# Patient Record
Sex: FEMALE | Race: WHITE
[De-identification: names, ages, dates, MRNs, and addresses within clinical notes are randomized per-mention and may not be internally consistent; named-entity substitution may affect disease eponyms.]

---

## 2018-10-14 ENCOUNTER — HOSPITAL ENCOUNTER (INPATIENT)
Dept: HOSPITAL 54 - ER | Age: 58
LOS: 4 days | Discharge: NURSING, REHAB OR LONG-TERM CARE | DRG: 64 | End: 2018-10-18
Attending: INTERNAL MEDICINE | Admitting: INTERNAL MEDICINE

## 2018-10-14 VITALS — SYSTOLIC BLOOD PRESSURE: 132 MMHG | DIASTOLIC BLOOD PRESSURE: 62 MMHG

## 2018-10-14 VITALS — DIASTOLIC BLOOD PRESSURE: 55 MMHG | SYSTOLIC BLOOD PRESSURE: 125 MMHG

## 2018-10-14 VITALS — BODY MASS INDEX: 37.65 KG/M2 | HEIGHT: 61.5 IN | WEIGHT: 202 LBS

## 2018-10-14 VITALS — DIASTOLIC BLOOD PRESSURE: 56 MMHG | SYSTOLIC BLOOD PRESSURE: 135 MMHG

## 2018-10-14 VITALS — DIASTOLIC BLOOD PRESSURE: 72 MMHG | SYSTOLIC BLOOD PRESSURE: 139 MMHG

## 2018-10-14 VITALS — DIASTOLIC BLOOD PRESSURE: 71 MMHG | SYSTOLIC BLOOD PRESSURE: 145 MMHG

## 2018-10-14 VITALS — DIASTOLIC BLOOD PRESSURE: 79 MMHG | SYSTOLIC BLOOD PRESSURE: 180 MMHG

## 2018-10-14 VITALS — SYSTOLIC BLOOD PRESSURE: 141 MMHG | DIASTOLIC BLOOD PRESSURE: 63 MMHG

## 2018-10-14 DIAGNOSIS — I21.4: ICD-10-CM

## 2018-10-14 DIAGNOSIS — Z87.01: ICD-10-CM

## 2018-10-14 DIAGNOSIS — R29.706: ICD-10-CM

## 2018-10-14 DIAGNOSIS — G81.94: ICD-10-CM

## 2018-10-14 DIAGNOSIS — E78.5: ICD-10-CM

## 2018-10-14 DIAGNOSIS — J96.01: ICD-10-CM

## 2018-10-14 DIAGNOSIS — Z79.84: ICD-10-CM

## 2018-10-14 DIAGNOSIS — I25.10: ICD-10-CM

## 2018-10-14 DIAGNOSIS — I10: ICD-10-CM

## 2018-10-14 DIAGNOSIS — R29.810: ICD-10-CM

## 2018-10-14 DIAGNOSIS — E87.1: ICD-10-CM

## 2018-10-14 DIAGNOSIS — E11.9: ICD-10-CM

## 2018-10-14 DIAGNOSIS — I65.22: ICD-10-CM

## 2018-10-14 DIAGNOSIS — I63.9: Primary | ICD-10-CM

## 2018-10-14 LAB
ALBUMIN SERPL BCP-MCNC: 3.5 G/DL (ref 3.4–5)
ALP SERPL-CCNC: 97 U/L (ref 46–116)
ALT SERPL W P-5'-P-CCNC: 37 U/L (ref 12–78)
APTT PPP: 24 SEC (ref 23–34)
AST SERPL W P-5'-P-CCNC: 13 U/L (ref 15–37)
BASOPHILS # BLD AUTO: 0 /CMM (ref 0–0.2)
BASOPHILS NFR BLD AUTO: 0.2 % (ref 0–2)
BILIRUB DIRECT SERPL-MCNC: 0.1 MG/DL (ref 0–0.2)
BILIRUB SERPL-MCNC: 0.6 MG/DL (ref 0.2–1)
BUN SERPL-MCNC: 17 MG/DL (ref 7–18)
CALCIUM SERPL-MCNC: 9.6 MG/DL (ref 8.5–10.1)
CHLORIDE SERPL-SCNC: 94 MMOL/L (ref 98–107)
CHOLEST SERPL-MCNC: 213 MG/DL (ref ?–200)
CO2 SERPL-SCNC: 28 MMOL/L (ref 21–32)
CREAT SERPL-MCNC: 0.7 MG/DL (ref 0.6–1.3)
EOSINOPHIL NFR BLD AUTO: 1.6 % (ref 0–6)
GLUCOSE SERPL-MCNC: 209 MG/DL (ref 74–106)
HCT VFR BLD AUTO: 41 % (ref 33–45)
HDLC SERPL-MCNC: 45 MG/DL (ref 40–60)
HGB BLD-MCNC: 13.1 G/DL (ref 11.5–14.8)
INR PPP: 0.91 (ref 0.87–1.13)
LDLC SERPL DIRECT ASSAY-MCNC: 129 MG/DL (ref 0–99)
LYMPHOCYTES NFR BLD AUTO: 22.9 % (ref 20–44)
LYMPHOCYTES NFR BLD AUTO: 3 /CMM (ref 0.8–4.8)
MCHC RBC AUTO-ENTMCNC: 32 G/DL (ref 31–36)
MCV RBC AUTO: 82 FL (ref 82–100)
MONOCYTES NFR BLD AUTO: 0.5 /CMM (ref 0.1–1.3)
MONOCYTES NFR BLD AUTO: 4.2 % (ref 2–12)
NEUTROPHILS # BLD AUTO: 9.3 /CMM (ref 1.8–8.9)
NEUTROPHILS NFR BLD AUTO: 71.1 % (ref 43–81)
NT-PROBNP SERPL-MCNC: 42 PG/ML (ref 0–125)
PLATELET # BLD AUTO: 523 /CMM (ref 150–450)
POTASSIUM SERPL-SCNC: 4.4 MMOL/L (ref 3.5–5.1)
PROT SERPL-MCNC: 7.5 G/DL (ref 6.4–8.2)
RBC # BLD AUTO: 5.03 MIL/UL (ref 4–5.2)
RDW COEFFICIENT OF VARIATION: 14.1 (ref 11.5–15)
SODIUM SERPL-SCNC: 128 MMOL/L (ref 136–145)
TRIGL SERPL-MCNC: 411 MG/DL (ref 30–150)
TROPONIN I SERPL-MCNC: < 0.017 NG/ML (ref 0–0.06)
TSH SERPL DL<=0.005 MIU/L-ACNC: 2.78 UIU/ML (ref 0.36–3.74)
WBC NRBC COR # BLD AUTO: 13.1 K/UL (ref 4.3–11)

## 2018-10-14 PROCEDURE — Z7610: HCPCS

## 2018-10-14 PROCEDURE — A6402 STERILE GAUZE <= 16 SQ IN: HCPCS

## 2018-10-14 PROCEDURE — A4606 OXYGEN PROBE USED W OXIMETER: HCPCS

## 2018-10-14 PROCEDURE — G0378 HOSPITAL OBSERVATION PER HR: HCPCS

## 2018-10-14 RX ADMIN — Medication PRN MG: at 18:24

## 2018-10-14 RX ADMIN — ISOSORBIDE DINITRATE SCH MG: 20 TABLET ORAL at 17:00

## 2018-10-14 RX ADMIN — ALBUTEROL SULFATE PRN MG: 2.5 SOLUTION RESPIRATORY (INHALATION) at 13:01

## 2018-10-14 RX ADMIN — INSULIN HUMAN PRN UNIT: 100 INJECTION, SOLUTION PARENTERAL at 13:20

## 2018-10-14 RX ADMIN — LEVOFLOXACIN SCH MG: 500 TABLET, FILM COATED ORAL at 13:15

## 2018-10-14 RX ADMIN — ISOSORBIDE DINITRATE SCH MG: 20 TABLET ORAL at 13:16

## 2018-10-14 RX ADMIN — Medication SCH MG: at 15:30

## 2018-10-14 RX ADMIN — SODIUM CHLORIDE PRN MLS/HR: 9 INJECTION, SOLUTION INTRAVENOUS at 17:00

## 2018-10-14 RX ADMIN — Medication SCH MG: at 17:39

## 2018-10-14 RX ADMIN — ALBUTEROL SULFATE PRN MG: 2.5 SOLUTION RESPIRATORY (INHALATION) at 17:40

## 2018-10-14 RX ADMIN — Medication SCH EACH: at 18:31

## 2018-10-14 RX ADMIN — Medication PRN MG: at 14:22

## 2018-10-14 RX ADMIN — Medication SCH EACH: at 13:20

## 2018-10-14 RX ADMIN — Medication SCH MG: at 12:27

## 2018-10-14 RX ADMIN — Medication SCH MG: at 23:00

## 2018-10-14 RX ADMIN — INSULIN HUMAN PRN UNIT: 100 INJECTION, SOLUTION PARENTERAL at 18:33

## 2018-10-14 RX ADMIN — ALBUTEROL SULFATE PRN MG: 2.5 SOLUTION RESPIRATORY (INHALATION) at 21:12

## 2018-10-14 NOTE — NUR
ERROL RN,SPOKE WITH   PATIENT MRI shows acute infarct right basal ganglia internal 
capsul/ischemic  .  patient will be transferred to icu per MD  nursing supervisor was 
notified

## 2018-10-14 NOTE — NUR
TELE RN NOTE



PATIENT BLOOD PRESSURE 125/55. ADMINISTERED ISOSORBIDE PO BUT HELD LOPRESSOR FOR NOW. PER 
DR. SHETTY, THE GOAL IS TO KEEP BLOOD PRESSURE SLIGHTLY ELEVATED TO ENSURE ADEQUATE ORGAN 
PERFUSION.

## 2018-10-14 NOTE — NUR
TELE RN NOTE



PATIENT CARE ENDORSED TO AUBREY HORN. PATIENT CURRENTLY RESTING IN BED IN STABLE CONDITION.

## 2018-10-14 NOTE — NUR
RN NOTE



RECEIVED PT IN NO ACUTE DISTRESS IN BED. PT IS A/O X 3 AND ABLE TO MAKE NEEDS KNOWN. PT IS 
ON O2 VIA NC @ 4 LPM AND TOLERATING WELL WITH O2 SAT @ 97%. PT HAS VISIBLE FACIAL DROOP ON 
THE LEFT SIDE OF MOUTH AND LEFT SIDE OF BODY. PT IS ABLE TO COMMUNICATE WELL WITH GOOD 
ANNUNCIATION OF WORDS. PT IS ABLE TO LIFT LEFT ARM AND LEG WITH SLIGHT DRIFT. PT NOT C/O ANY 
SOB, DIFFICULTY BREATHING OR PAIN AT THIS TIME. PT HAS RIGHT AC 18G THAT IS CLEAN DRY INTACT 
AND PATENT WITH NS @ 80ML/HR. PT HAS LEFT AC 20 G THAT IS CLEAN DRY INTACT AND PATENT WITH 
SALINE FLUSH. BED IN LOW LOCK POSITION WITH RIALS UP X 2. CALL LIGHT WITHIN REACH AND ALL 
SAFETY MEASURES ENSURED AND CARRIED OUT. WILL CONTINUE TO MONITOR PT.

## 2018-10-14 NOTE — NUR
TELE RN NOTE



RECEIVED PATIENT FROM THE ER AT 11:30 ACCOMPANIED BY AUBREY EPSTEIN AND ER TECH. PATIENT 
COMPLAINING OF SHORTNESS OF BREATH AND DIFFICULTY BREATHING. PLACED ON OXYGEN VIA NASAL 
CANNULA AT 4LPM. BREATHING TREATMENT ORDERS INITIATED AND PROVIDED. PLACED ON CARDIAC 
MONITOR, SINUS TACHYCARDIA WITH A HEART RATE . SPO2 97% ON 4LPM. ORIENTED TO UNIT, BED 
IN LOW AND LOCKED POSITION, CALL LIGHT WITHIN REACH, WILL CONTINUE TO MONITOR CLOSELY.

## 2018-10-14 NOTE — NUR
RN ERROL NOTE 



PATIENT IS AOX2-3, SPEECH CLEAR, ON 5L O2 VIA NC, ON TELE SR, TO BE TRANSFERRED TO ICU DUE 
TO MRI RESULTS, LEFT SIDED WEAKNESS, RAC #18G WITH NS AT 80 ML/HR AND LAC #20G SL, R SIDED 
HEADACHE/CHEST/NECK PER REPORT GIVEN MORPHINE, PT IS READY FOR TRANSFER WILL CALL ICU TO GO 
GO TO , SAFETY MAINTAINED AT ALL TIMES, BED IN LOW LOCKED POSITION, CALL LIGHT WITHIN 
REACH, WILL CONTINUE TO MONITOR FOR ANY CHANGES.

## 2018-10-14 NOTE — NUR
TELE RN NOTE



PATIENT COMPLAINING OF CHEST PALPITATIONS AND NECK, HEAD, SHOULDER, AND CHEST PAIN. PATIENT 
VERY ANXIOUS.  SINUS TACHYCARDIA WITH A HEART RATE RANGING BETWEEN 130 . PAGED DR. SHETTY AND DR. ROSS AND AND RECEIVED ORDERS FOR IVP MORPHINE AND IVP LOPRESSOR.

## 2018-10-14 NOTE — NUR
RN ERROL NOTES 



SPOKE WITH DR FISHER AND SAEED RESULTS OF MRI.ORDERS TO GIVE RESULTS TO DE EDUCO AND  
PATIENT TO BE TRANSFERRED TO ICU ROOM 258

## 2018-10-14 NOTE — NUR
RN ERROL NURSE CHANGE OF CONDITION



READ BACK RESULTS FROM RADIOLOGY RE: MRI PATIENT HAS ACUTE INFARCT (R) BASAL 
GANGLIA/INTERNAL CAPSULE. CALLED DR. AARON REGARDING RESULTS.

## 2018-10-14 NOTE — NUR
PT BIB RA WHO CAME IN DUE TO LEFT SIDED WEAKNESS AND LEFT SIDE FACIAL DROOPING. 
 DR. RIVAS AT BEDSIDE FOR EVAL FOR STROKE ASSESSMENT.  PATIENT ABLE TO 
VERBALIZE.  ALERT AND ORIENTED X 4, VERBALLY RESPONSIVE.  ON 02 @ 2LPM VIA NC.  
KEPT COMFORTABLE. WILL CONTINUE TO MONITOR ACCORDINGLY.

## 2018-10-15 VITALS — DIASTOLIC BLOOD PRESSURE: 64 MMHG | SYSTOLIC BLOOD PRESSURE: 155 MMHG

## 2018-10-15 VITALS — SYSTOLIC BLOOD PRESSURE: 181 MMHG | DIASTOLIC BLOOD PRESSURE: 130 MMHG

## 2018-10-15 VITALS — DIASTOLIC BLOOD PRESSURE: 60 MMHG | SYSTOLIC BLOOD PRESSURE: 143 MMHG

## 2018-10-15 VITALS — SYSTOLIC BLOOD PRESSURE: 136 MMHG | DIASTOLIC BLOOD PRESSURE: 67 MMHG

## 2018-10-15 VITALS — DIASTOLIC BLOOD PRESSURE: 80 MMHG | SYSTOLIC BLOOD PRESSURE: 144 MMHG

## 2018-10-15 VITALS — SYSTOLIC BLOOD PRESSURE: 155 MMHG | DIASTOLIC BLOOD PRESSURE: 63 MMHG

## 2018-10-15 VITALS — DIASTOLIC BLOOD PRESSURE: 59 MMHG | SYSTOLIC BLOOD PRESSURE: 129 MMHG

## 2018-10-15 VITALS — DIASTOLIC BLOOD PRESSURE: 70 MMHG | SYSTOLIC BLOOD PRESSURE: 145 MMHG

## 2018-10-15 VITALS — SYSTOLIC BLOOD PRESSURE: 155 MMHG | DIASTOLIC BLOOD PRESSURE: 74 MMHG

## 2018-10-15 VITALS — DIASTOLIC BLOOD PRESSURE: 45 MMHG | SYSTOLIC BLOOD PRESSURE: 135 MMHG

## 2018-10-15 VITALS — DIASTOLIC BLOOD PRESSURE: 88 MMHG | SYSTOLIC BLOOD PRESSURE: 146 MMHG

## 2018-10-15 VITALS — SYSTOLIC BLOOD PRESSURE: 145 MMHG | DIASTOLIC BLOOD PRESSURE: 74 MMHG

## 2018-10-15 VITALS — DIASTOLIC BLOOD PRESSURE: 64 MMHG | SYSTOLIC BLOOD PRESSURE: 160 MMHG

## 2018-10-15 VITALS — SYSTOLIC BLOOD PRESSURE: 125 MMHG | DIASTOLIC BLOOD PRESSURE: 52 MMHG

## 2018-10-15 VITALS — DIASTOLIC BLOOD PRESSURE: 74 MMHG | SYSTOLIC BLOOD PRESSURE: 167 MMHG

## 2018-10-15 VITALS — SYSTOLIC BLOOD PRESSURE: 143 MMHG | DIASTOLIC BLOOD PRESSURE: 67 MMHG

## 2018-10-15 VITALS — DIASTOLIC BLOOD PRESSURE: 64 MMHG | SYSTOLIC BLOOD PRESSURE: 135 MMHG

## 2018-10-15 VITALS — DIASTOLIC BLOOD PRESSURE: 62 MMHG | SYSTOLIC BLOOD PRESSURE: 140 MMHG

## 2018-10-15 VITALS — SYSTOLIC BLOOD PRESSURE: 164 MMHG | DIASTOLIC BLOOD PRESSURE: 75 MMHG

## 2018-10-15 VITALS — DIASTOLIC BLOOD PRESSURE: 54 MMHG | SYSTOLIC BLOOD PRESSURE: 129 MMHG

## 2018-10-15 VITALS — DIASTOLIC BLOOD PRESSURE: 67 MMHG | SYSTOLIC BLOOD PRESSURE: 151 MMHG

## 2018-10-15 VITALS — SYSTOLIC BLOOD PRESSURE: 137 MMHG | DIASTOLIC BLOOD PRESSURE: 72 MMHG

## 2018-10-15 LAB
APPEARANCE UR: (no result)
APPEARANCE UR: (no result)
APTT PPP: 25 SEC (ref 23–34)
BASOPHILS # BLD AUTO: 0 /CMM (ref 0–0.2)
BASOPHILS NFR BLD AUTO: 0.2 % (ref 0–2)
BILIRUB UR QL STRIP: NEGATIVE
BILIRUB UR QL STRIP: NEGATIVE
BUN SERPL-MCNC: 16 MG/DL (ref 7–18)
CALCIUM SERPL-MCNC: 8.4 MG/DL (ref 8.5–10.1)
CHLORIDE SERPL-SCNC: 99 MMOL/L (ref 98–107)
CHOLEST SERPL-MCNC: 209 MG/DL (ref ?–200)
CO2 SERPL-SCNC: 28 MMOL/L (ref 21–32)
COLOR UR: YELLOW
COLOR UR: YELLOW
CREAT SERPL-MCNC: 0.6 MG/DL (ref 0.6–1.3)
EOSINOPHIL NFR BLD AUTO: 0.6 % (ref 0–6)
GLUCOSE SERPL-MCNC: 183 MG/DL (ref 74–106)
GLUCOSE UR STRIP-MCNC: (no result) MG/DL
GLUCOSE UR STRIP-MCNC: (no result) MG/DL
HCT VFR BLD AUTO: 37 % (ref 33–45)
HDLC SERPL-MCNC: 42 MG/DL (ref 40–60)
HGB BLD-MCNC: 12.1 G/DL (ref 11.5–14.8)
HGB UR QL STRIP: (no result) ERY/UL
HGB UR QL STRIP: NEGATIVE ERY/UL
INR PPP: 0.96 (ref 0.87–1.13)
KETONES UR STRIP-MCNC: NEGATIVE MG/DL
KETONES UR STRIP-MCNC: NEGATIVE MG/DL
LDLC SERPL DIRECT ASSAY-MCNC: 142 MG/DL (ref 0–99)
LEUKOCYTE ESTERASE UR QL STRIP: NEGATIVE
LEUKOCYTE ESTERASE UR QL STRIP: NEGATIVE
LYMPHOCYTES NFR BLD AUTO: 17.3 % (ref 20–44)
LYMPHOCYTES NFR BLD AUTO: 2.4 /CMM (ref 0.8–4.8)
MCHC RBC AUTO-ENTMCNC: 33 G/DL (ref 31–36)
MCV RBC AUTO: 82 FL (ref 82–100)
MONOCYTES NFR BLD AUTO: 0.8 /CMM (ref 0.1–1.3)
MONOCYTES NFR BLD AUTO: 5.6 % (ref 2–12)
NEUTROPHILS # BLD AUTO: 10.4 /CMM (ref 1.8–8.9)
NEUTROPHILS NFR BLD AUTO: 76.3 % (ref 43–81)
NITRITE UR QL STRIP: NEGATIVE
NITRITE UR QL STRIP: NEGATIVE
PH UR STRIP: 6 [PH] (ref 5–8)
PH UR STRIP: 6.5 [PH] (ref 5–8)
PLATELET # BLD AUTO: 452 /CMM (ref 150–450)
POTASSIUM SERPL-SCNC: 4.1 MMOL/L (ref 3.5–5.1)
PROT UR QL STRIP: (no result) MG/DL
PROT UR QL STRIP: NEGATIVE MG/DL
RBC # BLD AUTO: 4.54 MIL/UL (ref 4–5.2)
RBC #/AREA URNS HPF: (no result) /HPF (ref 0–2)
RBC #/AREA URNS HPF: (no result) /HPF (ref 0–2)
RDW COEFFICIENT OF VARIATION: 14.5 (ref 11.5–15)
SODIUM SERPL-SCNC: 135 MMOL/L (ref 136–145)
TRIGL SERPL-MCNC: 206 MG/DL (ref 30–150)
UROBILINOGEN UR STRIP-MCNC: 0.2 EU/DL
UROBILINOGEN UR STRIP-MCNC: 0.2 EU/DL
WBC #/AREA URNS HPF: (no result) /HPF (ref 0–3)
WBC #/AREA URNS HPF: (no result) /HPF (ref 0–3)
WBC NRBC COR # BLD AUTO: 13.7 K/UL (ref 4.3–11)

## 2018-10-15 RX ADMIN — ALBUTEROL SULFATE PRN MG: 2.5 SOLUTION RESPIRATORY (INHALATION) at 23:40

## 2018-10-15 RX ADMIN — FLUTICASONE FUROATE AND VILANTEROL TRIFENATATE SCH EACH: 100; 25 POWDER RESPIRATORY (INHALATION) at 10:06

## 2018-10-15 RX ADMIN — Medication SCH EACH: at 18:02

## 2018-10-15 RX ADMIN — Medication PRN MG: at 18:04

## 2018-10-15 RX ADMIN — METOPROLOL TARTRATE SCH MG: 25 TABLET, FILM COATED ORAL at 08:16

## 2018-10-15 RX ADMIN — SODIUM CHLORIDE PRN MLS/HR: 9 INJECTION, SOLUTION INTRAVENOUS at 18:02

## 2018-10-15 RX ADMIN — LEVOFLOXACIN SCH MG: 500 TABLET, FILM COATED ORAL at 13:36

## 2018-10-15 RX ADMIN — INSULIN HUMAN PRN UNIT: 100 INJECTION, SOLUTION PARENTERAL at 18:05

## 2018-10-15 RX ADMIN — Medication SCH MG: at 23:40

## 2018-10-15 RX ADMIN — Medication PRN MG: at 21:10

## 2018-10-15 RX ADMIN — ALBUTEROL SULFATE PRN MG: 2.5 SOLUTION RESPIRATORY (INHALATION) at 08:02

## 2018-10-15 RX ADMIN — METOPROLOL TARTRATE SCH MG: 25 TABLET, FILM COATED ORAL at 21:09

## 2018-10-15 RX ADMIN — EZETIMIBE SCH MG: 10 TABLET ORAL at 08:15

## 2018-10-15 RX ADMIN — Medication SCH MG: at 08:02

## 2018-10-15 RX ADMIN — Medication SCH MG: at 11:25

## 2018-10-15 RX ADMIN — Medication SCH EACH: at 12:18

## 2018-10-15 RX ADMIN — PANTOPRAZOLE SODIUM SCH MG: 40 TABLET, DELAYED RELEASE ORAL at 08:15

## 2018-10-15 RX ADMIN — Medication SCH MG: at 11:20

## 2018-10-15 RX ADMIN — Medication SCH EACH: at 06:34

## 2018-10-15 RX ADMIN — Medication SCH EACH: at 00:41

## 2018-10-15 RX ADMIN — Medication SCH MG: at 19:08

## 2018-10-15 RX ADMIN — ALBUTEROL SULFATE PRN MG: 2.5 SOLUTION RESPIRATORY (INHALATION) at 19:08

## 2018-10-15 RX ADMIN — Medication PRN MG: at 08:10

## 2018-10-15 RX ADMIN — ALBUTEROL SULFATE PRN MG: 2.5 SOLUTION RESPIRATORY (INHALATION) at 11:25

## 2018-10-15 RX ADMIN — Medication SCH MG: at 15:33

## 2018-10-15 RX ADMIN — SODIUM CHLORIDE PRN MLS/HR: 9 INJECTION, SOLUTION INTRAVENOUS at 06:12

## 2018-10-15 RX ADMIN — Medication PRN MG: at 02:22

## 2018-10-15 RX ADMIN — INSULIN HUMAN PRN UNIT: 100 INJECTION, SOLUTION PARENTERAL at 13:35

## 2018-10-15 NOTE — NUR
WOUND CARE CONSULT: PT HAVING PROCEDURE AT THIS TIME. DISCUSSED SKIN PROTECTION WITH NURSING 
STAFF. WILL SEE PT AS PT CONDITION PERMITS.

## 2018-10-15 NOTE — NUR
ICU/RN:



Dr Lake at bedside, POC discussed at length, labs and imaging reviewed. New orders noted 
and carried out.

## 2018-10-15 NOTE — NUR
ICU/RN:



Dr Fraga at bedside, updated on pt status. Lengthy discussion with pt regarding POC. Per 
MD, hold CT Chest with contrast for now, redajust breathing tx and continue advair. Per MD 
continue current pain management.

## 2018-10-15 NOTE — NUR
ERROL RN NOTES



RECEIVED PATIENT FROM ICU, AAO X4, ON 4L O2, RESPIRATION UNLABORED, ST  ON 
TELEMONITOR, DENIES PAIN, RAC G18 WITH NS AT 80 ML/HR, LAC G20 FLUSHES WELL. BOTH SITES 
CLEAR. DUBOIS CATH IN PLACE, DRAINING YELLOW COLORED URINE TO GRAVITY. SOFT DIET. CALL LIGHT 
WITHIN REACH, BED LOW LOCKED, WILL CONT TO MONITOR.

## 2018-10-15 NOTE — NUR
RN NOTE



RECHECKED TEMP AND IT IS STILL .0. STARTED COOLING MEASURES WITH ICE IN AXILLARY AND 
FAN. WILL RECHECK.

## 2018-10-15 NOTE — NUR
ICU/RN:



Pt received, A&Ox3, no distress noted. Pt follows commands, L sided upper ext weakness noted 
with slight L sided facial droop. No visual gaze deficit noted. Educated on stroke and s/s. 
IVF infusing well. FC draining to gravity. Will cont to monitor pt.

## 2018-10-15 NOTE — NUR
ICU/RN:



Pt s/p breathing tx; morphine effective in managing pain. IVF infusing well through RAC. 
Transferred to [facility] 104 in stable condition. Paxil [pt home med] sent to Rx per protocol, 
inpatient meds placed in casette. All belongings with pt. Report given to AUBREY Galvez for NAHUN.

## 2018-10-16 VITALS — SYSTOLIC BLOOD PRESSURE: 174 MMHG | DIASTOLIC BLOOD PRESSURE: 75 MMHG

## 2018-10-16 VITALS — DIASTOLIC BLOOD PRESSURE: 72 MMHG | SYSTOLIC BLOOD PRESSURE: 142 MMHG

## 2018-10-16 VITALS — DIASTOLIC BLOOD PRESSURE: 60 MMHG | SYSTOLIC BLOOD PRESSURE: 125 MMHG

## 2018-10-16 VITALS — DIASTOLIC BLOOD PRESSURE: 54 MMHG | SYSTOLIC BLOOD PRESSURE: 130 MMHG

## 2018-10-16 VITALS — SYSTOLIC BLOOD PRESSURE: 142 MMHG | DIASTOLIC BLOOD PRESSURE: 72 MMHG

## 2018-10-16 VITALS — SYSTOLIC BLOOD PRESSURE: 131 MMHG | DIASTOLIC BLOOD PRESSURE: 80 MMHG

## 2018-10-16 VITALS — SYSTOLIC BLOOD PRESSURE: 122 MMHG | DIASTOLIC BLOOD PRESSURE: 54 MMHG

## 2018-10-16 LAB
BASOPHILS # BLD AUTO: 0.1 /CMM (ref 0–0.2)
BASOPHILS NFR BLD AUTO: 0.8 % (ref 0–2)
BUN SERPL-MCNC: 10 MG/DL (ref 7–18)
CALCIUM SERPL-MCNC: 8.4 MG/DL (ref 8.5–10.1)
CHLORIDE SERPL-SCNC: 97 MMOL/L (ref 98–107)
CO2 SERPL-SCNC: 27 MMOL/L (ref 21–32)
CREAT SERPL-MCNC: 0.6 MG/DL (ref 0.6–1.3)
EOSINOPHIL NFR BLD AUTO: 1.3 % (ref 0–6)
GLUCOSE SERPL-MCNC: 177 MG/DL (ref 74–106)
HCT VFR BLD AUTO: 35 % (ref 33–45)
HGB BLD-MCNC: 11.5 G/DL (ref 11.5–14.8)
LYMPHOCYTES NFR BLD AUTO: 25 % (ref 20–44)
LYMPHOCYTES NFR BLD AUTO: 3.2 /CMM (ref 0.8–4.8)
MAGNESIUM SERPL-MCNC: 1.9 MG/DL (ref 1.8–2.4)
MCHC RBC AUTO-ENTMCNC: 33 G/DL (ref 31–36)
MCV RBC AUTO: 82 FL (ref 82–100)
MONOCYTES NFR BLD AUTO: 0.9 /CMM (ref 0.1–1.3)
MONOCYTES NFR BLD AUTO: 7.4 % (ref 2–12)
NEUTROPHILS # BLD AUTO: 8.4 /CMM (ref 1.8–8.9)
NEUTROPHILS NFR BLD AUTO: 65.5 % (ref 43–81)
PHOSPHATE SERPL-MCNC: 3 MG/DL (ref 2.5–4.9)
PLATELET # BLD AUTO: 374 /CMM (ref 150–450)
POTASSIUM SERPL-SCNC: 4.3 MMOL/L (ref 3.5–5.1)
RBC # BLD AUTO: 4.28 MIL/UL (ref 4–5.2)
RDW COEFFICIENT OF VARIATION: 14.3 (ref 11.5–15)
SODIUM SERPL-SCNC: 132 MMOL/L (ref 136–145)
WBC NRBC COR # BLD AUTO: 12.8 K/UL (ref 4.3–11)

## 2018-10-16 RX ADMIN — Medication SCH MG: at 08:38

## 2018-10-16 RX ADMIN — SIMVASTATIN SCH MG: 40 TABLET, FILM COATED ORAL at 21:25

## 2018-10-16 RX ADMIN — Medication PRN MG: at 22:27

## 2018-10-16 RX ADMIN — Medication SCH MG: at 16:08

## 2018-10-16 RX ADMIN — ACETAMINOPHEN PRN MG: 325 TABLET ORAL at 23:29

## 2018-10-16 RX ADMIN — ISOSORBIDE DINITRATE SCH MG: 20 TABLET ORAL at 16:57

## 2018-10-16 RX ADMIN — Medication SCH MG: at 10:46

## 2018-10-16 RX ADMIN — Medication PRN MG: at 00:10

## 2018-10-16 RX ADMIN — INSULIN HUMAN PRN UNIT: 100 INJECTION, SOLUTION PARENTERAL at 17:42

## 2018-10-16 RX ADMIN — ALBUTEROL SULFATE PRN MG: 2.5 SOLUTION RESPIRATORY (INHALATION) at 12:47

## 2018-10-16 RX ADMIN — METOPROLOL TARTRATE SCH MG: 25 TABLET, FILM COATED ORAL at 23:29

## 2018-10-16 RX ADMIN — MUPIROCIN SCH GM: 20 OINTMENT TOPICAL at 21:25

## 2018-10-16 RX ADMIN — METOPROLOL TARTRATE SCH MG: 25 TABLET, FILM COATED ORAL at 12:31

## 2018-10-16 RX ADMIN — MUPIROCIN SCH GM: 20 OINTMENT TOPICAL at 13:40

## 2018-10-16 RX ADMIN — Medication SCH EACH: at 16:57

## 2018-10-16 RX ADMIN — Medication PRN MG: at 19:00

## 2018-10-16 RX ADMIN — Medication SCH EA: at 13:40

## 2018-10-16 RX ADMIN — Medication PRN MG: at 05:08

## 2018-10-16 RX ADMIN — FLUTICASONE FUROATE AND VILANTEROL TRIFENATATE SCH EACH: 100; 25 POWDER RESPIRATORY (INHALATION) at 08:38

## 2018-10-16 RX ADMIN — INSULIN HUMAN PRN UNIT: 100 INJECTION, SOLUTION PARENTERAL at 05:28

## 2018-10-16 RX ADMIN — INSULIN HUMAN PRN UNIT: 100 INJECTION, SOLUTION PARENTERAL at 12:40

## 2018-10-16 RX ADMIN — Medication SCH EACH: at 05:26

## 2018-10-16 RX ADMIN — Medication SCH MG: at 19:08

## 2018-10-16 RX ADMIN — INSULIN HUMAN PRN UNIT: 100 INJECTION, SOLUTION PARENTERAL at 00:05

## 2018-10-16 RX ADMIN — LISINOPRIL SCH MG: 10 TABLET ORAL at 12:43

## 2018-10-16 RX ADMIN — ALBUTEROL SULFATE PRN MG: 2.5 SOLUTION RESPIRATORY (INHALATION) at 23:15

## 2018-10-16 RX ADMIN — Medication SCH EACH: at 21:18

## 2018-10-16 RX ADMIN — Medication SCH MG: at 23:15

## 2018-10-16 RX ADMIN — ACETAMINOPHEN PRN MG: 325 TABLET ORAL at 06:56

## 2018-10-16 RX ADMIN — SODIUM CHLORIDE PRN MLS/HR: 9 INJECTION, SOLUTION INTRAVENOUS at 08:46

## 2018-10-16 RX ADMIN — EZETIMIBE SCH MG: 10 TABLET ORAL at 08:38

## 2018-10-16 RX ADMIN — SODIUM CHLORIDE PRN MLS/HR: 9 INJECTION, SOLUTION INTRAVENOUS at 06:37

## 2018-10-16 RX ADMIN — Medication SCH MG: at 07:14

## 2018-10-16 RX ADMIN — Medication PRN MG: at 12:31

## 2018-10-16 RX ADMIN — LEVOFLOXACIN SCH MG: 500 TABLET, FILM COATED ORAL at 12:30

## 2018-10-16 RX ADMIN — Medication PRN MG: at 16:02

## 2018-10-16 RX ADMIN — Medication PRN MG: at 08:39

## 2018-10-16 RX ADMIN — ALBUTEROL SULFATE PRN MG: 2.5 SOLUTION RESPIRATORY (INHALATION) at 19:08

## 2018-10-16 RX ADMIN — Medication SCH EACH: at 00:03

## 2018-10-16 RX ADMIN — PANTOPRAZOLE SODIUM SCH MG: 40 TABLET, DELAYED RELEASE ORAL at 08:37

## 2018-10-16 RX ADMIN — ALBUTEROL SULFATE PRN MG: 2.5 SOLUTION RESPIRATORY (INHALATION) at 16:16

## 2018-10-16 RX ADMIN — Medication SCH MG: at 03:10

## 2018-10-16 RX ADMIN — POLYETHYLENE GLYCOL 3350 SCH GM: 17 POWDER, FOR SOLUTION ORAL at 21:22

## 2018-10-16 RX ADMIN — Medication SCH EACH: at 12:42

## 2018-10-16 RX ADMIN — ALBUTEROL SULFATE PRN MG: 2.5 SOLUTION RESPIRATORY (INHALATION) at 03:10

## 2018-10-16 RX ADMIN — INSULIN HUMAN PRN UNIT: 100 INJECTION, SOLUTION PARENTERAL at 21:28

## 2018-10-16 RX ADMIN — METOPROLOL TARTRATE SCH MG: 25 TABLET, FILM COATED ORAL at 08:38

## 2018-10-16 RX ADMIN — METOPROLOL TARTRATE SCH MG: 25 TABLET, FILM COATED ORAL at 17:25

## 2018-10-16 NOTE — NUR
ERROL RN NOTE 

ABLE TO URINATE 300 ML OF YELLOW COLOR URINE AFTER DUBOIS CATH HAS BEEN REMOVED ,KEEP CLEAN 
AND DRY ,ALL NEEDS ATTENDED, WILL CONT TO MONITOR CLOSELY

## 2018-10-16 NOTE — NUR
PT REFUSED LIPITOR, STATED IT MAKES HER STOMACH HURTS AND REQUESTED CRESTOR. SPOKE TO MD NASH, WILL FOLLOW UP IN AM WITH MD BURCH.

## 2018-10-16 NOTE — NUR
TELE RN NOTE 

RESTING COMFORTABLY , ON TELE MONITOR SR 91 WITH DUBOIS CATH  TO GRAVITY WITH YELLOW COLOR 
URINE,  LT AC HL INTACT ON IVF AS ORDERED BED IN LOWEST AND LOCKED POSITION ,  WILL COMT TO 
MONITOR CLOSELY IN BED , SLEEPING AT THIS TRES E, PER RT BREATHING TX DOING NOW ,

## 2018-10-16 NOTE — NUR
RN/ERROL NOTES:



RECEIVED PT. IN BED W/ HOB ELEVATED. A/O X 4. DENIES ANY C/O CHEST PAIN. ON TELE MONITOR W/ 
SR @ 69. W/ LEFT AC G 20 PATENT AND INTACT W/ NO S/S OF INFECTION/INFILTRATION NOTED. W/ 
LEFT SIDE WEAKNESS NOTED. ABLE TO MAKE NEEDS KNOWN. ABLE TO SWALLOW PILLS. CALL LIGHT 
W/REACH. WILL CONTINUE TO MONITOR.

## 2018-10-16 NOTE — NUR
WOUND CARE CONSULT: PT PRESENTS WITH INTACT SKIN. DUBOIS CATH NOTED. ALL SKIN PROTECTION 
RECOMMENDATIONS DISCUSSED WITH NURSING STAFF. WILL SEE PRN. CURRENT CONRAD SCORE IS 16. MD 
IN AGREEMENT WITH PLAN OF CARE.

## 2018-10-16 NOTE — NUR
ERROL RN NOTE 

ST AT BEDSIDE ,ALL NEEDS ATTENDED

-------------------------------------------------------------------------------

Addendum: 10/16/18 at 1048 by DOMI KRAMER RN

-------------------------------------------------------------------------------

per dr melisa ovalle  to change Accu check ac and hs

## 2018-10-17 VITALS — SYSTOLIC BLOOD PRESSURE: 134 MMHG | DIASTOLIC BLOOD PRESSURE: 49 MMHG

## 2018-10-17 VITALS — DIASTOLIC BLOOD PRESSURE: 59 MMHG | SYSTOLIC BLOOD PRESSURE: 136 MMHG

## 2018-10-17 VITALS — SYSTOLIC BLOOD PRESSURE: 136 MMHG | DIASTOLIC BLOOD PRESSURE: 59 MMHG

## 2018-10-17 VITALS — DIASTOLIC BLOOD PRESSURE: 66 MMHG | SYSTOLIC BLOOD PRESSURE: 150 MMHG

## 2018-10-17 VITALS — SYSTOLIC BLOOD PRESSURE: 150 MMHG | DIASTOLIC BLOOD PRESSURE: 48 MMHG

## 2018-10-17 VITALS — SYSTOLIC BLOOD PRESSURE: 121 MMHG | DIASTOLIC BLOOD PRESSURE: 46 MMHG

## 2018-10-17 VITALS — DIASTOLIC BLOOD PRESSURE: 62 MMHG | SYSTOLIC BLOOD PRESSURE: 116 MMHG

## 2018-10-17 RX ADMIN — Medication SCH MG: at 15:46

## 2018-10-17 RX ADMIN — ISOSORBIDE DINITRATE SCH MG: 20 TABLET ORAL at 08:27

## 2018-10-17 RX ADMIN — CYCLOBENZAPRINE HYDROCHLORIDE PRN MG: 10 TABLET, FILM COATED ORAL at 21:53

## 2018-10-17 RX ADMIN — METOPROLOL TARTRATE SCH MG: 25 TABLET, FILM COATED ORAL at 17:58

## 2018-10-17 RX ADMIN — LOSARTAN POTASSIUM SCH MG: 50 TABLET, FILM COATED ORAL at 08:26

## 2018-10-17 RX ADMIN — ACETYLCYSTEINE SCH MG: 200 INHALANT RESPIRATORY (INHALATION) at 15:46

## 2018-10-17 RX ADMIN — MUPIROCIN SCH GM: 20 OINTMENT TOPICAL at 21:47

## 2018-10-17 RX ADMIN — ALBUTEROL SULFATE PRN MG: 2.5 SOLUTION RESPIRATORY (INHALATION) at 23:28

## 2018-10-17 RX ADMIN — Medication SCH MG: at 07:49

## 2018-10-17 RX ADMIN — FLUTICASONE FUROATE AND VILANTEROL TRIFENATATE SCH EACH: 100; 25 POWDER RESPIRATORY (INHALATION) at 08:20

## 2018-10-17 RX ADMIN — INSULIN HUMAN PRN UNIT: 100 INJECTION, SOLUTION PARENTERAL at 18:18

## 2018-10-17 RX ADMIN — ALBUTEROL SULFATE PRN MG: 2.5 SOLUTION RESPIRATORY (INHALATION) at 03:16

## 2018-10-17 RX ADMIN — Medication SCH EA: at 08:27

## 2018-10-17 RX ADMIN — SIMVASTATIN SCH MG: 40 TABLET, FILM COATED ORAL at 21:49

## 2018-10-17 RX ADMIN — MUPIROCIN SCH GM: 20 OINTMENT TOPICAL at 08:28

## 2018-10-17 RX ADMIN — ISOSORBIDE DINITRATE SCH MG: 20 TABLET ORAL at 16:31

## 2018-10-17 RX ADMIN — ACETAMINOPHEN PRN MG: 325 TABLET ORAL at 17:57

## 2018-10-17 RX ADMIN — Medication SCH MG: at 19:11

## 2018-10-17 RX ADMIN — ACETYLCYSTEINE SCH MG: 200 INHALANT RESPIRATORY (INHALATION) at 23:33

## 2018-10-17 RX ADMIN — Medication SCH EACH: at 12:52

## 2018-10-17 RX ADMIN — Medication PRN MG: at 08:41

## 2018-10-17 RX ADMIN — LEVOFLOXACIN SCH MG: 500 TABLET, FILM COATED ORAL at 12:52

## 2018-10-17 RX ADMIN — ACETYLCYSTEINE SCH MG: 200 INHALANT RESPIRATORY (INHALATION) at 12:00

## 2018-10-17 RX ADMIN — Medication SCH EACH: at 21:48

## 2018-10-17 RX ADMIN — METOPROLOL TARTRATE SCH MG: 25 TABLET, FILM COATED ORAL at 12:53

## 2018-10-17 RX ADMIN — Medication PRN MG: at 16:29

## 2018-10-17 RX ADMIN — INSULIN HUMAN PRN UNIT: 100 INJECTION, SOLUTION PARENTERAL at 08:24

## 2018-10-17 RX ADMIN — ALBUTEROL SULFATE PRN MG: 2.5 SOLUTION RESPIRATORY (INHALATION) at 07:49

## 2018-10-17 RX ADMIN — ALBUTEROL SULFATE PRN MG: 2.5 SOLUTION RESPIRATORY (INHALATION) at 19:16

## 2018-10-17 RX ADMIN — Medication SCH MG: at 18:44

## 2018-10-17 RX ADMIN — Medication PRN MG: at 20:02

## 2018-10-17 RX ADMIN — INSULIN HUMAN PRN UNIT: 100 INJECTION, SOLUTION PARENTERAL at 12:55

## 2018-10-17 RX ADMIN — Medication SCH MG: at 03:17

## 2018-10-17 RX ADMIN — Medication PRN MG: at 23:50

## 2018-10-17 RX ADMIN — Medication PRN MG: at 13:15

## 2018-10-17 RX ADMIN — Medication SCH EACH: at 08:19

## 2018-10-17 RX ADMIN — Medication SCH MG: at 12:00

## 2018-10-17 RX ADMIN — Medication SCH EACH: at 17:57

## 2018-10-17 RX ADMIN — LISINOPRIL SCH MG: 10 TABLET ORAL at 08:25

## 2018-10-17 RX ADMIN — Medication SCH MG: at 08:25

## 2018-10-17 RX ADMIN — METOPROLOL TARTRATE SCH MG: 25 TABLET, FILM COATED ORAL at 05:56

## 2018-10-17 RX ADMIN — EZETIMIBE SCH MG: 10 TABLET ORAL at 08:40

## 2018-10-17 RX ADMIN — PANTOPRAZOLE SODIUM SCH MG: 40 TABLET, DELAYED RELEASE ORAL at 08:20

## 2018-10-17 RX ADMIN — Medication SCH MG: at 23:33

## 2018-10-17 RX ADMIN — POLYETHYLENE GLYCOL 3350 SCH GM: 17 POWDER, FOR SOLUTION ORAL at 21:48

## 2018-10-17 NOTE — NUR
dr. vincent notified regarding change of condition c/o more weakness left side,neurologist 
seen and examined patient and ordered stat ct head.

## 2018-10-18 ENCOUNTER — HOSPITAL ENCOUNTER (INPATIENT)
Dept: HOSPITAL 91 - VRC | Age: 58
LOS: 13 days | Discharge: NURSING, REHAB OR LONG-TERM CARE | DRG: 56 | End: 2018-10-31
Payer: COMMERCIAL

## 2018-10-18 ENCOUNTER — HOSPITAL ENCOUNTER (INPATIENT)
Age: 58
LOS: 13 days | Discharge: NURSING, REHAB OR LONG-TERM CARE | DRG: 56 | End: 2018-10-31

## 2018-10-18 VITALS — DIASTOLIC BLOOD PRESSURE: 45 MMHG | SYSTOLIC BLOOD PRESSURE: 130 MMHG

## 2018-10-18 VITALS — DIASTOLIC BLOOD PRESSURE: 55 MMHG | SYSTOLIC BLOOD PRESSURE: 125 MMHG

## 2018-10-18 VITALS — DIASTOLIC BLOOD PRESSURE: 60 MMHG | SYSTOLIC BLOOD PRESSURE: 125 MMHG

## 2018-10-18 DIAGNOSIS — I25.10: ICD-10-CM

## 2018-10-18 DIAGNOSIS — R47.02: ICD-10-CM

## 2018-10-18 DIAGNOSIS — R51: ICD-10-CM

## 2018-10-18 DIAGNOSIS — D64.9: ICD-10-CM

## 2018-10-18 DIAGNOSIS — M54.2: ICD-10-CM

## 2018-10-18 DIAGNOSIS — E78.5: ICD-10-CM

## 2018-10-18 DIAGNOSIS — Z87.09: ICD-10-CM

## 2018-10-18 DIAGNOSIS — R33.9: ICD-10-CM

## 2018-10-18 DIAGNOSIS — K59.00: ICD-10-CM

## 2018-10-18 DIAGNOSIS — I73.9: ICD-10-CM

## 2018-10-18 DIAGNOSIS — I11.9: ICD-10-CM

## 2018-10-18 DIAGNOSIS — M54.9: ICD-10-CM

## 2018-10-18 DIAGNOSIS — F32.9: ICD-10-CM

## 2018-10-18 DIAGNOSIS — R53.81: ICD-10-CM

## 2018-10-18 DIAGNOSIS — R13.19: ICD-10-CM

## 2018-10-18 DIAGNOSIS — E11.9: ICD-10-CM

## 2018-10-18 DIAGNOSIS — Z87.01: ICD-10-CM

## 2018-10-18 DIAGNOSIS — I25.2: ICD-10-CM

## 2018-10-18 DIAGNOSIS — E87.1: ICD-10-CM

## 2018-10-18 DIAGNOSIS — I69.954: Primary | ICD-10-CM

## 2018-10-18 DIAGNOSIS — J69.0: ICD-10-CM

## 2018-10-18 DIAGNOSIS — Z79.4: ICD-10-CM

## 2018-10-18 DIAGNOSIS — J45.901: ICD-10-CM

## 2018-10-18 LAB
ADD UMIC: YES
UR ASCORBIC ACID: NEGATIVE MG/DL
UR BILIRUBIN (DIP): NEGATIVE MG/DL
UR BLOOD (DIP): (no result) MG/DL
UR CLARITY: CLEAR
UR COLOR: YELLOW
UR GLUCOSE (DIP): NEGATIVE MG/DL
UR KETONES (DIP): NEGATIVE MG/DL
UR LEUKOCYTE ESTERASE (DIP): NEGATIVE LEU/UL
UR NITRITE (DIP): NEGATIVE MG/DL
UR PH (DIP): 6 (ref 5–9)
UR RBC: 2 /HPF (ref 0–5)
UR SPECIFIC GRAVITY (DIP): 1.01 (ref 1–1.03)
UR TOTAL PROTEIN (DIP): NEGATIVE MG/DL
UR UROBILINOGEN (DIP): NEGATIVE MG/DL
UR WBC: 1 /HPF (ref 0–5)

## 2018-10-18 PROCEDURE — 80061 LIPID PANEL: CPT

## 2018-10-18 PROCEDURE — 97116 GAIT TRAINING THERAPY: CPT

## 2018-10-18 PROCEDURE — 70490 CT SOFT TISSUE NECK W/O DYE: CPT

## 2018-10-18 PROCEDURE — 97112 NEUROMUSCULAR REEDUCATION: CPT

## 2018-10-18 PROCEDURE — 84100 ASSAY OF PHOSPHORUS: CPT

## 2018-10-18 PROCEDURE — 84155 ASSAY OF PROTEIN SERUM: CPT

## 2018-10-18 PROCEDURE — 84300 ASSAY OF URINE SODIUM: CPT

## 2018-10-18 PROCEDURE — 94667 MNPJ CHEST WALL 1ST: CPT

## 2018-10-18 PROCEDURE — 83735 ASSAY OF MAGNESIUM: CPT

## 2018-10-18 PROCEDURE — 97167 OT EVAL HIGH COMPLEX 60 MIN: CPT

## 2018-10-18 PROCEDURE — 87081 CULTURE SCREEN ONLY: CPT

## 2018-10-18 PROCEDURE — 92526 ORAL FUNCTION THERAPY: CPT

## 2018-10-18 PROCEDURE — 80048 BASIC METABOLIC PNL TOTAL CA: CPT

## 2018-10-18 PROCEDURE — 97163 PT EVAL HIGH COMPLEX 45 MIN: CPT

## 2018-10-18 PROCEDURE — 93017 CV STRESS TEST TRACING ONLY: CPT

## 2018-10-18 PROCEDURE — 92610 EVALUATE SWALLOWING FUNCTION: CPT

## 2018-10-18 PROCEDURE — 80053 COMPREHEN METABOLIC PANEL: CPT

## 2018-10-18 PROCEDURE — 92611 MOTION FLUOROSCOPY/SWALLOW: CPT

## 2018-10-18 PROCEDURE — 81003 URINALYSIS AUTO W/O SCOPE: CPT

## 2018-10-18 PROCEDURE — 84484 ASSAY OF TROPONIN QUANT: CPT

## 2018-10-18 PROCEDURE — 83935 ASSAY OF URINE OSMOLALITY: CPT

## 2018-10-18 PROCEDURE — 87086 URINE CULTURE/COLONY COUNT: CPT

## 2018-10-18 PROCEDURE — 94640 AIRWAY INHALATION TREATMENT: CPT

## 2018-10-18 PROCEDURE — 78452 HT MUSCLE IMAGE SPECT MULT: CPT

## 2018-10-18 PROCEDURE — 82043 UR ALBUMIN QUANTITATIVE: CPT

## 2018-10-18 PROCEDURE — 97535 SELF CARE MNGMENT TRAINING: CPT

## 2018-10-18 PROCEDURE — 72141 MRI NECK SPINE W/O DYE: CPT

## 2018-10-18 PROCEDURE — 92507 TX SP LANG VOICE COMM INDIV: CPT

## 2018-10-18 PROCEDURE — 82550 ASSAY OF CK (CPK): CPT

## 2018-10-18 PROCEDURE — 97110 THERAPEUTIC EXERCISES: CPT

## 2018-10-18 PROCEDURE — 74230 X-RAY XM SWLNG FUNCJ C+: CPT

## 2018-10-18 PROCEDURE — 93306 TTE W/DOPPLER COMPLETE: CPT

## 2018-10-18 PROCEDURE — 81001 URINALYSIS AUTO W/SCOPE: CPT

## 2018-10-18 PROCEDURE — 93005 ELECTROCARDIOGRAM TRACING: CPT

## 2018-10-18 PROCEDURE — 71045 X-RAY EXAM CHEST 1 VIEW: CPT

## 2018-10-18 PROCEDURE — 97530 THERAPEUTIC ACTIVITIES: CPT

## 2018-10-18 PROCEDURE — 94668 MNPJ CHEST WALL SBSQ: CPT

## 2018-10-18 PROCEDURE — 82962 GLUCOSE BLOOD TEST: CPT

## 2018-10-18 PROCEDURE — 70450 CT HEAD/BRAIN W/O DYE: CPT

## 2018-10-18 PROCEDURE — 85025 COMPLETE CBC W/AUTO DIFF WBC: CPT

## 2018-10-18 PROCEDURE — 94664 DEMO&/EVAL PT USE INHALER: CPT

## 2018-10-18 PROCEDURE — 82553 CREATINE MB FRACTION: CPT

## 2018-10-18 PROCEDURE — 97542 WHEELCHAIR MNGMENT TRAINING: CPT

## 2018-10-18 RX ADMIN — ACETYLCYSTEINE SCH MG: 200 INHALANT RESPIRATORY (INHALATION) at 08:07

## 2018-10-18 RX ADMIN — METOPROLOL TARTRATE SCH MG: 25 TABLET, FILM COATED ORAL at 01:26

## 2018-10-18 RX ADMIN — MORPHINE SULFATE 1 MG: 2 INJECTION, SOLUTION INTRAMUSCULAR; INTRAVENOUS at 21:40

## 2018-10-18 RX ADMIN — Medication SCH MG: at 11:28

## 2018-10-18 RX ADMIN — ISOSORBIDE DINITRATE 1 MG: 20 TABLET ORAL at 21:38

## 2018-10-18 RX ADMIN — ATORVASTATIN CALCIUM 1 MG: 10 TABLET, FILM COATED ORAL at 21:38

## 2018-10-18 RX ADMIN — Medication SCH MG: at 14:51

## 2018-10-18 RX ADMIN — PANTOPRAZOLE SODIUM SCH MG: 40 TABLET, DELAYED RELEASE ORAL at 11:15

## 2018-10-18 RX ADMIN — Medication PRN MG: at 11:21

## 2018-10-18 RX ADMIN — ACETYLCYSTEINE 1 ML: 200 SOLUTION ORAL; RESPIRATORY (INHALATION) at 21:33

## 2018-10-18 RX ADMIN — Medication SCH EA: at 11:21

## 2018-10-18 RX ADMIN — FLUTICASONE FUROATE AND VILANTEROL TRIFENATATE SCH EACH: 100; 25 POWDER RESPIRATORY (INHALATION) at 11:20

## 2018-10-18 RX ADMIN — EZETIMIBE SCH MG: 10 TABLET ORAL at 11:14

## 2018-10-18 RX ADMIN — Medication SCH MG: at 04:03

## 2018-10-18 RX ADMIN — LOSARTAN POTASSIUM SCH MG: 50 TABLET, FILM COATED ORAL at 11:16

## 2018-10-18 RX ADMIN — IPRATROPIUM BROMIDE AND ALBUTEROL SULFATE 1 ML: .5; 3 SOLUTION RESPIRATORY (INHALATION) at 21:33

## 2018-10-18 RX ADMIN — ACETAMINOPHEN PRN MG: 325 TABLET ORAL at 01:33

## 2018-10-18 RX ADMIN — CYCLOBENZAPRINE 1 MG: 10 TABLET, FILM COATED ORAL at 23:14

## 2018-10-18 RX ADMIN — METOPROLOL TARTRATE SCH MG: 25 TABLET, FILM COATED ORAL at 06:01

## 2018-10-18 RX ADMIN — Medication SCH EACH: at 10:12

## 2018-10-18 RX ADMIN — ALBUTEROL SULFATE PRN MG: 2.5 SOLUTION RESPIRATORY (INHALATION) at 11:28

## 2018-10-18 RX ADMIN — Medication SCH MG: at 08:07

## 2018-10-18 RX ADMIN — Medication SCH MG: at 11:15

## 2018-10-18 RX ADMIN — METOPROLOL TARTRATE 1 MG: 25 TABLET ORAL at 23:15

## 2018-10-18 RX ADMIN — ACETAMINOPHEN 1 MG: 325 TABLET, FILM COATED ORAL at 23:14

## 2018-10-18 RX ADMIN — Medication SCH EACH: at 06:01

## 2018-10-18 RX ADMIN — CYCLOBENZAPRINE HYDROCHLORIDE PRN MG: 10 TABLET, FILM COATED ORAL at 06:01

## 2018-10-18 RX ADMIN — ISOSORBIDE DINITRATE SCH MG: 20 TABLET ORAL at 11:20

## 2018-10-18 RX ADMIN — ACETYLCYSTEINE SCH MG: 200 INHALANT RESPIRATORY (INHALATION) at 14:52

## 2018-10-18 RX ADMIN — INSULIN HUMAN PRN UNIT: 100 INJECTION, SOLUTION PARENTERAL at 10:19

## 2018-10-18 RX ADMIN — METOPROLOL TARTRATE SCH MG: 25 TABLET, FILM COATED ORAL at 06:00

## 2018-10-18 RX ADMIN — Medication PRN MG: at 15:54

## 2018-10-18 RX ADMIN — ALBUTEROL SULFATE PRN MG: 2.5 SOLUTION RESPIRATORY (INHALATION) at 14:51

## 2018-10-18 NOTE — NUR
MS RN NOTES

PT IS DISCHARGED TO ACUTE REHAB BY AMBULANCE.DISCHARGE MEDICATIONS AND PROCEDURES WELL 
EXPLAINED AND PT VERBALIZED UNDERSTOOD.ALL THE BELONGINGS WERE GIVEN AND PT MADE AWARE.ALL 
THE HOME MEDICATIONS ARE GIVEN.NO COMPLICATIONS NOTED.REPORT GIVEN TO  IN [facility].

## 2018-10-18 NOTE — NUR
MS RN OPENING NOTES

RECEIVED PT ON BED WITH LEFT SIDE WEAKNESS.ALERT/ORIENTED X 4.ON TELE MONITORING AS PER PT 
REQUEST,TOLERATING WELL WITH O2 SAT 97%.NO SOB AND ACUTE DISTRES 

-------------------------------------------------------------------------------

Addendum: 10/18/18 at 0754 by RACHEL DINERO RN

-------------------------------------------------------------------------------

NO SOB AND ACUTE DISTRESS NOTED.HEPLOCK IS ON LEFT AC G 20,SITE IS CLEAN,DRY AND 
INTACT.SAFETY IS MAINTAINED ALL THE TIME.BED IS IN LOW POSITION AND LOCKED.CALL LIGHT IS 
WITHIN REACH.WILL CONTINUE TO MONITOR THE PT CLOSELY.

## 2018-10-18 NOTE — NUR
MS RN NOTES

 SEEN THE PT AND ORDERED TO CHANGE FLEXERIL 10 MG PRN TO FLEXERIL 10MG PO TID A 
PER PT REQUEST.NEW ORDERS NOTED AND CARRIED OUT.

## 2018-10-18 NOTE — NUR
MS RN NOTES



PT REFUSED HER LOPRESSOR AT THIS TIME. EXPLAINED TO PT IMPORTANCE OF LOPRESSOR IN HER POC 
BUT PT STILL REFUSED. WILL CONTINUE TO MONITOR.

## 2018-10-18 NOTE — NUR
MS RN NOTES



AWAKE & RESPONSIVE. NOT IN ANY DISTRESS. NO SOB NOTED. DENIES ANY PAIN OR DISCOMFORT AT THIS 
TIME. WITH IV-HL PATENT & INTACT. MONITORED ACCORDINGLY. CALL LIGHT WITHIN REACH. BED IN 
LOWEST POSITION. SR UP X 2 FOR SAFETY. WILL ENDORSE TO NEXT SHIFT.

## 2018-10-19 LAB
ADD MAN DIFF?: NO
ALANINE AMINOTRANSFERASE: 36 IU/L (ref 13–69)
ALBUMIN/GLOBULIN RATIO: 1.03
ALBUMIN: 2.7 G/DL (ref 3.3–4.9)
ALKALINE PHOSPHATASE: 61 IU/L (ref 42–121)
ANION GAP: 1 (ref 5–13)
ASPARTATE AMINO TRANSFERASE: 21 IU/L (ref 15–46)
BASOPHIL #: 0 10^3/UL (ref 0–0.1)
BASOPHILS %: 0.2 % (ref 0–2)
BILIRUBIN,DIRECT: 0 MG/DL (ref 0–0.2)
BILIRUBIN,TOTAL: 0.6 MG/DL (ref 0.2–1.3)
BLOOD UREA NITROGEN: 15 MG/DL (ref 7–20)
CALCIUM: 8.8 MG/DL (ref 8.4–10.2)
CARBON DIOXIDE: 36 MMOL/L (ref 21–31)
CHLORIDE: 96 MMOL/L (ref 97–110)
CREATININE: 0.53 MG/DL (ref 0.44–1)
EOSINOPHILS #: 0.2 10^3/UL (ref 0–0.5)
EOSINOPHILS %: 1.7 % (ref 0–7)
GLOBULIN: 2.6 G/DL (ref 1.3–3.2)
GLUCOSE: 145 MG/DL (ref 70–220)
HEMATOCRIT: 30.8 % (ref 37–47)
HEMOGLOBIN: 9.7 G/DL (ref 12–16)
IMMATURE GRANS #M: 0.05 10^3/UL (ref 0–0.03)
IMMATURE GRANS % (M): 0.5 % (ref 0–0.43)
LYMPHOCYTES #: 2.7 10^3/UL (ref 0.8–2.9)
LYMPHOCYTES %: 28.7 % (ref 15–51)
MEAN CORPUSCULAR HEMOGLOBIN: 26.4 PG (ref 29–33)
MEAN CORPUSCULAR HGB CONC: 31.5 G/DL (ref 32–37)
MEAN CORPUSCULAR VOLUME: 83.7 FL (ref 82–101)
MEAN PLATELET VOLUME: 9.2 FL (ref 7.4–10.4)
MONOCYTE #: 1.3 10^3/UL (ref 0.3–0.9)
MONOCYTES %: 13.5 % (ref 0–11)
NEUTROPHIL #: 5.2 10^3/UL (ref 1.6–7.5)
NEUTROPHILS %: 55.4 % (ref 39–77)
NUCLEATED RED BLOOD CELLS #: 0 10^3/UL (ref 0–0)
NUCLEATED RED BLOOD CELLS%: 0 /100WBC (ref 0–0)
PLATELET COUNT: 297 10^3/UL (ref 140–415)
POTASSIUM: 4.3 MMOL/L (ref 3.5–5.1)
RED BLOOD COUNT: 3.68 10^6/UL (ref 4.2–5.4)
RED CELL DISTRIBUTION WIDTH: 13 % (ref 11.5–14.5)
SODIUM: 133 MMOL/L (ref 135–144)
TOTAL PROTEIN: 5.3 G/DL (ref 6.1–8.1)
WHITE BLOOD COUNT: 9.4 10^3/UL (ref 4.8–10.8)

## 2018-10-19 PROCEDURE — F07Z5ZZ BED MOBILITY TREATMENT: ICD-10-PCS

## 2018-10-19 PROCEDURE — F07Z9ZZ GAIT TRAINING/FUNCTIONAL AMBULATION TREATMENT: ICD-10-PCS

## 2018-10-19 PROCEDURE — F07Z8ZZ TRANSFER TRAINING TREATMENT: ICD-10-PCS

## 2018-10-19 RX ADMIN — INSULIN ASPART 1 UNIT: 100 INJECTION, SOLUTION INTRAVENOUS; SUBCUTANEOUS at 08:22

## 2018-10-19 RX ADMIN — ISOSORBIDE DINITRATE 1 MG: 20 TABLET ORAL at 21:00

## 2018-10-19 RX ADMIN — SENNOSIDES 1 TAB: 8.6 TABLET, FILM COATED ORAL at 21:00

## 2018-10-19 RX ADMIN — MORPHINE SULFATE 1 MG: 2 INJECTION, SOLUTION INTRAMUSCULAR; INTRAVENOUS at 12:11

## 2018-10-19 RX ADMIN — MORPHINE SULFATE 1 MG: 2 INJECTION, SOLUTION INTRAMUSCULAR; INTRAVENOUS at 03:25

## 2018-10-19 RX ADMIN — ACETAMINOPHEN 1 MG: 325 TABLET, FILM COATED ORAL at 17:47

## 2018-10-19 RX ADMIN — EZETIMIBE 1 MG: 10 TABLET ORAL at 09:37

## 2018-10-19 RX ADMIN — LEVOFLOXACIN 1 MG: 500 TABLET, FILM COATED ORAL at 09:37

## 2018-10-19 RX ADMIN — IPRATROPIUM BROMIDE AND ALBUTEROL SULFATE 1 ML: .5; 3 SOLUTION RESPIRATORY (INHALATION) at 14:34

## 2018-10-19 RX ADMIN — PAROXETINE HYDROCHLORIDE 1 MG: 12.5 TABLET, FILM COATED, EXTENDED RELEASE ORAL at 09:37

## 2018-10-19 RX ADMIN — DOCUSATE SODIUM 1 MG: 100 CAPSULE, LIQUID FILLED ORAL at 09:37

## 2018-10-19 RX ADMIN — DOCUSATE SODIUM 1 MG: 100 CAPSULE, LIQUID FILLED ORAL at 21:00

## 2018-10-19 RX ADMIN — IPRATROPIUM BROMIDE AND ALBUTEROL SULFATE 1 ML: .5; 3 SOLUTION RESPIRATORY (INHALATION) at 02:35

## 2018-10-19 RX ADMIN — FLUTICASONE FUROATE AND VILANTEROL TRIFENATATE 1 INH: 100; 25 POWDER RESPIRATORY (INHALATION) at 11:59

## 2018-10-19 RX ADMIN — ACETYLCYSTEINE 1 ML: 200 SOLUTION ORAL; RESPIRATORY (INHALATION) at 17:02

## 2018-10-19 RX ADMIN — PANTOPRAZOLE SODIUM 1 MG: 40 TABLET, DELAYED RELEASE ORAL at 06:43

## 2018-10-19 RX ADMIN — METOPROLOL TARTRATE 1 MG: 25 TABLET ORAL at 17:58

## 2018-10-19 RX ADMIN — ATORVASTATIN CALCIUM 1 MG: 10 TABLET, FILM COATED ORAL at 21:00

## 2018-10-19 RX ADMIN — MORPHINE SULFATE 1 MG: 2 INJECTION, SOLUTION INTRAMUSCULAR; INTRAVENOUS at 15:29

## 2018-10-19 RX ADMIN — MUPIROCIN 1 APPLIC: 20 OINTMENT TOPICAL at 21:00

## 2018-10-19 RX ADMIN — INSULIN ASPART 1 UNIT: 100 INJECTION, SOLUTION INTRAVENOUS; SUBCUTANEOUS at 12:15

## 2018-10-19 RX ADMIN — INSULIN ASPART 1 UNIT: 100 INJECTION, SOLUTION INTRAVENOUS; SUBCUTANEOUS at 17:44

## 2018-10-19 RX ADMIN — MUPIROCIN 1 APPLIC: 20 OINTMENT TOPICAL at 09:36

## 2018-10-19 RX ADMIN — INSULIN ASPART 1 UNIT: 100 INJECTION, SOLUTION INTRAVENOUS; SUBCUTANEOUS at 21:00

## 2018-10-19 RX ADMIN — METOPROLOL TARTRATE 1 MG: 25 TABLET ORAL at 11:59

## 2018-10-19 RX ADMIN — METOPROLOL TARTRATE 1 MG: 25 TABLET ORAL at 06:44

## 2018-10-19 RX ADMIN — LOSARTAN POTASSIUM 1 MG: 50 TABLET ORAL at 09:37

## 2018-10-19 RX ADMIN — MORPHINE SULFATE 1 MG: 2 INJECTION, SOLUTION INTRAMUSCULAR; INTRAVENOUS at 18:56

## 2018-10-19 RX ADMIN — MORPHINE SULFATE 1 MG: 2 INJECTION, SOLUTION INTRAMUSCULAR; INTRAVENOUS at 06:47

## 2018-10-19 RX ADMIN — ACETYLCYSTEINE 1 ML: 200 SOLUTION ORAL; RESPIRATORY (INHALATION) at 08:00

## 2018-10-19 RX ADMIN — ASPIRIN 1 MG: 81 TABLET, COATED ORAL at 09:37

## 2018-10-19 RX ADMIN — IPRATROPIUM BROMIDE AND ALBUTEROL SULFATE 1 ML: .5; 3 SOLUTION RESPIRATORY (INHALATION) at 08:58

## 2018-10-19 RX ADMIN — IPRATROPIUM BROMIDE AND ALBUTEROL SULFATE 1 ML: .5; 3 SOLUTION RESPIRATORY (INHALATION) at 19:51

## 2018-10-19 RX ADMIN — MORPHINE SULFATE 1 MG: 2 INJECTION, SOLUTION INTRAMUSCULAR; INTRAVENOUS at 22:41

## 2018-10-19 RX ADMIN — ISOSORBIDE DINITRATE 1 MG: 20 TABLET ORAL at 09:37

## 2018-10-19 RX ADMIN — POLYETHYLENE GLYCOL 3350 1 GM: 17 POWDER, FOR SOLUTION ORAL at 21:00

## 2018-10-20 LAB
ADD MAN DIFF?: NO
ANION GAP: 6 (ref 5–13)
BASOPHIL #: 0.1 10^3/UL (ref 0–0.1)
BASOPHILS %: 0.5 % (ref 0–2)
BLOOD UREA NITROGEN: 16 MG/DL (ref 7–20)
CALCIUM: 8.9 MG/DL (ref 8.4–10.2)
CARBON DIOXIDE: 35 MMOL/L (ref 21–31)
CHLORIDE: 95 MMOL/L (ref 97–110)
CREATININE: 0.73 MG/DL (ref 0.44–1)
EOSINOPHILS #: 0.3 10^3/UL (ref 0–0.5)
EOSINOPHILS %: 2.8 % (ref 0–7)
GLUCOSE: 152 MG/DL (ref 70–220)
HEMATOCRIT: 31.6 % (ref 37–47)
HEMOGLOBIN: 9.9 G/DL (ref 12–16)
IMMATURE GRANS #M: 0.04 10^3/UL (ref 0–0.03)
IMMATURE GRANS % (M): 0.4 % (ref 0–0.43)
LYMPHOCYTES #: 2.8 10^3/UL (ref 0.8–2.9)
LYMPHOCYTES %: 29.2 % (ref 15–51)
MAGNESIUM: 2.2 MG/DL (ref 1.7–2.5)
MEAN CORPUSCULAR HEMOGLOBIN: 26.5 PG (ref 29–33)
MEAN CORPUSCULAR HGB CONC: 31.3 G/DL (ref 32–37)
MEAN CORPUSCULAR VOLUME: 84.5 FL (ref 82–101)
MEAN PLATELET VOLUME: 9 FL (ref 7.4–10.4)
MONOCYTE #: 1.1 10^3/UL (ref 0.3–0.9)
MONOCYTES %: 11.8 % (ref 0–11)
NEUTROPHIL #: 5.3 10^3/UL (ref 1.6–7.5)
NEUTROPHILS %: 55.3 % (ref 39–77)
NUCLEATED RED BLOOD CELLS #: 0 10^3/UL (ref 0–0)
NUCLEATED RED BLOOD CELLS%: 0 /100WBC (ref 0–0)
PHOSPHORUS: 4.9 MG/DL (ref 2.5–4.9)
PLATELET COUNT: 304 10^3/UL (ref 140–415)
POTASSIUM: 4.2 MMOL/L (ref 3.5–5.1)
RED BLOOD COUNT: 3.74 10^6/UL (ref 4.2–5.4)
RED CELL DISTRIBUTION WIDTH: 13.1 % (ref 11.5–14.5)
SODIUM: 136 MMOL/L (ref 135–144)
WHITE BLOOD COUNT: 9.7 10^3/UL (ref 4.8–10.8)

## 2018-10-20 PROCEDURE — F08Z0ZZ BATHING/SHOWERING TECHNIQUES TREATMENT: ICD-10-PCS

## 2018-10-20 PROCEDURE — F06ZDZZ SWALLOWING DYSFUNCTION TREATMENT: ICD-10-PCS

## 2018-10-20 PROCEDURE — F08Z2ZZ GROOMING/PERSONAL HYGIENE TREATMENT: ICD-10-PCS

## 2018-10-20 RX ADMIN — ACETYLCYSTEINE 1 ML: 200 SOLUTION ORAL; RESPIRATORY (INHALATION) at 08:00

## 2018-10-20 RX ADMIN — METOPROLOL TARTRATE 1 MG: 25 TABLET ORAL at 23:53

## 2018-10-20 RX ADMIN — ACETYLCYSTEINE 1 ML: 200 SOLUTION ORAL; RESPIRATORY (INHALATION) at 23:18

## 2018-10-20 RX ADMIN — ACETYLCYSTEINE 1 ML: 200 SOLUTION ORAL; RESPIRATORY (INHALATION) at 16:00

## 2018-10-20 RX ADMIN — HYDROMORPHONE HYDROCHLORIDE 1 MG: 2 INJECTION, SOLUTION INTRAMUSCULAR; INTRAVENOUS; SUBCUTANEOUS at 18:03

## 2018-10-20 RX ADMIN — LOSARTAN POTASSIUM 1 MG: 50 TABLET ORAL at 10:34

## 2018-10-20 RX ADMIN — EZETIMIBE 1 MG: 10 TABLET ORAL at 09:00

## 2018-10-20 RX ADMIN — PAROXETINE HYDROCHLORIDE 1 MG: 12.5 TABLET, FILM COATED, EXTENDED RELEASE ORAL at 10:33

## 2018-10-20 RX ADMIN — FLUTICASONE FUROATE AND VILANTEROL TRIFENATATE 1 INH: 100; 25 POWDER RESPIRATORY (INHALATION) at 10:34

## 2018-10-20 RX ADMIN — METOPROLOL TARTRATE 1 MG: 25 TABLET ORAL at 12:00

## 2018-10-20 RX ADMIN — METOPROLOL TARTRATE 1 MG: 25 TABLET ORAL at 00:00

## 2018-10-20 RX ADMIN — MUPIROCIN 1 APPLIC: 20 OINTMENT TOPICAL at 13:01

## 2018-10-20 RX ADMIN — IPRATROPIUM BROMIDE AND ALBUTEROL SULFATE 1 ML: .5; 3 SOLUTION RESPIRATORY (INHALATION) at 00:03

## 2018-10-20 RX ADMIN — DOCUSATE SODIUM 1 MG: 100 CAPSULE, LIQUID FILLED ORAL at 21:01

## 2018-10-20 RX ADMIN — METOPROLOL TARTRATE 1 MG: 25 TABLET ORAL at 06:21

## 2018-10-20 RX ADMIN — PANTOPRAZOLE SODIUM 1 MG: 40 TABLET, DELAYED RELEASE ORAL at 06:18

## 2018-10-20 RX ADMIN — DOCUSATE SODIUM 1 MG: 100 CAPSULE, LIQUID FILLED ORAL at 10:33

## 2018-10-20 RX ADMIN — HYDROMORPHONE HYDROCHLORIDE 1 MG: 2 INJECTION, SOLUTION INTRAMUSCULAR; INTRAVENOUS; SUBCUTANEOUS at 13:03

## 2018-10-20 RX ADMIN — MORPHINE SULFATE 1 MG: 2 INJECTION, SOLUTION INTRAMUSCULAR; INTRAVENOUS at 09:11

## 2018-10-20 RX ADMIN — INSULIN ASPART 1 UNIT: 100 INJECTION, SOLUTION INTRAVENOUS; SUBCUTANEOUS at 21:04

## 2018-10-20 RX ADMIN — IPRATROPIUM BROMIDE AND ALBUTEROL SULFATE 1 ML: .5; 3 SOLUTION RESPIRATORY (INHALATION) at 13:28

## 2018-10-20 RX ADMIN — ATORVASTATIN CALCIUM 1 MG: 10 TABLET, FILM COATED ORAL at 21:01

## 2018-10-20 RX ADMIN — HYDROMORPHONE HYDROCHLORIDE 1 MG: 2 INJECTION, SOLUTION INTRAMUSCULAR; INTRAVENOUS; SUBCUTANEOUS at 14:58

## 2018-10-20 RX ADMIN — MUPIROCIN 1 APPLIC: 20 OINTMENT TOPICAL at 21:03

## 2018-10-20 RX ADMIN — ACETAMINOPHEN 1 MG: 325 TABLET, FILM COATED ORAL at 00:33

## 2018-10-20 RX ADMIN — ACETAMINOPHEN 1 MG: 325 TABLET, FILM COATED ORAL at 22:41

## 2018-10-20 RX ADMIN — SENNOSIDES 1 TAB: 8.6 TABLET, FILM COATED ORAL at 21:01

## 2018-10-20 RX ADMIN — INSULIN ASPART 1 UNIT: 100 INJECTION, SOLUTION INTRAVENOUS; SUBCUTANEOUS at 12:00

## 2018-10-20 RX ADMIN — INSULIN ASPART 1 UNIT: 100 INJECTION, SOLUTION INTRAVENOUS; SUBCUTANEOUS at 18:33

## 2018-10-20 RX ADMIN — ACETYLCYSTEINE 1 ML: 200 SOLUTION ORAL; RESPIRATORY (INHALATION) at 00:00

## 2018-10-20 RX ADMIN — MORPHINE SULFATE 1 MG: 2 INJECTION, SOLUTION INTRAMUSCULAR; INTRAVENOUS at 06:19

## 2018-10-20 RX ADMIN — HYDROMORPHONE HYDROCHLORIDE 1 MG: 2 INJECTION, SOLUTION INTRAMUSCULAR; INTRAVENOUS; SUBCUTANEOUS at 10:27

## 2018-10-20 RX ADMIN — ENOXAPARIN SODIUM 1 MG: 100 INJECTION SUBCUTANEOUS at 09:10

## 2018-10-20 RX ADMIN — IPRATROPIUM BROMIDE AND ALBUTEROL SULFATE 1 ML: .5; 3 SOLUTION RESPIRATORY (INHALATION) at 23:24

## 2018-10-20 RX ADMIN — IPRATROPIUM BROMIDE AND ALBUTEROL SULFATE 1 ML: .5; 3 SOLUTION RESPIRATORY (INHALATION) at 05:07

## 2018-10-20 RX ADMIN — IPRATROPIUM BROMIDE AND ALBUTEROL SULFATE 1 ML: .5; 3 SOLUTION RESPIRATORY (INHALATION) at 19:38

## 2018-10-20 RX ADMIN — ISOSORBIDE DINITRATE 1 MG: 20 TABLET ORAL at 10:35

## 2018-10-20 RX ADMIN — IPRATROPIUM BROMIDE AND ALBUTEROL SULFATE 1 ML: .5; 3 SOLUTION RESPIRATORY (INHALATION) at 17:11

## 2018-10-20 RX ADMIN — ISOSORBIDE DINITRATE 1 MG: 20 TABLET ORAL at 21:02

## 2018-10-20 RX ADMIN — BACLOFEN 1 MG: 10 TABLET ORAL at 23:50

## 2018-10-20 RX ADMIN — HYDROMORPHONE HYDROCHLORIDE 1 MG: 2 INJECTION, SOLUTION INTRAMUSCULAR; INTRAVENOUS; SUBCUTANEOUS at 21:22

## 2018-10-20 RX ADMIN — METOPROLOL TARTRATE 1 MG: 25 TABLET ORAL at 18:00

## 2018-10-20 RX ADMIN — POLYETHYLENE GLYCOL 3350 1 GM: 17 POWDER, FOR SOLUTION ORAL at 21:02

## 2018-10-20 RX ADMIN — ASPIRIN 1 MG: 81 TABLET, COATED ORAL at 10:33

## 2018-10-20 RX ADMIN — CYCLOBENZAPRINE 1 MG: 10 TABLET, FILM COATED ORAL at 01:35

## 2018-10-20 RX ADMIN — INSULIN ASPART 1 UNIT: 100 INJECTION, SOLUTION INTRAVENOUS; SUBCUTANEOUS at 09:06

## 2018-10-20 RX ADMIN — MORPHINE SULFATE 1 MG: 2 INJECTION, SOLUTION INTRAMUSCULAR; INTRAVENOUS at 01:47

## 2018-10-20 RX ADMIN — LEVOFLOXACIN 1 MG: 500 TABLET, FILM COATED ORAL at 10:33

## 2018-10-20 RX ADMIN — IPRATROPIUM BROMIDE AND ALBUTEROL SULFATE 1 ML: .5; 3 SOLUTION RESPIRATORY (INHALATION) at 09:07

## 2018-10-20 RX ADMIN — ACETAMINOPHEN 1 MG: 325 TABLET, FILM COATED ORAL at 18:28

## 2018-10-21 RX ADMIN — IPRATROPIUM BROMIDE AND ALBUTEROL SULFATE 1 ML: .5; 3 SOLUTION RESPIRATORY (INHALATION) at 04:00

## 2018-10-21 RX ADMIN — MUPIROCIN 1 APPLIC: 20 OINTMENT TOPICAL at 20:36

## 2018-10-21 RX ADMIN — POLYETHYLENE GLYCOL 3350 1 GM: 17 POWDER, FOR SOLUTION ORAL at 20:28

## 2018-10-21 RX ADMIN — HYDROMORPHONE HYDROCHLORIDE 1 MG: 2 INJECTION, SOLUTION INTRAMUSCULAR; INTRAVENOUS; SUBCUTANEOUS at 00:54

## 2018-10-21 RX ADMIN — ACETYLCYSTEINE 1 ML: 200 SOLUTION ORAL; RESPIRATORY (INHALATION) at 16:00

## 2018-10-21 RX ADMIN — METOPROLOL TARTRATE 1 MG: 50 TABLET, FILM COATED ORAL at 08:51

## 2018-10-21 RX ADMIN — IPRATROPIUM BROMIDE AND ALBUTEROL SULFATE 1 ML: .5; 3 SOLUTION RESPIRATORY (INHALATION) at 13:52

## 2018-10-21 RX ADMIN — MAGNESIUM HYDROXIDE 1 ML: 400 SUSPENSION ORAL at 17:25

## 2018-10-21 RX ADMIN — DOCUSATE SODIUM 1 MG: 100 CAPSULE, LIQUID FILLED ORAL at 20:27

## 2018-10-21 RX ADMIN — ACETAMINOPHEN 1 MG: 325 TABLET, FILM COATED ORAL at 23:35

## 2018-10-21 RX ADMIN — INSULIN ASPART 1 UNIT: 100 INJECTION, SOLUTION INTRAVENOUS; SUBCUTANEOUS at 12:40

## 2018-10-21 RX ADMIN — ACETYLCYSTEINE 1 ML: 200 SOLUTION ORAL; RESPIRATORY (INHALATION) at 08:00

## 2018-10-21 RX ADMIN — IPRATROPIUM BROMIDE AND ALBUTEROL SULFATE 1 ML: .5; 3 SOLUTION RESPIRATORY (INHALATION) at 08:31

## 2018-10-21 RX ADMIN — LEVOFLOXACIN 1 MG: 500 TABLET, FILM COATED ORAL at 09:00

## 2018-10-21 RX ADMIN — PANTOPRAZOLE SODIUM 1 MG: 40 TABLET, DELAYED RELEASE ORAL at 06:51

## 2018-10-21 RX ADMIN — EZETIMIBE 1 MG: 10 TABLET ORAL at 08:47

## 2018-10-21 RX ADMIN — PAROXETINE HYDROCHLORIDE 1 MG: 12.5 TABLET, FILM COATED, EXTENDED RELEASE ORAL at 12:59

## 2018-10-21 RX ADMIN — MUPIROCIN 1 APPLIC: 20 OINTMENT TOPICAL at 08:52

## 2018-10-21 RX ADMIN — METOPROLOL TARTRATE 1 MG: 25 TABLET ORAL at 06:52

## 2018-10-21 RX ADMIN — ACETAMINOPHEN 1 MG: 325 TABLET, FILM COATED ORAL at 13:00

## 2018-10-21 RX ADMIN — ISOSORBIDE DINITRATE 1 MG: 20 TABLET ORAL at 08:48

## 2018-10-21 RX ADMIN — METOPROLOL TARTRATE 1 MG: 50 TABLET, FILM COATED ORAL at 20:03

## 2018-10-21 RX ADMIN — ASPIRIN 1 MG: 81 TABLET, COATED ORAL at 08:46

## 2018-10-21 RX ADMIN — IPRATROPIUM BROMIDE AND ALBUTEROL SULFATE 1 ML: .5; 3 SOLUTION RESPIRATORY (INHALATION) at 16:33

## 2018-10-21 RX ADMIN — INSULIN ASPART 1 UNIT: 100 INJECTION, SOLUTION INTRAVENOUS; SUBCUTANEOUS at 21:00

## 2018-10-21 RX ADMIN — ATORVASTATIN CALCIUM 1 MG: 10 TABLET, FILM COATED ORAL at 21:00

## 2018-10-21 RX ADMIN — LOSARTAN POTASSIUM 1 MG: 50 TABLET ORAL at 08:49

## 2018-10-21 RX ADMIN — INSULIN ASPART 1 UNIT: 100 INJECTION, SOLUTION INTRAVENOUS; SUBCUTANEOUS at 17:26

## 2018-10-21 RX ADMIN — DOCUSATE SODIUM 1 MG: 100 CAPSULE, LIQUID FILLED ORAL at 08:56

## 2018-10-21 RX ADMIN — SENNOSIDES 1 TAB: 8.6 TABLET, FILM COATED ORAL at 20:26

## 2018-10-21 RX ADMIN — KETOROLAC TROMETHAMINE 1 MG: 30 INJECTION, SOLUTION INTRAMUSCULAR at 01:45

## 2018-10-21 RX ADMIN — IPRATROPIUM BROMIDE AND ALBUTEROL SULFATE 1 ML: .5; 3 SOLUTION RESPIRATORY (INHALATION) at 20:05

## 2018-10-21 RX ADMIN — ISOSORBIDE DINITRATE 1 MG: 20 TABLET ORAL at 20:03

## 2018-10-21 RX ADMIN — FLUTICASONE FUROATE AND VILANTEROL TRIFENATATE 1 INH: 100; 25 POWDER RESPIRATORY (INHALATION) at 08:52

## 2018-10-21 RX ADMIN — INSULIN ASPART 1 UNIT: 100 INJECTION, SOLUTION INTRAVENOUS; SUBCUTANEOUS at 08:46

## 2018-10-21 RX ADMIN — ENOXAPARIN SODIUM 1 MG: 100 INJECTION SUBCUTANEOUS at 08:51

## 2018-10-21 RX ADMIN — HYDROMORPHONE HYDROCHLORIDE 1 MG: 2 INJECTION, SOLUTION INTRAMUSCULAR; INTRAVENOUS; SUBCUTANEOUS at 19:59

## 2018-10-22 RX ADMIN — ACETAMINOPHEN 1 MG: 325 TABLET, FILM COATED ORAL at 09:21

## 2018-10-22 RX ADMIN — DOCUSATE SODIUM 1 MG: 100 CAPSULE, LIQUID FILLED ORAL at 20:55

## 2018-10-22 RX ADMIN — IPRATROPIUM BROMIDE AND ALBUTEROL SULFATE 1 ML: .5; 3 SOLUTION RESPIRATORY (INHALATION) at 08:18

## 2018-10-22 RX ADMIN — DOCUSATE SODIUM 1 MG: 100 CAPSULE, LIQUID FILLED ORAL at 08:43

## 2018-10-22 RX ADMIN — BACLOFEN 1 MG: 10 TABLET ORAL at 05:42

## 2018-10-22 RX ADMIN — HYDROMORPHONE HYDROCHLORIDE 1 MG: 2 INJECTION, SOLUTION INTRAMUSCULAR; INTRAVENOUS; SUBCUTANEOUS at 03:43

## 2018-10-22 RX ADMIN — HYDROMORPHONE HYDROCHLORIDE 1 MG: 2 INJECTION, SOLUTION INTRAMUSCULAR; INTRAVENOUS; SUBCUTANEOUS at 20:50

## 2018-10-22 RX ADMIN — INSULIN ASPART 1 UNIT: 100 INJECTION, SOLUTION INTRAVENOUS; SUBCUTANEOUS at 21:00

## 2018-10-22 RX ADMIN — SENNOSIDES 1 TAB: 8.6 TABLET, FILM COATED ORAL at 20:55

## 2018-10-22 RX ADMIN — METOPROLOL TARTRATE 1 MG: 50 TABLET, FILM COATED ORAL at 20:42

## 2018-10-22 RX ADMIN — ACETYLCYSTEINE 1 ML: 200 SOLUTION ORAL; RESPIRATORY (INHALATION) at 00:00

## 2018-10-22 RX ADMIN — ISOSORBIDE DINITRATE 1 MG: 20 TABLET ORAL at 20:41

## 2018-10-22 RX ADMIN — BUTALBITAL, ACETAMINOPHEN, AND CAFFEINE 1 TAB: 50; 325; 40 TABLET ORAL at 17:52

## 2018-10-22 RX ADMIN — ACETAMINOPHEN 1 MG: 325 TABLET, FILM COATED ORAL at 03:48

## 2018-10-22 RX ADMIN — INSULIN ASPART 1 UNIT: 100 INJECTION, SOLUTION INTRAVENOUS; SUBCUTANEOUS at 12:00

## 2018-10-22 RX ADMIN — HYDROMORPHONE HYDROCHLORIDE 1 MG: 2 INJECTION, SOLUTION INTRAMUSCULAR; INTRAVENOUS; SUBCUTANEOUS at 13:09

## 2018-10-22 RX ADMIN — ENOXAPARIN SODIUM 1 MG: 100 INJECTION SUBCUTANEOUS at 08:44

## 2018-10-22 RX ADMIN — ACETYLCYSTEINE 1 ML: 200 SOLUTION ORAL; RESPIRATORY (INHALATION) at 08:00

## 2018-10-22 RX ADMIN — ISOSORBIDE DINITRATE 1 MG: 20 TABLET ORAL at 08:42

## 2018-10-22 RX ADMIN — LEVOFLOXACIN 1 MG: 500 TABLET, FILM COATED ORAL at 08:43

## 2018-10-22 RX ADMIN — HYDROMORPHONE HYDROCHLORIDE 1 MG: 2 INJECTION, SOLUTION INTRAMUSCULAR; INTRAVENOUS; SUBCUTANEOUS at 06:44

## 2018-10-22 RX ADMIN — PANTOPRAZOLE SODIUM 1 MG: 40 TABLET, DELAYED RELEASE ORAL at 05:42

## 2018-10-22 RX ADMIN — ATORVASTATIN CALCIUM 1 MG: 10 TABLET, FILM COATED ORAL at 21:00

## 2018-10-22 RX ADMIN — METOPROLOL TARTRATE 1 MG: 50 TABLET, FILM COATED ORAL at 08:43

## 2018-10-22 RX ADMIN — ASPIRIN 1 MG: 81 TABLET, COATED ORAL at 08:42

## 2018-10-22 RX ADMIN — MUPIROCIN 1 APPLIC: 20 OINTMENT TOPICAL at 08:44

## 2018-10-22 RX ADMIN — HYDROMORPHONE HYDROCHLORIDE 1 MG: 2 INJECTION, SOLUTION INTRAMUSCULAR; INTRAVENOUS; SUBCUTANEOUS at 00:22

## 2018-10-22 RX ADMIN — MUPIROCIN 1 APPLIC: 20 OINTMENT TOPICAL at 21:07

## 2018-10-22 RX ADMIN — LOSARTAN POTASSIUM 1 MG: 50 TABLET ORAL at 08:43

## 2018-10-22 RX ADMIN — IPRATROPIUM BROMIDE AND ALBUTEROL SULFATE 1 ML: .5; 3 SOLUTION RESPIRATORY (INHALATION) at 12:00

## 2018-10-22 RX ADMIN — IPRATROPIUM BROMIDE AND ALBUTEROL SULFATE 1 ML: .5; 3 SOLUTION RESPIRATORY (INHALATION) at 00:08

## 2018-10-22 RX ADMIN — INSULIN ASPART 1 UNIT: 100 INJECTION, SOLUTION INTRAVENOUS; SUBCUTANEOUS at 17:35

## 2018-10-22 RX ADMIN — PAROXETINE HYDROCHLORIDE 1 MG: 12.5 TABLET, FILM COATED, EXTENDED RELEASE ORAL at 08:42

## 2018-10-22 RX ADMIN — INSULIN ASPART 1 UNIT: 100 INJECTION, SOLUTION INTRAVENOUS; SUBCUTANEOUS at 07:35

## 2018-10-22 RX ADMIN — IPRATROPIUM BROMIDE AND ALBUTEROL SULFATE 1 ML: .5; 3 SOLUTION RESPIRATORY (INHALATION) at 04:15

## 2018-10-22 RX ADMIN — POLYETHYLENE GLYCOL 3350 1 GM: 17 POWDER, FOR SOLUTION ORAL at 20:55

## 2018-10-22 RX ADMIN — SPIRONOLACTONE 1 MG: 25 TABLET, FILM COATED ORAL at 09:21

## 2018-10-22 RX ADMIN — FLUTICASONE FUROATE AND VILANTEROL TRIFENATATE 1 INH: 100; 25 POWDER RESPIRATORY (INHALATION) at 08:41

## 2018-10-22 RX ADMIN — ATORVASTATIN CALCIUM 1 MG: 10 TABLET, FILM COATED ORAL at 20:55

## 2018-10-22 RX ADMIN — IPRATROPIUM BROMIDE AND ALBUTEROL SULFATE 1 ML: .5; 3 SOLUTION RESPIRATORY (INHALATION) at 20:19

## 2018-10-22 RX ADMIN — EZETIMIBE 1 MG: 10 TABLET ORAL at 08:42

## 2018-10-22 RX ADMIN — IPRATROPIUM BROMIDE AND ALBUTEROL SULFATE 1 ML: .5; 3 SOLUTION RESPIRATORY (INHALATION) at 15:43

## 2018-10-23 LAB
ADD MAN DIFF?: NO
ANION GAP: 6 (ref 5–13)
BASOPHIL #: 0 10^3/UL (ref 0–0.1)
BASOPHILS %: 0.4 % (ref 0–2)
BLOOD UREA NITROGEN: 14 MG/DL (ref 7–20)
CALCIUM: 9.1 MG/DL (ref 8.4–10.2)
CARBON DIOXIDE: 34 MMOL/L (ref 21–31)
CHLORIDE: 92 MMOL/L (ref 97–110)
CREATININE: 0.52 MG/DL (ref 0.44–1)
EOSINOPHILS #: 0.1 10^3/UL (ref 0–0.5)
EOSINOPHILS %: 1.7 % (ref 0–7)
GLUCOSE: 156 MG/DL (ref 70–220)
HEMATOCRIT: 31.9 % (ref 37–47)
HEMOGLOBIN: 10.2 G/DL (ref 12–16)
IMMATURE GRANS #M: 0.04 10^3/UL (ref 0–0.03)
IMMATURE GRANS % (M): 0.5 % (ref 0–0.43)
LYMPHOCYTES #: 2.1 10^3/UL (ref 0.8–2.9)
LYMPHOCYTES %: 26.2 % (ref 15–51)
MAGNESIUM: 2.2 MG/DL (ref 1.7–2.5)
MEAN CORPUSCULAR HEMOGLOBIN: 26.4 PG (ref 29–33)
MEAN CORPUSCULAR HGB CONC: 32 G/DL (ref 32–37)
MEAN CORPUSCULAR VOLUME: 82.4 FL (ref 82–101)
MEAN PLATELET VOLUME: 8.9 FL (ref 7.4–10.4)
MONOCYTE #: 0.6 10^3/UL (ref 0.3–0.9)
MONOCYTES %: 7.5 % (ref 0–11)
NEUTROPHIL #: 5.2 10^3/UL (ref 1.6–7.5)
NEUTROPHILS %: 63.7 % (ref 39–77)
NUCLEATED RED BLOOD CELLS #: 0 10^3/UL (ref 0–0)
NUCLEATED RED BLOOD CELLS%: 0 /100WBC (ref 0–0)
PHOSPHORUS: 3.7 MG/DL (ref 2.5–4.9)
PLATELET COUNT: 337 10^3/UL (ref 140–415)
POTASSIUM: 4.6 MMOL/L (ref 3.5–5.1)
RED BLOOD COUNT: 3.87 10^6/UL (ref 4.2–5.4)
RED CELL DISTRIBUTION WIDTH: 12.9 % (ref 11.5–14.5)
SODIUM: 132 MMOL/L (ref 135–144)
WHITE BLOOD COUNT: 8.1 10^3/UL (ref 4.8–10.8)

## 2018-10-23 RX ADMIN — POLYETHYLENE GLYCOL 3350 1 GM: 17 POWDER, FOR SOLUTION ORAL at 00:00

## 2018-10-23 RX ADMIN — NIFEDIPINE 1 MG: 30 TABLET, FILM COATED, EXTENDED RELEASE ORAL at 10:20

## 2018-10-23 RX ADMIN — IPRATROPIUM BROMIDE AND ALBUTEROL SULFATE 1 ML: .5; 3 SOLUTION RESPIRATORY (INHALATION) at 09:16

## 2018-10-23 RX ADMIN — CARBOXYMETHYLCELLULOSE SODIUM 1 DROP: 10 GEL OPHTHALMIC at 20:25

## 2018-10-23 RX ADMIN — ATORVASTATIN CALCIUM 1 MG: 40 TABLET, FILM COATED ORAL at 20:14

## 2018-10-23 RX ADMIN — PREGABALIN 1 MG: 50 CAPSULE ORAL at 22:35

## 2018-10-23 RX ADMIN — ACETAMINOPHEN 1 MG: 325 TABLET, FILM COATED ORAL at 07:01

## 2018-10-23 RX ADMIN — ISOSORBIDE DINITRATE 1 MG: 20 TABLET ORAL at 07:21

## 2018-10-23 RX ADMIN — INSULIN ASPART 1 UNIT: 100 INJECTION, SOLUTION INTRAVENOUS; SUBCUTANEOUS at 12:00

## 2018-10-23 RX ADMIN — NIFEDIPINE 1 MG: 30 TABLET, FILM COATED, EXTENDED RELEASE ORAL at 14:55

## 2018-10-23 RX ADMIN — IPRATROPIUM BROMIDE AND ALBUTEROL SULFATE 1 ML: .5; 3 SOLUTION RESPIRATORY (INHALATION) at 00:15

## 2018-10-23 RX ADMIN — ENOXAPARIN SODIUM 1 MG: 100 INJECTION SUBCUTANEOUS at 10:20

## 2018-10-23 RX ADMIN — DOCUSATE SODIUM 1 MG: 100 CAPSULE, LIQUID FILLED ORAL at 10:12

## 2018-10-23 RX ADMIN — ACETAMINOPHEN 1 MG: 325 TABLET, FILM COATED ORAL at 14:19

## 2018-10-23 RX ADMIN — HYDROMORPHONE HYDROCHLORIDE 1 MG: 2 INJECTION, SOLUTION INTRAMUSCULAR; INTRAVENOUS; SUBCUTANEOUS at 17:30

## 2018-10-23 RX ADMIN — IPRATROPIUM BROMIDE AND ALBUTEROL SULFATE 1 ML: .5; 3 SOLUTION RESPIRATORY (INHALATION) at 14:06

## 2018-10-23 RX ADMIN — INSULIN ASPART 1 UNIT: 100 INJECTION, SOLUTION INTRAVENOUS; SUBCUTANEOUS at 08:57

## 2018-10-23 RX ADMIN — INSULIN ASPART 1 UNIT: 100 INJECTION, SOLUTION INTRAVENOUS; SUBCUTANEOUS at 17:35

## 2018-10-23 RX ADMIN — HYDROMORPHONE HYDROCHLORIDE 1 MG: 2 INJECTION, SOLUTION INTRAMUSCULAR; INTRAVENOUS; SUBCUTANEOUS at 08:46

## 2018-10-23 RX ADMIN — IPRATROPIUM BROMIDE AND ALBUTEROL SULFATE 1 ML: .5; 3 SOLUTION RESPIRATORY (INHALATION) at 23:15

## 2018-10-23 RX ADMIN — HYDROMORPHONE HYDROCHLORIDE 1 MG: 1 INJECTION, SOLUTION INTRAMUSCULAR; INTRAVENOUS; SUBCUTANEOUS at 14:20

## 2018-10-23 RX ADMIN — ACETYLCYSTEINE 1 ML: 200 SOLUTION ORAL; RESPIRATORY (INHALATION) at 00:24

## 2018-10-23 RX ADMIN — PANTOPRAZOLE SODIUM 1 MG: 40 TABLET, DELAYED RELEASE ORAL at 06:42

## 2018-10-23 RX ADMIN — METOPROLOL TARTRATE 1 MG: 50 TABLET, FILM COATED ORAL at 07:21

## 2018-10-23 RX ADMIN — MUPIROCIN 1 APPLIC: 20 OINTMENT TOPICAL at 20:25

## 2018-10-23 RX ADMIN — EZETIMIBE 1 MG: 10 TABLET ORAL at 10:03

## 2018-10-23 RX ADMIN — SENNOSIDES 1 TAB: 8.6 TABLET, FILM COATED ORAL at 21:00

## 2018-10-23 RX ADMIN — HYDROMORPHONE HYDROCHLORIDE 1 MG: 2 INJECTION, SOLUTION INTRAMUSCULAR; INTRAVENOUS; SUBCUTANEOUS at 23:58

## 2018-10-23 RX ADMIN — PREGABALIN 1 MG: 50 CAPSULE ORAL at 21:00

## 2018-10-23 RX ADMIN — IPRATROPIUM BROMIDE AND ALBUTEROL SULFATE 1 ML: .5; 3 SOLUTION RESPIRATORY (INHALATION) at 17:00

## 2018-10-23 RX ADMIN — ISOSORBIDE DINITRATE 1 MG: 20 TABLET ORAL at 20:10

## 2018-10-23 RX ADMIN — FLUTICASONE FUROATE AND VILANTEROL TRIFENATATE 1 INH: 100; 25 POWDER RESPIRATORY (INHALATION) at 10:12

## 2018-10-23 RX ADMIN — HYDROMORPHONE HYDROCHLORIDE 1 MG: 2 INJECTION, SOLUTION INTRAMUSCULAR; INTRAVENOUS; SUBCUTANEOUS at 20:23

## 2018-10-23 RX ADMIN — DOCUSATE SODIUM 1 MG: 100 CAPSULE, LIQUID FILLED ORAL at 21:00

## 2018-10-23 RX ADMIN — CARBOXYMETHYLCELLULOSE SODIUM 1 DROP: 10 GEL OPHTHALMIC at 17:00

## 2018-10-23 RX ADMIN — ACETYLCYSTEINE 1 ML: 200 SOLUTION ORAL; RESPIRATORY (INHALATION) at 16:00

## 2018-10-23 RX ADMIN — PREGABALIN 1 MG: 50 CAPSULE ORAL at 08:51

## 2018-10-23 RX ADMIN — HYDROMORPHONE HYDROCHLORIDE 1 MG: 2 INJECTION, SOLUTION INTRAMUSCULAR; INTRAVENOUS; SUBCUTANEOUS at 13:20

## 2018-10-23 RX ADMIN — INSULIN ASPART 1 UNIT: 100 INJECTION, SOLUTION INTRAVENOUS; SUBCUTANEOUS at 20:04

## 2018-10-23 RX ADMIN — ACETYLCYSTEINE 1 ML: 200 SOLUTION ORAL; RESPIRATORY (INHALATION) at 08:00

## 2018-10-23 RX ADMIN — HYDROMORPHONE HYDROCHLORIDE 1 MG: 2 INJECTION, SOLUTION INTRAMUSCULAR; INTRAVENOUS; SUBCUTANEOUS at 11:13

## 2018-10-23 RX ADMIN — IPRATROPIUM BROMIDE AND ALBUTEROL SULFATE 1 ML: .5; 3 SOLUTION RESPIRATORY (INHALATION) at 20:30

## 2018-10-23 RX ADMIN — POLYETHYLENE GLYCOL 3350 1 GM: 17 POWDER, FOR SOLUTION ORAL at 21:00

## 2018-10-23 RX ADMIN — SPIRONOLACTONE 1 MG: 25 TABLET, FILM COATED ORAL at 10:12

## 2018-10-23 RX ADMIN — LORAZEPAM 1 MG: 2 INJECTION, SOLUTION INTRAMUSCULAR; INTRAVENOUS at 14:47

## 2018-10-23 RX ADMIN — METOPROLOL TARTRATE 1 MG: 50 TABLET, FILM COATED ORAL at 20:10

## 2018-10-23 RX ADMIN — LOSARTAN POTASSIUM 1 MG: 50 TABLET ORAL at 07:20

## 2018-10-23 RX ADMIN — MUPIROCIN 1 APPLIC: 20 OINTMENT TOPICAL at 10:13

## 2018-10-23 RX ADMIN — CARBOXYMETHYLCELLULOSE SODIUM 1 DROP: 10 GEL OPHTHALMIC at 14:00

## 2018-10-23 RX ADMIN — HYDROMORPHONE HYDROCHLORIDE 1 MG: 2 INJECTION, SOLUTION INTRAMUSCULAR; INTRAVENOUS; SUBCUTANEOUS at 04:54

## 2018-10-23 RX ADMIN — IPRATROPIUM BROMIDE AND ALBUTEROL SULFATE 1 ML: .5; 3 SOLUTION RESPIRATORY (INHALATION) at 05:36

## 2018-10-23 RX ADMIN — PAROXETINE HYDROCHLORIDE 1 MG: 12.5 TABLET, FILM COATED, EXTENDED RELEASE ORAL at 10:03

## 2018-10-23 RX ADMIN — ASPIRIN 1 MG: 81 TABLET, COATED ORAL at 10:11

## 2018-10-23 RX ADMIN — ACETYLCYSTEINE 1 ML: 200 SOLUTION ORAL; RESPIRATORY (INHALATION) at 23:15

## 2018-10-24 LAB
ADD MAN DIFF?: NO
ANION GAP: 4 (ref 5–13)
BASOPHIL #: 0.1 10^3/UL (ref 0–0.1)
BASOPHILS %: 0.6 % (ref 0–2)
BLOOD UREA NITROGEN: 10 MG/DL (ref 7–20)
CALCIUM: 9 MG/DL (ref 8.4–10.2)
CARBON DIOXIDE: 35 MMOL/L (ref 21–31)
CHLORIDE: 94 MMOL/L (ref 97–110)
CHOL/HDL RATIO: 4.9 RATIO
CHOLESTEROL: 163 MG/DL (ref 100–200)
CK INDEX: 1.4
CK-MB: 1.86 NG/ML (ref 0–2.4)
CREATINE KINASE: 136 IU/L (ref 23–200)
CREATININE: 0.45 MG/DL (ref 0.44–1)
EOSINOPHILS #: 0.2 10^3/UL (ref 0–0.5)
EOSINOPHILS %: 2.2 % (ref 0–7)
GLUCOSE: 136 MG/DL (ref 70–220)
HDL CHOLESTEROL: 33 MG/DL (ref 35–98)
HEMATOCRIT: 30.2 % (ref 37–47)
HEMOGLOBIN: 9.6 G/DL (ref 12–16)
IMMATURE GRANS #M: 0.02 10^3/UL (ref 0–0.03)
IMMATURE GRANS % (M): 0.2 % (ref 0–0.43)
LDL CHOLESTEROL,CALCULATED: 103 MG/DL
LYMPHOCYTES #: 3.4 10^3/UL (ref 0.8–2.9)
LYMPHOCYTES %: 39.5 % (ref 15–51)
MEAN CORPUSCULAR HEMOGLOBIN: 26.1 PG (ref 29–33)
MEAN CORPUSCULAR HGB CONC: 31.8 G/DL (ref 32–37)
MEAN CORPUSCULAR VOLUME: 82.1 FL (ref 82–101)
MEAN PLATELET VOLUME: 8.9 FL (ref 7.4–10.4)
MONOCYTE #: 0.8 10^3/UL (ref 0.3–0.9)
MONOCYTES %: 9.4 % (ref 0–11)
NEUTROPHIL #: 4.1 10^3/UL (ref 1.6–7.5)
NEUTROPHILS %: 48.1 % (ref 39–77)
NUCLEATED RED BLOOD CELLS #: 0 10^3/UL (ref 0–0)
NUCLEATED RED BLOOD CELLS%: 0 /100WBC (ref 0–0)
PLATELET COUNT: 330 10^3/UL (ref 140–415)
POTASSIUM: 4 MMOL/L (ref 3.5–5.1)
RED BLOOD COUNT: 3.68 10^6/UL (ref 4.2–5.4)
RED CELL DISTRIBUTION WIDTH: 12.8 % (ref 11.5–14.5)
SODIUM: 133 MMOL/L (ref 135–144)
TRIGLYCERIDES: 134 MG/DL (ref 0–149)
TROPONIN-I: < 0.012 NG/ML (ref 0–0.12)
WHITE BLOOD COUNT: 8.5 10^3/UL (ref 4.8–10.8)

## 2018-10-24 RX ADMIN — HYDROMORPHONE HYDROCHLORIDE 1 MG: 2 INJECTION, SOLUTION INTRAMUSCULAR; INTRAVENOUS; SUBCUTANEOUS at 11:00

## 2018-10-24 RX ADMIN — HYDROMORPHONE HYDROCHLORIDE 1 MG: 2 INJECTION, SOLUTION INTRAMUSCULAR; INTRAVENOUS; SUBCUTANEOUS at 11:15

## 2018-10-24 RX ADMIN — NIFEDIPINE 1 MG: 30 TABLET, FILM COATED, EXTENDED RELEASE ORAL at 11:13

## 2018-10-24 RX ADMIN — IPRATROPIUM BROMIDE AND ALBUTEROL SULFATE 1 ML: .5; 3 SOLUTION RESPIRATORY (INHALATION) at 01:00

## 2018-10-24 RX ADMIN — IPRATROPIUM BROMIDE AND ALBUTEROL SULFATE 1 ML: .5; 3 SOLUTION RESPIRATORY (INHALATION) at 13:00

## 2018-10-24 RX ADMIN — CARBOXYMETHYLCELLULOSE SODIUM 1 DROP: 10 GEL OPHTHALMIC at 13:50

## 2018-10-24 RX ADMIN — POLYETHYLENE GLYCOL 3350 1 GM: 17 POWDER, FOR SOLUTION ORAL at 00:28

## 2018-10-24 RX ADMIN — ACETYLCYSTEINE 1 ML: 200 SOLUTION ORAL; RESPIRATORY (INHALATION) at 08:00

## 2018-10-24 RX ADMIN — INSULIN ASPART 1 UNIT: 100 INJECTION, SOLUTION INTRAVENOUS; SUBCUTANEOUS at 10:30

## 2018-10-24 RX ADMIN — PREGABALIN 1 MG: 50 CAPSULE ORAL at 11:19

## 2018-10-24 RX ADMIN — INSULIN ASPART 1 UNIT: 100 INJECTION, SOLUTION INTRAVENOUS; SUBCUTANEOUS at 12:10

## 2018-10-24 RX ADMIN — IPRATROPIUM BROMIDE AND ALBUTEROL SULFATE 1 ML: .5; 3 SOLUTION RESPIRATORY (INHALATION) at 05:11

## 2018-10-24 RX ADMIN — CARBOXYMETHYLCELLULOSE SODIUM 1 DROP: 10 GEL OPHTHALMIC at 10:50

## 2018-10-24 RX ADMIN — ACETAMINOPHEN 1 MG: 325 TABLET, FILM COATED ORAL at 00:27

## 2018-10-24 RX ADMIN — HYDROMORPHONE HYDROCHLORIDE 1 MG: 2 INJECTION, SOLUTION INTRAMUSCULAR; INTRAVENOUS; SUBCUTANEOUS at 19:02

## 2018-10-24 RX ADMIN — PANTOPRAZOLE SODIUM 1 MG: 40 TABLET, DELAYED RELEASE ORAL at 05:10

## 2018-10-24 RX ADMIN — NIFEDIPINE 1 MG: 30 TABLET, FILM COATED, EXTENDED RELEASE ORAL at 21:16

## 2018-10-24 RX ADMIN — SPIRONOLACTONE 1 MG: 25 TABLET, FILM COATED ORAL at 10:52

## 2018-10-24 RX ADMIN — LORAZEPAM 1 MG: 2 INJECTION, SOLUTION INTRAMUSCULAR; INTRAVENOUS at 16:08

## 2018-10-24 RX ADMIN — LOSARTAN POTASSIUM 1 MG: 50 TABLET ORAL at 11:03

## 2018-10-24 RX ADMIN — INSULIN ASPART 1 UNIT: 100 INJECTION, SOLUTION INTRAVENOUS; SUBCUTANEOUS at 18:18

## 2018-10-24 RX ADMIN — ACETAMINOPHEN 1 MG: 325 TABLET, FILM COATED ORAL at 16:03

## 2018-10-24 RX ADMIN — PREGABALIN 1 MG: 50 CAPSULE ORAL at 21:16

## 2018-10-24 RX ADMIN — METOPROLOL TARTRATE 1 MG: 50 TABLET, FILM COATED ORAL at 11:01

## 2018-10-24 RX ADMIN — ACETAMINOPHEN 1 MG: 325 TABLET, FILM COATED ORAL at 21:29

## 2018-10-24 RX ADMIN — ASPIRIN 1 MG: 81 TABLET, COATED ORAL at 10:51

## 2018-10-24 RX ADMIN — HYDROMORPHONE HYDROCHLORIDE 1 MG: 2 INJECTION, SOLUTION INTRAMUSCULAR; INTRAVENOUS; SUBCUTANEOUS at 22:52

## 2018-10-24 RX ADMIN — HYDROMORPHONE HYDROCHLORIDE 1 MG: 2 INJECTION, SOLUTION INTRAMUSCULAR; INTRAVENOUS; SUBCUTANEOUS at 07:45

## 2018-10-24 RX ADMIN — CARBOXYMETHYLCELLULOSE SODIUM 1 DROP: 10 GEL OPHTHALMIC at 18:09

## 2018-10-24 RX ADMIN — SENNOSIDES 1 TAB: 8.6 TABLET, FILM COATED ORAL at 21:13

## 2018-10-24 RX ADMIN — HYDROMORPHONE HYDROCHLORIDE 1 MG: 2 INJECTION, SOLUTION INTRAMUSCULAR; INTRAVENOUS; SUBCUTANEOUS at 15:59

## 2018-10-24 RX ADMIN — ISOSORBIDE DINITRATE 1 MG: 20 TABLET ORAL at 11:01

## 2018-10-24 RX ADMIN — ATORVASTATIN CALCIUM 1 MG: 40 TABLET, FILM COATED ORAL at 21:12

## 2018-10-24 RX ADMIN — DOCUSATE SODIUM 1 MG: 100 CAPSULE, LIQUID FILLED ORAL at 00:15

## 2018-10-24 RX ADMIN — POLYETHYLENE GLYCOL 3350 1 GM: 17 POWDER, FOR SOLUTION ORAL at 21:12

## 2018-10-24 RX ADMIN — IPRATROPIUM BROMIDE AND ALBUTEROL SULFATE 1 ML: .5; 3 SOLUTION RESPIRATORY (INHALATION) at 10:44

## 2018-10-24 RX ADMIN — INSULIN ASPART 1 UNIT: 100 INJECTION, SOLUTION INTRAVENOUS; SUBCUTANEOUS at 21:41

## 2018-10-24 RX ADMIN — ENOXAPARIN SODIUM 1 MG: 100 INJECTION SUBCUTANEOUS at 10:55

## 2018-10-24 RX ADMIN — METOPROLOL TARTRATE 1 MG: 50 TABLET, FILM COATED ORAL at 21:17

## 2018-10-24 RX ADMIN — DOCUSATE SODIUM 1 MG: 100 CAPSULE, LIQUID FILLED ORAL at 10:52

## 2018-10-24 RX ADMIN — FLUTICASONE FUROATE AND VILANTEROL TRIFENATATE 1 INH: 100; 25 POWDER RESPIRATORY (INHALATION) at 10:53

## 2018-10-24 RX ADMIN — DOCUSATE SODIUM 1 MG: 100 CAPSULE, LIQUID FILLED ORAL at 21:12

## 2018-10-24 RX ADMIN — CARBOXYMETHYLCELLULOSE SODIUM 1 DROP: 10 GEL OPHTHALMIC at 21:25

## 2018-10-24 RX ADMIN — EZETIMIBE 1 MG: 10 TABLET ORAL at 10:51

## 2018-10-24 RX ADMIN — MUPIROCIN 1 APPLIC: 20 OINTMENT TOPICAL at 10:53

## 2018-10-24 RX ADMIN — HYDROMORPHONE HYDROCHLORIDE 1 MG: 2 INJECTION, SOLUTION INTRAMUSCULAR; INTRAVENOUS; SUBCUTANEOUS at 05:10

## 2018-10-24 RX ADMIN — ISOSORBIDE DINITRATE 1 MG: 20 TABLET ORAL at 21:17

## 2018-10-24 RX ADMIN — IPRATROPIUM BROMIDE AND ALBUTEROL SULFATE 1 ML: .5; 3 SOLUTION RESPIRATORY (INHALATION) at 19:47

## 2018-10-24 RX ADMIN — REGADENOSON 1 MG: 0.08 INJECTION, SOLUTION INTRAVENOUS at 08:30

## 2018-10-24 RX ADMIN — MUPIROCIN 1 APPLIC: 20 OINTMENT TOPICAL at 21:42

## 2018-10-24 RX ADMIN — PAROXETINE HYDROCHLORIDE 1 MG: 12.5 TABLET, FILM COATED, EXTENDED RELEASE ORAL at 11:13

## 2018-10-24 RX ADMIN — HYDROMORPHONE HYDROCHLORIDE 1 MG: 2 INJECTION, SOLUTION INTRAMUSCULAR; INTRAVENOUS; SUBCUTANEOUS at 17:22

## 2018-10-24 RX ADMIN — HYDROMORPHONE HYDROCHLORIDE 1 MG: 2 INJECTION, SOLUTION INTRAMUSCULAR; INTRAVENOUS; SUBCUTANEOUS at 18:34

## 2018-10-24 RX ADMIN — IPRATROPIUM BROMIDE AND ALBUTEROL SULFATE 1 ML: .5; 3 SOLUTION RESPIRATORY (INHALATION) at 08:21

## 2018-10-24 RX ADMIN — ACETAMINOPHEN 1 MG: 325 TABLET, FILM COATED ORAL at 10:24

## 2018-10-25 LAB
ANION GAP: 8 (ref 5–13)
BLOOD UREA NITROGEN: 13 MG/DL (ref 7–20)
CALCIUM: 9.1 MG/DL (ref 8.4–10.2)
CARBON DIOXIDE: 32 MMOL/L (ref 21–31)
CHLORIDE: 94 MMOL/L (ref 97–110)
CREATININE: 0.46 MG/DL (ref 0.44–1)
GLUCOSE: 137 MG/DL (ref 70–220)
MAGNESIUM: 2.1 MG/DL (ref 1.7–2.5)
PHOSPHORUS: 5.1 MG/DL (ref 2.5–4.9)
POTASSIUM: 4.1 MMOL/L (ref 3.5–5.1)
SODIUM: 134 MMOL/L (ref 135–144)

## 2018-10-25 RX ADMIN — INSULIN ASPART 1 UNIT: 100 INJECTION, SOLUTION INTRAVENOUS; SUBCUTANEOUS at 11:41

## 2018-10-25 RX ADMIN — PREGABALIN 1 MG: 50 CAPSULE ORAL at 22:06

## 2018-10-25 RX ADMIN — INSULIN ASPART 1 UNIT: 100 INJECTION, SOLUTION INTRAVENOUS; SUBCUTANEOUS at 17:38

## 2018-10-25 RX ADMIN — IPRATROPIUM BROMIDE AND ALBUTEROL SULFATE 1 ML: .5; 3 SOLUTION RESPIRATORY (INHALATION) at 17:42

## 2018-10-25 RX ADMIN — IPRATROPIUM BROMIDE AND ALBUTEROL SULFATE 1 ML: .5; 3 SOLUTION RESPIRATORY (INHALATION) at 00:16

## 2018-10-25 RX ADMIN — DOCUSATE SODIUM 1 MG: 100 CAPSULE, LIQUID FILLED ORAL at 22:06

## 2018-10-25 RX ADMIN — CARBOXYMETHYLCELLULOSE SODIUM 1 DROP: 10 GEL OPHTHALMIC at 17:35

## 2018-10-25 RX ADMIN — CARBOXYMETHYLCELLULOSE SODIUM 1 DROP: 10 GEL OPHTHALMIC at 09:18

## 2018-10-25 RX ADMIN — IPRATROPIUM BROMIDE AND ALBUTEROL SULFATE 1 ML: .5; 3 SOLUTION RESPIRATORY (INHALATION) at 12:59

## 2018-10-25 RX ADMIN — SENNOSIDES 1 TAB: 8.6 TABLET, FILM COATED ORAL at 22:06

## 2018-10-25 RX ADMIN — HYDROMORPHONE HYDROCHLORIDE 1 MG: 2 INJECTION, SOLUTION INTRAMUSCULAR; INTRAVENOUS; SUBCUTANEOUS at 09:29

## 2018-10-25 RX ADMIN — NIFEDIPINE 1 MG: 30 TABLET, FILM COATED, EXTENDED RELEASE ORAL at 22:07

## 2018-10-25 RX ADMIN — PREGABALIN 1 MG: 50 CAPSULE ORAL at 09:10

## 2018-10-25 RX ADMIN — IPRATROPIUM BROMIDE AND ALBUTEROL SULFATE 1 ML: .5; 3 SOLUTION RESPIRATORY (INHALATION) at 09:28

## 2018-10-25 RX ADMIN — INSULIN ASPART 1 UNIT: 100 INJECTION, SOLUTION INTRAVENOUS; SUBCUTANEOUS at 08:03

## 2018-10-25 RX ADMIN — FLUTICASONE FUROATE AND VILANTEROL TRIFENATATE 1 INH: 100; 25 POWDER RESPIRATORY (INHALATION) at 09:18

## 2018-10-25 RX ADMIN — IPRATROPIUM BROMIDE AND ALBUTEROL SULFATE 1 ML: .5; 3 SOLUTION RESPIRATORY (INHALATION) at 20:44

## 2018-10-25 RX ADMIN — HYDROMORPHONE HYDROCHLORIDE 1 MG: 2 INJECTION, SOLUTION INTRAMUSCULAR; INTRAVENOUS; SUBCUTANEOUS at 06:23

## 2018-10-25 RX ADMIN — IPRATROPIUM BROMIDE AND ALBUTEROL SULFATE 1 ML: .5; 3 SOLUTION RESPIRATORY (INHALATION) at 04:56

## 2018-10-25 RX ADMIN — ENOXAPARIN SODIUM 1 MG: 100 INJECTION SUBCUTANEOUS at 09:17

## 2018-10-25 RX ADMIN — PANTOPRAZOLE SODIUM 1 MG: 40 TABLET, DELAYED RELEASE ORAL at 06:21

## 2018-10-25 RX ADMIN — ASPIRIN 1 MG: 81 TABLET, COATED ORAL at 09:07

## 2018-10-25 RX ADMIN — ATORVASTATIN CALCIUM 1 MG: 40 TABLET, FILM COATED ORAL at 22:06

## 2018-10-25 RX ADMIN — SPIRONOLACTONE 1 MG: 25 TABLET, FILM COATED ORAL at 09:08

## 2018-10-25 RX ADMIN — LOSARTAN POTASSIUM 1 MG: 50 TABLET ORAL at 09:08

## 2018-10-25 RX ADMIN — ACETAMINOPHEN 1 MG: 325 TABLET, FILM COATED ORAL at 07:20

## 2018-10-25 RX ADMIN — ACETAMINOPHEN 1 MG: 325 TABLET, FILM COATED ORAL at 13:31

## 2018-10-25 RX ADMIN — PREGABALIN 1 MG: 50 CAPSULE ORAL at 13:28

## 2018-10-25 RX ADMIN — METOPROLOL TARTRATE 1 MG: 50 TABLET, FILM COATED ORAL at 22:08

## 2018-10-25 RX ADMIN — HYDROMORPHONE HYDROCHLORIDE 1 MG: 2 INJECTION, SOLUTION INTRAMUSCULAR; INTRAVENOUS; SUBCUTANEOUS at 02:27

## 2018-10-25 RX ADMIN — EZETIMIBE 1 MG: 10 TABLET ORAL at 09:08

## 2018-10-25 RX ADMIN — DOCUSATE SODIUM 1 MG: 100 CAPSULE, LIQUID FILLED ORAL at 09:09

## 2018-10-25 RX ADMIN — MUPIROCIN 1 APPLIC: 20 OINTMENT TOPICAL at 21:00

## 2018-10-25 RX ADMIN — MUPIROCIN 1 APPLIC: 20 OINTMENT TOPICAL at 09:19

## 2018-10-25 RX ADMIN — CARBOXYMETHYLCELLULOSE SODIUM 1 DROP: 10 GEL OPHTHALMIC at 13:37

## 2018-10-25 RX ADMIN — INSULIN ASPART 1 UNIT: 100 INJECTION, SOLUTION INTRAVENOUS; SUBCUTANEOUS at 21:00

## 2018-10-25 RX ADMIN — CARBOXYMETHYLCELLULOSE SODIUM 1 DROP: 10 GEL OPHTHALMIC at 22:14

## 2018-10-25 RX ADMIN — BUTALBITAL, ACETAMINOPHEN, AND CAFFEINE 1 TAB: 50; 325; 40 TABLET ORAL at 04:09

## 2018-10-25 RX ADMIN — METOPROLOL TARTRATE 1 MG: 50 TABLET, FILM COATED ORAL at 09:09

## 2018-10-25 RX ADMIN — PAROXETINE HYDROCHLORIDE 1 MG: 12.5 TABLET, FILM COATED, EXTENDED RELEASE ORAL at 09:28

## 2018-10-25 RX ADMIN — POLYETHYLENE GLYCOL 3350 1 GM: 17 POWDER, FOR SOLUTION ORAL at 22:06

## 2018-10-25 RX ADMIN — BACLOFEN 1 MG: 10 TABLET ORAL at 09:28

## 2018-10-25 RX ADMIN — NIFEDIPINE 1 MG: 30 TABLET, FILM COATED, EXTENDED RELEASE ORAL at 09:08

## 2018-10-26 LAB
ADD MAN DIFF?: NO
ANION GAP: 7 (ref 5–13)
BASOPHIL #: 0 10^3/UL (ref 0–0.1)
BASOPHILS %: 0.5 % (ref 0–2)
BLOOD UREA NITROGEN: 10 MG/DL (ref 7–20)
CALCIUM: 9.5 MG/DL (ref 8.4–10.2)
CARBON DIOXIDE: 31 MMOL/L (ref 21–31)
CHLORIDE: 98 MMOL/L (ref 97–110)
CREATININE: 0.41 MG/DL (ref 0.44–1)
EOSINOPHILS #: 0.2 10^3/UL (ref 0–0.5)
EOSINOPHILS %: 2.5 % (ref 0–7)
GLUCOSE: 139 MG/DL (ref 70–220)
HEMATOCRIT: 34.8 % (ref 37–47)
HEMOGLOBIN: 11.1 G/DL (ref 12–16)
IMMATURE GRANS #M: 0.04 10^3/UL (ref 0–0.03)
IMMATURE GRANS % (M): 0.5 % (ref 0–0.43)
LYMPHOCYTES #: 2.1 10^3/UL (ref 0.8–2.9)
LYMPHOCYTES %: 25.6 % (ref 15–51)
MAGNESIUM: 2.1 MG/DL (ref 1.7–2.5)
MEAN CORPUSCULAR HEMOGLOBIN: 26.2 PG (ref 29–33)
MEAN CORPUSCULAR HGB CONC: 31.9 G/DL (ref 32–37)
MEAN CORPUSCULAR VOLUME: 82.3 FL (ref 82–101)
MEAN PLATELET VOLUME: 9.2 FL (ref 7.4–10.4)
MONOCYTE #: 0.7 10^3/UL (ref 0.3–0.9)
MONOCYTES %: 9.1 % (ref 0–11)
NEUTROPHIL #: 5 10^3/UL (ref 1.6–7.5)
NEUTROPHILS %: 61.8 % (ref 39–77)
NUCLEATED RED BLOOD CELLS #: 0 10^3/UL (ref 0–0)
NUCLEATED RED BLOOD CELLS%: 0 /100WBC (ref 0–0)
PHOSPHORUS: 3.9 MG/DL (ref 2.5–4.9)
PLATELET COUNT: 373 10^3/UL (ref 140–415)
POTASSIUM: 4.2 MMOL/L (ref 3.5–5.1)
RED BLOOD COUNT: 4.23 10^6/UL (ref 4.2–5.4)
RED CELL DISTRIBUTION WIDTH: 13.2 % (ref 11.5–14.5)
SODIUM: 136 MMOL/L (ref 135–144)
WHITE BLOOD COUNT: 8.2 10^3/UL (ref 4.8–10.8)

## 2018-10-26 RX ADMIN — DOCUSATE SODIUM 1 MG: 100 CAPSULE, LIQUID FILLED ORAL at 20:47

## 2018-10-26 RX ADMIN — IPRATROPIUM BROMIDE AND ALBUTEROL SULFATE 1 ML: .5; 3 SOLUTION RESPIRATORY (INHALATION) at 00:00

## 2018-10-26 RX ADMIN — HYDROMORPHONE HYDROCHLORIDE 1 MG: 2 INJECTION, SOLUTION INTRAMUSCULAR; INTRAVENOUS; SUBCUTANEOUS at 15:00

## 2018-10-26 RX ADMIN — SENNOSIDES 1 TAB: 8.6 TABLET, FILM COATED ORAL at 20:47

## 2018-10-26 RX ADMIN — ACETAMINOPHEN 1 MG: 325 TABLET, FILM COATED ORAL at 01:35

## 2018-10-26 RX ADMIN — POLYETHYLENE GLYCOL 3350 1 GM: 17 POWDER, FOR SOLUTION ORAL at 20:47

## 2018-10-26 RX ADMIN — FLUTICASONE FUROATE AND VILANTEROL TRIFENATATE 1 INH: 100; 25 POWDER RESPIRATORY (INHALATION) at 08:27

## 2018-10-26 RX ADMIN — ACETAMINOPHEN 1 MG: 325 TABLET, FILM COATED ORAL at 17:25

## 2018-10-26 RX ADMIN — IPRATROPIUM BROMIDE AND ALBUTEROL SULFATE 1 ML: .5; 3 SOLUTION RESPIRATORY (INHALATION) at 08:53

## 2018-10-26 RX ADMIN — NIFEDIPINE 1 MG: 30 TABLET, FILM COATED, EXTENDED RELEASE ORAL at 08:26

## 2018-10-26 RX ADMIN — SPIRONOLACTONE 1 MG: 25 TABLET, FILM COATED ORAL at 08:25

## 2018-10-26 RX ADMIN — INSULIN ASPART 1 UNIT: 100 INJECTION, SOLUTION INTRAVENOUS; SUBCUTANEOUS at 11:47

## 2018-10-26 RX ADMIN — ACETAMINOPHEN 1 MG: 325 TABLET, FILM COATED ORAL at 10:09

## 2018-10-26 RX ADMIN — BUTALBITAL, ACETAMINOPHEN, AND CAFFEINE 1 TAB: 50; 325; 40 TABLET ORAL at 14:00

## 2018-10-26 RX ADMIN — LABETALOL 1 MG: 200 TABLET, FILM COATED ORAL at 21:04

## 2018-10-26 RX ADMIN — HYDROMORPHONE HYDROCHLORIDE 1 MG: 2 INJECTION, SOLUTION INTRAMUSCULAR; INTRAVENOUS; SUBCUTANEOUS at 20:58

## 2018-10-26 RX ADMIN — PREGABALIN 1 MG: 75 CAPSULE ORAL at 21:51

## 2018-10-26 RX ADMIN — ATORVASTATIN CALCIUM 1 MG: 40 TABLET, FILM COATED ORAL at 20:47

## 2018-10-26 RX ADMIN — INSULIN ASPART 1 UNIT: 100 INJECTION, SOLUTION INTRAVENOUS; SUBCUTANEOUS at 17:18

## 2018-10-26 RX ADMIN — LABETALOL 1 MG: 200 TABLET, FILM COATED ORAL at 14:12

## 2018-10-26 RX ADMIN — DOCUSATE SODIUM 1 MG: 100 CAPSULE, LIQUID FILLED ORAL at 08:24

## 2018-10-26 RX ADMIN — EZETIMIBE 1 MG: 10 TABLET ORAL at 08:22

## 2018-10-26 RX ADMIN — ASPIRIN 1 MG: 81 TABLET, COATED ORAL at 08:24

## 2018-10-26 RX ADMIN — PREGABALIN 1 MG: 75 CAPSULE ORAL at 14:00

## 2018-10-26 RX ADMIN — HYDROMORPHONE HYDROCHLORIDE 1 MG: 2 INJECTION, SOLUTION INTRAMUSCULAR; INTRAVENOUS; SUBCUTANEOUS at 08:25

## 2018-10-26 RX ADMIN — PREGABALIN 1 MG: 75 CAPSULE ORAL at 08:44

## 2018-10-26 RX ADMIN — INSULIN ASPART 1 UNIT: 100 INJECTION, SOLUTION INTRAVENOUS; SUBCUTANEOUS at 07:49

## 2018-10-26 RX ADMIN — PAROXETINE HYDROCHLORIDE 1 MG: 12.5 TABLET, FILM COATED, EXTENDED RELEASE ORAL at 08:24

## 2018-10-26 RX ADMIN — NIFEDIPINE 1 MG: 30 TABLET, FILM COATED, EXTENDED RELEASE ORAL at 21:04

## 2018-10-26 RX ADMIN — HYDROMORPHONE HYDROCHLORIDE 1 MG: 2 INJECTION, SOLUTION INTRAMUSCULAR; INTRAVENOUS; SUBCUTANEOUS at 05:05

## 2018-10-26 RX ADMIN — CARBOXYMETHYLCELLULOSE SODIUM 1 DROP: 10 GEL OPHTHALMIC at 21:51

## 2018-10-26 RX ADMIN — METOPROLOL TARTRATE 1 MG: 50 TABLET, FILM COATED ORAL at 08:26

## 2018-10-26 RX ADMIN — BACLOFEN 1 MG: 10 TABLET ORAL at 01:35

## 2018-10-26 RX ADMIN — PANTOPRAZOLE SODIUM 1 MG: 40 TABLET, DELAYED RELEASE ORAL at 06:20

## 2018-10-26 RX ADMIN — ENOXAPARIN SODIUM 1 MG: 100 INJECTION SUBCUTANEOUS at 08:35

## 2018-10-26 RX ADMIN — IPRATROPIUM BROMIDE AND ALBUTEROL SULFATE 1 ML: .5; 3 SOLUTION RESPIRATORY (INHALATION) at 04:02

## 2018-10-26 RX ADMIN — IPRATROPIUM BROMIDE AND ALBUTEROL SULFATE 1 ML: .5; 3 SOLUTION RESPIRATORY (INHALATION) at 16:40

## 2018-10-26 RX ADMIN — BACLOFEN 1 MG: 10 TABLET ORAL at 08:25

## 2018-10-26 RX ADMIN — LOSARTAN POTASSIUM 1 MG: 50 TABLET ORAL at 08:25

## 2018-10-26 RX ADMIN — INSULIN ASPART 1 UNIT: 100 INJECTION, SOLUTION INTRAVENOUS; SUBCUTANEOUS at 21:00

## 2018-10-26 RX ADMIN — IPRATROPIUM BROMIDE AND ALBUTEROL SULFATE 1 ML: .5; 3 SOLUTION RESPIRATORY (INHALATION) at 21:44

## 2018-10-26 RX ADMIN — CARBOXYMETHYLCELLULOSE SODIUM 1 DROP: 10 GEL OPHTHALMIC at 17:20

## 2018-10-26 RX ADMIN — HYDROCHLOROTHIAZIDE 1 MG: 25 TABLET ORAL at 12:41

## 2018-10-26 RX ADMIN — HYDROMORPHONE HYDROCHLORIDE 1 MG: 2 INJECTION, SOLUTION INTRAMUSCULAR; INTRAVENOUS; SUBCUTANEOUS at 11:35

## 2018-10-26 RX ADMIN — MUPIROCIN 1 APPLIC: 20 OINTMENT TOPICAL at 09:00

## 2018-10-26 RX ADMIN — BUTALBITAL, ACETAMINOPHEN, AND CAFFEINE 1 TAB: 50; 325; 40 TABLET ORAL at 06:21

## 2018-10-26 RX ADMIN — CARBOXYMETHYLCELLULOSE SODIUM 1 DROP: 10 GEL OPHTHALMIC at 12:43

## 2018-10-26 RX ADMIN — CARBOXYMETHYLCELLULOSE SODIUM 1 DROP: 10 GEL OPHTHALMIC at 08:27

## 2018-10-26 RX ADMIN — IPRATROPIUM BROMIDE AND ALBUTEROL SULFATE 1 ML: .5; 3 SOLUTION RESPIRATORY (INHALATION) at 13:14

## 2018-10-27 LAB
ADD MAN DIFF?: NO
ANION GAP: 11 (ref 5–13)
BASOPHIL #: 0.1 10^3/UL (ref 0–0.1)
BASOPHILS %: 0.7 % (ref 0–2)
BLOOD UREA NITROGEN: 12 MG/DL (ref 7–20)
CALCIUM: 9.6 MG/DL (ref 8.4–10.2)
CARBON DIOXIDE: 30 MMOL/L (ref 21–31)
CHLORIDE: 90 MMOL/L (ref 97–110)
CREATININE: 0.51 MG/DL (ref 0.44–1)
EOSINOPHILS #: 0.3 10^3/UL (ref 0–0.5)
EOSINOPHILS %: 2.8 % (ref 0–7)
GLUCOSE: 101 MG/DL (ref 70–220)
HEMATOCRIT: 31.7 % (ref 37–47)
HEMOGLOBIN: 10.2 G/DL (ref 12–16)
IMMATURE GRANS #M: 0.03 10^3/UL (ref 0–0.03)
IMMATURE GRANS % (M): 0.3 % (ref 0–0.43)
LYMPHOCYTES #: 3.1 10^3/UL (ref 0.8–2.9)
LYMPHOCYTES %: 34.3 % (ref 15–51)
MAGNESIUM: 1.9 MG/DL (ref 1.7–2.5)
MEAN CORPUSCULAR HEMOGLOBIN: 26.4 PG (ref 29–33)
MEAN CORPUSCULAR HGB CONC: 32.2 G/DL (ref 32–37)
MEAN CORPUSCULAR VOLUME: 81.9 FL (ref 82–101)
MEAN PLATELET VOLUME: 8.9 FL (ref 7.4–10.4)
MONOCYTE #: 0.8 10^3/UL (ref 0.3–0.9)
MONOCYTES %: 8.9 % (ref 0–11)
NEUTROPHIL #: 4.8 10^3/UL (ref 1.6–7.5)
NEUTROPHILS %: 53 % (ref 39–77)
NUCLEATED RED BLOOD CELLS #: 0 10^3/UL (ref 0–0)
NUCLEATED RED BLOOD CELLS%: 0 /100WBC (ref 0–0)
PHOSPHORUS: 5.1 MG/DL (ref 2.5–4.9)
PLATELET COUNT: 361 10^3/UL (ref 140–415)
POTASSIUM: 4.3 MMOL/L (ref 3.5–5.1)
RED BLOOD COUNT: 3.87 10^6/UL (ref 4.2–5.4)
RED CELL DISTRIBUTION WIDTH: 13.1 % (ref 11.5–14.5)
SODIUM: 131 MMOL/L (ref 135–144)
WHITE BLOOD COUNT: 9 10^3/UL (ref 4.8–10.8)

## 2018-10-27 RX ADMIN — HYDROMORPHONE HYDROCHLORIDE 1 MG: 2 INJECTION, SOLUTION INTRAMUSCULAR; INTRAVENOUS; SUBCUTANEOUS at 12:00

## 2018-10-27 RX ADMIN — DOCUSATE SODIUM 1 MG: 100 CAPSULE, LIQUID FILLED ORAL at 09:05

## 2018-10-27 RX ADMIN — EZETIMIBE 1 MG: 10 TABLET ORAL at 09:04

## 2018-10-27 RX ADMIN — PREGABALIN 1 MG: 75 CAPSULE ORAL at 21:16

## 2018-10-27 RX ADMIN — HYDROMORPHONE HYDROCHLORIDE 1 MG: 2 INJECTION, SOLUTION INTRAMUSCULAR; INTRAVENOUS; SUBCUTANEOUS at 09:30

## 2018-10-27 RX ADMIN — LABETALOL 1 MG: 200 TABLET, FILM COATED ORAL at 14:46

## 2018-10-27 RX ADMIN — ACETAMINOPHEN 1 MG: 325 TABLET, FILM COATED ORAL at 12:57

## 2018-10-27 RX ADMIN — INSULIN ASPART 1 UNIT: 100 INJECTION, SOLUTION INTRAVENOUS; SUBCUTANEOUS at 17:35

## 2018-10-27 RX ADMIN — ATORVASTATIN CALCIUM 1 MG: 40 TABLET, FILM COATED ORAL at 21:13

## 2018-10-27 RX ADMIN — HYDROMORPHONE HYDROCHLORIDE 1 MG: 2 INJECTION, SOLUTION INTRAMUSCULAR; INTRAVENOUS; SUBCUTANEOUS at 22:01

## 2018-10-27 RX ADMIN — HYDROMORPHONE HYDROCHLORIDE 1 MG: 2 INJECTION, SOLUTION INTRAMUSCULAR; INTRAVENOUS; SUBCUTANEOUS at 06:06

## 2018-10-27 RX ADMIN — ACETAMINOPHEN 1 MG: 325 TABLET, FILM COATED ORAL at 08:33

## 2018-10-27 RX ADMIN — LABETALOL 1 MG: 200 TABLET, FILM COATED ORAL at 21:13

## 2018-10-27 RX ADMIN — ACETAMINOPHEN 1 MG: 325 TABLET, FILM COATED ORAL at 14:51

## 2018-10-27 RX ADMIN — PREGABALIN 1 MG: 75 CAPSULE ORAL at 06:06

## 2018-10-27 RX ADMIN — LABETALOL 1 MG: 200 TABLET, FILM COATED ORAL at 09:23

## 2018-10-27 RX ADMIN — CARBOXYMETHYLCELLULOSE SODIUM 1 DROP: 10 GEL OPHTHALMIC at 14:40

## 2018-10-27 RX ADMIN — IPRATROPIUM BROMIDE AND ALBUTEROL SULFATE 1 ML: .5; 3 SOLUTION RESPIRATORY (INHALATION) at 01:12

## 2018-10-27 RX ADMIN — SENNOSIDES 1 TAB: 8.6 TABLET, FILM COATED ORAL at 21:13

## 2018-10-27 RX ADMIN — ASPIRIN 1 MG: 81 TABLET, COATED ORAL at 09:05

## 2018-10-27 RX ADMIN — DOCUSATE SODIUM 1 MG: 100 CAPSULE, LIQUID FILLED ORAL at 21:13

## 2018-10-27 RX ADMIN — BACLOFEN 1 MG: 10 TABLET ORAL at 09:04

## 2018-10-27 RX ADMIN — IPRATROPIUM BROMIDE AND ALBUTEROL SULFATE 1 ML: .5; 3 SOLUTION RESPIRATORY (INHALATION) at 20:58

## 2018-10-27 RX ADMIN — INSULIN ASPART 1 UNIT: 100 INJECTION, SOLUTION INTRAVENOUS; SUBCUTANEOUS at 21:00

## 2018-10-27 RX ADMIN — MAGNESIUM HYDROXIDE 1 ML: 400 SUSPENSION ORAL at 22:11

## 2018-10-27 RX ADMIN — CARBOXYMETHYLCELLULOSE SODIUM 1 DROP: 10 GEL OPHTHALMIC at 17:17

## 2018-10-27 RX ADMIN — PANTOPRAZOLE SODIUM 1 MG: 40 TABLET, DELAYED RELEASE ORAL at 06:06

## 2018-10-27 RX ADMIN — SPIRONOLACTONE 1 MG: 25 TABLET, FILM COATED ORAL at 09:06

## 2018-10-27 RX ADMIN — PAROXETINE HYDROCHLORIDE 1 MG: 12.5 TABLET, FILM COATED, EXTENDED RELEASE ORAL at 09:05

## 2018-10-27 RX ADMIN — NIFEDIPINE 1 MG: 30 TABLET, FILM COATED, EXTENDED RELEASE ORAL at 21:14

## 2018-10-27 RX ADMIN — INSULIN ASPART 1 UNIT: 100 INJECTION, SOLUTION INTRAVENOUS; SUBCUTANEOUS at 07:35

## 2018-10-27 RX ADMIN — FLUTICASONE FUROATE AND VILANTEROL TRIFENATATE 1 INH: 100; 25 POWDER RESPIRATORY (INHALATION) at 09:01

## 2018-10-27 RX ADMIN — IPRATROPIUM BROMIDE AND ALBUTEROL SULFATE 1 ML: .5; 3 SOLUTION RESPIRATORY (INHALATION) at 09:32

## 2018-10-27 RX ADMIN — POLYETHYLENE GLYCOL 3350 1 GM: 17 POWDER, FOR SOLUTION ORAL at 21:16

## 2018-10-27 RX ADMIN — CARBOXYMETHYLCELLULOSE SODIUM 1 DROP: 10 GEL OPHTHALMIC at 21:16

## 2018-10-27 RX ADMIN — NIFEDIPINE 1 MG: 30 TABLET, FILM COATED, EXTENDED RELEASE ORAL at 09:24

## 2018-10-27 RX ADMIN — ENOXAPARIN SODIUM 1 MG: 100 INJECTION SUBCUTANEOUS at 14:37

## 2018-10-27 RX ADMIN — IPRATROPIUM BROMIDE AND ALBUTEROL SULFATE 1 ML: .5; 3 SOLUTION RESPIRATORY (INHALATION) at 05:22

## 2018-10-27 RX ADMIN — HYDROMORPHONE HYDROCHLORIDE 1 MG: 2 INJECTION, SOLUTION INTRAMUSCULAR; INTRAVENOUS; SUBCUTANEOUS at 17:18

## 2018-10-27 RX ADMIN — HYDROCHLOROTHIAZIDE 1 MG: 25 TABLET ORAL at 09:24

## 2018-10-27 RX ADMIN — CARBOXYMETHYLCELLULOSE SODIUM 1 DROP: 10 GEL OPHTHALMIC at 09:01

## 2018-10-27 RX ADMIN — IPRATROPIUM BROMIDE AND ALBUTEROL SULFATE 1 ML: .5; 3 SOLUTION RESPIRATORY (INHALATION) at 13:54

## 2018-10-27 RX ADMIN — MAGNESIUM HYDROXIDE 1 ML: 400 SUSPENSION ORAL at 08:27

## 2018-10-27 RX ADMIN — Medication 1 MG: at 22:11

## 2018-10-27 RX ADMIN — IPRATROPIUM BROMIDE AND ALBUTEROL SULFATE 1 ML: .5; 3 SOLUTION RESPIRATORY (INHALATION) at 18:10

## 2018-10-27 RX ADMIN — LOSARTAN POTASSIUM 1 MG: 50 TABLET ORAL at 09:25

## 2018-10-27 RX ADMIN — HYDROMORPHONE HYDROCHLORIDE 1 MG: 2 INJECTION, SOLUTION INTRAMUSCULAR; INTRAVENOUS; SUBCUTANEOUS at 10:00

## 2018-10-27 RX ADMIN — HYDROMORPHONE HYDROCHLORIDE 1 MG: 2 INJECTION, SOLUTION INTRAMUSCULAR; INTRAVENOUS; SUBCUTANEOUS at 11:48

## 2018-10-27 RX ADMIN — BUTALBITAL, ACETAMINOPHEN, AND CAFFEINE 1 TAB: 50; 325; 40 TABLET ORAL at 08:27

## 2018-10-27 RX ADMIN — PREGABALIN 1 MG: 75 CAPSULE ORAL at 14:39

## 2018-10-27 RX ADMIN — INSULIN ASPART 1 UNIT: 100 INJECTION, SOLUTION INTRAVENOUS; SUBCUTANEOUS at 12:00

## 2018-10-27 RX ADMIN — HYDROMORPHONE HYDROCHLORIDE 1 MG: 2 INJECTION, SOLUTION INTRAMUSCULAR; INTRAVENOUS; SUBCUTANEOUS at 14:31

## 2018-10-28 RX ADMIN — DOCUSATE SODIUM 1 MG: 100 CAPSULE, LIQUID FILLED ORAL at 09:47

## 2018-10-28 RX ADMIN — LABETALOL 1 MG: 200 TABLET, FILM COATED ORAL at 20:20

## 2018-10-28 RX ADMIN — HYDROMORPHONE HYDROCHLORIDE 1 MG: 2 INJECTION, SOLUTION INTRAMUSCULAR; INTRAVENOUS; SUBCUTANEOUS at 14:40

## 2018-10-28 RX ADMIN — CARBOXYMETHYLCELLULOSE SODIUM 1 DROP: 10 GEL OPHTHALMIC at 13:00

## 2018-10-28 RX ADMIN — HYDROMORPHONE HYDROCHLORIDE 1 MG: 2 INJECTION, SOLUTION INTRAMUSCULAR; INTRAVENOUS; SUBCUTANEOUS at 03:34

## 2018-10-28 RX ADMIN — MAGNESIUM HYDROXIDE 1 ML: 400 SUSPENSION ORAL at 20:21

## 2018-10-28 RX ADMIN — HYDROMORPHONE HYDROCHLORIDE 1 MG: 2 INJECTION, SOLUTION INTRAMUSCULAR; INTRAVENOUS; SUBCUTANEOUS at 23:56

## 2018-10-28 RX ADMIN — INSULIN ASPART 1 UNIT: 100 INJECTION, SOLUTION INTRAVENOUS; SUBCUTANEOUS at 21:00

## 2018-10-28 RX ADMIN — HYDROMORPHONE HYDROCHLORIDE 1 MG: 2 INJECTION, SOLUTION INTRAMUSCULAR; INTRAVENOUS; SUBCUTANEOUS at 12:30

## 2018-10-28 RX ADMIN — LABETALOL 1 MG: 200 TABLET, FILM COATED ORAL at 11:20

## 2018-10-28 RX ADMIN — ACETAMINOPHEN 1 MG: 325 TABLET, FILM COATED ORAL at 15:45

## 2018-10-28 RX ADMIN — IPRATROPIUM BROMIDE AND ALBUTEROL SULFATE 1 ML: .5; 3 SOLUTION RESPIRATORY (INHALATION) at 12:58

## 2018-10-28 RX ADMIN — INSULIN ASPART 1 UNIT: 100 INJECTION, SOLUTION INTRAVENOUS; SUBCUTANEOUS at 12:00

## 2018-10-28 RX ADMIN — PREGABALIN 1 MG: 75 CAPSULE ORAL at 14:40

## 2018-10-28 RX ADMIN — PREGABALIN 1 MG: 75 CAPSULE ORAL at 15:44

## 2018-10-28 RX ADMIN — IPRATROPIUM BROMIDE AND ALBUTEROL SULFATE 1 ML: .5; 3 SOLUTION RESPIRATORY (INHALATION) at 20:30

## 2018-10-28 RX ADMIN — DOCUSATE SODIUM 1 MG: 100 CAPSULE, LIQUID FILLED ORAL at 20:18

## 2018-10-28 RX ADMIN — PREGABALIN 1 MG: 75 CAPSULE ORAL at 23:55

## 2018-10-28 RX ADMIN — INSULIN ASPART 1 UNIT: 100 INJECTION, SOLUTION INTRAVENOUS; SUBCUTANEOUS at 17:35

## 2018-10-28 RX ADMIN — PANTOPRAZOLE SODIUM 1 MG: 40 TABLET, DELAYED RELEASE ORAL at 06:15

## 2018-10-28 RX ADMIN — SENNOSIDES 1 TAB: 8.6 TABLET, FILM COATED ORAL at 20:25

## 2018-10-28 RX ADMIN — EZETIMIBE 1 MG: 10 TABLET ORAL at 09:48

## 2018-10-28 RX ADMIN — HYDROMORPHONE HYDROCHLORIDE 1 MG: 2 INJECTION, SOLUTION INTRAMUSCULAR; INTRAVENOUS; SUBCUTANEOUS at 19:24

## 2018-10-28 RX ADMIN — POLYETHYLENE GLYCOL 3350 1 GM: 17 POWDER, FOR SOLUTION ORAL at 20:25

## 2018-10-28 RX ADMIN — ACETAMINOPHEN 1 MG: 325 TABLET, FILM COATED ORAL at 00:49

## 2018-10-28 RX ADMIN — CARBOXYMETHYLCELLULOSE SODIUM 1 DROP: 10 GEL OPHTHALMIC at 10:46

## 2018-10-28 RX ADMIN — ASPIRIN 1 MG: 81 TABLET, COATED ORAL at 09:47

## 2018-10-28 RX ADMIN — NIFEDIPINE 1 MG: 30 TABLET, FILM COATED, EXTENDED RELEASE ORAL at 20:20

## 2018-10-28 RX ADMIN — HYDROMORPHONE HYDROCHLORIDE 1 MG: 2 INJECTION, SOLUTION INTRAMUSCULAR; INTRAVENOUS; SUBCUTANEOUS at 21:44

## 2018-10-28 RX ADMIN — ACETAMINOPHEN 1 MG: 325 TABLET, FILM COATED ORAL at 06:15

## 2018-10-28 RX ADMIN — HYDROCHLOROTHIAZIDE 1 MG: 25 TABLET ORAL at 11:23

## 2018-10-28 RX ADMIN — LOSARTAN POTASSIUM 1 MG: 50 TABLET ORAL at 11:22

## 2018-10-28 RX ADMIN — BUTALBITAL, ACETAMINOPHEN, AND CAFFEINE 1 TAB: 50; 325; 40 TABLET ORAL at 08:28

## 2018-10-28 RX ADMIN — ACETAMINOPHEN 1 MG: 325 TABLET, FILM COATED ORAL at 21:48

## 2018-10-28 RX ADMIN — CARBOXYMETHYLCELLULOSE SODIUM 1 DROP: 10 GEL OPHTHALMIC at 17:02

## 2018-10-28 RX ADMIN — NIFEDIPINE 1 MG: 30 TABLET, FILM COATED, EXTENDED RELEASE ORAL at 11:21

## 2018-10-28 RX ADMIN — ENOXAPARIN SODIUM 1 MG: 100 INJECTION SUBCUTANEOUS at 09:49

## 2018-10-28 RX ADMIN — IPRATROPIUM BROMIDE AND ALBUTEROL SULFATE 1 ML: .5; 3 SOLUTION RESPIRATORY (INHALATION) at 01:18

## 2018-10-28 RX ADMIN — IPRATROPIUM BROMIDE AND ALBUTEROL SULFATE 1 ML: .5; 3 SOLUTION RESPIRATORY (INHALATION) at 09:32

## 2018-10-28 RX ADMIN — HYDROMORPHONE HYDROCHLORIDE 1 MG: 2 INJECTION, SOLUTION INTRAMUSCULAR; INTRAVENOUS; SUBCUTANEOUS at 16:59

## 2018-10-28 RX ADMIN — IPRATROPIUM BROMIDE AND ALBUTEROL SULFATE 1 ML: .5; 3 SOLUTION RESPIRATORY (INHALATION) at 05:30

## 2018-10-28 RX ADMIN — FLUTICASONE FUROATE AND VILANTEROL TRIFENATATE 1 INH: 100; 25 POWDER RESPIRATORY (INHALATION) at 10:46

## 2018-10-28 RX ADMIN — HYDROMORPHONE HYDROCHLORIDE 1 MG: 2 INJECTION, SOLUTION INTRAMUSCULAR; INTRAVENOUS; SUBCUTANEOUS at 09:44

## 2018-10-28 RX ADMIN — INSULIN ASPART 1 UNIT: 100 INJECTION, SOLUTION INTRAVENOUS; SUBCUTANEOUS at 07:35

## 2018-10-28 RX ADMIN — LABETALOL 1 MG: 200 TABLET, FILM COATED ORAL at 15:46

## 2018-10-28 RX ADMIN — SPIRONOLACTONE 1 MG: 25 TABLET, FILM COATED ORAL at 09:47

## 2018-10-28 RX ADMIN — BUTALBITAL, ACETAMINOPHEN, AND CAFFEINE 1 TAB: 50; 325; 40 TABLET ORAL at 17:46

## 2018-10-28 RX ADMIN — PREGABALIN 1 MG: 75 CAPSULE ORAL at 06:15

## 2018-10-28 RX ADMIN — IPRATROPIUM BROMIDE AND ALBUTEROL SULFATE 1 ML: .5; 3 SOLUTION RESPIRATORY (INHALATION) at 16:13

## 2018-10-28 RX ADMIN — HYDROMORPHONE HYDROCHLORIDE 1 MG: 2 INJECTION, SOLUTION INTRAMUSCULAR; INTRAVENOUS; SUBCUTANEOUS at 00:49

## 2018-10-28 RX ADMIN — ATORVASTATIN CALCIUM 1 MG: 40 TABLET, FILM COATED ORAL at 20:18

## 2018-10-28 RX ADMIN — CARBOXYMETHYLCELLULOSE SODIUM 1 DROP: 10 GEL OPHTHALMIC at 20:27

## 2018-10-28 RX ADMIN — PAROXETINE HYDROCHLORIDE 1 MG: 12.5 TABLET, FILM COATED, EXTENDED RELEASE ORAL at 09:47

## 2018-10-28 RX ADMIN — SPIRONOLACTONE 1 MG: 25 TABLET, FILM COATED ORAL at 11:22

## 2018-10-28 RX ADMIN — HYDROMORPHONE HYDROCHLORIDE 1 MG: 2 INJECTION, SOLUTION INTRAMUSCULAR; INTRAVENOUS; SUBCUTANEOUS at 06:24

## 2018-10-29 LAB
ADD UMIC: NO
UR ASCORBIC ACID: NEGATIVE MG/DL
UR BILIRUBIN (DIP): NEGATIVE MG/DL
UR BLOOD (DIP): NEGATIVE MG/DL
UR CLARITY: CLEAR
UR COLOR: (no result)
UR GLUCOSE (DIP): NEGATIVE MG/DL
UR KETONES (DIP): NEGATIVE MG/DL
UR LEUKOCYTE ESTERASE (DIP): NEGATIVE LEU/UL
UR NITRITE (DIP): NEGATIVE MG/DL
UR PH (DIP): 6 (ref 5–9)
UR SPECIFIC GRAVITY (DIP): 1.01 (ref 1–1.03)
UR TOTAL PROTEIN (DIP): NEGATIVE MG/DL
UR UROBILINOGEN (DIP): NEGATIVE MG/DL

## 2018-10-29 RX ADMIN — ATORVASTATIN CALCIUM 1 MG: 40 TABLET, FILM COATED ORAL at 21:31

## 2018-10-29 RX ADMIN — LABETALOL 1 MG: 200 TABLET, FILM COATED ORAL at 21:32

## 2018-10-29 RX ADMIN — HYDROMORPHONE HYDROCHLORIDE 1 MG: 2 INJECTION, SOLUTION INTRAMUSCULAR; INTRAVENOUS; SUBCUTANEOUS at 21:35

## 2018-10-29 RX ADMIN — IPRATROPIUM BROMIDE AND ALBUTEROL SULFATE 1 ML: .5; 3 SOLUTION RESPIRATORY (INHALATION) at 05:20

## 2018-10-29 RX ADMIN — HYDROMORPHONE HYDROCHLORIDE 1 MG: 2 INJECTION, SOLUTION INTRAMUSCULAR; INTRAVENOUS; SUBCUTANEOUS at 06:14

## 2018-10-29 RX ADMIN — PREGABALIN 1 MG: 50 CAPSULE ORAL at 14:00

## 2018-10-29 RX ADMIN — CARBOXYMETHYLCELLULOSE SODIUM 1 DROP: 10 GEL OPHTHALMIC at 17:27

## 2018-10-29 RX ADMIN — PAROXETINE HYDROCHLORIDE 1 MG: 12.5 TABLET, FILM COATED, EXTENDED RELEASE ORAL at 08:24

## 2018-10-29 RX ADMIN — HYDROMORPHONE HYDROCHLORIDE 1 MG: 2 INJECTION, SOLUTION INTRAMUSCULAR; INTRAVENOUS; SUBCUTANEOUS at 02:06

## 2018-10-29 RX ADMIN — DOCUSATE SODIUM 1 MG: 100 CAPSULE, LIQUID FILLED ORAL at 21:32

## 2018-10-29 RX ADMIN — IPRATROPIUM BROMIDE AND ALBUTEROL SULFATE 1 ML: .5; 3 SOLUTION RESPIRATORY (INHALATION) at 20:39

## 2018-10-29 RX ADMIN — IPRATROPIUM BROMIDE AND ALBUTEROL SULFATE 1 ML: .5; 3 SOLUTION RESPIRATORY (INHALATION) at 17:19

## 2018-10-29 RX ADMIN — ACETAMINOPHEN 1 MG: 325 TABLET, FILM COATED ORAL at 21:33

## 2018-10-29 RX ADMIN — INSULIN ASPART 1 UNIT: 100 INJECTION, SOLUTION INTRAVENOUS; SUBCUTANEOUS at 17:31

## 2018-10-29 RX ADMIN — DOCUSATE SODIUM 1 MG: 100 CAPSULE, LIQUID FILLED ORAL at 08:26

## 2018-10-29 RX ADMIN — NIFEDIPINE 1 MG: 30 TABLET, FILM COATED, EXTENDED RELEASE ORAL at 21:33

## 2018-10-29 RX ADMIN — CARBOXYMETHYLCELLULOSE SODIUM 1 DROP: 10 GEL OPHTHALMIC at 08:27

## 2018-10-29 RX ADMIN — ASPIRIN 1 MG: 81 TABLET, COATED ORAL at 08:25

## 2018-10-29 RX ADMIN — CARBOXYMETHYLCELLULOSE SODIUM 1 DROP: 10 GEL OPHTHALMIC at 21:31

## 2018-10-29 RX ADMIN — EZETIMIBE 1 MG: 10 TABLET ORAL at 08:24

## 2018-10-29 RX ADMIN — LABETALOL 1 MG: 200 TABLET, FILM COATED ORAL at 12:49

## 2018-10-29 RX ADMIN — PREGABALIN 1 MG: 50 CAPSULE ORAL at 21:40

## 2018-10-29 RX ADMIN — FLUTICASONE FUROATE AND VILANTEROL TRIFENATATE 1 INH: 100; 25 POWDER RESPIRATORY (INHALATION) at 08:27

## 2018-10-29 RX ADMIN — LABETALOL 1 MG: 200 TABLET, FILM COATED ORAL at 08:25

## 2018-10-29 RX ADMIN — HYDROMORPHONE HYDROCHLORIDE 1 MG: 2 INJECTION, SOLUTION INTRAMUSCULAR; INTRAVENOUS; SUBCUTANEOUS at 12:50

## 2018-10-29 RX ADMIN — IPRATROPIUM BROMIDE AND ALBUTEROL SULFATE 1 ML: .5; 3 SOLUTION RESPIRATORY (INHALATION) at 08:41

## 2018-10-29 RX ADMIN — HYDROMORPHONE HYDROCHLORIDE 1 MG: 2 INJECTION, SOLUTION INTRAMUSCULAR; INTRAVENOUS; SUBCUTANEOUS at 08:27

## 2018-10-29 RX ADMIN — INSULIN ASPART 1 UNIT: 100 INJECTION, SOLUTION INTRAVENOUS; SUBCUTANEOUS at 07:35

## 2018-10-29 RX ADMIN — ACETAMINOPHEN 1 MG: 325 TABLET, FILM COATED ORAL at 02:07

## 2018-10-29 RX ADMIN — PANTOPRAZOLE SODIUM 1 MG: 40 TABLET, DELAYED RELEASE ORAL at 06:10

## 2018-10-29 RX ADMIN — CARBOXYMETHYLCELLULOSE SODIUM 1 DROP: 10 GEL OPHTHALMIC at 12:50

## 2018-10-29 RX ADMIN — NIFEDIPINE 1 MG: 30 TABLET, FILM COATED, EXTENDED RELEASE ORAL at 08:25

## 2018-10-29 RX ADMIN — IPRATROPIUM BROMIDE AND ALBUTEROL SULFATE 1 ML: .5; 3 SOLUTION RESPIRATORY (INHALATION) at 01:05

## 2018-10-29 RX ADMIN — INSULIN ASPART 1 UNIT: 100 INJECTION, SOLUTION INTRAVENOUS; SUBCUTANEOUS at 12:00

## 2018-10-29 RX ADMIN — MAGNESIUM HYDROXIDE 1 ML: 400 SUSPENSION ORAL at 11:45

## 2018-10-29 RX ADMIN — SENNOSIDES 1 TAB: 8.6 TABLET, FILM COATED ORAL at 21:32

## 2018-10-29 RX ADMIN — HYDROMORPHONE HYDROCHLORIDE 1 MG: 2 INJECTION, SOLUTION INTRAMUSCULAR; INTRAVENOUS; SUBCUTANEOUS at 15:37

## 2018-10-29 RX ADMIN — HYDROMORPHONE HYDROCHLORIDE 1 MG: 2 INJECTION, SOLUTION INTRAMUSCULAR; INTRAVENOUS; SUBCUTANEOUS at 10:38

## 2018-10-29 RX ADMIN — HYDROMORPHONE HYDROCHLORIDE 1 MG: 2 INJECTION, SOLUTION INTRAMUSCULAR; INTRAVENOUS; SUBCUTANEOUS at 18:42

## 2018-10-29 RX ADMIN — LOSARTAN POTASSIUM 1 MG: 50 TABLET ORAL at 08:26

## 2018-10-29 RX ADMIN — BUTALBITAL, ACETAMINOPHEN, AND CAFFEINE 1 TAB: 50; 325; 40 TABLET ORAL at 09:29

## 2018-10-29 RX ADMIN — ENOXAPARIN SODIUM 1 MG: 100 INJECTION SUBCUTANEOUS at 08:24

## 2018-10-29 RX ADMIN — IPRATROPIUM BROMIDE AND ALBUTEROL SULFATE 1 ML: .5; 3 SOLUTION RESPIRATORY (INHALATION) at 12:06

## 2018-10-29 RX ADMIN — INSULIN ASPART 1 UNIT: 100 INJECTION, SOLUTION INTRAVENOUS; SUBCUTANEOUS at 21:00

## 2018-10-29 RX ADMIN — ACETAMINOPHEN 1 MG: 325 TABLET, FILM COATED ORAL at 06:10

## 2018-10-29 RX ADMIN — HYDROCHLOROTHIAZIDE 1 MG: 25 TABLET ORAL at 08:25

## 2018-10-29 RX ADMIN — POLYETHYLENE GLYCOL 3350 1 GM: 17 POWDER, FOR SOLUTION ORAL at 21:33

## 2018-10-29 RX ADMIN — PREGABALIN 1 MG: 100 CAPSULE ORAL at 11:45

## 2018-10-30 LAB
ADD MAN DIFF?: NO
ADD UMIC: YES
ANION GAP: 10 (ref 5–13)
BASOPHIL #: 0 10^3/UL (ref 0–0.1)
BASOPHILS %: 0.5 % (ref 0–2)
BLOOD UREA NITROGEN: 16 MG/DL (ref 7–20)
CALCIUM: 9.1 MG/DL (ref 8.4–10.2)
CARBON DIOXIDE: 31 MMOL/L (ref 21–31)
CHLORIDE: 85 MMOL/L (ref 97–110)
CREATININE,URINE RANDOM: 18.74 MG/DL (ref 20–320)
CREATININE: 0.48 MG/DL (ref 0.44–1)
EOSINOPHILS #: 0.3 10^3/UL (ref 0–0.5)
EOSINOPHILS %: 3.4 % (ref 0–7)
GLUCOSE: 136 MG/DL (ref 70–220)
HEMATOCRIT: 29.6 % (ref 37–47)
HEMOGLOBIN: 9.7 G/DL (ref 12–16)
IMMATURE GRANS #M: 0.04 10^3/UL (ref 0–0.03)
IMMATURE GRANS % (M): 0.5 % (ref 0–0.43)
LYMPHOCYTES #: 1.7 10^3/UL (ref 0.8–2.9)
LYMPHOCYTES %: 22.1 % (ref 15–51)
MAGNESIUM: 2 MG/DL (ref 1.7–2.5)
MEAN CORPUSCULAR HEMOGLOBIN: 26.6 PG (ref 29–33)
MEAN CORPUSCULAR HGB CONC: 32.8 G/DL (ref 32–37)
MEAN CORPUSCULAR VOLUME: 81.1 FL (ref 82–101)
MEAN PLATELET VOLUME: 8.9 FL (ref 7.4–10.4)
MONOCYTE #: 0.9 10^3/UL (ref 0.3–0.9)
MONOCYTES %: 11.7 % (ref 0–11)
NEUTROPHIL #: 4.9 10^3/UL (ref 1.6–7.5)
NEUTROPHILS %: 61.8 % (ref 39–77)
NUCLEATED RED BLOOD CELLS #: 0 10^3/UL (ref 0–0)
NUCLEATED RED BLOOD CELLS%: 0 /100WBC (ref 0–0)
OSMOLALITY,URINE: 170 MOSM/KG (ref 250–1200)
PHOSPHORUS: 4.3 MG/DL (ref 2.5–4.9)
PLATELET COUNT: 373 10^3/UL (ref 140–415)
POTASSIUM: 4.3 MMOL/L (ref 3.5–5.1)
PROTEIN URINE: 15 MG/DL (ref 0–11.9)
RED BLOOD COUNT: 3.65 10^6/UL (ref 4.2–5.4)
RED CELL DISTRIBUTION WIDTH: 13.1 % (ref 11.5–14.5)
SODIUM,URINE RANDOM: < 13 MMOL/L (ref 30–90)
SODIUM: 126 MMOL/L (ref 135–144)
UR ASCORBIC ACID: NEGATIVE MG/DL
UR BACTERIA: (no result) /HPF
UR BILIRUBIN (DIP): NEGATIVE MG/DL
UR BLOOD (DIP): NEGATIVE MG/DL
UR CLARITY: CLEAR
UR COLOR: YELLOW
UR GLUCOSE (DIP): NEGATIVE MG/DL
UR KETONES (DIP): NEGATIVE MG/DL
UR LEUKOCYTE ESTERASE (DIP): (no result) LEU/UL
UR NITRITE (DIP): NEGATIVE MG/DL
UR PH (DIP): 6 (ref 5–9)
UR RBC: 0 /HPF (ref 0–5)
UR SPECIFIC GRAVITY (DIP): 1 (ref 1–1.03)
UR TOTAL PROTEIN (DIP): NEGATIVE MG/DL
UR UROBILINOGEN (DIP): NEGATIVE MG/DL
UR WBC: 22 /HPF (ref 0–5)
WHITE BLOOD COUNT: 7.9 10^3/UL (ref 4.8–10.8)

## 2018-10-30 RX ADMIN — ACETAMINOPHEN 1 MG: 325 TABLET, FILM COATED ORAL at 17:34

## 2018-10-30 RX ADMIN — INSULIN ASPART 1 UNIT: 100 INJECTION, SOLUTION INTRAVENOUS; SUBCUTANEOUS at 12:34

## 2018-10-30 RX ADMIN — NIFEDIPINE 1 MG: 30 TABLET, FILM COATED, EXTENDED RELEASE ORAL at 09:58

## 2018-10-30 RX ADMIN — IPRATROPIUM BROMIDE AND ALBUTEROL SULFATE 1 ML: .5; 3 SOLUTION RESPIRATORY (INHALATION) at 05:35

## 2018-10-30 RX ADMIN — HYDROMORPHONE HYDROCHLORIDE 1 MG: 2 INJECTION, SOLUTION INTRAMUSCULAR; INTRAVENOUS; SUBCUTANEOUS at 06:38

## 2018-10-30 RX ADMIN — LABETALOL 1 MG: 200 TABLET, FILM COATED ORAL at 13:06

## 2018-10-30 RX ADMIN — LUBIPROSTONE 1 MCG: 8 CAPSULE, GELATIN COATED ORAL at 09:58

## 2018-10-30 RX ADMIN — FLUTICASONE FUROATE AND VILANTEROL TRIFENATATE 1 INH: 100; 25 POWDER RESPIRATORY (INHALATION) at 09:56

## 2018-10-30 RX ADMIN — BACLOFEN 1 MG: 10 TABLET ORAL at 20:55

## 2018-10-30 RX ADMIN — IPRATROPIUM BROMIDE AND ALBUTEROL SULFATE 1 ML: .5; 3 SOLUTION RESPIRATORY (INHALATION) at 21:27

## 2018-10-30 RX ADMIN — POLYETHYLENE GLYCOL 3350 1 GM: 17 POWDER, FOR SOLUTION ORAL at 21:08

## 2018-10-30 RX ADMIN — LOSARTAN POTASSIUM 1 MG: 50 TABLET ORAL at 09:57

## 2018-10-30 RX ADMIN — IPRATROPIUM BROMIDE AND ALBUTEROL SULFATE 1 ML: .5; 3 SOLUTION RESPIRATORY (INHALATION) at 09:04

## 2018-10-30 RX ADMIN — HYDROMORPHONE HYDROCHLORIDE 1 MG: 2 INJECTION, SOLUTION INTRAMUSCULAR; INTRAVENOUS; SUBCUTANEOUS at 22:17

## 2018-10-30 RX ADMIN — DOCUSATE SODIUM 1 MG: 100 CAPSULE, LIQUID FILLED ORAL at 09:58

## 2018-10-30 RX ADMIN — ACETAMINOPHEN 1 MG: 325 TABLET, FILM COATED ORAL at 23:39

## 2018-10-30 RX ADMIN — PREGABALIN 1 MG: 50 CAPSULE ORAL at 14:39

## 2018-10-30 RX ADMIN — SENNOSIDES 1 TAB: 8.6 TABLET, FILM COATED ORAL at 21:08

## 2018-10-30 RX ADMIN — DOCUSATE SODIUM 1 MG: 100 CAPSULE, LIQUID FILLED ORAL at 21:08

## 2018-10-30 RX ADMIN — HYDROMORPHONE HYDROCHLORIDE 1 MG: 2 INJECTION, SOLUTION INTRAMUSCULAR; INTRAVENOUS; SUBCUTANEOUS at 14:37

## 2018-10-30 RX ADMIN — NIFEDIPINE 1 MG: 30 TABLET, FILM COATED, EXTENDED RELEASE ORAL at 21:08

## 2018-10-30 RX ADMIN — LABETALOL 1 MG: 200 TABLET, FILM COATED ORAL at 21:07

## 2018-10-30 RX ADMIN — IPRATROPIUM BROMIDE AND ALBUTEROL SULFATE 1 ML: .5; 3 SOLUTION RESPIRATORY (INHALATION) at 16:42

## 2018-10-30 RX ADMIN — ASPIRIN 1 MG: 81 TABLET, COATED ORAL at 09:58

## 2018-10-30 RX ADMIN — PREGABALIN 1 MG: 50 CAPSULE ORAL at 22:16

## 2018-10-30 RX ADMIN — LABETALOL 1 MG: 200 TABLET, FILM COATED ORAL at 09:59

## 2018-10-30 RX ADMIN — IPRATROPIUM BROMIDE AND ALBUTEROL SULFATE 1 ML: .5; 3 SOLUTION RESPIRATORY (INHALATION) at 00:55

## 2018-10-30 RX ADMIN — CARBOXYMETHYLCELLULOSE SODIUM 1 DROP: 10 GEL OPHTHALMIC at 12:36

## 2018-10-30 RX ADMIN — BACLOFEN 1 MG: 10 TABLET ORAL at 14:40

## 2018-10-30 RX ADMIN — INSULIN ASPART 1 UNIT: 100 INJECTION, SOLUTION INTRAVENOUS; SUBCUTANEOUS at 17:36

## 2018-10-30 RX ADMIN — ACETAMINOPHEN 1 MG: 325 TABLET, FILM COATED ORAL at 07:31

## 2018-10-30 RX ADMIN — INSULIN ASPART 1 UNIT: 100 INJECTION, SOLUTION INTRAVENOUS; SUBCUTANEOUS at 08:04

## 2018-10-30 RX ADMIN — EZETIMIBE 1 MG: 10 TABLET ORAL at 09:58

## 2018-10-30 RX ADMIN — ATORVASTATIN CALCIUM 1 MG: 40 TABLET, FILM COATED ORAL at 21:08

## 2018-10-30 RX ADMIN — BACLOFEN 1 MG: 10 TABLET ORAL at 10:11

## 2018-10-30 RX ADMIN — BUTALBITAL, ACETAMINOPHEN, AND CAFFEINE 1 TAB: 50; 325; 40 TABLET ORAL at 11:16

## 2018-10-30 RX ADMIN — LUBIPROSTONE 1 MCG: 8 CAPSULE, GELATIN COATED ORAL at 21:06

## 2018-10-30 RX ADMIN — PAROXETINE HYDROCHLORIDE 1 MG: 12.5 TABLET, FILM COATED, EXTENDED RELEASE ORAL at 09:56

## 2018-10-30 RX ADMIN — PREGABALIN 1 MG: 50 CAPSULE ORAL at 06:37

## 2018-10-30 RX ADMIN — CARBOXYMETHYLCELLULOSE SODIUM 1 DROP: 10 GEL OPHTHALMIC at 09:55

## 2018-10-30 RX ADMIN — INSULIN ASPART 1 UNIT: 100 INJECTION, SOLUTION INTRAVENOUS; SUBCUTANEOUS at 21:00

## 2018-10-30 RX ADMIN — ENOXAPARIN SODIUM 1 MG: 100 INJECTION SUBCUTANEOUS at 10:00

## 2018-10-30 RX ADMIN — SPIRONOLACTONE 1 MG: 25 TABLET, FILM COATED ORAL at 09:56

## 2018-10-30 RX ADMIN — CARBOXYMETHYLCELLULOSE SODIUM 1 DROP: 10 GEL OPHTHALMIC at 17:40

## 2018-10-30 RX ADMIN — PANTOPRAZOLE SODIUM 1 MG: 40 TABLET, DELAYED RELEASE ORAL at 06:37

## 2018-10-30 RX ADMIN — HYDROMORPHONE HYDROCHLORIDE 1 MG: 2 INJECTION, SOLUTION INTRAMUSCULAR; INTRAVENOUS; SUBCUTANEOUS at 18:35

## 2018-10-30 RX ADMIN — CARBOXYMETHYLCELLULOSE SODIUM 1 DROP: 10 GEL OPHTHALMIC at 20:54

## 2018-10-31 LAB
ADD MAN DIFF?: NO
ANION GAP: 9 (ref 5–13)
BASOPHIL #: 0 10^3/UL (ref 0–0.1)
BASOPHILS %: 0.5 % (ref 0–2)
BLOOD UREA NITROGEN: 14 MG/DL (ref 7–20)
CALCIUM: 9.1 MG/DL (ref 8.4–10.2)
CARBON DIOXIDE: 32 MMOL/L (ref 21–31)
CHLORIDE: 88 MMOL/L (ref 97–110)
CREATININE: 0.5 MG/DL (ref 0.44–1)
EOSINOPHILS #: 0.2 10^3/UL (ref 0–0.5)
EOSINOPHILS %: 2.9 % (ref 0–7)
GLUCOSE: 110 MG/DL (ref 70–220)
HEMATOCRIT: 29.8 % (ref 37–47)
HEMOGLOBIN: 9.4 G/DL (ref 12–16)
IMMATURE GRANS #M: 0.03 10^3/UL (ref 0–0.03)
IMMATURE GRANS % (M): 0.4 % (ref 0–0.43)
LYMPHOCYTES #: 2.4 10^3/UL (ref 0.8–2.9)
LYMPHOCYTES %: 31.4 % (ref 15–51)
MAGNESIUM: 2.1 MG/DL (ref 1.7–2.5)
MEAN CORPUSCULAR HEMOGLOBIN: 26 PG (ref 29–33)
MEAN CORPUSCULAR HGB CONC: 31.5 G/DL (ref 32–37)
MEAN CORPUSCULAR VOLUME: 82.3 FL (ref 82–101)
MEAN PLATELET VOLUME: 9.5 FL (ref 7.4–10.4)
MICROALBUMIN/CREATININE RATIO: 15 (ref ?–30)
MICROALBUMIN: 0.3 MG/DL
MONOCYTE #: 0.9 10^3/UL (ref 0.3–0.9)
MONOCYTES %: 11.5 % (ref 0–11)
NEUTROPHIL #: 4 10^3/UL (ref 1.6–7.5)
NEUTROPHILS %: 53.3 % (ref 39–77)
NUCLEATED RED BLOOD CELLS #: 0 10^3/UL (ref 0–0)
NUCLEATED RED BLOOD CELLS%: 0 /100WBC (ref 0–0)
PHOSPHORUS: 4.3 MG/DL (ref 2.5–4.9)
PLATELET COUNT: 399 10^3/UL (ref 140–415)
POTASSIUM: 4.3 MMOL/L (ref 3.5–5.1)
RED BLOOD COUNT: 3.62 10^6/UL (ref 4.2–5.4)
RED CELL DISTRIBUTION WIDTH: 13.4 % (ref 11.5–14.5)
SODIUM: 129 MMOL/L (ref 135–144)
WHITE BLOOD COUNT: 7.6 10^3/UL (ref 4.8–10.8)

## 2018-10-31 RX ADMIN — HYDROMORPHONE HYDROCHLORIDE 1 MG: 2 INJECTION, SOLUTION INTRAMUSCULAR; INTRAVENOUS; SUBCUTANEOUS at 02:26

## 2018-10-31 RX ADMIN — HYDROMORPHONE HYDROCHLORIDE 1 MG: 2 INJECTION, SOLUTION INTRAMUSCULAR; INTRAVENOUS; SUBCUTANEOUS at 00:23

## 2018-10-31 RX ADMIN — BACLOFEN 1 MG: 10 TABLET ORAL at 13:00

## 2018-10-31 RX ADMIN — PREGABALIN 1 MG: 50 CAPSULE ORAL at 05:27

## 2018-10-31 RX ADMIN — DOCUSATE SODIUM 1 MG: 100 CAPSULE, LIQUID FILLED ORAL at 09:53

## 2018-10-31 RX ADMIN — PANTOPRAZOLE SODIUM 1 MG: 40 TABLET, DELAYED RELEASE ORAL at 05:25

## 2018-10-31 RX ADMIN — LUBIPROSTONE 1 MCG: 24 CAPSULE, GELATIN COATED ORAL at 09:53

## 2018-10-31 RX ADMIN — EZETIMIBE 1 MG: 10 TABLET ORAL at 09:52

## 2018-10-31 RX ADMIN — BARIUM SULFATE 1 ML: 980 POWDER, FOR SUSPENSION ORAL at 11:12

## 2018-10-31 RX ADMIN — HYDROMORPHONE HYDROCHLORIDE 1 MG: 2 INJECTION, SOLUTION INTRAMUSCULAR; INTRAVENOUS; SUBCUTANEOUS at 05:25

## 2018-10-31 RX ADMIN — NIFEDIPINE 1 MG: 30 TABLET, FILM COATED, EXTENDED RELEASE ORAL at 09:50

## 2018-10-31 RX ADMIN — LABETALOL 1 MG: 200 TABLET, FILM COATED ORAL at 08:17

## 2018-10-31 RX ADMIN — HYDROMORPHONE HYDROCHLORIDE 1 MG: 2 INJECTION, SOLUTION INTRAMUSCULAR; INTRAVENOUS; SUBCUTANEOUS at 09:59

## 2018-10-31 RX ADMIN — ENOXAPARIN SODIUM 1 MG: 100 INJECTION SUBCUTANEOUS at 09:59

## 2018-10-31 RX ADMIN — HYDROMORPHONE HYDROCHLORIDE 1 MG: 2 INJECTION, SOLUTION INTRAMUSCULAR; INTRAVENOUS; SUBCUTANEOUS at 14:00

## 2018-10-31 RX ADMIN — IPRATROPIUM BROMIDE AND ALBUTEROL SULFATE 1 ML: .5; 3 SOLUTION RESPIRATORY (INHALATION) at 04:46

## 2018-10-31 RX ADMIN — HYDROMORPHONE HYDROCHLORIDE 1 MG: 2 INJECTION, SOLUTION INTRAMUSCULAR; INTRAVENOUS; SUBCUTANEOUS at 08:02

## 2018-10-31 RX ADMIN — CARBOXYMETHYLCELLULOSE SODIUM 1 DROP: 10 GEL OPHTHALMIC at 17:00

## 2018-10-31 RX ADMIN — HYDROMORPHONE HYDROCHLORIDE 1 MG: 2 INJECTION, SOLUTION INTRAMUSCULAR; INTRAVENOUS; SUBCUTANEOUS at 17:15

## 2018-10-31 RX ADMIN — HYDROMORPHONE HYDROCHLORIDE 1 MG: 2 INJECTION, SOLUTION INTRAMUSCULAR; INTRAVENOUS; SUBCUTANEOUS at 12:22

## 2018-10-31 RX ADMIN — INSULIN ASPART 1 UNIT: 100 INJECTION, SOLUTION INTRAVENOUS; SUBCUTANEOUS at 08:10

## 2018-10-31 RX ADMIN — HYDROMORPHONE HYDROCHLORIDE 1 MG: 2 INJECTION, SOLUTION INTRAMUSCULAR; INTRAVENOUS; SUBCUTANEOUS at 18:00

## 2018-10-31 RX ADMIN — SPIRONOLACTONE 1 MG: 25 TABLET, FILM COATED ORAL at 09:52

## 2018-10-31 RX ADMIN — LABETALOL 1 MG: 200 TABLET, FILM COATED ORAL at 15:15

## 2018-10-31 RX ADMIN — ACETAMINOPHEN 1 MG: 325 TABLET, FILM COATED ORAL at 08:23

## 2018-10-31 RX ADMIN — NITROFURANTOIN MONOHYDRATE/MACROCRYSTALLINE 1 MG: 25; 75 CAPSULE ORAL at 09:49

## 2018-10-31 RX ADMIN — IPRATROPIUM BROMIDE AND ALBUTEROL SULFATE 1 ML: .5; 3 SOLUTION RESPIRATORY (INHALATION) at 16:24

## 2018-10-31 RX ADMIN — ASPIRIN 1 MG: 81 TABLET, COATED ORAL at 09:55

## 2018-10-31 RX ADMIN — CARBOXYMETHYLCELLULOSE SODIUM 1 DROP: 10 GEL OPHTHALMIC at 15:04

## 2018-10-31 RX ADMIN — FLUTICASONE FUROATE AND VILANTEROL TRIFENATATE 1 INH: 100; 25 POWDER RESPIRATORY (INHALATION) at 08:03

## 2018-10-31 RX ADMIN — PAROXETINE HYDROCHLORIDE 1 MG: 12.5 TABLET, FILM COATED, EXTENDED RELEASE ORAL at 09:49

## 2018-10-31 RX ADMIN — INSULIN ASPART 1 UNIT: 100 INJECTION, SOLUTION INTRAVENOUS; SUBCUTANEOUS at 12:24

## 2018-10-31 RX ADMIN — IPRATROPIUM BROMIDE AND ALBUTEROL SULFATE 1 ML: .5; 3 SOLUTION RESPIRATORY (INHALATION) at 12:58

## 2018-10-31 RX ADMIN — IPRATROPIUM BROMIDE AND ALBUTEROL SULFATE 1 ML: .5; 3 SOLUTION RESPIRATORY (INHALATION) at 09:03

## 2018-10-31 RX ADMIN — HYDROMORPHONE HYDROCHLORIDE 1 MG: 2 INJECTION, SOLUTION INTRAMUSCULAR; INTRAVENOUS; SUBCUTANEOUS at 15:04

## 2018-10-31 RX ADMIN — INSULIN ASPART 1 UNIT: 100 INJECTION, SOLUTION INTRAVENOUS; SUBCUTANEOUS at 17:35

## 2018-10-31 RX ADMIN — LOSARTAN POTASSIUM 1 MG: 50 TABLET ORAL at 09:51

## 2018-10-31 RX ADMIN — IPRATROPIUM BROMIDE AND ALBUTEROL SULFATE 1 ML: .5; 3 SOLUTION RESPIRATORY (INHALATION) at 00:59

## 2018-10-31 RX ADMIN — PREGABALIN 1 MG: 100 CAPSULE ORAL at 15:00

## 2018-10-31 RX ADMIN — CARBOXYMETHYLCELLULOSE SODIUM 1 DROP: 10 GEL OPHTHALMIC at 09:00

## 2018-10-31 RX ADMIN — BACLOFEN 1 MG: 10 TABLET ORAL at 09:00

## 2019-01-08 ENCOUNTER — HOSPITAL ENCOUNTER (INPATIENT)
Dept: HOSPITAL 54 - ER | Age: 59
LOS: 2 days | Discharge: NURSING, REHAB OR LONG-TERM CARE | DRG: 551 | End: 2019-01-10
Attending: NURSE PRACTITIONER | Admitting: NURSE PRACTITIONER
Payer: COMMERCIAL

## 2019-01-08 VITALS — DIASTOLIC BLOOD PRESSURE: 58 MMHG | SYSTOLIC BLOOD PRESSURE: 165 MMHG

## 2019-01-08 VITALS — HEIGHT: 62 IN | WEIGHT: 215 LBS | BODY MASS INDEX: 39.56 KG/M2

## 2019-01-08 VITALS — SYSTOLIC BLOOD PRESSURE: 109 MMHG | DIASTOLIC BLOOD PRESSURE: 62 MMHG

## 2019-01-08 VITALS — DIASTOLIC BLOOD PRESSURE: 63 MMHG | SYSTOLIC BLOOD PRESSURE: 147 MMHG

## 2019-01-08 VITALS — DIASTOLIC BLOOD PRESSURE: 45 MMHG | SYSTOLIC BLOOD PRESSURE: 119 MMHG

## 2019-01-08 VITALS — SYSTOLIC BLOOD PRESSURE: 128 MMHG | DIASTOLIC BLOOD PRESSURE: 61 MMHG

## 2019-01-08 VITALS — DIASTOLIC BLOOD PRESSURE: 73 MMHG | SYSTOLIC BLOOD PRESSURE: 165 MMHG

## 2019-01-08 DIAGNOSIS — G89.4: ICD-10-CM

## 2019-01-08 DIAGNOSIS — E66.9: ICD-10-CM

## 2019-01-08 DIAGNOSIS — I10: ICD-10-CM

## 2019-01-08 DIAGNOSIS — E78.5: ICD-10-CM

## 2019-01-08 DIAGNOSIS — N17.0: ICD-10-CM

## 2019-01-08 DIAGNOSIS — Z96.652: ICD-10-CM

## 2019-01-08 DIAGNOSIS — M54.9: Primary | ICD-10-CM

## 2019-01-08 DIAGNOSIS — S43.402A: ICD-10-CM

## 2019-01-08 DIAGNOSIS — I25.10: ICD-10-CM
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
## 2019-01-08 DIAGNOSIS — J32.9: ICD-10-CM

## 2019-01-08 DIAGNOSIS — S33.5XXA: ICD-10-CM

## 2019-01-08 DIAGNOSIS — I69.354: ICD-10-CM

## 2019-01-08 DIAGNOSIS — I25.2: ICD-10-CM
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
## 2019-01-08 DIAGNOSIS — G47.33: ICD-10-CM

## 2019-01-08 DIAGNOSIS — F32.9: ICD-10-CM
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
## 2019-01-08 DIAGNOSIS — Z79.84: ICD-10-CM

## 2019-01-08 DIAGNOSIS — E11.9: ICD-10-CM

## 2019-01-08 DIAGNOSIS — J45.909: ICD-10-CM

## 2019-01-08 DIAGNOSIS — F11.20: ICD-10-CM

## 2019-01-08 LAB
ALBUMIN SERPL BCP-MCNC: 3.6 G/DL (ref 3.4–5)
ALP SERPL-CCNC: 103 U/L (ref 46–116)
ALT SERPL W P-5'-P-CCNC: 40 U/L (ref 12–78)
AST SERPL W P-5'-P-CCNC: 21 U/L (ref 15–37)
BASOPHILS # BLD AUTO: 0.1 /CMM (ref 0–0.2)
BASOPHILS NFR BLD AUTO: 0.9 % (ref 0–2)
BILIRUB SERPL-MCNC: 0.4 MG/DL (ref 0.2–1)
BUN SERPL-MCNC: 19 MG/DL (ref 7–18)
CALCIUM SERPL-MCNC: 9.2 MG/DL (ref 8.5–10.1)
CHLORIDE SERPL-SCNC: 101 MMOL/L (ref 98–107)
CHOLEST SERPL-MCNC: 110 MG/DL (ref ?–200)
CO2 SERPL-SCNC: 27 MMOL/L (ref 21–32)
CREAT SERPL-MCNC: 0.6 MG/DL (ref 0.6–1.3)
CRP SERPL-MCNC: 0.5 MG/DL (ref 0–0.9)
EOSINOPHIL NFR BLD AUTO: 1.3 % (ref 0–6)
GLUCOSE SERPL-MCNC: 112 MG/DL (ref 74–106)
HCT VFR BLD AUTO: 30 % (ref 33–45)
HDLC SERPL-MCNC: 38 MG/DL (ref 40–60)
HGB BLD-MCNC: 10 G/DL (ref 11.5–14.8)
LDLC SERPL DIRECT ASSAY-MCNC: 69 MG/DL (ref 0–99)
LYMPHOCYTES NFR BLD AUTO: 1.4 /CMM (ref 0.8–4.8)
LYMPHOCYTES NFR BLD AUTO: 13.5 % (ref 20–44)
MAGNESIUM SERPL-MCNC: 2 MG/DL (ref 1.8–2.4)
MCHC RBC AUTO-ENTMCNC: 34 G/DL (ref 31–36)
MCV RBC AUTO: 83 FL (ref 82–100)
MONOCYTES NFR BLD AUTO: 0.5 /CMM (ref 0.1–1.3)
MONOCYTES NFR BLD AUTO: 4.6 % (ref 2–12)
NEUTROPHILS # BLD AUTO: 8.4 /CMM (ref 1.8–8.9)
NEUTROPHILS NFR BLD AUTO: 79.7 % (ref 43–81)
PHOSPHATE SERPL-MCNC: 4.3 MG/DL (ref 2.5–4.9)
PLATELET # BLD AUTO: 444 /CMM (ref 150–450)
POTASSIUM SERPL-SCNC: 4.5 MMOL/L (ref 3.5–5.1)
PROT SERPL-MCNC: 6.8 G/DL (ref 6.4–8.2)
RBC # BLD AUTO: 3.58 MIL/UL (ref 4–5.2)
SODIUM SERPL-SCNC: 136 MMOL/L (ref 136–145)
TRIGL SERPL-MCNC: 29 MG/DL (ref 30–150)
WBC NRBC COR # BLD AUTO: 10.5 K/UL (ref 4.3–11)

## 2019-01-08 PROCEDURE — G0378 HOSPITAL OBSERVATION PER HR: HCPCS

## 2019-01-08 RX ADMIN — BACLOFEN SCH MG: 10 TABLET ORAL at 09:03

## 2019-01-08 RX ADMIN — HYDROMORPHONE HYDROCHLORIDE PRN MG: 2 INJECTION, SOLUTION INTRAMUSCULAR; INTRAVENOUS; SUBCUTANEOUS at 16:47

## 2019-01-08 RX ADMIN — HYDROMORPHONE HYDROCHLORIDE PRN MG: 2 INJECTION, SOLUTION INTRAMUSCULAR; INTRAVENOUS; SUBCUTANEOUS at 21:54

## 2019-01-08 RX ADMIN — PANTOPRAZOLE SODIUM SCH MG: 40 TABLET, DELAYED RELEASE ORAL at 08:22

## 2019-01-08 RX ADMIN — ALBUTEROL SULFATE PRN MG: 2.5 SOLUTION RESPIRATORY (INHALATION) at 19:34

## 2019-01-08 RX ADMIN — PREGABALIN SCH MG: 25 CAPSULE ORAL at 21:53

## 2019-01-08 RX ADMIN — HYDROMORPHONE HYDROCHLORIDE PRN MG: 2 TABLET ORAL at 12:41

## 2019-01-08 RX ADMIN — ASPIRIN 81 MG SCH MG: 81 TABLET ORAL at 09:04

## 2019-01-08 RX ADMIN — BACLOFEN SCH MG: 10 TABLET ORAL at 16:44

## 2019-01-08 RX ADMIN — BACLOFEN SCH MG: 10 TABLET ORAL at 12:40

## 2019-01-08 RX ADMIN — Medication SCH EACH: at 16:45

## 2019-01-08 RX ADMIN — Medication SCH MG: at 09:04

## 2019-01-08 RX ADMIN — Medication SCH EACH: at 21:54

## 2019-01-08 RX ADMIN — Medication SCH MG: at 12:40

## 2019-01-08 RX ADMIN — Medication PRN MG: at 19:34

## 2019-01-08 RX ADMIN — SPIRONOLACTONE SCH MG: 25 TABLET, FILM COATED ORAL at 09:03

## 2019-01-08 RX ADMIN — HYDROMORPHONE HYDROCHLORIDE PRN MG: 2 TABLET ORAL at 20:03

## 2019-01-08 RX ADMIN — Medication SCH MG: at 16:44

## 2019-01-08 RX ADMIN — Medication SCH EACH: at 12:29

## 2019-01-08 RX ADMIN — EZETIMIBE SCH MG: 10 TABLET ORAL at 09:04

## 2019-01-08 RX ADMIN — Medication SCH EACH: at 08:11

## 2019-01-08 RX ADMIN — FLUTICASONE FUROATE AND VILANTEROL TRIFENATATE SCH EACH: 100; 25 POWDER RESPIRATORY (INHALATION) at 09:59

## 2019-01-08 RX ADMIN — ATORVASTATIN CALCIUM SCH MG: 40 TABLET, FILM COATED ORAL at 21:53

## 2019-01-08 NOTE — NUR
PT BIB RA WITH A C/O LOWER BACK PAIN THAT RADIATES DOWN THE LLE. PT HAD A CVA 
IN OCT 2018 AND HAS LEFT SIDED HEMIPARESIS AS A RESULT. PT IS ABLE TO STAND 
WITH ASSISTANCE. PT REC'D AN ICE PACK TO THE LT THIGH. DR TURNER IS AT THE 
BEDSIDE SPEAKING TO THE PT. PT IS AA&O X4. PT IS ON THE MONITOR AND CONTINUOUS 
PULSE OX.

## 2019-01-08 NOTE — NUR
RN NOTES 



PATIENT ENDORSED TO NEXT SHIFT IN STABLE CONDITION FOR CONTINUITY OF CARE. NO SIGNIFICANT 
CHANGES NOTED. WILL CONT TO MONITOR.

## 2019-01-08 NOTE — NUR
RN OPENING NOTES

RECEIVED REPORT FROM YENNI BURGOS. PATIENT A/A/O X3, ABLE TO MAKE NEEDS KNOWN. BREATHING EVEN 
& UNLABORED, TOLERATING ROOM AIR. RADIAL PULSES PRESENT. RIGHT HAND IV #22 INTACT & PATENT 
W/ DRESSING CDI, SALINE LOCKED. C/O LEFT THIGH PAIN W/ PAIN LEVEL 9/10. PAIN MED TO BE 
GIVEN. SAFETY MEASURES IN PLACE W/ SIDE RAILS UP & BED ALARM ON. INSTRUCTED TO USE CALL 
LIGHT FOR ASSISTANCE.

## 2019-01-08 NOTE — NUR
RN NOTES 



RECEIVED PATIENT IN BED ASLEEP WITH BREATHING NORMAL, EVEN AND UNLABORED. RESPOND TO VERBAL 
AND TACTILE STIMULI. NO SOB NOTED. NO ACUTE DISTRESS NOTED. ON ROOM AIR, SATURATING WELL. NO 
C/O PAIN AT THIS TIME. KEPT CLEAN, DRY AND COMFORTABLE. ALL NEEDS ATTENDED. SAFETY MEASURE 
OBSERVED. CALL LIGHT WITH IN REACH. WILL CONT TO MONITOR.

## 2019-01-09 VITALS — DIASTOLIC BLOOD PRESSURE: 58 MMHG | SYSTOLIC BLOOD PRESSURE: 156 MMHG

## 2019-01-09 VITALS — SYSTOLIC BLOOD PRESSURE: 170 MMHG | DIASTOLIC BLOOD PRESSURE: 58 MMHG

## 2019-01-09 VITALS — SYSTOLIC BLOOD PRESSURE: 166 MMHG | DIASTOLIC BLOOD PRESSURE: 73 MMHG

## 2019-01-09 VITALS — DIASTOLIC BLOOD PRESSURE: 45 MMHG | SYSTOLIC BLOOD PRESSURE: 154 MMHG

## 2019-01-09 VITALS — DIASTOLIC BLOOD PRESSURE: 52 MMHG | SYSTOLIC BLOOD PRESSURE: 161 MMHG

## 2019-01-09 RX ADMIN — Medication SCH MG: at 16:25

## 2019-01-09 RX ADMIN — Medication PRN MG: at 11:38

## 2019-01-09 RX ADMIN — Medication PRN MG: at 15:29

## 2019-01-09 RX ADMIN — ALBUTEROL SULFATE PRN MG: 2.5 SOLUTION RESPIRATORY (INHALATION) at 15:28

## 2019-01-09 RX ADMIN — BACLOFEN SCH MG: 10 TABLET ORAL at 16:15

## 2019-01-09 RX ADMIN — ALBUTEROL SULFATE PRN MG: 2.5 SOLUTION RESPIRATORY (INHALATION) at 23:20

## 2019-01-09 RX ADMIN — ISOSORBIDE DINITRATE SCH MG: 20 TABLET ORAL at 16:26

## 2019-01-09 RX ADMIN — FLUTICASONE FUROATE AND VILANTEROL TRIFENATATE SCH EACH: 100; 25 POWDER RESPIRATORY (INHALATION) at 09:04

## 2019-01-09 RX ADMIN — HYDROMORPHONE HYDROCHLORIDE PRN MG: 2 TABLET ORAL at 11:45

## 2019-01-09 RX ADMIN — HYDROMORPHONE HYDROCHLORIDE PRN MG: 2 INJECTION, SOLUTION INTRAMUSCULAR; INTRAVENOUS; SUBCUTANEOUS at 13:32

## 2019-01-09 RX ADMIN — PREGABALIN SCH MG: 25 CAPSULE ORAL at 20:27

## 2019-01-09 RX ADMIN — ACETAMINOPHEN PRN MG: 325 TABLET ORAL at 03:10

## 2019-01-09 RX ADMIN — Medication PRN MG: at 02:00

## 2019-01-09 RX ADMIN — ACETAMINOPHEN PRN MG: 325 TABLET ORAL at 16:32

## 2019-01-09 RX ADMIN — STANDARDIZED SENNA CONCENTRATE AND DOCUSATE SODIUM SCH TAB: 8.6; 5 TABLET ORAL at 22:00

## 2019-01-09 RX ADMIN — Medication PRN MG: at 20:13

## 2019-01-09 RX ADMIN — ALBUTEROL SULFATE PRN MG: 2.5 SOLUTION RESPIRATORY (INHALATION) at 20:13

## 2019-01-09 RX ADMIN — BACLOFEN SCH MG: 10 TABLET ORAL at 16:34

## 2019-01-09 RX ADMIN — Medication PRN MG: at 08:17

## 2019-01-09 RX ADMIN — Medication SCH EACH: at 21:39

## 2019-01-09 RX ADMIN — Medication PRN MG: at 23:20

## 2019-01-09 RX ADMIN — ALBUTEROL SULFATE PRN MG: 2.5 SOLUTION RESPIRATORY (INHALATION) at 02:00

## 2019-01-09 RX ADMIN — LABETALOL HCL SCH MG: 100 TABLET, FILM COATED ORAL at 20:27

## 2019-01-09 RX ADMIN — BACLOFEN SCH MG: 10 TABLET ORAL at 09:01

## 2019-01-09 RX ADMIN — HYDROMORPHONE HYDROCHLORIDE PRN MG: 2 TABLET ORAL at 18:06

## 2019-01-09 RX ADMIN — BACLOFEN SCH MG: 10 TABLET ORAL at 13:32

## 2019-01-09 RX ADMIN — BACLOFEN SCH MG: 10 TABLET ORAL at 16:30

## 2019-01-09 RX ADMIN — PANTOPRAZOLE SODIUM SCH MG: 40 TABLET, DELAYED RELEASE ORAL at 09:05

## 2019-01-09 RX ADMIN — Medication SCH EACH: at 09:00

## 2019-01-09 RX ADMIN — HYDROMORPHONE HYDROCHLORIDE PRN MG: 2 TABLET ORAL at 02:07

## 2019-01-09 RX ADMIN — Medication SCH MG: at 09:01

## 2019-01-09 RX ADMIN — Medication SCH MG: at 13:32

## 2019-01-09 RX ADMIN — SPIRONOLACTONE SCH MG: 25 TABLET, FILM COATED ORAL at 09:01

## 2019-01-09 RX ADMIN — ALBUTEROL SULFATE PRN MG: 2.5 SOLUTION RESPIRATORY (INHALATION) at 11:38

## 2019-01-09 RX ADMIN — Medication SCH EACH: at 18:05

## 2019-01-09 RX ADMIN — HYDROMORPHONE HYDROCHLORIDE PRN MG: 2 INJECTION, SOLUTION INTRAMUSCULAR; INTRAVENOUS; SUBCUTANEOUS at 05:18

## 2019-01-09 RX ADMIN — HYDROMORPHONE HYDROCHLORIDE PRN MG: 2 INJECTION, SOLUTION INTRAMUSCULAR; INTRAVENOUS; SUBCUTANEOUS at 20:28

## 2019-01-09 RX ADMIN — ASPIRIN 81 MG SCH MG: 81 TABLET ORAL at 09:00

## 2019-01-09 RX ADMIN — HYDROMORPHONE HYDROCHLORIDE PRN MG: 2 INJECTION, SOLUTION INTRAMUSCULAR; INTRAVENOUS; SUBCUTANEOUS at 09:00

## 2019-01-09 RX ADMIN — ATORVASTATIN CALCIUM SCH MG: 40 TABLET, FILM COATED ORAL at 20:27

## 2019-01-09 RX ADMIN — Medication SCH MG: at 22:54

## 2019-01-09 RX ADMIN — EZETIMIBE SCH MG: 10 TABLET ORAL at 09:00

## 2019-01-09 RX ADMIN — HYDROMORPHONE HYDROCHLORIDE PRN MG: 2 TABLET ORAL at 23:31

## 2019-01-09 RX ADMIN — Medication SCH EACH: at 12:39

## 2019-01-09 RX ADMIN — ALBUTEROL SULFATE PRN MG: 2.5 SOLUTION RESPIRATORY (INHALATION) at 08:17

## 2019-01-09 NOTE — NUR
RN NOTES 



RECEIVED PATIENT IN BED ASLEEP, AROUSABLE TO NOISE , WILL ASSSESS ORIENTATION FURTHER WHEN 
FULLY AWAKE, ON ROOM AIR, BREATHING EVEN AND UNLABORED. NO SOB NOTED. NO SIGN AND SYMPTOM OF 
ANY KIND OF DISTRESS AT THIS TIME. ON COMFORTABLE POSITION. IV LINE ON THE R HAND. IN PLACE 
AND INTACT. WILL ANTICIPATE NEEDS. CALL LIGHT WITHIN EASY REACH. SAFETY MEASURE OBSERVED AND 
MAINTAINED.WILL CONT TO MONITOR PATIENT.

## 2019-01-09 NOTE — NUR
RN NOTES



ENDORSED PATIENT FOR CONTINUITY OF CARE. NO ACUTE CHANGES WITHIN THE SHIFT. ALL NURSING 
NEEDS ATTENDED AND MET. SAFETY MEASURES IN PLACE AT ALL TIMES. CALL LIGHT WITHIN REACH. HOB 
ELEVATED AT ALL TIMES.

## 2019-01-10 ENCOUNTER — HOSPITAL ENCOUNTER (INPATIENT)
Dept: HOSPITAL 91 - VRC | Age: 59
LOS: 21 days | Discharge: HOME HEALTH | DRG: 945 | End: 2019-01-31
Payer: COMMERCIAL

## 2019-01-10 VITALS — SYSTOLIC BLOOD PRESSURE: 148 MMHG | DIASTOLIC BLOOD PRESSURE: 54 MMHG

## 2019-01-10 VITALS — DIASTOLIC BLOOD PRESSURE: 68 MMHG | SYSTOLIC BLOOD PRESSURE: 159 MMHG

## 2019-01-10 VITALS — DIASTOLIC BLOOD PRESSURE: 65 MMHG | SYSTOLIC BLOOD PRESSURE: 139 MMHG

## 2019-01-10 VITALS — DIASTOLIC BLOOD PRESSURE: 59 MMHG | SYSTOLIC BLOOD PRESSURE: 130 MMHG

## 2019-01-10 VITALS — SYSTOLIC BLOOD PRESSURE: 130 MMHG | DIASTOLIC BLOOD PRESSURE: 59 MMHG

## 2019-01-10 DIAGNOSIS — I25.10: ICD-10-CM

## 2019-01-10 DIAGNOSIS — I69.398: ICD-10-CM

## 2019-01-10 DIAGNOSIS — I10: ICD-10-CM

## 2019-01-10 DIAGNOSIS — R13.10: ICD-10-CM

## 2019-01-10 DIAGNOSIS — F32.9: ICD-10-CM

## 2019-01-10 DIAGNOSIS — K44.9: ICD-10-CM

## 2019-01-10 DIAGNOSIS — M25.512: ICD-10-CM

## 2019-01-10 DIAGNOSIS — Z74.09: ICD-10-CM

## 2019-01-10 DIAGNOSIS — R51: ICD-10-CM

## 2019-01-10 DIAGNOSIS — R10.32: ICD-10-CM

## 2019-01-10 DIAGNOSIS — E87.6: ICD-10-CM

## 2019-01-10 DIAGNOSIS — D50.9: ICD-10-CM

## 2019-01-10 DIAGNOSIS — F06.31: ICD-10-CM

## 2019-01-10 DIAGNOSIS — R53.81: Primary | ICD-10-CM

## 2019-01-10 DIAGNOSIS — M54.2: ICD-10-CM

## 2019-01-10 DIAGNOSIS — R07.9: ICD-10-CM

## 2019-01-10 DIAGNOSIS — K59.00: ICD-10-CM

## 2019-01-10 DIAGNOSIS — E78.5: ICD-10-CM

## 2019-01-10 DIAGNOSIS — I69.354: ICD-10-CM

## 2019-01-10 DIAGNOSIS — E66.01: ICD-10-CM

## 2019-01-10 DIAGNOSIS — M54.16: ICD-10-CM

## 2019-01-10 DIAGNOSIS — K29.70: ICD-10-CM

## 2019-01-10 DIAGNOSIS — D64.9: ICD-10-CM

## 2019-01-10 DIAGNOSIS — Z96.652: ICD-10-CM

## 2019-01-10 DIAGNOSIS — G89.4: ICD-10-CM

## 2019-01-10 DIAGNOSIS — J45.909: ICD-10-CM

## 2019-01-10 DIAGNOSIS — I25.2: ICD-10-CM

## 2019-01-10 DIAGNOSIS — G47.30: ICD-10-CM

## 2019-01-10 DIAGNOSIS — K64.4: ICD-10-CM

## 2019-01-10 DIAGNOSIS — E11.40: ICD-10-CM

## 2019-01-10 DIAGNOSIS — Z91.81: ICD-10-CM

## 2019-01-10 LAB
BUN SERPL-MCNC: 18 MG/DL (ref 7–18)
CALCIUM SERPL-MCNC: 9 MG/DL (ref 8.5–10.1)
CHLORIDE SERPL-SCNC: 99 MMOL/L (ref 98–107)
CO2 SERPL-SCNC: 29 MMOL/L (ref 21–32)
CREAT SERPL-MCNC: 0.6 MG/DL (ref 0.6–1.3)
GLUCOSE SERPL-MCNC: 126 MG/DL (ref 74–106)
POTASSIUM SERPL-SCNC: 4 MMOL/L (ref 3.5–5.1)
SODIUM SERPL-SCNC: 136 MMOL/L (ref 136–145)

## 2019-01-10 PROCEDURE — 80053 COMPREHEN METABOLIC PANEL: CPT

## 2019-01-10 PROCEDURE — 85730 THROMBOPLASTIN TIME PARTIAL: CPT

## 2019-01-10 PROCEDURE — 97110 THERAPEUTIC EXERCISES: CPT

## 2019-01-10 PROCEDURE — 83735 ASSAY OF MAGNESIUM: CPT

## 2019-01-10 PROCEDURE — 74176 CT ABD & PELVIS W/O CONTRAST: CPT

## 2019-01-10 PROCEDURE — 87086 URINE CULTURE/COLONY COUNT: CPT

## 2019-01-10 PROCEDURE — 80048 BASIC METABOLIC PNL TOTAL CA: CPT

## 2019-01-10 PROCEDURE — 82607 VITAMIN B-12: CPT

## 2019-01-10 PROCEDURE — 83540 ASSAY OF IRON: CPT

## 2019-01-10 PROCEDURE — 81001 URINALYSIS AUTO W/SCOPE: CPT

## 2019-01-10 PROCEDURE — 88305 TISSUE EXAM BY PATHOLOGIST: CPT

## 2019-01-10 PROCEDURE — 97167 OT EVAL HIGH COMPLEX 60 MIN: CPT

## 2019-01-10 PROCEDURE — 93971 EXTREMITY STUDY: CPT

## 2019-01-10 PROCEDURE — 82962 GLUCOSE BLOOD TEST: CPT

## 2019-01-10 PROCEDURE — 82746 ASSAY OF FOLIC ACID SERUM: CPT

## 2019-01-10 PROCEDURE — 82728 ASSAY OF FERRITIN: CPT

## 2019-01-10 PROCEDURE — 82270 OCCULT BLOOD FECES: CPT

## 2019-01-10 PROCEDURE — 73550: CPT

## 2019-01-10 PROCEDURE — 84100 ASSAY OF PHOSPHORUS: CPT

## 2019-01-10 PROCEDURE — 93005 ELECTROCARDIOGRAM TRACING: CPT

## 2019-01-10 PROCEDURE — 85025 COMPLETE CBC W/AUTO DIFF WBC: CPT

## 2019-01-10 PROCEDURE — 97542 WHEELCHAIR MNGMENT TRAINING: CPT

## 2019-01-10 PROCEDURE — 94640 AIRWAY INHALATION TREATMENT: CPT

## 2019-01-10 PROCEDURE — 97163 PT EVAL HIGH COMPLEX 45 MIN: CPT

## 2019-01-10 PROCEDURE — 87081 CULTURE SCREEN ONLY: CPT

## 2019-01-10 PROCEDURE — 97116 GAIT TRAINING THERAPY: CPT

## 2019-01-10 PROCEDURE — 71045 X-RAY EXAM CHEST 1 VIEW: CPT

## 2019-01-10 PROCEDURE — 97535 SELF CARE MNGMENT TRAINING: CPT

## 2019-01-10 PROCEDURE — 92610 EVALUATE SWALLOWING FUNCTION: CPT

## 2019-01-10 PROCEDURE — 97112 NEUROMUSCULAR REEDUCATION: CPT

## 2019-01-10 PROCEDURE — 84443 ASSAY THYROID STIM HORMONE: CPT

## 2019-01-10 PROCEDURE — 97530 THERAPEUTIC ACTIVITIES: CPT

## 2019-01-10 PROCEDURE — 88312 SPECIAL STAINS GROUP 1: CPT

## 2019-01-10 PROCEDURE — 85610 PROTHROMBIN TIME: CPT

## 2019-01-10 PROCEDURE — 80061 LIPID PANEL: CPT

## 2019-01-10 PROCEDURE — 93306 TTE W/DOPPLER COMPLETE: CPT

## 2019-01-10 PROCEDURE — 85045 AUTOMATED RETICULOCYTE COUNT: CPT

## 2019-01-10 PROCEDURE — 94664 DEMO&/EVAL PT USE INHALER: CPT

## 2019-01-10 PROCEDURE — 84439 ASSAY OF FREE THYROXINE: CPT

## 2019-01-10 RX ADMIN — ALBUTEROL SULFATE PRN MG: 2.5 SOLUTION RESPIRATORY (INHALATION) at 11:34

## 2019-01-10 RX ADMIN — ALBUTEROL SULFATE PRN MG: 2.5 SOLUTION RESPIRATORY (INHALATION) at 15:23

## 2019-01-10 RX ADMIN — Medication SCH MG: at 16:05

## 2019-01-10 RX ADMIN — Medication SCH MG: at 08:36

## 2019-01-10 RX ADMIN — ACETAMINOPHEN PRN MG: 325 TABLET ORAL at 09:51

## 2019-01-10 RX ADMIN — OXYBUTYNIN CHLORIDE 1 MG: 5 TABLET ORAL at 21:00

## 2019-01-10 RX ADMIN — BACLOFEN SCH MG: 10 TABLET ORAL at 16:06

## 2019-01-10 RX ADMIN — Medication SCH MG: at 08:45

## 2019-01-10 RX ADMIN — ASPIRIN 81 MG SCH MG: 81 TABLET ORAL at 08:36

## 2019-01-10 RX ADMIN — ALBUTEROL SULFATE PRN MG: 2.5 SOLUTION RESPIRATORY (INHALATION) at 08:06

## 2019-01-10 RX ADMIN — HYDROMORPHONE HYDROCHLORIDE PRN MG: 2 INJECTION, SOLUTION INTRAMUSCULAR; INTRAVENOUS; SUBCUTANEOUS at 12:44

## 2019-01-10 RX ADMIN — HYDROMORPHONE HYDROCHLORIDE 1 MG: 2 TABLET ORAL at 22:12

## 2019-01-10 RX ADMIN — Medication PRN MG: at 15:23

## 2019-01-10 RX ADMIN — ALBUTEROL SULFATE 1 MG: 2.5 SOLUTION RESPIRATORY (INHALATION) at 22:51

## 2019-01-10 RX ADMIN — DOCUSATE SODIUM 1 MG: 100 CAPSULE, LIQUID FILLED ORAL at 21:33

## 2019-01-10 RX ADMIN — BACLOFEN 1 MG: 10 TABLET ORAL at 22:11

## 2019-01-10 RX ADMIN — BACLOFEN 1 MG: 10 TABLET ORAL at 21:18

## 2019-01-10 RX ADMIN — PANTOPRAZOLE SODIUM SCH MG: 40 TABLET, DELAYED RELEASE ORAL at 08:37

## 2019-01-10 RX ADMIN — ACETAMINOPHEN PRN MG: 325 TABLET ORAL at 01:05

## 2019-01-10 RX ADMIN — Medication SCH MG: at 12:43

## 2019-01-10 RX ADMIN — ISOSORBIDE DINITRATE SCH MG: 20 TABLET ORAL at 08:50

## 2019-01-10 RX ADMIN — Medication SCH MG: at 08:35

## 2019-01-10 RX ADMIN — BACLOFEN SCH MG: 10 TABLET ORAL at 12:43

## 2019-01-10 RX ADMIN — BACLOFEN SCH MG: 10 TABLET ORAL at 08:34

## 2019-01-10 RX ADMIN — SPIRONOLACTONE SCH MG: 25 TABLET, FILM COATED ORAL at 08:37

## 2019-01-10 RX ADMIN — HYDROMORPHONE HYDROCHLORIDE 1 MG: 1 INJECTION, SOLUTION INTRAMUSCULAR; INTRAVENOUS; SUBCUTANEOUS at 21:02

## 2019-01-10 RX ADMIN — NIFEDIPINE 1 MG: 60 TABLET, FILM COATED, EXTENDED RELEASE ORAL at 21:00

## 2019-01-10 RX ADMIN — FLUTICASONE FUROATE AND VILANTEROL TRIFENATATE SCH EACH: 100; 25 POWDER RESPIRATORY (INHALATION) at 08:34

## 2019-01-10 RX ADMIN — HYDROMORPHONE HYDROCHLORIDE PRN MG: 2 TABLET ORAL at 05:34

## 2019-01-10 RX ADMIN — STANDARDIZED SENNA CONCENTRATE AND DOCUSATE SODIUM SCH TAB: 8.6; 5 TABLET ORAL at 08:35

## 2019-01-10 RX ADMIN — HYDROMORPHONE HYDROCHLORIDE PRN MG: 2 TABLET ORAL at 11:35

## 2019-01-10 RX ADMIN — ATORVASTATIN CALCIUM 1 MG: 40 TABLET, FILM COATED ORAL at 21:16

## 2019-01-10 RX ADMIN — Medication PRN MG: at 11:34

## 2019-01-10 RX ADMIN — SENNOSIDES 1 TAB: 8.6 TABLET, FILM COATED ORAL at 21:16

## 2019-01-10 RX ADMIN — Medication SCH EACH: at 12:26

## 2019-01-10 RX ADMIN — HYDROMORPHONE HYDROCHLORIDE PRN MG: 2 INJECTION, SOLUTION INTRAMUSCULAR; INTRAVENOUS; SUBCUTANEOUS at 04:14

## 2019-01-10 RX ADMIN — Medication PRN MG: at 03:15

## 2019-01-10 RX ADMIN — Medication SCH EACH: at 07:59

## 2019-01-10 RX ADMIN — EZETIMIBE SCH MG: 10 TABLET ORAL at 08:35

## 2019-01-10 RX ADMIN — ISOSORBIDE DINITRATE SCH MG: 20 TABLET ORAL at 16:06

## 2019-01-10 RX ADMIN — HYDROMORPHONE HYDROCHLORIDE PRN MG: 2 INJECTION, SOLUTION INTRAMUSCULAR; INTRAVENOUS; SUBCUTANEOUS at 08:31

## 2019-01-10 RX ADMIN — LABETALOL 1 MG: 200 TABLET, FILM COATED ORAL at 21:17

## 2019-01-10 RX ADMIN — LABETALOL HCL SCH MG: 100 TABLET, FILM COATED ORAL at 08:44

## 2019-01-10 RX ADMIN — Medication SCH MG: at 16:06

## 2019-01-10 RX ADMIN — ALBUTEROL SULFATE PRN MG: 2.5 SOLUTION RESPIRATORY (INHALATION) at 03:15

## 2019-01-10 RX ADMIN — Medication PRN MG: at 08:06

## 2019-01-10 NOTE — NUR
MS RN NOTES-- PT REQUESTED ONE DOSE OF MIRALAX. NOTIFIED AG EL WITH ORDERS FOR MIRALAX. 
READ BACK AND VERIFIED. ORDERS NOTED AND CARRIED OUT.

## 2019-01-10 NOTE — NUR
DISCHARGE NOTE



PT WAS D/C TO [facility] ACUTE REHAB AT THIS TIME IN MEDICALLY STABLE CONDITION VIA 
AMBULANCE CREW. ALL D/C PAPERWORK WAS DISCUSSED WITH PT, D/C PAPERWORK AND BELONGINGS LIST 
WERE SIGNED. BELONGINGS WERE HANDED TO THE PT/EMT CREW. ID BAND WAS REMOVED, IV WAS LEFT IN 
PLACE DUE TO REQUEST OF ACUTE REHAB NURSE JOHNNY WHO REPORT WAS GIVEN TOO. SKIN WOUNDS WERE 
PHOTOGRAPHED AND DOCUMENTED. PT LEFT A/O X4 WITH VS WNL. ALL NEEDS WERE ATTENDED TO DURING 
THEIR STAY. ETA 15 MIN

## 2019-01-10 NOTE — NUR
RN OPENING NOTES



PT RECEIVED IN BED AT LOWEST AND LOCKED POSITION WITH SIDE  RAILS UP X2, A/O X4, BREATHING 
EVEN AND UNLABORED ON RA, NO S/S OF PAIN OR DISTRESS NOTED, IV PATENT AND INTACT, SAFETY 
PRECAUTIONS IN PLACE, CALL LIGHT WITHIN REACH, WILL MONITOR ACCORDINGLY

## 2019-01-10 NOTE — NUR
pt  alert, oriented, s/p fall at home, but no fracture, on round the clock pain medication 
iv and oral dilaudid. able to stand and walk with assist and her one hand walker, due to 
weakness from left side from old stroke. had bm last night on the commode despite all pain 
meds , laxative just started. assisted with all needs, vss,afebrile. no significant 
changes.will continue to monitor.

## 2019-01-10 NOTE — NUR
RN NOTES



PT BLOOD GLUCOSE WAS NOTED TO BE 92 THEREFORE NO COVERAGE NEEDED OR GIVEN ACCORDING TO S/S

## 2019-01-10 NOTE — NUR
RN NOTES



PT BLOOD GLUCOSE WAS NOTED TO , THEREFORE ACCORDING TO S/S NO INSULIN NEEDED OR GIVEN 
AT THIS TIME

## 2019-01-10 NOTE — NUR
AUBREY NOTES



CRITICAL LAB VALUE CALL RECEIVED FROM LAB FOR PT PHOSPHORUS OF 9.0, NIKKO LUONG MADE AWARE, STAT 
BMP ORDERED

-------------------------------------------------------------------------------

Addendum: 01/10/19 at 0942 by ASHLYN CABALLERO RN

-------------------------------------------------------------------------------

DISREGARD WRONG PT

## 2019-01-11 LAB
ADD MAN DIFF?: NO
ADD UMIC: YES
ALANINE AMINOTRANSFERASE: 39 IU/L (ref 13–69)
ALBUMIN/GLOBULIN RATIO: 1.33
ALBUMIN: 4 G/DL (ref 3.3–4.9)
ALKALINE PHOSPHATASE: 73 IU/L (ref 42–121)
ANION GAP: 10 (ref 5–13)
ASPARTATE AMINO TRANSFERASE: 21 IU/L (ref 15–46)
BASOPHIL #: 0.1 10^3/UL (ref 0–0.1)
BASOPHILS %: 0.6 % (ref 0–2)
BILIRUBIN,DIRECT: 0 MG/DL (ref 0–0.2)
BILIRUBIN,TOTAL: 0.5 MG/DL (ref 0.2–1.3)
BLOOD UREA NITROGEN: 20 MG/DL (ref 7–20)
CALCIUM: 9.3 MG/DL (ref 8.4–10.2)
CARBON DIOXIDE: 33 MMOL/L (ref 21–31)
CHLORIDE: 98 MMOL/L (ref 97–110)
CREATININE: 0.48 MG/DL (ref 0.44–1)
EOSINOPHILS #: 0.3 10^3/UL (ref 0–0.5)
EOSINOPHILS %: 3.5 % (ref 0–7)
GLOBULIN: 3 G/DL (ref 1.3–3.2)
GLUCOSE: 134 MG/DL (ref 70–220)
HEMATOCRIT: 32.3 % (ref 37–47)
HEMOGLOBIN: 10.2 G/DL (ref 12–16)
IMMATURE GRANS #M: 0.03 10^3/UL (ref 0–0.03)
IMMATURE GRANS % (M): 0.3 % (ref 0–0.43)
LYMPHOCYTES #: 2.1 10^3/UL (ref 0.8–2.9)
LYMPHOCYTES %: 23.8 % (ref 15–51)
MEAN CORPUSCULAR HEMOGLOBIN: 27.1 PG (ref 29–33)
MEAN CORPUSCULAR HGB CONC: 31.6 G/DL (ref 32–37)
MEAN CORPUSCULAR VOLUME: 85.9 FL (ref 82–101)
MEAN PLATELET VOLUME: 9.3 FL (ref 7.4–10.4)
MONOCYTE #: 0.9 10^3/UL (ref 0.3–0.9)
MONOCYTES %: 10.1 % (ref 0–11)
NEUTROPHIL #: 5.3 10^3/UL (ref 1.6–7.5)
NEUTROPHILS %: 61.7 % (ref 39–77)
NUCLEATED RED BLOOD CELLS #: 0 10^3/UL (ref 0–0)
NUCLEATED RED BLOOD CELLS%: 0 /100WBC (ref 0–0)
PLATELET COUNT: 425 10^3/UL (ref 140–415)
POTASSIUM: 4.1 MMOL/L (ref 3.5–5.1)
RED BLOOD COUNT: 3.76 10^6/UL (ref 4.2–5.4)
RED CELL DISTRIBUTION WIDTH: 13.2 % (ref 11.5–14.5)
SODIUM: 141 MMOL/L (ref 135–144)
TOTAL PROTEIN: 7 G/DL (ref 6.1–8.1)
UR ASCORBIC ACID: NEGATIVE MG/DL
UR BACTERIA: (no result) /HPF
UR BILIRUBIN (DIP): NEGATIVE MG/DL
UR BLOOD (DIP): NEGATIVE MG/DL
UR CLARITY: (no result)
UR COLOR: YELLOW
UR GLUCOSE (DIP): NEGATIVE MG/DL
UR KETONES (DIP): NEGATIVE MG/DL
UR LEUKOCYTE ESTERASE (DIP): (no result) LEU/UL
UR NITRITE (DIP): NEGATIVE MG/DL
UR RBC: 3 /HPF (ref 0–5)
UR TOTAL PROTEIN (DIP): (no result) MG/DL
UR UROBILINOGEN (DIP): (no result) MG/DL
UR WBC: > 182 /HPF (ref 0–5)
URINE PH (DIP): 7 (ref 5–9)
URINE SPECIFIC GRAVITY (DIP): 1.01 (ref 1–1.03)
WHITE BLOOD COUNT: 8.6 10^3/UL (ref 4.8–10.8)

## 2019-01-11 RX ADMIN — INSULIN ASPART 1 UNIT: 100 INJECTION, SOLUTION INTRAVENOUS; SUBCUTANEOUS at 17:05

## 2019-01-11 RX ADMIN — HYDROMORPHONE HYDROCHLORIDE 1 MG: 1 INJECTION, SOLUTION INTRAMUSCULAR; INTRAVENOUS; SUBCUTANEOUS at 06:05

## 2019-01-11 RX ADMIN — HYDROMORPHONE HYDROCHLORIDE 1 MG: 1 INJECTION, SOLUTION INTRAMUSCULAR; INTRAVENOUS; SUBCUTANEOUS at 19:03

## 2019-01-11 RX ADMIN — SENNOSIDES 1 TAB: 8.6 TABLET, FILM COATED ORAL at 21:29

## 2019-01-11 RX ADMIN — IPRATROPIUM BROMIDE AND ALBUTEROL SULFATE 1 ML: .5; 3 SOLUTION RESPIRATORY (INHALATION) at 21:54

## 2019-01-11 RX ADMIN — INSULIN ASPART 1 UNIT: 100 INJECTION, SOLUTION INTRAVENOUS; SUBCUTANEOUS at 21:00

## 2019-01-11 RX ADMIN — INSULIN ASPART 1 UNIT: 100 INJECTION, SOLUTION INTRAVENOUS; SUBCUTANEOUS at 11:52

## 2019-01-11 RX ADMIN — PREGABALIN 1 MG: 50 CAPSULE ORAL at 17:02

## 2019-01-11 RX ADMIN — PREGABALIN 1 MG: 50 CAPSULE ORAL at 10:39

## 2019-01-11 RX ADMIN — ALBUTEROL SULFATE 1 MG: 2.5 SOLUTION RESPIRATORY (INHALATION) at 08:20

## 2019-01-11 RX ADMIN — ASPIRIN 81 MG CHEWABLE TABLET 1 MG: 81 TABLET CHEWABLE at 08:52

## 2019-01-11 RX ADMIN — HYDROMORPHONE HYDROCHLORIDE 1 MG: 2 TABLET ORAL at 17:50

## 2019-01-11 RX ADMIN — ALBUTEROL SULFATE 1 MG: 2.5 SOLUTION RESPIRATORY (INHALATION) at 11:25

## 2019-01-11 RX ADMIN — ALBUTEROL SULFATE 1 MG: 2.5 SOLUTION RESPIRATORY (INHALATION) at 04:04

## 2019-01-11 RX ADMIN — SPIRONOLACTONE 1 MG: 25 TABLET, FILM COATED ORAL at 08:52

## 2019-01-11 RX ADMIN — PREGABALIN 1 MG: 50 CAPSULE ORAL at 21:29

## 2019-01-11 RX ADMIN — HYDROMORPHONE HYDROCHLORIDE 1 MG: 1 INJECTION, SOLUTION INTRAMUSCULAR; INTRAVENOUS; SUBCUTANEOUS at 14:44

## 2019-01-11 RX ADMIN — BACLOFEN 1 MG: 10 TABLET ORAL at 12:17

## 2019-01-11 RX ADMIN — LABETALOL 1 MG: 200 TABLET, FILM COATED ORAL at 21:29

## 2019-01-11 RX ADMIN — HYDROMORPHONE HYDROCHLORIDE 1 MG: 2 TABLET ORAL at 07:04

## 2019-01-11 RX ADMIN — LABETALOL 1 MG: 200 TABLET, FILM COATED ORAL at 08:52

## 2019-01-11 RX ADMIN — EZETIMIBE 1 MG: 10 TABLET ORAL at 08:51

## 2019-01-11 RX ADMIN — DOCUSATE SODIUM 1 MG: 100 CAPSULE, LIQUID FILLED ORAL at 08:53

## 2019-01-11 RX ADMIN — NIFEDIPINE 1 MG: 60 TABLET, FILM COATED, EXTENDED RELEASE ORAL at 21:30

## 2019-01-11 RX ADMIN — LIDOCAINE 1 PATCH: 50 PATCH TOPICAL at 12:17

## 2019-01-11 RX ADMIN — LOSARTAN POTASSIUM 1 MG: 50 TABLET ORAL at 08:51

## 2019-01-11 RX ADMIN — ATORVASTATIN CALCIUM 1 MG: 40 TABLET, FILM COATED ORAL at 21:30

## 2019-01-11 RX ADMIN — PANTOPRAZOLE SODIUM 1 MG: 40 TABLET, DELAYED RELEASE ORAL at 06:05

## 2019-01-11 RX ADMIN — LEVOFLOXACIN 1 MG: 500 TABLET, FILM COATED ORAL at 06:05

## 2019-01-11 RX ADMIN — HYDROMORPHONE HYDROCHLORIDE 1 MG: 2 TABLET ORAL at 22:29

## 2019-01-11 RX ADMIN — OXYBUTYNIN CHLORIDE 1 MG: 5 TABLET ORAL at 08:53

## 2019-01-11 RX ADMIN — FLUTICASONE FUROATE AND VILANTEROL TRIFENATATE 1 INH: 100; 25 POWDER RESPIRATORY (INHALATION) at 08:51

## 2019-01-11 RX ADMIN — DOCUSATE SODIUM 1 MG: 100 CAPSULE, LIQUID FILLED ORAL at 21:29

## 2019-01-11 RX ADMIN — PAROXETINE HYDROCHLORIDE 1 MG: 12.5 TABLET, FILM COATED, EXTENDED RELEASE ORAL at 11:48

## 2019-01-11 RX ADMIN — NIFEDIPINE 1 MG: 60 TABLET, FILM COATED, EXTENDED RELEASE ORAL at 08:52

## 2019-01-11 RX ADMIN — HYDROMORPHONE HYDROCHLORIDE 1 MG: 2 TABLET ORAL at 12:17

## 2019-01-11 RX ADMIN — ACETAMINOPHEN 1 MG: 325 TABLET, FILM COATED ORAL at 21:40

## 2019-01-11 RX ADMIN — HYDROMORPHONE HYDROCHLORIDE 1 MG: 1 INJECTION, SOLUTION INTRAMUSCULAR; INTRAVENOUS; SUBCUTANEOUS at 10:40

## 2019-01-11 RX ADMIN — BACLOFEN 1 MG: 10 TABLET ORAL at 21:30

## 2019-01-12 LAB
ADD MAN DIFF?: NO
ANION GAP: 10 (ref 5–13)
BASOPHIL #: 0.1 10^3/UL (ref 0–0.1)
BASOPHILS %: 0.6 % (ref 0–2)
BLOOD UREA NITROGEN: 18 MG/DL (ref 7–20)
CALCIUM: 9.5 MG/DL (ref 8.4–10.2)
CARBON DIOXIDE: 32 MMOL/L (ref 21–31)
CHLORIDE: 96 MMOL/L (ref 97–110)
CREATININE: 0.54 MG/DL (ref 0.44–1)
EOSINOPHILS #: 0.3 10^3/UL (ref 0–0.5)
EOSINOPHILS %: 3.7 % (ref 0–7)
GLUCOSE: 95 MG/DL (ref 70–220)
HEMATOCRIT: 30.4 % (ref 37–47)
HEMOGLOBIN: 9.6 G/DL (ref 12–16)
IMMATURE GRANS #M: 0.04 10^3/UL (ref 0–0.03)
IMMATURE GRANS % (M): 0.5 % (ref 0–0.43)
LYMPHOCYTES #: 2.5 10^3/UL (ref 0.8–2.9)
LYMPHOCYTES %: 28.3 % (ref 15–51)
MAGNESIUM: 2 MG/DL (ref 1.7–2.5)
MEAN CORPUSCULAR HEMOGLOBIN: 27 PG (ref 29–33)
MEAN CORPUSCULAR HGB CONC: 31.6 G/DL (ref 32–37)
MEAN CORPUSCULAR VOLUME: 85.4 FL (ref 82–101)
MEAN PLATELET VOLUME: 9.5 FL (ref 7.4–10.4)
MONOCYTE #: 1 10^3/UL (ref 0.3–0.9)
MONOCYTES %: 11.6 % (ref 0–11)
NEUTROPHIL #: 4.9 10^3/UL (ref 1.6–7.5)
NEUTROPHILS %: 55.3 % (ref 39–77)
NUCLEATED RED BLOOD CELLS #: 0 10^3/UL (ref 0–0)
NUCLEATED RED BLOOD CELLS%: 0 /100WBC (ref 0–0)
PHOSPHORUS: 4.9 MG/DL (ref 2.5–4.9)
PLATELET COUNT: 388 10^3/UL (ref 140–415)
POTASSIUM: 3.8 MMOL/L (ref 3.5–5.1)
RED BLOOD COUNT: 3.56 10^6/UL (ref 4.2–5.4)
RED CELL DISTRIBUTION WIDTH: 13.1 % (ref 11.5–14.5)
SODIUM: 138 MMOL/L (ref 135–144)
WHITE BLOOD COUNT: 8.9 10^3/UL (ref 4.8–10.8)

## 2019-01-12 RX ADMIN — DOCUSATE SODIUM 1 MG: 100 CAPSULE, LIQUID FILLED ORAL at 21:12

## 2019-01-12 RX ADMIN — MAGNESIUM HYDROXIDE 1 ML: 400 SUSPENSION ORAL at 22:06

## 2019-01-12 RX ADMIN — INSULIN ASPART 1 UNIT: 100 INJECTION, SOLUTION INTRAVENOUS; SUBCUTANEOUS at 07:35

## 2019-01-12 RX ADMIN — PREGABALIN 1 MG: 50 CAPSULE ORAL at 13:21

## 2019-01-12 RX ADMIN — NIFEDIPINE 1 MG: 60 TABLET, FILM COATED, EXTENDED RELEASE ORAL at 08:44

## 2019-01-12 RX ADMIN — PANTOPRAZOLE SODIUM 1 MG: 40 TABLET, DELAYED RELEASE ORAL at 06:12

## 2019-01-12 RX ADMIN — SENNOSIDES 1 TAB: 8.6 TABLET, FILM COATED ORAL at 21:12

## 2019-01-12 RX ADMIN — LEVOFLOXACIN 1 MG: 500 TABLET, FILM COATED ORAL at 06:12

## 2019-01-12 RX ADMIN — ACETAMINOPHEN 1 MG: 325 TABLET, FILM COATED ORAL at 22:07

## 2019-01-12 RX ADMIN — SOLIFENACIN SUCCINATE 1 MG: 5 TABLET, FILM COATED ORAL at 08:45

## 2019-01-12 RX ADMIN — LABETALOL 1 MG: 200 TABLET, FILM COATED ORAL at 21:12

## 2019-01-12 RX ADMIN — INSULIN ASPART 1 UNIT: 100 INJECTION, SOLUTION INTRAVENOUS; SUBCUTANEOUS at 17:32

## 2019-01-12 RX ADMIN — HYDROMORPHONE HYDROCHLORIDE 1 MG: 1 INJECTION, SOLUTION INTRAMUSCULAR; INTRAVENOUS; SUBCUTANEOUS at 10:24

## 2019-01-12 RX ADMIN — EZETIMIBE 1 MG: 10 TABLET ORAL at 08:43

## 2019-01-12 RX ADMIN — NIFEDIPINE 1 MG: 60 TABLET, FILM COATED, EXTENDED RELEASE ORAL at 21:12

## 2019-01-12 RX ADMIN — ASPIRIN 81 MG CHEWABLE TABLET 1 MG: 81 TABLET CHEWABLE at 08:45

## 2019-01-12 RX ADMIN — DOCUSATE SODIUM 1 MG: 100 CAPSULE, LIQUID FILLED ORAL at 08:45

## 2019-01-12 RX ADMIN — PREGABALIN 1 MG: 50 CAPSULE ORAL at 06:15

## 2019-01-12 RX ADMIN — HYDROMORPHONE HYDROCHLORIDE 1 MG: 1 INJECTION, SOLUTION INTRAMUSCULAR; INTRAVENOUS; SUBCUTANEOUS at 00:14

## 2019-01-12 RX ADMIN — HYDROMORPHONE HYDROCHLORIDE 1 MG: 2 TABLET ORAL at 08:01

## 2019-01-12 RX ADMIN — BACLOFEN 1 MG: 10 TABLET ORAL at 21:11

## 2019-01-12 RX ADMIN — ACETAMINOPHEN 1 MG: 325 TABLET, FILM COATED ORAL at 11:32

## 2019-01-12 RX ADMIN — HYDROMORPHONE HYDROCHLORIDE 1 MG: 2 TABLET ORAL at 11:45

## 2019-01-12 RX ADMIN — PREGABALIN 1 MG: 50 CAPSULE ORAL at 22:29

## 2019-01-12 RX ADMIN — HYDROMORPHONE HYDROCHLORIDE 1 MG: 2 TABLET ORAL at 16:12

## 2019-01-12 RX ADMIN — IPRATROPIUM BROMIDE AND ALBUTEROL SULFATE 1 ML: .5; 3 SOLUTION RESPIRATORY (INHALATION) at 13:05

## 2019-01-12 RX ADMIN — SPIRONOLACTONE 1 MG: 25 TABLET, FILM COATED ORAL at 08:45

## 2019-01-12 RX ADMIN — HYDROMORPHONE HYDROCHLORIDE 1 MG: 1 INJECTION, SOLUTION INTRAMUSCULAR; INTRAVENOUS; SUBCUTANEOUS at 06:25

## 2019-01-12 RX ADMIN — LIDOCAINE 1 PATCH: 50 PATCH TOPICAL at 08:45

## 2019-01-12 RX ADMIN — HYDROCORTISONE 1 APPLIC: 1 OINTMENT TOPICAL at 14:49

## 2019-01-12 RX ADMIN — HYDROMORPHONE HYDROCHLORIDE 1 MG: 2 TABLET ORAL at 21:04

## 2019-01-12 RX ADMIN — HYDROMORPHONE HYDROCHLORIDE 1 MG: 1 INJECTION, SOLUTION INTRAMUSCULAR; INTRAVENOUS; SUBCUTANEOUS at 14:48

## 2019-01-12 RX ADMIN — ATORVASTATIN CALCIUM 1 MG: 40 TABLET, FILM COATED ORAL at 21:10

## 2019-01-12 RX ADMIN — IPRATROPIUM BROMIDE AND ALBUTEROL SULFATE 1 ML: .5; 3 SOLUTION RESPIRATORY (INHALATION) at 18:35

## 2019-01-12 RX ADMIN — ENOXAPARIN SODIUM 1 MG: 100 INJECTION SUBCUTANEOUS at 08:47

## 2019-01-12 RX ADMIN — BACLOFEN 1 MG: 10 TABLET ORAL at 13:21

## 2019-01-12 RX ADMIN — BACLOFEN 1 MG: 10 TABLET ORAL at 08:48

## 2019-01-12 RX ADMIN — PAROXETINE HYDROCHLORIDE 1 MG: 12.5 TABLET, FILM COATED, EXTENDED RELEASE ORAL at 08:43

## 2019-01-12 RX ADMIN — FLUTICASONE FUROATE AND VILANTEROL TRIFENATATE 1 INH: 100; 25 POWDER RESPIRATORY (INHALATION) at 08:43

## 2019-01-12 RX ADMIN — INSULIN ASPART 1 UNIT: 100 INJECTION, SOLUTION INTRAVENOUS; SUBCUTANEOUS at 21:00

## 2019-01-12 RX ADMIN — HYDROMORPHONE HYDROCHLORIDE 1 MG: 1 INJECTION, SOLUTION INTRAMUSCULAR; INTRAVENOUS; SUBCUTANEOUS at 19:32

## 2019-01-12 RX ADMIN — LOSARTAN POTASSIUM 1 MG: 50 TABLET ORAL at 08:43

## 2019-01-12 RX ADMIN — IPRATROPIUM BROMIDE AND ALBUTEROL SULFATE 1 ML: .5; 3 SOLUTION RESPIRATORY (INHALATION) at 08:34

## 2019-01-12 RX ADMIN — LABETALOL 1 MG: 200 TABLET, FILM COATED ORAL at 08:44

## 2019-01-12 RX ADMIN — INSULIN ASPART 1 UNIT: 100 INJECTION, SOLUTION INTRAVENOUS; SUBCUTANEOUS at 11:50

## 2019-01-13 LAB
% IRON SATURATION: 10 % SAT (ref 22–52)
FERRITIN: 26.2 NG/ML (ref 11.1–264)
IRON: 43 UG/DL (ref 35–150)
TOTAL IRON BINDING CAPACITY: 432 UG/DL (ref 241–421)

## 2019-01-13 RX ADMIN — IPRATROPIUM BROMIDE AND ALBUTEROL SULFATE 1 ML: .5; 3 SOLUTION RESPIRATORY (INHALATION) at 15:48

## 2019-01-13 RX ADMIN — HYDROMORPHONE HYDROCHLORIDE 1 MG: 1 INJECTION, SOLUTION INTRAMUSCULAR; INTRAVENOUS; SUBCUTANEOUS at 08:26

## 2019-01-13 RX ADMIN — INSULIN ASPART 1 UNIT: 100 INJECTION, SOLUTION INTRAVENOUS; SUBCUTANEOUS at 21:00

## 2019-01-13 RX ADMIN — LOSARTAN POTASSIUM 1 MG: 50 TABLET ORAL at 11:57

## 2019-01-13 RX ADMIN — INSULIN ASPART 1 UNIT: 100 INJECTION, SOLUTION INTRAVENOUS; SUBCUTANEOUS at 07:35

## 2019-01-13 RX ADMIN — POLYETHYLENE GLYCOL 3350 1 GM: 17 POWDER, FOR SOLUTION ORAL at 10:15

## 2019-01-13 RX ADMIN — EZETIMIBE 1 MG: 10 TABLET ORAL at 09:00

## 2019-01-13 RX ADMIN — PREGABALIN 1 MG: 50 CAPSULE ORAL at 13:56

## 2019-01-13 RX ADMIN — LABETALOL 1 MG: 200 TABLET, FILM COATED ORAL at 11:55

## 2019-01-13 RX ADMIN — ENOXAPARIN SODIUM 1 MG: 100 INJECTION SUBCUTANEOUS at 09:01

## 2019-01-13 RX ADMIN — ACETAMINOPHEN 1 MG: 325 TABLET, FILM COATED ORAL at 21:56

## 2019-01-13 RX ADMIN — MAGNESIUM HYDROXIDE 1 ML: 400 SUSPENSION ORAL at 20:40

## 2019-01-13 RX ADMIN — FLUTICASONE FUROATE AND VILANTEROL TRIFENATATE 1 INH: 100; 25 POWDER RESPIRATORY (INHALATION) at 10:11

## 2019-01-13 RX ADMIN — HYDROMORPHONE HYDROCHLORIDE 1 MG: 1 INJECTION, SOLUTION INTRAMUSCULAR; INTRAVENOUS; SUBCUTANEOUS at 19:46

## 2019-01-13 RX ADMIN — SENNOSIDES 1 TAB: 8.6 TABLET, FILM COATED ORAL at 20:41

## 2019-01-13 RX ADMIN — IPRATROPIUM BROMIDE AND ALBUTEROL SULFATE 1 ML: .5; 3 SOLUTION RESPIRATORY (INHALATION) at 11:52

## 2019-01-13 RX ADMIN — HYDROMORPHONE HYDROCHLORIDE 1 MG: 1 INJECTION, SOLUTION INTRAMUSCULAR; INTRAVENOUS; SUBCUTANEOUS at 04:46

## 2019-01-13 RX ADMIN — BACLOFEN 1 MG: 10 TABLET ORAL at 20:41

## 2019-01-13 RX ADMIN — LIDOCAINE 1 PATCH: 50 PATCH TOPICAL at 10:15

## 2019-01-13 RX ADMIN — ACETAMINOPHEN 1 MG: 325 TABLET, FILM COATED ORAL at 17:35

## 2019-01-13 RX ADMIN — HYDROMORPHONE HYDROCHLORIDE 1 MG: 2 TABLET ORAL at 06:23

## 2019-01-13 RX ADMIN — HYDROMORPHONE HYDROCHLORIDE 1 MG: 1 INJECTION, SOLUTION INTRAMUSCULAR; INTRAVENOUS; SUBCUTANEOUS at 00:14

## 2019-01-13 RX ADMIN — HYDROMORPHONE HYDROCHLORIDE 1 MG: 2 TABLET ORAL at 10:35

## 2019-01-13 RX ADMIN — SOLIFENACIN SUCCINATE 1 MG: 5 TABLET, FILM COATED ORAL at 09:04

## 2019-01-13 RX ADMIN — BACLOFEN 1 MG: 10 TABLET ORAL at 14:02

## 2019-01-13 RX ADMIN — HYDROMORPHONE HYDROCHLORIDE 1 MG: 2 TABLET ORAL at 23:35

## 2019-01-13 RX ADMIN — HYDROMORPHONE HYDROCHLORIDE 1 MG: 1 INJECTION, SOLUTION INTRAMUSCULAR; INTRAVENOUS; SUBCUTANEOUS at 11:56

## 2019-01-13 RX ADMIN — ATORVASTATIN CALCIUM 1 MG: 40 TABLET, FILM COATED ORAL at 20:41

## 2019-01-13 RX ADMIN — HYDROMORPHONE HYDROCHLORIDE 1 MG: 1 INJECTION, SOLUTION INTRAMUSCULAR; INTRAVENOUS; SUBCUTANEOUS at 14:02

## 2019-01-13 RX ADMIN — SPIRONOLACTONE 1 MG: 25 TABLET, FILM COATED ORAL at 08:24

## 2019-01-13 RX ADMIN — DOCUSATE SODIUM 1 MG: 100 CAPSULE, LIQUID FILLED ORAL at 20:41

## 2019-01-13 RX ADMIN — NIFEDIPINE 1 MG: 60 TABLET, FILM COATED, EXTENDED RELEASE ORAL at 20:41

## 2019-01-13 RX ADMIN — LABETALOL 1 MG: 200 TABLET, FILM COATED ORAL at 20:42

## 2019-01-13 RX ADMIN — DOCUSATE SODIUM 1 MG: 100 CAPSULE, LIQUID FILLED ORAL at 09:00

## 2019-01-13 RX ADMIN — BACLOFEN 1 MG: 10 TABLET ORAL at 08:24

## 2019-01-13 RX ADMIN — HYDROMORPHONE HYDROCHLORIDE 1 MG: 2 TABLET ORAL at 02:13

## 2019-01-13 RX ADMIN — FUROSEMIDE 1 MG: 20 TABLET ORAL at 11:56

## 2019-01-13 RX ADMIN — INSULIN ASPART 1 UNIT: 100 INJECTION, SOLUTION INTRAVENOUS; SUBCUTANEOUS at 17:35

## 2019-01-13 RX ADMIN — ASPIRIN 81 MG CHEWABLE TABLET 1 MG: 81 TABLET CHEWABLE at 08:26

## 2019-01-13 RX ADMIN — INSULIN ASPART 1 UNIT: 100 INJECTION, SOLUTION INTRAVENOUS; SUBCUTANEOUS at 12:00

## 2019-01-13 RX ADMIN — IPRATROPIUM BROMIDE AND ALBUTEROL SULFATE 1 ML: .5; 3 SOLUTION RESPIRATORY (INHALATION) at 19:46

## 2019-01-13 RX ADMIN — PREGABALIN 1 MG: 75 CAPSULE ORAL at 21:28

## 2019-01-13 RX ADMIN — PAROXETINE HYDROCHLORIDE 1 MG: 12.5 TABLET, FILM COATED, EXTENDED RELEASE ORAL at 08:26

## 2019-01-13 RX ADMIN — LEVOFLOXACIN 1 MG: 500 TABLET, FILM COATED ORAL at 06:23

## 2019-01-13 RX ADMIN — HYDROMORPHONE HYDROCHLORIDE 1 MG: 1 INJECTION, SOLUTION INTRAMUSCULAR; INTRAVENOUS; SUBCUTANEOUS at 15:56

## 2019-01-13 RX ADMIN — PREGABALIN 1 MG: 50 CAPSULE ORAL at 06:23

## 2019-01-13 RX ADMIN — NIFEDIPINE 1 MG: 60 TABLET, FILM COATED, EXTENDED RELEASE ORAL at 09:00

## 2019-01-13 RX ADMIN — ACETAMINOPHEN 1 MG: 325 TABLET, FILM COATED ORAL at 02:50

## 2019-01-13 RX ADMIN — HYDROMORPHONE HYDROCHLORIDE 1 MG: 2 TABLET ORAL at 17:35

## 2019-01-13 RX ADMIN — PANTOPRAZOLE SODIUM 1 MG: 40 TABLET, DELAYED RELEASE ORAL at 06:23

## 2019-01-14 LAB
ADD MAN DIFF?: NO
ANION GAP: 10 (ref 5–13)
BASOPHIL #: 0.1 10^3/UL (ref 0–0.1)
BASOPHILS %: 0.7 % (ref 0–2)
BLOOD UREA NITROGEN: 21 MG/DL (ref 7–20)
CALCIUM: 9.3 MG/DL (ref 8.4–10.2)
CARBON DIOXIDE: 34 MMOL/L (ref 21–31)
CHLORIDE: 98 MMOL/L (ref 97–110)
CREATININE: 0.47 MG/DL (ref 0.44–1)
EOSINOPHILS #: 0.3 10^3/UL (ref 0–0.5)
EOSINOPHILS %: 4.1 % (ref 0–7)
GLUCOSE: 91 MG/DL (ref 70–220)
HEMATOCRIT: 31.4 % (ref 37–47)
HEMOGLOBIN: 9.8 G/DL (ref 12–16)
IMMATURE GRANS #M: 0.02 10^3/UL (ref 0–0.03)
IMMATURE GRANS % (M): 0.3 % (ref 0–0.43)
LYMPHOCYTES #: 2 10^3/UL (ref 0.8–2.9)
LYMPHOCYTES %: 26.3 % (ref 15–51)
MEAN CORPUSCULAR HEMOGLOBIN: 26.9 PG (ref 29–33)
MEAN CORPUSCULAR HGB CONC: 31.2 G/DL (ref 32–37)
MEAN CORPUSCULAR VOLUME: 86.3 FL (ref 82–101)
MEAN PLATELET VOLUME: 9.7 FL (ref 7.4–10.4)
MONOCYTE #: 0.8 10^3/UL (ref 0.3–0.9)
MONOCYTES %: 11.1 % (ref 0–11)
NEUTROPHIL #: 4.3 10^3/UL (ref 1.6–7.5)
NEUTROPHILS %: 57.5 % (ref 39–77)
NUCLEATED RED BLOOD CELLS #: 0 10^3/UL (ref 0–0)
NUCLEATED RED BLOOD CELLS%: 0 /100WBC (ref 0–0)
OCCULT BLOOD STOOL: NEGATIVE
PLATELET COUNT: 403 10^3/UL (ref 140–415)
POTASSIUM: 4.2 MMOL/L (ref 3.5–5.1)
RED BLOOD COUNT: 3.64 10^6/UL (ref 4.2–5.4)
RED CELL DISTRIBUTION WIDTH: 13.1 % (ref 11.5–14.5)
SODIUM: 142 MMOL/L (ref 135–144)
WHITE BLOOD COUNT: 7.5 10^3/UL (ref 4.8–10.8)

## 2019-01-14 RX ADMIN — DOCUSATE SODIUM 1 MG: 100 CAPSULE, LIQUID FILLED ORAL at 08:11

## 2019-01-14 RX ADMIN — PREGABALIN 1 MG: 50 CAPSULE ORAL at 14:43

## 2019-01-14 RX ADMIN — IPRATROPIUM BROMIDE AND ALBUTEROL SULFATE 1 ML: .5; 3 SOLUTION RESPIRATORY (INHALATION) at 17:17

## 2019-01-14 RX ADMIN — LOSARTAN POTASSIUM 1 MG: 50 TABLET ORAL at 08:11

## 2019-01-14 RX ADMIN — PAROXETINE HYDROCHLORIDE 1 MG: 12.5 TABLET, FILM COATED, EXTENDED RELEASE ORAL at 08:08

## 2019-01-14 RX ADMIN — SENNOSIDES 1 TAB: 8.6 TABLET, FILM COATED ORAL at 20:52

## 2019-01-14 RX ADMIN — HYDROMORPHONE HYDROCHLORIDE 1 MG: 2 TABLET ORAL at 06:25

## 2019-01-14 RX ADMIN — HYDROMORPHONE HYDROCHLORIDE 1 MG: 2 INJECTION, SOLUTION INTRAMUSCULAR; INTRAVENOUS; SUBCUTANEOUS at 08:27

## 2019-01-14 RX ADMIN — FLUTICASONE FUROATE AND VILANTEROL TRIFENATATE 1 INH: 100; 25 POWDER RESPIRATORY (INHALATION) at 08:12

## 2019-01-14 RX ADMIN — INSULIN ASPART 1 UNIT: 100 INJECTION, SOLUTION INTRAVENOUS; SUBCUTANEOUS at 12:00

## 2019-01-14 RX ADMIN — IPRATROPIUM BROMIDE AND ALBUTEROL SULFATE 1 ML: .5; 3 SOLUTION RESPIRATORY (INHALATION) at 20:48

## 2019-01-14 RX ADMIN — LABETALOL 1 MG: 200 TABLET, FILM COATED ORAL at 08:10

## 2019-01-14 RX ADMIN — NIFEDIPINE 1 MG: 60 TABLET, FILM COATED, EXTENDED RELEASE ORAL at 20:52

## 2019-01-14 RX ADMIN — BACLOFEN 1 MG: 10 TABLET ORAL at 20:52

## 2019-01-14 RX ADMIN — IPRATROPIUM BROMIDE AND ALBUTEROL SULFATE 1 ML: .5; 3 SOLUTION RESPIRATORY (INHALATION) at 12:38

## 2019-01-14 RX ADMIN — EZETIMIBE 1 MG: 10 TABLET ORAL at 08:08

## 2019-01-14 RX ADMIN — HYDROMORPHONE HYDROCHLORIDE 1 MG: 2 INJECTION, SOLUTION INTRAMUSCULAR; INTRAVENOUS; SUBCUTANEOUS at 12:33

## 2019-01-14 RX ADMIN — BACLOFEN 1 MG: 10 TABLET ORAL at 08:10

## 2019-01-14 RX ADMIN — POLYETHYLENE GLYCOL 3350 1 GM: 17 POWDER, FOR SOLUTION ORAL at 08:12

## 2019-01-14 RX ADMIN — INSULIN ASPART 1 UNIT: 100 INJECTION, SOLUTION INTRAVENOUS; SUBCUTANEOUS at 21:00

## 2019-01-14 RX ADMIN — ASPIRIN 81 MG CHEWABLE TABLET 1 MG: 81 TABLET CHEWABLE at 08:27

## 2019-01-14 RX ADMIN — DOCUSATE SODIUM 1 MG: 100 CAPSULE, LIQUID FILLED ORAL at 20:51

## 2019-01-14 RX ADMIN — SODIUM FERRIC GLUCONATE COMPLEX 1 MLS/HR: 12.5 INJECTION INTRAVENOUS at 14:40

## 2019-01-14 RX ADMIN — ACETAMINOPHEN 1 MG: 325 TABLET, FILM COATED ORAL at 21:52

## 2019-01-14 RX ADMIN — LIDOCAINE 1 PATCH: 50 PATCH TOPICAL at 08:22

## 2019-01-14 RX ADMIN — NIFEDIPINE 1 MG: 60 TABLET, FILM COATED, EXTENDED RELEASE ORAL at 08:12

## 2019-01-14 RX ADMIN — HYDROMORPHONE HYDROCHLORIDE 1 MG: 1 INJECTION, SOLUTION INTRAMUSCULAR; INTRAVENOUS; SUBCUTANEOUS at 04:55

## 2019-01-14 RX ADMIN — ACETAMINOPHEN 1 MG: 325 TABLET, FILM COATED ORAL at 05:01

## 2019-01-14 RX ADMIN — HYDROMORPHONE HYDROCHLORIDE 1 MG: 2 INJECTION, SOLUTION INTRAMUSCULAR; INTRAVENOUS; SUBCUTANEOUS at 16:17

## 2019-01-14 RX ADMIN — PREGABALIN 1 MG: 50 CAPSULE ORAL at 06:26

## 2019-01-14 RX ADMIN — IPRATROPIUM BROMIDE AND ALBUTEROL SULFATE 1 ML: .5; 3 SOLUTION RESPIRATORY (INHALATION) at 01:03

## 2019-01-14 RX ADMIN — FUROSEMIDE 1 MG: 20 TABLET ORAL at 08:11

## 2019-01-14 RX ADMIN — LUBIPROSTONE 1 MCG: 8 CAPSULE, GELATIN COATED ORAL at 20:50

## 2019-01-14 RX ADMIN — HYDROMORPHONE HYDROCHLORIDE 1 MG: 2 TABLET ORAL at 13:06

## 2019-01-14 RX ADMIN — HYDROMORPHONE HYDROCHLORIDE 1 MG: 2 INJECTION, SOLUTION INTRAMUSCULAR; INTRAVENOUS; SUBCUTANEOUS at 21:52

## 2019-01-14 RX ADMIN — LEVOFLOXACIN 1 MG: 500 TABLET, FILM COATED ORAL at 06:26

## 2019-01-14 RX ADMIN — LUBIPROSTONE 1 MCG: 8 CAPSULE, GELATIN COATED ORAL at 09:00

## 2019-01-14 RX ADMIN — SOLIFENACIN SUCCINATE 1 MG: 5 TABLET, FILM COATED ORAL at 08:07

## 2019-01-14 RX ADMIN — BACLOFEN 1 MG: 10 TABLET ORAL at 13:07

## 2019-01-14 RX ADMIN — PREGABALIN 1 MG: 75 CAPSULE ORAL at 20:50

## 2019-01-14 RX ADMIN — HYDROMORPHONE HYDROCHLORIDE 1 MG: 2 TABLET ORAL at 19:57

## 2019-01-14 RX ADMIN — ATORVASTATIN CALCIUM 1 MG: 40 TABLET, FILM COATED ORAL at 20:51

## 2019-01-14 RX ADMIN — IPRATROPIUM BROMIDE AND ALBUTEROL SULFATE 1 ML: .5; 3 SOLUTION RESPIRATORY (INHALATION) at 08:49

## 2019-01-14 RX ADMIN — SPIRONOLACTONE 1 MG: 25 TABLET, FILM COATED ORAL at 08:10

## 2019-01-14 RX ADMIN — INSULIN ASPART 1 UNIT: 100 INJECTION, SOLUTION INTRAVENOUS; SUBCUTANEOUS at 07:35

## 2019-01-14 RX ADMIN — PANTOPRAZOLE SODIUM 1 MG: 40 TABLET, DELAYED RELEASE ORAL at 06:25

## 2019-01-14 RX ADMIN — INSULIN ASPART 1 UNIT: 100 INJECTION, SOLUTION INTRAVENOUS; SUBCUTANEOUS at 17:35

## 2019-01-14 RX ADMIN — IPRATROPIUM BROMIDE AND ALBUTEROL SULFATE 1 ML: .5; 3 SOLUTION RESPIRATORY (INHALATION) at 05:24

## 2019-01-14 RX ADMIN — ENOXAPARIN SODIUM 1 MG: 100 INJECTION SUBCUTANEOUS at 08:21

## 2019-01-14 RX ADMIN — LABETALOL 1 MG: 200 TABLET, FILM COATED ORAL at 20:51

## 2019-01-15 RX ADMIN — HYDROMORPHONE HYDROCHLORIDE 1 MG: 1 INJECTION, SOLUTION INTRAMUSCULAR; INTRAVENOUS; SUBCUTANEOUS at 17:29

## 2019-01-15 RX ADMIN — HYDROMORPHONE HYDROCHLORIDE 1 MG: 2 INJECTION, SOLUTION INTRAMUSCULAR; INTRAVENOUS; SUBCUTANEOUS at 06:21

## 2019-01-15 RX ADMIN — SOLIFENACIN SUCCINATE 1 MG: 5 TABLET, FILM COATED ORAL at 11:02

## 2019-01-15 RX ADMIN — PAROXETINE HYDROCHLORIDE 1 MG: 12.5 TABLET, FILM COATED, EXTENDED RELEASE ORAL at 11:04

## 2019-01-15 RX ADMIN — LIDOCAINE 1 PATCH: 50 PATCH TOPICAL at 11:08

## 2019-01-15 RX ADMIN — INSULIN ASPART 1 UNIT: 100 INJECTION, SOLUTION INTRAVENOUS; SUBCUTANEOUS at 07:35

## 2019-01-15 RX ADMIN — PREGABALIN 1 MG: 50 CAPSULE ORAL at 06:21

## 2019-01-15 RX ADMIN — BACLOFEN 1 MG: 10 TABLET ORAL at 14:25

## 2019-01-15 RX ADMIN — HYDROMORPHONE HYDROCHLORIDE 1 MG: 2 INJECTION, SOLUTION INTRAMUSCULAR; INTRAVENOUS; SUBCUTANEOUS at 23:20

## 2019-01-15 RX ADMIN — SENNOSIDES 1 TAB: 8.6 TABLET, FILM COATED ORAL at 21:00

## 2019-01-15 RX ADMIN — DOCUSATE SODIUM 1 MG: 100 CAPSULE, LIQUID FILLED ORAL at 21:00

## 2019-01-15 RX ADMIN — IPRATROPIUM BROMIDE AND ALBUTEROL SULFATE 1 ML: .5; 3 SOLUTION RESPIRATORY (INHALATION) at 16:19

## 2019-01-15 RX ADMIN — HYDROMORPHONE HYDROCHLORIDE 1 MG: 2 TABLET ORAL at 01:44

## 2019-01-15 RX ADMIN — SPIRONOLACTONE 1 MG: 25 TABLET, FILM COATED ORAL at 11:07

## 2019-01-15 RX ADMIN — HYDROMORPHONE HYDROCHLORIDE 1 MG: 2 TABLET ORAL at 12:47

## 2019-01-15 RX ADMIN — EZETIMIBE 1 MG: 10 TABLET ORAL at 11:08

## 2019-01-15 RX ADMIN — BACLOFEN 1 MG: 10 TABLET ORAL at 21:08

## 2019-01-15 RX ADMIN — ATORVASTATIN CALCIUM 1 MG: 40 TABLET, FILM COATED ORAL at 21:06

## 2019-01-15 RX ADMIN — IPRATROPIUM BROMIDE AND ALBUTEROL SULFATE 1 ML: .5; 3 SOLUTION RESPIRATORY (INHALATION) at 21:40

## 2019-01-15 RX ADMIN — NIFEDIPINE 1 MG: 60 TABLET, FILM COATED, EXTENDED RELEASE ORAL at 21:07

## 2019-01-15 RX ADMIN — INSULIN ASPART 1 UNIT: 100 INJECTION, SOLUTION INTRAVENOUS; SUBCUTANEOUS at 17:27

## 2019-01-15 RX ADMIN — LABETALOL 1 MG: 200 TABLET, FILM COATED ORAL at 14:27

## 2019-01-15 RX ADMIN — POLYETHYLENE GLYCOL 3350 1 GM: 17 POWDER, FOR SOLUTION ORAL at 11:07

## 2019-01-15 RX ADMIN — PANTOPRAZOLE SODIUM 1 MG: 40 TABLET, DELAYED RELEASE ORAL at 06:21

## 2019-01-15 RX ADMIN — ACETAMINOPHEN 1 MG: 325 TABLET, FILM COATED ORAL at 11:06

## 2019-01-15 RX ADMIN — IPRATROPIUM BROMIDE AND ALBUTEROL SULFATE 1 ML: .5; 3 SOLUTION RESPIRATORY (INHALATION) at 05:55

## 2019-01-15 RX ADMIN — IPRATROPIUM BROMIDE AND ALBUTEROL SULFATE 1 ML: .5; 3 SOLUTION RESPIRATORY (INHALATION) at 01:53

## 2019-01-15 RX ADMIN — INSULIN ASPART 1 UNIT: 100 INJECTION, SOLUTION INTRAVENOUS; SUBCUTANEOUS at 12:55

## 2019-01-15 RX ADMIN — BACLOFEN 1 MG: 10 TABLET ORAL at 11:07

## 2019-01-15 RX ADMIN — LUBIPROSTONE 1 MCG: 8 CAPSULE, GELATIN COATED ORAL at 21:00

## 2019-01-15 RX ADMIN — DOCUSATE SODIUM 1 MG: 100 CAPSULE, LIQUID FILLED ORAL at 11:06

## 2019-01-15 RX ADMIN — INSULIN ASPART 1 UNIT: 100 INJECTION, SOLUTION INTRAVENOUS; SUBCUTANEOUS at 21:00

## 2019-01-15 RX ADMIN — PREGABALIN 1 MG: 50 CAPSULE ORAL at 14:25

## 2019-01-15 RX ADMIN — FUROSEMIDE 1 MG: 20 TABLET ORAL at 11:11

## 2019-01-15 RX ADMIN — NIFEDIPINE 1 MG: 60 TABLET, FILM COATED, EXTENDED RELEASE ORAL at 11:11

## 2019-01-15 RX ADMIN — LOSARTAN POTASSIUM 1 MG: 50 TABLET ORAL at 11:11

## 2019-01-15 RX ADMIN — LABETALOL 1 MG: 200 TABLET, FILM COATED ORAL at 21:09

## 2019-01-15 RX ADMIN — HYDROMORPHONE HYDROCHLORIDE 1 MG: 2 TABLET ORAL at 21:14

## 2019-01-15 RX ADMIN — LUBIPROSTONE 1 MCG: 8 CAPSULE, GELATIN COATED ORAL at 11:04

## 2019-01-15 RX ADMIN — BISACODYL 1 MG: 5 TABLET, COATED ORAL at 17:27

## 2019-01-15 RX ADMIN — FLUTICASONE FUROATE AND VILANTEROL TRIFENATATE 1 INH: 100; 25 POWDER RESPIRATORY (INHALATION) at 11:12

## 2019-01-15 RX ADMIN — HYDROMORPHONE HYDROCHLORIDE 1 MG: 2 TABLET ORAL at 08:06

## 2019-01-15 RX ADMIN — SODIUM FERRIC GLUCONATE COMPLEX 1 MLS/HR: 12.5 INJECTION INTRAVENOUS at 12:48

## 2019-01-15 RX ADMIN — POLYETHYLENE GLYCOL 3350, SODIUM SULFATE ANHYDROUS, SODIUM BICARBONATE, SODIUM CHLORIDE, POTASSIUM CHLORIDE 1 ML: 236; 22.74; 6.74; 5.86; 2.97 POWDER, FOR SOLUTION ORAL at 17:28

## 2019-01-15 RX ADMIN — POLYETHYLENE GLYCOL 3350, SODIUM SULFATE ANHYDROUS, SODIUM BICARBONATE, SODIUM CHLORIDE, POTASSIUM CHLORIDE 1 ML: 236; 22.74; 6.74; 5.86; 2.97 POWDER, FOR SOLUTION ORAL at 21:05

## 2019-01-15 RX ADMIN — LEVOFLOXACIN 1 MG: 500 TABLET, FILM COATED ORAL at 06:21

## 2019-01-15 RX ADMIN — BISACODYL 1 MG: 5 TABLET, COATED ORAL at 21:07

## 2019-01-15 RX ADMIN — PREGABALIN 1 MG: 75 CAPSULE ORAL at 21:08

## 2019-01-16 LAB
ADD MAN DIFF?: NO
ALANINE AMINOTRANSFERASE: 68 IU/L (ref 13–69)
ALBUMIN/GLOBULIN RATIO: 1.48
ALBUMIN: 3.7 G/DL (ref 3.3–4.9)
ALKALINE PHOSPHATASE: 87 IU/L (ref 42–121)
ANION GAP: 5 (ref 5–13)
ASPARTATE AMINO TRANSFERASE: 45 IU/L (ref 15–46)
BASOPHIL #: 0.1 10^3/UL (ref 0–0.1)
BASOPHILS %: 0.6 % (ref 0–2)
BILIRUBIN,DIRECT: 0 MG/DL (ref 0–0.2)
BILIRUBIN,TOTAL: 0.4 MG/DL (ref 0.2–1.3)
BLOOD UREA NITROGEN: 15 MG/DL (ref 7–20)
CALCIUM: 9.4 MG/DL (ref 8.4–10.2)
CARBON DIOXIDE: 34 MMOL/L (ref 21–31)
CHLORIDE: 101 MMOL/L (ref 97–110)
CHOL/HDL RATIO: 2.8 RATIO
CHOLESTEROL: 87 MG/DL (ref 100–200)
CREATININE: 0.44 MG/DL (ref 0.44–1)
EOSINOPHILS #: 0.2 10^3/UL (ref 0–0.5)
EOSINOPHILS %: 2.4 % (ref 0–7)
FREE T4 (FREE THYROXINE): 0.95 NG/DL (ref 0.78–2.44)
GLOBULIN: 2.5 G/DL (ref 1.3–3.2)
GLUCOSE: 104 MG/DL (ref 70–220)
HDL CHOLESTEROL: 31 MG/DL (ref 35–98)
HEMATOCRIT: 30.4 % (ref 37–47)
HEMOGLOBIN: 9.5 G/DL (ref 12–16)
IMMATURE GRANS #M: 0.02 10^3/UL (ref 0–0.03)
IMMATURE GRANS % (M): 0.2 % (ref 0–0.43)
INR: 1.01
LDL CHOLESTEROL,CALCULATED: 41 MG/DL
LYMPHOCYTES #: 1.9 10^3/UL (ref 0.8–2.9)
LYMPHOCYTES %: 23.5 % (ref 15–51)
MAGNESIUM: 2 MG/DL (ref 1.7–2.5)
MEAN CORPUSCULAR HEMOGLOBIN: 26.8 PG (ref 29–33)
MEAN CORPUSCULAR HGB CONC: 31.3 G/DL (ref 32–37)
MEAN CORPUSCULAR VOLUME: 85.6 FL (ref 82–101)
MEAN PLATELET VOLUME: 9.3 FL (ref 7.4–10.4)
MONOCYTE #: 0.9 10^3/UL (ref 0.3–0.9)
MONOCYTES %: 11.4 % (ref 0–11)
NEUTROPHIL #: 5.1 10^3/UL (ref 1.6–7.5)
NEUTROPHILS %: 61.9 % (ref 39–77)
NUCLEATED RED BLOOD CELLS #: 0 10^3/UL (ref 0–0)
NUCLEATED RED BLOOD CELLS%: 0 /100WBC (ref 0–0)
PARTIAL THROMBOPLASTIN TIME: 29.7 SEC (ref 23–35)
PLATELET COUNT: 372 10^3/UL (ref 140–415)
POTASSIUM: 3.3 MMOL/L (ref 3.5–5.1)
PROTIME: 13.4 SEC (ref 11.9–14.9)
PT RATIO: 1
RED BLOOD COUNT: 3.55 10^6/UL (ref 4.2–5.4)
RED CELL DISTRIBUTION WIDTH: 13 % (ref 11.5–14.5)
SODIUM: 140 MMOL/L (ref 135–144)
THYROID STIMULATING HORMONE: 1.44 MIU/L (ref 0.47–4.68)
TOTAL PROTEIN: 6.2 G/DL (ref 6.1–8.1)
TRIGLYCERIDES: 73 MG/DL (ref 0–149)
WHITE BLOOD COUNT: 8.3 10^3/UL (ref 4.8–10.8)

## 2019-01-16 PROCEDURE — F08Z2ZZ GROOMING/PERSONAL HYGIENE TREATMENT: ICD-10-PCS

## 2019-01-16 PROCEDURE — 0DJD8ZZ INSPECTION OF LOWER INTESTINAL TRACT, VIA NATURAL OR ARTIFICIAL OPENING ENDOSCOPIC: ICD-10-PCS

## 2019-01-16 PROCEDURE — F08Z1ZZ DRESSING TECHNIQUES TREATMENT: ICD-10-PCS

## 2019-01-16 PROCEDURE — F07Z5ZZ BED MOBILITY TREATMENT: ICD-10-PCS

## 2019-01-16 PROCEDURE — F07Z9ZZ GAIT TRAINING/FUNCTIONAL AMBULATION TREATMENT: ICD-10-PCS

## 2019-01-16 PROCEDURE — F08Z0ZZ BATHING/SHOWERING TECHNIQUES TREATMENT: ICD-10-PCS

## 2019-01-16 PROCEDURE — F07Z8ZZ TRANSFER TRAINING TREATMENT: ICD-10-PCS

## 2019-01-16 PROCEDURE — 0DB68ZX EXCISION OF STOMACH, VIA NATURAL OR ARTIFICIAL OPENING ENDOSCOPIC, DIAGNOSTIC: ICD-10-PCS

## 2019-01-16 RX ADMIN — DOCUSATE SODIUM 1 MG: 100 CAPSULE, LIQUID FILLED ORAL at 21:00

## 2019-01-16 RX ADMIN — IPRATROPIUM BROMIDE AND ALBUTEROL SULFATE 1 ML: .5; 3 SOLUTION RESPIRATORY (INHALATION) at 19:18

## 2019-01-16 RX ADMIN — LABETALOL 1 MG: 200 TABLET, FILM COATED ORAL at 21:21

## 2019-01-16 RX ADMIN — EZETIMIBE 1 MG: 10 TABLET ORAL at 16:46

## 2019-01-16 RX ADMIN — DOCUSATE SODIUM 1 MG: 100 CAPSULE, LIQUID FILLED ORAL at 08:29

## 2019-01-16 RX ADMIN — ATORVASTATIN CALCIUM 1 MG: 40 TABLET, FILM COATED ORAL at 21:24

## 2019-01-16 RX ADMIN — PANTOPRAZOLE SODIUM 1 MG: 40 TABLET, DELAYED RELEASE ORAL at 06:00

## 2019-01-16 RX ADMIN — LOSARTAN POTASSIUM 1 MG: 50 TABLET ORAL at 16:46

## 2019-01-16 RX ADMIN — FLUTICASONE FUROATE AND VILANTEROL TRIFENATATE 1 INH: 100; 25 POWDER RESPIRATORY (INHALATION) at 09:29

## 2019-01-16 RX ADMIN — SOLIFENACIN SUCCINATE 1 MG: 5 TABLET, FILM COATED ORAL at 16:46

## 2019-01-16 RX ADMIN — HYDROMORPHONE HYDROCHLORIDE 1 MG: 2 INJECTION INTRAMUSCULAR; INTRAVENOUS; SUBCUTANEOUS at 13:54

## 2019-01-16 RX ADMIN — LABETALOL 1 MG: 200 TABLET, FILM COATED ORAL at 08:31

## 2019-01-16 RX ADMIN — LUBIPROSTONE 1 MCG: 8 CAPSULE, GELATIN COATED ORAL at 21:19

## 2019-01-16 RX ADMIN — FUROSEMIDE 1 MG: 20 TABLET ORAL at 08:30

## 2019-01-16 RX ADMIN — HYDROMORPHONE HYDROCHLORIDE 1 MG: 2 INJECTION, SOLUTION INTRAMUSCULAR; INTRAVENOUS; SUBCUTANEOUS at 09:30

## 2019-01-16 RX ADMIN — NIFEDIPINE 1 MG: 60 TABLET, FILM COATED, EXTENDED RELEASE ORAL at 08:31

## 2019-01-16 RX ADMIN — NIFEDIPINE 1 MG: 60 TABLET, FILM COATED, EXTENDED RELEASE ORAL at 21:21

## 2019-01-16 RX ADMIN — IPRATROPIUM BROMIDE AND ALBUTEROL SULFATE 1 ML: .5; 3 SOLUTION RESPIRATORY (INHALATION) at 14:40

## 2019-01-16 RX ADMIN — ACETAMINOPHEN 1 MG: 325 TABLET, FILM COATED ORAL at 00:21

## 2019-01-16 RX ADMIN — POTASSIUM CHLORIDE 1 MLS/HR: 200 INJECTION, SOLUTION INTRAVENOUS at 11:01

## 2019-01-16 RX ADMIN — LOSARTAN POTASSIUM 1 MG: 50 TABLET ORAL at 08:30

## 2019-01-16 RX ADMIN — POTASSIUM CHLORIDE 1 MLS/HR: 200 INJECTION, SOLUTION INTRAVENOUS at 09:30

## 2019-01-16 RX ADMIN — BACLOFEN 1 MG: 10 TABLET ORAL at 21:20

## 2019-01-16 RX ADMIN — HYDROMORPHONE HYDROCHLORIDE 1 MG: 2 TABLET ORAL at 01:13

## 2019-01-16 RX ADMIN — HYDROMORPHONE HYDROCHLORIDE 1 MG: 1 INJECTION, SOLUTION INTRAMUSCULAR; INTRAVENOUS; SUBCUTANEOUS at 22:50

## 2019-01-16 RX ADMIN — IPRATROPIUM BROMIDE AND ALBUTEROL SULFATE 1 ML: .5; 3 SOLUTION RESPIRATORY (INHALATION) at 08:00

## 2019-01-16 RX ADMIN — POLYETHYLENE GLYCOL 3350, SODIUM SULFATE ANHYDROUS, SODIUM BICARBONATE, SODIUM CHLORIDE, POTASSIUM CHLORIDE 1 ML: 236; 22.74; 6.74; 5.86; 2.97 POWDER, FOR SOLUTION ORAL at 21:25

## 2019-01-16 RX ADMIN — PREGABALIN 1 MG: 50 CAPSULE ORAL at 14:50

## 2019-01-16 RX ADMIN — SPIRONOLACTONE 1 MG: 25 TABLET, FILM COATED ORAL at 16:46

## 2019-01-16 RX ADMIN — LUBIPROSTONE 1 MCG: 8 CAPSULE, GELATIN COATED ORAL at 08:29

## 2019-01-16 RX ADMIN — HYDROMORPHONE HYDROCHLORIDE 1 MG: 2 INJECTION, SOLUTION INTRAMUSCULAR; INTRAVENOUS; SUBCUTANEOUS at 18:20

## 2019-01-16 RX ADMIN — LUBIPROSTONE 1 MCG: 8 CAPSULE, GELATIN COATED ORAL at 16:45

## 2019-01-16 RX ADMIN — PROPOFOL 1: 10 INJECTION, EMULSION INTRAVENOUS at 12:32

## 2019-01-16 RX ADMIN — PAROXETINE HYDROCHLORIDE 1 MG: 12.5 TABLET, FILM COATED, EXTENDED RELEASE ORAL at 08:31

## 2019-01-16 RX ADMIN — PREGABALIN 1 MG: 75 CAPSULE ORAL at 21:15

## 2019-01-16 RX ADMIN — POLYETHYLENE GLYCOL 3350 1 GM: 17 POWDER, FOR SOLUTION ORAL at 16:45

## 2019-01-16 RX ADMIN — LIDOCAINE HYDROCHLORIDE 1 MG: 20 INJECTION, SOLUTION INTRAVENOUS at 12:32

## 2019-01-16 RX ADMIN — BACLOFEN 1 MG: 10 TABLET ORAL at 08:30

## 2019-01-16 RX ADMIN — INSULIN ASPART 1 UNIT: 100 INJECTION, SOLUTION INTRAVENOUS; SUBCUTANEOUS at 12:00

## 2019-01-16 RX ADMIN — POLYETHYLENE GLYCOL 3350 1 GM: 17 POWDER, FOR SOLUTION ORAL at 08:30

## 2019-01-16 RX ADMIN — PREGABALIN 1 MG: 50 CAPSULE ORAL at 06:00

## 2019-01-16 RX ADMIN — SODIUM FERRIC GLUCONATE COMPLEX 1 MLS/HR: 12.5 INJECTION INTRAVENOUS at 16:42

## 2019-01-16 RX ADMIN — SOLIFENACIN SUCCINATE 1 MG: 5 TABLET, FILM COATED ORAL at 08:32

## 2019-01-16 RX ADMIN — INSULIN ASPART 1 UNIT: 100 INJECTION, SOLUTION INTRAVENOUS; SUBCUTANEOUS at 07:35

## 2019-01-16 RX ADMIN — POTASSIUM CHLORIDE 1 MLS/HR: 200 INJECTION, SOLUTION INTRAVENOUS at 15:16

## 2019-01-16 RX ADMIN — BACLOFEN 1 MG: 10 TABLET ORAL at 14:50

## 2019-01-16 RX ADMIN — ACETAMINOPHEN 1 MG: 325 TABLET, FILM COATED ORAL at 16:43

## 2019-01-16 RX ADMIN — HYDROMORPHONE HYDROCHLORIDE 1 MG: 2 TABLET ORAL at 21:38

## 2019-01-16 RX ADMIN — INSULIN ASPART 1 UNIT: 100 INJECTION, SOLUTION INTRAVENOUS; SUBCUTANEOUS at 17:28

## 2019-01-16 RX ADMIN — HYDROMORPHONE HYDROCHLORIDE 1 MG: 2 TABLET ORAL at 15:02

## 2019-01-16 RX ADMIN — INSULIN ASPART 1 UNIT: 100 INJECTION, SOLUTION INTRAVENOUS; SUBCUTANEOUS at 21:24

## 2019-01-16 RX ADMIN — PANTOPRAZOLE SODIUM 1 MG: 40 TABLET, DELAYED RELEASE ORAL at 23:00

## 2019-01-16 RX ADMIN — SPIRONOLACTONE 1 MG: 25 TABLET, FILM COATED ORAL at 08:29

## 2019-01-16 RX ADMIN — LIDOCAINE 1 PATCH: 50 PATCH TOPICAL at 09:29

## 2019-01-16 RX ADMIN — EZETIMIBE 1 MG: 10 TABLET ORAL at 08:32

## 2019-01-16 RX ADMIN — SENNOSIDES 1 TAB: 8.6 TABLET, FILM COATED ORAL at 21:00

## 2019-01-16 RX ADMIN — PAROXETINE HYDROCHLORIDE 1 MG: 12.5 TABLET, FILM COATED, EXTENDED RELEASE ORAL at 16:46

## 2019-01-17 RX ADMIN — NIFEDIPINE 1 MG: 60 TABLET, FILM COATED, EXTENDED RELEASE ORAL at 20:38

## 2019-01-17 RX ADMIN — BACLOFEN 1 MG: 10 TABLET ORAL at 20:37

## 2019-01-17 RX ADMIN — INSULIN ASPART 1 UNIT: 100 INJECTION, SOLUTION INTRAVENOUS; SUBCUTANEOUS at 07:35

## 2019-01-17 RX ADMIN — SOLIFENACIN SUCCINATE 1 MG: 5 TABLET, FILM COATED ORAL at 10:59

## 2019-01-17 RX ADMIN — BACLOFEN 1 MG: 10 TABLET ORAL at 14:08

## 2019-01-17 RX ADMIN — MEPERIDINE HYDROCHLORIDE 1 MG: 25 INJECTION, SOLUTION INTRAMUSCULAR; INTRAVENOUS; SUBCUTANEOUS at 09:58

## 2019-01-17 RX ADMIN — HYDROMORPHONE HYDROCHLORIDE 1 MG: 1 INJECTION, SOLUTION INTRAMUSCULAR; INTRAVENOUS; SUBCUTANEOUS at 06:33

## 2019-01-17 RX ADMIN — INSULIN ASPART 1 UNIT: 100 INJECTION, SOLUTION INTRAVENOUS; SUBCUTANEOUS at 17:38

## 2019-01-17 RX ADMIN — PANTOPRAZOLE SODIUM 1 MG: 40 TABLET, DELAYED RELEASE ORAL at 06:19

## 2019-01-17 RX ADMIN — HYDROMORPHONE HYDROCHLORIDE 1 MG: 1 INJECTION, SOLUTION INTRAMUSCULAR; INTRAVENOUS; SUBCUTANEOUS at 20:42

## 2019-01-17 RX ADMIN — IPRATROPIUM BROMIDE AND ALBUTEROL SULFATE 1 ML: .5; 3 SOLUTION RESPIRATORY (INHALATION) at 20:06

## 2019-01-17 RX ADMIN — IPRATROPIUM BROMIDE AND ALBUTEROL SULFATE 1 ML: .5; 3 SOLUTION RESPIRATORY (INHALATION) at 14:45

## 2019-01-17 RX ADMIN — SODIUM FERRIC GLUCONATE COMPLEX 1 MLS/HR: 12.5 INJECTION INTRAVENOUS at 14:12

## 2019-01-17 RX ADMIN — INSULIN ASPART 1 UNIT: 100 INJECTION, SOLUTION INTRAVENOUS; SUBCUTANEOUS at 20:40

## 2019-01-17 RX ADMIN — LABETALOL 1 MG: 200 TABLET, FILM COATED ORAL at 11:00

## 2019-01-17 RX ADMIN — DOCUSATE SODIUM 1 MG: 100 CAPSULE, LIQUID FILLED ORAL at 20:37

## 2019-01-17 RX ADMIN — POLYETHYLENE GLYCOL 3350 1 GM: 17 POWDER, FOR SOLUTION ORAL at 22:10

## 2019-01-17 RX ADMIN — PROPOFOL 1: 10 INJECTION, EMULSION INTRAVENOUS at 08:50

## 2019-01-17 RX ADMIN — IPRATROPIUM BROMIDE AND ALBUTEROL SULFATE 1 ML: .5; 3 SOLUTION RESPIRATORY (INHALATION) at 07:33

## 2019-01-17 RX ADMIN — ENOXAPARIN SODIUM 1 MG: 100 INJECTION SUBCUTANEOUS at 11:16

## 2019-01-17 RX ADMIN — HYDROMORPHONE HYDROCHLORIDE 1 MG: 2 TABLET ORAL at 17:45

## 2019-01-17 RX ADMIN — LIDOCAINE 1 PATCH: 50 PATCH TOPICAL at 11:04

## 2019-01-17 RX ADMIN — PREGABALIN 1 MG: 50 CAPSULE ORAL at 14:08

## 2019-01-17 RX ADMIN — BACLOFEN 1 MG: 10 TABLET ORAL at 11:01

## 2019-01-17 RX ADMIN — PAROXETINE HYDROCHLORIDE 1 MG: 12.5 TABLET, FILM COATED, EXTENDED RELEASE ORAL at 11:37

## 2019-01-17 RX ADMIN — PREGABALIN 1 MG: 50 CAPSULE ORAL at 06:20

## 2019-01-17 RX ADMIN — FUROSEMIDE 1 MG: 20 TABLET ORAL at 09:00

## 2019-01-17 RX ADMIN — HYDROMORPHONE HYDROCHLORIDE 1 MG: 2 INJECTION, SOLUTION INTRAMUSCULAR; INTRAVENOUS; SUBCUTANEOUS at 11:08

## 2019-01-17 RX ADMIN — SENNOSIDES 1 TAB: 8.6 TABLET, FILM COATED ORAL at 20:37

## 2019-01-17 RX ADMIN — ATORVASTATIN CALCIUM 1 MG: 40 TABLET, FILM COATED ORAL at 20:37

## 2019-01-17 RX ADMIN — HYDROMORPHONE HYDROCHLORIDE 1 MG: 2 TABLET ORAL at 14:08

## 2019-01-17 RX ADMIN — LUBIPROSTONE 1 MCG: 8 CAPSULE, GELATIN COATED ORAL at 20:41

## 2019-01-17 RX ADMIN — INSULIN ASPART 1 UNIT: 100 INJECTION, SOLUTION INTRAVENOUS; SUBCUTANEOUS at 14:11

## 2019-01-17 RX ADMIN — HYDROMORPHONE HYDROCHLORIDE 1 MG: 2 TABLET ORAL at 22:11

## 2019-01-17 RX ADMIN — FLUTICASONE FUROATE AND VILANTEROL TRIFENATATE 1 INH: 100; 25 POWDER RESPIRATORY (INHALATION) at 11:02

## 2019-01-17 RX ADMIN — LUBIPROSTONE 1 MCG: 8 CAPSULE, GELATIN COATED ORAL at 09:00

## 2019-01-17 RX ADMIN — LABETALOL 1 MG: 200 TABLET, FILM COATED ORAL at 20:38

## 2019-01-17 RX ADMIN — ASCORBIC ACID, VITAMIN A PALMITATE, CHOLECALCIFEROL, THIAMINE HYDROCHLORIDE, RIBOFLAVIN-5 PHOSPHATE SODIUM, PYRIDOXINE HYDROCHLORIDE, NIACINAMIDE, DEXPANTHENOL, ALPHA-TOCOPHEROL ACETATE, VITAMIN K1, FOLIC ACID, BIOTIN, CYANOCOBALAMIN 1 MLS/HR: 200; 3300; 200; 6; 3.6; 6; 40; 15; 10; 150; 600; 60; 5 INJECTION, SOLUTION INTRAVENOUS at 06:18

## 2019-01-17 RX ADMIN — EZETIMIBE 1 MG: 10 TABLET ORAL at 11:02

## 2019-01-17 RX ADMIN — ASPIRIN 81 MG CHEWABLE TABLET 1 MG: 81 TABLET CHEWABLE at 11:37

## 2019-01-17 RX ADMIN — PREGABALIN 1 MG: 75 CAPSULE ORAL at 20:37

## 2019-01-17 RX ADMIN — POLYETHYLENE GLYCOL 3350 1 GM: 17 POWDER, FOR SOLUTION ORAL at 09:00

## 2019-01-17 RX ADMIN — NIFEDIPINE 1 MG: 60 TABLET, FILM COATED, EXTENDED RELEASE ORAL at 11:01

## 2019-01-17 RX ADMIN — LOSARTAN POTASSIUM 1 MG: 50 TABLET ORAL at 11:02

## 2019-01-17 RX ADMIN — SPIRONOLACTONE 1 MG: 25 TABLET, FILM COATED ORAL at 11:01

## 2019-01-17 RX ADMIN — HYDROMORPHONE HYDROCHLORIDE 1 MG: 2 TABLET ORAL at 01:42

## 2019-01-17 RX ADMIN — DOCUSATE SODIUM 1 MG: 100 CAPSULE, LIQUID FILLED ORAL at 09:00

## 2019-01-18 LAB
ADD MAN DIFF?: NO
ANION GAP: 9 (ref 5–13)
BASOPHIL #: 0.1 10^3/UL (ref 0–0.1)
BASOPHILS %: 0.8 % (ref 0–2)
BLOOD UREA NITROGEN: 9 MG/DL (ref 7–20)
CALCIUM: 9.9 MG/DL (ref 8.4–10.2)
CARBON DIOXIDE: 30 MMOL/L (ref 21–31)
CHLORIDE: 103 MMOL/L (ref 97–110)
CREATININE: 0.4 MG/DL (ref 0.44–1)
EOSINOPHILS #: 0.2 10^3/UL (ref 0–0.5)
EOSINOPHILS %: 2.6 % (ref 0–7)
GLUCOSE: 121 MG/DL (ref 70–220)
HEMATOCRIT: 31.4 % (ref 37–47)
HEMOGLOBIN: 10 G/DL (ref 12–16)
IMMATURE GRANS #M: 0.03 10^3/UL (ref 0–0.03)
IMMATURE GRANS % (M): 0.4 % (ref 0–0.43)
LYMPHOCYTES #: 1.9 10^3/UL (ref 0.8–2.9)
LYMPHOCYTES %: 24.2 % (ref 15–51)
MAGNESIUM: 2 MG/DL (ref 1.7–2.5)
MEAN CORPUSCULAR HEMOGLOBIN: 27.2 PG (ref 29–33)
MEAN CORPUSCULAR HGB CONC: 31.8 G/DL (ref 32–37)
MEAN CORPUSCULAR VOLUME: 85.6 FL (ref 82–101)
MEAN PLATELET VOLUME: 9.5 FL (ref 7.4–10.4)
MONOCYTE #: 0.8 10^3/UL (ref 0.3–0.9)
MONOCYTES %: 9.9 % (ref 0–11)
NEUTROPHIL #: 4.8 10^3/UL (ref 1.6–7.5)
NEUTROPHILS %: 62.1 % (ref 39–77)
NUCLEATED RED BLOOD CELLS #: 0 10^3/UL (ref 0–0)
NUCLEATED RED BLOOD CELLS%: 0 /100WBC (ref 0–0)
PHOSPHORUS: 4.3 MG/DL (ref 2.5–4.9)
PLATELET COUNT: 347 10^3/UL (ref 140–415)
POTASSIUM: 3.8 MMOL/L (ref 3.5–5.1)
RED BLOOD COUNT: 3.67 10^6/UL (ref 4.2–5.4)
RED CELL DISTRIBUTION WIDTH: 13.1 % (ref 11.5–14.5)
SODIUM: 142 MMOL/L (ref 135–144)
WHITE BLOOD COUNT: 7.8 10^3/UL (ref 4.8–10.8)

## 2019-01-18 RX ADMIN — BACLOFEN 1 MG: 10 TABLET ORAL at 09:22

## 2019-01-18 RX ADMIN — NIFEDIPINE 1 MG: 60 TABLET, FILM COATED, EXTENDED RELEASE ORAL at 21:07

## 2019-01-18 RX ADMIN — DOCUSATE SODIUM 1 MG: 100 CAPSULE, LIQUID FILLED ORAL at 21:06

## 2019-01-18 RX ADMIN — ATORVASTATIN CALCIUM 1 MG: 40 TABLET, FILM COATED ORAL at 21:06

## 2019-01-18 RX ADMIN — SPIRONOLACTONE 1 MG: 25 TABLET, FILM COATED ORAL at 09:22

## 2019-01-18 RX ADMIN — SOLIFENACIN SUCCINATE 1 MG: 5 TABLET, FILM COATED ORAL at 09:22

## 2019-01-18 RX ADMIN — ENOXAPARIN SODIUM 1 MG: 100 INJECTION SUBCUTANEOUS at 09:34

## 2019-01-18 RX ADMIN — MINERAL OIL, PETROLATUM, PHENYLEPHRINE HCL 1 APPLIC: 2.5; 140; 749 OINTMENT TOPICAL at 11:30

## 2019-01-18 RX ADMIN — LUBIPROSTONE 1 MCG: 8 CAPSULE, GELATIN COATED ORAL at 21:06

## 2019-01-18 RX ADMIN — BACLOFEN 1 MG: 10 TABLET ORAL at 21:07

## 2019-01-18 RX ADMIN — HYDROMORPHONE HYDROCHLORIDE 1 MG: 2 INJECTION, SOLUTION INTRAMUSCULAR; INTRAVENOUS; SUBCUTANEOUS at 13:53

## 2019-01-18 RX ADMIN — INSULIN ASPART 1 UNIT: 100 INJECTION, SOLUTION INTRAVENOUS; SUBCUTANEOUS at 07:35

## 2019-01-18 RX ADMIN — BACLOFEN 1 MG: 10 TABLET ORAL at 12:39

## 2019-01-18 RX ADMIN — PREGABALIN 1 MG: 75 CAPSULE ORAL at 21:06

## 2019-01-18 RX ADMIN — EZETIMIBE 1 MG: 10 TABLET ORAL at 09:23

## 2019-01-18 RX ADMIN — ACETAMINOPHEN 1 MG: 325 TABLET, FILM COATED ORAL at 12:39

## 2019-01-18 RX ADMIN — LOSARTAN POTASSIUM 1 MG: 50 TABLET ORAL at 09:19

## 2019-01-18 RX ADMIN — INSULIN ASPART 1 UNIT: 100 INJECTION, SOLUTION INTRAVENOUS; SUBCUTANEOUS at 17:36

## 2019-01-18 RX ADMIN — NIFEDIPINE 1 MG: 60 TABLET, FILM COATED, EXTENDED RELEASE ORAL at 09:00

## 2019-01-18 RX ADMIN — PAROXETINE HYDROCHLORIDE 1 MG: 12.5 TABLET, FILM COATED, EXTENDED RELEASE ORAL at 09:18

## 2019-01-18 RX ADMIN — SENNOSIDES 1 TAB: 8.6 TABLET, FILM COATED ORAL at 21:06

## 2019-01-18 RX ADMIN — PREGABALIN 1 MG: 50 CAPSULE ORAL at 13:53

## 2019-01-18 RX ADMIN — HYDROMORPHONE HYDROCHLORIDE 1 MG: 2 TABLET ORAL at 15:44

## 2019-01-18 RX ADMIN — FLUTICASONE FUROATE AND VILANTEROL TRIFENATATE 1 INH: 100; 25 POWDER RESPIRATORY (INHALATION) at 09:18

## 2019-01-18 RX ADMIN — POLYETHYLENE GLYCOL 3350 1 GM: 17 POWDER, FOR SOLUTION ORAL at 09:23

## 2019-01-18 RX ADMIN — LABETALOL 1 MG: 200 TABLET, FILM COATED ORAL at 09:23

## 2019-01-18 RX ADMIN — INSULIN ASPART 1 UNIT: 100 INJECTION, SOLUTION INTRAVENOUS; SUBCUTANEOUS at 12:41

## 2019-01-18 RX ADMIN — ASPIRIN 81 MG CHEWABLE TABLET 1 MG: 81 TABLET CHEWABLE at 09:21

## 2019-01-18 RX ADMIN — HYDROMORPHONE HYDROCHLORIDE 1 MG: 2 INJECTION, SOLUTION INTRAMUSCULAR; INTRAVENOUS; SUBCUTANEOUS at 19:45

## 2019-01-18 RX ADMIN — LUBIPROSTONE 1 MCG: 8 CAPSULE, GELATIN COATED ORAL at 09:19

## 2019-01-18 RX ADMIN — IPRATROPIUM BROMIDE AND ALBUTEROL SULFATE 1 ML: .5; 3 SOLUTION RESPIRATORY (INHALATION) at 13:50

## 2019-01-18 RX ADMIN — LIDOCAINE 1 PATCH: 50 PATCH TOPICAL at 09:24

## 2019-01-18 RX ADMIN — PREGABALIN 1 MG: 50 CAPSULE ORAL at 06:54

## 2019-01-18 RX ADMIN — IPRATROPIUM BROMIDE AND ALBUTEROL SULFATE 1 ML: .5; 3 SOLUTION RESPIRATORY (INHALATION) at 23:00

## 2019-01-18 RX ADMIN — HYDROMORPHONE HYDROCHLORIDE 1 MG: 2 TABLET ORAL at 08:20

## 2019-01-18 RX ADMIN — HYDROMORPHONE HYDROCHLORIDE 1 MG: 1 INJECTION, SOLUTION INTRAMUSCULAR; INTRAVENOUS; SUBCUTANEOUS at 06:52

## 2019-01-18 RX ADMIN — HYDROMORPHONE HYDROCHLORIDE 1 MG: 2 TABLET ORAL at 21:14

## 2019-01-18 RX ADMIN — LABETALOL 1 MG: 200 TABLET, FILM COATED ORAL at 21:09

## 2019-01-18 RX ADMIN — FUROSEMIDE 1 MG: 20 TABLET ORAL at 09:22

## 2019-01-18 RX ADMIN — MINERAL OIL, PETROLATUM, PHENYLEPHRINE HCL 1 APPLIC: 2.5; 140; 749 OINTMENT TOPICAL at 21:00

## 2019-01-18 RX ADMIN — INSULIN ASPART 1 UNIT: 100 INJECTION, SOLUTION INTRAVENOUS; SUBCUTANEOUS at 21:00

## 2019-01-18 RX ADMIN — PANTOPRAZOLE SODIUM 1 MG: 40 TABLET, DELAYED RELEASE ORAL at 06:54

## 2019-01-18 RX ADMIN — DOCUSATE SODIUM 1 MG: 100 CAPSULE, LIQUID FILLED ORAL at 09:18

## 2019-01-18 RX ADMIN — SODIUM FERRIC GLUCONATE COMPLEX 1 MLS/HR: 12.5 INJECTION INTRAVENOUS at 15:27

## 2019-01-18 RX ADMIN — IPRATROPIUM BROMIDE AND ALBUTEROL SULFATE 1 ML: .5; 3 SOLUTION RESPIRATORY (INHALATION) at 07:24

## 2019-01-19 LAB
FERRITIN: 231 NG/ML (ref 11.1–264)
FOLATE: 12.7 NG/ML (ref 2.8–20)
RETICULOCYTE COUNT #: 0.08 X10^6 (ref 0.02–0.11)
RETICULOCYTE COUNT %: 2.2 % (ref 0.5–1.5)
RETICULOCYTE RBC: 3.8
VITAMIN B12: 446 PG/ML (ref 239–931)

## 2019-01-19 RX ADMIN — NIFEDIPINE 1 MG: 60 TABLET, FILM COATED, EXTENDED RELEASE ORAL at 09:40

## 2019-01-19 RX ADMIN — SOLIFENACIN SUCCINATE 1 MG: 5 TABLET, FILM COATED ORAL at 12:01

## 2019-01-19 RX ADMIN — INSULIN ASPART 1 UNIT: 100 INJECTION, SOLUTION INTRAVENOUS; SUBCUTANEOUS at 12:00

## 2019-01-19 RX ADMIN — HYDROMORPHONE HYDROCHLORIDE 1 MG: 2 INJECTION, SOLUTION INTRAMUSCULAR; INTRAVENOUS; SUBCUTANEOUS at 20:57

## 2019-01-19 RX ADMIN — ASPIRIN 81 MG CHEWABLE TABLET 1 MG: 81 TABLET CHEWABLE at 09:41

## 2019-01-19 RX ADMIN — PREGABALIN 1 MG: 50 CAPSULE ORAL at 07:15

## 2019-01-19 RX ADMIN — NIFEDIPINE 1 MG: 60 TABLET, FILM COATED, EXTENDED RELEASE ORAL at 20:58

## 2019-01-19 RX ADMIN — PREGABALIN 1 MG: 50 CAPSULE ORAL at 14:39

## 2019-01-19 RX ADMIN — ATORVASTATIN CALCIUM 1 MG: 40 TABLET, FILM COATED ORAL at 20:58

## 2019-01-19 RX ADMIN — INSULIN ASPART 1 UNIT: 100 INJECTION, SOLUTION INTRAVENOUS; SUBCUTANEOUS at 08:28

## 2019-01-19 RX ADMIN — LOSARTAN POTASSIUM 1 MG: 50 TABLET ORAL at 09:40

## 2019-01-19 RX ADMIN — PREGABALIN 1 MG: 75 CAPSULE ORAL at 20:58

## 2019-01-19 RX ADMIN — LIDOCAINE 1 PATCH: 50 PATCH TOPICAL at 12:04

## 2019-01-19 RX ADMIN — IPRATROPIUM BROMIDE AND ALBUTEROL SULFATE 1 ML: .5; 3 SOLUTION RESPIRATORY (INHALATION) at 21:10

## 2019-01-19 RX ADMIN — INSULIN ASPART 1 UNIT: 100 INJECTION, SOLUTION INTRAVENOUS; SUBCUTANEOUS at 21:00

## 2019-01-19 RX ADMIN — EZETIMIBE 1 MG: 10 TABLET ORAL at 09:39

## 2019-01-19 RX ADMIN — MINERAL OIL, PETROLATUM, PHENYLEPHRINE HCL 1 APPLIC: 2.5; 140; 749 OINTMENT TOPICAL at 17:39

## 2019-01-19 RX ADMIN — DOCUSATE SODIUM 1 MG: 100 CAPSULE, LIQUID FILLED ORAL at 20:58

## 2019-01-19 RX ADMIN — SENNOSIDES 1 TAB: 8.6 TABLET, FILM COATED ORAL at 20:57

## 2019-01-19 RX ADMIN — HYDROMORPHONE HYDROCHLORIDE 1 MG: 2 TABLET ORAL at 18:22

## 2019-01-19 RX ADMIN — POLYETHYLENE GLYCOL 3350 1 GM: 17 POWDER, FOR SOLUTION ORAL at 09:41

## 2019-01-19 RX ADMIN — HYDROMORPHONE HYDROCHLORIDE 1 MG: 2 TABLET ORAL at 14:16

## 2019-01-19 RX ADMIN — MINERAL OIL, PETROLATUM, PHENYLEPHRINE HCL 1 APPLIC: 2.5; 140; 749 OINTMENT TOPICAL at 21:00

## 2019-01-19 RX ADMIN — LUBIPROSTONE 1 MCG: 8 CAPSULE, GELATIN COATED ORAL at 09:38

## 2019-01-19 RX ADMIN — LABETALOL 1 MG: 200 TABLET, FILM COATED ORAL at 20:59

## 2019-01-19 RX ADMIN — IPRATROPIUM BROMIDE AND ALBUTEROL SULFATE 1 ML: .5; 3 SOLUTION RESPIRATORY (INHALATION) at 09:00

## 2019-01-19 RX ADMIN — DOCUSATE SODIUM 1 MG: 100 CAPSULE, LIQUID FILLED ORAL at 09:39

## 2019-01-19 RX ADMIN — PANTOPRAZOLE SODIUM 1 MG: 40 TABLET, DELAYED RELEASE ORAL at 07:15

## 2019-01-19 RX ADMIN — LABETALOL 1 MG: 200 TABLET, FILM COATED ORAL at 09:41

## 2019-01-19 RX ADMIN — IPRATROPIUM BROMIDE AND ALBUTEROL SULFATE 1 ML: .5; 3 SOLUTION RESPIRATORY (INHALATION) at 15:00

## 2019-01-19 RX ADMIN — BACLOFEN 1 MG: 10 TABLET ORAL at 09:39

## 2019-01-19 RX ADMIN — HYDROMORPHONE HYDROCHLORIDE 1 MG: 2 INJECTION, SOLUTION INTRAMUSCULAR; INTRAVENOUS; SUBCUTANEOUS at 07:17

## 2019-01-19 RX ADMIN — SPIRONOLACTONE 1 MG: 25 TABLET, FILM COATED ORAL at 09:39

## 2019-01-19 RX ADMIN — PAROXETINE HYDROCHLORIDE 1 MG: 12.5 TABLET, FILM COATED, EXTENDED RELEASE ORAL at 09:39

## 2019-01-19 RX ADMIN — HYDROMORPHONE HYDROCHLORIDE 1 MG: 2 TABLET ORAL at 22:50

## 2019-01-19 RX ADMIN — HYDROMORPHONE HYDROCHLORIDE 1 MG: 2 TABLET ORAL at 08:10

## 2019-01-19 RX ADMIN — FUROSEMIDE 1 MG: 20 TABLET ORAL at 09:40

## 2019-01-19 RX ADMIN — HYDROCORTISONE 1 APPLIC: 1 OINTMENT TOPICAL at 22:45

## 2019-01-19 RX ADMIN — LUBIPROSTONE 1 MCG: 8 CAPSULE, GELATIN COATED ORAL at 20:57

## 2019-01-19 RX ADMIN — INSULIN ASPART 1 UNIT: 100 INJECTION, SOLUTION INTRAVENOUS; SUBCUTANEOUS at 17:37

## 2019-01-19 RX ADMIN — BACLOFEN 1 MG: 10 TABLET ORAL at 20:57

## 2019-01-19 RX ADMIN — FLUTICASONE FUROATE AND VILANTEROL TRIFENATATE 1 INH: 100; 25 POWDER RESPIRATORY (INHALATION) at 09:38

## 2019-01-19 RX ADMIN — BACLOFEN 1 MG: 10 TABLET ORAL at 14:39

## 2019-01-19 RX ADMIN — ENOXAPARIN SODIUM 1 MG: 100 INJECTION SUBCUTANEOUS at 09:52

## 2019-01-20 RX ADMIN — INSULIN ASPART 1 UNIT: 100 INJECTION, SOLUTION INTRAVENOUS; SUBCUTANEOUS at 17:22

## 2019-01-20 RX ADMIN — SOLIFENACIN SUCCINATE 1 MG: 5 TABLET, FILM COATED ORAL at 08:48

## 2019-01-20 RX ADMIN — EZETIMIBE 1 MG: 10 TABLET ORAL at 08:49

## 2019-01-20 RX ADMIN — HYDROMORPHONE HYDROCHLORIDE 1 MG: 1 INJECTION, SOLUTION INTRAMUSCULAR; INTRAVENOUS; SUBCUTANEOUS at 20:14

## 2019-01-20 RX ADMIN — LUBIPROSTONE 1 MCG: 8 CAPSULE, GELATIN COATED ORAL at 08:47

## 2019-01-20 RX ADMIN — LUBIPROSTONE 1 MCG: 8 CAPSULE, GELATIN COATED ORAL at 21:25

## 2019-01-20 RX ADMIN — LIDOCAINE 1 PATCH: 50 PATCH TOPICAL at 08:54

## 2019-01-20 RX ADMIN — PREGABALIN 1 MG: 50 CAPSULE ORAL at 13:18

## 2019-01-20 RX ADMIN — SPIRONOLACTONE 1 MG: 25 TABLET, FILM COATED ORAL at 08:49

## 2019-01-20 RX ADMIN — HYDROMORPHONE HYDROCHLORIDE 1 MG: 1 INJECTION, SOLUTION INTRAMUSCULAR; INTRAVENOUS; SUBCUTANEOUS at 06:26

## 2019-01-20 RX ADMIN — FLUTICASONE FUROATE AND VILANTEROL TRIFENATATE 1 INH: 100; 25 POWDER RESPIRATORY (INHALATION) at 08:46

## 2019-01-20 RX ADMIN — NIFEDIPINE 1 MG: 60 TABLET, FILM COATED, EXTENDED RELEASE ORAL at 08:49

## 2019-01-20 RX ADMIN — PREGABALIN 1 MG: 75 CAPSULE ORAL at 21:25

## 2019-01-20 RX ADMIN — INSULIN ASPART 1 UNIT: 100 INJECTION, SOLUTION INTRAVENOUS; SUBCUTANEOUS at 07:35

## 2019-01-20 RX ADMIN — MINERAL OIL, PETROLATUM, PHENYLEPHRINE HCL 1 APPLIC: 2.5; 140; 749 OINTMENT TOPICAL at 09:00

## 2019-01-20 RX ADMIN — HYDROMORPHONE HYDROCHLORIDE 1 MG: 1 INJECTION, SOLUTION INTRAMUSCULAR; INTRAVENOUS; SUBCUTANEOUS at 12:08

## 2019-01-20 RX ADMIN — LABETALOL 1 MG: 200 TABLET, FILM COATED ORAL at 21:26

## 2019-01-20 RX ADMIN — IPRATROPIUM BROMIDE AND ALBUTEROL SULFATE 1 ML: .5; 3 SOLUTION RESPIRATORY (INHALATION) at 09:01

## 2019-01-20 RX ADMIN — HYDROMORPHONE HYDROCHLORIDE 1 MG: 2 TABLET ORAL at 08:50

## 2019-01-20 RX ADMIN — ASPIRIN 81 MG CHEWABLE TABLET 1 MG: 81 TABLET CHEWABLE at 08:49

## 2019-01-20 RX ADMIN — SENNOSIDES 1 TAB: 8.6 TABLET, FILM COATED ORAL at 21:26

## 2019-01-20 RX ADMIN — HYDROMORPHONE HYDROCHLORIDE 1 MG: 2 TABLET ORAL at 17:22

## 2019-01-20 RX ADMIN — NIFEDIPINE 1 MG: 60 TABLET, FILM COATED, EXTENDED RELEASE ORAL at 21:27

## 2019-01-20 RX ADMIN — BACLOFEN 1 MG: 10 TABLET ORAL at 08:48

## 2019-01-20 RX ADMIN — LOSARTAN POTASSIUM 1 MG: 50 TABLET ORAL at 08:48

## 2019-01-20 RX ADMIN — PREGABALIN 1 MG: 50 CAPSULE ORAL at 06:26

## 2019-01-20 RX ADMIN — INSULIN ASPART 1 UNIT: 100 INJECTION, SOLUTION INTRAVENOUS; SUBCUTANEOUS at 21:00

## 2019-01-20 RX ADMIN — MINERAL OIL, PETROLATUM, PHENYLEPHRINE HCL 1 APPLIC: 2.5; 140; 749 OINTMENT TOPICAL at 21:28

## 2019-01-20 RX ADMIN — POLYETHYLENE GLYCOL 3350 1 GM: 17 POWDER, FOR SOLUTION ORAL at 08:50

## 2019-01-20 RX ADMIN — PANTOPRAZOLE SODIUM 1 MG: 40 TABLET, DELAYED RELEASE ORAL at 06:27

## 2019-01-20 RX ADMIN — ACETAMINOPHEN 1 MG: 325 TABLET, FILM COATED ORAL at 15:16

## 2019-01-20 RX ADMIN — PAROXETINE HYDROCHLORIDE 1 MG: 12.5 TABLET, FILM COATED, EXTENDED RELEASE ORAL at 08:47

## 2019-01-20 RX ADMIN — LABETALOL 1 MG: 200 TABLET, FILM COATED ORAL at 08:50

## 2019-01-20 RX ADMIN — BACLOFEN 1 MG: 10 TABLET ORAL at 21:27

## 2019-01-20 RX ADMIN — DOCUSATE SODIUM 1 MG: 100 CAPSULE, LIQUID FILLED ORAL at 08:49

## 2019-01-20 RX ADMIN — IPRATROPIUM BROMIDE AND ALBUTEROL SULFATE 1 ML: .5; 3 SOLUTION RESPIRATORY (INHALATION) at 21:00

## 2019-01-20 RX ADMIN — ENOXAPARIN SODIUM 1 MG: 100 INJECTION SUBCUTANEOUS at 08:55

## 2019-01-20 RX ADMIN — ATORVASTATIN CALCIUM 1 MG: 40 TABLET, FILM COATED ORAL at 21:26

## 2019-01-20 RX ADMIN — HYDROMORPHONE HYDROCHLORIDE 1 MG: 2 TABLET ORAL at 22:01

## 2019-01-20 RX ADMIN — BISACODYL 1 MG: 5 TABLET, COATED ORAL at 13:19

## 2019-01-20 RX ADMIN — FUROSEMIDE 1 MG: 20 TABLET ORAL at 08:49

## 2019-01-20 RX ADMIN — HYDROMORPHONE HYDROCHLORIDE 1 MG: 2 TABLET ORAL at 13:19

## 2019-01-20 RX ADMIN — BACLOFEN 1 MG: 10 TABLET ORAL at 13:19

## 2019-01-20 RX ADMIN — INSULIN ASPART 1 UNIT: 100 INJECTION, SOLUTION INTRAVENOUS; SUBCUTANEOUS at 12:00

## 2019-01-20 RX ADMIN — DOCUSATE SODIUM 1 MG: 100 CAPSULE, LIQUID FILLED ORAL at 21:25

## 2019-01-20 RX ADMIN — IPRATROPIUM BROMIDE AND ALBUTEROL SULFATE 1 ML: .5; 3 SOLUTION RESPIRATORY (INHALATION) at 15:50

## 2019-01-21 RX ADMIN — INSULIN ASPART 1 UNIT: 100 INJECTION, SOLUTION INTRAVENOUS; SUBCUTANEOUS at 07:47

## 2019-01-21 RX ADMIN — HYDROMORPHONE HYDROCHLORIDE 1 MG: 2 INJECTION, SOLUTION INTRAMUSCULAR; INTRAVENOUS; SUBCUTANEOUS at 12:32

## 2019-01-21 RX ADMIN — LABETALOL 1 MG: 200 TABLET, FILM COATED ORAL at 20:40

## 2019-01-21 RX ADMIN — ASPIRIN 81 MG CHEWABLE TABLET 1 MG: 81 TABLET CHEWABLE at 08:46

## 2019-01-21 RX ADMIN — SPIRONOLACTONE 1 MG: 25 TABLET, FILM COATED ORAL at 08:46

## 2019-01-21 RX ADMIN — PAROXETINE HYDROCHLORIDE 1 MG: 12.5 TABLET, FILM COATED, EXTENDED RELEASE ORAL at 08:44

## 2019-01-21 RX ADMIN — EZETIMIBE 1 MG: 10 TABLET ORAL at 09:00

## 2019-01-21 RX ADMIN — LABETALOL 1 MG: 200 TABLET, FILM COATED ORAL at 10:13

## 2019-01-21 RX ADMIN — PREGABALIN 1 MG: 50 CAPSULE ORAL at 06:12

## 2019-01-21 RX ADMIN — HYDROMORPHONE HYDROCHLORIDE 1 MG: 2 TABLET ORAL at 22:32

## 2019-01-21 RX ADMIN — PSYLLIUM HUSK 1 PKT: 3.4 POWDER ORAL at 10:35

## 2019-01-21 RX ADMIN — PREGABALIN 1 MG: 50 CAPSULE ORAL at 13:33

## 2019-01-21 RX ADMIN — LUBIPROSTONE 1 MCG: 8 CAPSULE, GELATIN COATED ORAL at 20:39

## 2019-01-21 RX ADMIN — MINERAL OIL, PETROLATUM, PHENYLEPHRINE HCL 1 APPLIC: 2.5; 140; 749 OINTMENT TOPICAL at 12:31

## 2019-01-21 RX ADMIN — DOCUSATE SODIUM 1 MG: 100 CAPSULE, LIQUID FILLED ORAL at 08:45

## 2019-01-21 RX ADMIN — MINERAL OIL, PETROLATUM, PHENYLEPHRINE HCL 1 APPLIC: 2.5; 140; 749 OINTMENT TOPICAL at 20:45

## 2019-01-21 RX ADMIN — HYDROMORPHONE HYDROCHLORIDE 1 MG: 2 INJECTION, SOLUTION INTRAMUSCULAR; INTRAVENOUS; SUBCUTANEOUS at 19:00

## 2019-01-21 RX ADMIN — SOLIFENACIN SUCCINATE 1 MG: 5 TABLET, FILM COATED ORAL at 08:44

## 2019-01-21 RX ADMIN — PREGABALIN 1 MG: 75 CAPSULE ORAL at 20:40

## 2019-01-21 RX ADMIN — INSULIN ASPART 1 UNIT: 100 INJECTION, SOLUTION INTRAVENOUS; SUBCUTANEOUS at 17:33

## 2019-01-21 RX ADMIN — LIDOCAINE 1 PATCH: 50 PATCH TOPICAL at 10:23

## 2019-01-21 RX ADMIN — INSULIN ASPART 1 UNIT: 100 INJECTION, SOLUTION INTRAVENOUS; SUBCUTANEOUS at 12:30

## 2019-01-21 RX ADMIN — SENNOSIDES 1 TAB: 8.6 TABLET, FILM COATED ORAL at 20:40

## 2019-01-21 RX ADMIN — HYDROMORPHONE HYDROCHLORIDE 1 MG: 2 TABLET ORAL at 13:24

## 2019-01-21 RX ADMIN — LUBIPROSTONE 1 MCG: 8 CAPSULE, GELATIN COATED ORAL at 08:45

## 2019-01-21 RX ADMIN — FUROSEMIDE 1 MG: 20 TABLET ORAL at 10:11

## 2019-01-21 RX ADMIN — HYDROMORPHONE HYDROCHLORIDE 1 MG: 2 TABLET ORAL at 08:37

## 2019-01-21 RX ADMIN — ATORVASTATIN CALCIUM 1 MG: 40 TABLET, FILM COATED ORAL at 20:40

## 2019-01-21 RX ADMIN — HYDROMORPHONE HYDROCHLORIDE 1 MG: 2 INJECTION, SOLUTION INTRAMUSCULAR; INTRAVENOUS; SUBCUTANEOUS at 07:44

## 2019-01-21 RX ADMIN — IPRATROPIUM BROMIDE AND ALBUTEROL SULFATE 1 ML: .5; 3 SOLUTION RESPIRATORY (INHALATION) at 14:33

## 2019-01-21 RX ADMIN — PANTOPRAZOLE SODIUM 1 MG: 40 TABLET, DELAYED RELEASE ORAL at 06:12

## 2019-01-21 RX ADMIN — FLUTICASONE FUROATE AND VILANTEROL TRIFENATATE 1 INH: 100; 25 POWDER RESPIRATORY (INHALATION) at 08:46

## 2019-01-21 RX ADMIN — NIFEDIPINE 1 MG: 60 TABLET, FILM COATED, EXTENDED RELEASE ORAL at 10:14

## 2019-01-21 RX ADMIN — LOSARTAN POTASSIUM 1 MG: 50 TABLET ORAL at 10:12

## 2019-01-21 RX ADMIN — ENOXAPARIN SODIUM 1 MG: 100 INJECTION SUBCUTANEOUS at 10:22

## 2019-01-21 RX ADMIN — IPRATROPIUM BROMIDE AND ALBUTEROL SULFATE 1 ML: .5; 3 SOLUTION RESPIRATORY (INHALATION) at 08:18

## 2019-01-21 RX ADMIN — INSULIN ASPART 1 UNIT: 100 INJECTION, SOLUTION INTRAVENOUS; SUBCUTANEOUS at 20:48

## 2019-01-21 RX ADMIN — BACLOFEN 1 MG: 10 TABLET ORAL at 12:32

## 2019-01-21 RX ADMIN — IPRATROPIUM BROMIDE AND ALBUTEROL SULFATE 1 ML: .5; 3 SOLUTION RESPIRATORY (INHALATION) at 23:57

## 2019-01-21 RX ADMIN — BACLOFEN 1 MG: 10 TABLET ORAL at 08:45

## 2019-01-21 RX ADMIN — HYDROMORPHONE HYDROCHLORIDE 1 MG: 2 TABLET ORAL at 17:23

## 2019-01-21 RX ADMIN — IPRATROPIUM BROMIDE AND ALBUTEROL SULFATE 1 ML: .5; 3 SOLUTION RESPIRATORY (INHALATION) at 20:00

## 2019-01-21 RX ADMIN — HYDROMORPHONE HYDROCHLORIDE 1 MG: 1 INJECTION, SOLUTION INTRAMUSCULAR; INTRAVENOUS; SUBCUTANEOUS at 00:16

## 2019-01-21 RX ADMIN — NIFEDIPINE 1 MG: 60 TABLET, FILM COATED, EXTENDED RELEASE ORAL at 20:40

## 2019-01-21 RX ADMIN — BACLOFEN 1 MG: 10 TABLET ORAL at 20:39

## 2019-01-21 RX ADMIN — MAGESIUM CITRATE 1 ML: 1.75 LIQUID ORAL at 10:30

## 2019-01-22 RX ADMIN — SOLIFENACIN SUCCINATE 1 MG: 5 TABLET, FILM COATED ORAL at 08:24

## 2019-01-22 RX ADMIN — MINERAL OIL, PETROLATUM, PHENYLEPHRINE HCL 1 APPLIC: 2.5; 140; 749 OINTMENT TOPICAL at 20:13

## 2019-01-22 RX ADMIN — HYDROMORPHONE HYDROCHLORIDE 1 MG: 1 INJECTION, SOLUTION INTRAMUSCULAR; INTRAVENOUS; SUBCUTANEOUS at 06:52

## 2019-01-22 RX ADMIN — ASPIRIN 81 MG CHEWABLE TABLET 1 MG: 81 TABLET CHEWABLE at 08:25

## 2019-01-22 RX ADMIN — INSULIN ASPART 1 UNIT: 100 INJECTION, SOLUTION INTRAVENOUS; SUBCUTANEOUS at 21:00

## 2019-01-22 RX ADMIN — HYDROMORPHONE HYDROCHLORIDE 1 MG: 2 TABLET ORAL at 14:59

## 2019-01-22 RX ADMIN — EZETIMIBE 1 MG: 10 TABLET ORAL at 08:24

## 2019-01-22 RX ADMIN — LABETALOL 1 MG: 200 TABLET, FILM COATED ORAL at 20:08

## 2019-01-22 RX ADMIN — SPIRONOLACTONE 1 MG: 25 TABLET, FILM COATED ORAL at 08:25

## 2019-01-22 RX ADMIN — FLUTICASONE FUROATE AND VILANTEROL TRIFENATATE 1 INH: 100; 25 POWDER RESPIRATORY (INHALATION) at 08:24

## 2019-01-22 RX ADMIN — LUBIPROSTONE 1 MCG: 8 CAPSULE, GELATIN COATED ORAL at 08:25

## 2019-01-22 RX ADMIN — PAROXETINE HYDROCHLORIDE 1 MG: 12.5 TABLET, FILM COATED, EXTENDED RELEASE ORAL at 08:25

## 2019-01-22 RX ADMIN — BACLOFEN 1 MG: 10 TABLET ORAL at 13:33

## 2019-01-22 RX ADMIN — IPRATROPIUM BROMIDE AND ALBUTEROL SULFATE 1 ML: .5; 3 SOLUTION RESPIRATORY (INHALATION) at 14:03

## 2019-01-22 RX ADMIN — LUBIPROSTONE 1 MCG: 8 CAPSULE, GELATIN COATED ORAL at 20:07

## 2019-01-22 RX ADMIN — PREGABALIN 1 MG: 75 CAPSULE ORAL at 20:08

## 2019-01-22 RX ADMIN — PREGABALIN 1 MG: 50 CAPSULE ORAL at 13:33

## 2019-01-22 RX ADMIN — FUROSEMIDE 1 MG: 20 TABLET ORAL at 08:25

## 2019-01-22 RX ADMIN — IPRATROPIUM BROMIDE AND ALBUTEROL SULFATE 1 ML: .5; 3 SOLUTION RESPIRATORY (INHALATION) at 20:36

## 2019-01-22 RX ADMIN — SENNOSIDES 1 TAB: 8.6 TABLET, FILM COATED ORAL at 20:08

## 2019-01-22 RX ADMIN — ATORVASTATIN CALCIUM 1 MG: 40 TABLET, FILM COATED ORAL at 20:08

## 2019-01-22 RX ADMIN — LIDOCAINE 1 PATCH: 50 PATCH TOPICAL at 10:56

## 2019-01-22 RX ADMIN — PSYLLIUM HUSK 1 PKT: 3.4 POWDER ORAL at 08:26

## 2019-01-22 RX ADMIN — PREGABALIN 1 MG: 50 CAPSULE ORAL at 06:52

## 2019-01-22 RX ADMIN — HYDROMORPHONE HYDROCHLORIDE 1 MG: 2 TABLET ORAL at 20:06

## 2019-01-22 RX ADMIN — NIFEDIPINE 1 MG: 60 TABLET, FILM COATED, EXTENDED RELEASE ORAL at 20:09

## 2019-01-22 RX ADMIN — HYDROMORPHONE HYDROCHLORIDE 1 MG: 2 TABLET ORAL at 02:50

## 2019-01-22 RX ADMIN — INSULIN ASPART 1 UNIT: 100 INJECTION, SOLUTION INTRAVENOUS; SUBCUTANEOUS at 07:35

## 2019-01-22 RX ADMIN — LOSARTAN POTASSIUM 1 MG: 50 TABLET ORAL at 11:12

## 2019-01-22 RX ADMIN — HYDROMORPHONE HYDROCHLORIDE 1 MG: 2 INJECTION, SOLUTION INTRAMUSCULAR; INTRAVENOUS; SUBCUTANEOUS at 22:33

## 2019-01-22 RX ADMIN — BACLOFEN 1 MG: 10 TABLET ORAL at 20:07

## 2019-01-22 RX ADMIN — BACLOFEN 1 MG: 10 TABLET ORAL at 08:25

## 2019-01-22 RX ADMIN — LABETALOL 1 MG: 200 TABLET, FILM COATED ORAL at 11:12

## 2019-01-22 RX ADMIN — NIFEDIPINE 1 MG: 60 TABLET, FILM COATED, EXTENDED RELEASE ORAL at 11:13

## 2019-01-22 RX ADMIN — PANTOPRAZOLE SODIUM 1 MG: 40 TABLET, DELAYED RELEASE ORAL at 06:51

## 2019-01-22 RX ADMIN — MINERAL OIL, PETROLATUM, PHENYLEPHRINE HCL 1 APPLIC: 2.5; 140; 749 OINTMENT TOPICAL at 10:57

## 2019-01-22 RX ADMIN — ENOXAPARIN SODIUM 1 MG: 100 INJECTION SUBCUTANEOUS at 10:55

## 2019-01-22 RX ADMIN — HYDROMORPHONE HYDROCHLORIDE 1 MG: 2 TABLET ORAL at 10:53

## 2019-01-22 RX ADMIN — IPRATROPIUM BROMIDE AND ALBUTEROL SULFATE 1 ML: .5; 3 SOLUTION RESPIRATORY (INHALATION) at 08:06

## 2019-01-22 RX ADMIN — HYDROMORPHONE HYDROCHLORIDE 1 MG: 2 INJECTION, SOLUTION INTRAMUSCULAR; INTRAVENOUS; SUBCUTANEOUS at 00:18

## 2019-01-22 RX ADMIN — INSULIN ASPART 1 UNIT: 100 INJECTION, SOLUTION INTRAVENOUS; SUBCUTANEOUS at 12:18

## 2019-01-22 RX ADMIN — ACETAMINOPHEN 1 MG: 325 TABLET, FILM COATED ORAL at 13:33

## 2019-01-22 RX ADMIN — INSULIN ASPART 1 UNIT: 100 INJECTION, SOLUTION INTRAVENOUS; SUBCUTANEOUS at 17:47

## 2019-01-23 LAB
ANION GAP: 12 (ref 5–13)
BLOOD UREA NITROGEN: 19 MG/DL (ref 7–20)
CALCIUM: 9.4 MG/DL (ref 8.4–10.2)
CARBON DIOXIDE: 31 MMOL/L (ref 21–31)
CHLORIDE: 98 MMOL/L (ref 97–110)
CREATININE: 0.47 MG/DL (ref 0.44–1)
GLUCOSE: 101 MG/DL (ref 70–220)
MAGNESIUM: 2 MG/DL (ref 1.7–2.5)
PHOSPHORUS: 4.6 MG/DL (ref 2.5–4.9)
POTASSIUM: 4 MMOL/L (ref 3.5–5.1)
SODIUM: 141 MMOL/L (ref 135–144)

## 2019-01-23 RX ADMIN — MINERAL OIL, PETROLATUM, PHENYLEPHRINE HCL 1 APPLIC: 2.5; 140; 749 OINTMENT TOPICAL at 09:00

## 2019-01-23 RX ADMIN — PREGABALIN 1 MG: 50 CAPSULE ORAL at 13:38

## 2019-01-23 RX ADMIN — LABETALOL 1 MG: 200 TABLET, FILM COATED ORAL at 10:18

## 2019-01-23 RX ADMIN — HYDROMORPHONE HYDROCHLORIDE 1 MG: 2 TABLET ORAL at 19:06

## 2019-01-23 RX ADMIN — IPRATROPIUM BROMIDE AND ALBUTEROL SULFATE 1 ML: .5; 3 SOLUTION RESPIRATORY (INHALATION) at 17:39

## 2019-01-23 RX ADMIN — SENNOSIDES 1 TAB: 8.6 TABLET, FILM COATED ORAL at 21:49

## 2019-01-23 RX ADMIN — HYDROMORPHONE HYDROCHLORIDE 1 MG: 2 INJECTION, SOLUTION INTRAMUSCULAR; INTRAVENOUS; SUBCUTANEOUS at 07:38

## 2019-01-23 RX ADMIN — HYDROMORPHONE HYDROCHLORIDE 1 MG: 2 INJECTION, SOLUTION INTRAMUSCULAR; INTRAVENOUS; SUBCUTANEOUS at 03:24

## 2019-01-23 RX ADMIN — INSULIN ASPART 1 UNIT: 100 INJECTION, SOLUTION INTRAVENOUS; SUBCUTANEOUS at 12:00

## 2019-01-23 RX ADMIN — INSULIN ASPART 1 UNIT: 100 INJECTION, SOLUTION INTRAVENOUS; SUBCUTANEOUS at 21:00

## 2019-01-23 RX ADMIN — PAROXETINE HYDROCHLORIDE 1 MG: 12.5 TABLET, FILM COATED, EXTENDED RELEASE ORAL at 10:20

## 2019-01-23 RX ADMIN — HYDROMORPHONE HYDROCHLORIDE 1 MG: 2 TABLET ORAL at 00:07

## 2019-01-23 RX ADMIN — HYDROMORPHONE HYDROCHLORIDE 1 MG: 1 INJECTION, SOLUTION INTRAMUSCULAR; INTRAVENOUS; SUBCUTANEOUS at 13:40

## 2019-01-23 RX ADMIN — LABETALOL 1 MG: 200 TABLET, FILM COATED ORAL at 13:00

## 2019-01-23 RX ADMIN — SOLIFENACIN SUCCINATE 1 MG: 5 TABLET, FILM COATED ORAL at 10:18

## 2019-01-23 RX ADMIN — ENOXAPARIN SODIUM 1 MG: 100 INJECTION SUBCUTANEOUS at 10:38

## 2019-01-23 RX ADMIN — BACLOFEN 1 MG: 10 TABLET ORAL at 21:49

## 2019-01-23 RX ADMIN — MINERAL OIL, PETROLATUM, PHENYLEPHRINE HCL 1 APPLIC: 2.5; 140; 749 OINTMENT TOPICAL at 21:00

## 2019-01-23 RX ADMIN — SPIRONOLACTONE 1 MG: 25 TABLET, FILM COATED ORAL at 10:20

## 2019-01-23 RX ADMIN — LABETALOL 1 MG: 200 TABLET, FILM COATED ORAL at 21:49

## 2019-01-23 RX ADMIN — NIFEDIPINE 1 MG: 60 TABLET, FILM COATED, EXTENDED RELEASE ORAL at 21:49

## 2019-01-23 RX ADMIN — PREGABALIN 1 MG: 75 CAPSULE ORAL at 21:49

## 2019-01-23 RX ADMIN — INSULIN ASPART 1 UNIT: 100 INJECTION, SOLUTION INTRAVENOUS; SUBCUTANEOUS at 07:35

## 2019-01-23 RX ADMIN — IPRATROPIUM BROMIDE AND ALBUTEROL SULFATE 1 ML: .5; 3 SOLUTION RESPIRATORY (INHALATION) at 03:51

## 2019-01-23 RX ADMIN — LIDOCAINE 1 PATCH: 50 PATCH TOPICAL at 10:23

## 2019-01-23 RX ADMIN — ASPIRIN 81 MG CHEWABLE TABLET 1 MG: 81 TABLET CHEWABLE at 10:19

## 2019-01-23 RX ADMIN — LUBIPROSTONE 1 MCG: 8 CAPSULE, GELATIN COATED ORAL at 21:47

## 2019-01-23 RX ADMIN — ATORVASTATIN CALCIUM 1 MG: 40 TABLET, FILM COATED ORAL at 21:59

## 2019-01-23 RX ADMIN — NIFEDIPINE 1 MG: 60 TABLET, FILM COATED, EXTENDED RELEASE ORAL at 09:00

## 2019-01-23 RX ADMIN — IPRATROPIUM BROMIDE AND ALBUTEROL SULFATE 1 ML: .5; 3 SOLUTION RESPIRATORY (INHALATION) at 21:21

## 2019-01-23 RX ADMIN — EZETIMIBE 1 MG: 10 TABLET ORAL at 10:19

## 2019-01-23 RX ADMIN — BACLOFEN 1 MG: 10 TABLET ORAL at 13:38

## 2019-01-23 RX ADMIN — HYDROMORPHONE HYDROCHLORIDE 1 MG: 2 TABLET ORAL at 10:03

## 2019-01-23 RX ADMIN — BACLOFEN 1 MG: 10 TABLET ORAL at 07:39

## 2019-01-23 RX ADMIN — INSULIN ASPART 1 UNIT: 100 INJECTION, SOLUTION INTRAVENOUS; SUBCUTANEOUS at 17:24

## 2019-01-23 RX ADMIN — IPRATROPIUM BROMIDE AND ALBUTEROL SULFATE 1 ML: .5; 3 SOLUTION RESPIRATORY (INHALATION) at 08:32

## 2019-01-23 RX ADMIN — FLUTICASONE FUROATE AND VILANTEROL TRIFENATATE 1 INH: 100; 25 POWDER RESPIRATORY (INHALATION) at 10:21

## 2019-01-23 RX ADMIN — HYDROMORPHONE HYDROCHLORIDE 1 MG: 2 INJECTION, SOLUTION INTRAMUSCULAR; INTRAVENOUS; SUBCUTANEOUS at 21:50

## 2019-01-23 RX ADMIN — PANTOPRAZOLE SODIUM 1 MG: 40 TABLET, DELAYED RELEASE ORAL at 06:41

## 2019-01-23 RX ADMIN — LOSARTAN POTASSIUM 1 MG: 50 TABLET ORAL at 09:00

## 2019-01-23 RX ADMIN — FUROSEMIDE 1 MG: 20 TABLET ORAL at 09:00

## 2019-01-23 RX ADMIN — IPRATROPIUM BROMIDE AND ALBUTEROL SULFATE 1 ML: .5; 3 SOLUTION RESPIRATORY (INHALATION) at 13:03

## 2019-01-23 RX ADMIN — PREGABALIN 1 MG: 50 CAPSULE ORAL at 06:41

## 2019-01-23 RX ADMIN — LUBIPROSTONE 1 MCG: 8 CAPSULE, GELATIN COATED ORAL at 10:19

## 2019-01-23 RX ADMIN — PSYLLIUM HUSK 1 PKT: 3.4 POWDER ORAL at 10:20

## 2019-01-24 RX ADMIN — IPRATROPIUM BROMIDE AND ALBUTEROL SULFATE 1 ML: .5; 3 SOLUTION RESPIRATORY (INHALATION) at 19:37

## 2019-01-24 RX ADMIN — INSULIN ASPART 1 UNIT: 100 INJECTION, SOLUTION INTRAVENOUS; SUBCUTANEOUS at 12:00

## 2019-01-24 RX ADMIN — ASPIRIN 81 MG CHEWABLE TABLET 1 MG: 81 TABLET CHEWABLE at 09:33

## 2019-01-24 RX ADMIN — PREGABALIN 1 MG: 50 CAPSULE ORAL at 13:16

## 2019-01-24 RX ADMIN — NIFEDIPINE 1 MG: 60 TABLET, FILM COATED, EXTENDED RELEASE ORAL at 13:00

## 2019-01-24 RX ADMIN — MINERAL OIL, PETROLATUM, PHENYLEPHRINE HCL 1 APPLIC: 2.5; 140; 749 OINTMENT TOPICAL at 09:00

## 2019-01-24 RX ADMIN — BACLOFEN 1 MG: 10 TABLET ORAL at 09:34

## 2019-01-24 RX ADMIN — PREGABALIN 1 MG: 50 CAPSULE ORAL at 05:59

## 2019-01-24 RX ADMIN — HYDROMORPHONE HYDROCHLORIDE 1 MG: 2 INJECTION, SOLUTION INTRAMUSCULAR; INTRAVENOUS; SUBCUTANEOUS at 23:20

## 2019-01-24 RX ADMIN — HYDROMORPHONE HYDROCHLORIDE 1 MG: 2 TABLET ORAL at 09:27

## 2019-01-24 RX ADMIN — BACLOFEN 1 MG: 10 TABLET ORAL at 22:22

## 2019-01-24 RX ADMIN — HYDROMORPHONE HYDROCHLORIDE 1 MG: 2 INJECTION, SOLUTION INTRAMUSCULAR; INTRAVENOUS; SUBCUTANEOUS at 18:52

## 2019-01-24 RX ADMIN — LIDOCAINE 1 PATCH: 50 PATCH TOPICAL at 09:41

## 2019-01-24 RX ADMIN — NIFEDIPINE 1 MG: 60 TABLET, FILM COATED, EXTENDED RELEASE ORAL at 22:23

## 2019-01-24 RX ADMIN — INSULIN ASPART 1 UNIT: 100 INJECTION, SOLUTION INTRAVENOUS; SUBCUTANEOUS at 22:19

## 2019-01-24 RX ADMIN — SOLIFENACIN SUCCINATE 1 MG: 5 TABLET, FILM COATED ORAL at 09:48

## 2019-01-24 RX ADMIN — LOSARTAN POTASSIUM 1 MG: 50 TABLET ORAL at 13:06

## 2019-01-24 RX ADMIN — HYDROMORPHONE HYDROCHLORIDE 1 MG: 2 INJECTION, SOLUTION INTRAMUSCULAR; INTRAVENOUS; SUBCUTANEOUS at 12:57

## 2019-01-24 RX ADMIN — EZETIMIBE 1 MG: 10 TABLET ORAL at 09:33

## 2019-01-24 RX ADMIN — MINERAL OIL, PETROLATUM, PHENYLEPHRINE HCL 1 APPLIC: 2.5; 140; 749 OINTMENT TOPICAL at 22:24

## 2019-01-24 RX ADMIN — PREGABALIN 1 MG: 75 CAPSULE ORAL at 22:21

## 2019-01-24 RX ADMIN — PAROXETINE HYDROCHLORIDE 1 MG: 12.5 TABLET, FILM COATED, EXTENDED RELEASE ORAL at 09:32

## 2019-01-24 RX ADMIN — LUBIPROSTONE 1 MCG: 8 CAPSULE, GELATIN COATED ORAL at 22:12

## 2019-01-24 RX ADMIN — FUROSEMIDE 1 MG: 20 TABLET ORAL at 13:04

## 2019-01-24 RX ADMIN — IPRATROPIUM BROMIDE AND ALBUTEROL SULFATE 1 ML: .5; 3 SOLUTION RESPIRATORY (INHALATION) at 08:00

## 2019-01-24 RX ADMIN — HYDROMORPHONE HYDROCHLORIDE 1 MG: 2 TABLET ORAL at 15:30

## 2019-01-24 RX ADMIN — HYDROMORPHONE HYDROCHLORIDE 1 MG: 2 TABLET ORAL at 01:27

## 2019-01-24 RX ADMIN — ENOXAPARIN SODIUM 1 MG: 100 INJECTION SUBCUTANEOUS at 09:45

## 2019-01-24 RX ADMIN — LUBIPROSTONE 1 MCG: 8 CAPSULE, GELATIN COATED ORAL at 09:33

## 2019-01-24 RX ADMIN — SENNOSIDES 1 TAB: 8.6 TABLET, FILM COATED ORAL at 22:23

## 2019-01-24 RX ADMIN — HYDROMORPHONE HYDROCHLORIDE 1 MG: 2 INJECTION, SOLUTION INTRAMUSCULAR; INTRAVENOUS; SUBCUTANEOUS at 08:02

## 2019-01-24 RX ADMIN — INSULIN ASPART 1 UNIT: 100 INJECTION, SOLUTION INTRAVENOUS; SUBCUTANEOUS at 17:35

## 2019-01-24 RX ADMIN — LABETALOL 1 MG: 200 TABLET, FILM COATED ORAL at 22:23

## 2019-01-24 RX ADMIN — IPRATROPIUM BROMIDE AND ALBUTEROL SULFATE 1 ML: .5; 3 SOLUTION RESPIRATORY (INHALATION) at 05:18

## 2019-01-24 RX ADMIN — INSULIN ASPART 1 UNIT: 100 INJECTION, SOLUTION INTRAVENOUS; SUBCUTANEOUS at 07:35

## 2019-01-24 RX ADMIN — LABETALOL 1 MG: 200 TABLET, FILM COATED ORAL at 13:01

## 2019-01-24 RX ADMIN — PSYLLIUM HUSK 1 PKT: 3.4 POWDER ORAL at 09:32

## 2019-01-24 RX ADMIN — ATORVASTATIN CALCIUM 1 MG: 40 TABLET, FILM COATED ORAL at 22:22

## 2019-01-24 RX ADMIN — IPRATROPIUM BROMIDE AND ALBUTEROL SULFATE 1 ML: .5; 3 SOLUTION RESPIRATORY (INHALATION) at 12:30

## 2019-01-24 RX ADMIN — LABETALOL 1 MG: 200 TABLET, FILM COATED ORAL at 17:10

## 2019-01-24 RX ADMIN — PANTOPRAZOLE SODIUM 1 MG: 40 TABLET, DELAYED RELEASE ORAL at 05:59

## 2019-01-24 RX ADMIN — FLUTICASONE FUROATE AND VILANTEROL TRIFENATATE 1 INH: 100; 25 POWDER RESPIRATORY (INHALATION) at 13:05

## 2019-01-24 RX ADMIN — BACLOFEN 1 MG: 10 TABLET ORAL at 13:01

## 2019-01-24 RX ADMIN — HYDROMORPHONE HYDROCHLORIDE 1 MG: 2 INJECTION, SOLUTION INTRAMUSCULAR; INTRAVENOUS; SUBCUTANEOUS at 03:45

## 2019-01-24 RX ADMIN — SPIRONOLACTONE 1 MG: 25 TABLET, FILM COATED ORAL at 09:33

## 2019-01-24 RX ADMIN — IPRATROPIUM BROMIDE AND ALBUTEROL SULFATE 1 ML: .5; 3 SOLUTION RESPIRATORY (INHALATION) at 01:49

## 2019-01-24 RX ADMIN — HYDROMORPHONE HYDROCHLORIDE 1 MG: 2 INJECTION, SOLUTION INTRAMUSCULAR; INTRAVENOUS; SUBCUTANEOUS at 16:57

## 2019-01-24 RX ADMIN — HYDROMORPHONE HYDROCHLORIDE 1 MG: 2 TABLET ORAL at 19:37

## 2019-01-25 RX ADMIN — MINERAL OIL, PETROLATUM, PHENYLEPHRINE HCL 1 APPLIC: 2.5; 140; 749 OINTMENT TOPICAL at 21:00

## 2019-01-25 RX ADMIN — INSULIN ASPART 1 UNIT: 100 INJECTION, SOLUTION INTRAVENOUS; SUBCUTANEOUS at 17:56

## 2019-01-25 RX ADMIN — INSULIN ASPART 1 UNIT: 100 INJECTION, SOLUTION INTRAVENOUS; SUBCUTANEOUS at 07:35

## 2019-01-25 RX ADMIN — NIFEDIPINE 1 MG: 60 TABLET, FILM COATED, EXTENDED RELEASE ORAL at 09:25

## 2019-01-25 RX ADMIN — PREGABALIN 1 MG: 50 CAPSULE ORAL at 06:22

## 2019-01-25 RX ADMIN — PAROXETINE HYDROCHLORIDE 1 MG: 12.5 TABLET, FILM COATED, EXTENDED RELEASE ORAL at 09:22

## 2019-01-25 RX ADMIN — DOCUSATE SODIUM 1 MG: 100 CAPSULE, LIQUID FILLED ORAL at 22:10

## 2019-01-25 RX ADMIN — MINERAL OIL, PETROLATUM, PHENYLEPHRINE HCL 1 APPLIC: 2.5; 140; 749 OINTMENT TOPICAL at 09:00

## 2019-01-25 RX ADMIN — IPRATROPIUM BROMIDE AND ALBUTEROL SULFATE 1 ML: .5; 3 SOLUTION RESPIRATORY (INHALATION) at 01:34

## 2019-01-25 RX ADMIN — LIDOCAINE 1 PATCH: 50 PATCH TOPICAL at 10:04

## 2019-01-25 RX ADMIN — IPRATROPIUM BROMIDE AND ALBUTEROL SULFATE 1 ML: .5; 3 SOLUTION RESPIRATORY (INHALATION) at 15:10

## 2019-01-25 RX ADMIN — ACETAMINOPHEN 1 MG: 325 TABLET, FILM COATED ORAL at 11:54

## 2019-01-25 RX ADMIN — LOSARTAN POTASSIUM 1 MG: 50 TABLET ORAL at 09:24

## 2019-01-25 RX ADMIN — ACETAMINOPHEN 1 MG: 325 TABLET, FILM COATED ORAL at 20:52

## 2019-01-25 RX ADMIN — FUROSEMIDE 1 MG: 20 TABLET ORAL at 09:23

## 2019-01-25 RX ADMIN — INSULIN ASPART 1 UNIT: 100 INJECTION, SOLUTION INTRAVENOUS; SUBCUTANEOUS at 21:00

## 2019-01-25 RX ADMIN — HYDROMORPHONE HYDROCHLORIDE 1 MG: 2 TABLET ORAL at 13:37

## 2019-01-25 RX ADMIN — HYDROMORPHONE HYDROCHLORIDE 1 MG: 2 TABLET ORAL at 22:12

## 2019-01-25 RX ADMIN — PSYLLIUM HUSK 1 PKT: 3.4 POWDER ORAL at 09:20

## 2019-01-25 RX ADMIN — IPRATROPIUM BROMIDE AND ALBUTEROL SULFATE 1 ML: .5; 3 SOLUTION RESPIRATORY (INHALATION) at 19:55

## 2019-01-25 RX ADMIN — PREGABALIN 1 MG: 75 CAPSULE ORAL at 22:11

## 2019-01-25 RX ADMIN — INSULIN ASPART 1 UNIT: 100 INJECTION, SOLUTION INTRAVENOUS; SUBCUTANEOUS at 11:58

## 2019-01-25 RX ADMIN — ASPIRIN 81 MG CHEWABLE TABLET 1 MG: 81 TABLET CHEWABLE at 09:25

## 2019-01-25 RX ADMIN — BACLOFEN 1 MG: 10 TABLET ORAL at 12:51

## 2019-01-25 RX ADMIN — ATORVASTATIN CALCIUM 1 MG: 40 TABLET, FILM COATED ORAL at 22:12

## 2019-01-25 RX ADMIN — PANTOPRAZOLE SODIUM 1 MG: 40 TABLET, DELAYED RELEASE ORAL at 06:22

## 2019-01-25 RX ADMIN — POLYETHYLENE GLYCOL 3350 1 GM: 17 POWDER, FOR SOLUTION ORAL at 14:40

## 2019-01-25 RX ADMIN — FLUTICASONE FUROATE AND VILANTEROL TRIFENATATE 1 INH: 100; 25 POWDER RESPIRATORY (INHALATION) at 09:25

## 2019-01-25 RX ADMIN — LABETALOL 1 MG: 200 TABLET, FILM COATED ORAL at 13:37

## 2019-01-25 RX ADMIN — LUBIPROSTONE 1 MCG: 8 CAPSULE, GELATIN COATED ORAL at 22:10

## 2019-01-25 RX ADMIN — LABETALOL 1 MG: 200 TABLET, FILM COATED ORAL at 09:28

## 2019-01-25 RX ADMIN — BACLOFEN 1 MG: 10 TABLET ORAL at 22:11

## 2019-01-25 RX ADMIN — IPRATROPIUM BROMIDE AND ALBUTEROL SULFATE 1 ML: .5; 3 SOLUTION RESPIRATORY (INHALATION) at 08:56

## 2019-01-25 RX ADMIN — SOLIFENACIN SUCCINATE 1 MG: 5 TABLET, FILM COATED ORAL at 09:23

## 2019-01-25 RX ADMIN — SPIRONOLACTONE 1 MG: 25 TABLET, FILM COATED ORAL at 09:25

## 2019-01-25 RX ADMIN — SENNOSIDES 1 TAB: 8.6 TABLET, FILM COATED ORAL at 22:11

## 2019-01-25 RX ADMIN — EZETIMIBE 1 MG: 10 TABLET ORAL at 09:24

## 2019-01-25 RX ADMIN — NIFEDIPINE 1 MG: 60 TABLET, FILM COATED, EXTENDED RELEASE ORAL at 22:11

## 2019-01-25 RX ADMIN — LABETALOL 1 MG: 200 TABLET, FILM COATED ORAL at 22:11

## 2019-01-25 RX ADMIN — HYDROMORPHONE HYDROCHLORIDE 1 MG: 1 INJECTION, SOLUTION INTRAMUSCULAR; INTRAVENOUS; SUBCUTANEOUS at 23:07

## 2019-01-25 RX ADMIN — ENOXAPARIN SODIUM 1 MG: 100 INJECTION SUBCUTANEOUS at 09:52

## 2019-01-25 RX ADMIN — HYDROMORPHONE HYDROCHLORIDE 1 MG: 2 TABLET ORAL at 17:58

## 2019-01-25 RX ADMIN — BACLOFEN 1 MG: 10 TABLET ORAL at 09:22

## 2019-01-25 RX ADMIN — PREGABALIN 1 MG: 50 CAPSULE ORAL at 13:37

## 2019-01-25 RX ADMIN — HYDROMORPHONE HYDROCHLORIDE 1 MG: 2 TABLET ORAL at 09:19

## 2019-01-25 RX ADMIN — LUBIPROSTONE 1 MCG: 8 CAPSULE, GELATIN COATED ORAL at 09:20

## 2019-01-26 RX ADMIN — IPRATROPIUM BROMIDE AND ALBUTEROL SULFATE 1 ML: .5; 3 SOLUTION RESPIRATORY (INHALATION) at 20:00

## 2019-01-26 RX ADMIN — HYDROMORPHONE HYDROCHLORIDE 1 MG: 1 INJECTION, SOLUTION INTRAMUSCULAR; INTRAVENOUS; SUBCUTANEOUS at 20:44

## 2019-01-26 RX ADMIN — INSULIN ASPART 1 UNIT: 100 INJECTION, SOLUTION INTRAVENOUS; SUBCUTANEOUS at 12:42

## 2019-01-26 RX ADMIN — HYDROMORPHONE HYDROCHLORIDE 1 MG: 2 TABLET ORAL at 07:52

## 2019-01-26 RX ADMIN — IPRATROPIUM BROMIDE AND ALBUTEROL SULFATE 1 ML: .5; 3 SOLUTION RESPIRATORY (INHALATION) at 00:48

## 2019-01-26 RX ADMIN — HYDROMORPHONE HYDROCHLORIDE 1 MG: 1 INJECTION, SOLUTION INTRAMUSCULAR; INTRAVENOUS; SUBCUTANEOUS at 11:27

## 2019-01-26 RX ADMIN — MINERAL OIL, PETROLATUM, PHENYLEPHRINE HCL 1 APPLIC: 2.5; 140; 749 OINTMENT TOPICAL at 09:00

## 2019-01-26 RX ADMIN — FLUTICASONE FUROATE AND VILANTEROL TRIFENATATE 1 INH: 100; 25 POWDER RESPIRATORY (INHALATION) at 11:19

## 2019-01-26 RX ADMIN — HYDROMORPHONE HYDROCHLORIDE 1 MG: 1 INJECTION, SOLUTION INTRAMUSCULAR; INTRAVENOUS; SUBCUTANEOUS at 10:46

## 2019-01-26 RX ADMIN — DOCUSATE SODIUM 1 MG: 100 CAPSULE, LIQUID FILLED ORAL at 11:18

## 2019-01-26 RX ADMIN — LABETALOL 1 MG: 200 TABLET, FILM COATED ORAL at 21:00

## 2019-01-26 RX ADMIN — NIFEDIPINE 1 MG: 60 TABLET, FILM COATED, EXTENDED RELEASE ORAL at 21:00

## 2019-01-26 RX ADMIN — PSYLLIUM HUSK 1 PKT: 3.4 POWDER ORAL at 11:17

## 2019-01-26 RX ADMIN — IPRATROPIUM BROMIDE AND ALBUTEROL SULFATE 1 ML: .5; 3 SOLUTION RESPIRATORY (INHALATION) at 08:23

## 2019-01-26 RX ADMIN — LABETALOL 1 MG: 200 TABLET, FILM COATED ORAL at 13:00

## 2019-01-26 RX ADMIN — ENOXAPARIN SODIUM 1 MG: 100 INJECTION SUBCUTANEOUS at 09:52

## 2019-01-26 RX ADMIN — IPRATROPIUM BROMIDE AND ALBUTEROL SULFATE 1 ML: .5; 3 SOLUTION RESPIRATORY (INHALATION) at 21:45

## 2019-01-26 RX ADMIN — FUROSEMIDE 1 MG: 20 TABLET ORAL at 15:38

## 2019-01-26 RX ADMIN — BACLOFEN 1 MG: 10 TABLET ORAL at 22:30

## 2019-01-26 RX ADMIN — PREGABALIN 1 MG: 75 CAPSULE ORAL at 22:30

## 2019-01-26 RX ADMIN — PREGABALIN 1 MG: 50 CAPSULE ORAL at 15:27

## 2019-01-26 RX ADMIN — HYDROMORPHONE HYDROCHLORIDE 1 MG: 1 INJECTION, SOLUTION INTRAMUSCULAR; INTRAVENOUS; SUBCUTANEOUS at 15:16

## 2019-01-26 RX ADMIN — HYDROMORPHONE HYDROCHLORIDE 1 MG: 2 TABLET ORAL at 17:42

## 2019-01-26 RX ADMIN — INSULIN ASPART 1 UNIT: 100 INJECTION, SOLUTION INTRAVENOUS; SUBCUTANEOUS at 07:35

## 2019-01-26 RX ADMIN — PREGABALIN 1 MG: 50 CAPSULE ORAL at 07:00

## 2019-01-26 RX ADMIN — INSULIN ASPART 1 UNIT: 100 INJECTION, SOLUTION INTRAVENOUS; SUBCUTANEOUS at 17:35

## 2019-01-26 RX ADMIN — LUBIPROSTONE 1 MCG: 8 CAPSULE, GELATIN COATED ORAL at 11:15

## 2019-01-26 RX ADMIN — SPIRONOLACTONE 1 MG: 25 TABLET, FILM COATED ORAL at 11:51

## 2019-01-26 RX ADMIN — NIFEDIPINE 1 MG: 60 TABLET, FILM COATED, EXTENDED RELEASE ORAL at 09:00

## 2019-01-26 RX ADMIN — LIDOCAINE 1 PATCH: 50 PATCH TOPICAL at 11:14

## 2019-01-26 RX ADMIN — SENNOSIDES 1 TAB: 8.6 TABLET, FILM COATED ORAL at 22:31

## 2019-01-26 RX ADMIN — HYDROMORPHONE HYDROCHLORIDE 1 MG: 2 TABLET ORAL at 12:30

## 2019-01-26 RX ADMIN — HYDROMORPHONE HYDROCHLORIDE 1 MG: 1 INJECTION, SOLUTION INTRAMUSCULAR; INTRAVENOUS; SUBCUTANEOUS at 07:00

## 2019-01-26 RX ADMIN — HYDROMORPHONE HYDROCHLORIDE 1 MG: 2 TABLET ORAL at 02:18

## 2019-01-26 RX ADMIN — INSULIN ASPART 1 UNIT: 100 INJECTION, SOLUTION INTRAVENOUS; SUBCUTANEOUS at 21:00

## 2019-01-26 RX ADMIN — IPRATROPIUM BROMIDE AND ALBUTEROL SULFATE 1 ML: .5; 3 SOLUTION RESPIRATORY (INHALATION) at 13:58

## 2019-01-26 RX ADMIN — PAROXETINE HYDROCHLORIDE 1 MG: 12.5 TABLET, FILM COATED, EXTENDED RELEASE ORAL at 11:16

## 2019-01-26 RX ADMIN — ASPIRIN 81 MG CHEWABLE TABLET 1 MG: 81 TABLET CHEWABLE at 11:16

## 2019-01-26 RX ADMIN — ATORVASTATIN CALCIUM 1 MG: 40 TABLET, FILM COATED ORAL at 22:30

## 2019-01-26 RX ADMIN — DOCUSATE SODIUM 1 MG: 100 CAPSULE, LIQUID FILLED ORAL at 22:30

## 2019-01-26 RX ADMIN — LABETALOL 1 MG: 200 TABLET, FILM COATED ORAL at 09:00

## 2019-01-26 RX ADMIN — LUBIPROSTONE 1 MCG: 8 CAPSULE, GELATIN COATED ORAL at 22:30

## 2019-01-26 RX ADMIN — IPRATROPIUM BROMIDE AND ALBUTEROL SULFATE 1 ML: .5; 3 SOLUTION RESPIRATORY (INHALATION) at 18:00

## 2019-01-26 RX ADMIN — SOLIFENACIN SUCCINATE 1 MG: 5 TABLET, FILM COATED ORAL at 11:15

## 2019-01-26 RX ADMIN — PANTOPRAZOLE SODIUM 1 MG: 40 TABLET, DELAYED RELEASE ORAL at 07:00

## 2019-01-26 RX ADMIN — MINERAL OIL, PETROLATUM, PHENYLEPHRINE HCL 1 APPLIC: 2.5; 140; 749 OINTMENT TOPICAL at 22:36

## 2019-01-26 RX ADMIN — EZETIMIBE 1 MG: 10 TABLET ORAL at 11:15

## 2019-01-26 RX ADMIN — BACLOFEN 1 MG: 10 TABLET ORAL at 15:29

## 2019-01-26 RX ADMIN — LOSARTAN POTASSIUM 1 MG: 50 TABLET ORAL at 11:52

## 2019-01-26 RX ADMIN — BACLOFEN 1 MG: 10 TABLET ORAL at 11:53

## 2019-01-27 RX ADMIN — PSYLLIUM HUSK 1 PKT: 3.4 POWDER ORAL at 09:05

## 2019-01-27 RX ADMIN — LOSARTAN POTASSIUM 1 MG: 50 TABLET ORAL at 09:04

## 2019-01-27 RX ADMIN — INSULIN ASPART 1 UNIT: 100 INJECTION, SOLUTION INTRAVENOUS; SUBCUTANEOUS at 07:35

## 2019-01-27 RX ADMIN — ASPIRIN 81 MG CHEWABLE TABLET 1 MG: 81 TABLET CHEWABLE at 09:03

## 2019-01-27 RX ADMIN — SPIRONOLACTONE 1 MG: 25 TABLET, FILM COATED ORAL at 09:03

## 2019-01-27 RX ADMIN — MINERAL OIL, PETROLATUM, PHENYLEPHRINE HCL 1 APPLIC: 2.5; 140; 749 OINTMENT TOPICAL at 21:00

## 2019-01-27 RX ADMIN — ACETAMINOPHEN 1 MG: 325 TABLET, FILM COATED ORAL at 10:52

## 2019-01-27 RX ADMIN — IPRATROPIUM BROMIDE AND ALBUTEROL SULFATE 1 ML: .5; 3 SOLUTION RESPIRATORY (INHALATION) at 17:26

## 2019-01-27 RX ADMIN — DOCUSATE SODIUM 1 MG: 100 CAPSULE, LIQUID FILLED ORAL at 09:04

## 2019-01-27 RX ADMIN — HYDROMORPHONE HYDROCHLORIDE 1 MG: 1 INJECTION, SOLUTION INTRAMUSCULAR; INTRAVENOUS; SUBCUTANEOUS at 11:29

## 2019-01-27 RX ADMIN — DOCUSATE SODIUM 1 MG: 100 CAPSULE, LIQUID FILLED ORAL at 21:23

## 2019-01-27 RX ADMIN — PREGABALIN 1 MG: 75 CAPSULE ORAL at 21:23

## 2019-01-27 RX ADMIN — MINERAL OIL, PETROLATUM, PHENYLEPHRINE HCL 1 APPLIC: 2.5; 140; 749 OINTMENT TOPICAL at 09:00

## 2019-01-27 RX ADMIN — ENOXAPARIN SODIUM 1 MG: 100 INJECTION SUBCUTANEOUS at 09:05

## 2019-01-27 RX ADMIN — FUROSEMIDE 1 MG: 20 TABLET ORAL at 09:04

## 2019-01-27 RX ADMIN — HYDROMORPHONE HYDROCHLORIDE 1 MG: 2 TABLET ORAL at 21:25

## 2019-01-27 RX ADMIN — LUBIPROSTONE 1 MCG: 8 CAPSULE, GELATIN COATED ORAL at 21:23

## 2019-01-27 RX ADMIN — LIDOCAINE 1 PATCH: 50 PATCH TOPICAL at 09:08

## 2019-01-27 RX ADMIN — FLUTICASONE FUROATE AND VILANTEROL TRIFENATATE 1 INH: 100; 25 POWDER RESPIRATORY (INHALATION) at 09:02

## 2019-01-27 RX ADMIN — IPRATROPIUM BROMIDE AND ALBUTEROL SULFATE 1 ML: .5; 3 SOLUTION RESPIRATORY (INHALATION) at 08:35

## 2019-01-27 RX ADMIN — PREGABALIN 1 MG: 50 CAPSULE ORAL at 13:36

## 2019-01-27 RX ADMIN — IPRATROPIUM BROMIDE AND ALBUTEROL SULFATE 1 ML: .5; 3 SOLUTION RESPIRATORY (INHALATION) at 14:22

## 2019-01-27 RX ADMIN — BACLOFEN 1 MG: 10 TABLET ORAL at 09:05

## 2019-01-27 RX ADMIN — BACLOFEN 1 MG: 10 TABLET ORAL at 13:36

## 2019-01-27 RX ADMIN — IPRATROPIUM BROMIDE AND ALBUTEROL SULFATE 1 ML: .5; 3 SOLUTION RESPIRATORY (INHALATION) at 02:46

## 2019-01-27 RX ADMIN — IPRATROPIUM BROMIDE AND ALBUTEROL SULFATE 1 ML: .5; 3 SOLUTION RESPIRATORY (INHALATION) at 20:52

## 2019-01-27 RX ADMIN — LUBIPROSTONE 1 MCG: 8 CAPSULE, GELATIN COATED ORAL at 09:03

## 2019-01-27 RX ADMIN — NIFEDIPINE 1 MG: 60 TABLET, FILM COATED, EXTENDED RELEASE ORAL at 21:24

## 2019-01-27 RX ADMIN — EZETIMIBE 1 MG: 10 TABLET ORAL at 09:06

## 2019-01-27 RX ADMIN — HYDROMORPHONE HYDROCHLORIDE 1 MG: 2 TABLET ORAL at 08:15

## 2019-01-27 RX ADMIN — POLYETHYLENE GLYCOL 3350 1 GM: 17 POWDER, FOR SOLUTION ORAL at 17:36

## 2019-01-27 RX ADMIN — INSULIN ASPART 1 UNIT: 100 INJECTION, SOLUTION INTRAVENOUS; SUBCUTANEOUS at 12:24

## 2019-01-27 RX ADMIN — INSULIN ASPART 1 UNIT: 100 INJECTION, SOLUTION INTRAVENOUS; SUBCUTANEOUS at 17:33

## 2019-01-27 RX ADMIN — HYDROMORPHONE HYDROCHLORIDE 1 MG: 2 TABLET ORAL at 13:35

## 2019-01-27 RX ADMIN — SENNOSIDES 1 TAB: 8.6 TABLET, FILM COATED ORAL at 21:23

## 2019-01-27 RX ADMIN — PANTOPRAZOLE SODIUM 1 MG: 40 TABLET, DELAYED RELEASE ORAL at 06:19

## 2019-01-27 RX ADMIN — NIFEDIPINE 1 MG: 60 TABLET, FILM COATED, EXTENDED RELEASE ORAL at 09:06

## 2019-01-27 RX ADMIN — PAROXETINE HYDROCHLORIDE 1 MG: 12.5 TABLET, FILM COATED, EXTENDED RELEASE ORAL at 09:05

## 2019-01-27 RX ADMIN — HYDROMORPHONE HYDROCHLORIDE 1 MG: 2 TABLET ORAL at 17:32

## 2019-01-27 RX ADMIN — LABETALOL 1 MG: 200 TABLET, FILM COATED ORAL at 13:35

## 2019-01-27 RX ADMIN — FLUTICASONE PROPIONATE 1 SPRAY: 50 SPRAY, METERED NASAL at 21:22

## 2019-01-27 RX ADMIN — LABETALOL 1 MG: 200 TABLET, FILM COATED ORAL at 09:05

## 2019-01-27 RX ADMIN — ATORVASTATIN CALCIUM 1 MG: 40 TABLET, FILM COATED ORAL at 21:23

## 2019-01-27 RX ADMIN — BACLOFEN 1 MG: 10 TABLET ORAL at 21:23

## 2019-01-27 RX ADMIN — PREGABALIN 1 MG: 50 CAPSULE ORAL at 06:19

## 2019-01-27 RX ADMIN — SOLIFENACIN SUCCINATE 1 MG: 5 TABLET, FILM COATED ORAL at 09:06

## 2019-01-27 RX ADMIN — LABETALOL 1 MG: 200 TABLET, FILM COATED ORAL at 21:24

## 2019-01-27 RX ADMIN — INSULIN ASPART 1 UNIT: 100 INJECTION, SOLUTION INTRAVENOUS; SUBCUTANEOUS at 21:00

## 2019-01-28 RX ADMIN — LUBIPROSTONE 1 MCG: 8 CAPSULE, GELATIN COATED ORAL at 20:19

## 2019-01-28 RX ADMIN — LOSARTAN POTASSIUM 1 MG: 50 TABLET ORAL at 09:38

## 2019-01-28 RX ADMIN — PREGABALIN 1 MG: 50 CAPSULE ORAL at 14:21

## 2019-01-28 RX ADMIN — IPRATROPIUM BROMIDE AND ALBUTEROL SULFATE 1 ML: .5; 3 SOLUTION RESPIRATORY (INHALATION) at 14:00

## 2019-01-28 RX ADMIN — HYDROMORPHONE HYDROCHLORIDE 1 MG: 2 TABLET ORAL at 22:54

## 2019-01-28 RX ADMIN — PREGABALIN 1 MG: 75 CAPSULE ORAL at 20:19

## 2019-01-28 RX ADMIN — SPIRONOLACTONE 1 MG: 25 TABLET, FILM COATED ORAL at 09:37

## 2019-01-28 RX ADMIN — LABETALOL 1 MG: 200 TABLET, FILM COATED ORAL at 14:31

## 2019-01-28 RX ADMIN — IPRATROPIUM BROMIDE AND ALBUTEROL SULFATE 1 ML: .5; 3 SOLUTION RESPIRATORY (INHALATION) at 20:14

## 2019-01-28 RX ADMIN — HYDROMORPHONE HYDROCHLORIDE 1 MG: 1 INJECTION, SOLUTION INTRAMUSCULAR; INTRAVENOUS; SUBCUTANEOUS at 14:19

## 2019-01-28 RX ADMIN — HYDROMORPHONE HYDROCHLORIDE 1 MG: 2 TABLET ORAL at 18:49

## 2019-01-28 RX ADMIN — BACLOFEN 1 MG: 10 TABLET ORAL at 14:22

## 2019-01-28 RX ADMIN — SOLIFENACIN SUCCINATE 1 MG: 5 TABLET, FILM COATED ORAL at 09:37

## 2019-01-28 RX ADMIN — POLYETHYLENE GLYCOL 3350 1 GM: 17 POWDER, FOR SOLUTION ORAL at 09:35

## 2019-01-28 RX ADMIN — NIFEDIPINE 1 MG: 60 TABLET, FILM COATED, EXTENDED RELEASE ORAL at 20:22

## 2019-01-28 RX ADMIN — FLUTICASONE FUROATE AND VILANTEROL TRIFENATATE 1 INH: 100; 25 POWDER RESPIRATORY (INHALATION) at 09:21

## 2019-01-28 RX ADMIN — LIDOCAINE 1 PATCH: 50 PATCH TOPICAL at 09:40

## 2019-01-28 RX ADMIN — BACLOFEN 1 MG: 10 TABLET ORAL at 20:21

## 2019-01-28 RX ADMIN — ATORVASTATIN CALCIUM 1 MG: 40 TABLET, FILM COATED ORAL at 20:19

## 2019-01-28 RX ADMIN — IPRATROPIUM BROMIDE AND ALBUTEROL SULFATE 1 ML: .5; 3 SOLUTION RESPIRATORY (INHALATION) at 11:43

## 2019-01-28 RX ADMIN — MINERAL OIL, PETROLATUM, PHENYLEPHRINE HCL 1 APPLIC: 2.5; 140; 749 OINTMENT TOPICAL at 09:00

## 2019-01-28 RX ADMIN — SENNOSIDES 1 TAB: 8.6 TABLET, FILM COATED ORAL at 20:21

## 2019-01-28 RX ADMIN — ENOXAPARIN SODIUM 1 MG: 100 INJECTION SUBCUTANEOUS at 09:24

## 2019-01-28 RX ADMIN — PSYLLIUM HUSK 1 PKT: 3.4 POWDER ORAL at 09:35

## 2019-01-28 RX ADMIN — INSULIN ASPART 1 UNIT: 100 INJECTION, SOLUTION INTRAVENOUS; SUBCUTANEOUS at 20:42

## 2019-01-28 RX ADMIN — LABETALOL 1 MG: 200 TABLET, FILM COATED ORAL at 09:20

## 2019-01-28 RX ADMIN — HYDROMORPHONE HYDROCHLORIDE 1 MG: 1 INJECTION, SOLUTION INTRAMUSCULAR; INTRAVENOUS; SUBCUTANEOUS at 20:18

## 2019-01-28 RX ADMIN — PAROXETINE HYDROCHLORIDE 1 MG: 12.5 TABLET, FILM COATED, EXTENDED RELEASE ORAL at 09:37

## 2019-01-28 RX ADMIN — INSULIN ASPART 1 UNIT: 100 INJECTION, SOLUTION INTRAVENOUS; SUBCUTANEOUS at 12:00

## 2019-01-28 RX ADMIN — ASPIRIN 81 MG CHEWABLE TABLET 1 MG: 81 TABLET CHEWABLE at 09:19

## 2019-01-28 RX ADMIN — FLUTICASONE PROPIONATE 1 SPRAY: 50 SPRAY, METERED NASAL at 20:24

## 2019-01-28 RX ADMIN — HYDROMORPHONE HYDROCHLORIDE 1 MG: 2 TABLET ORAL at 10:00

## 2019-01-28 RX ADMIN — MINERAL OIL, PETROLATUM, PHENYLEPHRINE HCL 1 APPLIC: 2.5; 140; 749 OINTMENT TOPICAL at 20:24

## 2019-01-28 RX ADMIN — IPRATROPIUM BROMIDE AND ALBUTEROL SULFATE 1 ML: .5; 3 SOLUTION RESPIRATORY (INHALATION) at 08:10

## 2019-01-28 RX ADMIN — HYDROMORPHONE HYDROCHLORIDE 1 MG: 1 INJECTION, SOLUTION INTRAMUSCULAR; INTRAVENOUS; SUBCUTANEOUS at 16:09

## 2019-01-28 RX ADMIN — LUBIPROSTONE 1 MCG: 8 CAPSULE, GELATIN COATED ORAL at 09:39

## 2019-01-28 RX ADMIN — LABETALOL 1 MG: 200 TABLET, FILM COATED ORAL at 20:23

## 2019-01-28 RX ADMIN — HYDROMORPHONE HYDROCHLORIDE 1 MG: 1 INJECTION, SOLUTION INTRAMUSCULAR; INTRAVENOUS; SUBCUTANEOUS at 02:36

## 2019-01-28 RX ADMIN — HYDROMORPHONE HYDROCHLORIDE 1 MG: 2 TABLET ORAL at 05:40

## 2019-01-28 RX ADMIN — FUROSEMIDE 1 MG: 20 TABLET ORAL at 09:36

## 2019-01-28 RX ADMIN — EZETIMIBE 1 MG: 10 TABLET ORAL at 09:19

## 2019-01-28 RX ADMIN — DOCUSATE SODIUM 1 MG: 100 CAPSULE, LIQUID FILLED ORAL at 20:19

## 2019-01-28 RX ADMIN — HYDROMORPHONE HYDROCHLORIDE 1 MG: 1 INJECTION, SOLUTION INTRAMUSCULAR; INTRAVENOUS; SUBCUTANEOUS at 07:01

## 2019-01-28 RX ADMIN — INSULIN ASPART 1 UNIT: 100 INJECTION, SOLUTION INTRAVENOUS; SUBCUTANEOUS at 07:35

## 2019-01-28 RX ADMIN — BACLOFEN 1 MG: 10 TABLET ORAL at 09:18

## 2019-01-28 RX ADMIN — FLUTICASONE PROPIONATE 1 SPRAY: 50 SPRAY, METERED NASAL at 09:35

## 2019-01-28 RX ADMIN — PANTOPRAZOLE SODIUM 1 MG: 40 TABLET, DELAYED RELEASE ORAL at 05:40

## 2019-01-28 RX ADMIN — HYDROMORPHONE HYDROCHLORIDE 1 MG: 1 INJECTION, SOLUTION INTRAMUSCULAR; INTRAVENOUS; SUBCUTANEOUS at 11:11

## 2019-01-28 RX ADMIN — NIFEDIPINE 1 MG: 60 TABLET, FILM COATED, EXTENDED RELEASE ORAL at 09:37

## 2019-01-28 RX ADMIN — DOCUSATE SODIUM 1 MG: 100 CAPSULE, LIQUID FILLED ORAL at 09:39

## 2019-01-28 RX ADMIN — INSULIN ASPART 1 UNIT: 100 INJECTION, SOLUTION INTRAVENOUS; SUBCUTANEOUS at 17:35

## 2019-01-28 RX ADMIN — PREGABALIN 1 MG: 50 CAPSULE ORAL at 05:39

## 2019-01-29 RX ADMIN — FLUTICASONE FUROATE AND VILANTEROL TRIFENATATE 1 INH: 100; 25 POWDER RESPIRATORY (INHALATION) at 10:58

## 2019-01-29 RX ADMIN — PAROXETINE HYDROCHLORIDE 1 MG: 12.5 TABLET, FILM COATED, EXTENDED RELEASE ORAL at 10:51

## 2019-01-29 RX ADMIN — PANTOPRAZOLE SODIUM 1 MG: 40 TABLET, DELAYED RELEASE ORAL at 06:23

## 2019-01-29 RX ADMIN — PSYLLIUM HUSK 1 PKT: 3.4 POWDER ORAL at 10:57

## 2019-01-29 RX ADMIN — IPRATROPIUM BROMIDE AND ALBUTEROL SULFATE 1 ML: .5; 3 SOLUTION RESPIRATORY (INHALATION) at 14:23

## 2019-01-29 RX ADMIN — LUBIPROSTONE 1 MCG: 8 CAPSULE, GELATIN COATED ORAL at 21:44

## 2019-01-29 RX ADMIN — EZETIMIBE 1 MG: 10 TABLET ORAL at 10:52

## 2019-01-29 RX ADMIN — DOCUSATE SODIUM 1 MG: 100 CAPSULE, LIQUID FILLED ORAL at 21:44

## 2019-01-29 RX ADMIN — MINERAL OIL, PETROLATUM, PHENYLEPHRINE HCL 1 APPLIC: 2.5; 140; 749 OINTMENT TOPICAL at 21:51

## 2019-01-29 RX ADMIN — ASPIRIN 81 MG CHEWABLE TABLET 1 MG: 81 TABLET CHEWABLE at 10:52

## 2019-01-29 RX ADMIN — FLUTICASONE PROPIONATE 1 SPRAY: 50 SPRAY, METERED NASAL at 10:57

## 2019-01-29 RX ADMIN — FUROSEMIDE 1 MG: 20 TABLET ORAL at 11:52

## 2019-01-29 RX ADMIN — HYDROMORPHONE HYDROCHLORIDE 1 MG: 2 TABLET ORAL at 13:38

## 2019-01-29 RX ADMIN — NIFEDIPINE 1 MG: 60 TABLET, FILM COATED, EXTENDED RELEASE ORAL at 21:45

## 2019-01-29 RX ADMIN — BACLOFEN 1 MG: 10 TABLET ORAL at 21:46

## 2019-01-29 RX ADMIN — IPRATROPIUM BROMIDE AND ALBUTEROL SULFATE 1 ML: .5; 3 SOLUTION RESPIRATORY (INHALATION) at 08:14

## 2019-01-29 RX ADMIN — INSULIN ASPART 1 UNIT: 100 INJECTION, SOLUTION INTRAVENOUS; SUBCUTANEOUS at 12:00

## 2019-01-29 RX ADMIN — INSULIN ASPART 1 UNIT: 100 INJECTION, SOLUTION INTRAVENOUS; SUBCUTANEOUS at 17:35

## 2019-01-29 RX ADMIN — BACLOFEN 1 MG: 10 TABLET ORAL at 16:31

## 2019-01-29 RX ADMIN — LOSARTAN POTASSIUM 1 MG: 50 TABLET ORAL at 11:50

## 2019-01-29 RX ADMIN — SPIRONOLACTONE 1 MG: 25 TABLET, FILM COATED ORAL at 10:52

## 2019-01-29 RX ADMIN — FLUTICASONE PROPIONATE 1 SPRAY: 50 SPRAY, METERED NASAL at 21:43

## 2019-01-29 RX ADMIN — IPRATROPIUM BROMIDE AND ALBUTEROL SULFATE 1 ML: .5; 3 SOLUTION RESPIRATORY (INHALATION) at 20:32

## 2019-01-29 RX ADMIN — HYDROMORPHONE HYDROCHLORIDE 1 MG: 1 INJECTION, SOLUTION INTRAMUSCULAR; INTRAVENOUS; SUBCUTANEOUS at 21:49

## 2019-01-29 RX ADMIN — HYDROMORPHONE HYDROCHLORIDE 1 MG: 2 TABLET ORAL at 19:19

## 2019-01-29 RX ADMIN — HYDROMORPHONE HYDROCHLORIDE 1 MG: 1 INJECTION, SOLUTION INTRAMUSCULAR; INTRAVENOUS; SUBCUTANEOUS at 17:22

## 2019-01-29 RX ADMIN — INSULIN ASPART 1 UNIT: 100 INJECTION, SOLUTION INTRAVENOUS; SUBCUTANEOUS at 21:00

## 2019-01-29 RX ADMIN — ATORVASTATIN CALCIUM 1 MG: 40 TABLET, FILM COATED ORAL at 21:44

## 2019-01-29 RX ADMIN — POLYETHYLENE GLYCOL 3350 1 GM: 17 POWDER, FOR SOLUTION ORAL at 21:49

## 2019-01-29 RX ADMIN — NIFEDIPINE 1 MG: 60 TABLET, FILM COATED, EXTENDED RELEASE ORAL at 11:51

## 2019-01-29 RX ADMIN — LABETALOL 1 MG: 200 TABLET, FILM COATED ORAL at 21:45

## 2019-01-29 RX ADMIN — HYDROMORPHONE HYDROCHLORIDE 1 MG: 2 TABLET ORAL at 08:44

## 2019-01-29 RX ADMIN — BACLOFEN 1 MG: 10 TABLET ORAL at 11:50

## 2019-01-29 RX ADMIN — SOLIFENACIN SUCCINATE 1 MG: 5 TABLET, FILM COATED ORAL at 10:52

## 2019-01-29 RX ADMIN — PREGABALIN 1 MG: 50 CAPSULE ORAL at 06:23

## 2019-01-29 RX ADMIN — MINERAL OIL, PETROLATUM, PHENYLEPHRINE HCL 1 APPLIC: 2.5; 140; 749 OINTMENT TOPICAL at 09:00

## 2019-01-29 RX ADMIN — HYDROMORPHONE HYDROCHLORIDE 1 MG: 1 INJECTION, SOLUTION INTRAMUSCULAR; INTRAVENOUS; SUBCUTANEOUS at 06:24

## 2019-01-29 RX ADMIN — LABETALOL 1 MG: 200 TABLET, FILM COATED ORAL at 13:00

## 2019-01-29 RX ADMIN — ENOXAPARIN SODIUM 1 MG: 100 INJECTION SUBCUTANEOUS at 11:02

## 2019-01-29 RX ADMIN — LABETALOL 1 MG: 200 TABLET, FILM COATED ORAL at 11:52

## 2019-01-29 RX ADMIN — HYDROMORPHONE HYDROCHLORIDE 1 MG: 1 INJECTION, SOLUTION INTRAMUSCULAR; INTRAVENOUS; SUBCUTANEOUS at 16:30

## 2019-01-29 RX ADMIN — PREGABALIN 1 MG: 75 CAPSULE ORAL at 21:44

## 2019-01-29 RX ADMIN — INSULIN ASPART 1 UNIT: 100 INJECTION, SOLUTION INTRAVENOUS; SUBCUTANEOUS at 07:35

## 2019-01-29 RX ADMIN — SENNOSIDES 1 TAB: 8.6 TABLET, FILM COATED ORAL at 21:44

## 2019-01-29 RX ADMIN — PREGABALIN 1 MG: 50 CAPSULE ORAL at 13:37

## 2019-01-29 RX ADMIN — HYDROMORPHONE HYDROCHLORIDE 1 MG: 1 INJECTION, SOLUTION INTRAMUSCULAR; INTRAVENOUS; SUBCUTANEOUS at 10:51

## 2019-01-29 RX ADMIN — DOCUSATE SODIUM 1 MG: 100 CAPSULE, LIQUID FILLED ORAL at 11:51

## 2019-01-29 RX ADMIN — LIDOCAINE 1 PATCH: 50 PATCH TOPICAL at 11:00

## 2019-01-29 RX ADMIN — LUBIPROSTONE 1 MCG: 8 CAPSULE, GELATIN COATED ORAL at 10:51

## 2019-01-30 RX ADMIN — BACLOFEN 1 MG: 10 TABLET ORAL at 09:16

## 2019-01-30 RX ADMIN — LUBIPROSTONE 1 MCG: 8 CAPSULE, GELATIN COATED ORAL at 21:23

## 2019-01-30 RX ADMIN — ATORVASTATIN CALCIUM 1 MG: 40 TABLET, FILM COATED ORAL at 21:24

## 2019-01-30 RX ADMIN — LABETALOL 1 MG: 200 TABLET, FILM COATED ORAL at 12:31

## 2019-01-30 RX ADMIN — HYDROMORPHONE HYDROCHLORIDE 1 MG: 1 INJECTION, SOLUTION INTRAMUSCULAR; INTRAVENOUS; SUBCUTANEOUS at 12:32

## 2019-01-30 RX ADMIN — ASPIRIN 81 MG CHEWABLE TABLET 1 MG: 81 TABLET CHEWABLE at 09:17

## 2019-01-30 RX ADMIN — IPRATROPIUM BROMIDE AND ALBUTEROL SULFATE 1 ML: .5; 3 SOLUTION RESPIRATORY (INHALATION) at 19:52

## 2019-01-30 RX ADMIN — DOCUSATE SODIUM 1 MG: 100 CAPSULE, LIQUID FILLED ORAL at 09:16

## 2019-01-30 RX ADMIN — LOSARTAN POTASSIUM 1 MG: 50 TABLET ORAL at 09:16

## 2019-01-30 RX ADMIN — HYDROMORPHONE HYDROCHLORIDE 1 MG: 1 INJECTION, SOLUTION INTRAMUSCULAR; INTRAVENOUS; SUBCUTANEOUS at 21:31

## 2019-01-30 RX ADMIN — IPRATROPIUM BROMIDE AND ALBUTEROL SULFATE 1 ML: .5; 3 SOLUTION RESPIRATORY (INHALATION) at 14:00

## 2019-01-30 RX ADMIN — FUROSEMIDE 1 MG: 20 TABLET ORAL at 09:18

## 2019-01-30 RX ADMIN — HYDROMORPHONE HYDROCHLORIDE 1 MG: 2 TABLET ORAL at 14:01

## 2019-01-30 RX ADMIN — FLUTICASONE FUROATE AND VILANTEROL TRIFENATATE 1 INH: 100; 25 POWDER RESPIRATORY (INHALATION) at 09:14

## 2019-01-30 RX ADMIN — DOCUSATE SODIUM 1 MG: 100 CAPSULE, LIQUID FILLED ORAL at 21:24

## 2019-01-30 RX ADMIN — MINERAL OIL, PETROLATUM, PHENYLEPHRINE HCL 1 APPLIC: 2.5; 140; 749 OINTMENT TOPICAL at 09:24

## 2019-01-30 RX ADMIN — NIFEDIPINE 1 MG: 60 TABLET, FILM COATED, EXTENDED RELEASE ORAL at 21:24

## 2019-01-30 RX ADMIN — FLUTICASONE PROPIONATE 1 SPRAY: 50 SPRAY, METERED NASAL at 21:00

## 2019-01-30 RX ADMIN — LABETALOL 1 MG: 200 TABLET, FILM COATED ORAL at 09:18

## 2019-01-30 RX ADMIN — BACLOFEN 1 MG: 10 TABLET ORAL at 21:24

## 2019-01-30 RX ADMIN — PREGABALIN 1 MG: 50 CAPSULE ORAL at 06:42

## 2019-01-30 RX ADMIN — FLUTICASONE PROPIONATE 1 SPRAY: 50 SPRAY, METERED NASAL at 09:12

## 2019-01-30 RX ADMIN — HYDROMORPHONE HYDROCHLORIDE 1 MG: 2 TABLET ORAL at 09:17

## 2019-01-30 RX ADMIN — MINERAL OIL, PETROLATUM, PHENYLEPHRINE HCL 1 APPLIC: 2.5; 140; 749 OINTMENT TOPICAL at 21:00

## 2019-01-30 RX ADMIN — PREGABALIN 1 MG: 75 CAPSULE ORAL at 21:24

## 2019-01-30 RX ADMIN — LABETALOL 1 MG: 200 TABLET, FILM COATED ORAL at 21:24

## 2019-01-30 RX ADMIN — PANTOPRAZOLE SODIUM 1 MG: 40 TABLET, DELAYED RELEASE ORAL at 06:42

## 2019-01-30 RX ADMIN — LIDOCAINE 1 PATCH: 50 PATCH TOPICAL at 09:15

## 2019-01-30 RX ADMIN — BACLOFEN 1 MG: 10 TABLET ORAL at 12:31

## 2019-01-30 RX ADMIN — PAROXETINE HYDROCHLORIDE 1 MG: 12.5 TABLET, FILM COATED, EXTENDED RELEASE ORAL at 09:15

## 2019-01-30 RX ADMIN — POLYETHYLENE GLYCOL 3350 1 GM: 17 POWDER, FOR SOLUTION ORAL at 14:05

## 2019-01-30 RX ADMIN — INSULIN ASPART 1 UNIT: 100 INJECTION, SOLUTION INTRAVENOUS; SUBCUTANEOUS at 21:00

## 2019-01-30 RX ADMIN — EZETIMIBE 1 MG: 10 TABLET ORAL at 09:15

## 2019-01-30 RX ADMIN — SOLIFENACIN SUCCINATE 1 MG: 5 TABLET, FILM COATED ORAL at 09:15

## 2019-01-30 RX ADMIN — SPIRONOLACTONE 1 MG: 25 TABLET, FILM COATED ORAL at 09:17

## 2019-01-30 RX ADMIN — INSULIN ASPART 1 UNIT: 100 INJECTION, SOLUTION INTRAVENOUS; SUBCUTANEOUS at 07:35

## 2019-01-30 RX ADMIN — INSULIN ASPART 1 UNIT: 100 INJECTION, SOLUTION INTRAVENOUS; SUBCUTANEOUS at 12:00

## 2019-01-30 RX ADMIN — PREGABALIN 1 MG: 50 CAPSULE ORAL at 14:01

## 2019-01-30 RX ADMIN — LUBIPROSTONE 1 MCG: 8 CAPSULE, GELATIN COATED ORAL at 09:15

## 2019-01-30 RX ADMIN — MONTELUKAST SODIUM 1 MG: 10 TABLET, FILM COATED ORAL at 21:25

## 2019-01-30 RX ADMIN — NIFEDIPINE 1 MG: 60 TABLET, FILM COATED, EXTENDED RELEASE ORAL at 09:16

## 2019-01-30 RX ADMIN — HYDROMORPHONE HYDROCHLORIDE 1 MG: 2 TABLET ORAL at 18:33

## 2019-01-30 RX ADMIN — PSYLLIUM HUSK 1 PKT: 3.4 POWDER ORAL at 09:14

## 2019-01-30 RX ADMIN — INSULIN ASPART 1 UNIT: 100 INJECTION, SOLUTION INTRAVENOUS; SUBCUTANEOUS at 17:12

## 2019-01-30 RX ADMIN — HYDROMORPHONE HYDROCHLORIDE 1 MG: 1 INJECTION, SOLUTION INTRAMUSCULAR; INTRAVENOUS; SUBCUTANEOUS at 06:42

## 2019-01-30 RX ADMIN — HYDROMORPHONE HYDROCHLORIDE 1 MG: 1 INJECTION, SOLUTION INTRAMUSCULAR; INTRAVENOUS; SUBCUTANEOUS at 17:12

## 2019-01-30 RX ADMIN — ENOXAPARIN SODIUM 1 MG: 100 INJECTION SUBCUTANEOUS at 09:17

## 2019-01-30 RX ADMIN — HYDROMORPHONE HYDROCHLORIDE 1 MG: 2 TABLET ORAL at 22:36

## 2019-01-30 RX ADMIN — SENNOSIDES 1 TAB: 8.6 TABLET, FILM COATED ORAL at 21:25

## 2019-01-30 RX ADMIN — IPRATROPIUM BROMIDE AND ALBUTEROL SULFATE 1 ML: .5; 3 SOLUTION RESPIRATORY (INHALATION) at 07:41

## 2019-01-30 RX ADMIN — HYDROMORPHONE HYDROCHLORIDE 1 MG: 2 TABLET ORAL at 00:35

## 2019-01-31 RX ADMIN — DOCUSATE SODIUM 1 MG: 100 CAPSULE, LIQUID FILLED ORAL at 11:36

## 2019-01-31 RX ADMIN — SPIRONOLACTONE 1 MG: 25 TABLET, FILM COATED ORAL at 08:38

## 2019-01-31 RX ADMIN — HYDROMORPHONE HYDROCHLORIDE 1 MG: 2 TABLET ORAL at 08:32

## 2019-01-31 RX ADMIN — LOSARTAN POTASSIUM 1 MG: 50 TABLET ORAL at 08:45

## 2019-01-31 RX ADMIN — IPRATROPIUM BROMIDE AND ALBUTEROL SULFATE 1 ML: .5; 3 SOLUTION RESPIRATORY (INHALATION) at 07:45

## 2019-01-31 RX ADMIN — IPRATROPIUM BROMIDE AND ALBUTEROL SULFATE 1 ML: .5; 3 SOLUTION RESPIRATORY (INHALATION) at 00:40

## 2019-01-31 RX ADMIN — IPRATROPIUM BROMIDE AND ALBUTEROL SULFATE 1 ML: .5; 3 SOLUTION RESPIRATORY (INHALATION) at 16:20

## 2019-01-31 RX ADMIN — ENOXAPARIN SODIUM 1 MG: 100 INJECTION SUBCUTANEOUS at 16:28

## 2019-01-31 RX ADMIN — PANTOPRAZOLE SODIUM 1 MG: 40 TABLET, DELAYED RELEASE ORAL at 07:01

## 2019-01-31 RX ADMIN — LIDOCAINE 1 PATCH: 50 PATCH TOPICAL at 11:36

## 2019-01-31 RX ADMIN — MONTELUKAST SODIUM 1 MG: 10 TABLET, FILM COATED ORAL at 19:02

## 2019-01-31 RX ADMIN — IPRATROPIUM BROMIDE AND ALBUTEROL SULFATE 1 ML: .5; 3 SOLUTION RESPIRATORY (INHALATION) at 05:25

## 2019-01-31 RX ADMIN — HYDROMORPHONE HYDROCHLORIDE 1 MG: 1 INJECTION, SOLUTION INTRAMUSCULAR; INTRAVENOUS; SUBCUTANEOUS at 11:44

## 2019-01-31 RX ADMIN — PREGABALIN 1 MG: 50 CAPSULE ORAL at 13:46

## 2019-01-31 RX ADMIN — FLUTICASONE FUROATE AND VILANTEROL TRIFENATATE 1 INH: 100; 25 POWDER RESPIRATORY (INHALATION) at 11:31

## 2019-01-31 RX ADMIN — MINERAL OIL, PETROLATUM, PHENYLEPHRINE HCL 1 APPLIC: 2.5; 140; 749 OINTMENT TOPICAL at 09:00

## 2019-01-31 RX ADMIN — DOCUSATE SODIUM 1 MG: 100 CAPSULE, LIQUID FILLED ORAL at 08:37

## 2019-01-31 RX ADMIN — FLUTICASONE PROPIONATE 1 SPRAY: 50 SPRAY, METERED NASAL at 16:15

## 2019-01-31 RX ADMIN — INSULIN ASPART 1 UNIT: 100 INJECTION, SOLUTION INTRAVENOUS; SUBCUTANEOUS at 19:00

## 2019-01-31 RX ADMIN — BACLOFEN 1 MG: 10 TABLET ORAL at 11:44

## 2019-01-31 RX ADMIN — HYDROMORPHONE HYDROCHLORIDE 1 MG: 1 INJECTION, SOLUTION INTRAMUSCULAR; INTRAVENOUS; SUBCUTANEOUS at 16:14

## 2019-01-31 RX ADMIN — LABETALOL 1 MG: 200 TABLET, FILM COATED ORAL at 11:45

## 2019-01-31 RX ADMIN — EZETIMIBE 1 MG: 10 TABLET ORAL at 08:38

## 2019-01-31 RX ADMIN — INSULIN ASPART 1 UNIT: 100 INJECTION, SOLUTION INTRAVENOUS; SUBCUTANEOUS at 17:35

## 2019-01-31 RX ADMIN — BACLOFEN 1 MG: 10 TABLET ORAL at 08:39

## 2019-01-31 RX ADMIN — SOLIFENACIN SUCCINATE 1 MG: 5 TABLET, FILM COATED ORAL at 08:38

## 2019-01-31 RX ADMIN — FUROSEMIDE 1 MG: 20 TABLET ORAL at 09:00

## 2019-01-31 RX ADMIN — ASPIRIN 81 MG CHEWABLE TABLET 1 MG: 81 TABLET CHEWABLE at 08:39

## 2019-01-31 RX ADMIN — PREGABALIN 1 MG: 75 CAPSULE ORAL at 19:02

## 2019-01-31 RX ADMIN — LABETALOL 1 MG: 200 TABLET, FILM COATED ORAL at 16:35

## 2019-01-31 RX ADMIN — HYDROMORPHONE HYDROCHLORIDE 1 MG: 2 TABLET ORAL at 13:47

## 2019-01-31 RX ADMIN — PSYLLIUM HUSK 1 PKT: 3.4 POWDER ORAL at 11:46

## 2019-01-31 RX ADMIN — FLUTICASONE FUROATE AND VILANTEROL TRIFENATATE 1 INH: 100; 25 POWDER RESPIRATORY (INHALATION) at 11:43

## 2019-01-31 RX ADMIN — HYDROMORPHONE HYDROCHLORIDE 1 MG: 1 INJECTION, SOLUTION INTRAMUSCULAR; INTRAVENOUS; SUBCUTANEOUS at 01:38

## 2019-01-31 RX ADMIN — PAROXETINE HYDROCHLORIDE 1 MG: 12.5 TABLET, FILM COATED, EXTENDED RELEASE ORAL at 08:37

## 2019-01-31 RX ADMIN — INSULIN ASPART 1 UNIT: 100 INJECTION, SOLUTION INTRAVENOUS; SUBCUTANEOUS at 12:00

## 2019-01-31 RX ADMIN — INSULIN ASPART 1 UNIT: 100 INJECTION, SOLUTION INTRAVENOUS; SUBCUTANEOUS at 07:35

## 2019-01-31 RX ADMIN — HYDROMORPHONE HYDROCHLORIDE 1 MG: 2 TABLET ORAL at 18:32

## 2019-01-31 RX ADMIN — LUBIPROSTONE 1 MCG: 8 CAPSULE, GELATIN COATED ORAL at 08:48

## 2019-01-31 RX ADMIN — NIFEDIPINE 1 MG: 60 TABLET, FILM COATED, EXTENDED RELEASE ORAL at 11:46

## 2019-01-31 RX ADMIN — HYDROMORPHONE HYDROCHLORIDE 1 MG: 1 INJECTION, SOLUTION INTRAMUSCULAR; INTRAVENOUS; SUBCUTANEOUS at 07:01

## 2019-01-31 RX ADMIN — PREGABALIN 1 MG: 50 CAPSULE ORAL at 07:00

## 2019-01-31 RX ADMIN — HYDROMORPHONE HYDROCHLORIDE 1 MG: 2 TABLET ORAL at 03:16

## 2019-07-21 ENCOUNTER — HOSPITAL ENCOUNTER (INPATIENT)
Dept: HOSPITAL 91 - E/R | Age: 59
LOS: 12 days | Discharge: NURSING, REHAB OR LONG-TERM CARE | DRG: 557 | End: 2019-08-02
Payer: COMMERCIAL

## 2019-07-21 ENCOUNTER — HOSPITAL ENCOUNTER (INPATIENT)
Dept: HOSPITAL 10 - E/R | Age: 59
LOS: 12 days | Discharge: NURSING, REHAB OR LONG-TERM CARE | DRG: 557 | End: 2019-08-02
Payer: COMMERCIAL

## 2019-07-21 VITALS — SYSTOLIC BLOOD PRESSURE: 130 MMHG | DIASTOLIC BLOOD PRESSURE: 61 MMHG

## 2019-07-21 VITALS — DIASTOLIC BLOOD PRESSURE: 74 MMHG | SYSTOLIC BLOOD PRESSURE: 122 MMHG | RESPIRATION RATE: 18 BRPM | HEART RATE: 78 BPM

## 2019-07-21 VITALS — SYSTOLIC BLOOD PRESSURE: 147 MMHG | DIASTOLIC BLOOD PRESSURE: 65 MMHG | RESPIRATION RATE: 17 BRPM | HEART RATE: 70 BPM

## 2019-07-21 VITALS — HEART RATE: 72 BPM

## 2019-07-21 VITALS — RESPIRATION RATE: 17 BRPM | SYSTOLIC BLOOD PRESSURE: 142 MMHG | DIASTOLIC BLOOD PRESSURE: 65 MMHG | HEART RATE: 73 BPM

## 2019-07-21 VITALS
HEIGHT: 61 IN | BODY MASS INDEX: 49.32 KG/M2 | WEIGHT: 261.25 LBS | BODY MASS INDEX: 49.32 KG/M2 | WEIGHT: 261.25 LBS | HEIGHT: 61 IN

## 2019-07-21 DIAGNOSIS — E11.51: ICD-10-CM

## 2019-07-21 DIAGNOSIS — I69.354: ICD-10-CM

## 2019-07-21 DIAGNOSIS — E11.40: ICD-10-CM

## 2019-07-21 DIAGNOSIS — Z91.81: ICD-10-CM

## 2019-07-21 DIAGNOSIS — D64.9: ICD-10-CM

## 2019-07-21 DIAGNOSIS — I25.2: ICD-10-CM

## 2019-07-21 DIAGNOSIS — R47.01: ICD-10-CM

## 2019-07-21 DIAGNOSIS — G89.4: ICD-10-CM

## 2019-07-21 DIAGNOSIS — B02.9: ICD-10-CM

## 2019-07-21 DIAGNOSIS — B96.1: ICD-10-CM

## 2019-07-21 DIAGNOSIS — J45.909: ICD-10-CM

## 2019-07-21 DIAGNOSIS — I25.10: ICD-10-CM

## 2019-07-21 DIAGNOSIS — I10: ICD-10-CM

## 2019-07-21 DIAGNOSIS — T14.8XXA: ICD-10-CM
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
## 2019-07-21 DIAGNOSIS — E78.5: ICD-10-CM

## 2019-07-21 DIAGNOSIS — M76.30: Primary | ICD-10-CM

## 2019-07-21 DIAGNOSIS — N39.0: ICD-10-CM

## 2019-07-21 DIAGNOSIS — G47.33: ICD-10-CM

## 2019-07-21 DIAGNOSIS — G92: ICD-10-CM

## 2019-07-21 DIAGNOSIS — E66.01: ICD-10-CM

## 2019-07-21 LAB
ADD MAN DIFF?: NO
ADD UMIC: YES
ANION GAP: 8 (ref 5–13)
BASOPHIL #: 0.1 10^3/UL (ref 0–0.1)
BASOPHILS %: 0.4 % (ref 0–2)
BLOOD UREA NITROGEN: 22 MG/DL (ref 7–20)
CALCIUM: 9.5 MG/DL (ref 8.4–10.2)
CARBON DIOXIDE: 27 MMOL/L (ref 21–31)
CHLORIDE: 101 MMOL/L (ref 97–110)
CREATININE: 0.71 MG/DL (ref 0.44–1)
EOSINOPHILS #: 0.2 10^3/UL (ref 0–0.5)
EOSINOPHILS %: 1.5 % (ref 0–7)
GLUCOSE: 178 MG/DL (ref 70–220)
HEMATOCRIT: 33 % (ref 37–47)
HEMOGLOBIN: 10.5 G/DL (ref 12–16)
IMMATURE GRANS #M: 0.05 10^3/UL (ref 0–0.03)
IMMATURE GRANS % (M): 0.4 % (ref 0–0.43)
LYMPHOCYTES #: 2 10^3/UL (ref 0.8–2.9)
LYMPHOCYTES %: 14.7 % (ref 15–51)
MEAN CORPUSCULAR HEMOGLOBIN: 27.3 PG (ref 29–33)
MEAN CORPUSCULAR HGB CONC: 31.8 G/DL (ref 32–37)
MEAN CORPUSCULAR VOLUME: 85.7 FL (ref 82–101)
MEAN PLATELET VOLUME: 9.3 FL (ref 7.4–10.4)
MONOCYTE #: 1.3 10^3/UL (ref 0.3–0.9)
MONOCYTES %: 9.3 % (ref 0–11)
NEUTROPHIL #: 10 10^3/UL (ref 1.6–7.5)
NEUTROPHILS %: 73.7 % (ref 39–77)
NUCLEATED RED BLOOD CELLS #: 0 10^3/UL (ref 0–0)
NUCLEATED RED BLOOD CELLS%: 0 /100WBC (ref 0–0)
PLATELET COUNT: 375 10^3/UL (ref 140–415)
POTASSIUM: 5 MMOL/L (ref 3.5–5.1)
RED BLOOD COUNT: 3.85 10^6/UL (ref 4.2–5.4)
RED CELL DISTRIBUTION WIDTH: 13 % (ref 11.5–14.5)
SODIUM: 136 MMOL/L (ref 135–144)
TROPONIN-I: < 0.012 NG/ML (ref 0–0.12)
UR ASCORBIC ACID: NEGATIVE MG/DL
UR BACTERIA: (no result) /HPF
UR BILIRUBIN (DIP): NEGATIVE MG/DL
UR BLOOD (DIP): NEGATIVE MG/DL
UR CLARITY: (no result)
UR COLOR: YELLOW
UR GLUCOSE (DIP): NEGATIVE MG/DL
UR KETONES (DIP): NEGATIVE MG/DL
UR LEUKOCYTE ESTERASE (DIP): (no result) LEU/UL
UR MUCUS: (no result) /HPF
UR NITRITE (DIP): NEGATIVE MG/DL
UR PH (DIP): 5 (ref 5–9)
UR RBC: 1 /HPF (ref 0–5)
UR SPECIFIC GRAVITY (DIP): 1.01 (ref 1–1.03)
UR SQUAMOUS EPITHELIAL CELL: (no result) /HPF
UR TOTAL PROTEIN (DIP): NEGATIVE MG/DL
UR UROBILINOGEN (DIP): NEGATIVE MG/DL
UR WBC: 65 /HPF (ref 0–5)
WHITE BLOOD COUNT: 13.5 10^3/UL (ref 4.8–10.8)

## 2019-07-21 PROCEDURE — 84443 ASSAY THYROID STIM HORMONE: CPT

## 2019-07-21 PROCEDURE — 97116 GAIT TRAINING THERAPY: CPT

## 2019-07-21 PROCEDURE — 93880 EXTRACRANIAL BILAT STUDY: CPT

## 2019-07-21 PROCEDURE — 97165 OT EVAL LOW COMPLEX 30 MIN: CPT

## 2019-07-21 PROCEDURE — 93306 TTE W/DOPPLER COMPLETE: CPT

## 2019-07-21 PROCEDURE — 85025 COMPLETE CBC W/AUTO DIFF WBC: CPT

## 2019-07-21 PROCEDURE — 93005 ELECTROCARDIOGRAM TRACING: CPT

## 2019-07-21 PROCEDURE — 87086 URINE CULTURE/COLONY COUNT: CPT

## 2019-07-21 PROCEDURE — 70498 CT ANGIOGRAPHY NECK: CPT

## 2019-07-21 PROCEDURE — 36415 COLL VENOUS BLD VENIPUNCTURE: CPT

## 2019-07-21 PROCEDURE — 96372 THER/PROPH/DIAG INJ SC/IM: CPT

## 2019-07-21 PROCEDURE — 73520: CPT

## 2019-07-21 PROCEDURE — 81003 URINALYSIS AUTO W/O SCOPE: CPT

## 2019-07-21 PROCEDURE — 99285 EMERGENCY DEPT VISIT HI MDM: CPT

## 2019-07-21 PROCEDURE — 81001 URINALYSIS AUTO W/SCOPE: CPT

## 2019-07-21 PROCEDURE — 97161 PT EVAL LOW COMPLEX 20 MIN: CPT

## 2019-07-21 PROCEDURE — 72170 X-RAY EXAM OF PELVIS: CPT

## 2019-07-21 PROCEDURE — 80061 LIPID PANEL: CPT

## 2019-07-21 PROCEDURE — 84100 ASSAY OF PHOSPHORUS: CPT

## 2019-07-21 PROCEDURE — 94660 CPAP INITIATION&MGMT: CPT

## 2019-07-21 PROCEDURE — 97530 THERAPEUTIC ACTIVITIES: CPT

## 2019-07-21 PROCEDURE — 83735 ASSAY OF MAGNESIUM: CPT

## 2019-07-21 PROCEDURE — 85651 RBC SED RATE NONAUTOMATED: CPT

## 2019-07-21 PROCEDURE — 86140 C-REACTIVE PROTEIN: CPT

## 2019-07-21 PROCEDURE — 82962 GLUCOSE BLOOD TEST: CPT

## 2019-07-21 PROCEDURE — 71045 X-RAY EXAM CHEST 1 VIEW: CPT

## 2019-07-21 PROCEDURE — 84484 ASSAY OF TROPONIN QUANT: CPT

## 2019-07-21 PROCEDURE — 83036 HEMOGLOBIN GLYCOSYLATED A1C: CPT

## 2019-07-21 PROCEDURE — 87040 BLOOD CULTURE FOR BACTERIA: CPT

## 2019-07-21 PROCEDURE — 73110 X-RAY EXAM OF WRIST: CPT

## 2019-07-21 PROCEDURE — 97110 THERAPEUTIC EXERCISES: CPT

## 2019-07-21 PROCEDURE — 73090 X-RAY EXAM OF FOREARM: CPT

## 2019-07-21 PROCEDURE — 70450 CT HEAD/BRAIN W/O DYE: CPT

## 2019-07-21 PROCEDURE — 80053 COMPREHEN METABOLIC PANEL: CPT

## 2019-07-21 PROCEDURE — 80048 BASIC METABOLIC PNL TOTAL CA: CPT

## 2019-07-21 PROCEDURE — 83540 ASSAY OF IRON: CPT

## 2019-07-21 PROCEDURE — 97535 SELF CARE MNGMENT TRAINING: CPT

## 2019-07-21 RX ADMIN — BACLOFEN SCH MG: 10 TABLET ORAL at 21:23

## 2019-07-21 RX ADMIN — DOCUSATE SODIUM SCH MG: 100 CAPSULE, LIQUID FILLED ORAL at 20:52

## 2019-07-21 RX ADMIN — HYDROMORPHONE HYDROCHLORIDE 1 MG: 1 INJECTION, SOLUTION INTRAMUSCULAR; INTRAVENOUS; SUBCUTANEOUS at 16:28

## 2019-07-21 RX ADMIN — NIFEDIPINE SCH MG: 60 TABLET, FILM COATED, EXTENDED RELEASE ORAL at 20:55

## 2019-07-21 RX ADMIN — POTASSIUM ACETATE 1 MLS/HR: 3.93 INJECTION, SOLUTION, CONCENTRATE INTRAVENOUS at 12:30

## 2019-07-21 RX ADMIN — HYDROMORPHONE HYDROCHLORIDE PRN MG: 1 INJECTION, SOLUTION INTRAMUSCULAR; INTRAVENOUS; SUBCUTANEOUS at 10:53

## 2019-07-21 RX ADMIN — HYDROMORPHONE HYDROCHLORIDE PRN MG: 1 INJECTION, SOLUTION INTRAMUSCULAR; INTRAVENOUS; SUBCUTANEOUS at 20:34

## 2019-07-21 RX ADMIN — HYDROMORPHONE HYDROCHLORIDE 1 MG: 1 INJECTION, SOLUTION INTRAMUSCULAR; INTRAVENOUS; SUBCUTANEOUS at 10:53

## 2019-07-21 RX ADMIN — KETOROLAC TROMETHAMINE 1 MG: 30 INJECTION, SOLUTION INTRAMUSCULAR at 05:09

## 2019-07-21 RX ADMIN — PREGABALIN 1 MG: 75 CAPSULE ORAL at 21:23

## 2019-07-21 RX ADMIN — ALBUTEROL SULFATE SCH PUFF: 90 AEROSOL, METERED RESPIRATORY (INHALATION) at 22:37

## 2019-07-21 RX ADMIN — INSULIN ASPART 1 UNIT: 100 INJECTION, SOLUTION INTRAVENOUS; SUBCUTANEOUS at 17:50

## 2019-07-21 RX ADMIN — ALBUTEROL SULFATE 1 PUFF: 90 AEROSOL, METERED RESPIRATORY (INHALATION) at 22:37

## 2019-07-21 RX ADMIN — DOCUSATE SODIUM 1 MG: 100 CAPSULE, LIQUID FILLED ORAL at 20:52

## 2019-07-21 RX ADMIN — HYDROMORPHONE HYDROCHLORIDE 1 MG: 1 INJECTION, SOLUTION INTRAMUSCULAR; INTRAVENOUS; SUBCUTANEOUS at 20:34

## 2019-07-21 RX ADMIN — NIFEDIPINE 1 MG: 60 TABLET, FILM COATED, EXTENDED RELEASE ORAL at 20:55

## 2019-07-21 RX ADMIN — LABETALOL 1 MG: 200 TABLET, FILM COATED ORAL at 23:04

## 2019-07-21 RX ADMIN — BACLOFEN 1 MG: 10 TABLET ORAL at 21:23

## 2019-07-21 RX ADMIN — ATORVASTATIN CALCIUM 1 MG: 40 TABLET, FILM COATED ORAL at 21:23

## 2019-07-21 RX ADMIN — CALCIUM GLUCONATE SCH MLS/HR: 94 INJECTION, SOLUTION INTRAVENOUS at 12:30

## 2019-07-21 RX ADMIN — LIDOCAINE HYDROCHLORIDE 1 MLS/HR: 10 INJECTION, SOLUTION EPIDURAL; INFILTRATION; INTRACAUDAL; PERINEURAL at 05:37

## 2019-07-21 RX ADMIN — HYDROMORPHONE HYDROCHLORIDE PRN MG: 1 INJECTION, SOLUTION INTRAMUSCULAR; INTRAVENOUS; SUBCUTANEOUS at 16:28

## 2019-07-21 RX ADMIN — INSULIN ASPART 1 UNIT: 100 INJECTION, SOLUTION INTRAVENOUS; SUBCUTANEOUS at 22:26

## 2019-07-21 RX ADMIN — ATORVASTATIN CALCIUM SCH MG: 40 TABLET, FILM COATED ORAL at 21:23

## 2019-07-21 NOTE — ERD
ER Documentation


Chief Complaint


Chief Complaint





CHEST PAIN, RT ARM PAIN FROM FALL.





HPI


This is a 59-year-old woman here with multiple complaints including right 


lateral thigh pain due to iliotibial band syndrome, right wrist pain status post


fall yesterday, and complaints of chronic body pain and chest pain.  She is here


requesting opioid analgesics but also wants to be admitted for physical 


rehabilitation services.  She states she was admitted to [facility] physical rehabilitation a few months ago and it really helped her and 


because of her deteriorating condition at home she feels she will benefit from 


rehab services.  She states she has been using hydromorphone at home without 


relief of her pain.  She denies fevers or chills, no head or neck injury, no 


complaints of paresis or paresthesias.





ROS


All systems reviewed and are negative except as per history of present illness.





Allergies


Allergies:  


Coded Allergies:  


     tramadol (Verified  Allergy, Severe, SEVERE HEADACHE, 7/21/19)


     cephalexin (Verified  Allergy, Intermediate, 7/21/19)


     erythromycin base (Verified  Allergy, Intermediate, 7/21/19)





PMhx/Soc


Chronic neuropathic pain, right iliotibial band syndrome, chronic pain syndrome,


history of CVA with left-sided paresis, hyperlipidemia, diabetes mellitus type 


2, obstructive sleep apnea, depression, CAD with history of MI, asthma, 


hypertension, obesity, chronic headaches


History of Surgery:  Yes (see PT note)


Anesthesia Reaction:  No


Hx Neurological Disorder:  Yes (CVA)


Hx Respiratory Disorders:  Yes (asthma; obstructive sleep apnea)


Hx Cardiac Disorders:  Yes (HTN; CAD; Hyperlipdemia; )


Hx Psychiatric Problems:  Yes (depression)


Hx Miscellaneous Medical Probl:  No


Hx Alcohol Use:  No


Hx Substance Use:  No


Hx Tobacco Use:  No





FmHx


Family History:  No diabetes





Physical Exam


Vitals





Vital Signs


  Date      Temp  Pulse  Resp  B/P (MAP)   Pulse Ox  O2          O2 Flow    FiO2


Time                                                 Delivery    Rate


   7/21/19           83    17      158/71        95  Room Air


     04:45                          (100)


   7/21/19  98.9     77    16      173/69        98


     04:15                          (103)





Physical Exam


Const:   Well-developed well-nourished woman, Dehydrated, nontoxic in 


appearance, afebrile well-developed well-nourished woman, debilitated appearing


Resp:   Poor breath sounds bilaterally


Cardio:   Regular rate and rhythm, no murmurs


Skin:   No petechiae or rashes.  There is soft tissue contusion and ecchymosis 


to the right proximal palm and dorsal right wrist but no bony tenderness or 


deformity, no lacerations no target lesions


Ext:    No cyanosis, 2+ pitting edema in the lower extremities bilaterally, 


calves are symmetrical, distal pulses equal bilateral


Neur:   Awake and alert x3, no focal deficits or facial asymmetry, pupils equal 


round reactive to light


Psych:    Normal Mood and Affect


Result Diagram:  


7/21/19 0531





Results 24 hrs





Laboratory Tests


              Test
                                 7/21/19
05:31


              White Blood Count                     13.5 10^3/ul


              Red Blood Count                       3.85 10^6/ul


              Hemoglobin                               10.5 g/dl


              Hematocrit                                  33.0 %


              Mean Corpuscular Volume                    85.7 fl


              Mean Corpuscular Hemoglobin                27.3 pg


              Mean Corpuscular Hemoglobin
Concent     31.8 g/dl 



              Red Cell Distribution Width                 13.0 %


              Platelet Count                         375 10^3/UL


              Mean Platelet Volume                        9.3 fl


              Immature Granulocytes %                    0.400 %


              Neutrophils %                               73.7 %


              Lymphocytes %                               14.7 %


              Monocytes %                                  9.3 %


              Eosinophils %                                1.5 %


              Basophils %                                  0.4 %


              Nucleated Red Blood Cells %            0.0 /100WBC


              Immature Granulocytes #              0.050 10^3/ul


              Neutrophils #                         10.0 10^3/ul


              Lymphocytes #                          2.0 10^3/ul


              Monocytes #                            1.3 10^3/ul


              Eosinophils #                          0.2 10^3/ul


              Basophils #                            0.1 10^3/ul


              Nucleated Red Blood Cells #            0.0 10^3/ul





Current Medications


 Medications
   Dose
          Sig/Leo
       Start Time
   Status  Last


 (Trade)       Ordered        Route
 PRN     Stop Time              Admin
Dose


                              Reason                                Admin


 Ketorolac
     30 mg          ONCE  STAT
    7/21/19       DC           7/21/19


Tromethamine
                 IM
            04:52
                       05:09



 (Toradol)                                   7/21/19 04:53


 Sodium         500 ml @ 
     Q1H STAT
      7/21/19 7/21/19


Chloride       500 mls/hr     IV
            05:10
                       05:37



                                             7/21/19 06:09








Procedures/MDM


IV line was established patient was placed on cardiac monitor rhythm strip 


revealed a sinus rhythm at about 70 bpm with upright P and T waves.  Patient was


afebrile





X-ray right wrist 3V Interpreted by me: 


Scaphoid:   Normal


Bones:    No fracture


Joints:       No dislocation


Foreign body:    None





One AP view of the chest performed, read by me reveals no acute infiltrates, 


normal mediastinum, sharp costophrenic and cardiac borders, no air under the 


diaphragm. Otherwise unremarkable chest x-ray.





EKG performed, read by me: 74 bpm, normal sinus rhythm, normal axis, no acute ST


segment changes, narrow QRS complex, with good R-wave progression in precordial 


leads.





I administered 500 cc normal saline IV and Toradol 30 mg IM x1





CBC was unremarkable, other labs are pending I will follow-up.





I spoke to Dr. Kramer regarding the patient's presentation and 


symptomatology, he kindly agreed to admit the patient for continued pain control


and possible rehab services.





Departure


Diagnosis:  


   Primary Impression:  


   Intractable pain


   Additional Impressions:  


   Acute dehydration


   Wrist sprain


   Encounter type:  initial encounter  Laterality:  right  Qualified Codes:  S


   63.501A - Unspecified sprain of right wrist, initial encounter


   Contusion of soft tissue


Condition:  MAYELA Paige MD             Jul 21, 2019 04:45

## 2019-07-22 VITALS — HEART RATE: 69 BPM | RESPIRATION RATE: 18 BRPM | SYSTOLIC BLOOD PRESSURE: 120 MMHG | DIASTOLIC BLOOD PRESSURE: 56 MMHG

## 2019-07-22 VITALS — RESPIRATION RATE: 16 BRPM | HEART RATE: 69 BPM | DIASTOLIC BLOOD PRESSURE: 65 MMHG | SYSTOLIC BLOOD PRESSURE: 146 MMHG

## 2019-07-22 VITALS — SYSTOLIC BLOOD PRESSURE: 139 MMHG | RESPIRATION RATE: 18 BRPM | DIASTOLIC BLOOD PRESSURE: 60 MMHG

## 2019-07-22 VITALS — DIASTOLIC BLOOD PRESSURE: 58 MMHG | SYSTOLIC BLOOD PRESSURE: 117 MMHG | HEART RATE: 67 BPM

## 2019-07-22 VITALS — SYSTOLIC BLOOD PRESSURE: 111 MMHG | DIASTOLIC BLOOD PRESSURE: 56 MMHG | HEART RATE: 63 BPM | RESPIRATION RATE: 16 BRPM

## 2019-07-22 LAB
ADD MAN DIFF?: NO
ALANINE AMINOTRANSFERASE: 31 IU/L (ref 13–69)
ALBUMIN/GLOBULIN RATIO: 1.28
ALBUMIN: 3.6 G/DL (ref 3.3–4.9)
ALKALINE PHOSPHATASE: 103 IU/L (ref 42–121)
ANION GAP: 5 (ref 5–13)
ASPARTATE AMINO TRANSFERASE: 23 IU/L (ref 15–46)
BASOPHIL #: 0 10^3/UL (ref 0–0.1)
BASOPHILS %: 0.5 % (ref 0–2)
BILIRUBIN,DIRECT: 0 MG/DL (ref 0–0.2)
BILIRUBIN,TOTAL: 0.9 MG/DL (ref 0.2–1.3)
BLOOD UREA NITROGEN: 14 MG/DL (ref 7–20)
CALCIUM: 9.2 MG/DL (ref 8.4–10.2)
CARBON DIOXIDE: 29 MMOL/L (ref 21–31)
CHLORIDE: 104 MMOL/L (ref 97–110)
CREATININE: 0.49 MG/DL (ref 0.44–1)
EOSINOPHILS #: 0.2 10^3/UL (ref 0–0.5)
EOSINOPHILS %: 2.7 % (ref 0–7)
GLOBULIN: 2.8 G/DL (ref 1.3–3.2)
GLUCOSE: 150 MG/DL (ref 70–220)
HEMATOCRIT: 32.4 % (ref 37–47)
HEMOGLOBIN A1C: 10.8 % (ref 0–5.9)
HEMOGLOBIN: 10.2 G/DL (ref 12–16)
IMMATURE GRANS #M: 0.02 10^3/UL (ref 0–0.03)
IMMATURE GRANS % (M): 0.3 % (ref 0–0.43)
LYMPHOCYTES #: 2.4 10^3/UL (ref 0.8–2.9)
LYMPHOCYTES %: 31 % (ref 15–51)
MAGNESIUM: 2 MG/DL (ref 1.7–2.5)
MEAN CORPUSCULAR HEMOGLOBIN: 26.6 PG (ref 29–33)
MEAN CORPUSCULAR HGB CONC: 31.5 G/DL (ref 32–37)
MEAN CORPUSCULAR VOLUME: 84.6 FL (ref 82–101)
MEAN PLATELET VOLUME: 9.8 FL (ref 7.4–10.4)
MONOCYTE #: 0.8 10^3/UL (ref 0.3–0.9)
MONOCYTES %: 9.8 % (ref 0–11)
NEUTROPHIL #: 4.4 10^3/UL (ref 1.6–7.5)
NEUTROPHILS %: 55.7 % (ref 39–77)
NUCLEATED RED BLOOD CELLS #: 0 10^3/UL (ref 0–0)
NUCLEATED RED BLOOD CELLS%: 0 /100WBC (ref 0–0)
PHOSPHORUS: 3.9 MG/DL (ref 2.5–4.9)
PLATELET COUNT: 356 10^3/UL (ref 140–415)
POTASSIUM: 4.4 MMOL/L (ref 3.5–5.1)
RED BLOOD COUNT: 3.83 10^6/UL (ref 4.2–5.4)
RED CELL DISTRIBUTION WIDTH: 13.1 % (ref 11.5–14.5)
SODIUM: 138 MMOL/L (ref 135–144)
THYROID STIMULATING HORMONE: 2.27 MIU/L (ref 0.47–4.68)
TOTAL PROTEIN: 6.4 G/DL (ref 6.1–8.1)
WHITE BLOOD COUNT: 7.9 10^3/UL (ref 4.8–10.8)

## 2019-07-22 RX ADMIN — ASPIRIN 81 MG CHEWABLE TABLET 1 MG: 81 TABLET CHEWABLE at 08:41

## 2019-07-22 RX ADMIN — HYDROMORPHONE HYDROCHLORIDE 1 MG: 1 INJECTION, SOLUTION INTRAMUSCULAR; INTRAVENOUS; SUBCUTANEOUS at 13:07

## 2019-07-22 RX ADMIN — ALBUTEROL SULFATE SCH PUFF: 90 AEROSOL, METERED RESPIRATORY (INHALATION) at 08:42

## 2019-07-22 RX ADMIN — PREGABALIN 1 MG: 75 CAPSULE ORAL at 12:42

## 2019-07-22 RX ADMIN — LABETALOL 1 MG: 200 TABLET, FILM COATED ORAL at 21:04

## 2019-07-22 RX ADMIN — POTASSIUM ACETATE 1 MLS/HR: 3.93 INJECTION, SOLUTION, CONCENTRATE INTRAVENOUS at 13:44

## 2019-07-22 RX ADMIN — ATORVASTATIN CALCIUM 1 MG: 40 TABLET, FILM COATED ORAL at 21:04

## 2019-07-22 RX ADMIN — LINAGLIPTIN 1 MG: 5 TABLET, FILM COATED ORAL at 08:42

## 2019-07-22 RX ADMIN — BACLOFEN SCH MG: 10 TABLET ORAL at 21:05

## 2019-07-22 RX ADMIN — DOCUSATE SODIUM SCH MG: 100 CAPSULE, LIQUID FILLED ORAL at 08:42

## 2019-07-22 RX ADMIN — SPIRONOLACTONE SCH MG: 25 TABLET ORAL at 08:40

## 2019-07-22 RX ADMIN — ENOXAPARIN SODIUM SCH MG: 100 INJECTION SUBCUTANEOUS at 08:38

## 2019-07-22 RX ADMIN — ASPIRIN 81 MG SCH MG: 81 TABLET ORAL at 08:41

## 2019-07-22 RX ADMIN — LOSARTAN POTASSIUM SCH MG: 50 TABLET, FILM COATED ORAL at 08:42

## 2019-07-22 RX ADMIN — DOCUSATE SODIUM SCH MG: 100 CAPSULE, LIQUID FILLED ORAL at 21:05

## 2019-07-22 RX ADMIN — BACLOFEN SCH MG: 10 TABLET ORAL at 12:42

## 2019-07-22 RX ADMIN — ATORVASTATIN CALCIUM SCH MG: 40 TABLET, FILM COATED ORAL at 21:04

## 2019-07-22 RX ADMIN — PREGABALIN SCH MG: 75 CAPSULE ORAL at 21:05

## 2019-07-22 RX ADMIN — BACLOFEN 1 MG: 10 TABLET ORAL at 08:41

## 2019-07-22 RX ADMIN — LABETALOL 1 MG: 200 TABLET, FILM COATED ORAL at 08:41

## 2019-07-22 RX ADMIN — ALBUTEROL SULFATE SCH PUFF: 90 AEROSOL, METERED RESPIRATORY (INHALATION) at 21:02

## 2019-07-22 RX ADMIN — HYDROMORPHONE HYDROCHLORIDE 1 MG: 1 INJECTION, SOLUTION INTRAMUSCULAR; INTRAVENOUS; SUBCUTANEOUS at 09:07

## 2019-07-22 RX ADMIN — PANTOPRAZOLE SODIUM SCH MG: 40 TABLET, DELAYED RELEASE ORAL at 08:40

## 2019-07-22 RX ADMIN — SOLIFENACIN SUCCINATE 1 MG: 5 TABLET, FILM COATED ORAL at 09:00

## 2019-07-22 RX ADMIN — HYDROMORPHONE HYDROCHLORIDE PRN MG: 1 INJECTION, SOLUTION INTRAMUSCULAR; INTRAVENOUS; SUBCUTANEOUS at 09:07

## 2019-07-22 RX ADMIN — ALBUTEROL SULFATE 1 PUFF: 90 AEROSOL, METERED RESPIRATORY (INHALATION) at 21:02

## 2019-07-22 RX ADMIN — PREGABALIN SCH MG: 75 CAPSULE ORAL at 12:42

## 2019-07-22 RX ADMIN — CALCIUM GLUCONATE SCH MLS/HR: 94 INJECTION, SOLUTION INTRAVENOUS at 13:44

## 2019-07-22 RX ADMIN — LINAGLIPTIN SCH MG: 5 TABLET, FILM COATED ORAL at 08:42

## 2019-07-22 RX ADMIN — SOLIFENACIN SUCCINATE SCH MG: 5 TABLET, FILM COATED ORAL at 09:00

## 2019-07-22 RX ADMIN — LABETALOL 1 MG: 200 TABLET, FILM COATED ORAL at 12:41

## 2019-07-22 RX ADMIN — INSULIN ASPART 1 UNIT: 100 INJECTION, SOLUTION INTRAVENOUS; SUBCUTANEOUS at 12:25

## 2019-07-22 RX ADMIN — KETOROLAC TROMETHAMINE 1 MG: 15 INJECTION, SOLUTION INTRAMUSCULAR; INTRAVENOUS at 14:18

## 2019-07-22 RX ADMIN — PREGABALIN 1 MG: 75 CAPSULE ORAL at 21:05

## 2019-07-22 RX ADMIN — HYDROMORPHONE HYDROCHLORIDE 1 MG: 1 INJECTION, SOLUTION INTRAMUSCULAR; INTRAVENOUS; SUBCUTANEOUS at 00:28

## 2019-07-22 RX ADMIN — CALCIUM GLUCONATE SCH MLS/HR: 94 INJECTION, SOLUTION INTRAVENOUS at 01:37

## 2019-07-22 RX ADMIN — NIFEDIPINE 1 MG: 60 TABLET, FILM COATED, EXTENDED RELEASE ORAL at 21:04

## 2019-07-22 RX ADMIN — HYDROMORPHONE HYDROCHLORIDE PRN MG: 1 INJECTION, SOLUTION INTRAMUSCULAR; INTRAVENOUS; SUBCUTANEOUS at 04:36

## 2019-07-22 RX ADMIN — NIFEDIPINE SCH MG: 60 TABLET, FILM COATED, EXTENDED RELEASE ORAL at 21:04

## 2019-07-22 RX ADMIN — ALBUTEROL SULFATE SCH PUFF: 90 AEROSOL, METERED RESPIRATORY (INHALATION) at 12:42

## 2019-07-22 RX ADMIN — SPIRONOLACTONE 1 MG: 25 TABLET, FILM COATED ORAL at 08:40

## 2019-07-22 RX ADMIN — DOCUSATE SODIUM 1 MG: 100 CAPSULE, LIQUID FILLED ORAL at 08:42

## 2019-07-22 RX ADMIN — BACLOFEN 1 MG: 10 TABLET ORAL at 21:05

## 2019-07-22 RX ADMIN — BACLOFEN SCH MG: 10 TABLET ORAL at 08:41

## 2019-07-22 RX ADMIN — ENOXAPARIN SODIUM 1 MG: 100 INJECTION SUBCUTANEOUS at 08:38

## 2019-07-22 RX ADMIN — ALBUTEROL SULFATE 1 PUFF: 90 AEROSOL, METERED RESPIRATORY (INHALATION) at 08:42

## 2019-07-22 RX ADMIN — FUROSEMIDE 1 MG: 10 INJECTION, SOLUTION INTRAVENOUS at 12:42

## 2019-07-22 RX ADMIN — PREGABALIN 1 MG: 50 CAPSULE ORAL at 08:42

## 2019-07-22 RX ADMIN — INSULIN ASPART 1 UNIT: 100 INJECTION, SOLUTION INTRAVENOUS; SUBCUTANEOUS at 21:14

## 2019-07-22 RX ADMIN — INSULIN ASPART 1 UNIT: 100 INJECTION, SOLUTION INTRAVENOUS; SUBCUTANEOUS at 17:39

## 2019-07-22 RX ADMIN — NIFEDIPINE SCH MG: 60 TABLET, FILM COATED, EXTENDED RELEASE ORAL at 08:43

## 2019-07-22 RX ADMIN — HYDROMORPHONE HYDROCHLORIDE PRN MG: 1 INJECTION, SOLUTION INTRAMUSCULAR; INTRAVENOUS; SUBCUTANEOUS at 17:37

## 2019-07-22 RX ADMIN — POTASSIUM ACETATE 1 MLS/HR: 3.93 INJECTION, SOLUTION, CONCENTRATE INTRAVENOUS at 01:37

## 2019-07-22 RX ADMIN — DOCUSATE SODIUM 1 MG: 100 CAPSULE, LIQUID FILLED ORAL at 21:05

## 2019-07-22 RX ADMIN — NIFEDIPINE 1 MG: 60 TABLET, FILM COATED, EXTENDED RELEASE ORAL at 08:43

## 2019-07-22 RX ADMIN — INSULIN ASPART 1 UNIT: 100 INJECTION, SOLUTION INTRAVENOUS; SUBCUTANEOUS at 08:37

## 2019-07-22 RX ADMIN — HYDROMORPHONE HYDROCHLORIDE 1 MG: 1 INJECTION, SOLUTION INTRAMUSCULAR; INTRAVENOUS; SUBCUTANEOUS at 04:36

## 2019-07-22 RX ADMIN — BACLOFEN 1 MG: 10 TABLET ORAL at 12:42

## 2019-07-22 RX ADMIN — PANTOPRAZOLE SODIUM 1 MG: 40 TABLET, DELAYED RELEASE ORAL at 08:40

## 2019-07-22 RX ADMIN — ALBUTEROL SULFATE 1 PUFF: 90 AEROSOL, METERED RESPIRATORY (INHALATION) at 12:42

## 2019-07-22 RX ADMIN — LOSARTAN POTASSIUM 1 MG: 50 TABLET ORAL at 08:42

## 2019-07-22 RX ADMIN — HYDROMORPHONE HYDROCHLORIDE PRN MG: 1 INJECTION, SOLUTION INTRAMUSCULAR; INTRAVENOUS; SUBCUTANEOUS at 13:07

## 2019-07-22 RX ADMIN — HYDROMORPHONE HYDROCHLORIDE 1 MG: 1 INJECTION, SOLUTION INTRAMUSCULAR; INTRAVENOUS; SUBCUTANEOUS at 17:37

## 2019-07-22 RX ADMIN — HYDROMORPHONE HYDROCHLORIDE PRN MG: 1 INJECTION, SOLUTION INTRAMUSCULAR; INTRAVENOUS; SUBCUTANEOUS at 00:28

## 2019-07-22 NOTE — HP
DATE OF ADMISSION: 07/21/2019

 

HISTORY OF PRESENT ILLNESS:  The patient is a 59-year-old female with a past medical history of previ
ous CVA, history of chronic pain syndrome, history of right iliotibial band syndrome, chronic neuropa
thic pain, history of left-sided paresis, hyperlipidemia, diabetes type 2, obstructive sleep apnea, d
epression, coronary artery, history of asthma, hypertension, obesity, and chronic headaches.  The pat
osvaldo presents from home after having a fall yesterday, unable to manage the amount of pain that she i
s having.  The patient has noted since her previous admission to the acute rehab, a progressive decli
ne in her physical function.  Unclear if she had further neurologic disease.  The patient complains o
f pain similar to her iliotibial band syndrome.  The patient is being managed by an outpatient pain m
anagement doctor for her chronic pain.  No recent changes to any of her medications.  Known to have l
eukocytosis.  X-rays were negative.

 

PAST MEDICAL HISTORY:  Significant for above.

 

MEDICATIONS FROM HOME:  Include:

1.  Albuterol.

2.  Baclofen.

3.  Lipitor.

4.  Hydralazine.

5.  Labetalol.

6.  Losartan.

7.  Nifedipine.

8.  Aldactone.

9.  Tylenol.

10.  Dilaudid.

11.  Lyrica. 

12.  Advair HFA. 

13.  Colace.

14.  Protonix. 

15.  Psyllium.  

16.  Glipizide. 

17.  Janumet. 

18.  VESIcare.

 

ALLERGIES:  

1.  KEFLEX.

2.  ERYTHROMYCIN-BASED.

3.  TRAMADOL. 

 

SOCIAL HISTORY:  Does not smoke, drink or use IV drugs.

 

FAMILY HISTORY:  History of kidney disease.

 

REVIEW OF SYSTEMS:  A 14-point review of systems attempted and negative.

 

PHYSICAL EXAMINATION:

VITAL SIGNS:  We see temperature 99, blood pressure 147/65.

HEENT:  Normocephalic, atraumatic.  Pupils equal, round, and reactive to light.  Oropharynx shows jayjay
st mucous membranes.

NECK:  Supple.

HEART:  Regular rate and rhythm.

LUNGS:  Clear to auscultation.

ABDOMEN:  Soft, nontender, and nondistended.  Normoactive bowel sounds.

EXTREMITIES:  No clubbing, cyanosis, or edema.

SKIN:  Shows no rashes.

BACK:  Some point tenderness.

HIP:  Shows tenderness around the right thigh.

 

IMPRESSION:

1.  Acute exacerbation of the iliotibial band syndrome, status post fall; pain out of proportion, but
 the patient suffers from multiple different pain pathologies, previously well-managed by pain manage
ment doctor when she was here in this hospital by Dr. Melchor in the ARU.  We will recommend physical
 therapy.  Neurology consult.  ARU evaluation, may need to go back to outpatient pain management if d
oes not qualify.

2.  Anemia.  

3.  Status post fall with contusion.  Will x-ray hips.

4.  Progressive decline in function.  Rule out other CVA, will check a CT of the head.

5.  Frequent falls, doubt syncopal.  May benefit from more aggressive physical therapy.  Look for und
erlying cause.

6.  History of asthma.  Continue Advair and nebulizers p.r.n.

7.  Diabetes.  Continue regular medications.

8.  Hypertension.  Continue regular medications.  Monitor labs.

9.  History of previous cerebrovascular accident.  Medication optimized.  

10.  Goals and values discussed extensively with the patient and wishes to be FULL CODE.

 

 

Dictated By: NITHYA JONES/CORBY

DD:    07/21/2019 20:27:03

DT:    07/22/2019 02:21:40

Conf#: [id number]

DID#:  [id number]

## 2019-07-22 NOTE — CONS
Consult Date/Type/Reason


Admit Date/Time


Jul 21, 2019 at 05:32


Initial Consult Date





Requesting Provider:  NITHYA DODGE MD


Date/Time of Note


DATE: 7/22/19 


TIME: 11:58





Subjective


59-year-old female with a past medical history of previous CVA, history of 


chronic pain syndrome, history of right iliotibial band syndrome, chronic 


neuropathic pain, history of left-sided paresis, hyperlipidemia, diabetes type 


2, obstructive sleep apnea, depression, coronary artery, history of asthma, 


hypertension, obesity, and chronic headaches.  The patient presents from home 


after having a fall yesterday, unable to manage the amount of pain that she is 


having.  The patient has noted since her previous admission to the acute rehab, 


a progressive decline in her physical function.  Unclear if she had further 


neurologic disease.  The patient complains of pain similar to her iliotibial ba


nd syndrome.  The patient is being managed by an outpatient pain management 


doctor for her chronic pain.  No recent changes to any of her medications.  


Known to have leukocytosis.  X-rays were negative.


noted some encephalopathy possibly related to meds.


notes anxiety.


 





REVIEW OF SYSTEMS:  A 14-point review of systems attempted and negative.


 


PHYSICAL EXAMINATION:


HEENT:  Normocephalic, atraumatic.  Pupils equal, round, and reactive to light. 


Oropharynx shows moist mucous membranes.


NECK:  Supple.


HEART:  Regular rate and rhythm.


LUNGS:  Clear to auscultation.


ABDOMEN:  Soft, nontender, and nondistended.  Normoactive bowel sounds.


EXTREMITIES:  No clubbing, cyanosis, or edema.


SKIN:  Shows no rashes.


BACK:  Some point tenderness.


HIP:  Shows tenderness around the right thigh.





Objective


Vitals





Vital Signs


  Date      Temp  Pulse  Resp  B/P (MAP)   Pulse Ox  O2          O2 Flow    FiO2


Time                                                 Delivery    Rate


   7/22/19  98.7     69    16      146/65        93


     08:24                           (92)


   7/21/19                                                                    30


     23:31


   7/21/19                                           Room Air


     08:00








Intake and Output





7/21/19 7/21/19 7/22/19





1515:00


23:00


07:00





IntakeIntake Total


932 ml


1626 ml





OutputOutput Total


500 ml





BalanceBalance


432 ml


1626 ml














Results/Medications


Result Diagram:  


7/22/19 0526 7/22/19 0526





Results 24 hrs





Laboratory Tests


Test
                 7/21/19
16:20  7/21/19
17:45  7/21/19
20:40  7/21/19
22:24


Urine Color           YELLOW


Urine Clarity         CLOUDY  A


Urine pH                      5.0


Urine Specific              1.011


Gravity


Urine Ketones         NEGATIVE


Urine Nitrite         NEGATIVE


Urine Bilirubin       NEGATIVE


Urine Urobilinogen    NEGATIVE


Urine Leukocyte               2+  H


Esterase


Urine Microscopic               1


RBC


Urine Microscopic             65  H


WBC


Urine Squamous        FEW  
         
              
              



Epithelial
Cells


Urine Bacteria        MANY  A


Urine Mucus           FEW  A


Urine Hemoglobin      NEGATIVE


Urine Glucose         NEGATIVE


Urine Total Protein   NEGATIVE


Bedside Glucose                              204            198            190


Test
                 7/22/19
01:36  7/22/19
05:26  7/22/19
08:34  



Bedside Glucose               144                           160


White Blood Count                           7.9  #


Red Blood Count                            3.83  L


Hemoglobin                                 10.2  L


Hematocrit                                 32.4  L


Mean Corpuscular                            84.6


Volume


Mean Corpuscular                           26.6  L


Hemoglobin


Mean Corpuscular      
                   31.5  L
  
              



Hemoglobin
Concent


Red Cell                                    13.1


Distribution Width


Platelet Count                               356


Mean Platelet Volume                         9.8


Immature                                   0.300


Granulocytes %


Neutrophils %                               55.7


Lymphocytes %                               31.0


Monocytes %                                  9.8


Eosinophils %                                2.7


Basophils %                                  0.5


Nucleated Red Blood                          0.0


Cells %


Immature                                   0.020


Granulocytes #


Neutrophils #                                4.4


Lymphocytes #                                2.4


Monocytes #                                  0.8


Eosinophils #                                0.2


Basophils #                                  0.0


Nucleated Red Blood                          0.0


Cells #


Sodium Level                                 138


Potassium Level                              4.4


Chloride Level                               104


Carbon Dioxide Level                          29


Anion Gap                                      5


Blood Urea Nitrogen                           14


Creatinine                                  0.49


Est Glomerular        
              > 60  
        
              



Filtrat Rate
mL/min


Glucose Level                                150


Hemoglobin A1c                             10.8  H


Calcium Level                                9.2


Phosphorus Level                             3.9


Magnesium Level                              2.0


Total Bilirubin                              0.9


Direct Bilirubin                            0.00


Indirect Bilirubin                           0.9


Aspartate Amino       
                      23  
  
              



Transf
(AST/SGOT)


Alanine               
                      31  
  
              



Aminotransferase
(AL


T/SGPT)


Alkaline Phosphatase                         103


Total Protein                                6.4


Albumin                                      3.6


Globulin                                    2.80


Albumin/Globulin                            1.28


Ratio


Thyroid Stimulating   
                   2.270  
  
              



Hormone
(TSH)





Home Meds


Reported Medications


Sitagliptin Phos-Metformin Hcl (Janumet XR) 100-1,000 Mg Tbmp.24hr, 1 TAB PO 


WITH DINNER, #30 TAB


   7/21/19


Psyllium Husk/Calcium Carb (Metamucil Plus Calcium Capsule) 1 Each Capsule, 2 


EACH PO DAILY, CAP


   7/21/19


Docusate Sodium* (Colace*) 100 Mg Capsule, 100 MG PO BID, #60 CAP


   7/21/19


Aspirin* (Aspirin*) 325 Mg Tablet, 81 MG PO QID PRN for PAIN, TAB


   7/21/19


Acetaminophen* (Tylophen*) 500 Mg Capsule, 650 MG PO Q6H PRN for PAIN LEVEL 1-3,


TAB


   7/21/19


Salmeterol Xinaf-Fluticasone* (Advair HFA*) 230/12 Aerosol Inhaler, 2 INH IH 


BID, #1 INHALER


   7/21/19


Albuterol Sulfate* (Proair HFA*) 8.5 Gm Hfa.aer.ad, 2 PUFF INH TID, #1 INHALER


   7/21/19


Hydromorphone Hcl* (Hydromorphone Hcl*) 4 Mg Tablet, 4 MG PO Q6 PRN for PAIN, 


TAB


   7/21/19


Pregabalin* (Lyrica*) 75 Mg Capsule, 75 MG PO QHS, CAP


   7/21/19


Pregabalin* (Lyrica*) 50 Mg Capsule, 50 MG PO DAILY, CAP


   7/21/19


Baclofen* (Baclofen*) 20 Mg Tablet, 20 MG PO TID, TAB


   7/21/19


Solifenacin* (Vesicare*) 10 Mg Tablet, 10 MG PO DAILY, TAB


   7/21/19


Pantoprazole* (Protonix*) 40 Mg Tablet.dr, 40 MG PO DAILY, TAB


   7/21/19


Atorvastatin* (Atorvastatin*) 40 Mg Tablet, 40 MG PO QHS, #30 TAB


   7/21/19


Spironolactone* (Aldactone*) 25 Mg Tablet, 25 MG PO DAILY, #30 TAB


   7/21/19


Labetalol Hcl* (Labetalol Hcl*) 200 Mg Tablet, 400 MG PO TID, TAB


   7/21/19


Losartan Potassium* (Cozaar*) 100 Mg Tablet, 100 MG PO DAILY, #30 TAB


   7/21/19


Hydralazine Hcl* (Hydralazine Hcl*) 10 Mg Tablet, 10 MG PO BID, #120 TAB


   7/21/19


Nifedipine* (Nifedipine ER*) 60 Mg Tablet.sa, 60 MG PO BID, TAB.SA


   7/21/19


Glipizide* (Glipizide*) 5 Mg Tablet, 25 MG PO BID, TAB


   7/21/19


Medications





Current Medications


Hydromorphone HCl (Dilaudid) 4 mg Q4H  PRN PO SEVERE PAIN LEVEL 7-10;  Start 


7/21/19 at 08:00


Hydromorphone HCl (Dilaudid) 1 mg Q4H  PRN IV SEVERE PAIN LEVEL 7-10 Last 


administered on 7/22/19at 09:07; Admin Dose 1 MG;  Start 7/21/19 at 08:00


Sodium Chloride 1,000 ml @  75 mls/hr [id number] IV  Last administered on 7/22/19at


01:37; Admin Dose 75 MLS/HR;  Start 7/21/19 at 11:04


IV Flush (NS 3 ml) 3 ml PER PROTOCOL IV ;  Start 7/21/19 at 11:30


Docusate Sodium (Colace) 100 mg Q12H  PRN PO .CONSTIPATION;  Start 7/21/19 at 


11:30


Enoxaparin Sodium (Lovenox) 40 mg DAILY SC  Last administered on 7/22/19at 


08:38; Admin Dose 40 MG;  Start 7/22/19 at 09:00


Diagnostic Test (Pha) (Accu-Chek) 1 ea 02 XX  Last administered on 7/22/19at 


01:48; Admin Dose 1 EA;  Start 7/22/19 at 02:00


Insulin Aspart (Novolog Insulin Pen) NOVOLOG *MILD* ALGORITHM WITH MEALS  


BEDTIME SC  Last administered on 7/22/19at 08:37; Admin Dose 1 UNIT;  Start 


7/21/19 at 18:00


Miscellaneous Information 1 ea NOTE XX ;  Start 7/21/19 at 17:30


Glucose (Glutose) 15 gm Q15M  PRN PO DECREASED GLUCOSE;  Start 7/21/19 at 17:30


Glucose (Glutose) 22.5 gm Q15M  PRN PO DECREASED GLUCOSE;  Start 7/21/19 at 


17:30


Dextrose (D50w Syringe) 25 ml Q15M  PRN IV DECREASED GLUCOSE;  Start 7/21/19 at 


17:30


Dextrose (D50w Syringe) 50 ml Q15M  PRN IV DECREASED GLUCOSE;  Start 7/21/19 at 


17:30


Glucagon (Glucagen) 1 mg Q15M  PRN IM DECREASED GLUCOSE;  Start 7/21/19 at 17:30


Glucose (Glutose) 15 gm Q15M  PRN BUCCAL DECREASED GLUCOSE;  Start 7/21/19 at 


17:30


Acetaminophen (Tylenol Tab) 650 mg Q6H  PRN PO PAIN LEVEL 1-3;  Start 7/21/19 at


20:30


Albuterol (Ventolin Hfa) 2 puff TID INH  Last administered on 7/22/19at 08:42; 


Admin Dose 2 PUFF;  Start 7/21/19 at 21:00


Aspirin (Aspirin) 81 mg DAILY PO  Last administered on 7/22/19at 08:41; Admin 


Dose 81 MG;  Start 7/22/19 at 09:00


Atorvastatin Calcium (Lipitor) 40 mg QHS PO  Last administered on 7/21/19at 


21:23; Admin Dose 40 MG;  Start 7/21/19 at 21:00


Baclofen (Lioresal) 20 mg TID PO  Last administered on 7/22/19at 08:41; Admin 


Dose 20 MG;  Start 7/21/19 at 21:00


Docusate Sodium (Colace) 100 mg BID PO  Last administered on 7/22/19at 08:42; 


Admin Dose 100 MG;  Start 7/21/19 at 21:00


Hydralazine HCl (Apresoline) 10 mg BID PO  Last administered on 7/22/19at 08:41;


Admin Dose 10 MG;  Start 7/21/19 at 21:00


Labetalol HCl (Normodyne) 400 mg TID PO  Last administered on 7/22/19at 08:41; 


Admin Dose 400 MG;  Start 7/21/19 at 21:00


Losartan Potassium (Cozaar) 100 mg DAILY PO  Last administered on 7/22/19at 


08:42; Admin Dose 100 MG;  Start 7/22/19 at 09:00


Nifedipine (Procardia Xl) 60 mg BID PO  Last administered on 7/22/19at 08:43; 


Admin Dose 60 MG;  Start 7/21/19 at 21:00


Pantoprazole (Protonix Tab) 40 mg DAILY PO  Last administered on 7/22/19at 


08:40; Admin Dose 40 MG;  Start 7/22/19 at 09:00


Pregabalin (Lyrica) 75 mg QHS PO  Last administered on 7/21/19at 21:23; Admin 


Dose 75 MG;  Start 7/21/19 at 21:00


Solifenacin (Vesicare) 10 mg DAILY PO ;  Start 7/22/19 at 09:00


Spironolactone (Aldactone) 25 mg DAILY PO  Last administered on 7/22/19at 08:40;


Admin Dose 25 MG;  Start 7/22/19 at 09:00


Linagliptin (Tradjenta) 5 mg DAILY PO  Last administered on 7/22/19at 08:42; 


Admin Dose 5 MG;  Start 7/22/19 at 09:00


Metformin HCl (Glucophage) 500 mg BID WITH  MEALS PO  Last administered on 


7/22/19at 08:40; Admin Dose 500 MG;  Start 7/22/19 at 08:00


Pregabalin (Lyrica) 50 mg QAM PO  Last administered on 7/22/19at 08:42; Admin 


Dose 50 MG;  Start 7/22/19 at 09:00





Assessment/Plan


Hospital Course (Demo Recall)


1.  Acute exacerbation of the iliotibial band syndrome, status post fall; pain 


out of proportion, but the patient suffers from multiple different pain 


pathologies, previously well-managed by pain management doctor when she was here


in this hospital by Dr. Melchor in the ARU.  We will recommend physical therapy.


 Neurology consult.  ARU evaluation, may need to go back to outpatient pain 


management if does not qualify.


2.  Anemia.  


3.  Status post fall with contusion.  Will x-ray hips.


4.  Progressive decline in function.  Rule out other CVA, will check a CT of the


head.  neurology consultation


5.  Frequent falls, doubt syncopal.  May benefit from more aggressive physical 


therapy.  Look for underlying cause.


6.  History of asthma.  Continue Advair and nebulizers p.r.n.


7.  Diabetes.  Continue regular medications.


8.  Hypertension.  Continue regular medications.  Monitor labs.


9.  History of previous cerebrovascular accident.  Medication optimized.  


10.  encephalopathy- transient.  may be toxic from meds.  avoid sedatives.  


neuro consulted. fu ct head.











NITHYA DODGE MD          Jul 22, 2019 12:08

## 2019-07-22 NOTE — CONSI
Assessment/Plan


Assessment/Plan


Assessment/Plan (Recall)


59 F c/ Hx of stroke and other comorbidities, who presents for evaluation of 


scattered pains s/p recurrent fall.


Labs suggest a UTI, which could certainly predispose to an unsteady gait..





She notably has diffuse and chronic pain...requiring daily dilaudid, lyrica, 


etc.


There is perhaps a superimposed acute post-traumatic component; this should 


pari with time.





Regarding her chronic headache syndrome in particular, she may be a candidate 


for Topamax prophylaxis given its migrainous quality...but wants to consider the


option more in advance of its initiation..








P:


Consider the addition of an NSAID in the short term, as medically able


UTI and other medical management per primary


PT/OT as tolerated


Consider Topamax in the future for headache prophylaxis, should patient agree


Will follow





Consultation Date/Type/Reason


Admit Date/Time


Jul 21, 2019 at 05:32


Type of Consult


Neurology


Reason for Consultation


pain s/p fall


Requesting Provider:  NITHYA DODGE MD


Date/Time of Note


DATE: 7/22/19 


TIME: 10:42





Hx of Present Illness


The patient is a 59-year-old female with a past medical history of previous CVA,


history of chronic pain syndrome, history of right iliotibial band syndrome, 


chronic neuropathic pain, history of left-sided paresis, hyperlipidemia, 


diabetes type 2, obstructive sleep apnea, depression, coronary artery, history 


of asthma, hypertension, obesity, and chronic headaches.  The patient presents 


from home after having a fall yesterday, unable to manage the amount of pain 


that she is having.  The patient has noted since her previous admission to the 


acute rehab, a progressive decline in her physical function.  Unclear if she had


further neurologic disease.  The patient complains of pain similar to her 


iliotibial band syndrome.  The patient is being managed by an outpatient pain 


management doctor for her chronic pain.  No recent changes to any of her 


medications.  Known to have leukocytosis.  X-rays were negative.


per HPI





Objective


Exam


Vitals





Vital Signs


  Date      Temp  Pulse  Resp  B/P (MAP)   Pulse Ox  O2          O2 Flow    FiO2


Time                                                 Delivery    Rate


   7/22/19  98.7     69    16      146/65        93


     08:24                           (92)


   7/21/19                                                                    30


     23:31


   7/21/19                                           Room Air


     08:00








Intake and Output





7/21/19 7/21/19 7/22/19





1515:00


23:00


07:00





IntakeIntake Total


932 ml


1626 ml





OutputOutput Total


500 ml





BalanceBalance


432 ml


1626 ml











Exam


PE:


Gen Appearance: No Apparent Distress


HEENT: Normocephalic


Cardiovascular: Regular rate


Abdomen: Soft, obese


Extremities: Dry





NE:


The patient was alert and oriented. Language was normal. Fund of knowledge was 


normal.





Pupils were equal and reactive to light. There was no afferent pupillary defect.


Visual fields were normal. Funduscopic examination was limited. Extra-ocular 


movements were full. Ptosis was absent. There was no nystagmus. Facial sensation


was normal. Face was symmetric with normal strength. Hearing was intact. Palate 


movements were normal. Neck strength was normal. There was normal tongue bulk 


and speed of movement.





Tone was increased on the left. Muscle bulk was normal. I did not see 


fasciculations. Left arm and leg were moderate to severely weak.





Vibration sensation was reduced distally. Temperature and pinprick sensation was


normal.





Rapid alternating movements were normal. There was no dysmetria. There was no 


intention tremor. Gait was deferred due to bedrest.





Arm and leg reflexes were brisk on the left. Khan's sign was absent. Plantar 


responses were flexor.





Results


Result Diagram:  


7/22/19 0526 7/22/19 0526





Results 24hrs





Laboratory Tests


Test
                 7/21/19
16:20  7/21/19
17:45  7/21/19
20:40  7/21/19
22:24


Urine Color           YELLOW


Urine Clarity         CLOUDY  A


Urine pH                      5.0


Urine Specific              1.011


Gravity


Urine Ketones         NEGATIVE


Urine Nitrite         NEGATIVE


Urine Bilirubin       NEGATIVE


Urine Urobilinogen    NEGATIVE


Urine Leukocyte               2+  H


Esterase


Urine Microscopic               1


RBC


Urine Microscopic             65  H


WBC


Urine Squamous        FEW  
         
              
              



Epithelial
Cells


Urine Bacteria        MANY  A


Urine Mucus           FEW  A


Urine Hemoglobin      NEGATIVE


Urine Glucose         NEGATIVE


Urine Total Protein   NEGATIVE


Bedside Glucose                              204            198            190


Test
                 7/22/19
01:36  7/22/19
05:26  7/22/19
08:34  



Bedside Glucose               144                           160


White Blood Count                           7.9  #


Red Blood Count                            3.83  L


Hemoglobin                                 10.2  L


Hematocrit                                 32.4  L


Mean Corpuscular                            84.6


Volume


Mean Corpuscular                           26.6  L


Hemoglobin


Mean Corpuscular      
                   31.5  L
  
              



Hemoglobin
Concent


Red Cell                                    13.1


Distribution Width


Platelet Count                               356


Mean Platelet Volume                         9.8


Immature                                   0.300


Granulocytes %


Neutrophils %                               55.7


Lymphocytes %                               31.0


Monocytes %                                  9.8


Eosinophils %                                2.7


Basophils %                                  0.5


Nucleated Red Blood                          0.0


Cells %


Immature                                   0.020


Granulocytes #


Neutrophils #                                4.4


Lymphocytes #                                2.4


Monocytes #                                  0.8


Eosinophils #                                0.2


Basophils #                                  0.0


Nucleated Red Blood                          0.0


Cells #


Sodium Level                                 138


Potassium Level                              4.4


Chloride Level                               104


Carbon Dioxide Level                          29


Anion Gap                                      5


Blood Urea Nitrogen                           14


Creatinine                                  0.49


Est Glomerular        
              > 60  
        
              



Filtrat Rate
mL/min


Glucose Level                                150


Hemoglobin A1c                             10.8  H


Calcium Level                                9.2


Phosphorus Level                             3.9


Magnesium Level                              2.0


Total Bilirubin                              0.9


Direct Bilirubin                            0.00


Indirect Bilirubin                           0.9


Aspartate Amino       
                      23  
  
              



Transf
(AST/SGOT)


Alanine               
                      31  
  
              



Aminotransferase
(AL


T/SGPT)


Alkaline Phosphatase                         103


Total Protein                                6.4


Albumin                                      3.6


Globulin                                    2.80


Albumin/Globulin                            1.28


Ratio


Thyroid Stimulating   
                   2.270  
  
              



Hormone
(TSH)








Past Medical History


reviewed


Home Meds


Reported Medications


Sitagliptin Phos-Metformin Hcl (Janumet XR) 100-1,000 Mg Tbmp.24hr, 1 TAB PO 


WITH DINNER, #30 TAB


   7/21/19


Psyllium Husk/Calcium Carb (Metamucil Plus Calcium Capsule) 1 Each Capsule, 2 


EACH PO DAILY, CAP


   7/21/19


Docusate Sodium* (Colace*) 100 Mg Capsule, 100 MG PO BID, #60 CAP


   7/21/19


Aspirin* (Aspirin*) 325 Mg Tablet, 81 MG PO QID PRN for PAIN, TAB


   7/21/19


Acetaminophen* (Tylophen*) 500 Mg Capsule, 650 MG PO Q6H PRN for PAIN LEVEL 1-3,


TAB


   7/21/19


Salmeterol Xinaf-Fluticasone* (Advair HFA*) 230/12 Aerosol Inhaler, 2 INH IH 


BID, #1 INHALER


   7/21/19


Albuterol Sulfate* (Proair HFA*) 8.5 Gm Hfa.aer.ad, 2 PUFF INH TID, #1 INHALER


   7/21/19


Hydromorphone Hcl* (Hydromorphone Hcl*) 4 Mg Tablet, 4 MG PO Q6 PRN for PAIN, 


TAB


   7/21/19


Pregabalin* (Lyrica*) 75 Mg Capsule, 75 MG PO QHS, CAP


   7/21/19


Pregabalin* (Lyrica*) 50 Mg Capsule, 50 MG PO DAILY, CAP


   7/21/19


Baclofen* (Baclofen*) 20 Mg Tablet, 20 MG PO TID, TAB


   7/21/19


Solifenacin* (Vesicare*) 10 Mg Tablet, 10 MG PO DAILY, TAB


   7/21/19


Pantoprazole* (Protonix*) 40 Mg Tablet.dr, 40 MG PO DAILY, TAB


   7/21/19


Atorvastatin* (Atorvastatin*) 40 Mg Tablet, 40 MG PO QHS, #30 TAB


   7/21/19


Spironolactone* (Aldactone*) 25 Mg Tablet, 25 MG PO DAILY, #30 TAB


   7/21/19


Labetalol Hcl* (Labetalol Hcl*) 200 Mg Tablet, 400 MG PO TID, TAB


   7/21/19


Losartan Potassium* (Cozaar*) 100 Mg Tablet, 100 MG PO DAILY, #30 TAB


   7/21/19


Hydralazine Hcl* (Hydralazine Hcl*) 10 Mg Tablet, 10 MG PO BID, #120 TAB


   7/21/19


Nifedipine* (Nifedipine ER*) 60 Mg Tablet.sa, 60 MG PO BID, TAB.SA


   7/21/19


Glipizide* (Glipizide*) 5 Mg Tablet, 25 MG PO BID, TAB


   7/21/19


Medications





Current Medications


Hydromorphone HCl (Dilaudid) 4 mg Q4H  PRN PO SEVERE PAIN LEVEL 7-10;  Start 


7/21/19 at 08:00


Hydromorphone HCl (Dilaudid) 1 mg Q4H  PRN IV SEVERE PAIN LEVEL 7-10 Last 


administered on 7/22/19at 09:07; Admin Dose 1 MG;  Start 7/21/19 at 08:00


Sodium Chloride 1,000 ml @  75 mls/hr [id number] IV  Last administered on 7/22/19at


01:37; Admin Dose 75 MLS/HR;  Start 7/21/19 at 11:04


IV Flush (NS 3 ml) 3 ml PER PROTOCOL IV ;  Start 7/21/19 at 11:30


Docusate Sodium (Colace) 100 mg Q12H  PRN PO .CONSTIPATION;  Start 7/21/19 at 


11:30


Enoxaparin Sodium (Lovenox) 40 mg DAILY SC  Last administered on 7/22/19at 


08:38; Admin Dose 40 MG;  Start 7/22/19 at 09:00


Diagnostic Test (Pha) (Accu-Chek) 1 ea 02 XX  Last administered on 7/22/19at 


01:48; Admin Dose 1 EA;  Start 7/22/19 at 02:00


Insulin Aspart (Novolog Insulin Pen) NOVOLOG *MILD* ALGORITHM WITH MEALS  


BEDTIME SC  Last administered on 7/22/19at 08:37; Admin Dose 1 UNIT;  Start 


7/21/19 at 18:00


Miscellaneous Information 1 ea NOTE XX ;  Start 7/21/19 at 17:30


Glucose (Glutose) 15 gm Q15M  PRN PO DECREASED GLUCOSE;  Start 7/21/19 at 17:30


Glucose (Glutose) 22.5 gm Q15M  PRN PO DECREASED GLUCOSE;  Start 7/21/19 at 


17:30


Dextrose (D50w Syringe) 25 ml Q15M  PRN IV DECREASED GLUCOSE;  Start 7/21/19 at 


17:30


Dextrose (D50w Syringe) 50 ml Q15M  PRN IV DECREASED GLUCOSE;  Start 7/21/19 at 


17:30


Glucagon (Glucagen) 1 mg Q15M  PRN IM DECREASED GLUCOSE;  Start 7/21/19 at 17:30


Glucose (Glutose) 15 gm Q15M  PRN BUCCAL DECREASED GLUCOSE;  Start 7/21/19 at 


17:30


Acetaminophen (Tylenol Tab) 650 mg Q6H  PRN PO PAIN LEVEL 1-3;  Start 7/21/19 at


20:30


Albuterol (Ventolin Hfa) 2 puff TID INH  Last administered on 7/22/19at 08:42; 


Admin Dose 2 PUFF;  Start 7/21/19 at 21:00


Aspirin (Aspirin) 81 mg DAILY PO  Last administered on 7/22/19at 08:41; Admin Do


se 81 MG;  Start 7/22/19 at 09:00


Atorvastatin Calcium (Lipitor) 40 mg QHS PO  Last administered on 7/21/19at 


21:23; Admin Dose 40 MG;  Start 7/21/19 at 21:00


Baclofen (Lioresal) 20 mg TID PO  Last administered on 7/22/19at 08:41; Admin 


Dose 20 MG;  Start 7/21/19 at 21:00


Docusate Sodium (Colace) 100 mg BID PO  Last administered on 7/22/19at 08:42; 


Admin Dose 100 MG;  Start 7/21/19 at 21:00


Hydralazine HCl (Apresoline) 10 mg BID PO  Last administered on 7/22/19at 08:41;


Admin Dose 10 MG;  Start 7/21/19 at 21:00


Labetalol HCl (Normodyne) 400 mg TID PO  Last administered on 7/22/19at 08:41; 


Admin Dose 400 MG;  Start 7/21/19 at 21:00


Losartan Potassium (Cozaar) 100 mg DAILY PO  Last administered on 7/22/19at 


08:42; Admin Dose 100 MG;  Start 7/22/19 at 09:00


Nifedipine (Procardia Xl) 60 mg BID PO  Last administered on 7/22/19at 08:43; 


Admin Dose 60 MG;  Start 7/21/19 at 21:00


Pantoprazole (Protonix Tab) 40 mg DAILY PO  Last administered on 7/22/19at 


08:40; Admin Dose 40 MG;  Start 7/22/19 at 09:00


Pregabalin (Lyrica) 75 mg QHS PO  Last administered on 7/21/19at 21:23; Admin 


Dose 75 MG;  Start 7/21/19 at 21:00


Solifenacin (Vesicare) 10 mg DAILY PO ;  Start 7/22/19 at 09:00


Spironolactone (Aldactone) 25 mg DAILY PO  Last administered on 7/22/19at 08:40;


Admin Dose 25 MG;  Start 7/22/19 at 09:00


Linagliptin (Tradjenta) 5 mg DAILY PO  Last administered on 7/22/19at 08:42; 


Admin Dose 5 MG;  Start 7/22/19 at 09:00


Metformin HCl (Glucophage) 500 mg BID WITH  MEALS PO  Last administered on 


7/22/19at 08:40; Admin Dose 500 MG;  Start 7/22/19 at 08:00


Pregabalin (Lyrica) 50 mg QAM PO  Last administered on 7/22/19at 08:42; Admin 


Dose 50 MG;  Start 7/22/19 at 09:00


Allergies:  


Coded Allergies:  


     tramadol (Verified  Allergy, Severe, SEVERE HEADACHE, 7/21/19)


     cephalexin (Verified  Allergy, Intermediate, 7/21/19)


     erythromycin base (Verified  Allergy, Intermediate, 7/21/19)





Past Surgical History


Past Surgical Hx:  other





Social History


Smoking Status:  Former smoker











JOHNY BURCH                 Jul 22, 2019 10:43

## 2019-07-23 VITALS — HEART RATE: 61 BPM | DIASTOLIC BLOOD PRESSURE: 53 MMHG | SYSTOLIC BLOOD PRESSURE: 107 MMHG | RESPIRATION RATE: 18 BRPM

## 2019-07-23 VITALS — DIASTOLIC BLOOD PRESSURE: 65 MMHG | HEART RATE: 70 BPM | RESPIRATION RATE: 18 BRPM | SYSTOLIC BLOOD PRESSURE: 146 MMHG

## 2019-07-23 VITALS — RESPIRATION RATE: 18 BRPM | DIASTOLIC BLOOD PRESSURE: 64 MMHG | HEART RATE: 69 BPM | SYSTOLIC BLOOD PRESSURE: 139 MMHG

## 2019-07-23 VITALS — HEART RATE: 67 BPM | DIASTOLIC BLOOD PRESSURE: 63 MMHG | RESPIRATION RATE: 16 BRPM | SYSTOLIC BLOOD PRESSURE: 131 MMHG

## 2019-07-23 VITALS — SYSTOLIC BLOOD PRESSURE: 117 MMHG | HEART RATE: 65 BPM | DIASTOLIC BLOOD PRESSURE: 56 MMHG

## 2019-07-23 RX ADMIN — LOSARTAN POTASSIUM 1 MG: 50 TABLET ORAL at 08:14

## 2019-07-23 RX ADMIN — BACLOFEN SCH MG: 10 TABLET ORAL at 13:00

## 2019-07-23 RX ADMIN — SOLIFENACIN SUCCINATE 1 MG: 5 TABLET, FILM COATED ORAL at 08:12

## 2019-07-23 RX ADMIN — NIFEDIPINE SCH MG: 60 TABLET, FILM COATED, EXTENDED RELEASE ORAL at 08:14

## 2019-07-23 RX ADMIN — HYDROMORPHONE HYDROCHLORIDE PRN MG: 1 INJECTION, SOLUTION INTRAMUSCULAR; INTRAVENOUS; SUBCUTANEOUS at 23:59

## 2019-07-23 RX ADMIN — INSULIN ASPART 1 UNIT: 100 INJECTION, SOLUTION INTRAVENOUS; SUBCUTANEOUS at 08:16

## 2019-07-23 RX ADMIN — ACETAMINOPHEN 1 MG: 325 TABLET, FILM COATED ORAL at 10:59

## 2019-07-23 RX ADMIN — BACLOFEN SCH MG: 10 TABLET ORAL at 21:10

## 2019-07-23 RX ADMIN — DOCUSATE SODIUM 1 MG: 100 CAPSULE, LIQUID FILLED ORAL at 21:12

## 2019-07-23 RX ADMIN — LINAGLIPTIN SCH MG: 5 TABLET, FILM COATED ORAL at 08:14

## 2019-07-23 RX ADMIN — BACLOFEN SCH MG: 10 TABLET ORAL at 08:14

## 2019-07-23 RX ADMIN — HYDROMORPHONE HYDROCHLORIDE 1 MG: 1 INJECTION, SOLUTION INTRAMUSCULAR; INTRAVENOUS; SUBCUTANEOUS at 05:42

## 2019-07-23 RX ADMIN — BACLOFEN 1 MG: 10 TABLET ORAL at 08:14

## 2019-07-23 RX ADMIN — BACLOFEN SCH MG: 10 TABLET ORAL at 14:12

## 2019-07-23 RX ADMIN — ATORVASTATIN CALCIUM 1 MG: 40 TABLET, FILM COATED ORAL at 21:12

## 2019-07-23 RX ADMIN — ENOXAPARIN SODIUM SCH MG: 100 INJECTION SUBCUTANEOUS at 08:17

## 2019-07-23 RX ADMIN — SPIRONOLACTONE SCH MG: 25 TABLET ORAL at 08:14

## 2019-07-23 RX ADMIN — HYDROMORPHONE HYDROCHLORIDE PRN MG: 1 INJECTION, SOLUTION INTRAMUSCULAR; INTRAVENOUS; SUBCUTANEOUS at 13:46

## 2019-07-23 RX ADMIN — BACLOFEN 1 MG: 10 TABLET ORAL at 21:10

## 2019-07-23 RX ADMIN — ASPIRIN 81 MG SCH MG: 81 TABLET ORAL at 08:14

## 2019-07-23 RX ADMIN — BACLOFEN 1 MG: 10 TABLET ORAL at 14:12

## 2019-07-23 RX ADMIN — ALBUTEROL SULFATE SCH PUFF: 90 AEROSOL, METERED RESPIRATORY (INHALATION) at 21:10

## 2019-07-23 RX ADMIN — PANTOPRAZOLE SODIUM SCH MG: 40 TABLET, DELAYED RELEASE ORAL at 08:15

## 2019-07-23 RX ADMIN — HYDROMORPHONE HYDROCHLORIDE 1 MG: 1 INJECTION, SOLUTION INTRAMUSCULAR; INTRAVENOUS; SUBCUTANEOUS at 13:46

## 2019-07-23 RX ADMIN — HYDROMORPHONE HYDROCHLORIDE PRN MG: 1 INJECTION, SOLUTION INTRAMUSCULAR; INTRAVENOUS; SUBCUTANEOUS at 17:57

## 2019-07-23 RX ADMIN — NIFEDIPINE 1 MG: 30 TABLET, FILM COATED, EXTENDED RELEASE ORAL at 21:12

## 2019-07-23 RX ADMIN — ALBUTEROL SULFATE 1 PUFF: 90 AEROSOL, METERED RESPIRATORY (INHALATION) at 12:48

## 2019-07-23 RX ADMIN — ALBUTEROL SULFATE 1 PUFF: 90 AEROSOL, METERED RESPIRATORY (INHALATION) at 08:15

## 2019-07-23 RX ADMIN — NITROFURANTOIN MONOHYDRATE/MACROCRYSTALLINE 1 MG: 25; 75 CAPSULE ORAL at 21:11

## 2019-07-23 RX ADMIN — ASPIRIN 81 MG CHEWABLE TABLET 1 MG: 81 TABLET CHEWABLE at 08:14

## 2019-07-23 RX ADMIN — LINAGLIPTIN 1 MG: 5 TABLET, FILM COATED ORAL at 08:14

## 2019-07-23 RX ADMIN — INSULIN ASPART 1 UNIT: 100 INJECTION, SOLUTION INTRAVENOUS; SUBCUTANEOUS at 12:48

## 2019-07-23 RX ADMIN — INSULIN ASPART 1 UNIT: 100 INJECTION, SOLUTION INTRAVENOUS; SUBCUTANEOUS at 21:00

## 2019-07-23 RX ADMIN — PREGABALIN 1 MG: 75 CAPSULE ORAL at 21:12

## 2019-07-23 RX ADMIN — BACLOFEN 1 MG: 10 TABLET ORAL at 13:00

## 2019-07-23 RX ADMIN — PREGABALIN SCH MG: 75 CAPSULE ORAL at 08:13

## 2019-07-23 RX ADMIN — ALBUTEROL SULFATE SCH PUFF: 90 AEROSOL, METERED RESPIRATORY (INHALATION) at 12:48

## 2019-07-23 RX ADMIN — NITROFURANTOIN MONOHYDRATE/MACROCRYSTALLINE 1 MG: 25; 75 CAPSULE ORAL at 11:54

## 2019-07-23 RX ADMIN — SPIRONOLACTONE 1 MG: 25 TABLET, FILM COATED ORAL at 08:14

## 2019-07-23 RX ADMIN — NIFEDIPINE SCH MG: 30 TABLET, FILM COATED, EXTENDED RELEASE ORAL at 21:12

## 2019-07-23 RX ADMIN — INSULIN ASPART 1 UNIT: 100 INJECTION, SOLUTION INTRAVENOUS; SUBCUTANEOUS at 17:56

## 2019-07-23 RX ADMIN — LABETALOL 1 MG: 200 TABLET, FILM COATED ORAL at 21:11

## 2019-07-23 RX ADMIN — ALBUTEROL SULFATE 1 PUFF: 90 AEROSOL, METERED RESPIRATORY (INHALATION) at 21:10

## 2019-07-23 RX ADMIN — DOCUSATE SODIUM 1 MG: 100 CAPSULE, LIQUID FILLED ORAL at 08:14

## 2019-07-23 RX ADMIN — PREGABALIN SCH MG: 75 CAPSULE ORAL at 12:45

## 2019-07-23 RX ADMIN — PREGABALIN SCH MG: 75 CAPSULE ORAL at 21:12

## 2019-07-23 RX ADMIN — NIFEDIPINE 1 MG: 60 TABLET, FILM COATED, EXTENDED RELEASE ORAL at 08:14

## 2019-07-23 RX ADMIN — DOCUSATE SODIUM SCH MG: 100 CAPSULE, LIQUID FILLED ORAL at 08:14

## 2019-07-23 RX ADMIN — HYDROMORPHONE HYDROCHLORIDE 1 MG: 1 INJECTION, SOLUTION INTRAMUSCULAR; INTRAVENOUS; SUBCUTANEOUS at 23:59

## 2019-07-23 RX ADMIN — PANTOPRAZOLE SODIUM 1 MG: 40 TABLET, DELAYED RELEASE ORAL at 08:15

## 2019-07-23 RX ADMIN — LABETALOL 1 MG: 200 TABLET, FILM COATED ORAL at 08:13

## 2019-07-23 RX ADMIN — LOSARTAN POTASSIUM SCH MG: 50 TABLET, FILM COATED ORAL at 08:14

## 2019-07-23 RX ADMIN — HYDROMORPHONE HYDROCHLORIDE 1 MG: 1 INJECTION, SOLUTION INTRAMUSCULAR; INTRAVENOUS; SUBCUTANEOUS at 17:57

## 2019-07-23 RX ADMIN — ALBUTEROL SULFATE SCH PUFF: 90 AEROSOL, METERED RESPIRATORY (INHALATION) at 08:15

## 2019-07-23 RX ADMIN — HYDROMORPHONE HYDROCHLORIDE PRN MG: 1 INJECTION, SOLUTION INTRAMUSCULAR; INTRAVENOUS; SUBCUTANEOUS at 05:42

## 2019-07-23 RX ADMIN — HYDROMORPHONE HYDROCHLORIDE 1 MG: 1 INJECTION, SOLUTION INTRAMUSCULAR; INTRAVENOUS; SUBCUTANEOUS at 00:47

## 2019-07-23 RX ADMIN — HYDROMORPHONE HYDROCHLORIDE 1 MG: 1 INJECTION, SOLUTION INTRAMUSCULAR; INTRAVENOUS; SUBCUTANEOUS at 09:45

## 2019-07-23 RX ADMIN — HYDROMORPHONE HYDROCHLORIDE PRN MG: 1 INJECTION, SOLUTION INTRAMUSCULAR; INTRAVENOUS; SUBCUTANEOUS at 00:47

## 2019-07-23 RX ADMIN — ACETAMINOPHEN 1 MG: 500 TABLET, FILM COATED ORAL at 02:42

## 2019-07-23 RX ADMIN — PREGABALIN 1 MG: 75 CAPSULE ORAL at 12:45

## 2019-07-23 RX ADMIN — LABETALOL 1 MG: 200 TABLET, FILM COATED ORAL at 12:46

## 2019-07-23 RX ADMIN — ENOXAPARIN SODIUM 1 MG: 100 INJECTION SUBCUTANEOUS at 08:17

## 2019-07-23 RX ADMIN — HYDROMORPHONE HYDROCHLORIDE PRN MG: 1 INJECTION, SOLUTION INTRAMUSCULAR; INTRAVENOUS; SUBCUTANEOUS at 09:45

## 2019-07-23 RX ADMIN — ATORVASTATIN CALCIUM SCH MG: 40 TABLET, FILM COATED ORAL at 21:12

## 2019-07-23 RX ADMIN — DOCUSATE SODIUM SCH MG: 100 CAPSULE, LIQUID FILLED ORAL at 21:12

## 2019-07-23 RX ADMIN — KETOROLAC TROMETHAMINE 1 MG: 15 INJECTION, SOLUTION INTRAMUSCULAR; INTRAVENOUS at 15:09

## 2019-07-23 RX ADMIN — PREGABALIN 1 MG: 75 CAPSULE ORAL at 08:13

## 2019-07-23 RX ADMIN — SOLIFENACIN SUCCINATE SCH MG: 5 TABLET, FILM COATED ORAL at 08:12

## 2019-07-23 NOTE — CONS
Consult Date/Type/Reason


Admit Date/Time


Jul 21, 2019 at 05:32


Initial Consult Date





Requesting Provider:  NITHYA DODGE MD


Date/Time of Note


DATE: 7/23/19 


TIME: 09:54





Subjective


59-year-old female with a past medical history of previous CVA, history of 


chronic pain syndrome, history of right iliotibial band syndrome, chronic 


neuropathic pain, history of left-sided paresis, hyperlipidemia, diabetes type 


2, obstructive sleep apnea, depression, coronary artery, history of asthma, 


hypertension, obesity, and chronic headaches.  The patient presents from home 


after having a fall yesterday, unable to manage the amount of pain that she is 


having.  The patient has noted since her previous admission to the acute rehab, 


a progressive decline in her physical function.  Unclear if she had further 


neurologic disease.  The patient complains of pain similar to her iliotibial ba


nd syndrome.  The patient is being managed by an outpatient pain management 


doctor for her chronic pain.  No recent changes to any of her medications.  


Known to have leukocytosis.  X-rays were negative.


noted some encephalopathy possibly related to meds and poss uti


notes anxiety.


 





REVIEW OF SYSTEMS:  A 14-point review of systems attempted and negative.


 


PHYSICAL EXAMINATION:


HEENT:  Normocephalic, atraumatic.  Pupils equal, round, and reactive to light. 


Oropharynx shows moist mucous membranes.


NECK:  Supple.


HEART:  Regular rate and rhythm.


LUNGS:  Clear to auscultation.


ABDOMEN:  Soft, nontender, and nondistended.  Normoactive bowel sounds.


EXTREMITIES:  No clubbing, cyanosis, or edema.


SKIN:  Shows no rashes.


BACK:  Some point tenderness.


HIP:  Shows tenderness around the right thigh.





Objective


Vitals





Vital Signs


  Date      Temp  Pulse  Resp  B/P (MAP)   Pulse Ox  O2          O2 Flow    FiO2


Time                                                 Delivery    Rate


   7/23/19  98.5     70    18      146/65        95


     07:59                           (92)


   7/21/19                                                                    30


     23:31


   7/21/19                                           Room Air


     08:00








Intake and Output





7/22/19 7/22/19 7/23/19





1515:00


23:00


07:00





IntakeIntake Total


1878 ml


288 ml


358 ml





BalanceBalance


1878 ml


288 ml


358 ml














Results/Medications


Result Diagram:  


7/22/19 0526 7/22/19 0526





Results 24 hrs





Laboratory Tests


  Test
            7/22/19
12:24  7/22/19
17:37  7/22/19
21:00  7/23/19
02:33


  Bedside Glucose          161            178            184            157


  Test
            7/23/19
08:10  
              
              



  Bedside Glucose          149





Home Meds


Reported Medications


Sitagliptin Phos-Metformin Hcl (Janumet XR) 100-1,000 Mg Tbmp.24hr, 1 TAB PO 


WITH DINNER, #30 TAB


   7/21/19


Psyllium Husk/Calcium Carb (Metamucil Plus Calcium Capsule) 1 Each Capsule, 2 


EACH PO DAILY, CAP


   7/21/19


Docusate Sodium* (Colace*) 100 Mg Capsule, 100 MG PO BID, #60 CAP


   7/21/19


Aspirin* (Aspirin*) 325 Mg Tablet, 81 MG PO QID PRN for PAIN, TAB


   7/21/19


Acetaminophen* (Tylophen*) 500 Mg Capsule, 650 MG PO Q6H PRN for PAIN LEVEL 1-3,


TAB


   7/21/19


Salmeterol Xinaf-Fluticasone* (Advair HFA*) 230/12 Aerosol Inhaler, 2 INH IH 


BID, #1 INHALER


   7/21/19


Albuterol Sulfate* (Proair HFA*) 8.5 Gm Hfa.aer.ad, 2 PUFF INH TID, #1 INHALER


   7/21/19


Hydromorphone Hcl* (Hydromorphone Hcl*) 4 Mg Tablet, 4 MG PO Q6 PRN for PAIN, 


TAB


   7/21/19


Pregabalin* (Lyrica*) 75 Mg Capsule, 75 MG PO QHS, CAP


   7/21/19


Pregabalin* (Lyrica*) 50 Mg Capsule, 50 MG PO DAILY, CAP


   7/21/19


Baclofen* (Baclofen*) 20 Mg Tablet, 20 MG PO TID, TAB


   7/21/19


Solifenacin* (Vesicare*) 10 Mg Tablet, 10 MG PO DAILY, TAB


   7/21/19


Pantoprazole* (Protonix*) 40 Mg Tablet.dr, 40 MG PO DAILY, TAB


   7/21/19


Atorvastatin* (Atorvastatin*) 40 Mg Tablet, 40 MG PO QHS, #30 TAB


   7/21/19


Spironolactone* (Aldactone*) 25 Mg Tablet, 25 MG PO DAILY, #30 TAB


   7/21/19


Labetalol Hcl* (Labetalol Hcl*) 200 Mg Tablet, 400 MG PO TID, TAB


   7/21/19


Losartan Potassium* (Cozaar*) 100 Mg Tablet, 100 MG PO DAILY, #30 TAB


   7/21/19


Hydralazine Hcl* (Hydralazine Hcl*) 10 Mg Tablet, 10 MG PO BID, #120 TAB


   7/21/19


Nifedipine* (Nifedipine ER*) 60 Mg Tablet.sa, 60 MG PO BID, TAB.SA


   7/21/19


Glipizide* (Glipizide*) 5 Mg Tablet, 25 MG PO BID, TAB


   7/21/19


Medications





Current Medications


Hydromorphone HCl (Dilaudid) 4 mg Q4H  PRN PO SEVERE PAIN LEVEL 7-10;  Start 


7/21/19 at 08:00


Hydromorphone HCl (Dilaudid) 1 mg Q4H  PRN IV SEVERE PAIN LEVEL 7-10 Last 


administered on 7/23/19at 09:45; Admin Dose 1 MG;  Start 7/21/19 at 08:00


IV Flush (NS 3 ml) 3 ml PER PROTOCOL IV ;  Start 7/21/19 at 11:30


Docusate Sodium (Colace) 100 mg Q12H  PRN PO .CONSTIPATION;  Start 7/21/19 at 


11:30


Enoxaparin Sodium (Lovenox) 40 mg DAILY SC  Last administered on 7/23/19at 


08:17; Admin Dose 40 MG;  Start 7/22/19 at 09:00


Diagnostic Test (Pha) (Accu-Chek) 1 ea 02 XX  Last administered on 7/22/19at 


01:48; Admin Dose 1 EA;  Start 7/22/19 at 02:00


Insulin Aspart (Novolog Insulin Pen) NOVOLOG *MILD* ALGORITHM WITH MEALS  BEDT


GLENNA SC  Last administered on 7/23/19at 08:16; Admin Dose 1 UNIT;  Start 7/21/19 


at 18:00


Miscellaneous Information 1 ea NOTE XX ;  Start 7/21/19 at 17:30


Glucose (Glutose) 15 gm Q15M  PRN PO DECREASED GLUCOSE;  Start 7/21/19 at 17:30


Glucose (Glutose) 22.5 gm Q15M  PRN PO DECREASED GLUCOSE;  Start 7/21/19 at 


17:30


Dextrose (D50w Syringe) 25 ml Q15M  PRN IV DECREASED GLUCOSE;  Start 7/21/19 at 


17:30


Dextrose (D50w Syringe) 50 ml Q15M  PRN IV DECREASED GLUCOSE;  Start 7/21/19 at 


17:30


Glucagon (Glucagen) 1 mg Q15M  PRN IM DECREASED GLUCOSE;  Start 7/21/19 at 17:30


Glucose (Glutose) 15 gm Q15M  PRN BUCCAL DECREASED GLUCOSE;  Start 7/21/19 at 


17:30


Acetaminophen (Tylenol Tab) 650 mg Q6H  PRN PO PAIN LEVEL 1-3 Last administered 


on 7/23/19at 02:42; Admin Dose 650 MG;  Start 7/21/19 at 20:30


Albuterol (Ventolin Hfa) 2 puff TID INH  Last administered on 7/23/19at 08:15; 


Admin Dose 2 PUFF;  Start 7/21/19 at 21:00


Aspirin (Aspirin) 81 mg DAILY PO  Last administered on 7/23/19at 08:14; Admin 


Dose 81 MG;  Start 7/22/19 at 09:00


Atorvastatin Calcium (Lipitor) 40 mg QHS PO  Last administered on 7/22/19at 


21:04; Admin Dose 40 MG;  Start 7/21/19 at 21:00


Baclofen (Lioresal) 20 mg TID PO  Last administered on 7/23/19at 08:14; Admin 


Dose 20 MG;  Start 7/21/19 at 21:00


Docusate Sodium (Colace) 100 mg BID PO  Last administered on 7/23/19at 08:14; 


Admin Dose 100 MG;  Start 7/21/19 at 21:00


Hydralazine HCl (Apresoline) 10 mg BID PO  Last administered on 7/23/19at 08:13;


Admin Dose 10 MG;  Start 7/21/19 at 21:00


Labetalol HCl (Normodyne) 400 mg TID PO  Last administered on 7/23/19at 08:13; 


Admin Dose 400 MG;  Start 7/21/19 at 21:00


Losartan Potassium (Cozaar) 100 mg DAILY PO  Last administered on 7/23/19at 


08:14; Admin Dose 100 MG;  Start 7/22/19 at 09:00


Nifedipine (Procardia Xl) 60 mg BID PO  Last administered on 7/23/19at 08:14; 


Admin Dose 60 MG;  Start 7/21/19 at 21:00


Pantoprazole (Protonix Tab) 40 mg DAILY PO  Last administered on 7/23/19at 


08:15; Admin Dose 40 MG;  Start 7/22/19 at 09:00


Solifenacin (Vesicare) 10 mg DAILY PO  Last administered on 7/23/19at 08:12; 


Admin Dose 10 MG;  Start 7/22/19 at 09:00


Spironolactone (Aldactone) 25 mg DAILY PO  Last administered on 7/23/19at 08:14;


Admin Dose 25 MG;  Start 7/22/19 at 09:00


Linagliptin (Tradjenta) 5 mg DAILY PO  Last administered on 7/23/19at 08:14; 


Admin Dose 5 MG;  Start 7/22/19 at 09:00


Metformin HCl (Glucophage) 500 mg BID WITH  MEALS PO  Last administered on 


7/23/19at 08:13; Admin Dose 500 MG;  Start 7/22/19 at 08:00


Pregabalin (Lyrica) 75 mg TID PO  Last administered on 7/23/19at 08:13; Admin 


Dose 75 MG;  Start 7/22/19 at 13:00





Assessment/Plan


Hospital Course (Demo Recall)


1.  Acute exacerbation of the iliotibial band syndrome, status post fall; pain 


out of proportion, but the patient suffers from multiple different pain 


pathologies, previously well-managed by pain management doctor when she was here


in this hospital by Dr. Melchor in the ARU.  We will recommend physical therapy.


 Neurology consult.  ARU evaluation, may need to go back to outpatient pain 


management if does not qualify.


2.  Anemia.  


3.  Status post fall with contusion.  Will x-ray hips.


4.  Progressive decline in function.  Rule out other CVA, will check a CT of the


head.  neurology consultation


5.  Frequent falls, doubt syncopal.  May benefit from more aggressive physical 


therapy.  Look for underlying cause.


6.  History of asthma.  Continue Advair and nebulizers p.r.n.


7.  Diabetes.  Continue regular medications.


8.  Hypertension.  Continue regular medications.  Monitor labs.


9.  History of previous cerebrovascular accident.  Medication optimized.  


10.  encephalopathy- transient.  may be toxic from meds or uti.  avoid 


sedatives.  neuro consult appreciate.  unchanged ct head.11.  


11.  uti- k pneumonia.  pansensitive. start macrobid.











FARAHMANDIAN,NITHYA MD          Jul 23, 2019 09:57

## 2019-07-24 VITALS — SYSTOLIC BLOOD PRESSURE: 158 MMHG | RESPIRATION RATE: 16 BRPM | HEART RATE: 64 BPM | DIASTOLIC BLOOD PRESSURE: 69 MMHG

## 2019-07-24 VITALS — DIASTOLIC BLOOD PRESSURE: 50 MMHG | RESPIRATION RATE: 18 BRPM | SYSTOLIC BLOOD PRESSURE: 108 MMHG | HEART RATE: 66 BPM

## 2019-07-24 VITALS — HEART RATE: 68 BPM | SYSTOLIC BLOOD PRESSURE: 163 MMHG | DIASTOLIC BLOOD PRESSURE: 72 MMHG | RESPIRATION RATE: 18 BRPM

## 2019-07-24 VITALS — RESPIRATION RATE: 18 BRPM | SYSTOLIC BLOOD PRESSURE: 132 MMHG | DIASTOLIC BLOOD PRESSURE: 69 MMHG

## 2019-07-24 VITALS — HEART RATE: 70 BPM | DIASTOLIC BLOOD PRESSURE: 68 MMHG | SYSTOLIC BLOOD PRESSURE: 149 MMHG

## 2019-07-24 RX ADMIN — HYDROMORPHONE HYDROCHLORIDE 1 MG: 1 INJECTION, SOLUTION INTRAMUSCULAR; INTRAVENOUS; SUBCUTANEOUS at 12:54

## 2019-07-24 RX ADMIN — BACLOFEN 1 MG: 10 TABLET ORAL at 12:55

## 2019-07-24 RX ADMIN — NIFEDIPINE 1 MG: 30 TABLET, FILM COATED, EXTENDED RELEASE ORAL at 20:47

## 2019-07-24 RX ADMIN — LABETALOL 1 MG: 200 TABLET, FILM COATED ORAL at 13:02

## 2019-07-24 RX ADMIN — LINAGLIPTIN SCH MG: 5 TABLET, FILM COATED ORAL at 08:53

## 2019-07-24 RX ADMIN — PREGABALIN SCH MG: 75 CAPSULE ORAL at 08:52

## 2019-07-24 RX ADMIN — PREGABALIN 1 MG: 75 CAPSULE ORAL at 20:47

## 2019-07-24 RX ADMIN — HYDROMORPHONE HYDROCHLORIDE 1 MG: 1 INJECTION, SOLUTION INTRAMUSCULAR; INTRAVENOUS; SUBCUTANEOUS at 04:30

## 2019-07-24 RX ADMIN — LABETALOL 1 MG: 200 TABLET, FILM COATED ORAL at 08:52

## 2019-07-24 RX ADMIN — ALBUTEROL SULFATE SCH PUFF: 90 AEROSOL, METERED RESPIRATORY (INHALATION) at 13:02

## 2019-07-24 RX ADMIN — PANTOPRAZOLE SODIUM 1 MG: 40 TABLET, DELAYED RELEASE ORAL at 08:53

## 2019-07-24 RX ADMIN — BACLOFEN SCH MG: 10 TABLET ORAL at 12:55

## 2019-07-24 RX ADMIN — DOCUSATE SODIUM 1 MG: 100 CAPSULE, LIQUID FILLED ORAL at 08:51

## 2019-07-24 RX ADMIN — ALBUTEROL SULFATE 1 PUFF: 90 AEROSOL, METERED RESPIRATORY (INHALATION) at 20:45

## 2019-07-24 RX ADMIN — HYDROMORPHONE HYDROCHLORIDE PRN MG: 1 INJECTION, SOLUTION INTRAMUSCULAR; INTRAVENOUS; SUBCUTANEOUS at 08:58

## 2019-07-24 RX ADMIN — ALBUTEROL SULFATE 1 PUFF: 90 AEROSOL, METERED RESPIRATORY (INHALATION) at 13:02

## 2019-07-24 RX ADMIN — ALBUTEROL SULFATE 1 PUFF: 90 AEROSOL, METERED RESPIRATORY (INHALATION) at 08:54

## 2019-07-24 RX ADMIN — PANTOPRAZOLE SODIUM SCH MG: 40 TABLET, DELAYED RELEASE ORAL at 08:53

## 2019-07-24 RX ADMIN — FUROSEMIDE 1 MG: 20 TABLET ORAL at 08:54

## 2019-07-24 RX ADMIN — LOSARTAN POTASSIUM SCH MG: 50 TABLET, FILM COATED ORAL at 08:53

## 2019-07-24 RX ADMIN — POLYETHYLENE GLYCOL 3350 SCH GM: 17 POWDER, FOR SOLUTION ORAL at 08:50

## 2019-07-24 RX ADMIN — INSULIN ASPART 1 UNIT: 100 INJECTION, SOLUTION INTRAVENOUS; SUBCUTANEOUS at 13:07

## 2019-07-24 RX ADMIN — SPIRONOLACTONE 1 MG: 25 TABLET, FILM COATED ORAL at 08:53

## 2019-07-24 RX ADMIN — ACETAMINOPHEN 1 MG: 325 TABLET, FILM COATED ORAL at 01:41

## 2019-07-24 RX ADMIN — SPIRONOLACTONE SCH MG: 25 TABLET ORAL at 08:53

## 2019-07-24 RX ADMIN — DOCUSATE SODIUM 1 MG: 100 CAPSULE, LIQUID FILLED ORAL at 20:48

## 2019-07-24 RX ADMIN — LABETALOL 1 MG: 200 TABLET, FILM COATED ORAL at 20:47

## 2019-07-24 RX ADMIN — HYDROMORPHONE HYDROCHLORIDE PRN MG: 1 INJECTION, SOLUTION INTRAMUSCULAR; INTRAVENOUS; SUBCUTANEOUS at 18:39

## 2019-07-24 RX ADMIN — NITROFURANTOIN MONOHYDRATE/MACROCRYSTALLINE 1 MG: 25; 75 CAPSULE ORAL at 08:51

## 2019-07-24 RX ADMIN — NITROFURANTOIN MONOHYDRATE/MACROCRYSTALLINE 1 MG: 25; 75 CAPSULE ORAL at 20:46

## 2019-07-24 RX ADMIN — PREGABALIN SCH MG: 75 CAPSULE ORAL at 12:54

## 2019-07-24 RX ADMIN — HYDROMORPHONE HYDROCHLORIDE PRN MG: 1 INJECTION, SOLUTION INTRAMUSCULAR; INTRAVENOUS; SUBCUTANEOUS at 12:54

## 2019-07-24 RX ADMIN — INSULIN ASPART 1 UNIT: 100 INJECTION, SOLUTION INTRAVENOUS; SUBCUTANEOUS at 20:52

## 2019-07-24 RX ADMIN — BACLOFEN 1 MG: 10 TABLET ORAL at 08:51

## 2019-07-24 RX ADMIN — HYDROMORPHONE HYDROCHLORIDE 1 MG: 1 INJECTION, SOLUTION INTRAMUSCULAR; INTRAVENOUS; SUBCUTANEOUS at 08:58

## 2019-07-24 RX ADMIN — ASPIRIN 81 MG SCH MG: 81 TABLET ORAL at 08:53

## 2019-07-24 RX ADMIN — ATORVASTATIN CALCIUM SCH MG: 40 TABLET, FILM COATED ORAL at 20:46

## 2019-07-24 RX ADMIN — INSULIN ASPART 1 UNIT: 100 INJECTION, SOLUTION INTRAVENOUS; SUBCUTANEOUS at 18:14

## 2019-07-24 RX ADMIN — LOSARTAN POTASSIUM 1 MG: 50 TABLET ORAL at 08:53

## 2019-07-24 RX ADMIN — BACLOFEN SCH MG: 10 TABLET ORAL at 08:51

## 2019-07-24 RX ADMIN — HYDROMORPHONE HYDROCHLORIDE 1 MG: 1 INJECTION, SOLUTION INTRAMUSCULAR; INTRAVENOUS; SUBCUTANEOUS at 18:39

## 2019-07-24 RX ADMIN — ASPIRIN 81 MG CHEWABLE TABLET 1 MG: 81 TABLET CHEWABLE at 08:53

## 2019-07-24 RX ADMIN — LINAGLIPTIN 1 MG: 5 TABLET, FILM COATED ORAL at 08:53

## 2019-07-24 RX ADMIN — NIFEDIPINE SCH MG: 30 TABLET, FILM COATED, EXTENDED RELEASE ORAL at 20:47

## 2019-07-24 RX ADMIN — NIFEDIPINE 1 MG: 30 TABLET, FILM COATED, EXTENDED RELEASE ORAL at 08:53

## 2019-07-24 RX ADMIN — POLYETHYLENE GLYCOL 3350 1 GM: 17 POWDER, FOR SOLUTION ORAL at 08:50

## 2019-07-24 RX ADMIN — SOLIFENACIN SUCCINATE SCH MG: 5 TABLET, FILM COATED ORAL at 08:52

## 2019-07-24 RX ADMIN — SOLIFENACIN SUCCINATE 1 MG: 5 TABLET, FILM COATED ORAL at 08:52

## 2019-07-24 RX ADMIN — ALBUTEROL SULFATE SCH PUFF: 90 AEROSOL, METERED RESPIRATORY (INHALATION) at 20:45

## 2019-07-24 RX ADMIN — INSULIN ASPART 1 UNIT: 100 INJECTION, SOLUTION INTRAVENOUS; SUBCUTANEOUS at 08:00

## 2019-07-24 RX ADMIN — DOCUSATE SODIUM SCH MG: 100 CAPSULE, LIQUID FILLED ORAL at 08:51

## 2019-07-24 RX ADMIN — PREGABALIN SCH MG: 75 CAPSULE ORAL at 20:47

## 2019-07-24 RX ADMIN — ALBUTEROL SULFATE SCH PUFF: 90 AEROSOL, METERED RESPIRATORY (INHALATION) at 08:54

## 2019-07-24 RX ADMIN — PREGABALIN 1 MG: 75 CAPSULE ORAL at 08:52

## 2019-07-24 RX ADMIN — ENOXAPARIN SODIUM 1 MG: 100 INJECTION SUBCUTANEOUS at 08:57

## 2019-07-24 RX ADMIN — HYDROMORPHONE HYDROCHLORIDE PRN MG: 1 INJECTION, SOLUTION INTRAMUSCULAR; INTRAVENOUS; SUBCUTANEOUS at 04:30

## 2019-07-24 RX ADMIN — NIFEDIPINE SCH MG: 30 TABLET, FILM COATED, EXTENDED RELEASE ORAL at 08:53

## 2019-07-24 RX ADMIN — BACLOFEN SCH MG: 10 TABLET ORAL at 20:47

## 2019-07-24 RX ADMIN — DOCUSATE SODIUM SCH MG: 100 CAPSULE, LIQUID FILLED ORAL at 20:48

## 2019-07-24 RX ADMIN — ENOXAPARIN SODIUM SCH MG: 100 INJECTION SUBCUTANEOUS at 08:57

## 2019-07-24 RX ADMIN — ATORVASTATIN CALCIUM 1 MG: 40 TABLET, FILM COATED ORAL at 20:46

## 2019-07-24 RX ADMIN — BACLOFEN 1 MG: 10 TABLET ORAL at 20:47

## 2019-07-24 RX ADMIN — PREGABALIN 1 MG: 75 CAPSULE ORAL at 12:54

## 2019-07-24 NOTE — CONS
Consult Date/Type/Reason


Admit Date/Time


Jul 21, 2019 at 05:32


Initial Consult Date





Requesting Provider:  NITHYA DODGE MD


Date/Time of Note


DATE: 7/24/19 


TIME: 10:16





Subjective


59-year-old female with a past medical history of previous CVA, history of 


chronic pain syndrome, history of right iliotibial band syndrome, chronic 


neuropathic pain, history of left-sided paresis, hyperlipidemia, diabetes type 


2, obstructive sleep apnea, depression, coronary artery, history of asthma, 


hypertension, obesity, and chronic headaches.  The patient presents from home 


after having a fall yesterday, unable to manage the amount of pain that she is 


having.  The patient has noted since her previous admission to the acute rehab, 


a progressive decline in her physical function.  Unclear if she had further 


neurologic disease.  The patient complains of pain similar to her iliotibial ba


nd syndrome.  The patient is being managed by an outpatient pain management 


doctor for her chronic pain.  No recent changes to any of her medications.  


Known to have leukocytosis.  X-rays were negative.


noted some encephalopathy possibly related to meds and poss uti


notes anxiety.


noted poss aphasia last night.  


 





REVIEW OF SYSTEMS:  A 14-point review of systems attempted and negative.


 


PHYSICAL EXAMINATION:


HEENT:  Normocephalic, atraumatic.  Pupils equal, round, and reactive to light. 


Oropharynx shows moist mucous membranes.


NECK:  Supple.


HEART:  Regular rate and rhythm.


LUNGS:  Clear to auscultation.


ABDOMEN:  Soft, nontender, and nondistended.  Normoactive bowel sounds.


EXTREMITIES:  No clubbing, cyanosis, or edema.


SKIN:  Shows no rashes.


BACK:  Some point tenderness.


HIP:  Shows tenderness around the right thigh.





Objective


Vitals





Vital Signs


  Date      Temp  Pulse  Resp  B/P (MAP)   Pulse Ox  O2          O2 Flow    FiO2


Time                                                 Delivery    Rate


   7/24/19  98.6     64    16      158/69        96


     07:31                           (98)


   7/21/19                                                                    30


     23:31


   7/21/19                                           Room Air


     08:00








Intake and Output





7/23/19 7/23/19 7/24/19





1515:00


23:00


07:00





IntakeIntake Total


680 ml


360 ml


958 ml





OutputOutput Total


1 ml





BalanceBalance


679 ml


360 ml


958 ml














Results/Medications


Result Diagram:  


7/22/19 0526                                                                    


           7/22/19 0526





Results 24 hrs





Laboratory Tests


  Test
            7/23/19
12:44  7/23/19
17:53  7/23/19
21:03  7/24/19
08:28


  Bedside Glucose          144            167            137            127





Home Meds


Reported Medications


Sitagliptin Phos-Metformin Hcl (Janumet XR) 100-1,000 Mg Tbmp.24hr, 1 TAB PO 


WITH DINNER, #30 TAB


   7/21/19


Psyllium Husk/Calcium Carb (Metamucil Plus Calcium Capsule) 1 Each Capsule, 2 


EACH PO DAILY, CAP


   7/21/19


Docusate Sodium* (Colace*) 100 Mg Capsule, 100 MG PO BID, #60 CAP


   7/21/19


Aspirin* (Aspirin*) 325 Mg Tablet, 81 MG PO QID PRN for PAIN, TAB


   7/21/19


Acetaminophen* (Tylophen*) 500 Mg Capsule, 650 MG PO Q6H PRN for PAIN LEVEL 1-3,


TAB


   7/21/19


Salmeterol Xinaf-Fluticasone* (Advair HFA*) 230/12 Aerosol Inhaler, 2 INH IH 


BID, #1 INHALER


   7/21/19


Albuterol Sulfate* (Proair HFA*) 8.5 Gm Hfa.aer.ad, 2 PUFF INH TID, #1 INHALER


   7/21/19


Hydromorphone Hcl* (Hydromorphone Hcl*) 4 Mg Tablet, 4 MG PO Q6 PRN for PAIN, 


TAB


   7/21/19


Pregabalin* (Lyrica*) 75 Mg Capsule, 75 MG PO QHS, CAP


   7/21/19


Pregabalin* (Lyrica*) 50 Mg Capsule, 50 MG PO DAILY, CAP


   7/21/19


Baclofen* (Baclofen*) 20 Mg Tablet, 20 MG PO TID, TAB


   7/21/19


Solifenacin* (Vesicare*) 10 Mg Tablet, 10 MG PO DAILY, TAB


   7/21/19


Pantoprazole* (Protonix*) 40 Mg Tablet.dr, 40 MG PO DAILY, TAB


   7/21/19


Atorvastatin* (Atorvastatin*) 40 Mg Tablet, 40 MG PO QHS, #30 TAB


   7/21/19


Spironolactone* (Aldactone*) 25 Mg Tablet, 25 MG PO DAILY, #30 TAB


   7/21/19


Labetalol Hcl* (Labetalol Hcl*) 200 Mg Tablet, 400 MG PO TID, TAB


   7/21/19


Losartan Potassium* (Cozaar*) 100 Mg Tablet, 100 MG PO DAILY, #30 TAB


   7/21/19


Hydralazine Hcl* (Hydralazine Hcl*) 10 Mg Tablet, 10 MG PO BID, #120 TAB


   7/21/19


Nifedipine* (Nifedipine ER*) 60 Mg Tablet.sa, 60 MG PO BID, TAB.SA


   7/21/19


Glipizide* (Glipizide*) 5 Mg Tablet, 25 MG PO BID, TAB


   7/21/19


Medications





Current Medications


Hydromorphone HCl (Dilaudid) 4 mg Q4H  PRN PO SEVERE PAIN LEVEL 7-10;  Start 


7/21/19 at 08:00


Hydromorphone HCl (Dilaudid) 1 mg Q4H  PRN IV SEVERE PAIN LEVEL 7-10 Last 


administered on 7/24/19at 08:58; Admin Dose 1 MG;  Start 7/21/19 at 08:00


IV Flush (NS 3 ml) 3 ml PER PROTOCOL IV ;  Start 7/21/19 at 11:30


Docusate Sodium (Colace) 100 mg Q12H  PRN PO .CONSTIPATION;  Start 7/21/19 at 


11:30


Enoxaparin Sodium (Lovenox) 40 mg DAILY SC  Last administered on 7/24/19at 


08:57; Admin Dose 40 MG;  Start 7/22/19 at 09:00


Diagnostic Test (Pha) (Accu-Chek) 1 ea 02 XX  Last administered on 7/22/19at 


01:48; Admin Dose 1 EA;  Start 7/22/19 at 02:00


Insulin Aspart (Novolog Insulin Pen) NOVOLOG *MILD* ALGORITHM WITH MEALS  BEDTIM


E SC  Last administered on 7/23/19at 17:56; Admin Dose 1 UNIT;  Start 7/21/19 at


18:00


Miscellaneous Information 1 ea NOTE XX ;  Start 7/21/19 at 17:30


Glucose (Glutose) 15 gm Q15M  PRN PO DECREASED GLUCOSE;  Start 7/21/19 at 17:30


Glucose (Glutose) 22.5 gm Q15M  PRN PO DECREASED GLUCOSE;  Start 7/21/19 at 


17:30


Dextrose (D50w Syringe) 25 ml Q15M  PRN IV DECREASED GLUCOSE;  Start 7/21/19 at 


17:30


Dextrose (D50w Syringe) 50 ml Q15M  PRN IV DECREASED GLUCOSE;  Start 7/21/19 at 


17:30


Glucagon (Glucagen) 1 mg Q15M  PRN IM DECREASED GLUCOSE;  Start 7/21/19 at 17:30


Glucose (Glutose) 15 gm Q15M  PRN BUCCAL DECREASED GLUCOSE;  Start 7/21/19 at 


17:30


Albuterol (Ventolin Hfa) 2 puff TID INH  Last administered on 7/24/19at 08:54; 


Admin Dose 2 PUFF;  Start 7/21/19 at 21:00


Aspirin (Aspirin) 81 mg DAILY PO  Last administered on 7/24/19at 08:53; Admin 


Dose 81 MG;  Start 7/22/19 at 09:00


Atorvastatin Calcium (Lipitor) 40 mg QHS PO  Last administered on 7/23/19at 


21:12; Admin Dose 40 MG;  Start 7/21/19 at 21:00


Baclofen (Lioresal) 20 mg TID PO  Last administered on 7/24/19at 08:51; Admin 


Dose 20 MG;  Start 7/21/19 at 21:00


Docusate Sodium (Colace) 100 mg BID PO  Last administered on 7/24/19at 08:51; 


Admin Dose 100 MG;  Start 7/21/19 at 21:00


Hydralazine HCl (Apresoline) 10 mg BID PO  Last administered on 7/24/19at 08:51;


Admin Dose 10 MG;  Start 7/21/19 at 21:00


Labetalol HCl (Normodyne) 400 mg TID PO  Last administered on 7/24/19at 08:52; 


Admin Dose 400 MG;  Start 7/21/19 at 21:00


Losartan Potassium (Cozaar) 100 mg DAILY PO  Last administered on 7/24/19at 


08:53; Admin Dose 100 MG;  Start 7/22/19 at 09:00


Pantoprazole (Protonix Tab) 40 mg DAILY PO  Last administered on 7/24/19at 


08:53; Admin Dose 40 MG;  Start 7/22/19 at 09:00


Solifenacin (Vesicare) 10 mg DAILY PO  Last administered on 7/24/19at 08:52; 


Admin Dose 10 MG;  Start 7/22/19 at 09:00


Spironolactone (Aldactone) 25 mg DAILY PO  Last administered on 7/24/19at 08:53;


Admin Dose 25 MG;  Start 7/22/19 at 09:00


Linagliptin (Tradjenta) 5 mg DAILY PO  Last administered on 7/24/19at 08:53; 


Admin Dose 5 MG;  Start 7/22/19 at 09:00


Metformin HCl (Glucophage) 500 mg BID WITH  MEALS PO  Last administered on 


7/24/19at 08:50; Admin Dose 500 MG;  Start 7/22/19 at 08:00


Pregabalin (Lyrica) 75 mg TID PO  Last administered on 7/24/19at 08:52; Admin 


Dose 75 MG;  Start 7/22/19 at 13:00


Nitrofurantoin Macrocrystals (Macrobid) 100 mg BID PO  Last administered on 


7/24/19at 08:51; Admin Dose 100 MG;  Start 7/23/19 at 11:00


Nifedipine (Procardia Xl) 30 mg BID PO  Last administered on 7/24/19at 08:53; 


Admin Dose 30 MG;  Start 7/23/19 at 21:00


Furosemide (Lasix) 20 mg DAILY PO  Last administered on 7/24/19at 08:54; Admin 


Dose 20 MG;  Start 7/24/19 at 09:00


Acetaminophen (Tylenol Tab) 650 mg Q6H  PRN PO MILD PAIN(1-3)OR ELEVATED TEMP 


Last administered on 7/24/19at 01:41; Admin Dose 650 MG;  Start 7/23/19 at 11:00


Polyethylene Glycol (Miralax) 17 gm DAILY PO  Last administered on 7/24/19at 


08:50; Admin Dose 17 GM;  Start 7/24/19 at 09:00





Assessment/Plan


Hospital Course (Demo Recall)


1.  Acute exacerbation of the iliotibial band syndrome, status post fall; pain 


out of proportion, but the patient suffers from multiple different pain 


pathologies, previously well-managed by pain management doctor when she was here


in this hospital by Dr. Melchor in the ARU.  We will recommend physical therapy.


 Neurology consult.  ARU evaluation, may need to go back to outpatient pain 


management if does not qualify.


2.  Anemia.  


3.  Status post fall with contusion.  Will x-ray hips.


4.  Progressive decline in function.  Rule out other CVA, will check a CT of the


head.  neurology consultation


5.  Frequent falls, doubt syncopal.  May benefit from more aggressive physical 


therapy.  Look for underlying cause.


6.  History of asthma.  Continue Advair and nebulizers p.r.n.


7.  Diabetes.  Continue regular medications.


8.  Hypertension.  Continue regular medications.  Monitor labs.


9.  History of previous cerebrovascular accident.  Medication optimized.  


10.  encephalopathy- transient.  may be toxic from meds or uti.  avoid 


sedatives.  neuro consult appreciate.  unchanged ct head.11.  


11.  uti- k pneumonia.  pansensitive. started macrobid. 


12.  aphasia- transient. ro tia.  neuro fu.  get echo and carotid.











NITHYA DODGE MD          Jul 24, 2019 10:20

## 2019-07-25 VITALS — HEART RATE: 66 BPM | RESPIRATION RATE: 19 BRPM | DIASTOLIC BLOOD PRESSURE: 67 MMHG | SYSTOLIC BLOOD PRESSURE: 149 MMHG

## 2019-07-25 VITALS — DIASTOLIC BLOOD PRESSURE: 73 MMHG | RESPIRATION RATE: 18 BRPM | SYSTOLIC BLOOD PRESSURE: 141 MMHG | HEART RATE: 66 BPM

## 2019-07-25 VITALS — SYSTOLIC BLOOD PRESSURE: 169 MMHG | RESPIRATION RATE: 18 BRPM | DIASTOLIC BLOOD PRESSURE: 74 MMHG | HEART RATE: 65 BPM

## 2019-07-25 VITALS — SYSTOLIC BLOOD PRESSURE: 144 MMHG | DIASTOLIC BLOOD PRESSURE: 66 MMHG | RESPIRATION RATE: 18 BRPM | HEART RATE: 65 BPM

## 2019-07-25 RX ADMIN — SOLIFENACIN SUCCINATE SCH MG: 5 TABLET, FILM COATED ORAL at 08:55

## 2019-07-25 RX ADMIN — PREGABALIN 1 MG: 75 CAPSULE ORAL at 12:55

## 2019-07-25 RX ADMIN — HYDROMORPHONE HYDROCHLORIDE PRN MG: 1 INJECTION, SOLUTION INTRAMUSCULAR; INTRAVENOUS; SUBCUTANEOUS at 04:31

## 2019-07-25 RX ADMIN — NIFEDIPINE SCH MG: 30 TABLET, FILM COATED, EXTENDED RELEASE ORAL at 08:56

## 2019-07-25 RX ADMIN — LINAGLIPTIN SCH MG: 5 TABLET, FILM COATED ORAL at 08:55

## 2019-07-25 RX ADMIN — LOSARTAN POTASSIUM SCH MG: 50 TABLET, FILM COATED ORAL at 08:56

## 2019-07-25 RX ADMIN — SPIRONOLACTONE SCH MG: 25 TABLET ORAL at 08:56

## 2019-07-25 RX ADMIN — NITROFURANTOIN MONOHYDRATE/MACROCRYSTALLINE 1 MG: 25; 75 CAPSULE ORAL at 20:30

## 2019-07-25 RX ADMIN — DOCUSATE SODIUM SCH MG: 100 CAPSULE, LIQUID FILLED ORAL at 20:29

## 2019-07-25 RX ADMIN — ALBUTEROL SULFATE SCH PUFF: 90 AEROSOL, METERED RESPIRATORY (INHALATION) at 20:35

## 2019-07-25 RX ADMIN — HYDROMORPHONE HYDROCHLORIDE 1 MG: 1 INJECTION, SOLUTION INTRAMUSCULAR; INTRAVENOUS; SUBCUTANEOUS at 08:40

## 2019-07-25 RX ADMIN — PREGABALIN SCH MG: 75 CAPSULE ORAL at 20:30

## 2019-07-25 RX ADMIN — HYDROMORPHONE HYDROCHLORIDE 1 MG: 1 INJECTION, SOLUTION INTRAMUSCULAR; INTRAVENOUS; SUBCUTANEOUS at 18:32

## 2019-07-25 RX ADMIN — INSULIN ASPART 1 UNIT: 100 INJECTION, SOLUTION INTRAVENOUS; SUBCUTANEOUS at 18:06

## 2019-07-25 RX ADMIN — BACLOFEN 1 MG: 10 TABLET ORAL at 08:56

## 2019-07-25 RX ADMIN — LABETALOL 1 MG: 200 TABLET, FILM COATED ORAL at 13:03

## 2019-07-25 RX ADMIN — FUROSEMIDE 1 MG: 20 TABLET ORAL at 08:54

## 2019-07-25 RX ADMIN — BACLOFEN SCH MG: 10 TABLET ORAL at 13:07

## 2019-07-25 RX ADMIN — ENOXAPARIN SODIUM 1 MG: 100 INJECTION SUBCUTANEOUS at 08:51

## 2019-07-25 RX ADMIN — ALBUTEROL SULFATE SCH PUFF: 90 AEROSOL, METERED RESPIRATORY (INHALATION) at 08:57

## 2019-07-25 RX ADMIN — DOCUSATE SODIUM SCH MG: 100 CAPSULE, LIQUID FILLED ORAL at 08:53

## 2019-07-25 RX ADMIN — HYDROMORPHONE HYDROCHLORIDE 1 MG: 1 INJECTION, SOLUTION INTRAMUSCULAR; INTRAVENOUS; SUBCUTANEOUS at 12:50

## 2019-07-25 RX ADMIN — PREGABALIN 1 MG: 75 CAPSULE ORAL at 20:30

## 2019-07-25 RX ADMIN — DOCUSATE SODIUM 1 MG: 100 CAPSULE, LIQUID FILLED ORAL at 20:29

## 2019-07-25 RX ADMIN — LOSARTAN POTASSIUM 1 MG: 50 TABLET ORAL at 08:56

## 2019-07-25 RX ADMIN — ASPIRIN 81 MG CHEWABLE TABLET 1 MG: 81 TABLET CHEWABLE at 08:56

## 2019-07-25 RX ADMIN — SOLIFENACIN SUCCINATE 1 MG: 5 TABLET, FILM COATED ORAL at 08:55

## 2019-07-25 RX ADMIN — INSULIN ASPART 1 UNIT: 100 INJECTION, SOLUTION INTRAVENOUS; SUBCUTANEOUS at 20:39

## 2019-07-25 RX ADMIN — NITROFURANTOIN MONOHYDRATE/MACROCRYSTALLINE 1 MG: 25; 75 CAPSULE ORAL at 08:53

## 2019-07-25 RX ADMIN — ENOXAPARIN SODIUM SCH MG: 100 INJECTION SUBCUTANEOUS at 08:51

## 2019-07-25 RX ADMIN — PREGABALIN 1 MG: 75 CAPSULE ORAL at 08:54

## 2019-07-25 RX ADMIN — BACLOFEN SCH MG: 10 TABLET ORAL at 20:28

## 2019-07-25 RX ADMIN — NIFEDIPINE SCH MG: 30 TABLET, FILM COATED, EXTENDED RELEASE ORAL at 20:34

## 2019-07-25 RX ADMIN — BACLOFEN SCH MG: 10 TABLET ORAL at 08:56

## 2019-07-25 RX ADMIN — ATORVASTATIN CALCIUM SCH MG: 40 TABLET, FILM COATED ORAL at 20:29

## 2019-07-25 RX ADMIN — ATORVASTATIN CALCIUM 1 MG: 40 TABLET, FILM COATED ORAL at 20:29

## 2019-07-25 RX ADMIN — DOCUSATE SODIUM 1 MG: 100 CAPSULE, LIQUID FILLED ORAL at 08:53

## 2019-07-25 RX ADMIN — POLYETHYLENE GLYCOL 3350 SCH GM: 17 POWDER, FOR SOLUTION ORAL at 08:53

## 2019-07-25 RX ADMIN — ASPIRIN 81 MG SCH MG: 81 TABLET ORAL at 08:56

## 2019-07-25 RX ADMIN — ACETAMINOPHEN 1 MG: 325 TABLET, FILM COATED ORAL at 11:14

## 2019-07-25 RX ADMIN — HYDROMORPHONE HYDROCHLORIDE PRN MG: 1 INJECTION, SOLUTION INTRAMUSCULAR; INTRAVENOUS; SUBCUTANEOUS at 18:32

## 2019-07-25 RX ADMIN — PANTOPRAZOLE SODIUM 1 MG: 40 TABLET, DELAYED RELEASE ORAL at 08:58

## 2019-07-25 RX ADMIN — BACLOFEN 1 MG: 10 TABLET ORAL at 20:28

## 2019-07-25 RX ADMIN — HYDROMORPHONE HYDROCHLORIDE PRN MG: 1 INJECTION, SOLUTION INTRAMUSCULAR; INTRAVENOUS; SUBCUTANEOUS at 08:40

## 2019-07-25 RX ADMIN — ACETAMINOPHEN 1 MG: 325 TABLET, FILM COATED ORAL at 20:31

## 2019-07-25 RX ADMIN — POLYETHYLENE GLYCOL 3350 1 GM: 17 POWDER, FOR SOLUTION ORAL at 08:53

## 2019-07-25 RX ADMIN — LABETALOL 1 MG: 200 TABLET, FILM COATED ORAL at 20:34

## 2019-07-25 RX ADMIN — HYDROMORPHONE HYDROCHLORIDE PRN MG: 1 INJECTION, SOLUTION INTRAMUSCULAR; INTRAVENOUS; SUBCUTANEOUS at 00:28

## 2019-07-25 RX ADMIN — ALBUTEROL SULFATE 1 PUFF: 90 AEROSOL, METERED RESPIRATORY (INHALATION) at 08:57

## 2019-07-25 RX ADMIN — HYDROMORPHONE HYDROCHLORIDE 1 MG: 1 INJECTION, SOLUTION INTRAMUSCULAR; INTRAVENOUS; SUBCUTANEOUS at 00:28

## 2019-07-25 RX ADMIN — PANTOPRAZOLE SODIUM SCH MG: 40 TABLET, DELAYED RELEASE ORAL at 08:58

## 2019-07-25 RX ADMIN — PREGABALIN SCH MG: 75 CAPSULE ORAL at 12:55

## 2019-07-25 RX ADMIN — ALBUTEROL SULFATE 1 PUFF: 90 AEROSOL, METERED RESPIRATORY (INHALATION) at 20:35

## 2019-07-25 RX ADMIN — ALBUTEROL SULFATE 1 PUFF: 90 AEROSOL, METERED RESPIRATORY (INHALATION) at 13:03

## 2019-07-25 RX ADMIN — HYDROMORPHONE HYDROCHLORIDE 1 MG: 1 INJECTION, SOLUTION INTRAMUSCULAR; INTRAVENOUS; SUBCUTANEOUS at 04:31

## 2019-07-25 RX ADMIN — LABETALOL 1 MG: 200 TABLET, FILM COATED ORAL at 08:54

## 2019-07-25 RX ADMIN — NIFEDIPINE 1 MG: 30 TABLET, FILM COATED, EXTENDED RELEASE ORAL at 20:34

## 2019-07-25 RX ADMIN — PREGABALIN SCH MG: 75 CAPSULE ORAL at 08:54

## 2019-07-25 RX ADMIN — BACLOFEN 1 MG: 10 TABLET ORAL at 13:07

## 2019-07-25 RX ADMIN — ALBUTEROL SULFATE SCH PUFF: 90 AEROSOL, METERED RESPIRATORY (INHALATION) at 13:03

## 2019-07-25 RX ADMIN — INSULIN ASPART 1 UNIT: 100 INJECTION, SOLUTION INTRAVENOUS; SUBCUTANEOUS at 08:50

## 2019-07-25 RX ADMIN — NIFEDIPINE 1 MG: 30 TABLET, FILM COATED, EXTENDED RELEASE ORAL at 08:56

## 2019-07-25 RX ADMIN — HYDROMORPHONE HYDROCHLORIDE PRN MG: 1 INJECTION, SOLUTION INTRAMUSCULAR; INTRAVENOUS; SUBCUTANEOUS at 12:50

## 2019-07-25 RX ADMIN — LINAGLIPTIN 1 MG: 5 TABLET, FILM COATED ORAL at 08:55

## 2019-07-25 RX ADMIN — SPIRONOLACTONE 1 MG: 25 TABLET, FILM COATED ORAL at 08:56

## 2019-07-25 RX ADMIN — INSULIN ASPART 1 UNIT: 100 INJECTION, SOLUTION INTRAVENOUS; SUBCUTANEOUS at 12:00

## 2019-07-25 NOTE — RADRPT
Echocardiogram Report

 

Patient Name: Mitchell PROCTORent ID: [id number]

: 1960 (59y 2m)Study Date: 2019 11:56:15 AM

Gender: FAccession #: [id number]

Tech: [id number]                            Location: 4810         

Ref.Physician: NITHYA DODGE            Height(Cm):            

 

BSA: Weight(Kg):

Quality: AdequateOrder Physician: NITHYA DODGE

Account #:

 

 

Procedures:

 

Echocardiographic Report:

Transthoracic echocardiogram with complete 2D, M-Mode, and doppler 

examination.

 

Indications:

 

Transient Ischemic Attack.

 

 

Measurements:

2D/M Mode                                          Doppler                                           
    

Measurement    Value    Normal Range               Measurement      Value    Normal Range            
    

LVIDd 2D       5.0      [ 3.8 - 5.2 ] cm           AV Mean Avni      1.4      [ 70.0 - 90.0 ] cm/sec  
    

LVIDs 2D       2.4      [ 2.2 - 3.5 ] cm           AV Mean PG       9.0      [ 2.0 - 4.0 ] mmHg      
    

LVPWd 2D       1.0      [ 0.6 - 0.9 ] cm           AV Peak Avni      2.2      [ 100.0 - 170.0 ] cm/sec
    

IVSd 2D        1.0      [ 0.6 - 0.9 ] cm           AV Peak PG       19.0     [ 2.0 - 9.0 ] mmHg      
    

AoR Diam 2D    2.5      [ 2.3 - 3.1 ] cm           AV VTI           52.3     cm                      
    

EDV 2D         117.0    [ 46.0 - 106.0 ] ml        LVOT Mean Avni    1.1      [ 60.0 - 80.0 ] cm/sec  
    

ESV 2D         19.7     [ 14.0 - 42.0 ] ml         LVOT Mean PG     6.0      [ 1.0 - 3.0 ] mmHg      
    

EF 2D          83.2     [ 54.0 - 74.0 ] percent    LVOT Peak Avni    1.5      [ 70.0 - 110.0 ] cm/sec 
    

LA Dimen 2D    3.5      [ 2.7 - 3.8 ] cm           LVOT Peak PG     9.0      [ 2.0 - 6.0 ] mmHg      
    

                                                   LVOT VTI         34.7     [ 20.0 - 30.0 ] cm      
    

                                                   MV E Peak Avni    1.4      [ 60.0 - 130.0 ] cm/sec 
    

                                                   MV A Peak Avni    1.0      [ 100.0 - 120.0 ] cm/sec
    

                                                   MV E/A           1.3      [ 0.8 - 1.5 ] ratio     
    

                                                   MV Decel Time    239      [ 104 - 258 ] msec      
    

                                                   Lat E` Avni       0.1      [ 10.0 - 15.0 ] cm/sec  
    

                                                   Lateral E/E`     22.3     [ 1.0 - 2.0 ] ratio     
    

                                                   Med E` Avni       0.0      cm/sec                  
    

                                                   MV E/A           1.3      [ 0.8 - 1.5 ] ratio     
    

                                                   TR Peak Avni      2.7      [ 100.0 - 280.0 ] cm/sec
    

                                                   TR Peak PG       29.0     mmHg                    
    

                                                   RVSP             39.0     [ 10.0 - 36.0 ] mmHg    
    

                                                   RA Pressure      10.0     mmHg                    
    

 

Findings:

 

Left Ventricle:

Normal left ventricular systolic function. Normal left ventricular 

cavity size. Normal left ventricular wall thickness. Ejection fraction 

is visually estimated at 65-70 %. Tissue Doppler/Mitral Doppler indices 

are within normal limits.

 

Right Ventricle:

Normal right ventricular size. Normal right ventricular systolic 

function.

 

Left Atrium:

The left atrium is normal in size.

 

Right Atrium:

The right atrium is normal in size.

 

Mitral Valve:

Normal appearance of the mitral valve. Mild mitral annular 

calcification. Trace mitral regurgitation.

 

Aortic Valve:

Aortic valve Max velocity 2.16 m/sec. Max PG 19.00 mmHg. Mean PG 9.00 

mmHg. Aortic sclerosis without significant stenosis. No aortic 

regurgitation.

 

Tricuspid Valve:

Normal appearance of the tricuspid valve. The estimated Peak RVSP is 39 

mmHg. There is trace tricuspid regurgitation.

 

Pulmonic Valve:

Pulmonic valve not well visualized.

 

Pericardium:

Normal pericardium with no significant pericardial effusion.

 

Aorta:

Normal aortic root.

 

IVC:

Normal inferior vena cava with poor inspiratory collapse consistent with 

elevated right atrial pressures.

 

 

Conclusions:

 

Normal left ventricular systolic function. Normal left ventricular 

cavity size. Normal left ventricular wall thickness. Ejection fraction 

is visually estimated at 65-70 %. Tissue Doppler/Mitral Doppler indices 

are within normal limits.

 

Normal appearance of the mitral valve. Mild mitral annular 

calcification. Trace mitral regurgitation.

 

Aortic valve Max velocity 2.16 m/sec. Max PG 19.00 mmHg. Mean PG 9.00 

mmHg. Aortic sclerosis without significant stenosis. No aortic 

regurgitation.

 

Normal appearance of the tricuspid valve. The estimated Peak RVSP is 39 

mmHg. There is trace tricuspid regurgitation.

 

Electronically Signed By:

 

Issa Brooke

2019 08:11:43 PDT

## 2019-07-25 NOTE — CONS
Consult Date/Type/Reason


Admit Date/Time


Jul 21, 2019 at 05:32


Initial Consult Date





Requesting Provider:  NITHYA DODGE MD


Date/Time of Note


DATE: 7/25/19 


TIME: 12:49





Subjective


59-year-old female with a past medical history of previous CVA, history of 


chronic pain syndrome, history of right iliotibial band syndrome, chronic 


neuropathic pain, history of left-sided paresis, hyperlipidemia, diabetes type 


2, obstructive sleep apnea, depression, coronary artery, history of asthma, 


hypertension, obesity, and chronic headaches.  The patient presents from home 


after having a fall yesterday, unable to manage the amount of pain that she is 


having.  The patient has noted since her previous admission to the acute rehab, 


a progressive decline in her physical function.  Unclear if she had further 


neurologic disease.  The patient complains of pain similar to her iliotibial ba


nd syndrome.  The patient is being managed by an outpatient pain management 


doctor for her chronic pain.  No recent changes to any of her medications.  


Known to have leukocytosis.  X-rays were negative.


noted some encephalopathy possibly related to meds and poss uti


notes anxiety.


noted poss aphasia


had carotid showing 50-70% ica L lesion


 





REVIEW OF SYSTEMS:  A 14-point review of systems attempted and negative.


 


PHYSICAL EXAMINATION:


HEENT:  Normocephalic, atraumatic.  Pupils equal, round, and reactive to light. 


Oropharynx shows moist mucous membranes.


NECK:  Supple.


HEART:  Regular rate and rhythm.


LUNGS:  Clear to auscultation.


ABDOMEN:  Soft, nontender, and nondistended.  Normoactive bowel sounds.


EXTREMITIES:  No clubbing, cyanosis, or edema.


SKIN:  Shows no rashes.


BACK:  Some point tenderness.


HIP:  Shows tenderness around the right thigh.





Objective


Vitals





Vital Signs


  Date      Temp  Pulse  Resp  B/P (MAP)   Pulse Ox  O2          O2 Flow    FiO2


Time                                                 Delivery    Rate


   7/25/19  98.5     65    18      169/74        96


     07:44                          (105)


   7/24/19                                           Room Air


     13:12


   7/21/19                                                                    30


     23:31








Intake and Output





7/24/19 7/24/19 7/25/19





1414:59


22:59


06:59





IntakeIntake Total


1400 ml


460 ml





OutputOutput Total


1400 ml


200 ml





BalanceBalance


0 ml


260 ml














Results/Medications


Result Diagram:  


7/22/19 0526                                                                    


           7/22/19 0526





Results 24 hrs





Laboratory Tests


  Test
            7/24/19
13:05  7/24/19
18:12  7/24/19
20:45  7/25/19
02:47


  Bedside Glucose          148            151            189            160


  Test
            7/25/19
08:46  
              
              



  Bedside Glucose          185





Home Meds


Reported Medications


Sitagliptin Phos-Metformin Hcl (Janumet XR) 100-1,000 Mg Tbmp.24hr, 1 TAB PO 


WITH DINNER, #30 TAB


   7/21/19


Psyllium Husk/Calcium Carb (Metamucil Plus Calcium Capsule) 1 Each Capsule, 2 


EACH PO DAILY, CAP


   7/21/19


Docusate Sodium* (Colace*) 100 Mg Capsule, 100 MG PO BID, #60 CAP


   7/21/19


Aspirin* (Aspirin*) 325 Mg Tablet, 81 MG PO QID PRN for PAIN, TAB


   7/21/19


Acetaminophen* (Tylophen*) 500 Mg Capsule, 650 MG PO Q6H PRN for PAIN LEVEL 1-3,


TAB


   7/21/19


Salmeterol Xinaf-Fluticasone* (Advair HFA*) 230/12 Aerosol Inhaler, 2 INH IH 


BID, #1 INHALER


   7/21/19


Albuterol Sulfate* (Proair HFA*) 8.5 Gm Hfa.aer.ad, 2 PUFF INH TID, #1 INHALER


   7/21/19


Hydromorphone Hcl* (Hydromorphone Hcl*) 4 Mg Tablet, 4 MG PO Q6 PRN for PAIN, 


TAB


   7/21/19


Pregabalin* (Lyrica*) 75 Mg Capsule, 75 MG PO QHS, CAP


   7/21/19


Pregabalin* (Lyrica*) 50 Mg Capsule, 50 MG PO DAILY, CAP


   7/21/19


Baclofen* (Baclofen*) 20 Mg Tablet, 20 MG PO TID, TAB


   7/21/19


Solifenacin* (Vesicare*) 10 Mg Tablet, 10 MG PO DAILY, TAB


   7/21/19


Pantoprazole* (Protonix*) 40 Mg Tablet.dr, 40 MG PO DAILY, TAB


   7/21/19


Atorvastatin* (Atorvastatin*) 40 Mg Tablet, 40 MG PO QHS, #30 TAB


   7/21/19


Spironolactone* (Aldactone*) 25 Mg Tablet, 25 MG PO DAILY, #30 TAB


   7/21/19


Labetalol Hcl* (Labetalol Hcl*) 200 Mg Tablet, 400 MG PO TID, TAB


   7/21/19


Losartan Potassium* (Cozaar*) 100 Mg Tablet, 100 MG PO DAILY, #30 TAB


   7/21/19


Hydralazine Hcl* (Hydralazine Hcl*) 10 Mg Tablet, 10 MG PO BID, #120 TAB


   7/21/19


Nifedipine* (Nifedipine ER*) 60 Mg Tablet.sa, 60 MG PO BID, TAB.SA


   7/21/19


Glipizide* (Glipizide*) 5 Mg Tablet, 25 MG PO BID, TAB


   7/21/19


Medications





Current Medications


Hydromorphone HCl (Dilaudid) 4 mg Q4H  PRN PO SEVERE PAIN LEVEL 7-10;  Start 


7/21/19 at 08:00


Hydromorphone HCl (Dilaudid) 1 mg Q4H  PRN IV SEVERE PAIN LEVEL 7-10 Last 


administered on 7/25/19at 08:40; Admin Dose 1 MG;  Start 7/21/19 at 08:00


IV Flush (NS 3 ml) 3 ml PER PROTOCOL IV ;  Start 7/21/19 at 11:30


Docusate Sodium (Colace) 100 mg Q12H  PRN PO .CONSTIPATION;  Start 7/21/19 at 


11:30


Enoxaparin Sodium (Lovenox) 40 mg DAILY SC  Last administered on 7/25/19at 


08:51; Admin Dose 40 MG;  Start 7/22/19 at 09:00


Diagnostic Test (Pha) (Accu-Chek) 1 ea 02 XX  Last administered on 7/22/19at 


01:48; Admin Dose 1 EA;  Start 7/22/19 at 02:00


Insulin Aspart (Novolog Insulin Pen) NOVOLOG *MILD* ALGORITHM WITH MEALS  


BEDTIME SC  Last administered on 7/25/19at 08:50; Admin Dose 2 UNIT;  Start 


7/21/19 at 18:00


Miscellaneous Information 1 ea NOTE XX ;  Start 7/21/19 at 17:30


Glucose (Glutose) 15 gm Q15M  PRN PO DECREASED GLUCOSE;  Start 7/21/19 at 17:30


Glucose (Glutose) 22.5 gm Q15M  PRN PO DECREASED GLUCOSE;  Start 7/21/19 at 


17:30


Dextrose (D50w Syringe) 25 ml Q15M  PRN IV DECREASED GLUCOSE;  Start 7/21/19 at 


17:30


Dextrose (D50w Syringe) 50 ml Q15M  PRN IV DECREASED GLUCOSE;  Start 7/21/19 at 


17:30


Glucagon (Glucagen) 1 mg Q15M  PRN IM DECREASED GLUCOSE;  Start 7/21/19 at 17:30


Glucose (Glutose) 15 gm Q15M  PRN BUCCAL DECREASED GLUCOSE;  Start 7/21/19 at 


17:30


Albuterol (Ventolin Hfa) 2 puff TID INH  Last administered on 7/25/19at 08:57; 


Admin Dose 2 PUFF;  Start 7/21/19 at 21:00


Aspirin (Aspirin) 81 mg DAILY PO  Last administered on 7/25/19at 08:56; Admin 


Dose 81 MG;  Start 7/22/19 at 09:00


Atorvastatin Calcium (Lipitor) 40 mg QHS PO  Last administered on 7/24/19at 


20:46; Admin Dose 40 MG;  Start 7/21/19 at 21:00


Baclofen (Lioresal) 20 mg TID PO  Last administered on 7/25/19at 08:56; Admin 


Dose 20 MG;  Start 7/21/19 at 21:00


Docusate Sodium (Colace) 100 mg BID PO  Last administered on 7/25/19at 08:53; 


Admin Dose 100 MG;  Start 7/21/19 at 21:00


Hydralazine HCl (Apresoline) 10 mg BID PO  Last administered on 7/25/19at 08:55;


Admin Dose 10 MG;  Start 7/21/19 at 21:00


Labetalol HCl (Normodyne) 400 mg TID PO  Last administered on 7/25/19at 08:54; 


Admin Dose 400 MG;  Start 7/21/19 at 21:00


Losartan Potassium (Cozaar) 100 mg DAILY PO  Last administered on 7/25/19at 


08:56; Admin Dose 100 MG;  Start 7/22/19 at 09:00


Pantoprazole (Protonix Tab) 40 mg DAILY PO  Last administered on 7/25/19at 


08:58; Admin Dose 40 MG;  Start 7/22/19 at 09:00


Solifenacin (Vesicare) 10 mg DAILY PO  Last administered on 7/25/19at 08:55; Adm


in Dose 10 MG;  Start 7/22/19 at 09:00


Spironolactone (Aldactone) 25 mg DAILY PO  Last administered on 7/25/19at 08:56;


Admin Dose 25 MG;  Start 7/22/19 at 09:00


Linagliptin (Tradjenta) 5 mg DAILY PO  Last administered on 7/25/19at 08:55; 


Admin Dose 5 MG;  Start 7/22/19 at 09:00


Metformin HCl (Glucophage) 500 mg BID WITH  MEALS PO  Last administered on 


7/25/19at 08:55; Admin Dose 500 MG;  Start 7/22/19 at 08:00


Pregabalin (Lyrica) 75 mg TID PO  Last administered on 7/25/19at 08:54; Admin 


Dose 75 MG;  Start 7/22/19 at 13:00


Nitrofurantoin Macrocrystals (Macrobid) 100 mg BID PO  Last administered on 


7/25/19at 08:53; Admin Dose 100 MG;  Start 7/23/19 at 11:00


Nifedipine (Procardia Xl) 30 mg BID PO  Last administered on 7/25/19at 08:56; 


Admin Dose 30 MG;  Start 7/23/19 at 21:00


Furosemide (Lasix) 20 mg DAILY PO  Last administered on 7/25/19at 08:54; Admin 


Dose 20 MG;  Start 7/24/19 at 09:00


Acetaminophen (Tylenol Tab) 650 mg Q6H  PRN PO MILD PAIN(1-3)OR ELEVATED TEMP 


Last administered on 7/25/19at 11:14; Admin Dose 650 MG;  Start 7/23/19 at 11:00


Polyethylene Glycol (Miralax) 17 gm DAILY PO  Last administered on 7/25/19at 


08:53; Admin Dose 17 GM;  Start 7/24/19 at 09:00





Assessment/Plan


Hospital Course (Demo Recall)


1.  Acute exacerbation of the iliotibial band syndrome, status post fall; pain 


out of proportion, but the patient suffers from multiple different pain 


pathologies, previously well-managed by pain management doctor when she was here


in this hospital by Dr. Melchor in the ARU.  We will recommend physical therapy.


 Neurology consult.  ARU evaluation, may need to go back to outpatient pain 


management if does not qualify.


2.  Anemia.  


3.  Status post fall with contusion.  Will x-ray hips.


4.  Progressive decline in function.  Rule out other CVA, will check a CT of the


head.  neurology consultation


5.  Frequent falls, doubt syncopal.  May benefit from more aggressive physical 


therapy.  Look for underlying cause.


6.  History of asthma.  Continue Advair and nebulizers p.r.n.


7.  Diabetes.  Continue regular medications.


8.  Hypertension.  Continue regular medications.  Monitor labs.


9.  History of previous cerebrovascular accident.  Medication optimized.  


10.  encephalopathy- transient.  may be toxic from meds or uti.  avoid se


datives.  neuro consult appreciate.  unchanged ct head.11.  


11.  uti- k pneumonia.  pansensitive. started macrobid. 


12.  aphasia- transient. ro tia.  neuro fu. note L ICA lesion.  will get 


vascular consult.











NITHYA DODGE MD          Jul 25, 2019 12:50

## 2019-07-26 VITALS — SYSTOLIC BLOOD PRESSURE: 136 MMHG | DIASTOLIC BLOOD PRESSURE: 62 MMHG | HEART RATE: 62 BPM | RESPIRATION RATE: 18 BRPM

## 2019-07-26 VITALS — RESPIRATION RATE: 20 BRPM | SYSTOLIC BLOOD PRESSURE: 192 MMHG | HEART RATE: 65 BPM | DIASTOLIC BLOOD PRESSURE: 76 MMHG

## 2019-07-26 VITALS — SYSTOLIC BLOOD PRESSURE: 154 MMHG | HEART RATE: 72 BPM | DIASTOLIC BLOOD PRESSURE: 70 MMHG

## 2019-07-26 LAB
% IRON SATURATION: 14 % SAT (ref 22–52)
ADD MAN DIFF?: NO
ALANINE AMINOTRANSFERASE: 39 IU/L (ref 13–69)
ALBUMIN/GLOBULIN RATIO: 1.27
ALBUMIN: 4.2 G/DL (ref 3.3–4.9)
ALKALINE PHOSPHATASE: 103 IU/L (ref 42–121)
ANION GAP: 9 (ref 5–13)
ASPARTATE AMINO TRANSFERASE: 33 IU/L (ref 15–46)
BASOPHIL #: 0.1 10^3/UL (ref 0–0.1)
BASOPHILS %: 0.5 % (ref 0–2)
BILIRUBIN,DIRECT: 0 MG/DL (ref 0–0.2)
BILIRUBIN,TOTAL: 0.6 MG/DL (ref 0.2–1.3)
BLOOD UREA NITROGEN: 14 MG/DL (ref 7–20)
C-REACTIVE PROTEIN: 1.1 MG/DL (ref 0–0.9)
CALCIUM: 10 MG/DL (ref 8.4–10.2)
CARBON DIOXIDE: 29 MMOL/L (ref 21–31)
CHLORIDE: 99 MMOL/L (ref 97–110)
CHOL/HDL RATIO: 4.6 RATIO
CHOLESTEROL: 136 MG/DL (ref 100–200)
CREATININE: 0.65 MG/DL (ref 0.44–1)
EOSINOPHILS #: 0.3 10^3/UL (ref 0–0.5)
EOSINOPHILS %: 3 % (ref 0–7)
ERYTHROCYTE SEDIMENTATION RATE: 27 MM/HR (ref 0–30)
GLOBULIN: 3.3 G/DL (ref 1.3–3.2)
GLUCOSE: 152 MG/DL (ref 70–220)
HDL CHOLESTEROL: 29 MG/DL (ref 35–98)
HEMATOCRIT: 34.5 % (ref 37–47)
HEMOGLOBIN: 11 G/DL (ref 12–16)
IMMATURE GRANS #M: 0.03 10^3/UL (ref 0–0.03)
IMMATURE GRANS % (M): 0.3 % (ref 0–0.43)
IRON: 59 UG/DL (ref 35–150)
LDL CHOLESTEROL,CALCULATED: 73 MG/DL
LYMPHOCYTES #: 2.1 10^3/UL (ref 0.8–2.9)
LYMPHOCYTES %: 21.9 % (ref 15–51)
MEAN CORPUSCULAR HEMOGLOBIN: 27.1 PG (ref 29–33)
MEAN CORPUSCULAR HGB CONC: 31.9 G/DL (ref 32–37)
MEAN CORPUSCULAR VOLUME: 85 FL (ref 82–101)
MEAN PLATELET VOLUME: 9.7 FL (ref 7.4–10.4)
MONOCYTE #: 0.8 10^3/UL (ref 0.3–0.9)
MONOCYTES %: 8.3 % (ref 0–11)
NEUTROPHIL #: 6.4 10^3/UL (ref 1.6–7.5)
NEUTROPHILS %: 66 % (ref 39–77)
NUCLEATED RED BLOOD CELLS #: 0 10^3/UL (ref 0–0)
NUCLEATED RED BLOOD CELLS%: 0 /100WBC (ref 0–0)
PLATELET COUNT: 391 10^3/UL (ref 140–415)
POTASSIUM: 4 MMOL/L (ref 3.5–5.1)
RED BLOOD COUNT: 4.06 10^6/UL (ref 4.2–5.4)
RED CELL DISTRIBUTION WIDTH: 12.9 % (ref 11.5–14.5)
SODIUM: 137 MMOL/L (ref 135–144)
THYROID STIMULATING HORMONE: 3.67 MIU/L (ref 0.47–4.68)
TOTAL IRON BINDING CAPACITY: 407 UG/DL (ref 241–421)
TOTAL PROTEIN: 7.5 G/DL (ref 6.1–8.1)
TRIGLYCERIDES: 172 MG/DL (ref 0–149)
WHITE BLOOD COUNT: 9.7 10^3/UL (ref 4.8–10.8)

## 2019-07-26 RX ADMIN — NITROFURANTOIN MONOHYDRATE/MACROCRYSTALLINE 1 MG: 25; 75 CAPSULE ORAL at 08:11

## 2019-07-26 RX ADMIN — PREGABALIN 1 MG: 75 CAPSULE ORAL at 13:00

## 2019-07-26 RX ADMIN — HYDROMORPHONE HYDROCHLORIDE 1 MG: 4 TABLET ORAL at 10:11

## 2019-07-26 RX ADMIN — ENOXAPARIN SODIUM 1 MG: 100 INJECTION SUBCUTANEOUS at 08:22

## 2019-07-26 RX ADMIN — HYDROMORPHONE HYDROCHLORIDE 1 MG: 4 TABLET ORAL at 23:33

## 2019-07-26 RX ADMIN — FUROSEMIDE 1 MG: 20 TABLET ORAL at 08:15

## 2019-07-26 RX ADMIN — ATORVASTATIN CALCIUM SCH MG: 40 TABLET, FILM COATED ORAL at 20:24

## 2019-07-26 RX ADMIN — BACLOFEN SCH MG: 10 TABLET ORAL at 12:54

## 2019-07-26 RX ADMIN — DOCUSATE SODIUM SCH MG: 100 CAPSULE, LIQUID FILLED ORAL at 08:11

## 2019-07-26 RX ADMIN — GLIPIZIDE 1 MG: 5 TABLET, FILM COATED, EXTENDED RELEASE ORAL at 14:32

## 2019-07-26 RX ADMIN — NIFEDIPINE 1 MG: 30 TABLET, FILM COATED, EXTENDED RELEASE ORAL at 08:11

## 2019-07-26 RX ADMIN — HYDROMORPHONE HYDROCHLORIDE PRN MG: 1 INJECTION, SOLUTION INTRAMUSCULAR; INTRAVENOUS; SUBCUTANEOUS at 02:00

## 2019-07-26 RX ADMIN — LABETALOL 1 MG: 200 TABLET, FILM COATED ORAL at 20:25

## 2019-07-26 RX ADMIN — DOCUSATE SODIUM SCH MG: 100 CAPSULE, LIQUID FILLED ORAL at 20:24

## 2019-07-26 RX ADMIN — ALBUTEROL SULFATE 1 PUFF: 90 AEROSOL, METERED RESPIRATORY (INHALATION) at 08:10

## 2019-07-26 RX ADMIN — ATORVASTATIN CALCIUM 1 MG: 40 TABLET, FILM COATED ORAL at 20:24

## 2019-07-26 RX ADMIN — ENOXAPARIN SODIUM SCH MG: 100 INJECTION SUBCUTANEOUS at 08:22

## 2019-07-26 RX ADMIN — BACLOFEN 1 MG: 10 TABLET ORAL at 12:54

## 2019-07-26 RX ADMIN — HYDROMORPHONE HYDROCHLORIDE 1 MG: 4 TABLET ORAL at 14:45

## 2019-07-26 RX ADMIN — ACETAMINOPHEN 1 MG: 325 TABLET, FILM COATED ORAL at 22:26

## 2019-07-26 RX ADMIN — ALBUTEROL SULFATE 1 PUFF: 90 AEROSOL, METERED RESPIRATORY (INHALATION) at 20:27

## 2019-07-26 RX ADMIN — ALBUTEROL SULFATE SCH PUFF: 90 AEROSOL, METERED RESPIRATORY (INHALATION) at 08:10

## 2019-07-26 RX ADMIN — HYDROMORPHONE HYDROCHLORIDE PRN MG: 4 TABLET ORAL at 10:11

## 2019-07-26 RX ADMIN — GLIPIZIDE SCH MG: 5 TABLET, FILM COATED, EXTENDED RELEASE ORAL at 14:32

## 2019-07-26 RX ADMIN — ALBUTEROL SULFATE SCH PUFF: 90 AEROSOL, METERED RESPIRATORY (INHALATION) at 20:27

## 2019-07-26 RX ADMIN — LOSARTAN POTASSIUM 1 MG: 50 TABLET ORAL at 08:12

## 2019-07-26 RX ADMIN — NIFEDIPINE SCH MG: 30 TABLET, FILM COATED, EXTENDED RELEASE ORAL at 20:24

## 2019-07-26 RX ADMIN — BACLOFEN SCH MG: 10 TABLET ORAL at 20:24

## 2019-07-26 RX ADMIN — PANTOPRAZOLE SODIUM 1 MG: 40 TABLET, DELAYED RELEASE ORAL at 08:13

## 2019-07-26 RX ADMIN — SOLIFENACIN SUCCINATE 1 MG: 5 TABLET, FILM COATED ORAL at 08:12

## 2019-07-26 RX ADMIN — PREGABALIN SCH MG: 75 CAPSULE ORAL at 20:23

## 2019-07-26 RX ADMIN — NIFEDIPINE SCH MG: 30 TABLET, FILM COATED, EXTENDED RELEASE ORAL at 08:11

## 2019-07-26 RX ADMIN — LINAGLIPTIN SCH MG: 5 TABLET, FILM COATED ORAL at 08:15

## 2019-07-26 RX ADMIN — HYDROMORPHONE HYDROCHLORIDE PRN MG: 4 TABLET ORAL at 23:33

## 2019-07-26 RX ADMIN — ASPIRIN 81 MG SCH MG: 81 TABLET ORAL at 08:12

## 2019-07-26 RX ADMIN — HYDROMORPHONE HYDROCHLORIDE 1 MG: 4 TABLET ORAL at 19:18

## 2019-07-26 RX ADMIN — SODIUM CHLORIDE 1 ML: 9 INJECTION, SOLUTION INTRAMUSCULAR; INTRAVENOUS; SUBCUTANEOUS at 09:40

## 2019-07-26 RX ADMIN — HYDROMORPHONE HYDROCHLORIDE PRN MG: 1 INJECTION, SOLUTION INTRAMUSCULAR; INTRAVENOUS; SUBCUTANEOUS at 06:05

## 2019-07-26 RX ADMIN — SPIRONOLACTONE SCH MG: 25 TABLET ORAL at 08:13

## 2019-07-26 RX ADMIN — FLUTICASONE FUROATE AND VILANTEROL TRIFENATATE SCH INH: 100; 25 POWDER RESPIRATORY (INHALATION) at 14:32

## 2019-07-26 RX ADMIN — ASPIRIN 81 MG CHEWABLE TABLET 1 MG: 81 TABLET CHEWABLE at 08:12

## 2019-07-26 RX ADMIN — PREGABALIN SCH MG: 75 CAPSULE ORAL at 13:00

## 2019-07-26 RX ADMIN — HYDROMORPHONE HYDROCHLORIDE 1 MG: 1 INJECTION, SOLUTION INTRAMUSCULAR; INTRAVENOUS; SUBCUTANEOUS at 06:05

## 2019-07-26 RX ADMIN — DOCUSATE SODIUM 1 MG: 100 CAPSULE, LIQUID FILLED ORAL at 20:24

## 2019-07-26 RX ADMIN — SOLIFENACIN SUCCINATE SCH MG: 5 TABLET, FILM COATED ORAL at 08:12

## 2019-07-26 RX ADMIN — BACLOFEN 1 MG: 10 TABLET ORAL at 20:24

## 2019-07-26 RX ADMIN — SPIRONOLACTONE 1 MG: 25 TABLET, FILM COATED ORAL at 08:13

## 2019-07-26 RX ADMIN — ALBUTEROL SULFATE SCH PUFF: 90 AEROSOL, METERED RESPIRATORY (INHALATION) at 12:54

## 2019-07-26 RX ADMIN — DOCUSATE SODIUM 1 MG: 100 CAPSULE, LIQUID FILLED ORAL at 08:11

## 2019-07-26 RX ADMIN — HYDROMORPHONE HYDROCHLORIDE PRN MG: 4 TABLET ORAL at 14:45

## 2019-07-26 RX ADMIN — PREGABALIN 1 MG: 75 CAPSULE ORAL at 08:15

## 2019-07-26 RX ADMIN — INSULIN ASPART 1 UNIT: 100 INJECTION, SOLUTION INTRAVENOUS; SUBCUTANEOUS at 21:00

## 2019-07-26 RX ADMIN — LABETALOL 1 MG: 200 TABLET, FILM COATED ORAL at 08:12

## 2019-07-26 RX ADMIN — LABETALOL 1 MG: 200 TABLET, FILM COATED ORAL at 13:00

## 2019-07-26 RX ADMIN — LOSARTAN POTASSIUM SCH MG: 50 TABLET, FILM COATED ORAL at 08:12

## 2019-07-26 RX ADMIN — BACLOFEN SCH MG: 10 TABLET ORAL at 08:13

## 2019-07-26 RX ADMIN — PREGABALIN 1 MG: 75 CAPSULE ORAL at 20:23

## 2019-07-26 RX ADMIN — PREGABALIN SCH MG: 75 CAPSULE ORAL at 08:15

## 2019-07-26 RX ADMIN — POLYETHYLENE GLYCOL 3350 1 GM: 17 POWDER, FOR SOLUTION ORAL at 08:25

## 2019-07-26 RX ADMIN — PANTOPRAZOLE SODIUM SCH MG: 40 TABLET, DELAYED RELEASE ORAL at 08:13

## 2019-07-26 RX ADMIN — BACLOFEN 1 MG: 10 TABLET ORAL at 08:13

## 2019-07-26 RX ADMIN — INSULIN ASPART 1 UNIT: 100 INJECTION, SOLUTION INTRAVENOUS; SUBCUTANEOUS at 13:05

## 2019-07-26 RX ADMIN — FLUTICASONE FUROATE AND VILANTEROL TRIFENATATE 1 INH: 100; 25 POWDER RESPIRATORY (INHALATION) at 14:32

## 2019-07-26 RX ADMIN — NITROFURANTOIN MONOHYDRATE/MACROCRYSTALLINE 1 MG: 25; 75 CAPSULE ORAL at 20:24

## 2019-07-26 RX ADMIN — VASOPRESSIN 1 ML/14 S: 20 INJECTION, SOLUTION INTRAMUSCULAR; SUBCUTANEOUS at 09:40

## 2019-07-26 RX ADMIN — INSULIN ASPART 1 UNIT: 100 INJECTION, SOLUTION INTRAVENOUS; SUBCUTANEOUS at 17:46

## 2019-07-26 RX ADMIN — IOHEXOL 1 ML/29 S: 350 INJECTION, SOLUTION INTRAVENOUS at 09:40

## 2019-07-26 RX ADMIN — INSULIN ASPART 1 UNIT: 100 INJECTION, SOLUTION INTRAVENOUS; SUBCUTANEOUS at 08:44

## 2019-07-26 RX ADMIN — POLYETHYLENE GLYCOL 3350 SCH GM: 17 POWDER, FOR SOLUTION ORAL at 08:25

## 2019-07-26 RX ADMIN — ALBUTEROL SULFATE 1 PUFF: 90 AEROSOL, METERED RESPIRATORY (INHALATION) at 12:54

## 2019-07-26 RX ADMIN — HYDROMORPHONE HYDROCHLORIDE PRN MG: 4 TABLET ORAL at 19:18

## 2019-07-26 RX ADMIN — LINAGLIPTIN 1 MG: 5 TABLET, FILM COATED ORAL at 08:15

## 2019-07-26 RX ADMIN — HYDROMORPHONE HYDROCHLORIDE 1 MG: 1 INJECTION, SOLUTION INTRAMUSCULAR; INTRAVENOUS; SUBCUTANEOUS at 02:00

## 2019-07-26 RX ADMIN — NIFEDIPINE 1 MG: 30 TABLET, FILM COATED, EXTENDED RELEASE ORAL at 20:24

## 2019-07-26 NOTE — CONS
Consult Date/Type/Reason


Admit Date/Time


Jul 21, 2019 at 05:32


Initial Consult Date





Requesting Provider:  NITHYA DODGE MD


Date/Time of Note


DATE: 7/26/19 


TIME: 11:37





Subjective


59-year-old female with a past medical history of previous CVA, history of 


chronic pain syndrome, history of right iliotibial band syndrome, chronic 


neuropathic pain, history of left-sided paresis, hyperlipidemia, diabetes type 


2, obstructive sleep apnea, depression, coronary artery, history of asthma, 


hypertension, obesity, and chronic headaches.  The patient presents from home 


after having a fall yesterday, unable to manage the amount of pain that she is 


having.  The patient has noted since her previous admission to the acute rehab, 


a progressive decline in her physical function.  Unclear if she had further 


neurologic disease.  The patient complains of pain similar to her iliotibial ba


nd syndrome.  The patient is being managed by an outpatient pain management 


doctor for her chronic pain.  No recent changes to any of her medications.  


Known to have leukocytosis.  X-rays were negative.


noted some encephalopathy possibly related to meds and poss uti


notes anxiety.


noted poss aphasia episodic.


had carotid showing 50-70% ica L lesion.  DW dr. hoffman.


 





REVIEW OF SYSTEMS:  A 14-point review of systems attempted and negative.


 


PHYSICAL EXAMINATION:


HEENT:  Normocephalic, atraumatic.  Pupils equal, round, and reactive to light. 


Oropharynx shows moist mucous membranes.


NECK:  Supple.


HEART:  Regular rate and rhythm.


LUNGS:  Clear to auscultation.


ABDOMEN:  Soft, nontender, and nondistended.  Normoactive bowel sounds.


EXTREMITIES:  No clubbing, cyanosis, or edema.


SKIN:  Shows no rashes.


BACK:  Some point tenderness.


HIP:  Shows tenderness around the right thigh.





Objective


Vitals





Vital Signs


  Date      Temp  Pulse  Resp  B/P (MAP)   Pulse Ox  O2          O2 Flow    FiO2


Time                                                 Delivery    Rate


   7/26/19  97.9     65    20      192/76        92


     08:18                          (114)


   7/25/19                                           Room Air


     13:10








Intake and Output





7/25/19 7/25/19 7/26/19





1515:00


23:00


07:00





IntakeIntake Total


600 ml


240 ml





OutputOutput Total


750 ml


300 ml





BalanceBalance


-150 ml


-60 ml














Results/Medications


Result Diagram:  


7/22/19 0526 7/22/19 0526





Results 24 hrs





Laboratory Tests


  Test
            7/25/19
12:53  7/25/19
18:02  7/25/19
20:27  7/26/19
02:29


  Bedside Glucose          127            144           237  H          145


  Test
            7/26/19
08:29  
              
              



  Bedside Glucose          184





Home Meds


Reported Medications


Sitagliptin Phos-Metformin Hcl (Janumet XR) 100-1,000 Mg Tbmp.24hr, 1 TAB PO 


WITH DINNER, #30 TAB


   7/21/19


Psyllium Husk/Calcium Carb (Metamucil Plus Calcium Capsule) 1 Each Capsule, 2 


EACH PO DAILY, CAP


   7/21/19


Docusate Sodium* (Colace*) 100 Mg Capsule, 100 MG PO BID, #60 CAP


   7/21/19


Aspirin* (Aspirin*) 325 Mg Tablet, 81 MG PO QID PRN for PAIN, TAB


   7/21/19


Acetaminophen* (Tylophen*) 500 Mg Capsule, 650 MG PO Q6H PRN for PAIN LEVEL 1-3,


TAB


   7/21/19


Salmeterol Xinaf-Fluticasone* (Advair HFA*) 230/12 Aerosol Inhaler, 2 INH IH 


BID, #1 INHALER


   7/21/19


Albuterol Sulfate* (Proair HFA*) 8.5 Gm Hfa.aer.ad, 2 PUFF INH TID, #1 INHALER


   7/21/19


Hydromorphone Hcl* (Hydromorphone Hcl*) 4 Mg Tablet, 4 MG PO Q6 PRN for PAIN, 


TAB


   7/21/19


Pregabalin* (Lyrica*) 75 Mg Capsule, 75 MG PO QHS, CAP


   7/21/19


Pregabalin* (Lyrica*) 50 Mg Capsule, 50 MG PO DAILY, CAP


   7/21/19


Baclofen* (Baclofen*) 20 Mg Tablet, 20 MG PO TID, TAB


   7/21/19


Solifenacin* (Vesicare*) 10 Mg Tablet, 10 MG PO DAILY, TAB


   7/21/19


Pantoprazole* (Protonix*) 40 Mg Tablet.dr, 40 MG PO DAILY, TAB


   7/21/19


Atorvastatin* (Atorvastatin*) 40 Mg Tablet, 40 MG PO QHS, #30 TAB


   7/21/19


Spironolactone* (Aldactone*) 25 Mg Tablet, 25 MG PO DAILY, #30 TAB


   7/21/19


Labetalol Hcl* (Labetalol Hcl*) 200 Mg Tablet, 400 MG PO TID, TAB


   7/21/19


Losartan Potassium* (Cozaar*) 100 Mg Tablet, 100 MG PO DAILY, #30 TAB


   7/21/19


Hydralazine Hcl* (Hydralazine Hcl*) 10 Mg Tablet, 10 MG PO BID, #120 TAB


   7/21/19


Nifedipine* (Nifedipine ER*) 60 Mg Tablet.sa, 60 MG PO BID, TAB.SA


   7/21/19


Glipizide* (Glipizide*) 5 Mg Tablet, 25 MG PO BID, TAB


   7/21/19


Medications





Current Medications


Hydromorphone HCl (Dilaudid) 4 mg Q4H  PRN PO SEVERE PAIN LEVEL 7-10 Last 


administered on 7/26/19at 10:11; Admin Dose 4 MG;  Start 7/21/19 at 08:00


Hydromorphone HCl (Dilaudid) 1 mg Q4H  PRN IV SEVERE PAIN LEVEL 7-10 Last 


administered on 7/26/19at 06:05; Admin Dose 1 MG;  Start 7/21/19 at 08:00


IV Flush (NS 3 ml) 3 ml PER PROTOCOL IV ;  Start 7/21/19 at 11:30


Docusate Sodium (Colace) 100 mg Q12H  PRN PO .CONSTIPATION;  Start 7/21/19 at 1


1:30


Enoxaparin Sodium (Lovenox) 40 mg DAILY SC  Last administered on 7/26/19at 


08:22; Admin Dose 40 MG;  Start 7/22/19 at 09:00


Diagnostic Test (Pha) (Accu-Chek) 1 ea 02 XX  Last administered on 7/22/19at 


01:48; Admin Dose 1 EA;  Start 7/22/19 at 02:00


Insulin Aspart (Novolog Insulin Pen) NOVOLOG *MILD* ALGORITHM WITH MEALS  


BEDTIME SC  Last administered on 7/26/19at 08:44; Admin Dose 2 UNIT;  Start 


7/21/19 at 18:00


Miscellaneous Information 1 ea NOTE XX ;  Start 7/21/19 at 17:30


Glucose (Glutose) 15 gm Q15M  PRN PO DECREASED GLUCOSE;  Start 7/21/19 at 17:30


Glucose (Glutose) 22.5 gm Q15M  PRN PO DECREASED GLUCOSE;  Start 7/21/19 at 


17:30


Dextrose (D50w Syringe) 25 ml Q15M  PRN IV DECREASED GLUCOSE;  Start 7/21/19 at 


17:30


Dextrose (D50w Syringe) 50 ml Q15M  PRN IV DECREASED GLUCOSE;  Start 7/21/19 at 


17:30


Glucagon (Glucagen) 1 mg Q15M  PRN IM DECREASED GLUCOSE;  Start 7/21/19 at 17:30


Glucose (Glutose) 15 gm Q15M  PRN BUCCAL DECREASED GLUCOSE;  Start 7/21/19 at 


17:30


Albuterol (Ventolin Hfa) 2 puff TID INH  Last administered on 7/26/19at 08:10; 


Admin Dose 2 PUFF;  Start 7/21/19 at 21:00


Aspirin (Aspirin) 81 mg DAILY PO  Last administered on 7/26/19at 08:12; Admin 


Dose 81 MG;  Start 7/22/19 at 09:00


Atorvastatin Calcium (Lipitor) 40 mg QHS PO  Last administered on 7/25/19at 20:


29; Admin Dose 40 MG;  Start 7/21/19 at 21:00


Baclofen (Lioresal) 20 mg TID PO  Last administered on 7/26/19at 08:13; Admin 


Dose 20 MG;  Start 7/21/19 at 21:00


Docusate Sodium (Colace) 100 mg BID PO  Last administered on 7/26/19at 08:11; 


Admin Dose 100 MG;  Start 7/21/19 at 21:00


Hydralazine HCl (Apresoline) 10 mg BID PO  Last administered on 7/26/19at 08:14;


Admin Dose 10 MG;  Start 7/21/19 at 21:00


Labetalol HCl (Normodyne) 400 mg TID PO  Last administered on 7/26/19at 08:12; 


Admin Dose 400 MG;  Start 7/21/19 at 21:00


Losartan Potassium (Cozaar) 100 mg DAILY PO  Last administered on 7/26/19at 


08:12; Admin Dose 100 MG;  Start 7/22/19 at 09:00


Pantoprazole (Protonix Tab) 40 mg DAILY PO  Last administered on 7/26/19at 


08:13; Admin Dose 40 MG;  Start 7/22/19 at 09:00


Solifenacin (Vesicare) 10 mg DAILY PO  Last administered on 7/26/19at 08:12; 


Admin Dose 10 MG;  Start 7/22/19 at 09:00


Spironolactone (Aldactone) 25 mg DAILY PO  Last administered on 7/26/19at 08:13;


Admin Dose 25 MG;  Start 7/22/19 at 09:00


Linagliptin (Tradjenta) 5 mg DAILY PO  Last administered on 7/26/19at 08:15; 


Admin Dose 5 MG;  Start 7/22/19 at 09:00


Metformin HCl (Glucophage) 500 mg BID WITH  MEALS PO  Last administered on 


7/25/19at 18:06; Admin Dose 500 MG;  Start 7/22/19 at 08:00


Pregabalin (Lyrica) 75 mg TID PO  Last administered on 7/26/19at 08:15; Admin 


Dose 75 MG;  Start 7/22/19 at 13:00


Nitrofurantoin Macrocrystals (Macrobid) 100 mg BID PO  Last administered on 


7/26/19at 08:11; Admin Dose 100 MG;  Start 7/23/19 at 11:00


Nifedipine (Procardia Xl) 30 mg BID PO  Last administered on 7/26/19at 08:11; A


dmin Dose 30 MG;  Start 7/23/19 at 21:00


Furosemide (Lasix) 20 mg DAILY PO  Last administered on 7/26/19at 08:15; Admin 


Dose 20 MG;  Start 7/24/19 at 09:00


Acetaminophen (Tylenol Tab) 650 mg Q6H  PRN PO MILD PAIN(1-3)OR ELEVATED TEMP 


Last administered on 7/25/19at 20:31; Admin Dose 650 MG;  Start 7/23/19 at 11:00


Polyethylene Glycol (Miralax) 17 gm DAILY PO  Last administered on 7/26/19at 


08:25; Admin Dose 17 GM;  Start 7/24/19 at 09:00





Assessment/Plan


Hospital Course (Demo Recall)


1.  Acute exacerbation of the iliotibial band syndrome, status post fall; pain o


ut of proportion, but the patient suffers from multiple different pain 


pathologies, previously well-managed by pain management doctor when she was here


in this hospital by Dr. Melchor in the ARU.  We will recommend physical therapy.


 Neurology consult.  ARU evaluation, may need to go back to outpatient pain 


management if does not qualify.


2.  Anemia.  


3.  Status post fall with contusion.  Will x-ray hips.


4.  Progressive decline in function.  Rule out other CVA, will check a CT of the


head.  neurology consultation


5.  Frequent falls, doubt syncopal.  May benefit from more aggressive physical 


therapy.  Look for underlying cause.


6.  History of asthma.  Continue Advair and nebulizers p.r.n.


7.  Diabetes.  Continue regular medications.  increase metformin


8.  Hypertension.  Continue regular medications.  Monitor labs.


9.  History of previous cerebrovascular accident.  Medication optimized.  


10.  encephalopathy- transient.  may be toxic from meds or uti.  avoid 


sedatives.  neuro consult appreciate.  unchanged ct head.   noted carotid duplex


result.  being worked up 


11.  uti- k pneumonia.  pansensitive. started macrobid. 


12.  aphasia- transient. ro tia.  neuro fu. note L ICA lesion. appreciate 


vascular consult.  plan on cta.











NITHYA DODGE MD          Jul 26, 2019 11:42

## 2019-07-26 NOTE — CONS
DATE OF ADMISSION: 07/21/2019

DATE OF CONSULTATION:  07/26/2019

 

 

 

REFERRING PHYSICIAN:   Bharat Dodge MD

 

REASON FOR CONSULTATION:  Left carotid stenosis, history of stroke.

 

HISTORY OF PRESENT ILLNESS:  This is a 59-year-old woman with multiple medical problems.  She has patrick
betes, hyperlipidemia, hypertension.  She is morbidly obese.  She has got chronic pain syndrome.  She
 had a stroke back about 6 months ago and has left-sided weakness.  She has had multiple falls over t
he last 6 months since the stroke.  She has some problems with her iliotibial band on the left.  It s
eizes up on her, causes real severe pain in that she falls down.  She is seeing a pain management doc
tor.  She came in on Monday because she fell again and had some bruising and pain in the right wrist.
  She had x-rays that showed no fracture.  She had a CT of the brain that showed an old right corona 
radiata lacunar infarct, nothing new.  So x-rayed her pelvis, hip, forearm and all the studies were n
egative for any fracture.  At the same time she had a carotid duplex scan that showed moderate stenos
is in the left internal carotid.  The right looked widely patent and did not show any stenosis, but t
he left has a 50% to 69% stenosis by velocity criteria in the proximal and mid portion.  The right si
ded velocities are all normal.  I reviewed the study myself and there is a significant moderate steno
sis.  I do not see any sign of ulceration or any clot or anything on the duplex.  She had a CTA of th
e neck done today as well, but the results are pending.  When I look at it there is a lot of motion a
rtifact that needs to be dealt with.  I really cannot interpret it.

 

PAST MEDICAL HISTORY:  Again, is significant for stroke, diabetes, hypertension.  She has left-sided 
hemiparesis, hyperlipidemia, sleep apnea, morbid obesity, coronary artery disease.  She had an MI in 
the past.  She has asthma.  She has chronic headaches that have developed after her stroke.  She also
 has chronic neuropathic pain.

 

MEDICATIONS:  Consist of:

1.  Paxil.

2.  Glucophage.

3.  Glucotrol.

4.  Breo Ellipta inhaler.

5.  Hydralazine.

6.  Lasix.

7.  Nifedipine.

8.  Macrobid.

9.  Lyrica.

10.  Lovenox subcu.

11.  Aspirin

12.  Cozaar

13.  Protonix.

14.  Aldactone.

15.  Tradjenta.

16.  Metformin.

17.  Albuterol.

18.  Lipitor.

19.  Baclofen.

20.  Hydralazine.

21.  Labetalol.

22.  NovoLog.

23.  Dilaudid.

 

ALLERGIES:  SHE IS ALLERGIC TO:

1.  KEFLEX.

2.  ERYTHROMYCIN.

3.  TRAMADOL.

 

SOCIAL HISTORY:  She is a nonsmoker.  She smoked said when she was young.  Briefly, just socially but
 never was a heavy smoker.  She does not drink or use any illicit drugs.

 

FAMILY HISTORY:  Significant chronic kidney disease.

 

REVIEW OF SYSTEMS:  She currently denies any chest pain or shortness of breath.  She has bilateral le
g edema.  She has trouble walking because of the left-sided hemiparesis.  She does report multiple ep
isodes of falling over the last 6 months.  She had when she fell the other day when she came in, she 
also had an episode where she had trouble finding words.  She said she was repeating the same sentenc
e over and over again for a few minutes, and she realized she was doing it but she said she could not
 change her speech.

 

PHYSICAL EXAMINATION:

GENERAL:  She is a middle-aged  woman.  She is obese.  She is in no acute distress.

VITAL SIGNS:  She has been afebrile.  Blood pressure is 192/76, heart rate 65, respiratory rate is 20
, 92% sat on room air.

NECK:  She has 2+ carotid, radial and brachial pulses bilaterally.

LUNGS:  Clear.

HEART:  Regular rate and rhythm.

ABDOMEN:  Obese, soft, nontender.

EXTREMITIES:  She has 3+ edema in both legs, fairly tense edema going all the way down into the foot,
 pitting.  I do not feel DP or PT pulses, but she is very swollen.  There are no wounds on her feet. 
 She has some decreased sensation but she can feel her feet.  No ulcers on the calf or plantar surfac
e of the foot.  She has left-sided weakness, arm is weaker than the leg.  She has no weakness on the 
right side.  I reviewed her labs data all.

 

LABORATORY DATA:  I reviewed her labs.  They all looked pretty normal, white count is normal.  The he
matologic study is normal with normal creatinine.  Her triglycerides were elevated at 172.  CRP is a 
little high at 1.1.  Again, I reviewed the carotid duplex scan and it does show 50-69% stenosis in th
e left internal carotid on velocity criteria.  It is not a critical stenosis.  Her stroke was on the 
right brain.  She has no stenosis on the right.  It is possible she had a TIA recently when she had t
his episode of trouble with word finding, but it resolved very, very quickly and was right after she 
fell, could have been from pain medication as well.  There was no show there is no evidence of a new 
stroke on the CT of the brain, the CTA of the neck is pending.  I tried looking at the raw data, but 
there is a lot of motion artifact that is going to need some processing and a final read by the radio
logist.  Again, interpreted.  I am going to follow up after the study.  She is on all the appropriate
 medications for peripheral arterial disease. She also might benefit from Plavix on the top of the as
pirin and Lipitor.  Otherwise, I would not recommend any intervention unless CTA shows something more
 concerning.

 

 

Dictated By: ISABELLE SIMS/CORBY

DD:    07/26/2019 14:08:41

DT:    07/26/2019 15:58:16

Conf#: [id number]

DID#:  [id number]

CC: KALA GRAF DO; JOHNY BURCH; BHARAT DODGE MD;*End*

## 2019-07-27 VITALS — DIASTOLIC BLOOD PRESSURE: 60 MMHG | SYSTOLIC BLOOD PRESSURE: 134 MMHG | RESPIRATION RATE: 18 BRPM | HEART RATE: 65 BPM

## 2019-07-27 VITALS — HEART RATE: 64 BPM | RESPIRATION RATE: 16 BRPM | SYSTOLIC BLOOD PRESSURE: 152 MMHG | DIASTOLIC BLOOD PRESSURE: 66 MMHG

## 2019-07-27 VITALS — HEART RATE: 72 BPM | SYSTOLIC BLOOD PRESSURE: 145 MMHG | DIASTOLIC BLOOD PRESSURE: 67 MMHG | RESPIRATION RATE: 18 BRPM

## 2019-07-27 VITALS — HEART RATE: 64 BPM | SYSTOLIC BLOOD PRESSURE: 160 MMHG | RESPIRATION RATE: 20 BRPM | DIASTOLIC BLOOD PRESSURE: 69 MMHG

## 2019-07-27 LAB
ADD UMIC: NO
UR ASCORBIC ACID: NEGATIVE MG/DL
UR BILIRUBIN (DIP): NEGATIVE MG/DL
UR BLOOD (DIP): NEGATIVE MG/DL
UR CLARITY: CLEAR
UR COLOR: YELLOW
UR GLUCOSE (DIP): NEGATIVE MG/DL
UR KETONES (DIP): NEGATIVE MG/DL
UR LEUKOCYTE ESTERASE (DIP): NEGATIVE LEU/UL
UR NITRITE (DIP): NEGATIVE MG/DL
UR PH (DIP): 7 (ref 5–9)
UR SPECIFIC GRAVITY (DIP): 1.01 (ref 1–1.03)
UR TOTAL PROTEIN (DIP): NEGATIVE MG/DL
UR UROBILINOGEN (DIP): NEGATIVE MG/DL

## 2019-07-27 RX ADMIN — INSULIN ASPART 1 UNIT: 100 INJECTION, SOLUTION INTRAVENOUS; SUBCUTANEOUS at 18:06

## 2019-07-27 RX ADMIN — HYDROMORPHONE HYDROCHLORIDE 1 MG: 4 TABLET ORAL at 11:47

## 2019-07-27 RX ADMIN — PREGABALIN 1 MG: 75 CAPSULE ORAL at 09:25

## 2019-07-27 RX ADMIN — NIFEDIPINE SCH MG: 30 TABLET, FILM COATED, EXTENDED RELEASE ORAL at 20:19

## 2019-07-27 RX ADMIN — FUROSEMIDE 1 MG: 20 TABLET ORAL at 09:27

## 2019-07-27 RX ADMIN — POLYETHYLENE GLYCOL 3350 1 GM: 17 POWDER, FOR SOLUTION ORAL at 09:21

## 2019-07-27 RX ADMIN — PAROXETINE HYDROCHLORIDE SCH MG: 12.5 TABLET, FILM COATED, EXTENDED RELEASE ORAL at 09:24

## 2019-07-27 RX ADMIN — SOLIFENACIN SUCCINATE SCH MG: 5 TABLET, FILM COATED ORAL at 09:23

## 2019-07-27 RX ADMIN — DOCUSATE SODIUM SCH MG: 100 CAPSULE, LIQUID FILLED ORAL at 09:24

## 2019-07-27 RX ADMIN — HYDROMORPHONE HYDROCHLORIDE PRN MG: 4 TABLET ORAL at 11:47

## 2019-07-27 RX ADMIN — BACLOFEN 1 MG: 10 TABLET ORAL at 20:18

## 2019-07-27 RX ADMIN — PREGABALIN 1 MG: 75 CAPSULE ORAL at 21:50

## 2019-07-27 RX ADMIN — INSULIN ASPART 1 UNIT: 100 INJECTION, SOLUTION INTRAVENOUS; SUBCUTANEOUS at 21:55

## 2019-07-27 RX ADMIN — HYDROMORPHONE HYDROCHLORIDE PRN MG: 4 TABLET ORAL at 07:43

## 2019-07-27 RX ADMIN — LINAGLIPTIN 1 MG: 5 TABLET, FILM COATED ORAL at 09:26

## 2019-07-27 RX ADMIN — ALBUTEROL SULFATE SCH PUFF: 90 AEROSOL, METERED RESPIRATORY (INHALATION) at 20:17

## 2019-07-27 RX ADMIN — FLUTICASONE FUROATE AND VILANTEROL TRIFENATATE 1 INH: 100; 25 POWDER RESPIRATORY (INHALATION) at 09:21

## 2019-07-27 RX ADMIN — ATORVASTATIN CALCIUM 1 MG: 40 TABLET, FILM COATED ORAL at 20:19

## 2019-07-27 RX ADMIN — NYSTATIN 1 APPLIC: 100000 POWDER TOPICAL at 20:20

## 2019-07-27 RX ADMIN — GLIPIZIDE SCH MG: 5 TABLET, FILM COATED, EXTENDED RELEASE ORAL at 09:26

## 2019-07-27 RX ADMIN — SPIRONOLACTONE 1 MG: 25 TABLET, FILM COATED ORAL at 09:26

## 2019-07-27 RX ADMIN — PREGABALIN SCH MG: 75 CAPSULE ORAL at 14:36

## 2019-07-27 RX ADMIN — LOSARTAN POTASSIUM SCH MG: 50 TABLET, FILM COATED ORAL at 09:23

## 2019-07-27 RX ADMIN — GLIPIZIDE 1 MG: 5 TABLET, FILM COATED, EXTENDED RELEASE ORAL at 09:26

## 2019-07-27 RX ADMIN — FLUTICASONE FUROATE AND VILANTEROL TRIFENATATE SCH INH: 100; 25 POWDER RESPIRATORY (INHALATION) at 09:21

## 2019-07-27 RX ADMIN — NIFEDIPINE 1 MG: 30 TABLET, FILM COATED, EXTENDED RELEASE ORAL at 09:25

## 2019-07-27 RX ADMIN — INSULIN ASPART 1 UNIT: 100 INJECTION, SOLUTION INTRAVENOUS; SUBCUTANEOUS at 07:55

## 2019-07-27 RX ADMIN — ALBUTEROL SULFATE SCH PUFF: 90 AEROSOL, METERED RESPIRATORY (INHALATION) at 09:20

## 2019-07-27 RX ADMIN — NIFEDIPINE 1 MG: 30 TABLET, FILM COATED, EXTENDED RELEASE ORAL at 20:19

## 2019-07-27 RX ADMIN — NYSTATIN 1 APPLIC: 100000 POWDER TOPICAL at 18:12

## 2019-07-27 RX ADMIN — ATORVASTATIN CALCIUM SCH MG: 40 TABLET, FILM COATED ORAL at 20:19

## 2019-07-27 RX ADMIN — POLYETHYLENE GLYCOL 3350 SCH GM: 17 POWDER, FOR SOLUTION ORAL at 09:21

## 2019-07-27 RX ADMIN — BACLOFEN SCH MG: 10 TABLET ORAL at 09:25

## 2019-07-27 RX ADMIN — LABETALOL 1 MG: 200 TABLET, FILM COATED ORAL at 09:25

## 2019-07-27 RX ADMIN — HYDROMORPHONE HYDROCHLORIDE 1 MG: 4 TABLET ORAL at 20:19

## 2019-07-27 RX ADMIN — ASPIRIN 81 MG CHEWABLE TABLET 1 MG: 81 TABLET CHEWABLE at 09:21

## 2019-07-27 RX ADMIN — BACLOFEN 1 MG: 10 TABLET ORAL at 14:36

## 2019-07-27 RX ADMIN — NITROFURANTOIN MONOHYDRATE/MACROCRYSTALLINE 1 MG: 25; 75 CAPSULE ORAL at 09:21

## 2019-07-27 RX ADMIN — PANTOPRAZOLE SODIUM SCH MG: 40 TABLET, DELAYED RELEASE ORAL at 09:26

## 2019-07-27 RX ADMIN — INSULIN ASPART 1 UNIT: 100 INJECTION, SOLUTION INTRAVENOUS; SUBCUTANEOUS at 12:00

## 2019-07-27 RX ADMIN — ALBUTEROL SULFATE 1 PUFF: 90 AEROSOL, METERED RESPIRATORY (INHALATION) at 09:20

## 2019-07-27 RX ADMIN — PREGABALIN SCH MG: 75 CAPSULE ORAL at 21:50

## 2019-07-27 RX ADMIN — LOSARTAN POTASSIUM 1 MG: 50 TABLET ORAL at 09:23

## 2019-07-27 RX ADMIN — PANTOPRAZOLE SODIUM 1 MG: 40 TABLET, DELAYED RELEASE ORAL at 09:26

## 2019-07-27 RX ADMIN — PREGABALIN SCH MG: 75 CAPSULE ORAL at 09:25

## 2019-07-27 RX ADMIN — PREGABALIN 1 MG: 75 CAPSULE ORAL at 14:36

## 2019-07-27 RX ADMIN — HYDROMORPHONE HYDROCHLORIDE 1 MG: 1 INJECTION, SOLUTION INTRAMUSCULAR; INTRAVENOUS; SUBCUTANEOUS at 03:36

## 2019-07-27 RX ADMIN — SPIRONOLACTONE SCH MG: 25 TABLET ORAL at 09:26

## 2019-07-27 RX ADMIN — ENOXAPARIN SODIUM SCH MG: 100 INJECTION SUBCUTANEOUS at 11:01

## 2019-07-27 RX ADMIN — LABETALOL 1 MG: 200 TABLET, FILM COATED ORAL at 14:36

## 2019-07-27 RX ADMIN — ALBUTEROL SULFATE SCH PUFF: 90 AEROSOL, METERED RESPIRATORY (INHALATION) at 14:35

## 2019-07-27 RX ADMIN — LABETALOL 1 MG: 200 TABLET, FILM COATED ORAL at 20:19

## 2019-07-27 RX ADMIN — DOCUSATE SODIUM SCH MG: 100 CAPSULE, LIQUID FILLED ORAL at 20:18

## 2019-07-27 RX ADMIN — NITROFURANTOIN MONOHYDRATE/MACROCRYSTALLINE 1 MG: 25; 75 CAPSULE ORAL at 20:18

## 2019-07-27 RX ADMIN — NYSTATIN SCH APPLIC: 100000 POWDER TOPICAL at 20:20

## 2019-07-27 RX ADMIN — SOLIFENACIN SUCCINATE 1 MG: 5 TABLET, FILM COATED ORAL at 09:23

## 2019-07-27 RX ADMIN — HYDROMORPHONE HYDROCHLORIDE 1 MG: 4 TABLET ORAL at 07:43

## 2019-07-27 RX ADMIN — ALBUTEROL SULFATE 1 PUFF: 90 AEROSOL, METERED RESPIRATORY (INHALATION) at 20:17

## 2019-07-27 RX ADMIN — DOCUSATE SODIUM 1 MG: 100 CAPSULE, LIQUID FILLED ORAL at 09:24

## 2019-07-27 RX ADMIN — DOCUSATE SODIUM 1 MG: 100 CAPSULE, LIQUID FILLED ORAL at 20:18

## 2019-07-27 RX ADMIN — NYSTATIN SCH APPLIC: 100000 POWDER TOPICAL at 18:12

## 2019-07-27 RX ADMIN — BACLOFEN SCH MG: 10 TABLET ORAL at 20:18

## 2019-07-27 RX ADMIN — NIFEDIPINE SCH MG: 30 TABLET, FILM COATED, EXTENDED RELEASE ORAL at 09:25

## 2019-07-27 RX ADMIN — ALBUTEROL SULFATE 1 PUFF: 90 AEROSOL, METERED RESPIRATORY (INHALATION) at 14:35

## 2019-07-27 RX ADMIN — BACLOFEN 1 MG: 10 TABLET ORAL at 09:25

## 2019-07-27 RX ADMIN — HYDROMORPHONE HYDROCHLORIDE PRN MG: 4 TABLET ORAL at 20:19

## 2019-07-27 RX ADMIN — HYDROMORPHONE HYDROCHLORIDE 1 MG: 4 TABLET ORAL at 16:00

## 2019-07-27 RX ADMIN — LINAGLIPTIN SCH MG: 5 TABLET, FILM COATED ORAL at 09:26

## 2019-07-27 RX ADMIN — ASPIRIN 81 MG SCH MG: 81 TABLET ORAL at 09:21

## 2019-07-27 RX ADMIN — ENOXAPARIN SODIUM 1 MG: 100 INJECTION SUBCUTANEOUS at 11:01

## 2019-07-27 RX ADMIN — HYDROMORPHONE HYDROCHLORIDE PRN MG: 1 INJECTION, SOLUTION INTRAMUSCULAR; INTRAVENOUS; SUBCUTANEOUS at 03:36

## 2019-07-27 RX ADMIN — PAROXETINE HYDROCHLORIDE 1 MG: 12.5 TABLET, FILM COATED, EXTENDED RELEASE ORAL at 09:24

## 2019-07-27 RX ADMIN — HYDROMORPHONE HYDROCHLORIDE PRN MG: 4 TABLET ORAL at 16:00

## 2019-07-27 RX ADMIN — BACLOFEN SCH MG: 10 TABLET ORAL at 14:36

## 2019-07-27 NOTE — CONS
Consult Date/Type/Reason


Admit Date/Time


Jul 21, 2019 at 05:32


Initial Consult Date





Requesting Provider:  NITHYA DODGE MD


Date/Time of Note


DATE: 7/27/19 


TIME: 10:25





Subjective


59-year-old female with a past medical history of previous CVA, history of 


chronic pain syndrome, history of right iliotibial band syndrome, chronic 


neuropathic pain, history of left-sided paresis, hyperlipidemia, diabetes type 


2, obstructive sleep apnea, depression, coronary artery, history of asthma, 


hypertension, obesity, and chronic headaches.  The patient presents from home 


after having a fall yesterday, unable to manage the amount of pain that she is 


having.  The patient has noted since her previous admission to the acute rehab, 


a progressive decline in her physical function.  Unclear if she had further 


neurologic disease.  The patient complains of pain similar to her iliotibial ba


nd syndrome.  The patient is being managed by an outpatient pain management 


doctor for her chronic pain.  No recent changes to any of her medications.  


Known to have leukocytosis.  X-rays were negative.


noted some encephalopathy possibly related to meds and poss uti


notes anxiety.


noted poss aphasia episodic.


had carotid showing 50-70% ica L lesion.  DW dr. hoffman.


cta limited by motion but no significant lesion noted.


 





REVIEW OF SYSTEMS:  A 14-point review of systems attempted and negative.


 


PHYSICAL EXAMINATION:


HEENT:  Normocephalic, atraumatic.  Pupils equal, round, and reactive to light. 


Oropharynx shows moist mucous membranes.


NECK:  Supple.


HEART:  Regular rate and rhythm.


LUNGS:  Clear to auscultation.


ABDOMEN:  Soft, nontender, and nondistended.  Normoactive bowel sounds.


EXTREMITIES:  No clubbing, cyanosis, or edema.


SKIN:  Shows no rashes.


BACK:  Some point tenderness.


HIP:  Shows tenderness around the right thigh.





Objective


Vitals





Vital Signs


  Date      Temp  Pulse  Resp  B/P (MAP)   Pulse Ox  O2          O2 Flow    FiO2


Time                                                 Delivery    Rate


   7/27/19  98.2     72    18      145/67        94


     07:57                           (93)


   7/25/19                                           Room Air


     13:10








Intake and Output





7/26/19 7/26/19 7/27/19





1515:00


23:00


07:00





IntakeIntake Total


560 ml


720 ml


300 ml





BalanceBalance


560 ml


720 ml


300 ml














Results/Medications


Result Diagram:  


7/26/19 1213                                                                    


           7/26/19 1213





Results 24 hrs





Laboratory Tests


Test
                 7/26/19
12:13  7/26/19
12:53  7/26/19
17:41  7/26/19
22:24


White Blood Count            9.7  #


Red Blood Count             4.06  L


Hemoglobin                  11.0  L


Hematocrit                  34.5  L


Mean Corpuscular             85.0


Volume


Mean Corpuscular            27.1  L


Hemoglobin


Mean Corpuscular           31.9  L
  
              
              



Hemoglobin
Concent


Red Cell                     12.9


Distribution Width


Platelet Count                391


Mean Platelet Volume          9.7


Immature                    0.300


Granulocytes %


Neutrophils %                66.0


Lymphocytes %                21.9


Monocytes %                   8.3


Eosinophils %                 3.0


Basophils %                   0.5


Nucleated Red Blood           0.0


Cells %


Immature                    0.030


Granulocytes #


Neutrophils #                 6.4


Lymphocytes #                 2.1


Monocytes #                   0.8


Eosinophils #                 0.3


Basophils #                   0.1


Nucleated Red Blood           0.0


Cells #


Erythrocyte                    27


Sedimentation Rate


Sodium Level                  137


Potassium Level               4.0


Chloride Level                 99


Carbon Dioxide Level           29


Anion Gap                       9


Blood Urea Nitrogen            14


Creatinine                   0.65


Est Glomerular        > 60  
        
              
              



Filtrat Rate
mL/min


Glucose Level                 152


Calcium Level                10.0


Iron Level                     59


Total Iron Binding            407


Capacity


Percent Iron                  14  L


Saturation


Total Bilirubin               0.6


Direct Bilirubin             0.00


Indirect Bilirubin            0.6


Aspartate Amino               33  
  
              
              



Transf
(AST/SGOT)


Alanine                       39  
  
              
              



Aminotransferase
(AL


T/SGPT)


Alkaline Phosphatase          103


C-Reactive Protein           1.1  H


Total Protein                 7.5


Albumin                       4.2


Globulin                    3.30  H


Albumin/Globulin             1.27


Ratio


Triglycerides Level          172  H


Cholesterol Level             136


LDL Cholesterol,               73


Calculated


HDL Cholesterol               29  L


Cholesterol/HDL               4.6


Ratio


Thyroid Stimulating        3.670  
  
              
              



Hormone
(TSH)


Bedside Glucose                              181            197            179


Test
                 7/27/19
05:20  7/27/19
07:46  
              



Urine Color           YELLOW


Urine Clarity         CLEAR


Urine pH                      7.0


Urine Specific              1.010


Gravity


Urine Ketones         NEGATIVE


Urine Nitrite         NEGATIVE


Urine Bilirubin       NEGATIVE


Urine Urobilinogen    NEGATIVE


Urine Leukocyte       NEGATIVE


Esterase


Urine Hemoglobin      NEGATIVE


Urine Glucose         NEGATIVE


Urine Total Protein   NEGATIVE


Bedside Glucose                              168





Home Meds


Reported Medications


Sitagliptin Phos-Metformin Hcl (Janumet XR) 100-1,000 Mg Tbmp.24hr, 1 TAB PO 


WITH DINNER, #30 TAB


   7/21/19


Psyllium Husk/Calcium Carb (Metamucil Plus Calcium Capsule) 1 Each Capsule, 2 


EACH PO DAILY, CAP


   7/21/19


Docusate Sodium* (Colace*) 100 Mg Capsule, 100 MG PO BID, #60 CAP


   7/21/19


Aspirin* (Aspirin*) 325 Mg Tablet, 81 MG PO QID PRN for PAIN, TAB


   7/21/19


Acetaminophen* (Tylophen*) 500 Mg Capsule, 650 MG PO Q6H PRN for PAIN LEVEL 1-3,


TAB


   7/21/19


Salmeterol Xinaf-Fluticasone* (Advair HFA*) 230/12 Aerosol Inhaler, 2 INH IH 


BID, #1 INHALER


   7/21/19


Albuterol Sulfate* (Proair HFA*) 8.5 Gm Hfa.aer.ad, 2 PUFF INH TID, #1 INHALER


   7/21/19


Hydromorphone Hcl* (Hydromorphone Hcl*) 4 Mg Tablet, 4 MG PO Q6 PRN for PAIN, 


TAB


   7/21/19


Pregabalin* (Lyrica*) 75 Mg Capsule, 75 MG PO QHS, CAP


   7/21/19


Pregabalin* (Lyrica*) 50 Mg Capsule, 50 MG PO DAILY, CAP


   7/21/19


Baclofen* (Baclofen*) 20 Mg Tablet, 20 MG PO TID, TAB


   7/21/19


Solifenacin* (Vesicare*) 10 Mg Tablet, 10 MG PO DAILY, TAB


   7/21/19


Pantoprazole* (Protonix*) 40 Mg Tablet.dr, 40 MG PO DAILY, TAB


   7/21/19


Atorvastatin* (Atorvastatin*) 40 Mg Tablet, 40 MG PO QHS, #30 TAB


   7/21/19


Spironolactone* (Aldactone*) 25 Mg Tablet, 25 MG PO DAILY, #30 TAB


   7/21/19


Labetalol Hcl* (Labetalol Hcl*) 200 Mg Tablet, 400 MG PO TID, TAB


   7/21/19


Losartan Potassium* (Cozaar*) 100 Mg Tablet, 100 MG PO DAILY, #30 TAB


   7/21/19


Hydralazine Hcl* (Hydralazine Hcl*) 10 Mg Tablet, 10 MG PO BID, #120 TAB


   7/21/19


Nifedipine* (Nifedipine ER*) 60 Mg Tablet.sa, 60 MG PO BID, TAB.SA


   7/21/19


Glipizide* (Glipizide*) 5 Mg Tablet, 25 MG PO BID, TAB


   7/21/19


Medications





Current Medications


Hydromorphone HCl (Dilaudid) 4 mg Q4H  PRN PO SEVERE PAIN LEVEL 7-10 Last 


administered on 7/27/19at 07:43; Admin Dose 4 MG;  Start 7/21/19 at 08:00


Hydromorphone HCl (Dilaudid) 1 mg Q4H  PRN IV SEVERE PAIN LEVEL 7-10 Last 


administered on 7/27/19at 03:36; Admin Dose 1 MG;  Start 7/21/19 at 08:00


IV Flush (NS 3 ml) 3 ml PER PROTOCOL IV ;  Start 7/21/19 at 11:30


Docusate Sodium (Colace) 100 mg Q12H  PRN PO .CONSTIPATION;  Start 7/21/19 at 


11:30


Enoxaparin Sodium (Lovenox) 40 mg DAILY SC  Last administered on 7/26/19at 


08:22; Admin Dose 40 MG;  Start 7/22/19 at 09:00


Diagnostic Test (Pha) (Accu-Chek) 1 ea 02 XX  Last administered on 7/22/19at 


01:48; Admin Dose 1 EA;  Start 7/22/19 at 02:00


Insulin Aspart (Novolog Insulin Pen) NOVOLOG *MILD* ALGORITHM WITH MEALS  


BEDTIME SC  Last administered on 7/27/19at 07:55; Admin Dose 1 UNIT;  Start 


7/21/19 at 18:00


Miscellaneous Information 1 ea NOTE XX ;  Start 7/21/19 at 17:30


Glucose (Glutose) 15 gm Q15M  PRN PO DECREASED GLUCOSE;  Start 7/21/19 at 17:30


Glucose (Glutose) 22.5 gm Q15M  PRN PO DECREASED GLUCOSE;  Start 7/21/19 at 


17:30


Dextrose (D50w Syringe) 25 ml Q15M  PRN IV DECREASED GLUCOSE;  Start 7/21/19 at 


17:30


Dextrose (D50w Syringe) 50 ml Q15M  PRN IV DECREASED GLUCOSE;  Start 7/21/19 at 


17:30


Glucagon (Glucagen) 1 mg Q15M  PRN IM DECREASED GLUCOSE;  Start 7/21/19 at 17:30


Glucose (Glutose) 15 gm Q15M  PRN BUCCAL DECREASED GLUCOSE;  Start 7/21/19 at 


17:30


Albuterol (Ventolin Hfa) 2 puff TID INH  Last administered on 7/27/19at 09:20; 


Admin Dose 2 PUFF;  Start 7/21/19 at 21:00


Aspirin (Aspirin) 81 mg DAILY PO  Last administered on 7/27/19at 09:21; Admin 


Dose 81 MG;  Start 7/22/19 at 09:00


Atorvastatin Calcium (Lipitor) 40 mg QHS PO  Last administered on 7/26/19at 


20:24; Admin Dose 40 MG;  Start 7/21/19 at 21:00


Baclofen (Lioresal) 20 mg TID PO  Last administered on 7/27/19at 09:25; Admin 


Dose 20 MG;  Start 7/21/19 at 21:00


Docusate Sodium (Colace) 100 mg BID PO  Last administered on 7/27/19at 09:24; 


Admin Dose 100 MG;  Start 7/21/19 at 21:00


Hydralazine HCl (Apresoline) 10 mg BID PO  Last administered on 7/27/19at 09:23;


Admin Dose 10 MG;  Start 7/21/19 at 21:00


Labetalol HCl (Normodyne) 400 mg TID PO  Last administered on 7/27/19at 09:25; 


Admin Dose 400 MG;  Start 7/21/19 at 21:00


Losartan Potassium (Cozaar) 100 mg DAILY PO  Last administered on 7/27/19at 


09:23; Admin Dose 100 MG;  Start 7/22/19 at 09:00


Pantoprazole (Protonix Tab) 40 mg DAILY PO  Last administered on 7/27/19at 


09:26; Admin Dose 40 MG;  Start 7/22/19 at 09:00


Solifenacin (Vesicare) 10 mg DAILY PO  Last administered on 7/27/19at 09:23; 


Admin Dose 10 MG;  Start 7/22/19 at 09:00


Spironolactone (Aldactone) 25 mg DAILY PO  Last administered on 7/27/19at 09:26;


Admin Dose 25 MG;  Start 7/22/19 at 09:00


Linagliptin (Tradjenta) 5 mg DAILY PO  Last administered on 7/27/19at 09:26; 


Admin Dose 5 MG;  Start 7/22/19 at 09:00


Pregabalin (Lyrica) 75 mg TID PO  Last administered on 7/27/19at 09:25; Admin 


Dose 75 MG;  Start 7/22/19 at 13:00


Nitrofurantoin Macrocrystals (Macrobid) 100 mg BID PO  Last administered on 


7/27/19at 09:21; Admin Dose 100 MG;  Start 7/23/19 at 11:00


Nifedipine (Procardia Xl) 30 mg BID PO  Last administered on 7/27/19at 09:25; 


Admin Dose 30 MG;  Start 7/23/19 at 21:00


Furosemide (Lasix) 20 mg DAILY PO  Last administered on 7/26/19at 08:15; Admin 


Dose 20 MG;  Start 7/24/19 at 09:00


Acetaminophen (Tylenol Tab) 650 mg Q6H  PRN PO MILD PAIN(1-3)OR ELEVATED TEMP 


Last administered on 7/26/19at 22:26; Admin Dose 650 MG;  Start 7/23/19 at 11:00


Polyethylene Glycol (Miralax) 17 gm DAILY PO  Last administered on 7/27/19at 


09:21; Admin Dose 17 GM;  Start 7/24/19 at 09:00


Metformin HCl (Glucophage) 1,000 mg BID WITH  MEALS PO ;  Start 7/26/19 at 18:00


Paroxetine HCl (Paxil Cr) 37.5 mg DAILY PO  Last administered on 7/27/19at 


09:24; Admin Dose 37.5 MG;  Start 7/27/19 at 09:00


Glipizide (Glucotrol Xl) 5 mg DAILY PO  Last administered on 7/27/19at 09:26; 


Admin Dose 5 MG;  Start 7/26/19 at 13:30


Fluticasone/ Vilanterol (Breo Ellipta 100-25 Mcg Inh) 1 inh DAILY INH  Last 


administered on 7/27/19at 09:21; Admin Dose 1 INH;  Start 7/26/19 at 13:30


Hydralazine HCl (Apresoline) 25 mg Q6H  PRN PO sbp>160;  Start 7/26/19 at 12:00





Assessment/Plan


Hospital Course (Demo Recall)


1.  Acute exacerbation of the iliotibial band syndrome, status post fall; pain 


out of proportion, but the patient suffers from multiple different pain 


pathologies, previously well-managed by pain management doctor when she was here


in this hospital by Dr. Melchor in the ARU.  We will recommend physical therapy.


 Neurology consult.  ARU evaluation, may need to go back to outpatient pain 


management if does not qualify.


2.  Anemia.  


3.  Status post fall with contusion.  Will x-ray hips.


4.  Progressive decline in function.  Rule out other CVA, will check a CT of the


head.  neurology consultation


5.  Frequent falls, doubt syncopal.  May benefit from more aggressive physical 


therapy.  Look for underlying cause.


6.  History of asthma.  Continue Advair and nebulizers p.r.n.


7.  Diabetes.  Continue regular medications.  increase metformin


8.  Hypertension.  Continue regular medications.  Monitor labs.


9.  History of previous cerebrovascular accident.  Medication optimized.  


10.  encephalopathy- transient.  may be toxic from meds or uti.  avoid 


sedatives.  neuro consult appreciate.  unchanged ct head.   noted carotid duplex


result.  being worked up 


11.  uti- k pneumonia.  pansensitive. started macrobid. 


12.  aphasia- transient. ro tia.  neuro fu. note L ICA lesion. appreciate 


vascular consult. 


 add plavix.





discharge planning.











NITHYA DODGE MD          Jul 27, 2019 10:26

## 2019-07-28 VITALS — HEART RATE: 76 BPM | SYSTOLIC BLOOD PRESSURE: 130 MMHG | DIASTOLIC BLOOD PRESSURE: 60 MMHG | RESPIRATION RATE: 16 BRPM

## 2019-07-28 VITALS — HEART RATE: 71 BPM | SYSTOLIC BLOOD PRESSURE: 164 MMHG | DIASTOLIC BLOOD PRESSURE: 72 MMHG

## 2019-07-28 VITALS — DIASTOLIC BLOOD PRESSURE: 72 MMHG | SYSTOLIC BLOOD PRESSURE: 175 MMHG | HEART RATE: 71 BPM | RESPIRATION RATE: 16 BRPM

## 2019-07-28 VITALS — DIASTOLIC BLOOD PRESSURE: 72 MMHG | SYSTOLIC BLOOD PRESSURE: 171 MMHG | HEART RATE: 74 BPM | RESPIRATION RATE: 16 BRPM

## 2019-07-28 VITALS — SYSTOLIC BLOOD PRESSURE: 110 MMHG | DIASTOLIC BLOOD PRESSURE: 53 MMHG | RESPIRATION RATE: 16 BRPM | HEART RATE: 62 BPM

## 2019-07-28 RX ADMIN — PANTOPRAZOLE SODIUM 1 MG: 40 TABLET, DELAYED RELEASE ORAL at 09:07

## 2019-07-28 RX ADMIN — LABETALOL 1 MG: 200 TABLET, FILM COATED ORAL at 14:58

## 2019-07-28 RX ADMIN — NYSTATIN 1 APPLIC: 100000 POWDER TOPICAL at 08:59

## 2019-07-28 RX ADMIN — HYDROMORPHONE HYDROCHLORIDE 1 MG: 4 TABLET ORAL at 10:50

## 2019-07-28 RX ADMIN — PAROXETINE HYDROCHLORIDE 1 MG: 12.5 TABLET, FILM COATED, EXTENDED RELEASE ORAL at 08:55

## 2019-07-28 RX ADMIN — INSULIN ASPART 1 UNIT: 100 INJECTION, SOLUTION INTRAVENOUS; SUBCUTANEOUS at 09:02

## 2019-07-28 RX ADMIN — SPIRONOLACTONE 1 MG: 25 TABLET, FILM COATED ORAL at 08:58

## 2019-07-28 RX ADMIN — ASPIRIN 81 MG CHEWABLE TABLET 1 MG: 81 TABLET CHEWABLE at 08:58

## 2019-07-28 RX ADMIN — SOLIFENACIN SUCCINATE 1 MG: 5 TABLET, FILM COATED ORAL at 08:56

## 2019-07-28 RX ADMIN — ALBUTEROL SULFATE 1 PUFF: 90 AEROSOL, METERED RESPIRATORY (INHALATION) at 08:53

## 2019-07-28 RX ADMIN — HYDROMORPHONE HYDROCHLORIDE 1 MG: 4 TABLET ORAL at 19:52

## 2019-07-28 RX ADMIN — HYDROMORPHONE HYDROCHLORIDE 1 MG: 4 TABLET ORAL at 14:58

## 2019-07-28 RX ADMIN — LINAGLIPTIN SCH MG: 5 TABLET, FILM COATED ORAL at 08:58

## 2019-07-28 RX ADMIN — BACLOFEN SCH MG: 10 TABLET ORAL at 21:57

## 2019-07-28 RX ADMIN — HYDROMORPHONE HYDROCHLORIDE 1 MG: 4 TABLET ORAL at 06:46

## 2019-07-28 RX ADMIN — PREGABALIN 1 MG: 75 CAPSULE ORAL at 14:57

## 2019-07-28 RX ADMIN — GLIPIZIDE 1 MG: 5 TABLET, FILM COATED, EXTENDED RELEASE ORAL at 08:55

## 2019-07-28 RX ADMIN — LABETALOL 1 MG: 200 TABLET, FILM COATED ORAL at 21:00

## 2019-07-28 RX ADMIN — LOSARTAN POTASSIUM 1 MG: 50 TABLET ORAL at 08:58

## 2019-07-28 RX ADMIN — NYSTATIN 1 APPLIC: 100000 POWDER TOPICAL at 21:58

## 2019-07-28 RX ADMIN — PREGABALIN 1 MG: 75 CAPSULE ORAL at 06:02

## 2019-07-28 RX ADMIN — PREGABALIN SCH MG: 75 CAPSULE ORAL at 14:57

## 2019-07-28 RX ADMIN — BACLOFEN SCH MG: 10 TABLET ORAL at 08:57

## 2019-07-28 RX ADMIN — DOCUSATE SODIUM 1 MG: 100 CAPSULE, LIQUID FILLED ORAL at 21:58

## 2019-07-28 RX ADMIN — PANTOPRAZOLE SODIUM SCH MG: 40 TABLET, DELAYED RELEASE ORAL at 09:07

## 2019-07-28 RX ADMIN — ALBUTEROL SULFATE SCH PUFF: 90 AEROSOL, METERED RESPIRATORY (INHALATION) at 08:53

## 2019-07-28 RX ADMIN — NITROFURANTOIN MONOHYDRATE/MACROCRYSTALLINE 1 MG: 25; 75 CAPSULE ORAL at 21:57

## 2019-07-28 RX ADMIN — HYDROMORPHONE HYDROCHLORIDE PRN MG: 4 TABLET ORAL at 10:50

## 2019-07-28 RX ADMIN — ALBUTEROL SULFATE SCH PUFF: 90 AEROSOL, METERED RESPIRATORY (INHALATION) at 14:56

## 2019-07-28 RX ADMIN — SPIRONOLACTONE SCH MG: 25 TABLET ORAL at 08:58

## 2019-07-28 RX ADMIN — NIFEDIPINE SCH MG: 30 TABLET, FILM COATED, EXTENDED RELEASE ORAL at 21:00

## 2019-07-28 RX ADMIN — INSULIN ASPART 1 UNIT: 100 INJECTION, SOLUTION INTRAVENOUS; SUBCUTANEOUS at 21:00

## 2019-07-28 RX ADMIN — INSULIN ASPART 1 UNIT: 100 INJECTION, SOLUTION INTRAVENOUS; SUBCUTANEOUS at 12:29

## 2019-07-28 RX ADMIN — NIFEDIPINE 1 MG: 30 TABLET, FILM COATED, EXTENDED RELEASE ORAL at 21:00

## 2019-07-28 RX ADMIN — ALBUTEROL SULFATE SCH PUFF: 90 AEROSOL, METERED RESPIRATORY (INHALATION) at 21:57

## 2019-07-28 RX ADMIN — ACETAMINOPHEN 1 MG: 325 TABLET, FILM COATED ORAL at 12:19

## 2019-07-28 RX ADMIN — DOCUSATE SODIUM SCH MG: 100 CAPSULE, LIQUID FILLED ORAL at 21:58

## 2019-07-28 RX ADMIN — PREGABALIN SCH MG: 75 CAPSULE ORAL at 21:58

## 2019-07-28 RX ADMIN — HYDROMORPHONE HYDROCHLORIDE PRN MG: 4 TABLET ORAL at 02:41

## 2019-07-28 RX ADMIN — ALBUTEROL SULFATE 1 PUFF: 90 AEROSOL, METERED RESPIRATORY (INHALATION) at 14:56

## 2019-07-28 RX ADMIN — POLYETHYLENE GLYCOL 3350 SCH GM: 17 POWDER, FOR SOLUTION ORAL at 08:52

## 2019-07-28 RX ADMIN — BACLOFEN 1 MG: 10 TABLET ORAL at 21:57

## 2019-07-28 RX ADMIN — DOCUSATE SODIUM 1 MG: 100 CAPSULE, LIQUID FILLED ORAL at 08:57

## 2019-07-28 RX ADMIN — HYDROMORPHONE HYDROCHLORIDE PRN MG: 4 TABLET ORAL at 19:52

## 2019-07-28 RX ADMIN — PREGABALIN SCH MG: 75 CAPSULE ORAL at 06:02

## 2019-07-28 RX ADMIN — HYDROMORPHONE HYDROCHLORIDE 1 MG: 4 TABLET ORAL at 02:41

## 2019-07-28 RX ADMIN — POLYETHYLENE GLYCOL 3350 1 GM: 17 POWDER, FOR SOLUTION ORAL at 08:52

## 2019-07-28 RX ADMIN — NYSTATIN SCH APPLIC: 100000 POWDER TOPICAL at 21:58

## 2019-07-28 RX ADMIN — NYSTATIN SCH APPLIC: 100000 POWDER TOPICAL at 08:59

## 2019-07-28 RX ADMIN — PAROXETINE HYDROCHLORIDE SCH MG: 12.5 TABLET, FILM COATED, EXTENDED RELEASE ORAL at 08:55

## 2019-07-28 RX ADMIN — GLIPIZIDE SCH MG: 5 TABLET, FILM COATED, EXTENDED RELEASE ORAL at 08:55

## 2019-07-28 RX ADMIN — BACLOFEN 1 MG: 10 TABLET ORAL at 08:57

## 2019-07-28 RX ADMIN — NIFEDIPINE SCH MG: 30 TABLET, FILM COATED, EXTENDED RELEASE ORAL at 08:55

## 2019-07-28 RX ADMIN — NITROFURANTOIN MONOHYDRATE/MACROCRYSTALLINE 1 MG: 25; 75 CAPSULE ORAL at 08:54

## 2019-07-28 RX ADMIN — HYDROMORPHONE HYDROCHLORIDE PRN MG: 4 TABLET ORAL at 06:46

## 2019-07-28 RX ADMIN — FLUTICASONE FUROATE AND VILANTEROL TRIFENATATE 1 INH: 100; 25 POWDER RESPIRATORY (INHALATION) at 09:02

## 2019-07-28 RX ADMIN — BACLOFEN 1 MG: 10 TABLET ORAL at 14:58

## 2019-07-28 RX ADMIN — DOCUSATE SODIUM SCH MG: 100 CAPSULE, LIQUID FILLED ORAL at 08:57

## 2019-07-28 RX ADMIN — NIFEDIPINE 1 MG: 30 TABLET, FILM COATED, EXTENDED RELEASE ORAL at 08:55

## 2019-07-28 RX ADMIN — ENOXAPARIN SODIUM 1 MG: 100 INJECTION SUBCUTANEOUS at 09:01

## 2019-07-28 RX ADMIN — FUROSEMIDE 1 MG: 20 TABLET ORAL at 08:58

## 2019-07-28 RX ADMIN — LABETALOL 1 MG: 200 TABLET, FILM COATED ORAL at 09:13

## 2019-07-28 RX ADMIN — LOSARTAN POTASSIUM SCH MG: 50 TABLET, FILM COATED ORAL at 08:58

## 2019-07-28 RX ADMIN — ENOXAPARIN SODIUM SCH MG: 100 INJECTION SUBCUTANEOUS at 09:01

## 2019-07-28 RX ADMIN — ACETAMINOPHEN 1 MG: 325 TABLET, FILM COATED ORAL at 04:56

## 2019-07-28 RX ADMIN — HYDROMORPHONE HYDROCHLORIDE PRN MG: 4 TABLET ORAL at 14:58

## 2019-07-28 RX ADMIN — ASPIRIN 81 MG SCH MG: 81 TABLET ORAL at 08:58

## 2019-07-28 RX ADMIN — SOLIFENACIN SUCCINATE SCH MG: 5 TABLET, FILM COATED ORAL at 08:56

## 2019-07-28 RX ADMIN — ATORVASTATIN CALCIUM SCH MG: 40 TABLET, FILM COATED ORAL at 21:57

## 2019-07-28 RX ADMIN — FLUTICASONE FUROATE AND VILANTEROL TRIFENATATE SCH INH: 100; 25 POWDER RESPIRATORY (INHALATION) at 09:02

## 2019-07-28 RX ADMIN — ATORVASTATIN CALCIUM 1 MG: 40 TABLET, FILM COATED ORAL at 21:57

## 2019-07-28 RX ADMIN — LINAGLIPTIN 1 MG: 5 TABLET, FILM COATED ORAL at 08:58

## 2019-07-28 RX ADMIN — PREGABALIN 1 MG: 75 CAPSULE ORAL at 21:58

## 2019-07-28 RX ADMIN — INSULIN ASPART 1 UNIT: 100 INJECTION, SOLUTION INTRAVENOUS; SUBCUTANEOUS at 17:38

## 2019-07-28 RX ADMIN — BACLOFEN SCH MG: 10 TABLET ORAL at 14:58

## 2019-07-28 RX ADMIN — ALBUTEROL SULFATE 1 PUFF: 90 AEROSOL, METERED RESPIRATORY (INHALATION) at 21:57

## 2019-07-28 NOTE — CONS
Consult Date/Type/Reason


Admit Date/Time


Jul 21, 2019 at 05:32


Initial Consult Date





Requesting Provider:  NITHYA DODGE MD


Date/Time of Note


DATE: 7/28/19 


TIME: 11:37





Subjective


59-year-old female with a past medical history of previous CVA, history of 


chronic pain syndrome, history of right iliotibial band syndrome, chronic 


neuropathic pain, history of left-sided paresis, hyperlipidemia, diabetes type 


2, obstructive sleep apnea, depression, coronary artery, history of asthma, 


hypertension, obesity, and chronic headaches.  The patient presents from home 


after having a fall yesterday, unable to manage the amount of pain that she is 


having.  The patient has noted since her previous admission to the acute rehab, 


a progressive decline in her physical function.  Unclear if she had further 


neurologic disease.  The patient complains of pain similar to her iliotibial ba


nd syndrome.  The patient is being managed by an outpatient pain management 


doctor for her chronic pain.  No recent changes to any of her medications.  


Known to have leukocytosis.  X-rays were negative.


noted some encephalopathy possibly related to meds and poss uti


notes anxiety.


noted poss aphasia episodic.


had carotid showing 50-70% ica L lesion.  DW dr. hoffman.


cta limited by motion but no significant lesion noted.


 noted facial rash





REVIEW OF SYSTEMS:  A 14-point review of systems attempted and negative.


 


PHYSICAL EXAMINATION:


HEENT:  Normocephalic, atraumatic.  Pupils equal, round, and reactive to light. 


Oropharynx shows moist mucous membranes.  macular lesion on angle of face.  no s


attelites


NECK:  Supple.


HEART:  Regular rate and rhythm.


LUNGS:  Clear to auscultation.


ABDOMEN:  Soft, nontender, and nondistended.  Normoactive bowel sounds.


EXTREMITIES:  No clubbing, cyanosis, or edema.


SKIN:  Shows no rashes.


BACK:  Some point tenderness.


HIP:  Shows tenderness around the right thigh.





Objective


Vitals





Vital Signs


  Date      Temp  Pulse  Resp  B/P (MAP)   Pulse Ox  O2          O2 Flow    FiO2


Time                                                 Delivery    Rate


   7/28/19  98.1     74    16      171/72        96


     07:46                          (105)


   7/25/19                                           Room Air


     13:10








Intake and Output





7/27/19 7/27/19 7/28/19





1515:00


23:00


07:00





IntakeIntake Total


1840 ml


560 ml


240 ml





BalanceBalance


1840 ml


560 ml


240 ml














Results/Medications


Result Diagram:  


7/26/19 1213                                                                    


           7/26/19 1213





Results 24 hrs





Laboratory Tests


  Test
            7/27/19
12:37  7/27/19
18:00  7/27/19
20:21  7/27/19
21:51


  Bedside Glucose          135            174            192            202


  Test
            7/28/19
02:38  7/28/19
08:41  
              



  Bedside Glucose          211            181





Home Meds


Reported Medications


Sitagliptin Phos-Metformin Hcl (Janumet XR) 100-1,000 Mg Tbmp.24hr, 1 TAB PO 


WITH DINNER, #30 TAB


   7/21/19


Psyllium Husk/Calcium Carb (Metamucil Plus Calcium Capsule) 1 Each Capsule, 2 


EACH PO DAILY, CAP


   7/21/19


Docusate Sodium* (Colace*) 100 Mg Capsule, 100 MG PO BID, #60 CAP


   7/21/19


Aspirin* (Aspirin*) 325 Mg Tablet, 81 MG PO QID PRN for PAIN, TAB


   7/21/19


Acetaminophen* (Tylophen*) 500 Mg Capsule, 650 MG PO Q6H PRN for PAIN LEVEL 1-3,


TAB


   7/21/19


Salmeterol Xinaf-Fluticasone* (Advair HFA*) 230/12 Aerosol Inhaler, 2 INH IH 


BID, #1 INHALER


   7/21/19


Albuterol Sulfate* (Proair HFA*) 8.5 Gm Hfa.aer.ad, 2 PUFF INH TID, #1 INHALER


   7/21/19


Hydromorphone Hcl* (Hydromorphone Hcl*) 4 Mg Tablet, 4 MG PO Q6 PRN for PAIN, 


TAB


   7/21/19


Pregabalin* (Lyrica*) 75 Mg Capsule, 75 MG PO QHS, CAP


   7/21/19


Pregabalin* (Lyrica*) 50 Mg Capsule, 50 MG PO DAILY, CAP


   7/21/19


Baclofen* (Baclofen*) 20 Mg Tablet, 20 MG PO TID, TAB


   7/21/19


Solifenacin* (Vesicare*) 10 Mg Tablet, 10 MG PO DAILY, TAB


   7/21/19


Pantoprazole* (Protonix*) 40 Mg Tablet.dr, 40 MG PO DAILY, TAB


   7/21/19


Atorvastatin* (Atorvastatin*) 40 Mg Tablet, 40 MG PO QHS, #30 TAB


   7/21/19


Spironolactone* (Aldactone*) 25 Mg Tablet, 25 MG PO DAILY, #30 TAB


   7/21/19


Labetalol Hcl* (Labetalol Hcl*) 200 Mg Tablet, 400 MG PO TID, TAB


   7/21/19


Losartan Potassium* (Cozaar*) 100 Mg Tablet, 100 MG PO DAILY, #30 TAB


   7/21/19


Hydralazine Hcl* (Hydralazine Hcl*) 10 Mg Tablet, 10 MG PO BID, #120 TAB


   7/21/19


Nifedipine* (Nifedipine ER*) 60 Mg Tablet.sa, 60 MG PO BID, TAB.SA


   7/21/19


Glipizide* (Glipizide*) 5 Mg Tablet, 25 MG PO BID, TAB


   7/21/19


Medications





Current Medications


Hydromorphone HCl (Dilaudid) 4 mg Q4H  PRN PO SEVERE PAIN LEVEL 7-10 Last 


administered on 7/28/19at 10:50; Admin Dose 4 MG;  Start 7/21/19 at 08:00


Hydromorphone HCl (Dilaudid) 1 mg Q4H  PRN IV SEVERE PAIN LEVEL 7-10 Last 


administered on 7/27/19at 03:36; Admin Dose 1 MG;  Start 7/21/19 at 08:00


IV Flush (NS 3 ml) 3 ml PER PROTOCOL IV ;  Start 7/21/19 at 11:30


Docusate Sodium (Colace) 100 mg Q12H  PRN PO .CONSTIPATION;  Start 7/21/19 at 


11:30


Enoxaparin Sodium (Lovenox) 40 mg DAILY SC  Last administered on 7/28/19at 


09:01; Admin Dose 40 MG;  Start 7/22/19 at 09:00


Diagnostic Test (Pha) (Accu-Chek) 1 ea 02 XX  Last administered on 7/22/19at 


01:48; Admin Dose 1 EA;  Start 7/22/19 at 02:00


Insulin Aspart (Novolog Insulin Pen) NOVOLOG *MILD* ALGORITHM WITH MEALS  


BEDTIME SC  Last administered on 7/28/19at 09:02; Admin Dose 2 UNIT;  Start 


7/21/19 at 18:00


Miscellaneous Information 1 ea NOTE XX ;  Start 7/21/19 at 17:30


Glucose (Glutose) 15 gm Q15M  PRN PO DECREASED GLUCOSE;  Start 7/21/19 at 17:30


Glucose (Glutose) 22.5 gm Q15M  PRN PO DECREASED GLUCOSE;  Start 7/21/19 at 


17:30


Dextrose (D50w Syringe) 25 ml Q15M  PRN IV DECREASED GLUCOSE;  Start 7/21/19 at 


17:30


Dextrose (D50w Syringe) 50 ml Q15M  PRN IV DECREASED GLUCOSE;  Start 7/21/19 at 


17:30


Glucagon (Glucagen) 1 mg Q15M  PRN IM DECREASED GLUCOSE;  Start 7/21/19 at 17:30


Glucose (Glutose) 15 gm Q15M  PRN BUCCAL DECREASED GLUCOSE;  Start 7/21/19 at 1


7:30


Albuterol (Ventolin Hfa) 2 puff TID INH  Last administered on 7/28/19at 08:53; 


Admin Dose 2 PUFF;  Start 7/21/19 at 21:00


Aspirin (Aspirin) 81 mg DAILY PO  Last administered on 7/28/19at 08:58; Admin 


Dose 81 MG;  Start 7/22/19 at 09:00


Atorvastatin Calcium (Lipitor) 40 mg QHS PO  Last administered on 7/27/19at 


20:19; Admin Dose 40 MG;  Start 7/21/19 at 21:00


Baclofen (Lioresal) 20 mg TID PO  Last administered on 7/28/19at 08:57; Admin 


Dose 20 MG;  Start 7/21/19 at 21:00


Docusate Sodium (Colace) 100 mg BID PO  Last administered on 7/28/19at 08:57; 


Admin Dose 100 MG;  Start 7/21/19 at 21:00


Hydralazine HCl (Apresoline) 10 mg BID PO  Last administered on 7/28/19at 08:56;


Admin Dose 10 MG;  Start 7/21/19 at 21:00


Labetalol HCl (Normodyne) 400 mg TID PO  Last administered on 7/28/19at 09:13; 


Admin Dose 400 MG;  Start 7/21/19 at 21:00


Losartan Potassium (Cozaar) 100 mg DAILY PO  Last administered on 7/28/19at 


08:58; Admin Dose 100 MG;  Start 7/22/19 at 09:00


Pantoprazole (Protonix Tab) 40 mg DAILY PO  Last administered on 7/28/19at 


09:07; Admin Dose 40 MG;  Start 7/22/19 at 09:00


Solifenacin (Vesicare) 10 mg DAILY PO  Last administered on 7/28/19at 08:56; 


Admin Dose 10 MG;  Start 7/22/19 at 09:00


Spironolactone (Aldactone) 25 mg DAILY PO  Last administered on 7/28/19at 08:58;


Admin Dose 25 MG;  Start 7/22/19 at 09:00


Linagliptin (Tradjenta) 5 mg DAILY PO  Last administered on 7/28/19at 08:58; 


Admin Dose 5 MG;  Start 7/22/19 at 09:00


Nitrofurantoin Macrocrystals (Macrobid) 100 mg BID PO  Last administered on 


7/28/19at 08:54; Admin Dose 100 MG;  Start 7/23/19 at 11:00


Nifedipine (Procardia Xl) 30 mg BID PO  Last administered on 7/28/19at 08:55; 


Admin Dose 30 MG;  Start 7/23/19 at 21:00


Furosemide (Lasix) 20 mg DAILY PO  Last administered on 7/28/19at 08:58; Admin 


Dose 20 MG;  Start 7/24/19 at 09:00


Acetaminophen (Tylenol Tab) 650 mg Q6H  PRN PO MILD PAIN(1-3)OR ELEVATED TEMP 


Last administered on 7/28/19at 04:56; Admin Dose 650 MG;  Start 7/23/19 at 11:00


Polyethylene Glycol (Miralax) 17 gm DAILY PO  Last administered on 7/28/19at 


08:52; Admin Dose 17 GM;  Start 7/24/19 at 09:00


Metformin HCl (Glucophage) 1,000 mg BID WITH  MEALS PO  Last administered on 


7/28/19at 08:56; Admin Dose 1,000 MG;  Start 7/26/19 at 18:00


Paroxetine HCl (Paxil Cr) 37.5 mg DAILY PO  Last administered on 7/28/19at 


08:55; Admin Dose 37.5 MG;  Start 7/27/19 at 09:00


Glipizide (Glucotrol Xl) 5 mg DAILY PO  Last administered on 7/28/19at 08:55; 


Admin Dose 5 MG;  Start 7/26/19 at 13:30


Fluticasone/ Vilanterol (Breo Ellipta 100-25 Mcg Inh) 1 inh DAILY INH  Last 


administered on 7/28/19at 09:02; Admin Dose 1 INH;  Start 7/26/19 at 13:30


Hydralazine HCl (Apresoline) 25 mg Q6H  PRN PO sbp>160 Last administered on 


7/28/19at 04:56; Admin Dose 25 MG;  Start 7/26/19 at 12:00


Pregabalin (Lyrica) 75 mg Q8 PO  Last administered on 7/28/19at 06:02; Admin 


Dose 75 MG;  Start 7/27/19 at 14:00


Nystatin (Nystatin Powder) 1 applic BID TOP  Last administered on 7/28/19at 


08:59; Admin Dose 1 APPLIC;  Start 7/27/19 at 15:00





Assessment/Plan


Hospital Course (Demo Recall)


1.  Acute exacerbation of the iliotibial band syndrome, status post fall; pain 


out of proportion, but the patient suffers from multiple different pain 


pathologies, previously well-managed by pain management doctor when she was here


in this hospital by Dr. Melchor in the ARU.  We will recommend physical therapy.


 Neurology consult.  ARU evaluation, may need to go back to outpatient pain 


management if does not qualify.


2.  Anemia.  


3.  Status post fall with contusion.  Will x-ray hips.


4.  Progressive decline in function.  Rule out other CVA, will check a CT of the


head.  neurology consultation


5.  Frequent falls, doubt syncopal.  May benefit from more aggressive physical 


therapy.  Look for underlying cause.


6.  History of asthma.  Continue Advair and nebulizers p.r.n.


7.  Diabetes.  Continue regular medications.  increase metformin


8.  Hypertension.  Continue regular medications.  Monitor labs.


9.  History of previous cerebrovascular accident.  Medication optimized.  


10.  encephalopathy- transient.  may be toxic from meds or uti.  avoid 


sedatives.  neuro consult appreciate.  unchanged ct head.   noted carotid duplex


result.  being worked up 


11.  uti- k pneumonia.  pansensitive. started macrobid. 


12.  aphasia- transient. ro tia.  neuro fu. note L ICA lesion. appreciate vascul


ar consult. 


 add plavix.


13.  likely shingles- start valtrex


discharge planning.











NITHYA DODGE MD          Jul 28, 2019 11:44

## 2019-07-29 VITALS — RESPIRATION RATE: 16 BRPM | HEART RATE: 65 BPM | DIASTOLIC BLOOD PRESSURE: 51 MMHG | SYSTOLIC BLOOD PRESSURE: 126 MMHG

## 2019-07-29 VITALS — DIASTOLIC BLOOD PRESSURE: 71 MMHG | SYSTOLIC BLOOD PRESSURE: 167 MMHG | HEART RATE: 70 BPM | RESPIRATION RATE: 20 BRPM

## 2019-07-29 VITALS — HEART RATE: 64 BPM | SYSTOLIC BLOOD PRESSURE: 127 MMHG | DIASTOLIC BLOOD PRESSURE: 58 MMHG | RESPIRATION RATE: 18 BRPM

## 2019-07-29 VITALS — HEART RATE: 81 BPM | RESPIRATION RATE: 18 BRPM | DIASTOLIC BLOOD PRESSURE: 65 MMHG | SYSTOLIC BLOOD PRESSURE: 143 MMHG

## 2019-07-29 RX ADMIN — INSULIN ASPART 1 UNIT: 100 INJECTION, SOLUTION INTRAVENOUS; SUBCUTANEOUS at 12:00

## 2019-07-29 RX ADMIN — HYDROMORPHONE HYDROCHLORIDE PRN MG: 4 TABLET ORAL at 06:51

## 2019-07-29 RX ADMIN — BACLOFEN 1 MG: 10 TABLET ORAL at 21:39

## 2019-07-29 RX ADMIN — POLYETHYLENE GLYCOL 3350 1 GM: 17 POWDER, FOR SOLUTION ORAL at 08:52

## 2019-07-29 RX ADMIN — HYDROMORPHONE HYDROCHLORIDE PRN MG: 4 TABLET ORAL at 11:20

## 2019-07-29 RX ADMIN — DOCUSATE SODIUM 1 MG: 100 CAPSULE, LIQUID FILLED ORAL at 08:53

## 2019-07-29 RX ADMIN — INSULIN ASPART 1 UNIT: 100 INJECTION, SOLUTION INTRAVENOUS; SUBCUTANEOUS at 08:59

## 2019-07-29 RX ADMIN — NIFEDIPINE 1 MG: 30 TABLET, FILM COATED, EXTENDED RELEASE ORAL at 21:00

## 2019-07-29 RX ADMIN — SOLIFENACIN SUCCINATE SCH MG: 5 TABLET, FILM COATED ORAL at 08:53

## 2019-07-29 RX ADMIN — NYSTATIN SCH APPLIC: 100000 POWDER TOPICAL at 21:39

## 2019-07-29 RX ADMIN — NYSTATIN SCH APPLIC: 100000 POWDER TOPICAL at 08:57

## 2019-07-29 RX ADMIN — ALBUTEROL SULFATE SCH PUFF: 90 AEROSOL, METERED RESPIRATORY (INHALATION) at 15:10

## 2019-07-29 RX ADMIN — POLYETHYLENE GLYCOL 3350 SCH GM: 17 POWDER, FOR SOLUTION ORAL at 08:52

## 2019-07-29 RX ADMIN — NITROFURANTOIN MONOHYDRATE/MACROCRYSTALLINE 1 MG: 25; 75 CAPSULE ORAL at 21:39

## 2019-07-29 RX ADMIN — NITROFURANTOIN MONOHYDRATE/MACROCRYSTALLINE 1 MG: 25; 75 CAPSULE ORAL at 08:53

## 2019-07-29 RX ADMIN — ATORVASTATIN CALCIUM 1 MG: 40 TABLET, FILM COATED ORAL at 21:40

## 2019-07-29 RX ADMIN — NYSTATIN 1 APPLIC: 100000 POWDER TOPICAL at 08:57

## 2019-07-29 RX ADMIN — FUROSEMIDE 1 MG: 20 TABLET ORAL at 08:56

## 2019-07-29 RX ADMIN — LOSARTAN POTASSIUM 1 MG: 50 TABLET ORAL at 08:57

## 2019-07-29 RX ADMIN — NYSTATIN 1 APPLIC: 100000 POWDER TOPICAL at 21:39

## 2019-07-29 RX ADMIN — DOCUSATE SODIUM SCH MG: 100 CAPSULE, LIQUID FILLED ORAL at 08:53

## 2019-07-29 RX ADMIN — GLIPIZIDE 1 MG: 5 TABLET, FILM COATED, EXTENDED RELEASE ORAL at 08:56

## 2019-07-29 RX ADMIN — LINAGLIPTIN 1 MG: 5 TABLET, FILM COATED ORAL at 08:54

## 2019-07-29 RX ADMIN — ENOXAPARIN SODIUM 1 MG: 100 INJECTION SUBCUTANEOUS at 09:00

## 2019-07-29 RX ADMIN — HYDROMORPHONE HYDROCHLORIDE PRN MG: 4 TABLET ORAL at 00:07

## 2019-07-29 RX ADMIN — ATORVASTATIN CALCIUM SCH MG: 40 TABLET, FILM COATED ORAL at 21:40

## 2019-07-29 RX ADMIN — HYDROMORPHONE HYDROCHLORIDE 1 MG: 4 TABLET ORAL at 11:20

## 2019-07-29 RX ADMIN — PREGABALIN 1 MG: 75 CAPSULE ORAL at 21:41

## 2019-07-29 RX ADMIN — ALBUTEROL SULFATE 1 PUFF: 90 AEROSOL, METERED RESPIRATORY (INHALATION) at 08:57

## 2019-07-29 RX ADMIN — INSULIN ASPART 1 UNIT: 100 INJECTION, SOLUTION INTRAVENOUS; SUBCUTANEOUS at 21:00

## 2019-07-29 RX ADMIN — LABETALOL 1 MG: 200 TABLET, FILM COATED ORAL at 08:55

## 2019-07-29 RX ADMIN — LOSARTAN POTASSIUM SCH MG: 50 TABLET, FILM COATED ORAL at 08:57

## 2019-07-29 RX ADMIN — SOLIFENACIN SUCCINATE 1 MG: 5 TABLET, FILM COATED ORAL at 08:53

## 2019-07-29 RX ADMIN — ASPIRIN 81 MG SCH MG: 81 TABLET ORAL at 08:54

## 2019-07-29 RX ADMIN — HYDROMORPHONE HYDROCHLORIDE 1 MG: 4 TABLET ORAL at 19:05

## 2019-07-29 RX ADMIN — LINAGLIPTIN SCH MG: 5 TABLET, FILM COATED ORAL at 08:54

## 2019-07-29 RX ADMIN — BACLOFEN SCH MG: 10 TABLET ORAL at 08:55

## 2019-07-29 RX ADMIN — HYDROMORPHONE HYDROCHLORIDE PRN MG: 4 TABLET ORAL at 19:05

## 2019-07-29 RX ADMIN — ALBUTEROL SULFATE 1 PUFF: 90 AEROSOL, METERED RESPIRATORY (INHALATION) at 15:10

## 2019-07-29 RX ADMIN — BACLOFEN 1 MG: 10 TABLET ORAL at 08:55

## 2019-07-29 RX ADMIN — PREGABALIN SCH MG: 75 CAPSULE ORAL at 15:10

## 2019-07-29 RX ADMIN — LABETALOL 1 MG: 200 TABLET, FILM COATED ORAL at 21:40

## 2019-07-29 RX ADMIN — HYDROMORPHONE HYDROCHLORIDE 1 MG: 4 TABLET ORAL at 15:10

## 2019-07-29 RX ADMIN — HYDROMORPHONE HYDROCHLORIDE 1 MG: 4 TABLET ORAL at 06:51

## 2019-07-29 RX ADMIN — PANTOPRAZOLE SODIUM 1 MG: 40 TABLET, DELAYED RELEASE ORAL at 08:53

## 2019-07-29 RX ADMIN — ALBUTEROL SULFATE SCH PUFF: 90 AEROSOL, METERED RESPIRATORY (INHALATION) at 21:39

## 2019-07-29 RX ADMIN — NIFEDIPINE SCH MG: 30 TABLET, FILM COATED, EXTENDED RELEASE ORAL at 21:00

## 2019-07-29 RX ADMIN — BACLOFEN SCH MG: 10 TABLET ORAL at 21:39

## 2019-07-29 RX ADMIN — INSULIN ASPART 1 UNIT: 100 INJECTION, SOLUTION INTRAVENOUS; SUBCUTANEOUS at 18:06

## 2019-07-29 RX ADMIN — ASPIRIN 81 MG CHEWABLE TABLET 1 MG: 81 TABLET CHEWABLE at 08:54

## 2019-07-29 RX ADMIN — SPIRONOLACTONE 1 MG: 25 TABLET, FILM COATED ORAL at 08:54

## 2019-07-29 RX ADMIN — ALBUTEROL SULFATE SCH PUFF: 90 AEROSOL, METERED RESPIRATORY (INHALATION) at 08:57

## 2019-07-29 RX ADMIN — PREGABALIN 1 MG: 75 CAPSULE ORAL at 05:36

## 2019-07-29 RX ADMIN — PREGABALIN SCH MG: 75 CAPSULE ORAL at 21:41

## 2019-07-29 RX ADMIN — NIFEDIPINE 1 MG: 30 TABLET, FILM COATED, EXTENDED RELEASE ORAL at 08:55

## 2019-07-29 RX ADMIN — BACLOFEN SCH MG: 10 TABLET ORAL at 15:11

## 2019-07-29 RX ADMIN — ENOXAPARIN SODIUM SCH MG: 100 INJECTION SUBCUTANEOUS at 09:00

## 2019-07-29 RX ADMIN — NIFEDIPINE SCH MG: 30 TABLET, FILM COATED, EXTENDED RELEASE ORAL at 08:55

## 2019-07-29 RX ADMIN — PREGABALIN SCH MG: 75 CAPSULE ORAL at 05:36

## 2019-07-29 RX ADMIN — DOCUSATE SODIUM SCH MG: 100 CAPSULE, LIQUID FILLED ORAL at 21:40

## 2019-07-29 RX ADMIN — LABETALOL 1 MG: 200 TABLET, FILM COATED ORAL at 15:12

## 2019-07-29 RX ADMIN — SPIRONOLACTONE SCH MG: 25 TABLET ORAL at 08:54

## 2019-07-29 RX ADMIN — BACLOFEN 1 MG: 10 TABLET ORAL at 15:11

## 2019-07-29 RX ADMIN — ALBUTEROL SULFATE 1 PUFF: 90 AEROSOL, METERED RESPIRATORY (INHALATION) at 21:39

## 2019-07-29 RX ADMIN — GLIPIZIDE SCH MG: 5 TABLET, FILM COATED, EXTENDED RELEASE ORAL at 08:56

## 2019-07-29 RX ADMIN — HYDROMORPHONE HYDROCHLORIDE PRN MG: 4 TABLET ORAL at 15:10

## 2019-07-29 RX ADMIN — HYDROMORPHONE HYDROCHLORIDE 1 MG: 4 TABLET ORAL at 00:07

## 2019-07-29 RX ADMIN — DOCUSATE SODIUM 1 MG: 100 CAPSULE, LIQUID FILLED ORAL at 21:40

## 2019-07-29 RX ADMIN — PAROXETINE HYDROCHLORIDE SCH MG: 12.5 TABLET, FILM COATED, EXTENDED RELEASE ORAL at 08:53

## 2019-07-29 RX ADMIN — FLUTICASONE FUROATE AND VILANTEROL TRIFENATATE SCH INH: 100; 25 POWDER RESPIRATORY (INHALATION) at 08:57

## 2019-07-29 RX ADMIN — FLUTICASONE FUROATE AND VILANTEROL TRIFENATATE 1 INH: 100; 25 POWDER RESPIRATORY (INHALATION) at 08:57

## 2019-07-29 RX ADMIN — PREGABALIN 1 MG: 75 CAPSULE ORAL at 15:10

## 2019-07-29 RX ADMIN — PANTOPRAZOLE SODIUM SCH MG: 40 TABLET, DELAYED RELEASE ORAL at 08:53

## 2019-07-29 RX ADMIN — PAROXETINE HYDROCHLORIDE 1 MG: 12.5 TABLET, FILM COATED, EXTENDED RELEASE ORAL at 08:53

## 2019-07-29 NOTE — PN
DATE:  07/29/2019

 

 

SUBJECTIVE:  The patient is stable, states her pain is improving.  The patient is pending evaluation 
for possible transfer to continue acute rehabilitation.  No other events noted.

 

OBJECTIVE:

VITAL SIGNS:  Blood pressure is 131/72, pulse 76, respirations 20, temperature 98.6.

HEENT:  Head is normocephalic.

NECK:  Supple.

HEART:  Regular rate.

LUNGS:  Show diminished breath sounds at the base.

ABDOMEN:  Soft, nontender to palpation.  No rebound or guarding.

EXTREMITIES:  Negative for clubbing, cyanosis, or edema.

DERMATOLOGIC:  No rashes.

NEUROLOGIC:  No change in exam.

 

MEDICATIONS:  Have been reviewed.

 

LABORATORY DATA:  Have been reviewed.

 

IMAGING STUDIES:  Have been reviewed.

 

ASSESSMENT AND PLAN:

1.  Status post fall with contusion, possible iliotibial band syndrome.  The patient's pain regimen h
as been adjusted.  The patient is pending evaluation for acute rehabilitation.  We will discuss with 
the [facility] acute rehabilitation.  If the [facility] acute rehab does not accept
 the patient, will attempt [place].  Continue current pain regimen.  Continue physical therapy.

2.  Anemia.  Monitor hemoglobin and hematocrit.  

3.  Status post confusion.  Continue to monitor.

4.  Recurrent falls.  Continue physical therapy.

5.  History of asthma.  Continue Advair nebulizer.

6.  Diabetes.  Continue current insulin regimen.  Adjust medications as needed.

7.  Hypertension.  Continue current blood pressure regimen.  Adjust medications.

8.  History of cerebrovascular accident.  Continue current treatment plan.

9.  Encephalopathy, toxic metabolic, improved.  Continue to monitor.

10.  Urinary tract infection.  Continue current antibiotic regimen.

11.  Possible transient ischemic attack.  Continue to monitor.  Appreciate neurology's evaluation.

12.  Questionable shingles.   Continue Valtrex.

13.  Carotid artery stenosis.  The patient was seen by vascular surgery, no plans for intervention.

 

DISCHARGE PLANNING:  To acute rehab.

 

 

Dictated By: KALA GRAF DO

 

NR/NTS

DD:    07/29/2019 07:34:56

DT:    07/29/2019 08:32:43

Conf#: [id number]

DID#:  [id number]

CC: NITHYA DODGE MD;*EndCC*

## 2019-07-30 VITALS — RESPIRATION RATE: 18 BRPM | HEART RATE: 66 BPM | DIASTOLIC BLOOD PRESSURE: 53 MMHG | SYSTOLIC BLOOD PRESSURE: 109 MMHG

## 2019-07-30 VITALS — SYSTOLIC BLOOD PRESSURE: 119 MMHG | DIASTOLIC BLOOD PRESSURE: 58 MMHG | HEART RATE: 65 BPM

## 2019-07-30 VITALS — RESPIRATION RATE: 18 BRPM | SYSTOLIC BLOOD PRESSURE: 152 MMHG | HEART RATE: 67 BPM | DIASTOLIC BLOOD PRESSURE: 66 MMHG

## 2019-07-30 VITALS — HEART RATE: 64 BPM | RESPIRATION RATE: 20 BRPM | SYSTOLIC BLOOD PRESSURE: 102 MMHG | DIASTOLIC BLOOD PRESSURE: 56 MMHG

## 2019-07-30 VITALS — RESPIRATION RATE: 20 BRPM | DIASTOLIC BLOOD PRESSURE: 67 MMHG | SYSTOLIC BLOOD PRESSURE: 155 MMHG | HEART RATE: 63 BPM

## 2019-07-30 LAB
ADD MAN DIFF?: NO
ANION GAP: 9 (ref 5–13)
BASOPHIL #: 0.1 10^3/UL (ref 0–0.1)
BASOPHILS %: 0.7 % (ref 0–2)
BLOOD UREA NITROGEN: 19 MG/DL (ref 7–20)
CALCIUM: 9.7 MG/DL (ref 8.4–10.2)
CARBON DIOXIDE: 28 MMOL/L (ref 21–31)
CHLORIDE: 102 MMOL/L (ref 97–110)
CREATININE: 0.56 MG/DL (ref 0.44–1)
EOSINOPHILS #: 0.6 10^3/UL (ref 0–0.5)
EOSINOPHILS %: 6.9 % (ref 0–7)
GLUCOSE: 120 MG/DL (ref 70–220)
HEMATOCRIT: 32.2 % (ref 37–47)
HEMOGLOBIN: 10.3 G/DL (ref 12–16)
IMMATURE GRANS #M: 0.04 10^3/UL (ref 0–0.03)
IMMATURE GRANS % (M): 0.4 % (ref 0–0.43)
LYMPHOCYTES #: 2.2 10^3/UL (ref 0.8–2.9)
LYMPHOCYTES %: 24.1 % (ref 15–51)
MAGNESIUM: 1.9 MG/DL (ref 1.7–2.5)
MEAN CORPUSCULAR HEMOGLOBIN: 27.4 PG (ref 29–33)
MEAN CORPUSCULAR HGB CONC: 32 G/DL (ref 32–37)
MEAN CORPUSCULAR VOLUME: 85.6 FL (ref 82–101)
MEAN PLATELET VOLUME: 9.8 FL (ref 7.4–10.4)
MONOCYTE #: 0.8 10^3/UL (ref 0.3–0.9)
MONOCYTES %: 8.9 % (ref 0–11)
NEUTROPHIL #: 5.4 10^3/UL (ref 1.6–7.5)
NEUTROPHILS %: 59 % (ref 39–77)
NUCLEATED RED BLOOD CELLS #: 0 10^3/UL (ref 0–0)
NUCLEATED RED BLOOD CELLS%: 0 /100WBC (ref 0–0)
PHOSPHORUS: 4.1 MG/DL (ref 2.5–4.9)
PLATELET COUNT: 388 10^3/UL (ref 140–415)
POTASSIUM: 4.3 MMOL/L (ref 3.5–5.1)
RED BLOOD COUNT: 3.76 10^6/UL (ref 4.2–5.4)
RED CELL DISTRIBUTION WIDTH: 12.7 % (ref 11.5–14.5)
SODIUM: 139 MMOL/L (ref 135–144)
WHITE BLOOD COUNT: 9.1 10^3/UL (ref 4.8–10.8)

## 2019-07-30 RX ADMIN — FUROSEMIDE 1 MG: 20 TABLET ORAL at 08:02

## 2019-07-30 RX ADMIN — NIFEDIPINE 1 MG: 30 TABLET, FILM COATED, EXTENDED RELEASE ORAL at 20:31

## 2019-07-30 RX ADMIN — LOSARTAN POTASSIUM SCH MG: 50 TABLET, FILM COATED ORAL at 08:03

## 2019-07-30 RX ADMIN — HYDROMORPHONE HYDROCHLORIDE PRN MG: 4 TABLET ORAL at 16:41

## 2019-07-30 RX ADMIN — GLIPIZIDE 1 MG: 5 TABLET, FILM COATED, EXTENDED RELEASE ORAL at 08:02

## 2019-07-30 RX ADMIN — GLIPIZIDE SCH MG: 5 TABLET, FILM COATED, EXTENDED RELEASE ORAL at 08:02

## 2019-07-30 RX ADMIN — LINAGLIPTIN SCH MG: 5 TABLET, FILM COATED ORAL at 08:03

## 2019-07-30 RX ADMIN — SOLIFENACIN SUCCINATE 1 MG: 5 TABLET, FILM COATED ORAL at 07:59

## 2019-07-30 RX ADMIN — ENOXAPARIN SODIUM 1 MG: 100 INJECTION SUBCUTANEOUS at 08:14

## 2019-07-30 RX ADMIN — ALBUTEROL SULFATE SCH PUFF: 90 AEROSOL, METERED RESPIRATORY (INHALATION) at 20:32

## 2019-07-30 RX ADMIN — PANTOPRAZOLE SODIUM 1 MG: 40 TABLET, DELAYED RELEASE ORAL at 07:59

## 2019-07-30 RX ADMIN — SPIRONOLACTONE 1 MG: 25 TABLET, FILM COATED ORAL at 08:19

## 2019-07-30 RX ADMIN — LOSARTAN POTASSIUM 1 MG: 50 TABLET ORAL at 08:03

## 2019-07-30 RX ADMIN — HYDROMORPHONE HYDROCHLORIDE PRN MG: 4 TABLET ORAL at 22:57

## 2019-07-30 RX ADMIN — HYDROMORPHONE HYDROCHLORIDE 1 MG: 4 TABLET ORAL at 08:23

## 2019-07-30 RX ADMIN — PANTOPRAZOLE SODIUM SCH MG: 40 TABLET, DELAYED RELEASE ORAL at 07:59

## 2019-07-30 RX ADMIN — ASPIRIN 81 MG SCH MG: 81 TABLET ORAL at 08:01

## 2019-07-30 RX ADMIN — NIFEDIPINE SCH MG: 30 TABLET, FILM COATED, EXTENDED RELEASE ORAL at 08:01

## 2019-07-30 RX ADMIN — HYDROMORPHONE HYDROCHLORIDE 1 MG: 4 TABLET ORAL at 02:08

## 2019-07-30 RX ADMIN — BACLOFEN 1 MG: 10 TABLET ORAL at 20:30

## 2019-07-30 RX ADMIN — ALBUTEROL SULFATE SCH PUFF: 90 AEROSOL, METERED RESPIRATORY (INHALATION) at 08:42

## 2019-07-30 RX ADMIN — FLUTICASONE FUROATE AND VILANTEROL TRIFENATATE SCH INH: 100; 25 POWDER RESPIRATORY (INHALATION) at 08:42

## 2019-07-30 RX ADMIN — LABETALOL 1 MG: 200 TABLET, FILM COATED ORAL at 08:01

## 2019-07-30 RX ADMIN — NIFEDIPINE SCH MG: 30 TABLET, FILM COATED, EXTENDED RELEASE ORAL at 20:31

## 2019-07-30 RX ADMIN — ALBUTEROL SULFATE 1 PUFF: 90 AEROSOL, METERED RESPIRATORY (INHALATION) at 20:32

## 2019-07-30 RX ADMIN — PREGABALIN 1 MG: 75 CAPSULE ORAL at 12:01

## 2019-07-30 RX ADMIN — BACLOFEN SCH MG: 10 TABLET ORAL at 08:03

## 2019-07-30 RX ADMIN — LABETALOL 1 MG: 200 TABLET, FILM COATED ORAL at 20:32

## 2019-07-30 RX ADMIN — NYSTATIN SCH APPLIC: 100000 POWDER TOPICAL at 20:32

## 2019-07-30 RX ADMIN — PAROXETINE HYDROCHLORIDE SCH MG: 12.5 TABLET, FILM COATED, EXTENDED RELEASE ORAL at 08:00

## 2019-07-30 RX ADMIN — BACLOFEN SCH MG: 10 TABLET ORAL at 12:00

## 2019-07-30 RX ADMIN — HYDROMORPHONE HYDROCHLORIDE PRN MG: 4 TABLET ORAL at 02:08

## 2019-07-30 RX ADMIN — NYSTATIN 1 APPLIC: 100000 POWDER TOPICAL at 08:42

## 2019-07-30 RX ADMIN — ALBUTEROL SULFATE 1 PUFF: 90 AEROSOL, METERED RESPIRATORY (INHALATION) at 08:42

## 2019-07-30 RX ADMIN — INSULIN ASPART 1 UNIT: 100 INJECTION, SOLUTION INTRAVENOUS; SUBCUTANEOUS at 07:58

## 2019-07-30 RX ADMIN — SPIRONOLACTONE SCH MG: 25 TABLET ORAL at 08:19

## 2019-07-30 RX ADMIN — PREGABALIN SCH MG: 75 CAPSULE ORAL at 20:35

## 2019-07-30 RX ADMIN — NYSTATIN 1 APPLIC: 100000 POWDER TOPICAL at 20:32

## 2019-07-30 RX ADMIN — PREGABALIN SCH MG: 75 CAPSULE ORAL at 12:01

## 2019-07-30 RX ADMIN — DOCUSATE SODIUM SCH MG: 100 CAPSULE, LIQUID FILLED ORAL at 20:30

## 2019-07-30 RX ADMIN — ATORVASTATIN CALCIUM SCH MG: 40 TABLET, FILM COATED ORAL at 20:30

## 2019-07-30 RX ADMIN — ATORVASTATIN CALCIUM 1 MG: 40 TABLET, FILM COATED ORAL at 20:30

## 2019-07-30 RX ADMIN — ALBUTEROL SULFATE 1 PUFF: 90 AEROSOL, METERED RESPIRATORY (INHALATION) at 12:01

## 2019-07-30 RX ADMIN — PREGABALIN 1 MG: 75 CAPSULE ORAL at 20:35

## 2019-07-30 RX ADMIN — ALBUTEROL SULFATE SCH PUFF: 90 AEROSOL, METERED RESPIRATORY (INHALATION) at 12:01

## 2019-07-30 RX ADMIN — FLUTICASONE FUROATE AND VILANTEROL TRIFENATATE 1 INH: 100; 25 POWDER RESPIRATORY (INHALATION) at 08:42

## 2019-07-30 RX ADMIN — NITROFURANTOIN MONOHYDRATE/MACROCRYSTALLINE 1 MG: 25; 75 CAPSULE ORAL at 08:41

## 2019-07-30 RX ADMIN — POLYETHYLENE GLYCOL 3350 SCH GM: 17 POWDER, FOR SOLUTION ORAL at 08:01

## 2019-07-30 RX ADMIN — PREGABALIN 1 MG: 75 CAPSULE ORAL at 06:30

## 2019-07-30 RX ADMIN — BACLOFEN 1 MG: 10 TABLET ORAL at 12:00

## 2019-07-30 RX ADMIN — PREGABALIN SCH MG: 75 CAPSULE ORAL at 06:30

## 2019-07-30 RX ADMIN — HYDROMORPHONE HYDROCHLORIDE 1 MG: 4 TABLET ORAL at 16:41

## 2019-07-30 RX ADMIN — BACLOFEN 1 MG: 10 TABLET ORAL at 08:03

## 2019-07-30 RX ADMIN — HYDROMORPHONE HYDROCHLORIDE 1 MG: 4 TABLET ORAL at 12:30

## 2019-07-30 RX ADMIN — DOCUSATE SODIUM 1 MG: 100 CAPSULE, LIQUID FILLED ORAL at 08:01

## 2019-07-30 RX ADMIN — ENOXAPARIN SODIUM SCH MG: 100 INJECTION SUBCUTANEOUS at 08:14

## 2019-07-30 RX ADMIN — SOLIFENACIN SUCCINATE SCH MG: 5 TABLET, FILM COATED ORAL at 07:59

## 2019-07-30 RX ADMIN — HYDROMORPHONE HYDROCHLORIDE PRN MG: 4 TABLET ORAL at 08:23

## 2019-07-30 RX ADMIN — BACLOFEN SCH MG: 10 TABLET ORAL at 20:30

## 2019-07-30 RX ADMIN — DOCUSATE SODIUM 1 MG: 100 CAPSULE, LIQUID FILLED ORAL at 20:30

## 2019-07-30 RX ADMIN — HYDROMORPHONE HYDROCHLORIDE PRN MG: 4 TABLET ORAL at 12:30

## 2019-07-30 RX ADMIN — HYDROMORPHONE HYDROCHLORIDE 1 MG: 4 TABLET ORAL at 22:57

## 2019-07-30 RX ADMIN — NIFEDIPINE 1 MG: 30 TABLET, FILM COATED, EXTENDED RELEASE ORAL at 08:01

## 2019-07-30 RX ADMIN — LINAGLIPTIN 1 MG: 5 TABLET, FILM COATED ORAL at 08:03

## 2019-07-30 RX ADMIN — INSULIN ASPART 1 UNIT: 100 INJECTION, SOLUTION INTRAVENOUS; SUBCUTANEOUS at 21:00

## 2019-07-30 RX ADMIN — DOCUSATE SODIUM SCH MG: 100 CAPSULE, LIQUID FILLED ORAL at 08:01

## 2019-07-30 RX ADMIN — POLYETHYLENE GLYCOL 3350 1 GM: 17 POWDER, FOR SOLUTION ORAL at 08:01

## 2019-07-30 RX ADMIN — LABETALOL 1 MG: 200 TABLET, FILM COATED ORAL at 12:00

## 2019-07-30 RX ADMIN — INSULIN ASPART 1 UNIT: 100 INJECTION, SOLUTION INTRAVENOUS; SUBCUTANEOUS at 20:35

## 2019-07-30 RX ADMIN — ASPIRIN 81 MG CHEWABLE TABLET 1 MG: 81 TABLET CHEWABLE at 08:01

## 2019-07-30 RX ADMIN — ACETAMINOPHEN 1 MG: 325 TABLET, FILM COATED ORAL at 11:06

## 2019-07-30 RX ADMIN — INSULIN ASPART 1 UNIT: 100 INJECTION, SOLUTION INTRAVENOUS; SUBCUTANEOUS at 11:59

## 2019-07-30 RX ADMIN — PAROXETINE HYDROCHLORIDE 1 MG: 12.5 TABLET, FILM COATED, EXTENDED RELEASE ORAL at 08:00

## 2019-07-30 RX ADMIN — NYSTATIN SCH APPLIC: 100000 POWDER TOPICAL at 08:42

## 2019-07-30 RX ADMIN — INSULIN ASPART 1 UNIT: 100 INJECTION, SOLUTION INTRAVENOUS; SUBCUTANEOUS at 16:45

## 2019-07-30 NOTE — PN
DATE:  07/30/2019

 

 

SUBJECTIVE:  The patient is stable.  The patient's pain is present but improving.  Blood pressures ar
e improving.  No other events noted.

 

OBJECTIVE:

VITAL SIGNS:  Blood pressure is 127/58, respiration 18, pulse 64, temperature 97.9.

HEENT:  Head is normocephalic.

NECK:  Supple.

HEART:  Regular rate.

LUNGS:  Show diminished breath sounds at the base.

ABDOMEN:  Soft, nontender to palpation without rebound or guarding.

EXTREMITIES:  Negative for clubbing, cyanosis, no edema.

DERMATOLOGIC:  No rashes.

MUSCULOSKELETAL:  No joint effusion.

NEUROLOGIC:  No change in exam.

 

MEDICATIONS:  Have been reviewed.

 

LABORATORY DATA:  Has been reviewed.

 

IMAGING STUDIES:  Have been reviewed.

 

ASSESSMENT AND PLAN:

1.  Status post fall with contusion, possible iliotibial band syndrome.  The patient's pain regimen h
as been adjusted.  Currently stable.  The patient is pending transfer to acute rehab, possible [facility]
 versus [facility].  If acute rehab is not available, the patient will likely need to be tra
nsferred to a SNF.   Otherwise, continue current pain regimen.  Continue physical therapy.

2.  Anemia.  Hemoglobin has been stable.  Continue to monitor.

3.  History of recent falls.  Continue physical therapy.

4.  History of asthma.  Continue medical management.

5.  Diabetes.  Continue current insulin regimen.

6.  Hypertension.  Blood pressure controlled.  Continue current blood pressure regimen.

7.  History of cerebrovascular accident.  Continue medical management.

8.  Encephalopathy, improved.

9.  Urinary tract infection.  The patient is completing antibiotic course.

10.  Questionable shingles.  Patient remains on Valtrex, completing course.

11.  Coronary artery stenosis.  The patient was seen by vascular surgery, nonsignificant stenosis not
ed.  No plan for intervention.

12.  Dyslipidemia.  Continue statin therapy.

13.  Gastrointestinal and deep vein thrombosis prophylaxis.

 

 

Dictated By: KALA GRAF DO

 

NR/NTS

DD:    07/30/2019 06:25:08

DT:    07/30/2019 07:00:29

Conf#: [id number]

DID#:  [id number]

CC: NITHYA DODGE MD;*EndCC*

## 2019-07-31 VITALS — RESPIRATION RATE: 18 BRPM | HEART RATE: 65 BPM | DIASTOLIC BLOOD PRESSURE: 61 MMHG | SYSTOLIC BLOOD PRESSURE: 136 MMHG

## 2019-07-31 VITALS — RESPIRATION RATE: 18 BRPM | DIASTOLIC BLOOD PRESSURE: 63 MMHG | SYSTOLIC BLOOD PRESSURE: 138 MMHG | HEART RATE: 65 BPM

## 2019-07-31 VITALS — HEART RATE: 63 BPM

## 2019-07-31 VITALS — RESPIRATION RATE: 16 BRPM | SYSTOLIC BLOOD PRESSURE: 129 MMHG | HEART RATE: 64 BPM | DIASTOLIC BLOOD PRESSURE: 60 MMHG

## 2019-07-31 VITALS — RESPIRATION RATE: 16 BRPM | HEART RATE: 65 BPM

## 2019-07-31 VITALS — SYSTOLIC BLOOD PRESSURE: 105 MMHG | DIASTOLIC BLOOD PRESSURE: 52 MMHG

## 2019-07-31 RX ADMIN — ALBUTEROL SULFATE 1 PUFF: 90 AEROSOL, METERED RESPIRATORY (INHALATION) at 13:27

## 2019-07-31 RX ADMIN — PREGABALIN SCH MG: 75 CAPSULE ORAL at 13:25

## 2019-07-31 RX ADMIN — PANTOPRAZOLE SODIUM SCH MG: 40 TABLET, DELAYED RELEASE ORAL at 08:20

## 2019-07-31 RX ADMIN — ALBUTEROL SULFATE SCH PUFF: 90 AEROSOL, METERED RESPIRATORY (INHALATION) at 20:27

## 2019-07-31 RX ADMIN — DOCUSATE SODIUM 1 MG: 100 CAPSULE, LIQUID FILLED ORAL at 20:26

## 2019-07-31 RX ADMIN — LINAGLIPTIN 1 MG: 5 TABLET, FILM COATED ORAL at 08:22

## 2019-07-31 RX ADMIN — LOSARTAN POTASSIUM SCH MG: 50 TABLET, FILM COATED ORAL at 08:20

## 2019-07-31 RX ADMIN — FLUTICASONE FUROATE AND VILANTEROL TRIFENATATE SCH INH: 100; 25 POWDER RESPIRATORY (INHALATION) at 08:24

## 2019-07-31 RX ADMIN — LOSARTAN POTASSIUM 1 MG: 50 TABLET ORAL at 08:20

## 2019-07-31 RX ADMIN — HYDROMORPHONE HYDROCHLORIDE 1 MG: 4 TABLET ORAL at 07:34

## 2019-07-31 RX ADMIN — HYDROMORPHONE HYDROCHLORIDE PRN MG: 4 TABLET ORAL at 07:34

## 2019-07-31 RX ADMIN — ENOXAPARIN SODIUM SCH MG: 100 INJECTION SUBCUTANEOUS at 08:23

## 2019-07-31 RX ADMIN — HYDROMORPHONE HYDROCHLORIDE 1 MG: 4 TABLET ORAL at 11:49

## 2019-07-31 RX ADMIN — NIFEDIPINE 1 MG: 30 TABLET, FILM COATED, EXTENDED RELEASE ORAL at 20:25

## 2019-07-31 RX ADMIN — DOCUSATE SODIUM SCH MG: 100 CAPSULE, LIQUID FILLED ORAL at 08:21

## 2019-07-31 RX ADMIN — SPIRONOLACTONE SCH MG: 25 TABLET ORAL at 08:20

## 2019-07-31 RX ADMIN — LABETALOL 1 MG: 200 TABLET, FILM COATED ORAL at 08:22

## 2019-07-31 RX ADMIN — PANTOPRAZOLE SODIUM 1 MG: 40 TABLET, DELAYED RELEASE ORAL at 08:20

## 2019-07-31 RX ADMIN — PREGABALIN SCH MG: 75 CAPSULE ORAL at 05:50

## 2019-07-31 RX ADMIN — INSULIN ASPART 1 UNIT: 100 INJECTION, SOLUTION INTRAVENOUS; SUBCUTANEOUS at 12:17

## 2019-07-31 RX ADMIN — BACLOFEN SCH MG: 10 TABLET ORAL at 08:21

## 2019-07-31 RX ADMIN — GLIPIZIDE SCH MG: 5 TABLET, FILM COATED, EXTENDED RELEASE ORAL at 08:20

## 2019-07-31 RX ADMIN — INSULIN ASPART 1 UNIT: 100 INJECTION, SOLUTION INTRAVENOUS; SUBCUTANEOUS at 08:00

## 2019-07-31 RX ADMIN — ENOXAPARIN SODIUM 1 MG: 100 INJECTION SUBCUTANEOUS at 08:23

## 2019-07-31 RX ADMIN — HYDROMORPHONE HYDROCHLORIDE 1 MG: 4 TABLET ORAL at 16:07

## 2019-07-31 RX ADMIN — FUROSEMIDE 1 MG: 20 TABLET ORAL at 08:22

## 2019-07-31 RX ADMIN — HYDROMORPHONE HYDROCHLORIDE PRN MG: 4 TABLET ORAL at 20:26

## 2019-07-31 RX ADMIN — ASPIRIN 81 MG CHEWABLE TABLET 1 MG: 81 TABLET CHEWABLE at 08:20

## 2019-07-31 RX ADMIN — INSULIN ASPART 1 UNIT: 100 INJECTION, SOLUTION INTRAVENOUS; SUBCUTANEOUS at 17:23

## 2019-07-31 RX ADMIN — SOLIFENACIN SUCCINATE SCH MG: 5 TABLET, FILM COATED ORAL at 08:22

## 2019-07-31 RX ADMIN — BACLOFEN 1 MG: 10 TABLET ORAL at 08:21

## 2019-07-31 RX ADMIN — ATORVASTATIN CALCIUM SCH MG: 40 TABLET, FILM COATED ORAL at 20:24

## 2019-07-31 RX ADMIN — PAROXETINE HYDROCHLORIDE 1 MG: 12.5 TABLET, FILM COATED, EXTENDED RELEASE ORAL at 08:19

## 2019-07-31 RX ADMIN — NIFEDIPINE 1 MG: 30 TABLET, FILM COATED, EXTENDED RELEASE ORAL at 08:20

## 2019-07-31 RX ADMIN — ALBUTEROL SULFATE 1 PUFF: 90 AEROSOL, METERED RESPIRATORY (INHALATION) at 20:27

## 2019-07-31 RX ADMIN — BACLOFEN 1 MG: 10 TABLET ORAL at 13:25

## 2019-07-31 RX ADMIN — LINAGLIPTIN SCH MG: 5 TABLET, FILM COATED ORAL at 08:22

## 2019-07-31 RX ADMIN — PAROXETINE HYDROCHLORIDE SCH MG: 12.5 TABLET, FILM COATED, EXTENDED RELEASE ORAL at 08:19

## 2019-07-31 RX ADMIN — BACLOFEN SCH MG: 10 TABLET ORAL at 13:25

## 2019-07-31 RX ADMIN — INSULIN ASPART 1 UNIT: 100 INJECTION, SOLUTION INTRAVENOUS; SUBCUTANEOUS at 21:00

## 2019-07-31 RX ADMIN — ASPIRIN 81 MG SCH MG: 81 TABLET ORAL at 08:20

## 2019-07-31 RX ADMIN — NIFEDIPINE SCH MG: 30 TABLET, FILM COATED, EXTENDED RELEASE ORAL at 08:20

## 2019-07-31 RX ADMIN — HYDROMORPHONE HYDROCHLORIDE 1 MG: 4 TABLET ORAL at 03:18

## 2019-07-31 RX ADMIN — NYSTATIN SCH APPLIC: 100000 POWDER TOPICAL at 20:28

## 2019-07-31 RX ADMIN — DOCUSATE SODIUM SCH MG: 100 CAPSULE, LIQUID FILLED ORAL at 20:26

## 2019-07-31 RX ADMIN — FLUTICASONE FUROATE AND VILANTEROL TRIFENATATE 1 INH: 100; 25 POWDER RESPIRATORY (INHALATION) at 08:24

## 2019-07-31 RX ADMIN — POLYETHYLENE GLYCOL 3350 1 GM: 17 POWDER, FOR SOLUTION ORAL at 08:18

## 2019-07-31 RX ADMIN — PREGABALIN 1 MG: 75 CAPSULE ORAL at 22:24

## 2019-07-31 RX ADMIN — PREGABALIN SCH MG: 75 CAPSULE ORAL at 22:24

## 2019-07-31 RX ADMIN — LABETALOL 1 MG: 200 TABLET, FILM COATED ORAL at 20:25

## 2019-07-31 RX ADMIN — HYDROMORPHONE HYDROCHLORIDE PRN MG: 4 TABLET ORAL at 03:18

## 2019-07-31 RX ADMIN — HYDROMORPHONE HYDROCHLORIDE 1 MG: 4 TABLET ORAL at 20:26

## 2019-07-31 RX ADMIN — ATORVASTATIN CALCIUM 1 MG: 40 TABLET, FILM COATED ORAL at 20:24

## 2019-07-31 RX ADMIN — ALBUTEROL SULFATE 1 PUFF: 90 AEROSOL, METERED RESPIRATORY (INHALATION) at 08:23

## 2019-07-31 RX ADMIN — HYDROMORPHONE HYDROCHLORIDE PRN MG: 4 TABLET ORAL at 11:49

## 2019-07-31 RX ADMIN — DOCUSATE SODIUM 1 MG: 100 CAPSULE, LIQUID FILLED ORAL at 08:21

## 2019-07-31 RX ADMIN — BACLOFEN SCH MG: 10 TABLET ORAL at 20:26

## 2019-07-31 RX ADMIN — NIFEDIPINE SCH MG: 30 TABLET, FILM COATED, EXTENDED RELEASE ORAL at 20:25

## 2019-07-31 RX ADMIN — SOLIFENACIN SUCCINATE 1 MG: 5 TABLET, FILM COATED ORAL at 08:22

## 2019-07-31 RX ADMIN — NYSTATIN 1 APPLIC: 100000 POWDER TOPICAL at 20:28

## 2019-07-31 RX ADMIN — ALBUTEROL SULFATE SCH PUFF: 90 AEROSOL, METERED RESPIRATORY (INHALATION) at 13:27

## 2019-07-31 RX ADMIN — SPIRONOLACTONE 1 MG: 25 TABLET, FILM COATED ORAL at 08:20

## 2019-07-31 RX ADMIN — PREGABALIN 1 MG: 75 CAPSULE ORAL at 05:50

## 2019-07-31 RX ADMIN — POLYETHYLENE GLYCOL 3350 SCH GM: 17 POWDER, FOR SOLUTION ORAL at 08:18

## 2019-07-31 RX ADMIN — ALBUTEROL SULFATE SCH PUFF: 90 AEROSOL, METERED RESPIRATORY (INHALATION) at 08:23

## 2019-07-31 RX ADMIN — GLIPIZIDE 1 MG: 5 TABLET, FILM COATED, EXTENDED RELEASE ORAL at 08:20

## 2019-07-31 RX ADMIN — NYSTATIN 1 APPLIC: 100000 POWDER TOPICAL at 08:19

## 2019-07-31 RX ADMIN — HYDROMORPHONE HYDROCHLORIDE PRN MG: 4 TABLET ORAL at 16:07

## 2019-07-31 RX ADMIN — BACLOFEN 1 MG: 10 TABLET ORAL at 20:26

## 2019-07-31 RX ADMIN — NYSTATIN SCH APPLIC: 100000 POWDER TOPICAL at 08:19

## 2019-07-31 RX ADMIN — PREGABALIN 1 MG: 75 CAPSULE ORAL at 13:25

## 2019-07-31 RX ADMIN — LABETALOL 1 MG: 200 TABLET, FILM COATED ORAL at 13:26

## 2019-07-31 NOTE — CONS
Assessment/Plan


Assessment/Plan


Assessment/Plan (Recall)


59 F c/ Hx of stroke and other comorbidities, who presents for evaluation of 


scattered pains s/p recurrent fall.


Labs suggest a UTI, which could certainly predispose to an unsteady gait..





She notably has diffuse and chronic pain...requiring daily dilaudid, lyrica, 


etc.


There is perhaps a superimposed acute post-traumatic component; this should 


pari with time.





Regarding her chronic headache syndrome in particular, she may be a candidate 


for Topamax prophylaxis given its migrainous quality...but wants to consider the


option more in advance of its initiation..











P:


Agree w/ asa/lipitor daily for secondary stroke prevention


Consider the addition of an NSAID in the short term, as medically able


UTI and other medical management per primary


PT/OT as tolerated


Consider Topamax in the future for headache prophylaxis, should patient agree


Will follow





Consultation Date/Type/Reason


Admit Date/Time


Jul 21, 2019 at 05:32


Type of Consult


Neurology


Reason for Consultation


pain s/p fall


Requesting Provider:  NITHYA DODGE MD


Date/Time of Note


DATE: 7/31/19 


TIME: 13:47





24 HR Interval Summary


Free Text/Dictation


Wants to know about transition to eliquis





Exam/Review of Systems


Exam


Vitals





Vital Signs


  Date      Temp  Pulse  Resp  B/P (MAP)   Pulse Ox  O2          O2 Flow    FiO2


Time                                                 Delivery    Rate


   7/31/19           63


     12:19


   7/31/19                         105/52


     12:18                           (69)


   7/31/19  98.0           16                    95


     07:42


   7/29/19                                           Room Air


     14:00








Intake and Output





7/30/19 7/30/19 7/31/19





1515:00


23:00


07:00





IntakeIntake Total


520 ml


1240 ml





OutputOutput Total


1100 ml





BalanceBalance


-580 ml


1240 ml














Results


Result Diagram:  


7/30/19 0435                                                                    


           7/30/19 0435





Results 24hrs





Laboratory Tests


  Test
            7/30/19
16:44  7/30/19
20:34  7/31/19
08:11  7/31/19
12:15


  Bedside Glucose          134             97            130            161








Medications


Medication





Current Medications


Hydromorphone HCl (Dilaudid) 4 mg Q4H  PRN PO SEVERE PAIN LEVEL 7-10 Last 


administered on 7/31/19at 11:49; Admin Dose 4 MG;  Start 7/21/19 at 08:00


Hydromorphone HCl (Dilaudid) 1 mg Q4H  PRN IV SEVERE PAIN LEVEL 7-10 Last 


administered on 7/27/19at 03:36; Admin Dose 1 MG;  Start 7/21/19 at 08:00


IV Flush (NS 3 ml) 3 ml PER PROTOCOL IV ;  Start 7/21/19 at 11:30


Docusate Sodium (Colace) 100 mg Q12H  PRN PO .CONSTIPATION;  Start 7/21/19 at 


11:30


Enoxaparin Sodium (Lovenox) 40 mg DAILY SC  Last administered on 7/31/19at 


08:23; Admin Dose 40 MG;  Start 7/22/19 at 09:00


Diagnostic Test (Pha) (Accu-Chek) 1 ea 02 XX  Last administered on 7/22/19at 


01:48; Admin Dose 1 EA;  Start 7/22/19 at 02:00


Miscellaneous Information 1 ea NOTE XX ;  Start 7/21/19 at 17:30


Glucose (Glutose) 15 gm Q15M  PRN PO DECREASED GLUCOSE;  Start 7/21/19 at 17:30


Glucose (Glutose) 22.5 gm Q15M  PRN PO DECREASED GLUCOSE;  Start 7/21/19 at 


17:30


Dextrose (D50w Syringe) 25 ml Q15M  PRN IV DECREASED GLUCOSE;  Start 7/21/19 at 


17:30


Dextrose (D50w Syringe) 50 ml Q15M  PRN IV DECREASED GLUCOSE;  Start 7/21/19 at 


17:30


Glucagon (Glucagen) 1 mg Q15M  PRN IM DECREASED GLUCOSE;  Start 7/21/19 at 17:30


Glucose (Glutose) 15 gm Q15M  PRN BUCCAL DECREASED GLUCOSE;  Start 7/21/19 at 


17:30


Albuterol (Ventolin Hfa) 2 puff TID INH  Last administered on 7/31/19at 13:27; 


Admin Dose 2 PUFF;  Start 7/21/19 at 21:00


Aspirin (Aspirin) 81 mg DAILY PO  Last administered on 7/31/19at 08:20; Admin 


Dose 81 MG;  Start 7/22/19 at 09:00


Atorvastatin Calcium (Lipitor) 40 mg QHS PO  Last administered on 7/30/19at 


20:30; Admin Dose 40 MG;  Start 7/21/19 at 21:00


Baclofen (Lioresal) 20 mg TID PO  Last administered on 7/31/19at 13:25; Admin 


Dose 20 MG;  Start 7/21/19 at 21:00


Docusate Sodium (Colace) 100 mg BID PO  Last administered on 7/31/19at 08:21; 


Admin Dose 100 MG;  Start 7/21/19 at 21:00


Hydralazine HCl (Apresoline) 10 mg BID PO  Last administered on 7/31/19at 08:21;


Admin Dose 10 MG;  Start 7/21/19 at 21:00


Labetalol HCl (Normodyne) 400 mg TID PO  Last administered on 7/31/19at 13:26; 


Admin Dose 400 MG;  Start 7/21/19 at 21:00


Losartan Potassium (Cozaar) 100 mg DAILY PO  Last administered on 7/31/19at 


08:20; Admin Dose 100 MG;  Start 7/22/19 at 09:00


Pantoprazole (Protonix Tab) 40 mg DAILY PO  Last administered on 7/31/19at 


08:20; Admin Dose 40 MG;  Start 7/22/19 at 09:00


Solifenacin (Vesicare) 10 mg DAILY PO  Last administered on 7/31/19at 08:22; 


Admin Dose 10 MG;  Start 7/22/19 at 09:00


Spironolactone (Aldactone) 25 mg DAILY PO  Last administered on 7/31/19at 08:20;


Admin Dose 25 MG;  Start 7/22/19 at 09:00


Linagliptin (Tradjenta) 5 mg DAILY PO  Last administered on 7/31/19at 08:22; 


Admin Dose 5 MG;  Start 7/22/19 at 09:00


Nifedipine (Procardia Xl) 30 mg BID PO  Last administered on 7/31/19at 08:20; 


Admin Dose 30 MG;  Start 7/23/19 at 21:00


Furosemide (Lasix) 20 mg DAILY PO  Last administered on 7/31/19at 08:22; Admin 


Dose 20 MG;  Start 7/24/19 at 09:00


Acetaminophen (Tylenol Tab) 650 mg Q6H  PRN PO MILD PAIN(1-3)OR ELEVATED TEMP 


Last administered on 7/30/19at 11:06; Admin Dose 650 MG;  Start 7/23/19 at 11:00


Polyethylene Glycol (Miralax) 17 gm DAILY PO  Last administered on 7/31/19at 


08:18; Admin Dose 17 GM;  Start 7/24/19 at 09:00


Paroxetine HCl (Paxil Cr) 37.5 mg DAILY PO  Last administered on 7/31/19at 


08:19; Admin Dose 37.5 MG;  Start 7/27/19 at 09:00


Glipizide (Glucotrol Xl) 5 mg DAILY PO  Last administered on 7/31/19at 08:20; 


Admin Dose 5 MG;  Start 7/26/19 at 13:30


Fluticasone/ Vilanterol (Breo Ellipta 100-25 Mcg Inh) 1 inh DAILY INH  Last 


administered on 7/31/19at 08:24; Admin Dose 1 INH;  Start 7/26/19 at 13:30


Hydralazine HCl (Apresoline) 25 mg Q6H  PRN PO sbp>160 Last administered on 


7/28/19at 04:56; Admin Dose 25 MG;  Start 7/26/19 at 12:00


Pregabalin (Lyrica) 75 mg Q8 PO  Last administered on 7/31/19at 13:25; Admin 


Dose 75 MG;  Start 7/27/19 at 14:00


Nystatin (Nystatin Powder) 1 applic BID TOP  Last administered on 7/31/19at 


08:19; Admin Dose 1 APPLIC;  Start 7/27/19 at 15:00


Valacyclovir HCl (Valtrex) 1,000 mg TID PO  Last administered on 7/31/19at 


13:25; Admin Dose 1,000 MG;  Start 7/28/19 at 13:00


Metformin HCl (Glucophage) 500 mg BID WITH  MEALS PO  Last administered on 


7/31/19at 08:20; Admin Dose 500 MG;  Start 7/31/19 at 08:00


Insulin Aspart (Novolog Insulin Pen) NOVOLOG *MODERATE* ALGORITHM WITH MEALS  


BEDTIME SC  Last administered on 7/31/19at 12:17; Admin Dose 2 UNIT;  Start 


7/30/19 at 21:00











JOHNY BURCH                 Jul 31, 2019 13:50

## 2019-07-31 NOTE — PN
DATE:  07/31/2019

 

 

SUBJECTIVE:  The patient is stable, no events overnight.  I spoke with the patient, she is requesting
 to be placed on Eliquis instead of aspirin and Plavix.  I informed the patient I will discuss the ca
se with neurology.  No other events noted.

 

OBJECTIVE:

VITAL SIGNS:  Blood pressure 155/67, pulse 86, respirations 20, temperature 98.4.

HEENT:  Head is normocephalic.

NECK:  Supple.

HEART:  Regular rate.

LUNGS:  Show diminished breath sounds at the base.

ABDOMEN:  Soft, nontender to palpation without rebound or guarding.

EXTREMITIES:  Negative for clubbing, cyanosis, no edema.

DERMATOLOGIC:  No rashes.

MUSCULOSKELETAL:  No joint effusion.

NEUROLOGIC:  No change in exam.

 

MEDICATIONS:  Have been reviewed.

 

LABORATORY DATA:  Have been reviewed.

 

IMAGING STUDIES:  Have been reviewed.

 

ASSESSMENT AND PLAN:

1.  Status post fall with contusion, questionable possible iliotibial band syndrome.  The patient's p
ain regimen has been adjusted.  Currently stable pending transfer to acute rehab in [place].  Continue
 to monitor.

2.  Anemia.  Monitor hemoglobin and hematocrit levels.

3.  Asthma.  Continue medical management.

4.  Diabetes.  Continue current insulin regimen.  The patient's metformin was adjusted, sliding scale
 was adjusted. 

5.   Hypertension.  Blood pressure controlled.  Continue current blood pressure regimen.

6.  History of cerebrovascular accident.  Continue medical management.  The patient is currently on a
spirin.  Will discuss with neurology if the patient is a candidate for Eliquis.

7.  Urinary tract infection.  Continue the  patient's antibiotic course.

8.  Questionable shingles.  The patient is completing course of Valtrex.

9.  Coronary artery stenosis.  The patient was seen by vascular surgery no significant stenosis.  No 
indication for intervention at this time.

10.  Gastrointestinal and deep venous thrombosis  prophylaxis.

 

 

Dictated By: KALA GRAF DO

 

NR/NTS

DD:    07/31/2019 06:49:51

DT:    07/31/2019 08:42:21

Conf#: [id number]

DID#:  [id number]

CC: NITHYA DODGE MD;*EndCC*

## 2019-08-01 VITALS — DIASTOLIC BLOOD PRESSURE: 61 MMHG | HEART RATE: 64 BPM | SYSTOLIC BLOOD PRESSURE: 136 MMHG | RESPIRATION RATE: 18 BRPM

## 2019-08-01 VITALS — SYSTOLIC BLOOD PRESSURE: 131 MMHG | HEART RATE: 65 BPM | RESPIRATION RATE: 16 BRPM | DIASTOLIC BLOOD PRESSURE: 57 MMHG

## 2019-08-01 VITALS — DIASTOLIC BLOOD PRESSURE: 55 MMHG | SYSTOLIC BLOOD PRESSURE: 108 MMHG | RESPIRATION RATE: 16 BRPM | HEART RATE: 68 BPM

## 2019-08-01 VITALS — SYSTOLIC BLOOD PRESSURE: 148 MMHG | HEART RATE: 70 BPM | RESPIRATION RATE: 18 BRPM | DIASTOLIC BLOOD PRESSURE: 69 MMHG

## 2019-08-01 RX ADMIN — BACLOFEN SCH MG: 10 TABLET ORAL at 21:51

## 2019-08-01 RX ADMIN — DOCUSATE SODIUM 1 MG: 100 CAPSULE, LIQUID FILLED ORAL at 08:38

## 2019-08-01 RX ADMIN — LOSARTAN POTASSIUM SCH MG: 50 TABLET, FILM COATED ORAL at 08:39

## 2019-08-01 RX ADMIN — PREGABALIN SCH MG: 75 CAPSULE ORAL at 21:51

## 2019-08-01 RX ADMIN — HYDROMORPHONE HYDROCHLORIDE PRN MG: 4 TABLET ORAL at 04:32

## 2019-08-01 RX ADMIN — SPIRONOLACTONE 1 MG: 25 TABLET, FILM COATED ORAL at 08:36

## 2019-08-01 RX ADMIN — LABETALOL 1 MG: 200 TABLET, FILM COATED ORAL at 14:56

## 2019-08-01 RX ADMIN — LOSARTAN POTASSIUM 1 MG: 50 TABLET ORAL at 08:39

## 2019-08-01 RX ADMIN — HYDROMORPHONE HYDROCHLORIDE PRN MG: 4 TABLET ORAL at 00:25

## 2019-08-01 RX ADMIN — ALBUTEROL SULFATE SCH PUFF: 90 AEROSOL, METERED RESPIRATORY (INHALATION) at 14:55

## 2019-08-01 RX ADMIN — LABETALOL 1 MG: 200 TABLET, FILM COATED ORAL at 21:51

## 2019-08-01 RX ADMIN — POLYETHYLENE GLYCOL 3350 SCH GM: 17 POWDER, FOR SOLUTION ORAL at 08:38

## 2019-08-01 RX ADMIN — PAROXETINE HYDROCHLORIDE SCH MG: 12.5 TABLET, FILM COATED, EXTENDED RELEASE ORAL at 08:37

## 2019-08-01 RX ADMIN — HYDROMORPHONE HYDROCHLORIDE PRN MG: 4 TABLET ORAL at 21:51

## 2019-08-01 RX ADMIN — SPIRONOLACTONE SCH MG: 25 TABLET ORAL at 08:36

## 2019-08-01 RX ADMIN — NIFEDIPINE 1 MG: 30 TABLET, FILM COATED, EXTENDED RELEASE ORAL at 21:51

## 2019-08-01 RX ADMIN — NYSTATIN SCH APPLIC: 100000 POWDER TOPICAL at 08:34

## 2019-08-01 RX ADMIN — DOCUSATE SODIUM 1 MG: 100 CAPSULE, LIQUID FILLED ORAL at 21:50

## 2019-08-01 RX ADMIN — PREGABALIN 1 MG: 75 CAPSULE ORAL at 14:56

## 2019-08-01 RX ADMIN — INSULIN ASPART 1 UNIT: 100 INJECTION, SOLUTION INTRAVENOUS; SUBCUTANEOUS at 21:00

## 2019-08-01 RX ADMIN — BACLOFEN SCH MG: 10 TABLET ORAL at 08:37

## 2019-08-01 RX ADMIN — BACLOFEN 1 MG: 10 TABLET ORAL at 21:51

## 2019-08-01 RX ADMIN — HYDROMORPHONE HYDROCHLORIDE 1 MG: 4 TABLET ORAL at 00:25

## 2019-08-01 RX ADMIN — NYSTATIN 1 APPLIC: 100000 POWDER TOPICAL at 08:34

## 2019-08-01 RX ADMIN — BACLOFEN SCH MG: 10 TABLET ORAL at 14:55

## 2019-08-01 RX ADMIN — LINAGLIPTIN SCH MG: 5 TABLET, FILM COATED ORAL at 08:35

## 2019-08-01 RX ADMIN — PAROXETINE HYDROCHLORIDE 1 MG: 12.5 TABLET, FILM COATED, EXTENDED RELEASE ORAL at 08:37

## 2019-08-01 RX ADMIN — GLIPIZIDE 1 MG: 5 TABLET, FILM COATED, EXTENDED RELEASE ORAL at 08:36

## 2019-08-01 RX ADMIN — ALBUTEROL SULFATE 1 PUFF: 90 AEROSOL, METERED RESPIRATORY (INHALATION) at 08:35

## 2019-08-01 RX ADMIN — PREGABALIN SCH MG: 75 CAPSULE ORAL at 14:56

## 2019-08-01 RX ADMIN — ATORVASTATIN CALCIUM SCH MG: 40 TABLET, FILM COATED ORAL at 21:51

## 2019-08-01 RX ADMIN — SOLIFENACIN SUCCINATE SCH MG: 5 TABLET, FILM COATED ORAL at 08:36

## 2019-08-01 RX ADMIN — HYDROMORPHONE HYDROCHLORIDE 1 MG: 4 TABLET ORAL at 12:31

## 2019-08-01 RX ADMIN — PANTOPRAZOLE SODIUM 1 MG: 40 TABLET, DELAYED RELEASE ORAL at 08:35

## 2019-08-01 RX ADMIN — ALBUTEROL SULFATE 1 PUFF: 90 AEROSOL, METERED RESPIRATORY (INHALATION) at 21:52

## 2019-08-01 RX ADMIN — INSULIN ASPART 1 UNIT: 100 INJECTION, SOLUTION INTRAVENOUS; SUBCUTANEOUS at 17:44

## 2019-08-01 RX ADMIN — FUROSEMIDE 1 MG: 20 TABLET ORAL at 08:40

## 2019-08-01 RX ADMIN — BACLOFEN 1 MG: 10 TABLET ORAL at 08:37

## 2019-08-01 RX ADMIN — HYDROMORPHONE HYDROCHLORIDE 1 MG: 4 TABLET ORAL at 08:36

## 2019-08-01 RX ADMIN — HYDROMORPHONE HYDROCHLORIDE 1 MG: 4 TABLET ORAL at 04:32

## 2019-08-01 RX ADMIN — ALBUTEROL SULFATE SCH PUFF: 90 AEROSOL, METERED RESPIRATORY (INHALATION) at 08:35

## 2019-08-01 RX ADMIN — HYDROMORPHONE HYDROCHLORIDE PRN MG: 4 TABLET ORAL at 08:36

## 2019-08-01 RX ADMIN — INSULIN ASPART 1 UNIT: 100 INJECTION, SOLUTION INTRAVENOUS; SUBCUTANEOUS at 12:00

## 2019-08-01 RX ADMIN — LABETALOL 1 MG: 200 TABLET, FILM COATED ORAL at 08:39

## 2019-08-01 RX ADMIN — POLYETHYLENE GLYCOL 3350 1 GM: 17 POWDER, FOR SOLUTION ORAL at 08:38

## 2019-08-01 RX ADMIN — DOCUSATE SODIUM SCH MG: 100 CAPSULE, LIQUID FILLED ORAL at 21:50

## 2019-08-01 RX ADMIN — ATORVASTATIN CALCIUM 1 MG: 40 TABLET, FILM COATED ORAL at 21:51

## 2019-08-01 RX ADMIN — NYSTATIN SCH APPLIC: 100000 POWDER TOPICAL at 21:52

## 2019-08-01 RX ADMIN — LINAGLIPTIN 1 MG: 5 TABLET, FILM COATED ORAL at 08:35

## 2019-08-01 RX ADMIN — NIFEDIPINE SCH MG: 30 TABLET, FILM COATED, EXTENDED RELEASE ORAL at 21:51

## 2019-08-01 RX ADMIN — ALBUTEROL SULFATE SCH PUFF: 90 AEROSOL, METERED RESPIRATORY (INHALATION) at 21:52

## 2019-08-01 RX ADMIN — NIFEDIPINE SCH MG: 30 TABLET, FILM COATED, EXTENDED RELEASE ORAL at 08:39

## 2019-08-01 RX ADMIN — FLUTICASONE FUROATE AND VILANTEROL TRIFENATATE SCH INH: 100; 25 POWDER RESPIRATORY (INHALATION) at 08:35

## 2019-08-01 RX ADMIN — ENOXAPARIN SODIUM 1 MG: 100 INJECTION SUBCUTANEOUS at 08:45

## 2019-08-01 RX ADMIN — NIFEDIPINE 1 MG: 30 TABLET, FILM COATED, EXTENDED RELEASE ORAL at 08:39

## 2019-08-01 RX ADMIN — BACLOFEN 1 MG: 10 TABLET ORAL at 14:55

## 2019-08-01 RX ADMIN — ENOXAPARIN SODIUM SCH MG: 100 INJECTION SUBCUTANEOUS at 08:45

## 2019-08-01 RX ADMIN — ALBUTEROL SULFATE 1 PUFF: 90 AEROSOL, METERED RESPIRATORY (INHALATION) at 14:55

## 2019-08-01 RX ADMIN — HYDROMORPHONE HYDROCHLORIDE 1 MG: 4 TABLET ORAL at 17:38

## 2019-08-01 RX ADMIN — HYDROMORPHONE HYDROCHLORIDE 1 MG: 4 TABLET ORAL at 21:51

## 2019-08-01 RX ADMIN — ASPIRIN 81 MG SCH MG: 81 TABLET ORAL at 08:36

## 2019-08-01 RX ADMIN — HYDROMORPHONE HYDROCHLORIDE PRN MG: 4 TABLET ORAL at 17:38

## 2019-08-01 RX ADMIN — PREGABALIN 1 MG: 75 CAPSULE ORAL at 21:51

## 2019-08-01 RX ADMIN — PREGABALIN SCH MG: 75 CAPSULE ORAL at 06:27

## 2019-08-01 RX ADMIN — PREGABALIN 1 MG: 75 CAPSULE ORAL at 06:27

## 2019-08-01 RX ADMIN — GLIPIZIDE SCH MG: 5 TABLET, FILM COATED, EXTENDED RELEASE ORAL at 08:36

## 2019-08-01 RX ADMIN — FLUTICASONE FUROATE AND VILANTEROL TRIFENATATE 1 INH: 100; 25 POWDER RESPIRATORY (INHALATION) at 08:35

## 2019-08-01 RX ADMIN — DOCUSATE SODIUM SCH MG: 100 CAPSULE, LIQUID FILLED ORAL at 08:38

## 2019-08-01 RX ADMIN — PANTOPRAZOLE SODIUM SCH MG: 40 TABLET, DELAYED RELEASE ORAL at 08:35

## 2019-08-01 RX ADMIN — ASPIRIN 81 MG CHEWABLE TABLET 1 MG: 81 TABLET CHEWABLE at 08:36

## 2019-08-01 RX ADMIN — NYSTATIN 1 APPLIC: 100000 POWDER TOPICAL at 21:52

## 2019-08-01 RX ADMIN — SOLIFENACIN SUCCINATE 1 MG: 5 TABLET, FILM COATED ORAL at 08:36

## 2019-08-01 RX ADMIN — INSULIN ASPART 1 UNIT: 100 INJECTION, SOLUTION INTRAVENOUS; SUBCUTANEOUS at 08:00

## 2019-08-01 RX ADMIN — HYDROMORPHONE HYDROCHLORIDE PRN MG: 4 TABLET ORAL at 12:31

## 2019-08-01 NOTE — PN
DATE:  08/01/2019

 

 

SUBJECTIVE:  The patient is stable.  I spoke yesterday with neurology.  There is no active indication
 for Plavix or Eliquis.  The patient also is requesting possible transfer to a SNF, [facility]. 
 She does not wish to go to [facility] rehabilitation.

 

OBJECTIVE:

VITAL SIGNS:  Blood pressure is 148/69, respiration 18, pulse 70, temperature 98.3.

HEENT:  Head is normocephalic.

NECK:  Supple.

HEART:  Regular rate.

LUNGS:  Show diminished breath sounds at the base.

ABDOMEN:  Soft, nontender to palpation without rebound or guarding.

EXTREMITIES:  Negative for clubbing, cyanosis, no edema.

DERMATOLOGIC:  No rashes.

MUSCULOSKELETAL:  No joint effusion.  

NEUROLOGIC:  No change in exam.  

 

MEDICATIONS: The patient's medications have been reviewed. 

 

LABORATORY DATA:  The laboratory data has been reviewed. 

 

IMAGING STUDIES:  The imaging studies have been reviewed.

 

ASSESSMENT AND PLAN:

1.  Status post fall, possible iliotibial band syndrome.  Continue current pain regimen.  The patient
 now is requesting transfer to [facility].

2.  Anemia.  Monitor H and H levels.  

3.  Asthma.  Continue medical management.  

4.  Diabetes.  Continue current insulin regimen.

5.  Hypertension.  Blood pressure control.  Continue current blood pressure regimen.

6.  History of cerebrovascular accident.  Appreciate neurology's evaluation.  Continue aspirin and Li
pitor.  No indication for Eliquis or Plavix at this time.

7.  Urinary tract infection.  The patient has completed an antibiotic course.

8.  Questionable shingles.  The patient is completing course of Valtrex.

9.  Coronary artery disease.  No significant stenosis necessitating surgery.  Continue to monitor. 

10.  GI and DVT prophylaxis.

 

 

Dictated By: KALA GRAF DO

 

NR/NTS

DD:    08/01/2019 07:43:07

DT:    08/01/2019 09:11:30

Conf#: [id number]

DID#:  [id number]

CC: NITHYA DODGE MD;*End*

## 2019-08-02 VITALS — RESPIRATION RATE: 18 BRPM | HEART RATE: 79 BPM | SYSTOLIC BLOOD PRESSURE: 151 MMHG | DIASTOLIC BLOOD PRESSURE: 68 MMHG

## 2019-08-02 VITALS — DIASTOLIC BLOOD PRESSURE: 64 MMHG | RESPIRATION RATE: 18 BRPM | SYSTOLIC BLOOD PRESSURE: 138 MMHG | HEART RATE: 64 BPM

## 2019-08-02 VITALS — DIASTOLIC BLOOD PRESSURE: 52 MMHG | SYSTOLIC BLOOD PRESSURE: 106 MMHG | HEART RATE: 68 BPM | RESPIRATION RATE: 18 BRPM

## 2019-08-02 RX ADMIN — SPIRONOLACTONE SCH MG: 25 TABLET ORAL at 08:25

## 2019-08-02 RX ADMIN — ASPIRIN 81 MG SCH MG: 81 TABLET ORAL at 08:25

## 2019-08-02 RX ADMIN — HYDROMORPHONE HYDROCHLORIDE 1 MG: 4 TABLET ORAL at 10:39

## 2019-08-02 RX ADMIN — LINAGLIPTIN 1 MG: 5 TABLET, FILM COATED ORAL at 08:25

## 2019-08-02 RX ADMIN — ENOXAPARIN SODIUM SCH MG: 100 INJECTION SUBCUTANEOUS at 08:32

## 2019-08-02 RX ADMIN — HYDROMORPHONE HYDROCHLORIDE PRN MG: 4 TABLET ORAL at 14:57

## 2019-08-02 RX ADMIN — LABETALOL 1 MG: 200 TABLET, FILM COATED ORAL at 12:55

## 2019-08-02 RX ADMIN — INSULIN ASPART 1 UNIT: 100 INJECTION, SOLUTION INTRAVENOUS; SUBCUTANEOUS at 17:51

## 2019-08-02 RX ADMIN — PREGABALIN SCH MG: 75 CAPSULE ORAL at 14:31

## 2019-08-02 RX ADMIN — NYSTATIN 1 APPLIC: 100000 POWDER TOPICAL at 08:21

## 2019-08-02 RX ADMIN — POLYETHYLENE GLYCOL 3350 1 GM: 17 POWDER, FOR SOLUTION ORAL at 08:25

## 2019-08-02 RX ADMIN — LABETALOL 1 MG: 200 TABLET, FILM COATED ORAL at 08:25

## 2019-08-02 RX ADMIN — PAROXETINE HYDROCHLORIDE SCH MG: 12.5 TABLET, FILM COATED, EXTENDED RELEASE ORAL at 08:25

## 2019-08-02 RX ADMIN — SPIRONOLACTONE 1 MG: 25 TABLET, FILM COATED ORAL at 08:25

## 2019-08-02 RX ADMIN — PREGABALIN 1 MG: 75 CAPSULE ORAL at 14:31

## 2019-08-02 RX ADMIN — INSULIN ASPART 1 UNIT: 100 INJECTION, SOLUTION INTRAVENOUS; SUBCUTANEOUS at 08:00

## 2019-08-02 RX ADMIN — LOSARTAN POTASSIUM SCH MG: 50 TABLET, FILM COATED ORAL at 08:23

## 2019-08-02 RX ADMIN — PREGABALIN 1 MG: 75 CAPSULE ORAL at 06:28

## 2019-08-02 RX ADMIN — CLOPIDOGREL 1 MG: 75 TABLET, FILM COATED ORAL at 08:23

## 2019-08-02 RX ADMIN — PAROXETINE HYDROCHLORIDE 1 MG: 12.5 TABLET, FILM COATED, EXTENDED RELEASE ORAL at 08:25

## 2019-08-02 RX ADMIN — FLUTICASONE FUROATE AND VILANTEROL TRIFENATATE 1 INH: 100; 25 POWDER RESPIRATORY (INHALATION) at 08:22

## 2019-08-02 RX ADMIN — PANTOPRAZOLE SODIUM SCH MG: 40 TABLET, DELAYED RELEASE ORAL at 08:24

## 2019-08-02 RX ADMIN — BACLOFEN SCH MG: 10 TABLET ORAL at 08:24

## 2019-08-02 RX ADMIN — ALBUTEROL SULFATE SCH PUFF: 90 AEROSOL, METERED RESPIRATORY (INHALATION) at 12:56

## 2019-08-02 RX ADMIN — GLIPIZIDE 1 MG: 5 TABLET, FILM COATED, EXTENDED RELEASE ORAL at 08:26

## 2019-08-02 RX ADMIN — NIFEDIPINE SCH MG: 30 TABLET, FILM COATED, EXTENDED RELEASE ORAL at 08:25

## 2019-08-02 RX ADMIN — BACLOFEN SCH MG: 10 TABLET ORAL at 12:55

## 2019-08-02 RX ADMIN — BACLOFEN 1 MG: 10 TABLET ORAL at 08:24

## 2019-08-02 RX ADMIN — SOLIFENACIN SUCCINATE SCH MG: 5 TABLET, FILM COATED ORAL at 08:24

## 2019-08-02 RX ADMIN — HYDROMORPHONE HYDROCHLORIDE 1 MG: 4 TABLET ORAL at 02:03

## 2019-08-02 RX ADMIN — ALBUTEROL SULFATE 1 PUFF: 90 AEROSOL, METERED RESPIRATORY (INHALATION) at 08:22

## 2019-08-02 RX ADMIN — HYDROMORPHONE HYDROCHLORIDE PRN MG: 4 TABLET ORAL at 06:29

## 2019-08-02 RX ADMIN — ENOXAPARIN SODIUM 1 MG: 100 INJECTION SUBCUTANEOUS at 08:32

## 2019-08-02 RX ADMIN — ALBUTEROL SULFATE 1 PUFF: 90 AEROSOL, METERED RESPIRATORY (INHALATION) at 12:56

## 2019-08-02 RX ADMIN — ALBUTEROL SULFATE SCH PUFF: 90 AEROSOL, METERED RESPIRATORY (INHALATION) at 08:22

## 2019-08-02 RX ADMIN — PANTOPRAZOLE SODIUM 1 MG: 40 TABLET, DELAYED RELEASE ORAL at 08:24

## 2019-08-02 RX ADMIN — NYSTATIN SCH APPLIC: 100000 POWDER TOPICAL at 08:21

## 2019-08-02 RX ADMIN — HYDROMORPHONE HYDROCHLORIDE PRN MG: 4 TABLET ORAL at 02:03

## 2019-08-02 RX ADMIN — HYDROMORPHONE HYDROCHLORIDE PRN MG: 4 TABLET ORAL at 10:39

## 2019-08-02 RX ADMIN — HYDROMORPHONE HYDROCHLORIDE 1 MG: 4 TABLET ORAL at 14:57

## 2019-08-02 RX ADMIN — NIFEDIPINE 1 MG: 30 TABLET, FILM COATED, EXTENDED RELEASE ORAL at 08:25

## 2019-08-02 RX ADMIN — POLYETHYLENE GLYCOL 3350 SCH GM: 17 POWDER, FOR SOLUTION ORAL at 08:25

## 2019-08-02 RX ADMIN — FUROSEMIDE 1 MG: 20 TABLET ORAL at 08:24

## 2019-08-02 RX ADMIN — DOCUSATE SODIUM 1 MG: 100 CAPSULE, LIQUID FILLED ORAL at 08:25

## 2019-08-02 RX ADMIN — PREGABALIN SCH MG: 75 CAPSULE ORAL at 06:28

## 2019-08-02 RX ADMIN — DOCUSATE SODIUM SCH MG: 100 CAPSULE, LIQUID FILLED ORAL at 08:25

## 2019-08-02 RX ADMIN — GLIPIZIDE SCH MG: 5 TABLET, FILM COATED, EXTENDED RELEASE ORAL at 08:26

## 2019-08-02 RX ADMIN — BACLOFEN 1 MG: 10 TABLET ORAL at 12:55

## 2019-08-02 RX ADMIN — LINAGLIPTIN SCH MG: 5 TABLET, FILM COATED ORAL at 08:25

## 2019-08-02 RX ADMIN — HYDROMORPHONE HYDROCHLORIDE 1 MG: 4 TABLET ORAL at 06:29

## 2019-08-02 RX ADMIN — ASPIRIN 81 MG CHEWABLE TABLET 1 MG: 81 TABLET CHEWABLE at 08:25

## 2019-08-02 RX ADMIN — LOSARTAN POTASSIUM 1 MG: 50 TABLET ORAL at 08:23

## 2019-08-02 RX ADMIN — SOLIFENACIN SUCCINATE 1 MG: 5 TABLET, FILM COATED ORAL at 08:24

## 2019-08-02 RX ADMIN — FLUTICASONE FUROATE AND VILANTEROL TRIFENATATE SCH INH: 100; 25 POWDER RESPIRATORY (INHALATION) at 08:22

## 2019-08-02 RX ADMIN — INSULIN ASPART 1 UNIT: 100 INJECTION, SOLUTION INTRAVENOUS; SUBCUTANEOUS at 12:58

## 2019-08-03 NOTE — DS
DATE OF ADMISSION: 07/21/2019

DATE OF DISCHARGE: 08/02/2019

 

HOSPITAL COURSE:  This is a 59-year-old female with a past medical history of CVA, history of chronic
 pain syndrome, history of right iliotibial band syndrome, history of chronic neuropathy, history of 
_____ diabetes, obstructive sleep apnea, depression, coronary artery disease, asthma, hypertension, c
hronic headaches, who presented to [facility] after having a mechanical fall.  The 
patient as a result, had severe pain, was brought into the emergency room and admitted to med/surg.  
The patient during the hospital course the pain medications was adjusted, which improved and controll
ed her pain.  The patient had acute exacerbation of her iliotibial band syndrome.  The patient also s
een by neurologist, Dr. Celestin given her history of CVA.  The patient was recommended to continue aspi
rin and Lipitor.  A neurologic workup included a Doppler ultrasound, which showed some stenosis of th
e carotid arteries.  The patient was seen by vascular surgery, who recommended no intervention, but m
edical management with Plavix.  The patient's other medical problems included diabetes, hypertension,
 asthma have been stable.  The patient did have a UTI, which was treated with a Macrobid.  The patien
t had a questionable possibility of shingles and this was empirically treated with Valtrex.  The [facility]
ent currently at this time is pending transfer to [facility] for acute rehabilitation.  The [facility]
ent was not able to be accepted to [facility] acute rehab.

 

At the time of discharge, the patient is stable, in no acute distress.

 

FINAL DIAGNOSES:

1.  Status post fall with iliotibial band syndrome.

2.  Chronic pain syndrome.

3.  Anemia.

4.  Asthma.

5.  Diabetes.

6.  Hypertension.

7.  History of cerebrovascular accident.

8.  Status post urinary tract infection.

9.  Questionable shingles.  The patient is completing course.

10.  Peripheral vascular disease.

11.  Coronary artery disease.

12.  Gastrointestinal and deep venous thrombosis prophylaxis.

 

FINAL MEDICATIONS:  Please see reconciliation list.

 

At the time of discharge, the patient is stable, in no acute distress.

 

Please note I spent over 40 minutes of time preparing the patient's discharge.

 

 

Dictated By: KALA VENCES/CORBY

DD:    08/02/2019 07:08:54

DT:    08/03/2019 02:29:31

Conf#: [id number]

DID#:  [id number]

CC: NITHYA DODGE MD;*End*

## 2020-03-02 ENCOUNTER — HOSPITAL ENCOUNTER (INPATIENT)
Dept: HOSPITAL 54 - ER | Age: 60
LOS: 4 days | Discharge: HOME | DRG: 469 | End: 2020-03-06
Attending: NURSE PRACTITIONER | Admitting: NURSE PRACTITIONER
Payer: COMMERCIAL

## 2020-03-02 VITALS — BODY MASS INDEX: 43.43 KG/M2 | HEIGHT: 62 IN | WEIGHT: 236 LBS

## 2020-03-02 DIAGNOSIS — I10: ICD-10-CM

## 2020-03-02 DIAGNOSIS — I69.354: ICD-10-CM

## 2020-03-02 DIAGNOSIS — Z91.81: ICD-10-CM

## 2020-03-02 DIAGNOSIS — I65.22: ICD-10-CM

## 2020-03-02 DIAGNOSIS — J45.901: ICD-10-CM

## 2020-03-02 DIAGNOSIS — E11.65: ICD-10-CM

## 2020-03-02 DIAGNOSIS — I25.2: ICD-10-CM

## 2020-03-02 DIAGNOSIS — R55: ICD-10-CM

## 2020-03-02 DIAGNOSIS — Z79.84: ICD-10-CM

## 2020-03-02 DIAGNOSIS — E87.5: ICD-10-CM

## 2020-03-02 DIAGNOSIS — N17.0: Primary | ICD-10-CM

## 2020-03-02 DIAGNOSIS — E78.5: ICD-10-CM

## 2020-03-02 DIAGNOSIS — R53.2: ICD-10-CM

## 2020-03-02 DIAGNOSIS — N31.9: ICD-10-CM

## 2020-03-02 DIAGNOSIS — Z79.82: ICD-10-CM

## 2020-03-02 DIAGNOSIS — E66.01: ICD-10-CM

## 2020-03-02 DIAGNOSIS — G89.4: ICD-10-CM

## 2020-03-02 DIAGNOSIS — R53.1: ICD-10-CM

## 2020-03-02 DIAGNOSIS — I25.10: ICD-10-CM

## 2020-03-02 DIAGNOSIS — B96.89: ICD-10-CM

## 2020-03-02 DIAGNOSIS — N39.0: ICD-10-CM

## 2020-03-02 DIAGNOSIS — E86.0: ICD-10-CM

## 2020-03-02 LAB
ALBUMIN SERPL BCP-MCNC: 3.8 G/DL (ref 3.4–5)
ALP SERPL-CCNC: 107 U/L (ref 46–116)
ALT SERPL W P-5'-P-CCNC: 50 U/L (ref 12–78)
AST SERPL W P-5'-P-CCNC: 23 U/L (ref 15–37)
BASOPHILS # BLD AUTO: 0.2 /CMM (ref 0–0.2)
BASOPHILS NFR BLD AUTO: 1.3 % (ref 0–2)
BILIRUB DIRECT SERPL-MCNC: 0.1 MG/DL (ref 0–0.2)
BILIRUB SERPL-MCNC: 0.6 MG/DL (ref 0.2–1)
BUN SERPL-MCNC: 35 MG/DL (ref 7–18)
CALCIUM SERPL-MCNC: 9.5 MG/DL (ref 8.5–10.1)
CHLORIDE SERPL-SCNC: 103 MMOL/L (ref 98–107)
CO2 SERPL-SCNC: 26 MMOL/L (ref 21–32)
CREAT SERPL-MCNC: 1.4 MG/DL (ref 0.6–1.3)
EOSINOPHIL NFR BLD AUTO: 2.4 % (ref 0–6)
GLUCOSE SERPL-MCNC: 142 MG/DL (ref 74–106)
HCT VFR BLD AUTO: 33 % (ref 33–45)
HGB BLD-MCNC: 10.6 G/DL (ref 11.5–14.8)
LYMPHOCYTES NFR BLD AUTO: 20.9 % (ref 20–44)
LYMPHOCYTES NFR BLD AUTO: 3.2 /CMM (ref 0.8–4.8)
MCHC RBC AUTO-ENTMCNC: 32 G/DL (ref 31–36)
MCV RBC AUTO: 86 FL (ref 82–100)
MONOCYTES NFR BLD AUTO: 1.3 /CMM (ref 0.1–1.3)
MONOCYTES NFR BLD AUTO: 8.6 % (ref 2–12)
NEUTROPHILS # BLD AUTO: 10.2 /CMM (ref 1.8–8.9)
NEUTROPHILS NFR BLD AUTO: 66.8 % (ref 43–81)
PLATELET # BLD AUTO: 378 /CMM (ref 150–450)
POTASSIUM SERPL-SCNC: 5.7 MMOL/L (ref 3.5–5.1)
PROT SERPL-MCNC: 7 G/DL (ref 6.4–8.2)
RBC # BLD AUTO: 3.86 MIL/UL (ref 4–5.2)
SODIUM SERPL-SCNC: 137 MMOL/L (ref 136–145)
WBC NRBC COR # BLD AUTO: 15.2 K/UL (ref 4.3–11)

## 2020-03-02 PROCEDURE — G0378 HOSPITAL OBSERVATION PER HR: HCPCS

## 2020-03-02 PROCEDURE — A4216 STERILE WATER/SALINE, 10 ML: HCPCS

## 2020-03-02 NOTE — NUR
PT AAOX4. BIBRA88. C/O GEN WEAKNESS. LETHARGY. "VERBALIZED IM WEAK"

PT C/O L FOOT PAIN, STATES SHE HAD SURGERY IN 2011. VSS. NO ACTUE DISTRESS 
NOTED. VSS. AWAITING MD FOR EVAL.

## 2020-03-03 VITALS — DIASTOLIC BLOOD PRESSURE: 70 MMHG | SYSTOLIC BLOOD PRESSURE: 160 MMHG

## 2020-03-03 VITALS — SYSTOLIC BLOOD PRESSURE: 149 MMHG | DIASTOLIC BLOOD PRESSURE: 58 MMHG

## 2020-03-03 VITALS — DIASTOLIC BLOOD PRESSURE: 73 MMHG | SYSTOLIC BLOOD PRESSURE: 100 MMHG

## 2020-03-03 VITALS — DIASTOLIC BLOOD PRESSURE: 46 MMHG | SYSTOLIC BLOOD PRESSURE: 138 MMHG

## 2020-03-03 LAB
BASOPHILS # BLD AUTO: 0.1 /CMM (ref 0–0.2)
BASOPHILS NFR BLD AUTO: 1.1 % (ref 0–2)
BUN SERPL-MCNC: 33 MG/DL (ref 7–18)
CALCIUM SERPL-MCNC: 9.3 MG/DL (ref 8.5–10.1)
CHLORIDE SERPL-SCNC: 101 MMOL/L (ref 98–107)
CHOLEST SERPL-MCNC: 104 MG/DL (ref ?–200)
CO2 SERPL-SCNC: 25 MMOL/L (ref 21–32)
CREAT SERPL-MCNC: 1 MG/DL (ref 0.6–1.3)
EOSINOPHIL NFR BLD AUTO: 2 % (ref 0–6)
GLUCOSE SERPL-MCNC: 131 MG/DL (ref 74–106)
HCT VFR BLD AUTO: 31 % (ref 33–45)
HDLC SERPL-MCNC: 34 MG/DL (ref 40–60)
HGB BLD-MCNC: 10.2 G/DL (ref 11.5–14.8)
LDLC SERPL DIRECT ASSAY-MCNC: 62 MG/DL (ref 0–99)
LYMPHOCYTES NFR BLD AUTO: 2.2 /CMM (ref 0.8–4.8)
LYMPHOCYTES NFR BLD AUTO: 20.7 % (ref 20–44)
MAGNESIUM SERPL-MCNC: 2 MG/DL (ref 1.8–2.4)
MCHC RBC AUTO-ENTMCNC: 33 G/DL (ref 31–36)
MCV RBC AUTO: 84 FL (ref 82–100)
MONOCYTES NFR BLD AUTO: 0.8 /CMM (ref 0.1–1.3)
MONOCYTES NFR BLD AUTO: 7.8 % (ref 2–12)
NEUTROPHILS # BLD AUTO: 7.3 /CMM (ref 1.8–8.9)
NEUTROPHILS NFR BLD AUTO: 68.4 % (ref 43–81)
PH UR STRIP: 5.5 [PH] (ref 5–8)
PHOSPHATE SERPL-MCNC: 3.9 MG/DL (ref 2.5–4.9)
PLATELET # BLD AUTO: 141 /CMM (ref 150–450)
POTASSIUM SERPL-SCNC: 5.2 MMOL/L (ref 3.5–5.1)
RBC # BLD AUTO: 3.69 MIL/UL (ref 4–5.2)
RBC #/AREA URNS HPF: (no result) /HPF (ref 0–2)
SODIUM SERPL-SCNC: 136 MMOL/L (ref 136–145)
TRIGL SERPL-MCNC: 138 MG/DL (ref 30–150)
UROBILINOGEN UR STRIP-MCNC: 0.2 EU/DL
WBC NRBC COR # BLD AUTO: 10.6 K/UL (ref 4.3–11)

## 2020-03-03 RX ADMIN — Medication SCH MG: at 12:12

## 2020-03-03 RX ADMIN — EZETIMIBE SCH MG: 10 TABLET ORAL at 08:28

## 2020-03-03 RX ADMIN — Medication SCH EACH: at 12:10

## 2020-03-03 RX ADMIN — ALBUTEROL SULFATE PRN MG: 2.5 SOLUTION RESPIRATORY (INHALATION) at 22:41

## 2020-03-03 RX ADMIN — ISOSORBIDE DINITRATE SCH MG: 20 TABLET ORAL at 08:31

## 2020-03-03 RX ADMIN — LABETALOL HCL SCH MG: 100 TABLET, FILM COATED ORAL at 16:21

## 2020-03-03 RX ADMIN — Medication SCH EACH: at 21:57

## 2020-03-03 RX ADMIN — LABETALOL HCL SCH MG: 100 TABLET, FILM COATED ORAL at 08:49

## 2020-03-03 RX ADMIN — LIDOCAINE SCH EA: 50 PATCH CUTANEOUS at 14:25

## 2020-03-03 RX ADMIN — ISOSORBIDE DINITRATE SCH MG: 20 TABLET ORAL at 16:21

## 2020-03-03 RX ADMIN — BACLOFEN SCH MG: 10 TABLET ORAL at 08:28

## 2020-03-03 RX ADMIN — Medication PRN MG: at 10:13

## 2020-03-03 RX ADMIN — METFORMIN HYDROCHLORIDE SCH MG: 500 TABLET, EXTENDED RELEASE ORAL at 10:32

## 2020-03-03 RX ADMIN — INSULIN HUMAN PRN UNIT: 100 INJECTION, SOLUTION PARENTERAL at 12:11

## 2020-03-03 RX ADMIN — FLUTICASONE FUROATE AND VILANTEROL TRIFENATATE SCH EACH: 100; 25 POWDER RESPIRATORY (INHALATION) at 10:31

## 2020-03-03 RX ADMIN — Medication SCH MG: at 16:20

## 2020-03-03 RX ADMIN — Medication SCH MG: at 08:43

## 2020-03-03 RX ADMIN — HYDROMORPHONE HYDROCHLORIDE PRN MG: 2 TABLET ORAL at 10:38

## 2020-03-03 RX ADMIN — LABETALOL HCL SCH MG: 100 TABLET, FILM COATED ORAL at 12:12

## 2020-03-03 RX ADMIN — ALBUTEROL SULFATE PRN MG: 2.5 SOLUTION RESPIRATORY (INHALATION) at 14:28

## 2020-03-03 RX ADMIN — INSULIN HUMAN PRN UNIT: 100 INJECTION, SOLUTION PARENTERAL at 21:59

## 2020-03-03 RX ADMIN — BACLOFEN SCH MG: 10 TABLET ORAL at 16:19

## 2020-03-03 RX ADMIN — INSULIN HUMAN PRN UNIT: 100 INJECTION, SOLUTION PARENTERAL at 17:08

## 2020-03-03 RX ADMIN — CLOTRIMAZOLE SCH GM: 1 CREAM TOPICAL at 09:48

## 2020-03-03 RX ADMIN — Medication SCH MG: at 16:21

## 2020-03-03 RX ADMIN — Medication PRN MG: at 14:28

## 2020-03-03 RX ADMIN — ASPIRIN 81 MG SCH MG: 81 TABLET ORAL at 08:28

## 2020-03-03 RX ADMIN — BACLOFEN SCH MG: 10 TABLET ORAL at 12:12

## 2020-03-03 RX ADMIN — PANTOPRAZOLE SODIUM SCH MG: 40 TABLET, DELAYED RELEASE ORAL at 08:27

## 2020-03-03 RX ADMIN — CLOTRIMAZOLE SCH GM: 1 CREAM TOPICAL at 16:22

## 2020-03-03 RX ADMIN — Medication PRN MG: at 22:41

## 2020-03-03 RX ADMIN — HYDROMORPHONE HYDROCHLORIDE PRN MG: 2 TABLET ORAL at 04:37

## 2020-03-03 RX ADMIN — LOSARTAN POTASSIUM SCH MG: 50 TABLET, FILM COATED ORAL at 08:30

## 2020-03-03 RX ADMIN — ATORVASTATIN CALCIUM SCH MG: 40 TABLET, FILM COATED ORAL at 21:57

## 2020-03-03 RX ADMIN — SPIRONOLACTONE SCH MG: 25 TABLET, FILM COATED ORAL at 08:27

## 2020-03-03 RX ADMIN — Medication SCH MG: at 08:27

## 2020-03-03 RX ADMIN — Medication SCH EACH: at 17:07

## 2020-03-03 RX ADMIN — ALBUTEROL SULFATE PRN MG: 2.5 SOLUTION RESPIRATORY (INHALATION) at 10:13

## 2020-03-03 NOTE — NUR
MS RN NOTE:



RECEIVED PATIENT FROM ER, NO ACUTE DISTRESS NOTED. BREATHING EVEN AND UNLABORED, NO SOB 
NOTED. IV TO LFA #20 IN PLACE. ORIENTED PATIENT TO ROOM AND USE OF CALL LIGHT. VERIFIED 
PATIENT'S HOME MEDICATIONS AND DOSAGE. MEDICATION LIST UPDATED. BED LOCKED AND IN LOWEST 
POSITION CALL LIGHT IN REACH. WILL CONTINUE TO MONITOR

## 2020-03-03 NOTE — NUR
RN CLOSING NOTE



PT IN BED AT LOWEST AND LOCKED POSITION WITH SIDE RAILS X2, A/O X4 BREATHING EVEN AND 
UNLABORED WITH NO DISTRESS OR PAIN AT THIS TIME, IV IS PATENT AND INTACT, SAFETY PRECAUTIONS 
IN PLACE, CALL LIGHT IN REACH, ALL NEEDS ATTENDED TO, WILL ENDORSE TO NIGHT RN FOR NAHUN.

## 2020-03-03 NOTE — NUR
WOUND CARE CONSULT: PT PRESENTS WITH SLIGHT REDNESS AND EDEMA TO LOWER LEGS, RASH AND 
REDNESS TO BREASTFOLDS, ABDOMINAL/GROIN FOLDS, PRESENT ON ADMISSION. RECOMMENDATIONS MADE 
FOR SKIN PROTECTION. DISCUSSED WITH NURSING STAFF. WILL SEE PRN. MD IN AGREEMENT WITH PLAN 
OF CARE. CURRENT CONRAD SCORE IS 14. 

-------------------------------------------------------------------------------

Addendum: 03/03/20 at 0855 by CELESTINE ALLEN WNDNU

-------------------------------------------------------------------------------

Amended: Links added.

## 2020-03-03 NOTE — NUR
MS2/RN

AT 1930, RECEIVED PATIENT AWAKE BUT SLEEPY, COMFORTABLE, NO C/O PAIN, NO DISTRESS NOTED, 
CALL LIGHT IN REACH. WILL MONITOR.

## 2020-03-03 NOTE — NUR
RN NOTE



PHARMACY CALLED AND SPOKE TO ZUNILDA REGARDING PT JANUMET AND INFORMED THAT PT CANNOT PROVIDE 
MEDICATION BUT IS OKAY WITH MED BEING SPLIT IN TWO. ALSO INQUIRED ABOUT 0900 PAXIL AND 
INFORMED THAT THEY DO NOT HAVE THE SR VARIANT.

## 2020-03-03 NOTE — NUR
MS RN NOTE:



PATIENT RESTING IN BED, NO ACUTE DISTRESS NOTED. BREATHING EVEN AND UNLABORED, NO SOB NOTED. 
O2 AT 2 LPM VIA NC IN PLACE. IV TO LFA #20 IN PLACE. BED LOCKED AND IN LOWEST POSITION CALL 
LIGHT IN REACH. WILL ENDORSE TO DAY NURSE TO CONTINUE WITH PLAN OF CARE.

## 2020-03-03 NOTE — NUR
MS RN NOTE:



PATIENT COMPLAINS OF PAIN TO BACK/ LEFT HIP 9/10, DILAUDID 4MG ORAL GIVEN PER MD ORDER. WILL 
CONTINUE TO MONITOR.

## 2020-03-04 VITALS — SYSTOLIC BLOOD PRESSURE: 125 MMHG | DIASTOLIC BLOOD PRESSURE: 65 MMHG

## 2020-03-04 VITALS — DIASTOLIC BLOOD PRESSURE: 51 MMHG | SYSTOLIC BLOOD PRESSURE: 107 MMHG

## 2020-03-04 VITALS — DIASTOLIC BLOOD PRESSURE: 96 MMHG | SYSTOLIC BLOOD PRESSURE: 151 MMHG

## 2020-03-04 VITALS — SYSTOLIC BLOOD PRESSURE: 107 MMHG | DIASTOLIC BLOOD PRESSURE: 51 MMHG

## 2020-03-04 LAB
BASOPHILS # BLD AUTO: 0.1 /CMM (ref 0–0.2)
BASOPHILS NFR BLD AUTO: 1.3 % (ref 0–2)
BUN SERPL-MCNC: 18 MG/DL (ref 7–18)
CALCIUM SERPL-MCNC: 9.2 MG/DL (ref 8.5–10.1)
CHLORIDE SERPL-SCNC: 100 MMOL/L (ref 98–107)
CO2 SERPL-SCNC: 27 MMOL/L (ref 21–32)
CREAT SERPL-MCNC: 0.7 MG/DL (ref 0.6–1.3)
EOSINOPHIL NFR BLD AUTO: 3.3 % (ref 0–6)
GLUCOSE SERPL-MCNC: 130 MG/DL (ref 74–106)
HCT VFR BLD AUTO: 31 % (ref 33–45)
HGB BLD-MCNC: 10.2 G/DL (ref 11.5–14.8)
LYMPHOCYTES NFR BLD AUTO: 2.9 /CMM (ref 0.8–4.8)
LYMPHOCYTES NFR BLD AUTO: 32.3 % (ref 20–44)
MAGNESIUM SERPL-MCNC: 2 MG/DL (ref 1.8–2.4)
MCHC RBC AUTO-ENTMCNC: 33 G/DL (ref 31–36)
MCV RBC AUTO: 84 FL (ref 82–100)
MONOCYTES NFR BLD AUTO: 0.9 /CMM (ref 0.1–1.3)
MONOCYTES NFR BLD AUTO: 10.3 % (ref 2–12)
NEUTROPHILS # BLD AUTO: 4.7 /CMM (ref 1.8–8.9)
NEUTROPHILS NFR BLD AUTO: 52.8 % (ref 43–81)
PHOSPHATE SERPL-MCNC: 3.8 MG/DL (ref 2.5–4.9)
PLATELET # BLD AUTO: 360 /CMM (ref 150–450)
POTASSIUM SERPL-SCNC: 4.7 MMOL/L (ref 3.5–5.1)
RBC # BLD AUTO: 3.67 MIL/UL (ref 4–5.2)
SODIUM SERPL-SCNC: 136 MMOL/L (ref 136–145)
WBC NRBC COR # BLD AUTO: 8.8 K/UL (ref 4.3–11)

## 2020-03-04 RX ADMIN — Medication PRN MG: at 10:32

## 2020-03-04 RX ADMIN — FLUTICASONE FUROATE AND VILANTEROL TRIFENATATE SCH EACH: 100; 25 POWDER RESPIRATORY (INHALATION) at 08:40

## 2020-03-04 RX ADMIN — LIDOCAINE SCH EA: 50 PATCH CUTANEOUS at 14:51

## 2020-03-04 RX ADMIN — PAROXETINE HYDROCHLORIDE SCH MG: 10 TABLET, FILM COATED ORAL at 08:40

## 2020-03-04 RX ADMIN — METFORMIN HYDROCHLORIDE SCH MG: 500 TABLET, EXTENDED RELEASE ORAL at 08:30

## 2020-03-04 RX ADMIN — PREGABALIN SCH MG: 25 CAPSULE ORAL at 21:22

## 2020-03-04 RX ADMIN — Medication SCH MG: at 08:40

## 2020-03-04 RX ADMIN — POLYETHYLENE GLYCOL 3350 SCH GM: 17 POWDER, FOR SOLUTION ORAL at 21:22

## 2020-03-04 RX ADMIN — Medication SCH MG: at 17:46

## 2020-03-04 RX ADMIN — ALBUTEROL SULFATE PRN MG: 2.5 SOLUTION RESPIRATORY (INHALATION) at 20:04

## 2020-03-04 RX ADMIN — BACLOFEN SCH MG: 10 TABLET ORAL at 13:34

## 2020-03-04 RX ADMIN — Medication SCH MG: at 17:00

## 2020-03-04 RX ADMIN — PANTOPRAZOLE SODIUM SCH MG: 40 TABLET, DELAYED RELEASE ORAL at 07:44

## 2020-03-04 RX ADMIN — ACETAMINOPHEN PRN MG: 325 TABLET ORAL at 03:13

## 2020-03-04 RX ADMIN — ALBUTEROL SULFATE PRN MG: 2.5 SOLUTION RESPIRATORY (INHALATION) at 10:32

## 2020-03-04 RX ADMIN — INSULIN HUMAN PRN UNIT: 100 INJECTION, SOLUTION PARENTERAL at 12:10

## 2020-03-04 RX ADMIN — SPIRONOLACTONE SCH MG: 25 TABLET, FILM COATED ORAL at 08:40

## 2020-03-04 RX ADMIN — LEVOFLOXACIN SCH MLS/HR: 500 INJECTION, SOLUTION INTRAVENOUS at 02:04

## 2020-03-04 RX ADMIN — LABETALOL HCL SCH MG: 100 TABLET, FILM COATED ORAL at 13:34

## 2020-03-04 RX ADMIN — Medication PRN MG: at 03:44

## 2020-03-04 RX ADMIN — INSULIN HUMAN PRN UNIT: 100 INJECTION, SOLUTION PARENTERAL at 21:42

## 2020-03-04 RX ADMIN — HYDROMORPHONE HYDROCHLORIDE PRN MG: 2 TABLET ORAL at 00:17

## 2020-03-04 RX ADMIN — MUPIROCIN SCH APPLIC: 20 OINTMENT TOPICAL at 18:03

## 2020-03-04 RX ADMIN — BACLOFEN SCH MG: 10 TABLET ORAL at 08:35

## 2020-03-04 RX ADMIN — HYDROMORPHONE HYDROCHLORIDE PRN MG: 2 TABLET ORAL at 12:08

## 2020-03-04 RX ADMIN — INSULIN HUMAN PRN UNIT: 100 INJECTION, SOLUTION PARENTERAL at 17:53

## 2020-03-04 RX ADMIN — Medication SCH MG: at 17:45

## 2020-03-04 RX ADMIN — BACLOFEN SCH MG: 10 TABLET ORAL at 17:45

## 2020-03-04 RX ADMIN — PREGABALIN SCH MG: 25 CAPSULE ORAL at 13:34

## 2020-03-04 RX ADMIN — INSULIN HUMAN PRN UNIT: 100 INJECTION, SOLUTION PARENTERAL at 08:00

## 2020-03-04 RX ADMIN — Medication SCH EACH: at 08:01

## 2020-03-04 RX ADMIN — ALBUTEROL SULFATE PRN MG: 2.5 SOLUTION RESPIRATORY (INHALATION) at 03:44

## 2020-03-04 RX ADMIN — HYDROMORPHONE HYDROCHLORIDE PRN MG: 2 TABLET ORAL at 06:04

## 2020-03-04 RX ADMIN — Medication SCH EACH: at 12:10

## 2020-03-04 RX ADMIN — LOSARTAN POTASSIUM SCH MG: 50 TABLET, FILM COATED ORAL at 08:33

## 2020-03-04 RX ADMIN — Medication PRN MG: at 20:04

## 2020-03-04 RX ADMIN — EZETIMIBE SCH MG: 10 TABLET ORAL at 08:34

## 2020-03-04 RX ADMIN — CLOTRIMAZOLE SCH APPLIC: 1 CREAM TOPICAL at 08:40

## 2020-03-04 RX ADMIN — CLOTRIMAZOLE SCH APPLIC: 1 CREAM TOPICAL at 17:49

## 2020-03-04 RX ADMIN — LABETALOL HCL SCH MG: 100 TABLET, FILM COATED ORAL at 08:31

## 2020-03-04 RX ADMIN — ATORVASTATIN CALCIUM SCH MG: 40 TABLET, FILM COATED ORAL at 21:22

## 2020-03-04 RX ADMIN — CLOPIDOGREL BISULFATE SCH MG: 75 TABLET, FILM COATED ORAL at 08:35

## 2020-03-04 RX ADMIN — LABETALOL HCL SCH MG: 100 TABLET, FILM COATED ORAL at 17:47

## 2020-03-04 RX ADMIN — HYDROMORPHONE HYDROCHLORIDE PRN MG: 2 TABLET ORAL at 20:19

## 2020-03-04 RX ADMIN — Medication SCH MG: at 13:34

## 2020-03-04 RX ADMIN — ASPIRIN 81 MG SCH MG: 81 TABLET ORAL at 08:28

## 2020-03-04 RX ADMIN — Medication SCH MG: at 08:30

## 2020-03-04 RX ADMIN — ISOSORBIDE DINITRATE SCH MG: 20 TABLET ORAL at 08:34

## 2020-03-04 RX ADMIN — PREGABALIN SCH MG: 25 CAPSULE ORAL at 08:28

## 2020-03-04 RX ADMIN — ISOSORBIDE DINITRATE SCH MG: 20 TABLET ORAL at 17:00

## 2020-03-04 RX ADMIN — MUPIROCIN SCH APPLIC: 20 OINTMENT TOPICAL at 21:34

## 2020-03-04 RX ADMIN — Medication SCH EACH: at 17:49

## 2020-03-04 RX ADMIN — Medication SCH EACH: at 21:41

## 2020-03-04 NOTE — NUR
MS RN NOTES

PATIENT IN BED ALERT ORIENTED X3. NO ACUTE DISTRESS NOTED, BREATHING UNLABORED. NO SOB 
NOTED. IV ACCESS PATENT AND INTACT, NO REDNESS OR SWELLING NOTED. SAFETY MEASURES IN PLACE. 
CALL LIGHT WITHIN REACH. WILL CONTINUE TO MONITOR ACCORDINGLY.

## 2020-03-04 NOTE — NUR
MS RN NOTES



RADIOLOGY CALLED AND SAID THE PATIENT IS SCHEDULED FOR 8AM CTA BRAIN AND CAROTID. WILL 
ENDORSE TO MORNING SHIFT.

## 2020-03-04 NOTE — NUR
MS RN OPENING NOTES



RECEIVED PATIENT FROM MORNING SHIFT, ALERT AND ORIENTED X 3. VERBALLY RESPONSIVE AND ABLE TO 
FOLLOW DIRECTIONS. BREATHING REGULAR AND UNLABORED ON OXYGEN AT 2L/MIN VIA NASAL CANNULA. 
LEFT FOREARM G20 IV LINE INTACT AND PATENT, FLUSHING WELL WITH NO BLEEDING OR S/S OF 
INFILTRATION NOTED. COMPLAINED OF 8/10 HEADACHE, NON-PHARMACOLOGICAL INTERVENTIONS PROVIDED. 
BED LOW AND LOCKED ON SEMI FOWLERS POSITION. CALL LIGHT IN REACH. WILL CONTINUE TO MONITOR.

## 2020-03-04 NOTE — NUR
MS2/RN

AT 0000H, /73, PER PATIENT SHE WAS JUST IN PAIN, DILAUDID 4 MG PO WAS GIVEN AS 
ORDERED. WILL MONITOR.

## 2020-03-04 NOTE — NUR
MS RN NOTES



RECEIVED A CALL FROM CT SCAN REGARDING CONSENT FOR CTA OF THE BRAIN AND CAROTID. ALSO 
REQUESTED TO PUT AN ANTECUBITAL AT LEAST G20 IV LINE.

## 2020-03-04 NOTE — NUR
MS2/RN

PATIENT IS AWAKE, C/O HEADACHE 7/10, MEDICATED WITH DILAUDID 4 MG PO, PER PATIENT SHE TAKES 
DILAUDID FOR HEADACHE AT HOME. PATIENT REQUESTED TO DO ACCU CHECK RIGHT BEFORE BREAKFAST, 
WILL ENDORSE TO NEXT RN, ALL NEEDS ATTENDED AT THIS TIME, WILL CONTINUE TO MONITOR.

## 2020-03-04 NOTE — NUR
MS RN NOTES

PATIENT IN BED ALERT ORIENTED X3. NO ACUTE DISTRESS NOTED, BREATHING UNLABORED. NO SOB 
NOTED. IV ACCESS PATENT AND INTACT, NO REDNESS OR SWELLING NOTED. NEEDS ATTENDED AND 
ANTICIPATED. SAFETY MEASURES IN PLACE. CALL LIGHT WITHIN REACH. WILL ENDORSE TO NIGHT NURSE 
FOR CONTINUITY OF CARE.

## 2020-03-04 NOTE — NUR
MS RN NOTES



BS 283mg/dl, 4UNITS REGULAR INSULIN GIVEN SQ. SNACKS PROVIDED ON BEDSIDE. WILL CONTINUE TO 
MONITOR.

## 2020-03-04 NOTE — NUR
MS RN NOTES



COMPLAINED OF 8/10 HEADACHE, DILAUDID 4MG GIVEN BY MOUTH. NON-PHARMACOLOGICAL INTERVENTIONS 
PROVIDED. VITAL SIGN WNL. WILL CONTINUE TO MONITOR.

## 2020-03-04 NOTE — NUR
MS RN NOTES



CONSENT FOR CTA OF THE BRAIN AND CAROTID OBTAINED AT BEDSIDE. RIGHT AC G20 IV LINE INSERTED 
WITH GOOD BLOOD BACK FLOW AND FLUSHING WELL. CALLED CT SCAN DEPARTMENT THRICE BUT NO ONE 
PICKED-UP.

## 2020-03-04 NOTE — NUR
Visited pt today. Pt confirmed height 5'2" (62 inches) and reports UBW 205lbs but pt states 
pt gained 20lbs after stroke. Noted A1C 8.0H and BMI 43.2. Per pt, pt has been educated on 
DM diet previously and trying to follow the diet but pt is unable to exercise. Provided diet 
education to pt with handouts at length. Pt receptive to diet education and verbalized 
understanding. Noted MD order to discharge pt today.

## 2020-03-04 NOTE — NUR
MS RN NOTES

NOTIFIED JEREMIAS JONES REGARDING CAROTID ULTRASOUND RESULT AND THAT LOAN ERNANDEZ 
SAID NOT CLEARED FOR DISCHARGE AND NEEDS VASCULAR SURGERY CONSULT, AG MAYS  SAID TO 
DISCONTINUE DISCHARGE ORDER AND SHE WILL CONTACT VASCULAR MD. ORDER CLARIFIED AND READ BACK, 
 NOTED AND CARRIED OUT.

## 2020-03-04 NOTE — NUR
MS RN NOTES

RECEIVED NEW ORDER FROM AG MAYS FOR COLACE 100 MG BID AND MIRALAX 17 G QHS, 
ORDERS CLARIFIED AND READ BACK TO MD, NOTED AND CARRIED OUT.

## 2020-03-04 NOTE — NUR
MS RN NOTES

MRSA NARES TEST RESULTED, RELAYED TO AG MAYS WITH NEW ORDER FOR CONTACT 
ISOLATION,NOTED AND CARRIED OUT. AG MAYS TO PUT IN BACTROBAN ORDER.

## 2020-03-05 VITALS — SYSTOLIC BLOOD PRESSURE: 141 MMHG | DIASTOLIC BLOOD PRESSURE: 61 MMHG

## 2020-03-05 VITALS — DIASTOLIC BLOOD PRESSURE: 64 MMHG | SYSTOLIC BLOOD PRESSURE: 138 MMHG

## 2020-03-05 VITALS — SYSTOLIC BLOOD PRESSURE: 119 MMHG | DIASTOLIC BLOOD PRESSURE: 54 MMHG

## 2020-03-05 LAB
BASOPHILS # BLD AUTO: 0.1 /CMM (ref 0–0.2)
BASOPHILS NFR BLD AUTO: 0.6 % (ref 0–2)
BUN SERPL-MCNC: 17 MG/DL (ref 7–18)
CALCIUM SERPL-MCNC: 9.3 MG/DL (ref 8.5–10.1)
CHLORIDE SERPL-SCNC: 100 MMOL/L (ref 98–107)
CO2 SERPL-SCNC: 28 MMOL/L (ref 21–32)
CREAT SERPL-MCNC: 0.7 MG/DL (ref 0.6–1.3)
EOSINOPHIL NFR BLD AUTO: 0.6 % (ref 0–6)
GLUCOSE SERPL-MCNC: 208 MG/DL (ref 74–106)
HCT VFR BLD AUTO: 31 % (ref 33–45)
HGB BLD-MCNC: 10.2 G/DL (ref 11.5–14.8)
LYMPHOCYTES NFR BLD AUTO: 2.2 /CMM (ref 0.8–4.8)
LYMPHOCYTES NFR BLD AUTO: 21.7 % (ref 20–44)
MAGNESIUM SERPL-MCNC: 2 MG/DL (ref 1.8–2.4)
MCHC RBC AUTO-ENTMCNC: 33 G/DL (ref 31–36)
MCV RBC AUTO: 85 FL (ref 82–100)
MONOCYTES NFR BLD AUTO: 0.8 /CMM (ref 0.1–1.3)
MONOCYTES NFR BLD AUTO: 8.3 % (ref 2–12)
NEUTROPHILS # BLD AUTO: 7 /CMM (ref 1.8–8.9)
NEUTROPHILS NFR BLD AUTO: 68.8 % (ref 43–81)
PHOSPHATE SERPL-MCNC: 3.6 MG/DL (ref 2.5–4.9)
PLATELET # BLD AUTO: 351 /CMM (ref 150–450)
POTASSIUM SERPL-SCNC: 4.2 MMOL/L (ref 3.5–5.1)
RBC # BLD AUTO: 3.66 MIL/UL (ref 4–5.2)
SODIUM SERPL-SCNC: 136 MMOL/L (ref 136–145)
WBC NRBC COR # BLD AUTO: 10.1 K/UL (ref 4.3–11)

## 2020-03-05 RX ADMIN — ASPIRIN 81 MG SCH MG: 81 TABLET ORAL at 08:39

## 2020-03-05 RX ADMIN — HYDROMORPHONE HYDROCHLORIDE PRN MG: 2 TABLET ORAL at 13:53

## 2020-03-05 RX ADMIN — INSULIN HUMAN PRN UNIT: 100 INJECTION, SOLUTION PARENTERAL at 06:50

## 2020-03-05 RX ADMIN — Medication SCH MG: at 12:59

## 2020-03-05 RX ADMIN — Medication PRN MG: at 23:41

## 2020-03-05 RX ADMIN — BACLOFEN SCH MG: 10 TABLET ORAL at 08:39

## 2020-03-05 RX ADMIN — INSULIN HUMAN PRN UNIT: 100 INJECTION, SOLUTION PARENTERAL at 21:21

## 2020-03-05 RX ADMIN — Medication SCH MG: at 08:39

## 2020-03-05 RX ADMIN — CLOPIDOGREL BISULFATE SCH MG: 75 TABLET, FILM COATED ORAL at 08:50

## 2020-03-05 RX ADMIN — ISOSORBIDE DINITRATE SCH MG: 20 TABLET ORAL at 17:00

## 2020-03-05 RX ADMIN — METFORMIN HYDROCHLORIDE SCH MG: 500 TABLET, EXTENDED RELEASE ORAL at 08:45

## 2020-03-05 RX ADMIN — ACETAMINOPHEN PRN MG: 325 TABLET ORAL at 00:08

## 2020-03-05 RX ADMIN — HYDROMORPHONE HYDROCHLORIDE PRN MG: 2 TABLET ORAL at 21:08

## 2020-03-05 RX ADMIN — PAROXETINE HYDROCHLORIDE SCH MG: 10 TABLET, FILM COATED ORAL at 08:40

## 2020-03-05 RX ADMIN — LOSARTAN POTASSIUM SCH MG: 50 TABLET, FILM COATED ORAL at 08:41

## 2020-03-05 RX ADMIN — Medication SCH MG: at 17:56

## 2020-03-05 RX ADMIN — LEVOFLOXACIN SCH MLS/HR: 500 INJECTION, SOLUTION INTRAVENOUS at 01:23

## 2020-03-05 RX ADMIN — Medication PRN MG: at 09:09

## 2020-03-05 RX ADMIN — LABETALOL HCL SCH MG: 100 TABLET, FILM COATED ORAL at 08:44

## 2020-03-05 RX ADMIN — BACLOFEN SCH MG: 10 TABLET ORAL at 12:59

## 2020-03-05 RX ADMIN — ALBUTEROL SULFATE PRN MG: 2.5 SOLUTION RESPIRATORY (INHALATION) at 23:41

## 2020-03-05 RX ADMIN — Medication SCH EACH: at 21:10

## 2020-03-05 RX ADMIN — ALBUTEROL SULFATE PRN MG: 2.5 SOLUTION RESPIRATORY (INHALATION) at 20:12

## 2020-03-05 RX ADMIN — CLOTRIMAZOLE SCH APPLIC: 1 CREAM TOPICAL at 18:07

## 2020-03-05 RX ADMIN — PREGABALIN SCH MG: 25 CAPSULE ORAL at 21:05

## 2020-03-05 RX ADMIN — ALBUTEROL SULFATE PRN MG: 2.5 SOLUTION RESPIRATORY (INHALATION) at 14:39

## 2020-03-05 RX ADMIN — PREGABALIN SCH MG: 25 CAPSULE ORAL at 05:05

## 2020-03-05 RX ADMIN — ACETAMINOPHEN PRN MG: 325 TABLET ORAL at 17:56

## 2020-03-05 RX ADMIN — INSULIN HUMAN PRN UNIT: 100 INJECTION, SOLUTION PARENTERAL at 18:05

## 2020-03-05 RX ADMIN — LABETALOL HCL SCH MG: 100 TABLET, FILM COATED ORAL at 12:59

## 2020-03-05 RX ADMIN — Medication PRN MG: at 00:09

## 2020-03-05 RX ADMIN — SPIRONOLACTONE SCH MG: 25 TABLET, FILM COATED ORAL at 08:39

## 2020-03-05 RX ADMIN — CLOTRIMAZOLE SCH APPLIC: 1 CREAM TOPICAL at 08:47

## 2020-03-05 RX ADMIN — EZETIMIBE SCH MG: 10 TABLET ORAL at 08:40

## 2020-03-05 RX ADMIN — Medication SCH MG: at 08:46

## 2020-03-05 RX ADMIN — MUPIROCIN SCH APPLIC: 20 OINTMENT TOPICAL at 08:48

## 2020-03-05 RX ADMIN — MUPIROCIN SCH APPLIC: 20 OINTMENT TOPICAL at 21:10

## 2020-03-05 RX ADMIN — LABETALOL HCL SCH MG: 100 TABLET, FILM COATED ORAL at 17:57

## 2020-03-05 RX ADMIN — Medication SCH EACH: at 06:48

## 2020-03-05 RX ADMIN — Medication SCH MG: at 17:00

## 2020-03-05 RX ADMIN — Medication SCH EACH: at 12:49

## 2020-03-05 RX ADMIN — BACLOFEN SCH MG: 10 TABLET ORAL at 17:56

## 2020-03-05 RX ADMIN — LIDOCAINE SCH EA: 50 PATCH CUTANEOUS at 13:53

## 2020-03-05 RX ADMIN — Medication PRN MG: at 14:40

## 2020-03-05 RX ADMIN — PANTOPRAZOLE SODIUM SCH MG: 40 TABLET, DELAYED RELEASE ORAL at 07:31

## 2020-03-05 RX ADMIN — ALBUTEROL SULFATE PRN MG: 2.5 SOLUTION RESPIRATORY (INHALATION) at 00:09

## 2020-03-05 RX ADMIN — HYDROMORPHONE HYDROCHLORIDE PRN MG: 2 TABLET ORAL at 03:24

## 2020-03-05 RX ADMIN — FLUTICASONE FUROATE AND VILANTEROL TRIFENATATE SCH EACH: 100; 25 POWDER RESPIRATORY (INHALATION) at 08:45

## 2020-03-05 RX ADMIN — INSULIN HUMAN PRN UNIT: 100 INJECTION, SOLUTION PARENTERAL at 12:50

## 2020-03-05 RX ADMIN — Medication SCH EACH: at 17:59

## 2020-03-05 RX ADMIN — PREGABALIN SCH MG: 25 CAPSULE ORAL at 12:59

## 2020-03-05 RX ADMIN — ATORVASTATIN CALCIUM SCH MG: 40 TABLET, FILM COATED ORAL at 21:05

## 2020-03-05 RX ADMIN — Medication PRN MG: at 20:12

## 2020-03-05 RX ADMIN — ALBUTEROL SULFATE PRN MG: 2.5 SOLUTION RESPIRATORY (INHALATION) at 09:09

## 2020-03-05 RX ADMIN — ISOSORBIDE DINITRATE SCH MG: 20 TABLET ORAL at 08:46

## 2020-03-05 RX ADMIN — POLYETHYLENE GLYCOL 3350 SCH GM: 17 POWDER, FOR SOLUTION ORAL at 21:05

## 2020-03-05 NOTE — NUR
MS RN CLOSING NOTES



PATIENT IN BED, ALERT AND ORIENTED X 3. VERBALLY RESPONSIVE AND ABLE TO FOLLOW DIRECTIONS. 
AFEBRILE WITH NO S/S OF DISTRESS NOTED. IV LINES PATENT AND FLUSHING WELL. NO COMPLAINTS OF 
PAIN/DISCOMFORT AS OF NOW. BED LOW AND LOCKED ON SEMI FOWLERS POSITION. CALL LIGHT IN REACH. 
WILL ENDORSE TO MORNING SHIFT FOR NAHUN.

## 2020-03-05 NOTE — NUR
MS RN OPENING NOTE 



RECEIVED PATIENT ON CHAIR. PATIENT A/OX4. CURRENTLY TOLERATING ROOM AIR AT THIS TIME. 
RESPIRATIONS ARE EVEN AND UNLABORED. NO S/S SOB NOTED. STATES SHE IS IN PAIN INFORMED HER I 
WILL CHECK WHEN HER PAIN MEDICATION IS DUE AND BRING AS SOON AS AVAILABLE. IN NO APPARENT 
DISTRESS. IV ACCESS IN LFA#20 PATENT AND SALINE LOCKED, AND RAC #20 PATENT AND SALINE 
LOCKED. BED IS LOW AND LOCKED, HOB ELEVATED IN HIGH FOWLERS, SIDE RIALS UP X2. INFORMED 
PATIENT TO INFORM ME WHEN SHE WOULD LIKE ME TO HELP HER INTO BED. CALL LIGHT WITHIN REACH. 
WILL CONTINUE TO MONITOR.

## 2020-03-05 NOTE — NUR
MS RN NOTES

PATIENT SEEN AND EVALUATED BY KEELEY FLOWER MD AWARE OF CTA RESULTS.

-------------------------------------------------------------------------------

Addendum: 03/05/20 at 1903 by REAGAN ALCANTAR RN

-------------------------------------------------------------------------------

ADDENDUM 

PER KEELEY FLOWER NOTHING NEEDS TO BE DONE AT THIS TIME, PATIENT NEEDS TO FOLLOW WITH DR MCCANN POST DISCHARGE IN 1 WEEK, MD PROVIDED OFFICE AND PHONE NUMBER INFORMATION TO THE 
PATIENT . NOTIFIED NP JEREMIAS MAYS, NO NEW ORDERS MADE AT THIS TIME.

## 2020-03-05 NOTE — NUR
MS RN NOTES

PATIENT IN BED ALERT ORIENTED X3. NO ACUTE DISTRESS NOTED, BREATHING UNLABORED. NO SOB 
NOTED. IV ACCESS PATENT AND INTACT, NO REDNESS OR SWELLING NOTED. PATIENT REFUSED SOME 
MEDICATIONS TODAY DESPITE OF EXPLANATION OF RISKS AND BENEFITS. NEEDS ATTENDED AND 
ANTICIPATED. SAFETY MEASURES IN PLACE. CALL LIGHT WITHIN REACH. WILL ENDORSE TO NIGHT NURSE 
FOR CONTINUITY OF CARE.

## 2020-03-05 NOTE — NUR
MS RN NOTES

CTA HEAD AND NECK RESULTED, NOTIFIED NP JEREMIAS MAYS SAID RESULT FOR DR MCCANN ( VASCULAR 
SURGERY ), CALLED DR MCCANN OFFICE SPOKE WITH FRED MADE AWARE THAT CTA HEAD AND NECK 
RESULTED AND ASKING IF DR MCCANN COMING HERE TODAY SAID SHE WILL PAGE DR MCCANN. AWAITING 
FOR CALL BACK.

## 2020-03-05 NOTE — NUR
MS RN NOTE 



ADMINISTERED PRN DILAUDID 4MG FOR PAIN 8/10 FOR PAIN IN BACK AND HEADACHE. WILL CONTINUE TO 
MONITOR.

## 2020-03-05 NOTE — NUR
MS RN NOTES



BS 201mg/dl, 4UNITS REGULAR INSULIN GIVEN SQ. SNACKS PROVIDED ON BEDSIDE. WILL CONTINUE TO 
MONITOR.

## 2020-03-06 VITALS — SYSTOLIC BLOOD PRESSURE: 149 MMHG | DIASTOLIC BLOOD PRESSURE: 71 MMHG

## 2020-03-06 LAB
BASOPHILS # BLD AUTO: 0.1 /CMM (ref 0–0.2)
BASOPHILS NFR BLD AUTO: 1.1 % (ref 0–2)
BUN SERPL-MCNC: 15 MG/DL (ref 7–18)
CALCIUM SERPL-MCNC: 9.1 MG/DL (ref 8.5–10.1)
CHLORIDE SERPL-SCNC: 100 MMOL/L (ref 98–107)
CO2 SERPL-SCNC: 28 MMOL/L (ref 21–32)
CREAT SERPL-MCNC: 0.7 MG/DL (ref 0.6–1.3)
EOSINOPHIL NFR BLD AUTO: 3.9 % (ref 0–6)
GLUCOSE SERPL-MCNC: 157 MG/DL (ref 74–106)
HCT VFR BLD AUTO: 33 % (ref 33–45)
HGB BLD-MCNC: 10.6 G/DL (ref 11.5–14.8)
LYMPHOCYTES NFR BLD AUTO: 3 /CMM (ref 0.8–4.8)
LYMPHOCYTES NFR BLD AUTO: 33.5 % (ref 20–44)
MAGNESIUM SERPL-MCNC: 1.9 MG/DL (ref 1.8–2.4)
MCHC RBC AUTO-ENTMCNC: 32 G/DL (ref 31–36)
MCV RBC AUTO: 85 FL (ref 82–100)
MONOCYTES NFR BLD AUTO: 0.9 /CMM (ref 0.1–1.3)
MONOCYTES NFR BLD AUTO: 9.7 % (ref 2–12)
NEUTROPHILS # BLD AUTO: 4.7 /CMM (ref 1.8–8.9)
NEUTROPHILS NFR BLD AUTO: 51.8 % (ref 43–81)
PHOSPHATE SERPL-MCNC: 3.8 MG/DL (ref 2.5–4.9)
PLATELET # BLD AUTO: 370 /CMM (ref 150–450)
POTASSIUM SERPL-SCNC: 4.2 MMOL/L (ref 3.5–5.1)
RBC # BLD AUTO: 3.87 MIL/UL (ref 4–5.2)
SODIUM SERPL-SCNC: 136 MMOL/L (ref 136–145)
WBC NRBC COR # BLD AUTO: 9 K/UL (ref 4.3–11)

## 2020-03-06 RX ADMIN — CLOPIDOGREL BISULFATE SCH MG: 75 TABLET, FILM COATED ORAL at 08:39

## 2020-03-06 RX ADMIN — FLUTICASONE FUROATE AND VILANTEROL TRIFENATATE SCH EACH: 100; 25 POWDER RESPIRATORY (INHALATION) at 09:08

## 2020-03-06 RX ADMIN — PAROXETINE HYDROCHLORIDE SCH MG: 10 TABLET, FILM COATED ORAL at 08:38

## 2020-03-06 RX ADMIN — Medication SCH MG: at 08:40

## 2020-03-06 RX ADMIN — Medication PRN MG: at 11:48

## 2020-03-06 RX ADMIN — CLOTRIMAZOLE SCH APPLIC: 1 CREAM TOPICAL at 09:00

## 2020-03-06 RX ADMIN — Medication SCH EACH: at 12:10

## 2020-03-06 RX ADMIN — METFORMIN HYDROCHLORIDE SCH MG: 500 TABLET, EXTENDED RELEASE ORAL at 09:00

## 2020-03-06 RX ADMIN — Medication SCH MG: at 16:19

## 2020-03-06 RX ADMIN — INSULIN HUMAN PRN UNIT: 100 INJECTION, SOLUTION PARENTERAL at 06:14

## 2020-03-06 RX ADMIN — PREGABALIN SCH MG: 25 CAPSULE ORAL at 13:21

## 2020-03-06 RX ADMIN — ACETAMINOPHEN PRN MG: 325 TABLET ORAL at 10:31

## 2020-03-06 RX ADMIN — LIDOCAINE SCH EA: 50 PATCH CUTANEOUS at 14:12

## 2020-03-06 RX ADMIN — BACLOFEN SCH MG: 10 TABLET ORAL at 08:41

## 2020-03-06 RX ADMIN — ISOSORBIDE DINITRATE SCH MG: 20 TABLET ORAL at 08:42

## 2020-03-06 RX ADMIN — SPIRONOLACTONE SCH MG: 25 TABLET, FILM COATED ORAL at 08:39

## 2020-03-06 RX ADMIN — HYDROMORPHONE HYDROCHLORIDE PRN MG: 2 TABLET ORAL at 11:41

## 2020-03-06 RX ADMIN — LABETALOL HCL SCH MG: 100 TABLET, FILM COATED ORAL at 08:41

## 2020-03-06 RX ADMIN — Medication SCH MG: at 08:41

## 2020-03-06 RX ADMIN — LEVOFLOXACIN SCH MLS/HR: 500 INJECTION, SOLUTION INTRAVENOUS at 01:17

## 2020-03-06 RX ADMIN — PREGABALIN SCH MG: 25 CAPSULE ORAL at 05:22

## 2020-03-06 RX ADMIN — INSULIN HUMAN PRN UNIT: 100 INJECTION, SOLUTION PARENTERAL at 12:26

## 2020-03-06 RX ADMIN — LOSARTAN POTASSIUM SCH MG: 50 TABLET, FILM COATED ORAL at 08:37

## 2020-03-06 RX ADMIN — Medication SCH MG: at 13:21

## 2020-03-06 RX ADMIN — LABETALOL HCL SCH MG: 100 TABLET, FILM COATED ORAL at 13:21

## 2020-03-06 RX ADMIN — BACLOFEN SCH MG: 10 TABLET ORAL at 16:20

## 2020-03-06 RX ADMIN — ALBUTEROL SULFATE PRN MG: 2.5 SOLUTION RESPIRATORY (INHALATION) at 11:48

## 2020-03-06 RX ADMIN — BACLOFEN SCH MG: 10 TABLET ORAL at 13:21

## 2020-03-06 RX ADMIN — ASPIRIN 81 MG SCH MG: 81 TABLET ORAL at 08:41

## 2020-03-06 RX ADMIN — ALBUTEROL SULFATE PRN MG: 2.5 SOLUTION RESPIRATORY (INHALATION) at 03:58

## 2020-03-06 RX ADMIN — LABETALOL HCL SCH MG: 100 TABLET, FILM COATED ORAL at 16:22

## 2020-03-06 RX ADMIN — MUPIROCIN SCH APPLIC: 20 OINTMENT TOPICAL at 09:00

## 2020-03-06 RX ADMIN — Medication SCH EACH: at 06:01

## 2020-03-06 RX ADMIN — EZETIMIBE SCH MG: 10 TABLET ORAL at 08:38

## 2020-03-06 RX ADMIN — Medication PRN MG: at 03:58

## 2020-03-06 RX ADMIN — HYDROMORPHONE HYDROCHLORIDE PRN MG: 2 TABLET ORAL at 06:15

## 2020-03-06 RX ADMIN — PANTOPRAZOLE SODIUM SCH MG: 40 TABLET, DELAYED RELEASE ORAL at 08:41

## 2020-03-06 NOTE — NUR
MS/RN NOTES

PATIENT IS ALERT AND ORIENTED X4. DENIES PAIN AT THIS TIME. IN ROOM AIR AND SATURATION AT 
99%. RESPIRATION REGULAR AND UNLABORED. THE PATIENT IN NO APPARENT DISTRESS. SEEN AND 
EXAMINED BY MD WITH ORDERS MADE AND CARRIED OUT. PATIENT DISCHARGED AT 1627. PATIENT WAS 
GIVEN DISCHARGED INSTRUCTIONS AND PATIENT VERBALIZED UNDERSTANDING. THE PATIENT LEFT THE 
HOSPITAL IN MEDICALLY STABLE CONDITION,  BY MARCOS THE  ON A PRIVATE CAR.

## 2020-03-06 NOTE — NUR
MS RN CLOSING NOTE 



PATIENT IN BED. PATIENT A/OX4. TOLERATING ROOM AIR. RESPIRATIONS ARE EVEN AND UNLABORED. NO 
SOB NOTED. MANAGED PAIN WITH DILUADID 4MG.NO DISTRESS NOTED. IV ACCESS MAINTAINED IN LFA#20 
PATENT AND SALINE LOCKED, AND RAC #20 PATENT AND SALINE LOCKED. BED REMAINS LOW AND LOCKED, 
HOB ELEVATED IN HIGH FOWLERS, SIDE RIALS UP X2. CALL LIGHT WITHIN REACH. WILL ENDORSE TO 
NEXT SHIFT

## 2020-03-06 NOTE — NUR
MS/RN NOTES

PATIENT REFUSED FOR ISOSORBIDE 20MG,PATIENT VERBALIZED THAT MD IS DISCONTINUE THIS 
MEDICATION ALREADY. METFORMIN 1000MG, PATIENT VERBALIZED THAT DUE TO CT SCAN THAT WAS DONE 
TO HER THE RADIOLOGY TECHNICIAN TOLD HER TO HOLD THE METFORMIN FOR 2 DAYS. EXPLAINED THE 
RISK AND BENEFITS. WILL CONTINUE TO MONITOR.

## 2020-03-06 NOTE — NUR
MS/RN OPENING NOTES



PATIENT IN BED. PATIENT A/OX4. TOLERATING ROOM AIR. RESPIRATIONS ARE EVEN AND UNLABORED. NO 
SOB NOTED.NO DISTRESS NOTED. IV ACCESS MAINTAINED IN LFA#20 PATENT AND SALINE LOCKED, AND 
RAC #20 PATENT AND SALINE LOCKED. BED REMAINS LOW AND LOCKED, HOB ELEVATED IN HIGH FOWLERS, 
SIDE RIALS UP X2. CALL LIGHT WITH  IN REACH. WILL CONTINUE TO MONITOR.

## 2020-03-17 ENCOUNTER — HOSPITAL ENCOUNTER (INPATIENT)
Dept: HOSPITAL 54 - ER | Age: 60
LOS: 3 days | Discharge: HOME | DRG: 720 | End: 2020-03-20
Attending: NURSE PRACTITIONER | Admitting: NURSE PRACTITIONER
Payer: MEDICAID

## 2020-03-17 VITALS — DIASTOLIC BLOOD PRESSURE: 66 MMHG | SYSTOLIC BLOOD PRESSURE: 162 MMHG

## 2020-03-17 VITALS — WEIGHT: 237.12 LBS | BODY MASS INDEX: 43.64 KG/M2 | HEIGHT: 62 IN

## 2020-03-17 DIAGNOSIS — G93.41: ICD-10-CM

## 2020-03-17 DIAGNOSIS — Z79.82: ICD-10-CM

## 2020-03-17 DIAGNOSIS — Z87.891: ICD-10-CM

## 2020-03-17 DIAGNOSIS — N31.9: ICD-10-CM

## 2020-03-17 DIAGNOSIS — D63.8: ICD-10-CM

## 2020-03-17 DIAGNOSIS — E11.9: ICD-10-CM

## 2020-03-17 DIAGNOSIS — I65.23: ICD-10-CM
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
## 2020-03-17 DIAGNOSIS — R53.2: ICD-10-CM

## 2020-03-17 DIAGNOSIS — Z87.440: ICD-10-CM

## 2020-03-17 DIAGNOSIS — Z79.51: ICD-10-CM

## 2020-03-17 DIAGNOSIS — Z79.84: ICD-10-CM

## 2020-03-17 DIAGNOSIS — E78.5: ICD-10-CM

## 2020-03-17 DIAGNOSIS — I11.0: ICD-10-CM

## 2020-03-17 DIAGNOSIS — J44.9: ICD-10-CM

## 2020-03-17 DIAGNOSIS — Z79.02: ICD-10-CM

## 2020-03-17 DIAGNOSIS — I27.20: ICD-10-CM

## 2020-03-17 DIAGNOSIS — Z79.01: ICD-10-CM

## 2020-03-17 DIAGNOSIS — L03.116: ICD-10-CM

## 2020-03-17 DIAGNOSIS — G89.4: ICD-10-CM

## 2020-03-17 DIAGNOSIS — T50.0X5A: ICD-10-CM

## 2020-03-17 DIAGNOSIS — E87.1: ICD-10-CM

## 2020-03-17 DIAGNOSIS — I69.354: ICD-10-CM

## 2020-03-17 DIAGNOSIS — L03.115: ICD-10-CM

## 2020-03-17 DIAGNOSIS — E87.5: ICD-10-CM
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
## 2020-03-17 DIAGNOSIS — T44.5X5A: ICD-10-CM

## 2020-03-17 DIAGNOSIS — I25.2: ICD-10-CM

## 2020-03-17 DIAGNOSIS — E66.01: ICD-10-CM

## 2020-03-17 DIAGNOSIS — Z88.1: ICD-10-CM

## 2020-03-17 DIAGNOSIS — J96.91: ICD-10-CM

## 2020-03-17 DIAGNOSIS — Z98.890: ICD-10-CM

## 2020-03-17 DIAGNOSIS — A41.9: Primary | ICD-10-CM

## 2020-03-17 DIAGNOSIS — Z79.899: ICD-10-CM

## 2020-03-17 DIAGNOSIS — I50.33: ICD-10-CM

## 2020-03-17 LAB
ALBUMIN SERPL BCP-MCNC: 3.7 G/DL (ref 3.4–5)
ALP SERPL-CCNC: 116 U/L (ref 46–116)
ALT SERPL W P-5'-P-CCNC: 44 U/L (ref 12–78)
AST SERPL W P-5'-P-CCNC: 22 U/L (ref 15–37)
BASE EXCESS BLDA CALC-SCNC: -2.2 MMOL/L
BASOPHILS # BLD AUTO: 0.1 /CMM (ref 0–0.2)
BASOPHILS NFR BLD AUTO: 0.3 % (ref 0–2)
BILIRUB DIRECT SERPL-MCNC: 0.2 MG/DL (ref 0–0.2)
BILIRUB SERPL-MCNC: 0.8 MG/DL (ref 0.2–1)
BUN SERPL-MCNC: 24 MG/DL (ref 7–18)
CALCIUM SERPL-MCNC: 9 MG/DL (ref 8.5–10.1)
CHLORIDE SERPL-SCNC: 92 MMOL/L (ref 98–107)
CO2 SERPL-SCNC: 27 MMOL/L (ref 21–32)
CREAT SERPL-MCNC: 1 MG/DL (ref 0.6–1.3)
DO-HGB MFR BLDA: 41.2 MMHG
EOSINOPHIL NFR BLD AUTO: 0.2 % (ref 0–6)
GLUCOSE SERPL-MCNC: 170 MG/DL (ref 74–106)
HCT VFR BLD AUTO: 33 % (ref 33–45)
HGB BLD-MCNC: 10.6 G/DL (ref 11.5–14.8)
INHALED O2 CONCENTRATION: 21 %
LYMPHOCYTES NFR BLD AUTO: 0.7 /CMM (ref 0.8–4.8)
LYMPHOCYTES NFR BLD AUTO: 3.9 % (ref 20–44)
MCHC RBC AUTO-ENTMCNC: 32 G/DL (ref 31–36)
MCV RBC AUTO: 84 FL (ref 82–100)
MONOCYTES NFR BLD AUTO: 0.2 /CMM (ref 0.1–1.3)
MONOCYTES NFR BLD AUTO: 1.1 % (ref 2–12)
NEUTROPHILS # BLD AUTO: 17.7 /CMM (ref 1.8–8.9)
NEUTROPHILS NFR BLD AUTO: 94.5 % (ref 43–81)
NT-PROBNP SERPL-MCNC: 491 PG/ML (ref 0–125)
PCO2 TEMP ADJ BLDA: 43.2 MMHG (ref 35–45)
PH TEMP ADJ BLDA: 7.35 [PH] (ref 7.35–7.45)
PH UR STRIP: 6.5 [PH] (ref 5–8)
PLATELET # BLD AUTO: 345 /CMM (ref 150–450)
PO2 TEMP ADJ BLDA: 56.8 MMHG (ref 75–100)
POTASSIUM SERPL-SCNC: 5.8 MMOL/L (ref 3.5–5.1)
PROT SERPL-MCNC: 6.9 G/DL (ref 6.4–8.2)
RBC # BLD AUTO: 3.86 MIL/UL (ref 4–5.2)
SAO2 % BLDA: 87.9 % (ref 92–98.5)
SODIUM SERPL-SCNC: 125 MMOL/L (ref 136–145)
UROBILINOGEN UR STRIP-MCNC: 0.2 EU/DL
WBC NRBC COR # BLD AUTO: 18.8 K/UL (ref 4.3–11)

## 2020-03-17 PROCEDURE — G0378 HOSPITAL OBSERVATION PER HR: HCPCS

## 2020-03-17 RX ADMIN — ATORVASTATIN CALCIUM SCH MG: 40 TABLET, FILM COATED ORAL at 22:58

## 2020-03-17 RX ADMIN — Medication SCH MG: at 19:30

## 2020-03-18 VITALS — SYSTOLIC BLOOD PRESSURE: 121 MMHG | DIASTOLIC BLOOD PRESSURE: 56 MMHG

## 2020-03-18 VITALS — DIASTOLIC BLOOD PRESSURE: 60 MMHG | SYSTOLIC BLOOD PRESSURE: 145 MMHG

## 2020-03-18 VITALS — SYSTOLIC BLOOD PRESSURE: 156 MMHG | DIASTOLIC BLOOD PRESSURE: 71 MMHG

## 2020-03-18 VITALS — DIASTOLIC BLOOD PRESSURE: 59 MMHG | SYSTOLIC BLOOD PRESSURE: 128 MMHG

## 2020-03-18 VITALS — SYSTOLIC BLOOD PRESSURE: 125 MMHG | DIASTOLIC BLOOD PRESSURE: 55 MMHG

## 2020-03-18 VITALS — DIASTOLIC BLOOD PRESSURE: 78 MMHG | SYSTOLIC BLOOD PRESSURE: 174 MMHG

## 2020-03-18 LAB
BASOPHILS # BLD AUTO: 0 /CMM (ref 0–0.2)
BASOPHILS NFR BLD AUTO: 0.5 % (ref 0–2)
BUN SERPL-MCNC: 20 MG/DL (ref 7–18)
CALCIUM SERPL-MCNC: 9.1 MG/DL (ref 8.5–10.1)
CHLORIDE SERPL-SCNC: 94 MMOL/L (ref 98–107)
CHOLEST SERPL-MCNC: 99 MG/DL (ref ?–200)
CO2 SERPL-SCNC: 26 MMOL/L (ref 21–32)
CREAT SERPL-MCNC: 0.7 MG/DL (ref 0.6–1.3)
EOSINOPHIL NFR BLD AUTO: 0.2 % (ref 0–6)
GLUCOSE SERPL-MCNC: 154 MG/DL (ref 74–106)
HCT VFR BLD AUTO: 29 % (ref 33–45)
HDLC SERPL-MCNC: 37 MG/DL (ref 40–60)
HGB BLD-MCNC: 9.4 G/DL (ref 11.5–14.8)
LDLC SERPL DIRECT ASSAY-MCNC: 54 MG/DL (ref 0–99)
LYMPHOCYTES NFR BLD AUTO: 1.3 /CMM (ref 0.8–4.8)
LYMPHOCYTES NFR BLD AUTO: 15.6 % (ref 20–44)
MAGNESIUM SERPL-MCNC: 2.1 MG/DL (ref 1.8–2.4)
MCHC RBC AUTO-ENTMCNC: 33 G/DL (ref 31–36)
MCV RBC AUTO: 83 FL (ref 82–100)
MONOCYTES NFR BLD AUTO: 0.7 /CMM (ref 0.1–1.3)
MONOCYTES NFR BLD AUTO: 8.2 % (ref 2–12)
NEUTROPHILS # BLD AUTO: 6.2 /CMM (ref 1.8–8.9)
NEUTROPHILS NFR BLD AUTO: 75.5 % (ref 43–81)
PHOSPHATE SERPL-MCNC: 3.8 MG/DL (ref 2.5–4.9)
PLATELET # BLD AUTO: 354 /CMM (ref 150–450)
POTASSIUM SERPL-SCNC: 5.1 MMOL/L (ref 3.5–5.1)
RBC # BLD AUTO: 3.42 MIL/UL (ref 4–5.2)
SODIUM SERPL-SCNC: 128 MMOL/L (ref 136–145)
TRIGL SERPL-MCNC: 75 MG/DL (ref 30–150)
TSH SERPL DL<=0.005 MIU/L-ACNC: 1.45 UIU/ML (ref 0.36–3.74)
WBC NRBC COR # BLD AUTO: 8.2 K/UL (ref 4.3–11)

## 2020-03-18 RX ADMIN — DEXTROSE MONOHYDRATE SCH MLS/HR: 50 INJECTION, SOLUTION INTRAVENOUS at 17:44

## 2020-03-18 RX ADMIN — Medication SCH MG: at 19:43

## 2020-03-18 RX ADMIN — BACLOFEN SCH MG: 10 TABLET ORAL at 17:41

## 2020-03-18 RX ADMIN — Medication SCH MG: at 14:36

## 2020-03-18 RX ADMIN — ALBUTEROL SULFATE SCH MG: 2.5 SOLUTION RESPIRATORY (INHALATION) at 11:06

## 2020-03-18 RX ADMIN — INSULIN HUMAN PRN UNIT: 100 INJECTION, SOLUTION PARENTERAL at 17:47

## 2020-03-18 RX ADMIN — HYDROMORPHONE HYDROCHLORIDE PRN MG: 2 TABLET ORAL at 14:35

## 2020-03-18 RX ADMIN — ALBUTEROL SULFATE SCH MG: 2.5 SOLUTION RESPIRATORY (INHALATION) at 19:43

## 2020-03-18 RX ADMIN — ACETAMINOPHEN PRN MG: 325 TABLET ORAL at 22:04

## 2020-03-18 RX ADMIN — Medication SCH EACH: at 21:43

## 2020-03-18 RX ADMIN — Medication SCH MG: at 17:00

## 2020-03-18 RX ADMIN — Medication SCH MG: at 17:41

## 2020-03-18 RX ADMIN — ASPIRIN 81 MG SCH MG: 81 TABLET ORAL at 08:23

## 2020-03-18 RX ADMIN — FLUTICASONE FUROATE AND VILANTEROL TRIFENATATE SCH EACH: 100; 25 POWDER RESPIRATORY (INHALATION) at 21:40

## 2020-03-18 RX ADMIN — PAROXETINE HYDROCHLORIDE SCH MG: 10 TABLET, FILM COATED ORAL at 12:32

## 2020-03-18 RX ADMIN — CLOPIDOGREL BISULFATE SCH MG: 75 TABLET, FILM COATED ORAL at 08:23

## 2020-03-18 RX ADMIN — FUROSEMIDE SCH MG: 10 INJECTION, SOLUTION INTRAMUSCULAR; INTRAVENOUS at 12:06

## 2020-03-18 RX ADMIN — FUROSEMIDE SCH MG: 10 INJECTION, SOLUTION INTRAMUSCULAR; INTRAVENOUS at 08:22

## 2020-03-18 RX ADMIN — HYDROMORPHONE HYDROCHLORIDE PRN MG: 2 TABLET ORAL at 02:07

## 2020-03-18 RX ADMIN — LABETALOL HCL SCH MG: 100 TABLET, FILM COATED ORAL at 17:41

## 2020-03-18 RX ADMIN — Medication SCH EACH: at 12:06

## 2020-03-18 RX ADMIN — Medication SCH MG: at 12:33

## 2020-03-18 RX ADMIN — Medication SCH MG: at 08:28

## 2020-03-18 RX ADMIN — Medication SCH MG: at 11:06

## 2020-03-18 RX ADMIN — FUROSEMIDE SCH MG: 10 INJECTION, SOLUTION INTRAMUSCULAR; INTRAVENOUS at 17:41

## 2020-03-18 RX ADMIN — ATORVASTATIN CALCIUM SCH MG: 40 TABLET, FILM COATED ORAL at 22:04

## 2020-03-18 RX ADMIN — EZETIMIBE SCH MG: 10 TABLET ORAL at 08:23

## 2020-03-18 RX ADMIN — LABETALOL HCL SCH MG: 100 TABLET, FILM COATED ORAL at 12:09

## 2020-03-18 RX ADMIN — ALBUTEROL SULFATE SCH MG: 2.5 SOLUTION RESPIRATORY (INHALATION) at 14:36

## 2020-03-18 RX ADMIN — ALBUTEROL SULFATE SCH MG: 2.5 SOLUTION RESPIRATORY (INHALATION) at 23:59

## 2020-03-18 RX ADMIN — HYDROMORPHONE HYDROCHLORIDE PRN MG: 2 TABLET ORAL at 08:23

## 2020-03-18 RX ADMIN — INSULIN HUMAN PRN UNIT: 100 INJECTION, SOLUTION PARENTERAL at 21:52

## 2020-03-18 RX ADMIN — Medication SCH MG: at 17:42

## 2020-03-18 RX ADMIN — Medication SCH MG: at 10:33

## 2020-03-18 RX ADMIN — HYDROMORPHONE HYDROCHLORIDE PRN MG: 2 TABLET ORAL at 19:40

## 2020-03-18 RX ADMIN — INSULIN HUMAN PRN UNIT: 100 INJECTION, SOLUTION PARENTERAL at 12:07

## 2020-03-18 RX ADMIN — HYDROMORPHONE HYDROCHLORIDE PRN MG: 2 TABLET ORAL at 23:57

## 2020-03-18 RX ADMIN — BACLOFEN SCH MG: 10 TABLET ORAL at 12:08

## 2020-03-18 RX ADMIN — Medication SCH EACH: at 17:42

## 2020-03-18 RX ADMIN — Medication PRN OZ: at 22:29

## 2020-03-18 RX ADMIN — Medication SCH MG: at 12:30

## 2020-03-18 RX ADMIN — ISOSORBIDE DINITRATE SCH MG: 20 TABLET ORAL at 08:22

## 2020-03-18 RX ADMIN — LABETALOL HCL SCH MG: 100 TABLET, FILM COATED ORAL at 08:26

## 2020-03-18 RX ADMIN — Medication SCH MG: at 01:09

## 2020-03-18 RX ADMIN — FLUTICASONE FUROATE AND VILANTEROL TRIFENATATE SCH EACH: 100; 25 POWDER RESPIRATORY (INHALATION) at 12:06

## 2020-03-18 RX ADMIN — Medication SCH MG: at 23:59

## 2020-03-18 RX ADMIN — ISOSORBIDE DINITRATE SCH MG: 20 TABLET ORAL at 08:37

## 2020-03-18 RX ADMIN — PANTOPRAZOLE SODIUM SCH MG: 40 TABLET, DELAYED RELEASE ORAL at 08:22

## 2020-03-19 VITALS — DIASTOLIC BLOOD PRESSURE: 58 MMHG | SYSTOLIC BLOOD PRESSURE: 160 MMHG

## 2020-03-19 VITALS — DIASTOLIC BLOOD PRESSURE: 50 MMHG | SYSTOLIC BLOOD PRESSURE: 140 MMHG

## 2020-03-19 VITALS — SYSTOLIC BLOOD PRESSURE: 163 MMHG | DIASTOLIC BLOOD PRESSURE: 70 MMHG

## 2020-03-19 VITALS — DIASTOLIC BLOOD PRESSURE: 45 MMHG | SYSTOLIC BLOOD PRESSURE: 118 MMHG

## 2020-03-19 VITALS — DIASTOLIC BLOOD PRESSURE: 61 MMHG | SYSTOLIC BLOOD PRESSURE: 141 MMHG

## 2020-03-19 LAB
BASOPHILS # BLD AUTO: 0 /CMM (ref 0–0.2)
BASOPHILS NFR BLD AUTO: 0.6 % (ref 0–2)
BUN SERPL-MCNC: 14 MG/DL (ref 7–18)
CALCIUM SERPL-MCNC: 9 MG/DL (ref 8.5–10.1)
CHLORIDE SERPL-SCNC: 95 MMOL/L (ref 98–107)
CO2 SERPL-SCNC: 33 MMOL/L (ref 21–32)
CREAT SERPL-MCNC: 0.8 MG/DL (ref 0.6–1.3)
EOSINOPHIL NFR BLD AUTO: 0.8 % (ref 0–6)
GLUCOSE SERPL-MCNC: 134 MG/DL (ref 74–106)
HCT VFR BLD AUTO: 29 % (ref 33–45)
HGB BLD-MCNC: 9.8 G/DL (ref 11.5–14.8)
LYMPHOCYTES NFR BLD AUTO: 2.3 /CMM (ref 0.8–4.8)
LYMPHOCYTES NFR BLD AUTO: 30 % (ref 20–44)
MCHC RBC AUTO-ENTMCNC: 33 G/DL (ref 31–36)
MCV RBC AUTO: 83 FL (ref 82–100)
MONOCYTES NFR BLD AUTO: 1 /CMM (ref 0.1–1.3)
MONOCYTES NFR BLD AUTO: 12.9 % (ref 2–12)
NEUTROPHILS # BLD AUTO: 4.3 /CMM (ref 1.8–8.9)
NEUTROPHILS NFR BLD AUTO: 55.7 % (ref 43–81)
PLATELET # BLD AUTO: 345 /CMM (ref 150–450)
POTASSIUM SERPL-SCNC: 3.8 MMOL/L (ref 3.5–5.1)
RBC # BLD AUTO: 3.51 MIL/UL (ref 4–5.2)
SODIUM SERPL-SCNC: 133 MMOL/L (ref 136–145)
WBC NRBC COR # BLD AUTO: 7.7 K/UL (ref 4.3–11)

## 2020-03-19 RX ADMIN — Medication SCH MG: at 09:22

## 2020-03-19 RX ADMIN — HYDROMORPHONE HYDROCHLORIDE PRN MG: 2 TABLET ORAL at 06:00

## 2020-03-19 RX ADMIN — LABETALOL HCL SCH MG: 100 TABLET, FILM COATED ORAL at 09:21

## 2020-03-19 RX ADMIN — Medication SCH EACH: at 17:37

## 2020-03-19 RX ADMIN — DEXTROSE MONOHYDRATE SCH MLS/HR: 50 INJECTION, SOLUTION INTRAVENOUS at 17:42

## 2020-03-19 RX ADMIN — Medication SCH MG: at 17:35

## 2020-03-19 RX ADMIN — Medication SCH EACH: at 21:24

## 2020-03-19 RX ADMIN — LABETALOL HCL SCH MG: 100 TABLET, FILM COATED ORAL at 17:36

## 2020-03-19 RX ADMIN — Medication PRN OZ: at 09:41

## 2020-03-19 RX ADMIN — EZETIMIBE SCH MG: 10 TABLET ORAL at 09:21

## 2020-03-19 RX ADMIN — Medication SCH MG: at 17:37

## 2020-03-19 RX ADMIN — BACLOFEN SCH MG: 10 TABLET ORAL at 09:20

## 2020-03-19 RX ADMIN — ALBUTEROL SULFATE SCH MG: 2.5 SOLUTION RESPIRATORY (INHALATION) at 03:41

## 2020-03-19 RX ADMIN — FUROSEMIDE SCH MG: 20 TABLET ORAL at 15:10

## 2020-03-19 RX ADMIN — ALBUTEROL SULFATE SCH MG: 2.5 SOLUTION RESPIRATORY (INHALATION) at 11:01

## 2020-03-19 RX ADMIN — FLUTICASONE FUROATE AND VILANTEROL TRIFENATATE SCH EACH: 100; 25 POWDER RESPIRATORY (INHALATION) at 09:39

## 2020-03-19 RX ADMIN — PAROXETINE HYDROCHLORIDE SCH MG: 10 TABLET, FILM COATED ORAL at 09:20

## 2020-03-19 RX ADMIN — Medication SCH MG: at 14:41

## 2020-03-19 RX ADMIN — Medication SCH MG: at 11:01

## 2020-03-19 RX ADMIN — CLOPIDOGREL BISULFATE SCH MG: 75 TABLET, FILM COATED ORAL at 09:21

## 2020-03-19 RX ADMIN — FLUTICASONE FUROATE AND VILANTEROL TRIFENATATE SCH EACH: 100; 25 POWDER RESPIRATORY (INHALATION) at 21:24

## 2020-03-19 RX ADMIN — VALSARTAN SCH MG: 80 TABLET ORAL at 10:45

## 2020-03-19 RX ADMIN — Medication SCH MG: at 03:41

## 2020-03-19 RX ADMIN — BACLOFEN SCH MG: 10 TABLET ORAL at 12:19

## 2020-03-19 RX ADMIN — ASPIRIN 81 MG SCH MG: 81 TABLET ORAL at 09:21

## 2020-03-19 RX ADMIN — ALBUTEROL SULFATE SCH MG: 2.5 SOLUTION RESPIRATORY (INHALATION) at 07:22

## 2020-03-19 RX ADMIN — PANTOPRAZOLE SODIUM SCH MG: 40 TABLET, DELAYED RELEASE ORAL at 09:21

## 2020-03-19 RX ADMIN — Medication SCH MG: at 23:24

## 2020-03-19 RX ADMIN — DEXTROSE MONOHYDRATE SCH MLS/HR: 50 INJECTION, SOLUTION INTRAVENOUS at 07:24

## 2020-03-19 RX ADMIN — PREGABALIN SCH MG: 25 CAPSULE ORAL at 17:35

## 2020-03-19 RX ADMIN — Medication SCH EACH: at 08:29

## 2020-03-19 RX ADMIN — Medication SCH MG: at 09:20

## 2020-03-19 RX ADMIN — HYDROMORPHONE HYDROCHLORIDE PRN MG: 2 TABLET ORAL at 21:29

## 2020-03-19 RX ADMIN — HYDROMORPHONE HYDROCHLORIDE PRN MG: 2 TABLET ORAL at 15:11

## 2020-03-19 RX ADMIN — ALBUTEROL SULFATE SCH MG: 2.5 SOLUTION RESPIRATORY (INHALATION) at 14:41

## 2020-03-19 RX ADMIN — BACLOFEN SCH MG: 10 TABLET ORAL at 17:36

## 2020-03-19 RX ADMIN — Medication SCH EACH: at 12:19

## 2020-03-19 RX ADMIN — HYDROMORPHONE HYDROCHLORIDE PRN MG: 2 TABLET ORAL at 10:34

## 2020-03-19 RX ADMIN — Medication SCH MG: at 07:22

## 2020-03-19 RX ADMIN — INSULIN HUMAN PRN UNIT: 100 INJECTION, SOLUTION PARENTERAL at 17:42

## 2020-03-19 RX ADMIN — FUROSEMIDE SCH MG: 10 INJECTION, SOLUTION INTRAMUSCULAR; INTRAVENOUS at 09:19

## 2020-03-19 RX ADMIN — FUROSEMIDE SCH MG: 20 TABLET ORAL at 17:36

## 2020-03-19 RX ADMIN — Medication SCH MG: at 19:59

## 2020-03-19 RX ADMIN — ALBUTEROL SULFATE SCH MG: 2.5 SOLUTION RESPIRATORY (INHALATION) at 19:59

## 2020-03-19 RX ADMIN — PREGABALIN SCH MG: 25 CAPSULE ORAL at 12:27

## 2020-03-19 RX ADMIN — LABETALOL HCL SCH MG: 100 TABLET, FILM COATED ORAL at 12:19

## 2020-03-19 RX ADMIN — INSULIN HUMAN PRN UNIT: 100 INJECTION, SOLUTION PARENTERAL at 12:28

## 2020-03-19 RX ADMIN — ATORVASTATIN CALCIUM SCH MG: 40 TABLET, FILM COATED ORAL at 21:29

## 2020-03-19 RX ADMIN — ALBUTEROL SULFATE SCH MG: 2.5 SOLUTION RESPIRATORY (INHALATION) at 23:24

## 2020-03-19 RX ADMIN — INSULIN HUMAN PRN UNIT: 100 INJECTION, SOLUTION PARENTERAL at 21:40

## 2020-03-20 VITALS — SYSTOLIC BLOOD PRESSURE: 140 MMHG | DIASTOLIC BLOOD PRESSURE: 70 MMHG

## 2020-03-20 VITALS — SYSTOLIC BLOOD PRESSURE: 129 MMHG | DIASTOLIC BLOOD PRESSURE: 52 MMHG

## 2020-03-20 LAB
BASOPHILS # BLD AUTO: 0.1 /CMM (ref 0–0.2)
BASOPHILS NFR BLD AUTO: 1 % (ref 0–2)
BUN SERPL-MCNC: 13 MG/DL (ref 7–18)
CALCIUM SERPL-MCNC: 8.9 MG/DL (ref 8.5–10.1)
CHLORIDE SERPL-SCNC: 94 MMOL/L (ref 98–107)
CO2 SERPL-SCNC: 34 MMOL/L (ref 21–32)
CREAT SERPL-MCNC: 0.8 MG/DL (ref 0.6–1.3)
EOSINOPHIL NFR BLD AUTO: 1.6 % (ref 0–6)
GLUCOSE SERPL-MCNC: 226 MG/DL (ref 74–106)
HCT VFR BLD AUTO: 30 % (ref 33–45)
HGB BLD-MCNC: 10.1 G/DL (ref 11.5–14.8)
LYMPHOCYTES NFR BLD AUTO: 2.4 /CMM (ref 0.8–4.8)
LYMPHOCYTES NFR BLD AUTO: 26.4 % (ref 20–44)
MCHC RBC AUTO-ENTMCNC: 33 G/DL (ref 31–36)
MCV RBC AUTO: 84 FL (ref 82–100)
MONOCYTES NFR BLD AUTO: 1.1 /CMM (ref 0.1–1.3)
MONOCYTES NFR BLD AUTO: 11.7 % (ref 2–12)
NEUTROPHILS # BLD AUTO: 5.5 /CMM (ref 1.8–8.9)
NEUTROPHILS NFR BLD AUTO: 59.3 % (ref 43–81)
PLATELET # BLD AUTO: 352 /CMM (ref 150–450)
POTASSIUM SERPL-SCNC: 3.8 MMOL/L (ref 3.5–5.1)
RBC # BLD AUTO: 3.63 MIL/UL (ref 4–5.2)
SODIUM SERPL-SCNC: 133 MMOL/L (ref 136–145)
WBC NRBC COR # BLD AUTO: 9.2 K/UL (ref 4.3–11)

## 2020-03-20 RX ADMIN — Medication SCH MG: at 08:35

## 2020-03-20 RX ADMIN — EZETIMIBE SCH MG: 10 TABLET ORAL at 08:32

## 2020-03-20 RX ADMIN — FLUTICASONE FUROATE AND VILANTEROL TRIFENATATE SCH EACH: 100; 25 POWDER RESPIRATORY (INHALATION) at 08:31

## 2020-03-20 RX ADMIN — Medication SCH MG: at 08:33

## 2020-03-20 RX ADMIN — LABETALOL HCL SCH MG: 100 TABLET, FILM COATED ORAL at 08:34

## 2020-03-20 RX ADMIN — PREGABALIN SCH MG: 25 CAPSULE ORAL at 08:33

## 2020-03-20 RX ADMIN — FUROSEMIDE SCH MG: 20 TABLET ORAL at 08:35

## 2020-03-20 RX ADMIN — PANTOPRAZOLE SODIUM SCH MG: 40 TABLET, DELAYED RELEASE ORAL at 08:35

## 2020-03-20 RX ADMIN — DEXTROSE MONOHYDRATE SCH MLS/HR: 50 INJECTION, SOLUTION INTRAVENOUS at 04:54

## 2020-03-20 RX ADMIN — INSULIN HUMAN PRN UNIT: 100 INJECTION, SOLUTION PARENTERAL at 06:36

## 2020-03-20 RX ADMIN — PAROXETINE HYDROCHLORIDE SCH MG: 10 TABLET, FILM COATED ORAL at 08:32

## 2020-03-20 RX ADMIN — BACLOFEN SCH MG: 10 TABLET ORAL at 08:35

## 2020-03-20 RX ADMIN — Medication SCH EACH: at 12:31

## 2020-03-20 RX ADMIN — Medication SCH MG: at 08:32

## 2020-03-20 RX ADMIN — Medication SCH MG: at 03:38

## 2020-03-20 RX ADMIN — HYDROMORPHONE HYDROCHLORIDE PRN MG: 2 TABLET ORAL at 09:48

## 2020-03-20 RX ADMIN — ALBUTEROL SULFATE SCH MG: 2.5 SOLUTION RESPIRATORY (INHALATION) at 03:38

## 2020-03-20 RX ADMIN — VALSARTAN SCH MG: 80 TABLET ORAL at 08:34

## 2020-03-20 RX ADMIN — CLOPIDOGREL BISULFATE SCH MG: 75 TABLET, FILM COATED ORAL at 08:31

## 2020-03-20 RX ADMIN — ASPIRIN 81 MG SCH MG: 81 TABLET ORAL at 08:32

## 2020-03-20 RX ADMIN — INSULIN HUMAN PRN UNIT: 100 INJECTION, SOLUTION PARENTERAL at 12:32

## 2020-03-20 RX ADMIN — ALBUTEROL SULFATE SCH MG: 2.5 SOLUTION RESPIRATORY (INHALATION) at 15:04

## 2020-03-20 RX ADMIN — ACETAMINOPHEN PRN MG: 325 TABLET ORAL at 04:55

## 2020-03-20 RX ADMIN — Medication SCH MG: at 08:34

## 2020-03-20 RX ADMIN — HYDROMORPHONE HYDROCHLORIDE PRN MG: 2 TABLET ORAL at 03:42

## 2020-03-20 RX ADMIN — Medication SCH EACH: at 06:33

## 2020-03-20 RX ADMIN — ACETAMINOPHEN PRN MG: 325 TABLET ORAL at 11:12

## 2020-03-20 RX ADMIN — ALBUTEROL SULFATE SCH MG: 2.5 SOLUTION RESPIRATORY (INHALATION) at 08:34

## 2020-03-20 RX ADMIN — Medication SCH MG: at 15:04

## 2020-03-20 RX ADMIN — LABETALOL HCL SCH MG: 100 TABLET, FILM COATED ORAL at 13:58

## 2020-03-29 ENCOUNTER — HOSPITAL ENCOUNTER (INPATIENT)
Dept: HOSPITAL 54 - ER | Age: 60
LOS: 2 days | Discharge: HOME | DRG: 469 | End: 2020-03-31
Attending: INTERNAL MEDICINE | Admitting: NURSE PRACTITIONER
Payer: MEDICAID

## 2020-03-29 VITALS — SYSTOLIC BLOOD PRESSURE: 101 MMHG | DIASTOLIC BLOOD PRESSURE: 51 MMHG

## 2020-03-29 VITALS — WEIGHT: 232 LBS | BODY MASS INDEX: 38.65 KG/M2 | HEIGHT: 65 IN

## 2020-03-29 VITALS — DIASTOLIC BLOOD PRESSURE: 70 MMHG | SYSTOLIC BLOOD PRESSURE: 178 MMHG

## 2020-03-29 VITALS — DIASTOLIC BLOOD PRESSURE: 67 MMHG | SYSTOLIC BLOOD PRESSURE: 187 MMHG

## 2020-03-29 VITALS — DIASTOLIC BLOOD PRESSURE: 57 MMHG | SYSTOLIC BLOOD PRESSURE: 134 MMHG

## 2020-03-29 VITALS — DIASTOLIC BLOOD PRESSURE: 80 MMHG | SYSTOLIC BLOOD PRESSURE: 139 MMHG

## 2020-03-29 DIAGNOSIS — M21.372: ICD-10-CM

## 2020-03-29 DIAGNOSIS — Z79.84: ICD-10-CM

## 2020-03-29 DIAGNOSIS — I87.2: ICD-10-CM

## 2020-03-29 DIAGNOSIS — G89.4: ICD-10-CM

## 2020-03-29 DIAGNOSIS — E78.5: ICD-10-CM

## 2020-03-29 DIAGNOSIS — E66.01: ICD-10-CM

## 2020-03-29 DIAGNOSIS — I11.0: ICD-10-CM

## 2020-03-29 DIAGNOSIS — I69.354: ICD-10-CM

## 2020-03-29 DIAGNOSIS — G93.41: ICD-10-CM

## 2020-03-29 DIAGNOSIS — N32.81: ICD-10-CM

## 2020-03-29 DIAGNOSIS — J45.909: ICD-10-CM

## 2020-03-29 DIAGNOSIS — F11.10: ICD-10-CM

## 2020-03-29 DIAGNOSIS — E11.40: ICD-10-CM

## 2020-03-29 DIAGNOSIS — I50.32: ICD-10-CM

## 2020-03-29 DIAGNOSIS — Z79.891: ICD-10-CM

## 2020-03-29 DIAGNOSIS — I27.20: ICD-10-CM

## 2020-03-29 DIAGNOSIS — N17.0: Primary | ICD-10-CM

## 2020-03-29 DIAGNOSIS — Z79.82: ICD-10-CM

## 2020-03-29 LAB
APPEARANCE UR: (no result)
BASE EXCESS BLDA CALC-SCNC: -3.7 MMOL/L
BASOPHILS # BLD AUTO: 0.1 /CMM (ref 0–0.2)
BASOPHILS NFR BLD AUTO: 0.9 % (ref 0–2)
BILIRUB UR QL STRIP: (no result)
BUN SERPL-MCNC: 22 MG/DL (ref 7–18)
CALCIUM SERPL-MCNC: 8.9 MG/DL (ref 8.5–10.1)
CHLORIDE SERPL-SCNC: 101 MMOL/L (ref 98–107)
CO2 SERPL-SCNC: 27 MMOL/L (ref 21–32)
COLOR UR: YELLOW
CREAT SERPL-MCNC: 2.2 MG/DL (ref 0.6–1.3)
DO-HGB MFR BLDA: 44.1 MMHG
EOSINOPHIL NFR BLD AUTO: 1.1 % (ref 0–6)
GLUCOSE SERPL-MCNC: 228 MG/DL (ref 74–106)
GLUCOSE UR STRIP-MCNC: NEGATIVE MG/DL
HCT VFR BLD AUTO: 31 % (ref 33–45)
HGB BLD-MCNC: 10.1 G/DL (ref 11.5–14.8)
HGB UR QL STRIP: NEGATIVE ERY/UL
INHALED O2 CONCENTRATION: 28 %
KETONES UR STRIP-MCNC: (no result) MG/DL
LEUKOCYTE ESTERASE UR QL STRIP: NEGATIVE
LYMPHOCYTES NFR BLD AUTO: 1.7 /CMM (ref 0.8–4.8)
LYMPHOCYTES NFR BLD AUTO: 13.7 % (ref 20–44)
MCHC RBC AUTO-ENTMCNC: 33 G/DL (ref 31–36)
MCV RBC AUTO: 86 FL (ref 82–100)
MONOCYTES NFR BLD AUTO: 0.9 /CMM (ref 0.1–1.3)
MONOCYTES NFR BLD AUTO: 7.3 % (ref 2–12)
NEUTROPHILS # BLD AUTO: 9.6 /CMM (ref 1.8–8.9)
NEUTROPHILS NFR BLD AUTO: 77 % (ref 43–81)
NITRITE UR QL STRIP: NEGATIVE
PCO2 TEMP ADJ BLDA: 40.6 MMHG (ref 35–45)
PH TEMP ADJ BLDA: 7.35 [PH] (ref 7.35–7.45)
PH UR STRIP: 5 [PH] (ref 5–8)
PLATELET # BLD AUTO: 352 /CMM (ref 150–450)
PO2 TEMP ADJ BLDA: 107.6 MMHG (ref 75–100)
POTASSIUM SERPL-SCNC: 4.7 MMOL/L (ref 3.5–5.1)
PROT UR QL STRIP: NEGATIVE MG/DL
RBC # BLD AUTO: 3.62 MIL/UL (ref 4–5.2)
RBC #/AREA URNS HPF: (no result) /HPF (ref 0–2)
SAO2 % BLDA: 96.7 % (ref 92–98.5)
SODIUM SERPL-SCNC: 137 MMOL/L (ref 136–145)
UROBILINOGEN UR STRIP-MCNC: 0.2 EU/DL
VENTILATION MODE VENT: (no result)
WBC #/AREA URNS HPF: (no result) /HPF (ref 0–3)
WBC NRBC COR # BLD AUTO: 12.5 K/UL (ref 4.3–11)

## 2020-03-29 PROCEDURE — G0378 HOSPITAL OBSERVATION PER HR: HCPCS

## 2020-03-29 RX ADMIN — SODIUM CHLORIDE PRN MLS/HR: 9 INJECTION, SOLUTION INTRAVENOUS at 11:08

## 2020-03-29 RX ADMIN — LIDOCAINE SCH EA: 50 PATCH CUTANEOUS at 10:00

## 2020-03-29 RX ADMIN — PREGABALIN SCH MG: 25 CAPSULE ORAL at 12:35

## 2020-03-29 RX ADMIN — Medication SCH EACH: at 17:33

## 2020-03-29 RX ADMIN — BACLOFEN SCH MG: 10 TABLET ORAL at 12:35

## 2020-03-29 RX ADMIN — PREGABALIN SCH MG: 25 CAPSULE ORAL at 10:00

## 2020-03-29 RX ADMIN — Medication SCH EACH: at 22:00

## 2020-03-29 RX ADMIN — HEPARIN SODIUM SCH UNITS: 5000 INJECTION INTRAVENOUS; SUBCUTANEOUS at 21:35

## 2020-03-29 RX ADMIN — LABETALOL HCL SCH MG: 100 TABLET, FILM COATED ORAL at 16:59

## 2020-03-29 RX ADMIN — BACLOFEN SCH MG: 10 TABLET ORAL at 16:59

## 2020-03-29 RX ADMIN — PANTOPRAZOLE SODIUM SCH MG: 40 TABLET, DELAYED RELEASE ORAL at 10:00

## 2020-03-29 RX ADMIN — EZETIMIBE SCH MG: 10 TABLET ORAL at 10:00

## 2020-03-29 RX ADMIN — LABETALOL HCL SCH MG: 100 TABLET, FILM COATED ORAL at 10:00

## 2020-03-29 RX ADMIN — Medication SCH MG: at 14:00

## 2020-03-29 RX ADMIN — PREGABALIN SCH MG: 25 CAPSULE ORAL at 16:59

## 2020-03-29 RX ADMIN — LABETALOL HCL SCH MG: 100 TABLET, FILM COATED ORAL at 12:35

## 2020-03-29 RX ADMIN — HEPARIN SODIUM SCH UNITS: 5000 INJECTION INTRAVENOUS; SUBCUTANEOUS at 11:16

## 2020-03-29 RX ADMIN — PAROXETINE HYDROCHLORIDE SCH MG: 10 TABLET, FILM COATED ORAL at 14:00

## 2020-03-29 RX ADMIN — HYDRALAZINE HYDROCHLORIDE PRN MG: 20 INJECTION INTRAMUSCULAR; INTRAVENOUS at 21:36

## 2020-03-29 RX ADMIN — INSULIN HUMAN PRN UNIT: 100 INJECTION, SOLUTION PARENTERAL at 22:03

## 2020-03-29 RX ADMIN — BACLOFEN SCH MG: 10 TABLET ORAL at 10:00

## 2020-03-29 RX ADMIN — CLOPIDOGREL BISULFATE SCH MG: 75 TABLET, FILM COATED ORAL at 10:00

## 2020-03-29 RX ADMIN — HYDRALAZINE HYDROCHLORIDE PRN MG: 20 INJECTION INTRAMUSCULAR; INTRAVENOUS at 16:10

## 2020-03-29 RX ADMIN — ATORVASTATIN CALCIUM SCH MG: 40 TABLET, FILM COATED ORAL at 21:35

## 2020-03-29 RX ADMIN — HYDROMORPHONE HYDROCHLORIDE PRN MG: 2 TABLET ORAL at 19:44

## 2020-03-29 RX ADMIN — Medication SCH MG: at 16:59

## 2020-03-29 RX ADMIN — Medication SCH EACH: at 15:35

## 2020-03-29 RX ADMIN — Medication SCH MG: at 10:00

## 2020-03-29 RX ADMIN — ASPIRIN 81 MG SCH MG: 81 TABLET ORAL at 10:00

## 2020-03-29 NOTE — NUR
RN OPENING NOTE: RECEIVED PATIENT IN BED THIS MORNING. PATIENT IS SLEEPING, AROUSED BY 
PAINFUL STIMULI AND GOES BACK TO SLEEP AFTER BEING AROUSED, WAS INFORMED BY PM RN THAT 
PATIENT RECEIVED 4MG OF DILAUDID, PULSES PALPABLE, RESP WNL. PATIENT IS ON O2 2L/MIN VIA NC, 
SATING AT 99%, NO SIGNS OF RESPIRATORY DISTRESS NOTED. #20 ON LEFT HAND, C/D/I, FLUSHING 
WELL, NO SIGNS OF COMPLICATIONS NOTED. VSS. NO SIGNS OF ACUTE DISTRESS NOTED. CONTACTED DR KING FOR ADMITTING ORDERS AND WAS ORDERED TO PUT PATIENT ON A REGULAR DIET ONLY UNTIL 
LIST OF AM MD COMES OUT SO THEY COMPLETE THE REST OF THE ADMITTING ORDERS. SAFETY MEASURES 
IMPLEMENTED, BED IN LOWEST POSITION, LOCKED, SIDE RAILS UP X2, CALL LIGHT WITHIN REACH. WILL 
CONTINUE TO MONITOR PATIENT FOR CHANGES.

## 2020-03-29 NOTE — NUR
PT BIBRA DT GENERALIZED BODY PAIN FOR 2 HOURS. PTA, PT TOOK DILAUDUD 4MG PER 
REPORT. PT AAOX3, SATTING 90% ON RA, PLACED ON O2 NC 2 L 98%. PT CONNECTED TO 
THE CARDIAC MONITOR AND POX

## 2020-03-29 NOTE — NUR
RN MS ADMITTING NOTES

RECEIVED PT FROM ER VIA [facility] SLEEPING AND SNORING, CAN BE AWAKE BY PAIN, ON 02 2L VIA NC 
WITH O2 SAT 99% SAFELY TRANSFER FROM [facility] TO BED, WITH L HAND G20 PATENT FLUSHING WELL, 
HEAD TO TOE ASSESSMENT DONE WITH SOME BRUISES NOTED,ADMISSION ASSESSMENT DONE, INFORMED 
DAUGHTER ABOUT ADMISSION AND ASK FOR CODE STATUS, DAUGHTER SAYS FULL CODE NOTED AND MADE AN 
ORDER, V/S CHECKED WITHIN NORMAL RANGE, SAFETY MEASURED OBSERVED BED ON LOWEST POSITION AND 
LOCK SIDE RAILS UP X3, CALL LIGHT WITHIN REACHED WILL CONT. TO MONITOR

## 2020-03-29 NOTE — NUR
RN CLOSING NOTE: PATIENT REMAINS IN BED. PATIENT IS SLEEPING, AROUSED BY TACTILE STIMULI AND 
GOES BACK TO SLEEP AGAIN AFTER WAKING UP. NO SIGNS OF RESPIRATORY DISTRESS NOTED. NO SIGNS 
OF ACUTE DISTRESS NOTED. DUBOIS DRAINING WELL. SAFETY MEASURES IMPLEMENTED, BED IN LOWEST 
POSITION, LOCKED, SIDE RAILS UP X2, CALL LIGHT WITHIN REACH. WILL ENDORSE TO ONCOMING SHIFT 
FOR CONTINUITY OF CARE.

## 2020-03-29 NOTE — NUR
DR HERNANDEZ AWARE OF CT RESULTS (NEGATIVE) AND ABG RESULTS (WNL), NNO. WILL CONTINUE TO MONITOR 
PATIENT.

## 2020-03-29 NOTE — NUR
RN OPENING NOTE

PATIENT IN BED SLEEPING IN SEMI KENNEY'S POSITION. RESPIRATIONS EVEN AND UNLABORED. ON NASAL 
CANULA 3L OF O2 VIA NC. NO SIGNS OF RESPIRATORY DISTRESS NOTED.NO INDICATIONS OF PAIN OR 
DISCOMFORT. DUBOIS CATHETER PATENT AND INTACT AND DRAINING CLEAR YELLOW URINE WELL. SAFETY 
MEASURES IMPLEMENTED, BED IN LOWEST POSITION, LOCKED,  BED ALARM ON, SIDE RAILS UP X2, CALL 
LIGHT WITHIN REACH. WILL MONITOR PATIENT.

## 2020-03-29 NOTE — NUR
DR HERNANDEZ ROUNDED ON PATIENT, AWARE OF PATIENT'S DIFFICULT TO AROUSE. ORDERS FOR CT OF HEAD 
W/O CONTRAST TO R/O STROKE, STAT ABG. WILL CONTINUE TO MONITOR PATIENT.

## 2020-03-29 NOTE — NUR
1300 MEDS NOT GIVEN D/T PATIENT SLEEPING, DIFFICULT TO AROUSE. 

-------------------------------------------------------------------------------

Addendum: 03/29/20 at 1401 by EDWIN HICKMAN RN

-------------------------------------------------------------------------------

1400 MEDS NOT GIVEN, PATIENT STILL SLEEPING. 

-------------------------------------------------------------------------------

Addendum: 03/29/20 at 1729 by EDWIN HICKMAN RN

-------------------------------------------------------------------------------

1700 MEDS NOT GIVEN. PATIENT STILL SLEEPING

-------------------------------------------------------------------------------

Addendum: 03/29/20 at 1825 by EDWIN HICKMAN RN

-------------------------------------------------------------------------------

MD AWARE PATIENT HAS NOT BEEN TAKING PO MEDS D/T EXCESSIVE SLEEPINESS.

## 2020-03-29 NOTE — NUR
PATIENT IS WAKING UP WITH STERNAL RUB AND NAME BUT SLEEPING AGAIN. PATIENT HAS 1 WORD 
RESPONSES AND GOES BACK TO SLEEP. WILL CONTINUE TO MONITOR PATIENT.

## 2020-03-29 NOTE — NUR
RN CLOSING NOTES

PT STILL SLEEPING AND SNORING CAN BE AWAKE BY STERNAL RUB, O2  % ON O2 VIA NC 2L, NO 
SIGNIFICANT CHANGES NOTED WILL ENDORSED TO AM SHIFT NURSE

## 2020-03-29 NOTE — NUR
TELE RN NOTES

PT IN BED AWAKE WITH IV RUNNING ON THE LEFT HANG @80ML/HR. A/O X4. C/O GEN BODY PAIN 9/10. 
PO DILAUDED GIVEN @1944. LEFT SIDED WEAKNESS IS PRESENT ON BOTH EXTREMITIES. EDEMA NOTED ON 
LUE AND BLE. DUBOIS CATH WAS PULLED OUT AND NEW FC WAS PUT IN @2000. FC TO GRAVITY WITH 
CLEAR, YELLOW URINE SEEN IN THE BAG. PT ASKED FOR IV DILAUDED 1MG FOR GEN PAIN @2200. SPOKE 
WITH DR CHAHAL AT 2210, HE DENIED THE REQUEST, REFER TO THE PRIMARY DR TO OBTAIN THE 
ORDER IN THE AM. DAUGHTER KORY CALLED @2020, AND  REY AT @2030, BOTH ASKED TO 
HAVE A CALL BACK [phone] FROM PT'S DR. SHE WOULD LIKE TO DISCUSS THE PT'S PLAN OF CARE 
WITH HER DR.

## 2020-03-29 NOTE — NUR
PATIENT PICKED UP FOR CT. CONTACTED RADIOLOGY, C/O BRINA. THEY WILL DO ABG THERE AS WELL. 

-------------------------------------------------------------------------------

Addendum: 03/29/20 at 1207 by EDWIN HICKMAN RN

-------------------------------------------------------------------------------

RESPIRATORY AWARE, WILL DO ABG WHEN PATIENT COME BACK FROM CT.

## 2020-03-29 NOTE — NUR
SPOKE TO PATIENT'S  MARCOS [phone]. HE EXPLAINED THAT PATIENT WAS C/O 
GENERALIZED PAIN AND A BAD HEADACHE BEFORE THE PATIENT CAME IN TO THE HOSPITAL/ PATIENT HAS 
HAD A CVA IN THE PAST. CONTACTED DR HERNANDEZ, INFORMED HIM THAT PATIENT NEEDS ADMITTING ORDERS 
AND RECOMMENDED CT OF HEAD TO R/O STROKE, AWAITING RESPONSE BACK, WILL F/U. PATIENT IS 
AROUSED TO PAINFUL STIMULI BUT GOES BACK TO SLEEP. CN AWARE, DR ARREDONDO AWARE AND GAVE OK TO 
HOLD MEDICATIONS UNTIL PATIENT IS AWAKE AND ABLE TO TAKE MEDS.

## 2020-03-30 VITALS — DIASTOLIC BLOOD PRESSURE: 37 MMHG | SYSTOLIC BLOOD PRESSURE: 101 MMHG

## 2020-03-30 VITALS — DIASTOLIC BLOOD PRESSURE: 77 MMHG | SYSTOLIC BLOOD PRESSURE: 183 MMHG

## 2020-03-30 VITALS — SYSTOLIC BLOOD PRESSURE: 100 MMHG | DIASTOLIC BLOOD PRESSURE: 35 MMHG

## 2020-03-30 VITALS — SYSTOLIC BLOOD PRESSURE: 169 MMHG | DIASTOLIC BLOOD PRESSURE: 71 MMHG

## 2020-03-30 LAB
ALBUMIN SERPL BCP-MCNC: 3.4 G/DL (ref 3.4–5)
ALP SERPL-CCNC: 90 U/L (ref 46–116)
ALT SERPL W P-5'-P-CCNC: 40 U/L (ref 12–78)
APPEARANCE UR: (no result)
AST SERPL W P-5'-P-CCNC: 22 U/L (ref 15–37)
BASOPHILS # BLD AUTO: 0.1 /CMM (ref 0–0.2)
BASOPHILS NFR BLD AUTO: 0.9 % (ref 0–2)
BILIRUB SERPL-MCNC: 0.8 MG/DL (ref 0.2–1)
BILIRUB UR QL STRIP: (no result)
BUN SERPL-MCNC: 17 MG/DL (ref 7–18)
CALCIUM SERPL-MCNC: 9.1 MG/DL (ref 8.5–10.1)
CHLORIDE SERPL-SCNC: 105 MMOL/L (ref 98–107)
CK SERPL-CCNC: 170 U/L (ref 26–192)
CO2 SERPL-SCNC: 29 MMOL/L (ref 21–32)
COLOR UR: YELLOW
CREAT SERPL-MCNC: 0.8 MG/DL (ref 0.6–1.3)
CREAT UR-MCNC: 206.9 MG/DL (ref 30–125)
EOSINOPHIL NFR BLD AUTO: 1.1 % (ref 0–6)
GLUCOSE SERPL-MCNC: 145 MG/DL (ref 74–106)
GLUCOSE UR STRIP-MCNC: NEGATIVE MG/DL
HCT VFR BLD AUTO: 32 % (ref 33–45)
HGB BLD-MCNC: 10.3 G/DL (ref 11.5–14.8)
HGB UR QL STRIP: (no result) ERY/UL
KETONES UR STRIP-MCNC: NEGATIVE MG/DL
LEUKOCYTE ESTERASE UR QL STRIP: (no result)
LYMPHOCYTES NFR BLD AUTO: 2.3 /CMM (ref 0.8–4.8)
LYMPHOCYTES NFR BLD AUTO: 24.4 % (ref 20–44)
MAGNESIUM SERPL-MCNC: 2.3 MG/DL (ref 1.8–2.4)
MCHC RBC AUTO-ENTMCNC: 33 G/DL (ref 31–36)
MCV RBC AUTO: 85 FL (ref 82–100)
MONOCYTES NFR BLD AUTO: 0.7 /CMM (ref 0.1–1.3)
MONOCYTES NFR BLD AUTO: 7.7 % (ref 2–12)
NEUTROPHILS # BLD AUTO: 6.3 /CMM (ref 1.8–8.9)
NEUTROPHILS NFR BLD AUTO: 65.9 % (ref 43–81)
NITRITE UR QL STRIP: NEGATIVE
PH UR STRIP: 5.5 [PH] (ref 5–8)
PHOSPHATE SERPL-MCNC: 3.3 MG/DL (ref 2.5–4.9)
PLATELET # BLD AUTO: 344 /CMM (ref 150–450)
POTASSIUM SERPL-SCNC: 4.1 MMOL/L (ref 3.5–5.1)
PROT SERPL-MCNC: 6.4 G/DL (ref 6.4–8.2)
PROT UR QL STRIP: NEGATIVE MG/DL
PROT UR-MCNC: 34.7 MG/DL (ref 0–11.9)
RBC # BLD AUTO: 3.73 MIL/UL (ref 4–5.2)
RBC #/AREA URNS HPF: (no result) /HPF (ref 0–2)
SODIUM SERPL-SCNC: 142 MMOL/L (ref 136–145)
SODIUM UR-SCNC: 24 MMOL/L (ref 40–220)
UROBILINOGEN UR STRIP-MCNC: 0.2 EU/DL
WBC #/AREA URNS HPF: (no result) /HPF (ref 0–3)
WBC NRBC COR # BLD AUTO: 9.6 K/UL (ref 4.3–11)

## 2020-03-30 RX ADMIN — HYDROMORPHONE HYDROCHLORIDE PRN MG: 2 TABLET ORAL at 23:13

## 2020-03-30 RX ADMIN — CLOTRIMAZOLE SCH GM: 1 CREAM TOPICAL at 17:11

## 2020-03-30 RX ADMIN — Medication SCH EACH: at 22:14

## 2020-03-30 RX ADMIN — NITROGLYCERIN SCH GM: 20 OINTMENT TOPICAL at 22:17

## 2020-03-30 RX ADMIN — Medication SCH EACH: at 17:10

## 2020-03-30 RX ADMIN — HEPARIN SODIUM SCH UNITS: 5000 INJECTION INTRAVENOUS; SUBCUTANEOUS at 22:16

## 2020-03-30 RX ADMIN — Medication SCH OZ: at 11:32

## 2020-03-30 RX ADMIN — ALBUTEROL SULFATE PRN MG: 2.5 SOLUTION RESPIRATORY (INHALATION) at 09:26

## 2020-03-30 RX ADMIN — Medication SCH MG: at 08:19

## 2020-03-30 RX ADMIN — Medication SCH GM: at 18:45

## 2020-03-30 RX ADMIN — HYDROMORPHONE HYDROCHLORIDE PRN MG: 2 TABLET ORAL at 08:17

## 2020-03-30 RX ADMIN — EZETIMIBE SCH MG: 10 TABLET ORAL at 08:18

## 2020-03-30 RX ADMIN — NITROGLYCERIN SCH GM: 20 OINTMENT TOPICAL at 10:33

## 2020-03-30 RX ADMIN — PREGABALIN SCH MG: 25 CAPSULE ORAL at 12:30

## 2020-03-30 RX ADMIN — ATORVASTATIN CALCIUM SCH MG: 40 TABLET, FILM COATED ORAL at 22:14

## 2020-03-30 RX ADMIN — PREGABALIN SCH MG: 25 CAPSULE ORAL at 22:14

## 2020-03-30 RX ADMIN — PANTOPRAZOLE SODIUM SCH MG: 40 TABLET, DELAYED RELEASE ORAL at 08:19

## 2020-03-30 RX ADMIN — LABETALOL HCL SCH MG: 100 TABLET, FILM COATED ORAL at 22:17

## 2020-03-30 RX ADMIN — PREGABALIN SCH MG: 25 CAPSULE ORAL at 08:18

## 2020-03-30 RX ADMIN — PREGABALIN SCH MG: 25 CAPSULE ORAL at 17:00

## 2020-03-30 RX ADMIN — LABETALOL HCL SCH MG: 100 TABLET, FILM COATED ORAL at 08:17

## 2020-03-30 RX ADMIN — Medication SCH MG: at 17:00

## 2020-03-30 RX ADMIN — Medication SCH MG: at 17:09

## 2020-03-30 RX ADMIN — Medication SCH MG: at 12:33

## 2020-03-30 RX ADMIN — INSULIN HUMAN PRN UNIT: 100 INJECTION, SOLUTION PARENTERAL at 12:41

## 2020-03-30 RX ADMIN — LABETALOL HCL SCH MG: 100 TABLET, FILM COATED ORAL at 17:00

## 2020-03-30 RX ADMIN — BACLOFEN SCH MG: 10 TABLET ORAL at 22:13

## 2020-03-30 RX ADMIN — HEPARIN SODIUM SCH UNITS: 5000 INJECTION INTRAVENOUS; SUBCUTANEOUS at 08:23

## 2020-03-30 RX ADMIN — LABETALOL HCL SCH MG: 100 TABLET, FILM COATED ORAL at 12:31

## 2020-03-30 RX ADMIN — ASPIRIN 81 MG SCH MG: 81 TABLET ORAL at 08:19

## 2020-03-30 RX ADMIN — Medication SCH EACH: at 08:02

## 2020-03-30 RX ADMIN — Medication SCH EACH: at 12:23

## 2020-03-30 RX ADMIN — INSULIN HUMAN PRN UNIT: 100 INJECTION, SOLUTION PARENTERAL at 08:24

## 2020-03-30 RX ADMIN — INSULIN HUMAN PRN UNIT: 100 INJECTION, SOLUTION PARENTERAL at 17:07

## 2020-03-30 RX ADMIN — FLUTICASONE FUROATE AND VILANTEROL TRIFENATATE SCH EACH: 100; 25 POWDER RESPIRATORY (INHALATION) at 08:19

## 2020-03-30 RX ADMIN — BACLOFEN SCH MG: 10 TABLET ORAL at 17:00

## 2020-03-30 RX ADMIN — SODIUM CHLORIDE PRN MLS/HR: 9 INJECTION, SOLUTION INTRAVENOUS at 01:49

## 2020-03-30 RX ADMIN — BACLOFEN SCH MG: 10 TABLET ORAL at 08:19

## 2020-03-30 RX ADMIN — BACLOFEN SCH MG: 10 TABLET ORAL at 12:32

## 2020-03-30 RX ADMIN — ALBUTEROL SULFATE PRN MG: 2.5 SOLUTION RESPIRATORY (INHALATION) at 01:03

## 2020-03-30 RX ADMIN — INSULIN HUMAN PRN UNIT: 100 INJECTION, SOLUTION PARENTERAL at 22:16

## 2020-03-30 RX ADMIN — HYDROMORPHONE HYDROCHLORIDE PRN MG: 2 TABLET ORAL at 17:08

## 2020-03-30 RX ADMIN — PAROXETINE HYDROCHLORIDE SCH MG: 10 TABLET, FILM COATED ORAL at 08:18

## 2020-03-30 RX ADMIN — CLOPIDOGREL BISULFATE SCH MG: 75 TABLET, FILM COATED ORAL at 08:19

## 2020-03-30 RX ADMIN — HYDROMORPHONE HYDROCHLORIDE PRN MG: 2 TABLET ORAL at 12:56

## 2020-03-30 RX ADMIN — LIDOCAINE SCH EA: 50 PATCH CUTANEOUS at 10:21

## 2020-03-30 RX ADMIN — HYDROMORPHONE HYDROCHLORIDE PRN MG: 2 TABLET ORAL at 02:09

## 2020-03-30 NOTE — NUR
MS RN NOTES

PT IN BED. SLEEPING; NO SOB, WITH NC O2 @2LPM. MAURY WELL. IV IS ON L HAND SITE IS CLEAN AND 
DRY, DRESSING INTACT, NS RUNNING @80ML/HR, . MAURY WELL. NO C/O PAIN. FC TO GRAVITY WITH 
CLEAR, YELLOW URINE IN THE BAG. INCONTINENT OF BOWEL, NO BM AT THIS TIME. EDEMA PITTING 2-3 
+ ON BLE AND LEFT HAND. RASHES ON BLE AND BRUISE ON THE BUTTOCK. BED LOCKED AND IN LOWEST 
POSITION. ALL SAFETY MEASURES IN PLACE.  PT IS ABLE TO MAKE NEEDS KNOWN. WILL ENDORSE TO THE 
ONCOMING NURSE

## 2020-03-30 NOTE — NUR
WOUND CARE CONSULT: PT PRESENTS WITH ABDOMINAL FOLD AND BREASTFOLD RASH, PRESENT ON 
ADMISSION. PT ALSO NOTED TO HAVE RED RAISED AREAS TO LOWER LEGS, PRESENT ON ADMISSION. 
RECOMMEND DPM CONSULT. DR VARGAS NOTIFIED OF DPM CONSULT REQUEST. RECOMMENDATIONS MADE FOR 
SKIN PROTECTION. DISCUSSED WITH NURSING STAFF. WILL SEE PRN. MD IN AGREEMENT WITH PLAN OF 
CARE. CURRENT CONRAD SCORE IS 13. 

-------------------------------------------------------------------------------

Addendum: 03/30/20 at 1101 by CELESTINE ALLEN WNDNU

-------------------------------------------------------------------------------

Amended: Links added.

## 2020-03-30 NOTE — NUR
RM NOTE

PT AWAKE AND ALERT IN BED IN HIGH KENNEY'S POSITION. NO INDICATIONS OF RESPIRATORY DISTRESS. 
WITH COMPLAINT OF TOLERABLE MILD GENERALIZED BODY PAIN 3/10 POST ADMINSTRATION OF PRN 
DILAUDID PO. REPOSITIONED PT USING 2 PERSON ASSIST. CALL LIGHT WITHIN REACH, SAFETY MEASURES 
IN PLACE, WILL CONTINUE TO MONITOR.

## 2020-03-30 NOTE — NUR
RN OPEN NOTES



RECEIVED PATIENT AWAKE IN BED. A/OX4. NO SIGNS OF DISTRESS OR DISCOMFORT. BREATHING EVEN AND 
UNLABORED. ON 2LPM 02 VIA NC. IV ACCESS IN L HAND, PATENT AND INTACT, NO SIGNS OF REDNESS OR 
INFILTRATION. BED IN LOW LOCKED POSITION WITH SIDE RAILS X2. CALL LIGHT WITHIN REACH. WILL 
CONTINUE TO MONITOR.

## 2020-03-30 NOTE — NUR
RN OPENING NOTE: RECEIVED PATIENT IN BED THIS MORNING. PATIENT IS ALERT AND ORIENTED X4, 
RESPONDS APPROPRIATELY. PATIENT IS ON O2 2L/MIN VIA NC, NO SIGNS OF RESPIRATORY DISTRESS 
NOTED. #20 ON LEFT HAND, C/D/I, FLUSHING WELL, NO SIGNS OF COMPLICATIONS NOTED, RUNNING NS @ 
80CC/HR. VSS. NO SIGNS OF ACUTE DISTRESS NOTED. SAFETY MEASURES IMPLEMENTED, BED IN LOWEST 
POSITION, LOCKED, SIDE RAILS UP X2, CALL LIGHT WITHIN REACH. WILL CONTINUE TO MONITOR 
PATIENT FOR CHANGES.

## 2020-03-30 NOTE — NUR
MS RN NOTES

PT ASKED TO GET OUT OF BED. WITH CNA AND OTHER RN ASSIST PT WAS OUT OF BED FOR 1 MINUTE, 
TOLERATED WELL. BACK IN BED WITH NO SOB. WILL CONT TO MONITOR

## 2020-03-30 NOTE — NUR
RN NOTE



RECEIVED PATIENT FROM SOFYA 9AM MEDS GIVEN BY HER. PATIENT IS NOT IN DISTRESS AT THE 
MOMENT, MD ORDER TO REMOVE DUBOIS CATHETER, ORDER CARRIED OUT, REMOVED DUBOIS CATHETER. WILL 
MONITOR FOR NEXT VOID. PATIENT RESTING IN BED COMFORTABLY, CLEAN AND DRY, NO DISTRESS NOTED. 
SAFETY MAINTAINED, CALL LIGHT WITHIN REACH, WILL CONTINUE TO MONITOR.

## 2020-03-30 NOTE — NUR
RN CLOSING NOTES



ALL PATIENT NEEDS MET. HELD SOME OF 1700 MEDICATIONS DUE TO NEW SCHEDULE OF MEDS PER TRES HERNANDEZ ORDER. DUBOIS CATHETER WAS REMOVED DURING MY SHIFT PER MD ORDER. NO ACUTE CHANGES TO 
PATIENT CONDITION DURING MY SHIFT. PT SAFETY WAS MAINTAINED, CALL LIGHT WITHIN REACH, 
ENDORSED TO PM NURSE TO CONTINUE CARE.

## 2020-03-31 VITALS — DIASTOLIC BLOOD PRESSURE: 45 MMHG | SYSTOLIC BLOOD PRESSURE: 142 MMHG

## 2020-03-31 VITALS — DIASTOLIC BLOOD PRESSURE: 82 MMHG | SYSTOLIC BLOOD PRESSURE: 101 MMHG

## 2020-03-31 VITALS — DIASTOLIC BLOOD PRESSURE: 45 MMHG | SYSTOLIC BLOOD PRESSURE: 149 MMHG

## 2020-03-31 VITALS — SYSTOLIC BLOOD PRESSURE: 149 MMHG | DIASTOLIC BLOOD PRESSURE: 45 MMHG

## 2020-03-31 VITALS — DIASTOLIC BLOOD PRESSURE: 37 MMHG | SYSTOLIC BLOOD PRESSURE: 117 MMHG

## 2020-03-31 LAB
ALBUMIN SERPL ELPH-MCNC: 3.1 G/DL (ref 2.9–4.4)
ALBUMIN/GLOB SERPL: 1.1 {RATIO} (ref 0.7–1.7)
ALPHA1 GLOB SERPL ELPH-MCNC: 0.2 G/DL (ref 0–0.4)
ALPHA2 GLOB SERPL ELPH-MCNC: 1 G/DL (ref 0.4–1)
B-GLOBULIN SERPL ELPH-MCNC: 0.8 G/DL (ref 0.7–1.3)
BASOPHILS # BLD AUTO: 0.1 /CMM (ref 0–0.2)
BASOPHILS NFR BLD AUTO: 0.8 % (ref 0–2)
BUN SERPL-MCNC: 23 MG/DL (ref 7–18)
CALCIUM SERPL-MCNC: 8.8 MG/DL (ref 8.5–10.1)
CHLORIDE SERPL-SCNC: 100 MMOL/L (ref 98–107)
CO2 SERPL-SCNC: 27 MMOL/L (ref 21–32)
CREAT SERPL-MCNC: 0.9 MG/DL (ref 0.6–1.3)
EOSINOPHIL NFR BLD AUTO: 2.2 % (ref 0–6)
GAMMA GLOB SERPL ELPH-MCNC: 0.8 G/DL (ref 0.4–1.8)
GLOBULIN SER CALC-MCNC: 2.8 G/DL (ref 2.2–3.9)
GLUCOSE SERPL-MCNC: 153 MG/DL (ref 74–106)
HCT VFR BLD AUTO: 29 % (ref 33–45)
HGB BLD-MCNC: 9.5 G/DL (ref 11.5–14.8)
LYMPHOCYTES NFR BLD AUTO: 2.9 /CMM (ref 0.8–4.8)
LYMPHOCYTES NFR BLD AUTO: 33.4 % (ref 20–44)
MAGNESIUM SERPL-MCNC: 2.2 MG/DL (ref 1.8–2.4)
MCHC RBC AUTO-ENTMCNC: 32 G/DL (ref 31–36)
MCV RBC AUTO: 86 FL (ref 82–100)
MONOCYTES NFR BLD AUTO: 1 /CMM (ref 0.1–1.3)
MONOCYTES NFR BLD AUTO: 11 % (ref 2–12)
NEUTROPHILS # BLD AUTO: 4.6 /CMM (ref 1.8–8.9)
NEUTROPHILS NFR BLD AUTO: 52.6 % (ref 43–81)
PHOSPHATE SERPL-MCNC: 3.9 MG/DL (ref 2.5–4.9)
PLATELET # BLD AUTO: 315 /CMM (ref 150–450)
POTASSIUM SERPL-SCNC: 4.4 MMOL/L (ref 3.5–5.1)
PTH-INTACT SERPL-MCNC: 30 PG/ML (ref 15–65)
RBC # BLD AUTO: 3.43 MIL/UL (ref 4–5.2)
SODIUM SERPL-SCNC: 135 MMOL/L (ref 136–145)
WBC NRBC COR # BLD AUTO: 8.8 K/UL (ref 4.3–11)

## 2020-03-31 RX ADMIN — Medication SCH GM: at 08:21

## 2020-03-31 RX ADMIN — Medication SCH MG: at 16:29

## 2020-03-31 RX ADMIN — Medication SCH EACH: at 08:05

## 2020-03-31 RX ADMIN — HEPARIN SODIUM SCH UNITS: 5000 INJECTION INTRAVENOUS; SUBCUTANEOUS at 08:24

## 2020-03-31 RX ADMIN — LABETALOL HCL SCH MG: 100 TABLET, FILM COATED ORAL at 05:00

## 2020-03-31 RX ADMIN — HYDROMORPHONE HYDROCHLORIDE PRN MG: 2 TABLET ORAL at 08:11

## 2020-03-31 RX ADMIN — CLOTRIMAZOLE SCH GM: 1 CREAM TOPICAL at 08:21

## 2020-03-31 RX ADMIN — NITROGLYCERIN SCH GM: 20 OINTMENT TOPICAL at 09:00

## 2020-03-31 RX ADMIN — EZETIMIBE SCH MG: 10 TABLET ORAL at 08:20

## 2020-03-31 RX ADMIN — INSULIN HUMAN PRN UNIT: 100 INJECTION, SOLUTION PARENTERAL at 11:58

## 2020-03-31 RX ADMIN — Medication SCH MG: at 12:02

## 2020-03-31 RX ADMIN — Medication SCH EACH: at 17:56

## 2020-03-31 RX ADMIN — LABETALOL HCL SCH MG: 100 TABLET, FILM COATED ORAL at 12:10

## 2020-03-31 RX ADMIN — PANTOPRAZOLE SODIUM SCH MG: 40 TABLET, DELAYED RELEASE ORAL at 08:05

## 2020-03-31 RX ADMIN — CLOPIDOGREL BISULFATE SCH MG: 75 TABLET, FILM COATED ORAL at 08:19

## 2020-03-31 RX ADMIN — Medication SCH MG: at 08:20

## 2020-03-31 RX ADMIN — Medication SCH OZ: at 08:22

## 2020-03-31 RX ADMIN — Medication SCH EACH: at 11:55

## 2020-03-31 RX ADMIN — FLUTICASONE FUROATE AND VILANTEROL TRIFENATATE SCH EACH: 100; 25 POWDER RESPIRATORY (INHALATION) at 08:05

## 2020-03-31 RX ADMIN — LIDOCAINE SCH EA: 50 PATCH CUTANEOUS at 10:27

## 2020-03-31 RX ADMIN — INSULIN HUMAN PRN UNIT: 100 INJECTION, SOLUTION PARENTERAL at 17:56

## 2020-03-31 RX ADMIN — PREGABALIN SCH MG: 25 CAPSULE ORAL at 04:46

## 2020-03-31 RX ADMIN — CLOTRIMAZOLE SCH GM: 1 CREAM TOPICAL at 16:29

## 2020-03-31 RX ADMIN — PREGABALIN SCH MG: 25 CAPSULE ORAL at 12:03

## 2020-03-31 RX ADMIN — BACLOFEN SCH MG: 10 TABLET ORAL at 12:03

## 2020-03-31 RX ADMIN — HYDROMORPHONE HYDROCHLORIDE PRN MG: 2 TABLET ORAL at 03:31

## 2020-03-31 RX ADMIN — HYDROMORPHONE HYDROCHLORIDE PRN MG: 2 TABLET ORAL at 16:28

## 2020-03-31 RX ADMIN — ASPIRIN 81 MG SCH MG: 81 TABLET ORAL at 08:19

## 2020-03-31 RX ADMIN — PAROXETINE HYDROCHLORIDE SCH MG: 10 TABLET, FILM COATED ORAL at 08:20

## 2020-03-31 RX ADMIN — Medication SCH MG: at 09:00

## 2020-03-31 RX ADMIN — BACLOFEN SCH MG: 10 TABLET ORAL at 04:46

## 2020-03-31 NOTE — NUR
RN NOTE

PT CO BURNING SENSATION IN HER VAGINA, AND PAIN IN HER BLADDER, SHE THINKS SHE MIGHT HAVE 
YEAST INFECTION BECAUSE SHE WAS ON CLINDAMYCIN AND VANCO RECENTLY, WANTS TO LET THE MD KNOW, 
WILL FOLLOW UP WITH DR WALKER.

## 2020-03-31 NOTE — NUR
RN NOTE

PT DISCHARGED HOME WITH , WHEELED OUT ON WHEELCHAIR BY CNA, IN STABLE CONDITION, 
PRESCRIPTION SUBMITTED TO [facility] ELECTRONICALLY. EXIT CARE, DISCHARGE INSTRUCTIONS PROVIDED, 
TEACHINGS DONE TO PT, PT VERBALIZED UNDERSTANDING, IV REMOVED, ID BAND REMOVED, BELONGINGS 
LIST SIGNED AND HOME MEDS GIVEN TO PT. PICTURES TAKEN AND PLACED IN CHART.

## 2020-03-31 NOTE — NUR
RN CLOSING NOTES



PATIENT RESTING IN BED, EASILY AROUSABLE. A/OX4. NO SIGNS OF DISTRESS OR DISCOMFORT. 
BREATHING EVEN AND UNLABORED. ON 2LPM 02 VIA NC. IV ACCESS IN L HAND, PATENT AND INTACT, NO 
SIGNS OF REDNESS OR INFILTRATION. ALL NEEDS MET. NO SIGNIFICANT CHANGES THROUGH THE NIGHT 
BED IN LOW LOCKED POSITION WITH SIDE RAILS X2. CALL LIGHT WITHIN REACH. WILL ENDORSE TO AM 
SHIFT FOR NAHUN.

## 2020-05-12 ENCOUNTER — HOSPITAL ENCOUNTER (INPATIENT)
Dept: HOSPITAL 54 - ER | Age: 60
LOS: 6 days | Discharge: HOME HEALTH | DRG: 58 | End: 2020-05-18
Attending: NURSE PRACTITIONER | Admitting: NURSE PRACTITIONER
Payer: MEDICAID

## 2020-05-12 VITALS — SYSTOLIC BLOOD PRESSURE: 134 MMHG | DIASTOLIC BLOOD PRESSURE: 69 MMHG

## 2020-05-12 VITALS — DIASTOLIC BLOOD PRESSURE: 69 MMHG | SYSTOLIC BLOOD PRESSURE: 134 MMHG

## 2020-05-12 VITALS — BODY MASS INDEX: 41.65 KG/M2 | WEIGHT: 250 LBS | HEIGHT: 65 IN

## 2020-05-12 DIAGNOSIS — Z79.84: ICD-10-CM

## 2020-05-12 DIAGNOSIS — K59.00: ICD-10-CM

## 2020-05-12 DIAGNOSIS — I25.10: ICD-10-CM

## 2020-05-12 DIAGNOSIS — I50.42: ICD-10-CM

## 2020-05-12 DIAGNOSIS — N32.81: ICD-10-CM

## 2020-05-12 DIAGNOSIS — G89.4: ICD-10-CM

## 2020-05-12 DIAGNOSIS — B00.1: ICD-10-CM

## 2020-05-12 DIAGNOSIS — R40.2412: ICD-10-CM

## 2020-05-12 DIAGNOSIS — E86.1: ICD-10-CM

## 2020-05-12 DIAGNOSIS — J45.909: ICD-10-CM

## 2020-05-12 DIAGNOSIS — I11.0: ICD-10-CM

## 2020-05-12 DIAGNOSIS — F32.9: ICD-10-CM

## 2020-05-12 DIAGNOSIS — Z79.02: ICD-10-CM

## 2020-05-12 DIAGNOSIS — R53.1: ICD-10-CM

## 2020-05-12 DIAGNOSIS — I69.354: ICD-10-CM

## 2020-05-12 DIAGNOSIS — E87.5: ICD-10-CM

## 2020-05-12 DIAGNOSIS — E78.5: ICD-10-CM

## 2020-05-12 DIAGNOSIS — Z79.82: ICD-10-CM

## 2020-05-12 DIAGNOSIS — E87.1: ICD-10-CM

## 2020-05-12 DIAGNOSIS — D64.9: ICD-10-CM

## 2020-05-12 DIAGNOSIS — Z87.440: ICD-10-CM

## 2020-05-12 DIAGNOSIS — Z79.891: ICD-10-CM

## 2020-05-12 DIAGNOSIS — N17.0: ICD-10-CM

## 2020-05-12 DIAGNOSIS — I69.331: Primary | ICD-10-CM

## 2020-05-12 LAB
ALBUMIN SERPL BCP-MCNC: 3.7 G/DL (ref 3.4–5)
ALP SERPL-CCNC: 88 U/L (ref 46–116)
ALT SERPL W P-5'-P-CCNC: 36 U/L (ref 12–78)
AST SERPL W P-5'-P-CCNC: 29 U/L (ref 15–37)
BASOPHILS # BLD AUTO: 0.1 /CMM (ref 0–0.2)
BASOPHILS NFR BLD AUTO: 0.9 % (ref 0–2)
BILIRUB SERPL-MCNC: 0.7 MG/DL (ref 0.2–1)
BUN SERPL-MCNC: 44 MG/DL (ref 7–18)
CALCIUM SERPL-MCNC: 8.9 MG/DL (ref 8.5–10.1)
CHLORIDE SERPL-SCNC: 94 MMOL/L (ref 98–107)
CO2 SERPL-SCNC: 26 MMOL/L (ref 21–32)
CREAT SERPL-MCNC: 2.3 MG/DL (ref 0.6–1.3)
EOSINOPHIL NFR BLD AUTO: 1.3 % (ref 0–6)
GLUCOSE SERPL-MCNC: 108 MG/DL (ref 74–106)
HCT VFR BLD AUTO: 28 % (ref 33–45)
HGB BLD-MCNC: 9.2 G/DL (ref 11.5–14.8)
LYMPHOCYTES NFR BLD AUTO: 2.2 /CMM (ref 0.8–4.8)
LYMPHOCYTES NFR BLD AUTO: 21.4 % (ref 20–44)
MCHC RBC AUTO-ENTMCNC: 33 G/DL (ref 31–36)
MCV RBC AUTO: 85 FL (ref 82–100)
MONOCYTES NFR BLD AUTO: 0.8 /CMM (ref 0.1–1.3)
MONOCYTES NFR BLD AUTO: 7.6 % (ref 2–12)
NEUTROPHILS # BLD AUTO: 7.1 /CMM (ref 1.8–8.9)
NEUTROPHILS NFR BLD AUTO: 68.8 % (ref 43–81)
PH UR STRIP: 5.5 [PH] (ref 5–8)
PLATELET # BLD AUTO: 354 /CMM (ref 150–450)
POTASSIUM SERPL-SCNC: 5.4 MMOL/L (ref 3.5–5.1)
PROT SERPL-MCNC: 6.9 G/DL (ref 6.4–8.2)
RBC # BLD AUTO: 3.3 MIL/UL (ref 4–5.2)
SODIUM SERPL-SCNC: 127 MMOL/L (ref 136–145)
UROBILINOGEN UR STRIP-MCNC: 0.2 EU/DL
WBC NRBC COR # BLD AUTO: 10.4 K/UL (ref 4.3–11)

## 2020-05-12 PROCEDURE — G0378 HOSPITAL OBSERVATION PER HR: HCPCS

## 2020-05-12 PROCEDURE — U0003 INFECTIOUS AGENT DETECTION BY NUCLEIC ACID (DNA OR RNA); SEVERE ACUTE RESPIRATORY SYNDROME CORONAVIRUS 2 (SARS-COV-2) (CORONAVIRUS DISEASE [COVID-19]), AMPLIFIED PROBE TECHNIQUE, MAKING USE OF HIGH THROUGHPUT TECHNOLOGIES AS DESCRIBED BY CMS-2020-01-R: HCPCS

## 2020-05-12 RX ADMIN — Medication SCH EACH: at 21:21

## 2020-05-12 RX ADMIN — ATORVASTATIN CALCIUM SCH MG: 40 TABLET, FILM COATED ORAL at 21:21

## 2020-05-12 RX ADMIN — SODIUM CHLORIDE PRN MLS/HR: 9 INJECTION, SOLUTION INTRAVENOUS at 21:23

## 2020-05-12 RX ADMIN — LABETALOL HCL SCH MG: 100 TABLET, FILM COATED ORAL at 21:21

## 2020-05-12 RX ADMIN — HYDROMORPHONE HYDROCHLORIDE PRN MG: 2 TABLET ORAL at 19:40

## 2020-05-12 RX ADMIN — BACLOFEN SCH MG: 10 TABLET ORAL at 21:21

## 2020-05-12 RX ADMIN — INSULIN HUMAN PRN UNIT: 100 INJECTION, SOLUTION PARENTERAL at 21:36

## 2020-05-12 RX ADMIN — PREGABALIN SCH MG: 25 CAPSULE ORAL at 21:21

## 2020-05-12 NOTE — NUR
RN NOTES

REPORTED TO NP MARA K 5.4, NEW ORDERS OF KAYEXALATE X1, AND ACCUCHECK WITH SLIDING SCALE. 
PATIENT WANTS SCHEDULE OF LABETALOL, LYRICA AND BACLOFEN CHANGED TO 0900, 1400 AND 2100

## 2020-05-12 NOTE — NUR
c/o geralized weakness x 2 days, also complaint of headache, neck pain and 
right arm shaking, . Patient a/ox4, breathing even and unlabored, no sob 
noted. Needs attended. Attached to the cardiac monitor.

## 2020-05-12 NOTE — NUR
RN NOTES

ADMITTED PATIENT FROM ED DUE TO RIGHT SIDED WEAKNESS AND SHAKINESS, DX OF RENAL FAILURE. 
ALERT AND ORIENTED X4, STABLE ON ROOM AIR, LUNG SOUNDS CLEAR, MORBIDLY OBESE, HX OF CVA 2018 
WITH LEFT SIDED WEAKNESS, UNABLE TO MOVE LEFT ARM. RIGHT ARM IS ABLE TO USE CELLPHONE, EAT 
WITH SPOON, NOT AMBULATORY. SR ON THE TELE, COMPLAINING OF PAIN TO SHOULDERS, GIVEN DILAUDID 
4 MG PO, DUBOIS CATHETER DRAINING WITH CLEAR AND YELLOW URINE. UNDER BREAST REDNESS, 
ABDOMINAL FOLDS REDNESS, LEFT LEG AND FOOT EDEMA +3 WITH CELLULITIS AND SCATTERED RED BUMPS, 
RIGHT LEG EDEMA +2.

## 2020-05-13 VITALS — SYSTOLIC BLOOD PRESSURE: 121 MMHG | DIASTOLIC BLOOD PRESSURE: 54 MMHG

## 2020-05-13 VITALS — SYSTOLIC BLOOD PRESSURE: 143 MMHG | DIASTOLIC BLOOD PRESSURE: 63 MMHG

## 2020-05-13 VITALS — SYSTOLIC BLOOD PRESSURE: 123 MMHG | DIASTOLIC BLOOD PRESSURE: 59 MMHG

## 2020-05-13 VITALS — SYSTOLIC BLOOD PRESSURE: 150 MMHG | DIASTOLIC BLOOD PRESSURE: 60 MMHG

## 2020-05-13 LAB
ALBUMIN SERPL BCP-MCNC: 3.3 G/DL (ref 3.4–5)
ALP SERPL-CCNC: 78 U/L (ref 46–116)
ALT SERPL W P-5'-P-CCNC: 33 U/L (ref 12–78)
AST SERPL W P-5'-P-CCNC: 22 U/L (ref 15–37)
BASOPHILS # BLD AUTO: 0 /CMM (ref 0–0.2)
BASOPHILS NFR BLD AUTO: 0.7 % (ref 0–2)
BILIRUB SERPL-MCNC: 0.6 MG/DL (ref 0.2–1)
BUN SERPL-MCNC: 34 MG/DL (ref 7–18)
CALCIUM SERPL-MCNC: 8.6 MG/DL (ref 8.5–10.1)
CHLORIDE SERPL-SCNC: 101 MMOL/L (ref 98–107)
CHOLEST SERPL-MCNC: 128 MG/DL (ref ?–200)
CO2 SERPL-SCNC: 27 MMOL/L (ref 21–32)
CREAT SERPL-MCNC: 1.3 MG/DL (ref 0.6–1.3)
EOSINOPHIL NFR BLD AUTO: 3.2 % (ref 0–6)
GLUCOSE SERPL-MCNC: 99 MG/DL (ref 74–106)
HCT VFR BLD AUTO: 29 % (ref 33–45)
HDLC SERPL-MCNC: 30 MG/DL (ref 40–60)
HGB BLD-MCNC: 9.4 G/DL (ref 11.5–14.8)
IRON SERPL-MCNC: 54 UG/DL (ref 50–175)
LDLC SERPL DIRECT ASSAY-MCNC: 87 MG/DL (ref 0–99)
LYMPHOCYTES NFR BLD AUTO: 2.4 /CMM (ref 0.8–4.8)
LYMPHOCYTES NFR BLD AUTO: 36 % (ref 20–44)
MAGNESIUM SERPL-MCNC: 2.3 MG/DL (ref 1.8–2.4)
MCHC RBC AUTO-ENTMCNC: 33 G/DL (ref 31–36)
MCV RBC AUTO: 84 FL (ref 82–100)
MONOCYTES NFR BLD AUTO: 0.7 /CMM (ref 0.1–1.3)
MONOCYTES NFR BLD AUTO: 10.7 % (ref 2–12)
NEUTROPHILS # BLD AUTO: 3.3 /CMM (ref 1.8–8.9)
NEUTROPHILS NFR BLD AUTO: 49.4 % (ref 43–81)
PHOSPHATE SERPL-MCNC: 4.1 MG/DL (ref 2.5–4.9)
PLATELET # BLD AUTO: 328 /CMM (ref 150–450)
POTASSIUM SERPL-SCNC: 4.4 MMOL/L (ref 3.5–5.1)
PROT SERPL-MCNC: 6.4 G/DL (ref 6.4–8.2)
RBC # BLD AUTO: 3.4 MIL/UL (ref 4–5.2)
SODIUM SERPL-SCNC: 136 MMOL/L (ref 136–145)
TIBC SERPL-MCNC: 355 UG/DL (ref 250–450)
TRIGL SERPL-MCNC: 153 MG/DL (ref 30–150)
TSH SERPL DL<=0.005 MIU/L-ACNC: 1.38 UIU/ML (ref 0.36–3.74)
WBC NRBC COR # BLD AUTO: 6.8 K/UL (ref 4.3–11)

## 2020-05-13 RX ADMIN — POLYETHYLENE GLYCOL 3350 PRN GM: 17 POWDER, FOR SOLUTION ORAL at 12:30

## 2020-05-13 RX ADMIN — CLOPIDOGREL BISULFATE SCH MG: 75 TABLET, FILM COATED ORAL at 08:37

## 2020-05-13 RX ADMIN — NITROFURANTOIN MONOHYDRATE AND NITROFURANTOIN, MACROCRYSTALLINE SCH MG: 75; 25 CAPSULE ORAL at 22:29

## 2020-05-13 RX ADMIN — ASPIRIN 81 MG SCH MG: 81 TABLET ORAL at 08:35

## 2020-05-13 RX ADMIN — LINAGLIPTIN SCH MG: 5 TABLET, FILM COATED ORAL at 08:38

## 2020-05-13 RX ADMIN — LIDOCAINE SCH EA: 50 PATCH CUTANEOUS at 08:39

## 2020-05-13 RX ADMIN — Medication SCH MG: at 11:23

## 2020-05-13 RX ADMIN — ACETAMINOPHEN PRN MG: 325 TABLET ORAL at 22:29

## 2020-05-13 RX ADMIN — LABETALOL HCL SCH MG: 100 TABLET, FILM COATED ORAL at 21:20

## 2020-05-13 RX ADMIN — INSULIN HUMAN PRN UNIT: 100 INJECTION, SOLUTION PARENTERAL at 06:32

## 2020-05-13 RX ADMIN — PANTOPRAZOLE SODIUM SCH MG: 40 TABLET, DELAYED RELEASE ORAL at 08:27

## 2020-05-13 RX ADMIN — ACETAMINOPHEN PRN MG: 325 TABLET ORAL at 08:49

## 2020-05-13 RX ADMIN — PREGABALIN SCH MG: 25 CAPSULE ORAL at 21:18

## 2020-05-13 RX ADMIN — PREGABALIN SCH MG: 25 CAPSULE ORAL at 08:36

## 2020-05-13 RX ADMIN — INSULIN HUMAN PRN UNIT: 100 INJECTION, SOLUTION PARENTERAL at 22:11

## 2020-05-13 RX ADMIN — ATORVASTATIN CALCIUM SCH MG: 40 TABLET, FILM COATED ORAL at 21:18

## 2020-05-13 RX ADMIN — Medication SCH EACH: at 22:09

## 2020-05-13 RX ADMIN — HYDROMORPHONE HYDROCHLORIDE PRN MG: 2 TABLET ORAL at 04:47

## 2020-05-13 RX ADMIN — Medication SCH EACH: at 06:32

## 2020-05-13 RX ADMIN — Medication SCH MG: at 17:11

## 2020-05-13 RX ADMIN — Medication SCH EACH: at 17:10

## 2020-05-13 RX ADMIN — Medication SCH MG: at 08:37

## 2020-05-13 RX ADMIN — HYDROMORPHONE HYDROCHLORIDE PRN MG: 2 TABLET ORAL at 11:10

## 2020-05-13 RX ADMIN — HYDROMORPHONE HYDROCHLORIDE PRN MG: 2 TABLET ORAL at 17:11

## 2020-05-13 RX ADMIN — FLUTICASONE FUROATE AND VILANTEROL TRIFENATATE SCH EACH: 100; 25 POWDER RESPIRATORY (INHALATION) at 08:48

## 2020-05-13 RX ADMIN — Medication SCH EACH: at 11:22

## 2020-05-13 RX ADMIN — PREGABALIN SCH MG: 25 CAPSULE ORAL at 14:04

## 2020-05-13 RX ADMIN — LABETALOL HCL SCH MG: 100 TABLET, FILM COATED ORAL at 08:36

## 2020-05-13 RX ADMIN — BACLOFEN SCH MG: 10 TABLET ORAL at 14:10

## 2020-05-13 RX ADMIN — FLUTICASONE FUROATE AND VILANTEROL TRIFENATATE SCH EACH: 100; 25 POWDER RESPIRATORY (INHALATION) at 21:18

## 2020-05-13 RX ADMIN — HYDROMORPHONE HYDROCHLORIDE PRN MG: 2 TABLET ORAL at 23:13

## 2020-05-13 RX ADMIN — INSULIN HUMAN PRN UNIT: 100 INJECTION, SOLUTION PARENTERAL at 11:25

## 2020-05-13 RX ADMIN — SODIUM CHLORIDE PRN MLS/HR: 9 INJECTION, SOLUTION INTRAVENOUS at 10:32

## 2020-05-13 RX ADMIN — Medication SCH MG: at 17:10

## 2020-05-13 RX ADMIN — LOSARTAN POTASSIUM SCH MG: 25 TABLET, FILM COATED ORAL at 08:38

## 2020-05-13 RX ADMIN — BACLOFEN SCH MG: 10 TABLET ORAL at 21:18

## 2020-05-13 RX ADMIN — Medication SCH MG: at 12:30

## 2020-05-13 RX ADMIN — LABETALOL HCL SCH MG: 100 TABLET, FILM COATED ORAL at 14:09

## 2020-05-13 RX ADMIN — EZETIMIBE SCH MG: 10 TABLET ORAL at 08:37

## 2020-05-13 RX ADMIN — BACLOFEN SCH MG: 10 TABLET ORAL at 08:38

## 2020-05-13 RX ADMIN — PAROXETINE HYDROCHLORIDE SCH MG: 10 TABLET, FILM COATED ORAL at 08:37

## 2020-05-13 RX ADMIN — CLOTRIMAZOLE SCH GM: 1 CREAM TOPICAL at 17:10

## 2020-05-13 NOTE — NUR
WOUND CARE CONSULT: PT REFUSED TO TURN FOR FULL SKIN ASSESSMENT OF BACK AND BUTTOCKS. PT 
NOTED TO HAVE REDNESS TO LOWER LEGS WITH EDEMA, AND REDNESS/RASH TO BREAST FOLDS AND 
ABDOMINAL/GROIN FOLDS, PRESENT ON ADMISSION. RECOMMENDATIONS MADE FOR SKIN PROTECTION. 
DISCUSSED WITH NURSING STAFF. CURRENT CONRAD SCORE IS 14. MD IN AGREEMENT WITH PLAN OF CARE. 
WILL SEE PRN.

## 2020-05-13 NOTE — NUR
RN NOTES

ALERT AND ORIENTED X4, ROOM AIR, DILAUDID 4 MG X2 FOR PAIN, ACCUCHECK WITH SLIDING SCALE, 
DUBOIS CATHETER DRAINING WELL, RIGHT SIDED WEAKNESS, USES ONE ARM TO EAT AND DO THINGS, LEFT 
ARM NON FUNCTIONAL. BLE EDEMA, FOLLOW UP ON WOUND CARE CONSULT, PT AND OT. MONITOR K LEVEL, 
GIVEN KAYEXALATE X1, BUN AND CREATININE, CONTINUE IVF.

## 2020-05-13 NOTE — NUR
Tele/RN Opening Note



Received patient AO x 3-4, able to responds all stimuli. c/o pain on shoulder 5/10 and given 
Tylenol. Respiratory even and unlabored O2sat 98% on room air, noticed cough occasionally. 
Skin is warm to touch, kept clean/dry, intact IV site. Kept low bed position with locked 
wheel and elevated head of bed for secure airway. Call light within reach, will continue to 
monitor.

## 2020-05-13 NOTE — NUR
RN OPEN NOTES



PATIENT IS LAYING IN BED. ON NASAL CANULA, NO SOB/ ACUTE RESPIRATORY DISTRESS NOTED. CALL 
LIGHT IS WITHIN REACH. NO COMPLAINTS OF PAIN AT THE MOMENT. BED IS IN LOWEST LOCKED POSITION 
WITH SIDE RAILS UP X2, SEMI FOWLERS. WILL CONTINUE TO MONITOR.

## 2020-05-13 NOTE — NUR
Tele/RN Closing Note



Patient in bed comfortably, does no c/o pain or discomfort at his time. C/o difficulty 
breath during OT,

applied oxygen via n/c and remains O2sat 98%. Skin is warm to touch, intact IV site running 
NS at 75 ml/hr. Given Dilaudid 4mg for bilateral shoulder pain. Kept lower position of the 
bed with locked wheel and elevated head of bed for secure airway. Call light within reach, 
will endorse night shift.

## 2020-05-13 NOTE — NUR
Tele/RN Closing Note



Patinet in bed comfortably, does no c/o pain or discomfort at his time. C/o difficulty 
breath during OT,

 n/c p

-------------------------------------------------------------------------------

Addendum: 05/13/20 at 1851 by AMBER QUIÑONEZ RN

-------------------------------------------------------------------------------

Error

## 2020-05-14 VITALS — SYSTOLIC BLOOD PRESSURE: 119 MMHG | DIASTOLIC BLOOD PRESSURE: 42 MMHG

## 2020-05-14 VITALS — SYSTOLIC BLOOD PRESSURE: 131 MMHG | DIASTOLIC BLOOD PRESSURE: 131 MMHG

## 2020-05-14 VITALS — DIASTOLIC BLOOD PRESSURE: 68 MMHG | SYSTOLIC BLOOD PRESSURE: 119 MMHG

## 2020-05-14 VITALS — DIASTOLIC BLOOD PRESSURE: 59 MMHG | SYSTOLIC BLOOD PRESSURE: 145 MMHG

## 2020-05-14 VITALS — DIASTOLIC BLOOD PRESSURE: 62 MMHG | SYSTOLIC BLOOD PRESSURE: 138 MMHG

## 2020-05-14 LAB
BASOPHILS # BLD AUTO: 0.1 /CMM (ref 0–0.2)
BASOPHILS NFR BLD AUTO: 1 % (ref 0–2)
BUN SERPL-MCNC: 18 MG/DL (ref 7–18)
CALCIUM SERPL-MCNC: 8.7 MG/DL (ref 8.5–10.1)
CHLORIDE SERPL-SCNC: 103 MMOL/L (ref 98–107)
CO2 SERPL-SCNC: 29 MMOL/L (ref 21–32)
CREAT SERPL-MCNC: 0.7 MG/DL (ref 0.6–1.3)
EOSINOPHIL NFR BLD AUTO: 1.8 % (ref 0–6)
GLUCOSE SERPL-MCNC: 127 MG/DL (ref 74–106)
HCT VFR BLD AUTO: 28 % (ref 33–45)
HGB BLD-MCNC: 9.5 G/DL (ref 11.5–14.8)
LYMPHOCYTES NFR BLD AUTO: 2.3 /CMM (ref 0.8–4.8)
LYMPHOCYTES NFR BLD AUTO: 29.9 % (ref 20–44)
MAGNESIUM SERPL-MCNC: 1.9 MG/DL (ref 1.8–2.4)
MCHC RBC AUTO-ENTMCNC: 34 G/DL (ref 31–36)
MCV RBC AUTO: 84 FL (ref 82–100)
MONOCYTES NFR BLD AUTO: 0.8 /CMM (ref 0.1–1.3)
MONOCYTES NFR BLD AUTO: 9.9 % (ref 2–12)
NEUTROPHILS # BLD AUTO: 4.4 /CMM (ref 1.8–8.9)
NEUTROPHILS NFR BLD AUTO: 57.4 % (ref 43–81)
PHOSPHATE SERPL-MCNC: 2.8 MG/DL (ref 2.5–4.9)
PLATELET # BLD AUTO: 329 /CMM (ref 150–450)
POTASSIUM SERPL-SCNC: 4.5 MMOL/L (ref 3.5–5.1)
RBC # BLD AUTO: 3.39 MIL/UL (ref 4–5.2)
SODIUM SERPL-SCNC: 138 MMOL/L (ref 136–145)
WBC NRBC COR # BLD AUTO: 7.7 K/UL (ref 4.3–11)

## 2020-05-14 RX ADMIN — ALBUTEROL SULFATE SCH PUFF: 90 AEROSOL, METERED RESPIRATORY (INHALATION) at 22:40

## 2020-05-14 RX ADMIN — Medication SCH MG: at 10:31

## 2020-05-14 RX ADMIN — CLOTRIMAZOLE SCH APPLIC: 1 CREAM TOPICAL at 17:18

## 2020-05-14 RX ADMIN — BACLOFEN SCH MG: 10 TABLET ORAL at 14:15

## 2020-05-14 RX ADMIN — HYDROMORPHONE HYDROCHLORIDE PRN MG: 2 TABLET ORAL at 04:54

## 2020-05-14 RX ADMIN — LABETALOL HCL SCH MG: 100 TABLET, FILM COATED ORAL at 14:14

## 2020-05-14 RX ADMIN — ASPIRIN 81 MG SCH MG: 81 TABLET ORAL at 10:31

## 2020-05-14 RX ADMIN — INSULIN HUMAN PRN UNIT: 100 INJECTION, SOLUTION PARENTERAL at 17:35

## 2020-05-14 RX ADMIN — CLOTRIMAZOLE SCH APPLIC: 1 CREAM TOPICAL at 11:24

## 2020-05-14 RX ADMIN — INSULIN HUMAN PRN UNIT: 100 INJECTION, SOLUTION PARENTERAL at 12:32

## 2020-05-14 RX ADMIN — LABETALOL HCL SCH MG: 100 TABLET, FILM COATED ORAL at 10:35

## 2020-05-14 RX ADMIN — FLUTICASONE FUROATE AND VILANTEROL TRIFENATATE SCH EACH: 100; 25 POWDER RESPIRATORY (INHALATION) at 21:28

## 2020-05-14 RX ADMIN — BACLOFEN SCH MG: 10 TABLET ORAL at 10:33

## 2020-05-14 RX ADMIN — LIDOCAINE SCH EA: 50 PATCH CUTANEOUS at 10:30

## 2020-05-14 RX ADMIN — Medication SCH EACH: at 07:30

## 2020-05-14 RX ADMIN — HYDROMORPHONE HYDROCHLORIDE PRN MG: 2 TABLET ORAL at 10:58

## 2020-05-14 RX ADMIN — PREGABALIN SCH MG: 25 CAPSULE ORAL at 14:15

## 2020-05-14 RX ADMIN — NITROFURANTOIN MONOHYDRATE AND NITROFURANTOIN, MACROCRYSTALLINE SCH MG: 75; 25 CAPSULE ORAL at 21:24

## 2020-05-14 RX ADMIN — PREGABALIN SCH MG: 25 CAPSULE ORAL at 10:32

## 2020-05-14 RX ADMIN — ATORVASTATIN CALCIUM SCH MG: 40 TABLET, FILM COATED ORAL at 21:16

## 2020-05-14 RX ADMIN — BACLOFEN SCH MG: 10 TABLET ORAL at 21:16

## 2020-05-14 RX ADMIN — Medication SCH EACH: at 22:40

## 2020-05-14 RX ADMIN — CLOPIDOGREL BISULFATE SCH MG: 75 TABLET, FILM COATED ORAL at 10:33

## 2020-05-14 RX ADMIN — HYDROMORPHONE HYDROCHLORIDE PRN MG: 2 TABLET ORAL at 22:40

## 2020-05-14 RX ADMIN — PAROXETINE HYDROCHLORIDE SCH MG: 10 TABLET, FILM COATED ORAL at 10:32

## 2020-05-14 RX ADMIN — ALBUTEROL SULFATE SCH PUFF: 90 AEROSOL, METERED RESPIRATORY (INHALATION) at 19:50

## 2020-05-14 RX ADMIN — EZETIMIBE SCH MG: 10 TABLET ORAL at 10:32

## 2020-05-14 RX ADMIN — HYDROMORPHONE HYDROCHLORIDE PRN MG: 2 TABLET ORAL at 17:18

## 2020-05-14 RX ADMIN — ALBUTEROL SULFATE SCH PUFF: 90 AEROSOL, METERED RESPIRATORY (INHALATION) at 17:10

## 2020-05-14 RX ADMIN — Medication SCH MG: at 10:32

## 2020-05-14 RX ADMIN — Medication SCH EACH: at 17:10

## 2020-05-14 RX ADMIN — LABETALOL HCL SCH MG: 100 TABLET, FILM COATED ORAL at 21:17

## 2020-05-14 RX ADMIN — PREGABALIN SCH MG: 25 CAPSULE ORAL at 21:17

## 2020-05-14 RX ADMIN — Medication SCH EACH: at 12:12

## 2020-05-14 RX ADMIN — LOSARTAN POTASSIUM SCH MG: 25 TABLET, FILM COATED ORAL at 10:33

## 2020-05-14 RX ADMIN — Medication SCH MG: at 17:10

## 2020-05-14 RX ADMIN — LINAGLIPTIN SCH MG: 5 TABLET, FILM COATED ORAL at 10:31

## 2020-05-14 RX ADMIN — Medication SCH MG: at 17:00

## 2020-05-14 RX ADMIN — FLUTICASONE FUROATE AND VILANTEROL TRIFENATATE SCH EACH: 100; 25 POWDER RESPIRATORY (INHALATION) at 11:25

## 2020-05-14 RX ADMIN — PANTOPRAZOLE SODIUM SCH MG: 40 TABLET, DELAYED RELEASE ORAL at 10:30

## 2020-05-14 RX ADMIN — Medication SCH MG: at 14:15

## 2020-05-14 RX ADMIN — POLYETHYLENE GLYCOL 3350 PRN GM: 17 POWDER, FOR SOLUTION ORAL at 10:30

## 2020-05-14 NOTE — NUR
RN CLOSE NOTES





PATIENT IS LAYING IN BED. CALL LIGHT IS WITHIN REACH. BED IS IN LOWEST LOCKED POSITION WITH 
SIDE RAILS UP X2, SEMI FOWLERS. ALL DUE MEDS HAVE BEEN GIVEN. IV ON L HAND #20G IS PATENT 
AND INTACT RUNNING NS @ 75MLS/HR. A/O X4. APPEARS COMFORTABLE/ LAST PAIN MEDS GIVEN 0454.  
CALL LIGHT IS WITHIN REACH. WILL ENDORSE TO AM NURSE.

## 2020-05-14 NOTE — NUR
TELE RN NOTES



RECEIVED PT IN BED AWAKE AND ABLE TO MAKE NEEDS KNOWN.  PT A/O X3.  RESPIRATIONS EVEN AND 
UNLABORED WITH NO S/S OF ACUTE DISTRESS OR SOB NOTED.  NO COMPLAINTS OF PAIN AT THIS TIME.  
PT NOTED WITH ARLETTE #20G PATENT AND INTACT AND SL.  SAFETY MEASURES IN PLACE WITH BED IN 
LOWEST LOCKED POSITION WITH SIDE RAILS UPX2.  CALL LIGHT WITHIN REACH.  WILL CONTINUE TO 
MONITOR.

## 2020-05-14 NOTE — NUR
ms rn

received on bed, awake,laert,oriented x4,not in any form of distress, respirations even and 
unlabored,no sob noted, lungs are diminished,abdomen soft,positive bowel sounds,denies pain 
at this itme, wood to gravity w. yellowish urine output,will monitor patient.

## 2020-05-15 VITALS — SYSTOLIC BLOOD PRESSURE: 136 MMHG | DIASTOLIC BLOOD PRESSURE: 72 MMHG

## 2020-05-15 VITALS — DIASTOLIC BLOOD PRESSURE: 66 MMHG | SYSTOLIC BLOOD PRESSURE: 142 MMHG

## 2020-05-15 VITALS — SYSTOLIC BLOOD PRESSURE: 144 MMHG | DIASTOLIC BLOOD PRESSURE: 61 MMHG

## 2020-05-15 VITALS — DIASTOLIC BLOOD PRESSURE: 72 MMHG | SYSTOLIC BLOOD PRESSURE: 150 MMHG

## 2020-05-15 VITALS — DIASTOLIC BLOOD PRESSURE: 75 MMHG | SYSTOLIC BLOOD PRESSURE: 136 MMHG

## 2020-05-15 VITALS — DIASTOLIC BLOOD PRESSURE: 61 MMHG | SYSTOLIC BLOOD PRESSURE: 144 MMHG

## 2020-05-15 VITALS — DIASTOLIC BLOOD PRESSURE: 67 MMHG | SYSTOLIC BLOOD PRESSURE: 170 MMHG

## 2020-05-15 RX ADMIN — CLOTRIMAZOLE SCH APPLIC: 1 CREAM TOPICAL at 08:18

## 2020-05-15 RX ADMIN — NITROFURANTOIN MONOHYDRATE AND NITROFURANTOIN, MACROCRYSTALLINE SCH MG: 75; 25 CAPSULE ORAL at 21:56

## 2020-05-15 RX ADMIN — ASPIRIN 81 MG SCH MG: 81 TABLET ORAL at 08:17

## 2020-05-15 RX ADMIN — INSULIN HUMAN PRN UNIT: 100 INJECTION, SOLUTION PARENTERAL at 08:21

## 2020-05-15 RX ADMIN — ALBUTEROL SULFATE SCH PUFF: 90 AEROSOL, METERED RESPIRATORY (INHALATION) at 12:43

## 2020-05-15 RX ADMIN — INSULIN HUMAN PRN UNIT: 100 INJECTION, SOLUTION PARENTERAL at 22:40

## 2020-05-15 RX ADMIN — PREGABALIN SCH MG: 25 CAPSULE ORAL at 14:07

## 2020-05-15 RX ADMIN — Medication SCH MG: at 08:17

## 2020-05-15 RX ADMIN — ATORVASTATIN CALCIUM SCH MG: 40 TABLET, FILM COATED ORAL at 21:56

## 2020-05-15 RX ADMIN — POLYETHYLENE GLYCOL 3350 PRN GM: 17 POWDER, FOR SOLUTION ORAL at 11:58

## 2020-05-15 RX ADMIN — ALBUTEROL SULFATE SCH PUFF: 90 AEROSOL, METERED RESPIRATORY (INHALATION) at 20:03

## 2020-05-15 RX ADMIN — PANTOPRAZOLE SODIUM SCH MG: 40 TABLET, DELAYED RELEASE ORAL at 08:19

## 2020-05-15 RX ADMIN — Medication SCH MG: at 17:20

## 2020-05-15 RX ADMIN — Medication SCH MG: at 08:20

## 2020-05-15 RX ADMIN — Medication SCH EACH: at 17:21

## 2020-05-15 RX ADMIN — Medication SCH MG: at 12:37

## 2020-05-15 RX ADMIN — PAROXETINE HYDROCHLORIDE SCH MG: 10 TABLET, FILM COATED ORAL at 08:18

## 2020-05-15 RX ADMIN — PREGABALIN SCH MG: 25 CAPSULE ORAL at 08:18

## 2020-05-15 RX ADMIN — HYDROMORPHONE HYDROCHLORIDE PRN MG: 2 TABLET ORAL at 08:19

## 2020-05-15 RX ADMIN — ALBUTEROL SULFATE SCH PUFF: 90 AEROSOL, METERED RESPIRATORY (INHALATION) at 14:10

## 2020-05-15 RX ADMIN — Medication SCH EACH: at 06:47

## 2020-05-15 RX ADMIN — BACLOFEN SCH MG: 10 TABLET ORAL at 21:56

## 2020-05-15 RX ADMIN — LABETALOL HCL SCH MG: 100 TABLET, FILM COATED ORAL at 08:20

## 2020-05-15 RX ADMIN — CLOTRIMAZOLE SCH APPLIC: 1 CREAM TOPICAL at 17:59

## 2020-05-15 RX ADMIN — Medication SCH EACH: at 22:39

## 2020-05-15 RX ADMIN — LINAGLIPTIN SCH MG: 5 TABLET, FILM COATED ORAL at 08:19

## 2020-05-15 RX ADMIN — Medication SCH APPLIC: at 12:38

## 2020-05-15 RX ADMIN — Medication SCH APPLIC: at 17:26

## 2020-05-15 RX ADMIN — Medication SCH EACH: at 11:58

## 2020-05-15 RX ADMIN — LIDOCAINE SCH EA: 50 PATCH CUTANEOUS at 08:18

## 2020-05-15 RX ADMIN — Medication SCH MG: at 17:00

## 2020-05-15 RX ADMIN — BACLOFEN SCH MG: 10 TABLET ORAL at 08:18

## 2020-05-15 RX ADMIN — PREGABALIN SCH MG: 25 CAPSULE ORAL at 21:56

## 2020-05-15 RX ADMIN — ALBUTEROL SULFATE SCH PUFF: 90 AEROSOL, METERED RESPIRATORY (INHALATION) at 03:38

## 2020-05-15 RX ADMIN — LABETALOL HCL SCH MG: 100 TABLET, FILM COATED ORAL at 14:07

## 2020-05-15 RX ADMIN — BACLOFEN SCH MG: 10 TABLET ORAL at 14:07

## 2020-05-15 RX ADMIN — LABETALOL HCL SCH MG: 100 TABLET, FILM COATED ORAL at 21:57

## 2020-05-15 RX ADMIN — LOSARTAN POTASSIUM SCH MG: 25 TABLET, FILM COATED ORAL at 08:19

## 2020-05-15 RX ADMIN — CLOPIDOGREL BISULFATE SCH MG: 75 TABLET, FILM COATED ORAL at 08:18

## 2020-05-15 RX ADMIN — ALBUTEROL SULFATE SCH PUFF: 90 AEROSOL, METERED RESPIRATORY (INHALATION) at 07:35

## 2020-05-15 RX ADMIN — Medication SCH MG: at 08:18

## 2020-05-15 RX ADMIN — FLUTICASONE FUROATE AND VILANTEROL TRIFENATATE SCH EACH: 100; 25 POWDER RESPIRATORY (INHALATION) at 11:25

## 2020-05-15 RX ADMIN — EZETIMIBE SCH MG: 10 TABLET ORAL at 08:17

## 2020-05-15 RX ADMIN — HYDROMORPHONE HYDROCHLORIDE PRN MG: 2 TABLET ORAL at 14:17

## 2020-05-15 RX ADMIN — HYDROMORPHONE HYDROCHLORIDE PRN MG: 2 TABLET ORAL at 03:37

## 2020-05-15 RX ADMIN — HYDROMORPHONE HYDROCHLORIDE PRN MG: 2 TABLET ORAL at 20:02

## 2020-05-15 NOTE — NUR
TELE RN NOTES



RECEIVED PT IN CHAIR AWAKE AND ABLE TO MAKE NEEDS KNOWN.  PT A/O X3.  RESPIRATIONS EVEN AND 
UNLABORED WITH NO S/S OF ACUTE DISTRESS OR SOB NOTED.  NO COMPLAINTS OF PAIN AT THIS TIME.  
PT NOTED WITH ARLETTE #20G PATENT AND INTACT AND SL.  SAFETY MEASURES IN PLACE WITH BED IN 
LOWEST LOCKED POSITION WITH SIDE RAILS UPX2.  CALL LIGHT WITHIN REACH.  WILL CONTINUE TO 
MONITOR.

## 2020-05-15 NOTE — NUR
END OF SHIFT SUMMARY NOTES



PT IS A/OX4, AFEBRILE. CARDIAC-MONITORED NSR, VSS. ON O2 @2L/MIN VIA NC TO KEEP O2 >92%. 
RESPIRATIONS ARE EVEN AND UNLABORED, NOT IN ANY ACUTE DISTRESS NOTED. PT DENIES ANY PAIN, NO 
C/O CHEST PAIN. LEFT HAND 20G INTACT, NO INFITLRATION NOTED. DRESSING KEPT CLEAN AND DRY. 
+FLATUS, X1BM DURING SHIFT. TOLERATING PO W/ NO N/V. ALL NEEDS MET AND RENDERED. SAFETY 
MEASURES ARE IN PLACE. CALL LIGHT IS LEFT WITHIN REACH. WILL MONITOR AND CONTINUE POC.

## 2020-05-15 NOTE — NUR
TELE RN NOTES



PT IN BED AWAKE AND ABLE TO MAKE NEEDS KNOWN.  PT A/O X3.  RESPIRATIONS EVEN AND UNLABORED 
WITH NO S/S OF ACUTE DISTRESS OR SOB NOTED THROUGHOUT SHIFT.  NO COMPLAINTS OF PAIN AT THIS 
TIME.  PT NOTED WITH LHAND #20G PATENT AND INTACT AND SL.  PT KEPT CLEAN, DRY, AND 
COMFORTABLE.  SAFETY MEASURES IN PLACE WITH BED IN LOWEST LOCKED POSITION WITH SIDE RAILS 
UPX2.  CALL LIGHT WITHIN REACH.  WILL ENDORSE TO ONCOMING NURSE FOR NAHUN.

## 2020-05-16 VITALS — DIASTOLIC BLOOD PRESSURE: 57 MMHG | SYSTOLIC BLOOD PRESSURE: 135 MMHG

## 2020-05-16 VITALS — SYSTOLIC BLOOD PRESSURE: 150 MMHG | DIASTOLIC BLOOD PRESSURE: 74 MMHG

## 2020-05-16 VITALS — DIASTOLIC BLOOD PRESSURE: 74 MMHG | SYSTOLIC BLOOD PRESSURE: 150 MMHG

## 2020-05-16 VITALS — DIASTOLIC BLOOD PRESSURE: 72 MMHG | SYSTOLIC BLOOD PRESSURE: 159 MMHG

## 2020-05-16 VITALS — SYSTOLIC BLOOD PRESSURE: 135 MMHG | DIASTOLIC BLOOD PRESSURE: 57 MMHG

## 2020-05-16 LAB
BASOPHILS # BLD AUTO: 0.1 /CMM (ref 0–0.2)
BASOPHILS NFR BLD AUTO: 1.2 % (ref 0–2)
BUN SERPL-MCNC: 16 MG/DL (ref 7–18)
CALCIUM SERPL-MCNC: 8.6 MG/DL (ref 8.5–10.1)
CHLORIDE SERPL-SCNC: 99 MMOL/L (ref 98–107)
CO2 SERPL-SCNC: 29 MMOL/L (ref 21–32)
CREAT SERPL-MCNC: 0.6 MG/DL (ref 0.6–1.3)
EOSINOPHIL NFR BLD AUTO: 5.6 % (ref 0–6)
GLUCOSE SERPL-MCNC: 97 MG/DL (ref 74–106)
HCT VFR BLD AUTO: 27 % (ref 33–45)
HGB BLD-MCNC: 9.2 G/DL (ref 11.5–14.8)
LYMPHOCYTES NFR BLD AUTO: 2.2 /CMM (ref 0.8–4.8)
LYMPHOCYTES NFR BLD AUTO: 30.5 % (ref 20–44)
MAGNESIUM SERPL-MCNC: 1.9 MG/DL (ref 1.8–2.4)
MCHC RBC AUTO-ENTMCNC: 35 G/DL (ref 31–36)
MCV RBC AUTO: 84 FL (ref 82–100)
MONOCYTES NFR BLD AUTO: 0.9 /CMM (ref 0.1–1.3)
MONOCYTES NFR BLD AUTO: 12.1 % (ref 2–12)
NEUTROPHILS # BLD AUTO: 3.7 /CMM (ref 1.8–8.9)
NEUTROPHILS NFR BLD AUTO: 50.6 % (ref 43–81)
PHOSPHATE SERPL-MCNC: 4.1 MG/DL (ref 2.5–4.9)
PLATELET # BLD AUTO: 292 /CMM (ref 150–450)
POTASSIUM SERPL-SCNC: 4.1 MMOL/L (ref 3.5–5.1)
RBC # BLD AUTO: 3.16 MIL/UL (ref 4–5.2)
SODIUM SERPL-SCNC: 133 MMOL/L (ref 136–145)
WBC NRBC COR # BLD AUTO: 7.4 K/UL (ref 4.3–11)

## 2020-05-16 RX ADMIN — ALBUTEROL SULFATE SCH PUFF: 90 AEROSOL, METERED RESPIRATORY (INHALATION) at 10:09

## 2020-05-16 RX ADMIN — Medication SCH EACH: at 16:51

## 2020-05-16 RX ADMIN — CLOTRIMAZOLE SCH APPLIC: 1 CREAM TOPICAL at 10:08

## 2020-05-16 RX ADMIN — INSULIN HUMAN PRN UNIT: 100 INJECTION, SOLUTION PARENTERAL at 12:17

## 2020-05-16 RX ADMIN — PAROXETINE HYDROCHLORIDE SCH MG: 10 TABLET, FILM COATED ORAL at 09:33

## 2020-05-16 RX ADMIN — LIDOCAINE SCH EA: 50 PATCH CUTANEOUS at 09:32

## 2020-05-16 RX ADMIN — PREGABALIN SCH MG: 25 CAPSULE ORAL at 13:14

## 2020-05-16 RX ADMIN — ALBUTEROL SULFATE SCH PUFF: 90 AEROSOL, METERED RESPIRATORY (INHALATION) at 00:22

## 2020-05-16 RX ADMIN — FUROSEMIDE SCH MG: 20 TABLET ORAL at 09:34

## 2020-05-16 RX ADMIN — HYDROMORPHONE HYDROCHLORIDE PRN MG: 2 TABLET ORAL at 10:07

## 2020-05-16 RX ADMIN — Medication SCH MG: at 09:40

## 2020-05-16 RX ADMIN — CLOTRIMAZOLE SCH APPLIC: 1 CREAM TOPICAL at 16:49

## 2020-05-16 RX ADMIN — EZETIMIBE SCH MG: 10 TABLET ORAL at 09:33

## 2020-05-16 RX ADMIN — Medication SCH MG: at 09:34

## 2020-05-16 RX ADMIN — LABETALOL HCL SCH MG: 100 TABLET, FILM COATED ORAL at 21:23

## 2020-05-16 RX ADMIN — LINAGLIPTIN SCH MG: 5 TABLET, FILM COATED ORAL at 09:34

## 2020-05-16 RX ADMIN — ASPIRIN 81 MG SCH MG: 81 TABLET ORAL at 09:33

## 2020-05-16 RX ADMIN — ATORVASTATIN CALCIUM SCH MG: 40 TABLET, FILM COATED ORAL at 21:22

## 2020-05-16 RX ADMIN — LABETALOL HCL SCH MG: 100 TABLET, FILM COATED ORAL at 09:34

## 2020-05-16 RX ADMIN — Medication SCH EACH: at 22:22

## 2020-05-16 RX ADMIN — Medication SCH MG: at 09:00

## 2020-05-16 RX ADMIN — ALBUTEROL SULFATE SCH PUFF: 90 AEROSOL, METERED RESPIRATORY (INHALATION) at 20:06

## 2020-05-16 RX ADMIN — NITROFURANTOIN MONOHYDRATE AND NITROFURANTOIN, MACROCRYSTALLINE SCH MG: 75; 25 CAPSULE ORAL at 21:25

## 2020-05-16 RX ADMIN — Medication SCH APPLIC: at 10:10

## 2020-05-16 RX ADMIN — Medication SCH MG: at 16:48

## 2020-05-16 RX ADMIN — VALACYCLOVIR HYDROCHLORIDE SCH MG: 500 TABLET, FILM COATED ORAL at 09:33

## 2020-05-16 RX ADMIN — BACLOFEN SCH MG: 10 TABLET ORAL at 21:22

## 2020-05-16 RX ADMIN — PANTOPRAZOLE SODIUM SCH MG: 40 TABLET, DELAYED RELEASE ORAL at 09:36

## 2020-05-16 RX ADMIN — Medication SCH APPLIC: at 16:51

## 2020-05-16 RX ADMIN — HYDROMORPHONE HYDROCHLORIDE PRN MG: 2 TABLET ORAL at 15:15

## 2020-05-16 RX ADMIN — LABETALOL HCL SCH MG: 100 TABLET, FILM COATED ORAL at 13:14

## 2020-05-16 RX ADMIN — Medication SCH MG: at 09:33

## 2020-05-16 RX ADMIN — ACETAMINOPHEN PRN MG: 325 TABLET ORAL at 00:23

## 2020-05-16 RX ADMIN — Medication SCH APPLIC: at 13:28

## 2020-05-16 RX ADMIN — ACETAMINOPHEN PRN MG: 325 TABLET ORAL at 18:43

## 2020-05-16 RX ADMIN — BACLOFEN SCH MG: 10 TABLET ORAL at 13:14

## 2020-05-16 RX ADMIN — PREGABALIN SCH MG: 25 CAPSULE ORAL at 09:33

## 2020-05-16 RX ADMIN — ALBUTEROL SULFATE SCH PUFF: 90 AEROSOL, METERED RESPIRATORY (INHALATION) at 13:28

## 2020-05-16 RX ADMIN — FLUTICASONE FUROATE AND VILANTEROL TRIFENATATE SCH EACH: 100; 25 POWDER RESPIRATORY (INHALATION) at 10:09

## 2020-05-16 RX ADMIN — LOSARTAN POTASSIUM SCH MG: 25 TABLET, FILM COATED ORAL at 09:37

## 2020-05-16 RX ADMIN — BACLOFEN SCH MG: 10 TABLET ORAL at 09:34

## 2020-05-16 RX ADMIN — ALBUTEROL SULFATE SCH PUFF: 90 AEROSOL, METERED RESPIRATORY (INHALATION) at 16:44

## 2020-05-16 RX ADMIN — Medication SCH EACH: at 07:16

## 2020-05-16 RX ADMIN — Medication SCH EACH: at 12:16

## 2020-05-16 RX ADMIN — ALBUTEROL SULFATE SCH PUFF: 90 AEROSOL, METERED RESPIRATORY (INHALATION) at 04:25

## 2020-05-16 RX ADMIN — HYDROMORPHONE HYDROCHLORIDE PRN MG: 2 TABLET ORAL at 20:20

## 2020-05-16 RX ADMIN — CLOPIDOGREL BISULFATE SCH MG: 75 TABLET, FILM COATED ORAL at 09:34

## 2020-05-16 RX ADMIN — Medication SCH MG: at 13:14

## 2020-05-16 RX ADMIN — PREGABALIN SCH MG: 25 CAPSULE ORAL at 21:22

## 2020-05-16 NOTE — NUR
SPOKE TO SARAH OF P.T. WHO STATED THAT THEY SEE ORTHO PTS ON WEEKENDS AND WILL SEE  PT ON 
MONDAY FOR [facility]T. [place]

## 2020-05-16 NOTE — NUR
MS RN NOTES



PT IN BED AWAKE AND ABLE TO MAKE NEEDS KNOWN.  PT A/O X3.  RESPIRATIONS EVEN AND UNLABORED 
WITH NO S/S OF ACUTE DISTRESS OR SOB NOTED THROUGHOUT SHIFT.  NO COMPLAINTS OF PAIN AT THIS 
TIME.  PT NOTED WITH LT HAND #20G PATENT AND INTACT AND SL.  PT KEPT CLEAN, DRY, AND 
COMFORTABLE. WITH DUBOIS CATHETER DRAINING CLEAR YELLOW URINE OUTPUT. SAFETY MEASURES IN 
PLACE WITH BED IN LOWEST LOCKED POSITION WITH SIDE RAILS UPX2.  CALL LIGHT WITHIN REACH.

## 2020-05-16 NOTE — NUR
PT SITTING IN BED WATCHING TV AND BUSY ON HER CELL PHONE.DENYING ANY DISTRESS BUT ANXIOUS OF 
NOT BEING ABLE TO VOID.INSTRUCTED PT TO DRINK FLUIDS AND JUST TO RELAX. WILL MONITOR.

## 2020-05-17 VITALS — SYSTOLIC BLOOD PRESSURE: 142 MMHG | DIASTOLIC BLOOD PRESSURE: 69 MMHG

## 2020-05-17 VITALS — SYSTOLIC BLOOD PRESSURE: 168 MMHG | DIASTOLIC BLOOD PRESSURE: 68 MMHG

## 2020-05-17 VITALS — SYSTOLIC BLOOD PRESSURE: 179 MMHG | DIASTOLIC BLOOD PRESSURE: 82 MMHG

## 2020-05-17 LAB
BASOPHILS # BLD AUTO: 0.1 /CMM (ref 0–0.2)
BASOPHILS NFR BLD AUTO: 1.2 % (ref 0–2)
BUN SERPL-MCNC: 16 MG/DL (ref 7–18)
CALCIUM SERPL-MCNC: 8.5 MG/DL (ref 8.5–10.1)
CHLORIDE SERPL-SCNC: 92 MMOL/L (ref 98–107)
CO2 SERPL-SCNC: 31 MMOL/L (ref 21–32)
CREAT SERPL-MCNC: 0.7 MG/DL (ref 0.6–1.3)
EOSINOPHIL NFR BLD AUTO: 5.7 % (ref 0–6)
GLUCOSE SERPL-MCNC: 132 MG/DL (ref 74–106)
HCT VFR BLD AUTO: 27 % (ref 33–45)
HGB BLD-MCNC: 9 G/DL (ref 11.5–14.8)
LYMPHOCYTES NFR BLD AUTO: 2.6 /CMM (ref 0.8–4.8)
LYMPHOCYTES NFR BLD AUTO: 30.9 % (ref 20–44)
MAGNESIUM SERPL-MCNC: 1.9 MG/DL (ref 1.8–2.4)
MCHC RBC AUTO-ENTMCNC: 33 G/DL (ref 31–36)
MCV RBC AUTO: 84 FL (ref 82–100)
MONOCYTES NFR BLD AUTO: 0.8 /CMM (ref 0.1–1.3)
MONOCYTES NFR BLD AUTO: 9.6 % (ref 2–12)
NEUTROPHILS # BLD AUTO: 4.4 /CMM (ref 1.8–8.9)
NEUTROPHILS NFR BLD AUTO: 52.6 % (ref 43–81)
OSMOLALITY UR: 140 MOS/KG (ref 340–1090)
PHOSPHATE SERPL-MCNC: 3.8 MG/DL (ref 2.5–4.9)
PLATELET # BLD AUTO: 313 /CMM (ref 150–450)
POTASSIUM SERPL-SCNC: 4 MMOL/L (ref 3.5–5.1)
RBC # BLD AUTO: 3.24 MIL/UL (ref 4–5.2)
SODIUM SERPL-SCNC: 127 MMOL/L (ref 136–145)
SODIUM UR-SCNC: 24 MMOL/L (ref 40–220)
WBC NRBC COR # BLD AUTO: 8.3 K/UL (ref 4.3–11)

## 2020-05-17 RX ADMIN — LABETALOL HCL SCH MG: 100 TABLET, FILM COATED ORAL at 14:17

## 2020-05-17 RX ADMIN — LINAGLIPTIN SCH MG: 5 TABLET, FILM COATED ORAL at 09:02

## 2020-05-17 RX ADMIN — Medication SCH MG: at 08:58

## 2020-05-17 RX ADMIN — LABETALOL HCL SCH MG: 100 TABLET, FILM COATED ORAL at 21:12

## 2020-05-17 RX ADMIN — Medication SCH EACH: at 17:07

## 2020-05-17 RX ADMIN — Medication SCH EACH: at 12:20

## 2020-05-17 RX ADMIN — NITROFURANTOIN MONOHYDRATE AND NITROFURANTOIN, MACROCRYSTALLINE SCH MG: 75; 25 CAPSULE ORAL at 21:12

## 2020-05-17 RX ADMIN — HYDROMORPHONE HYDROCHLORIDE PRN MG: 2 TABLET ORAL at 09:15

## 2020-05-17 RX ADMIN — ASPIRIN 81 MG SCH MG: 81 TABLET ORAL at 08:59

## 2020-05-17 RX ADMIN — POLYETHYLENE GLYCOL 3350 PRN GM: 17 POWDER, FOR SOLUTION ORAL at 15:30

## 2020-05-17 RX ADMIN — HYDROMORPHONE HYDROCHLORIDE PRN MG: 2 TABLET ORAL at 00:39

## 2020-05-17 RX ADMIN — CLOPIDOGREL BISULFATE SCH MG: 75 TABLET, FILM COATED ORAL at 09:16

## 2020-05-17 RX ADMIN — ALBUTEROL SULFATE SCH PUFF: 90 AEROSOL, METERED RESPIRATORY (INHALATION) at 19:35

## 2020-05-17 RX ADMIN — ALBUTEROL SULFATE SCH PUFF: 90 AEROSOL, METERED RESPIRATORY (INHALATION) at 09:07

## 2020-05-17 RX ADMIN — Medication SCH MG: at 17:06

## 2020-05-17 RX ADMIN — Medication SCH MG: at 08:59

## 2020-05-17 RX ADMIN — ALBUTEROL SULFATE SCH PUFF: 90 AEROSOL, METERED RESPIRATORY (INHALATION) at 12:55

## 2020-05-17 RX ADMIN — Medication SCH EACH: at 22:48

## 2020-05-17 RX ADMIN — Medication SCH APPLIC: at 12:56

## 2020-05-17 RX ADMIN — INSULIN HUMAN PRN UNIT: 100 INJECTION, SOLUTION PARENTERAL at 13:01

## 2020-05-17 RX ADMIN — PANTOPRAZOLE SODIUM SCH MG: 40 TABLET, DELAYED RELEASE ORAL at 09:01

## 2020-05-17 RX ADMIN — FLUTICASONE FUROATE AND VILANTEROL TRIFENATATE SCH EACH: 100; 25 POWDER RESPIRATORY (INHALATION) at 09:06

## 2020-05-17 RX ADMIN — PAROXETINE HYDROCHLORIDE SCH MG: 10 TABLET, FILM COATED ORAL at 09:01

## 2020-05-17 RX ADMIN — ALBUTEROL SULFATE SCH PUFF: 90 AEROSOL, METERED RESPIRATORY (INHALATION) at 00:03

## 2020-05-17 RX ADMIN — PREGABALIN SCH MG: 25 CAPSULE ORAL at 09:16

## 2020-05-17 RX ADMIN — INSULIN HUMAN PRN UNIT: 100 INJECTION, SOLUTION PARENTERAL at 17:11

## 2020-05-17 RX ADMIN — LIDOCAINE SCH EA: 50 PATCH CUTANEOUS at 09:04

## 2020-05-17 RX ADMIN — Medication SCH EACH: at 07:01

## 2020-05-17 RX ADMIN — VALACYCLOVIR HYDROCHLORIDE SCH MG: 500 TABLET, FILM COATED ORAL at 09:15

## 2020-05-17 RX ADMIN — ALBUTEROL SULFATE SCH PUFF: 90 AEROSOL, METERED RESPIRATORY (INHALATION) at 04:04

## 2020-05-17 RX ADMIN — LOSARTAN POTASSIUM SCH MG: 25 TABLET, FILM COATED ORAL at 08:58

## 2020-05-17 RX ADMIN — BACLOFEN SCH MG: 10 TABLET ORAL at 08:58

## 2020-05-17 RX ADMIN — Medication SCH MG: at 13:04

## 2020-05-17 RX ADMIN — BACLOFEN SCH MG: 10 TABLET ORAL at 21:12

## 2020-05-17 RX ADMIN — ALBUTEROL SULFATE SCH PUFF: 90 AEROSOL, METERED RESPIRATORY (INHALATION) at 23:01

## 2020-05-17 RX ADMIN — Medication SCH APPLIC: at 09:06

## 2020-05-17 RX ADMIN — Medication SCH APPLIC: at 17:07

## 2020-05-17 RX ADMIN — LABETALOL HCL SCH MG: 100 TABLET, FILM COATED ORAL at 09:02

## 2020-05-17 RX ADMIN — CLOTRIMAZOLE SCH APPLIC: 1 CREAM TOPICAL at 09:05

## 2020-05-17 RX ADMIN — ATORVASTATIN CALCIUM SCH MG: 40 TABLET, FILM COATED ORAL at 21:12

## 2020-05-17 RX ADMIN — HYDROMORPHONE HYDROCHLORIDE PRN MG: 2 TABLET ORAL at 14:18

## 2020-05-17 RX ADMIN — ALBUTEROL SULFATE SCH PUFF: 90 AEROSOL, METERED RESPIRATORY (INHALATION) at 15:31

## 2020-05-17 RX ADMIN — BACLOFEN SCH MG: 10 TABLET ORAL at 13:05

## 2020-05-17 RX ADMIN — PREGABALIN SCH MG: 25 CAPSULE ORAL at 21:12

## 2020-05-17 RX ADMIN — CLOTRIMAZOLE SCH APPLIC: 1 CREAM TOPICAL at 17:07

## 2020-05-17 RX ADMIN — HYDROMORPHONE HYDROCHLORIDE PRN MG: 2 TABLET ORAL at 18:41

## 2020-05-17 RX ADMIN — EZETIMIBE SCH MG: 10 TABLET ORAL at 08:58

## 2020-05-17 RX ADMIN — PREGABALIN SCH MG: 25 CAPSULE ORAL at 13:04

## 2020-05-17 NOTE — NUR
rm chase

 MEDICATION WERE ADMINISTERED FOR PAIN EFFECTIVE, PATIENT SITTING IN THE CHAIR, STABLE, NO 
ACUTE RESPIRATORY DISTRESS, V/S TAKEN /68, P-69, ENDORSED ONCOMING NURSE FOLLOW PLAN 
OF CARE.

## 2020-05-17 NOTE — NUR
MS RN NOTES



PT IN BED AWAKE AND ABLE TO MAKE NEEDS KNOWN.  PT A/O X3.  RESPIRATIONS EVEN AND UNLABORED 
WITH NO S/S OF ACUTE DISTRESS OR SOB NOTED THROUGHOUT SHIFT.  NO COMPLAINTS OF PAIN AT THIS 
TIME.  PT NOTED WITH LHAND #20G PATENT AND INTACT AND SL.  PT KEPT CLEAN, DRY, AND 
COMFORTABLE.  SAFETY MEASURES IN PLACE WITH BED IN LOWEST LOCKED POSITION WITH SIDE RAILS 
UPX2.  CALL LIGHT WITHIN REACH.  WILL ENDORSE TO ONCOMING NURSE FOR NAHUN.

## 2020-05-17 NOTE — NUR
RN NOTES

RECEIVED PATIENT IN THE BED A/O X3, NO ACUTE RESPIRATORY DISTRESS, V/S STABLE, PATIENT HAS 
DISTENDED ABDOMEN,OBESE,  EDEMA BLE, LEFT SIDE WEAKNESS. PATIENT TOTAL CARE. ADMINISTERED 
SCHEDULED MEDICATION, ALSO WAS COMPLAINING OF GENERALIZED PAIN. PATIENT INCONTINENT USING 
DIAPER. ASSIST PATIENT TURN AND REPOSTION Q 2 HR. CALL LIGHT WITHIN TO REACH. CONTINUED 
MONITORING.

## 2020-05-17 NOTE — NUR
MS RN NOTES



PT NOTED WITH ELEVATED BP, MD MADE AWARE.  NEW ORDER FOR CLONIDINE 0.1MG PO PRN FOR SBP 
>160.  WILL CONTINUE TO MONITOR.

## 2020-05-17 NOTE — NUR
rn notes

administered  Dilaudid 4 mg po prn for generalized pain 7/10 per patient request, v/s taken 
bp 142/69, p-65, r-18. continued monitoring.

## 2020-05-17 NOTE — NUR
rn notes

 administered Dilaudid 4 mg po prn for generalized pain 8/10 per patient request, v/s taken 
bp-175/76, p-68, r-18. continued monitoring.

## 2020-05-17 NOTE — NUR
RN NOTES

 BS-145 MG/DL COVERAGE GIVEN, ALSO ADMINISTERED SCHEDULED MEDS, PATIENT SITTING IN THE 
CHAIR, TOLERATED LUNCH 100 %.

## 2020-05-17 NOTE — NUR
RN NOTES

 ADMINISTERED DILAUDID 4 MG PO PRN FOR GENERALIZED PAIN 8/10 PER PATIENT REQUEST, V/S TAKEN 
/71, P67, R-18. CONTINUED MONITORING.

## 2020-05-17 NOTE — NUR
MS RN NOTES



RECEIVED PT IN CHAIR AWAKE AND ABLE TO MAKE NEEDS KNOWN.  PT A/O X3.  RESPIRATIONS EVEN AND 
UNLABORED WITH NO S/S OF ACUTE DISTRESS OR SOB NOTED.  NO COMPLAINTS OF PAIN AT THIS TIME.  
PT NOTED WITH LHAND #20G PATENT AND INTACT AND SL.  SAFETY MEASURES IN PLACE WITH BED IN 
LOWEST LOCKED POSITION WITH SIDE RAILS UPX2.  CALL LIGHT WITHIN REACH.  WILL CONTINUE TO 
MONITOR.

## 2020-05-18 ENCOUNTER — HOSPITAL ENCOUNTER (INPATIENT)
Dept: HOSPITAL 54 - ER | Age: 60
LOS: 1 days | Discharge: HOME HEALTH | DRG: 756 | End: 2020-05-19
Attending: NURSE PRACTITIONER | Admitting: NURSE PRACTITIONER
Payer: COMMERCIAL

## 2020-05-18 VITALS — HEIGHT: 65 IN | WEIGHT: 250 LBS | BODY MASS INDEX: 41.65 KG/M2

## 2020-05-18 VITALS — SYSTOLIC BLOOD PRESSURE: 188 MMHG | DIASTOLIC BLOOD PRESSURE: 73 MMHG

## 2020-05-18 VITALS — SYSTOLIC BLOOD PRESSURE: 141 MMHG | DIASTOLIC BLOOD PRESSURE: 61 MMHG

## 2020-05-18 VITALS — SYSTOLIC BLOOD PRESSURE: 136 MMHG | DIASTOLIC BLOOD PRESSURE: 83 MMHG

## 2020-05-18 VITALS — DIASTOLIC BLOOD PRESSURE: 63 MMHG | SYSTOLIC BLOOD PRESSURE: 160 MMHG

## 2020-05-18 VITALS — DIASTOLIC BLOOD PRESSURE: 61 MMHG | SYSTOLIC BLOOD PRESSURE: 141 MMHG

## 2020-05-18 VITALS — DIASTOLIC BLOOD PRESSURE: 60 MMHG | SYSTOLIC BLOOD PRESSURE: 168 MMHG

## 2020-05-18 DIAGNOSIS — I25.10: ICD-10-CM

## 2020-05-18 DIAGNOSIS — Z87.440: ICD-10-CM

## 2020-05-18 DIAGNOSIS — G89.4: ICD-10-CM

## 2020-05-18 DIAGNOSIS — D64.9: ICD-10-CM

## 2020-05-18 DIAGNOSIS — E78.5: ICD-10-CM

## 2020-05-18 DIAGNOSIS — F11.90: ICD-10-CM

## 2020-05-18 DIAGNOSIS — J45.909: ICD-10-CM

## 2020-05-18 DIAGNOSIS — F32.9: ICD-10-CM

## 2020-05-18 DIAGNOSIS — I27.20: ICD-10-CM

## 2020-05-18 DIAGNOSIS — Z79.84: ICD-10-CM

## 2020-05-18 DIAGNOSIS — I11.0: ICD-10-CM

## 2020-05-18 DIAGNOSIS — E87.1: ICD-10-CM

## 2020-05-18 DIAGNOSIS — Z79.82: ICD-10-CM

## 2020-05-18 DIAGNOSIS — Z79.899: ICD-10-CM

## 2020-05-18 DIAGNOSIS — I69.354: ICD-10-CM

## 2020-05-18 DIAGNOSIS — E11.9: ICD-10-CM

## 2020-05-18 DIAGNOSIS — N32.81: ICD-10-CM

## 2020-05-18 DIAGNOSIS — Z88.1: ICD-10-CM

## 2020-05-18 DIAGNOSIS — Z79.01: ICD-10-CM

## 2020-05-18 DIAGNOSIS — F41.9: Primary | ICD-10-CM

## 2020-05-18 DIAGNOSIS — Z79.02: ICD-10-CM

## 2020-05-18 DIAGNOSIS — I50.42: ICD-10-CM

## 2020-05-18 LAB
ALBUMIN SERPL BCP-MCNC: 3.6 G/DL (ref 3.4–5)
ALP SERPL-CCNC: 103 U/L (ref 46–116)
ALT SERPL W P-5'-P-CCNC: 39 U/L (ref 12–78)
AST SERPL W P-5'-P-CCNC: 24 U/L (ref 15–37)
BASOPHILS # BLD AUTO: 0.1 /CMM (ref 0–0.2)
BASOPHILS # BLD AUTO: 0.2 /CMM (ref 0–0.2)
BASOPHILS NFR BLD AUTO: 1.1 % (ref 0–2)
BASOPHILS NFR BLD AUTO: 1.6 % (ref 0–2)
BILIRUB DIRECT SERPL-MCNC: 0.1 MG/DL (ref 0–0.2)
BILIRUB SERPL-MCNC: 0.6 MG/DL (ref 0.2–1)
BUN SERPL-MCNC: 17 MG/DL (ref 7–18)
BUN SERPL-MCNC: 18 MG/DL (ref 7–18)
CALCIUM SERPL-MCNC: 8.7 MG/DL (ref 8.5–10.1)
CALCIUM SERPL-MCNC: 8.8 MG/DL (ref 8.5–10.1)
CHLORIDE SERPL-SCNC: 91 MMOL/L (ref 98–107)
CHLORIDE SERPL-SCNC: 94 MMOL/L (ref 98–107)
CO2 SERPL-SCNC: 29 MMOL/L (ref 21–32)
CO2 SERPL-SCNC: 32 MMOL/L (ref 21–32)
CREAT SERPL-MCNC: 0.7 MG/DL (ref 0.6–1.3)
CREAT SERPL-MCNC: 0.8 MG/DL (ref 0.6–1.3)
EOSINOPHIL NFR BLD AUTO: 3.5 % (ref 0–6)
EOSINOPHIL NFR BLD AUTO: 5 % (ref 0–6)
GLUCOSE SERPL-MCNC: 122 MG/DL (ref 74–106)
GLUCOSE SERPL-MCNC: 137 MG/DL (ref 74–106)
HCT VFR BLD AUTO: 27 % (ref 33–45)
HCT VFR BLD AUTO: 29 % (ref 33–45)
HGB BLD-MCNC: 8.9 G/DL (ref 11.5–14.8)
HGB BLD-MCNC: 9.6 G/DL (ref 11.5–14.8)
LYMPHOCYTES NFR BLD AUTO: 2.3 /CMM (ref 0.8–4.8)
LYMPHOCYTES NFR BLD AUTO: 2.4 /CMM (ref 0.8–4.8)
LYMPHOCYTES NFR BLD AUTO: 23.2 % (ref 20–44)
LYMPHOCYTES NFR BLD AUTO: 29.5 % (ref 20–44)
MAGNESIUM SERPL-MCNC: 1.9 MG/DL (ref 1.8–2.4)
MCHC RBC AUTO-ENTMCNC: 33 G/DL (ref 31–36)
MCHC RBC AUTO-ENTMCNC: 33 G/DL (ref 31–36)
MCV RBC AUTO: 84 FL (ref 82–100)
MCV RBC AUTO: 84 FL (ref 82–100)
MONOCYTES NFR BLD AUTO: 0.8 /CMM (ref 0.1–1.3)
MONOCYTES NFR BLD AUTO: 0.9 /CMM (ref 0.1–1.3)
MONOCYTES NFR BLD AUTO: 10.9 % (ref 2–12)
MONOCYTES NFR BLD AUTO: 8 % (ref 2–12)
NEUTROPHILS # BLD AUTO: 4.3 /CMM (ref 1.8–8.9)
NEUTROPHILS # BLD AUTO: 6.4 /CMM (ref 1.8–8.9)
NEUTROPHILS NFR BLD AUTO: 53.5 % (ref 43–81)
NEUTROPHILS NFR BLD AUTO: 63.7 % (ref 43–81)
NT-PROBNP SERPL-MCNC: 259 PG/ML (ref 0–125)
PHOSPHATE SERPL-MCNC: 4 MG/DL (ref 2.5–4.9)
PLATELET # BLD AUTO: 273 /CMM (ref 150–450)
PLATELET # BLD AUTO: 324 /CMM (ref 150–450)
POTASSIUM SERPL-SCNC: 4 MMOL/L (ref 3.5–5.1)
POTASSIUM SERPL-SCNC: 4.8 MMOL/L (ref 3.5–5.1)
PROT SERPL-MCNC: 6.9 G/DL (ref 6.4–8.2)
RBC # BLD AUTO: 3.2 MIL/UL (ref 4–5.2)
RBC # BLD AUTO: 3.45 MIL/UL (ref 4–5.2)
SODIUM SERPL-SCNC: 127 MMOL/L (ref 136–145)
SODIUM SERPL-SCNC: 129 MMOL/L (ref 136–145)
TSH SERPL DL<=0.005 MIU/L-ACNC: 4.55 UIU/ML (ref 0.36–3.74)
URATE SERPL-MCNC: 3.5 MG/DL (ref 2.6–7.2)
WBC NRBC COR # BLD AUTO: 10.1 K/UL (ref 4.3–11)
WBC NRBC COR # BLD AUTO: 8.1 K/UL (ref 4.3–11)

## 2020-05-18 PROCEDURE — G0378 HOSPITAL OBSERVATION PER HR: HCPCS

## 2020-05-18 RX ADMIN — ALBUTEROL SULFATE SCH PUFF: 90 AEROSOL, METERED RESPIRATORY (INHALATION) at 03:35

## 2020-05-18 RX ADMIN — Medication SCH EACH: at 17:40

## 2020-05-18 RX ADMIN — LABETALOL HCL SCH MG: 100 TABLET, FILM COATED ORAL at 13:21

## 2020-05-18 RX ADMIN — LABETALOL HCL SCH MG: 100 TABLET, FILM COATED ORAL at 08:51

## 2020-05-18 RX ADMIN — Medication SCH EACH: at 07:48

## 2020-05-18 RX ADMIN — Medication SCH MG: at 13:21

## 2020-05-18 RX ADMIN — BACLOFEN SCH MG: 10 TABLET ORAL at 08:48

## 2020-05-18 RX ADMIN — ASPIRIN 81 MG SCH MG: 81 TABLET ORAL at 08:51

## 2020-05-18 RX ADMIN — BACLOFEN SCH MG: 10 TABLET ORAL at 13:21

## 2020-05-18 RX ADMIN — Medication SCH APPLIC: at 17:43

## 2020-05-18 RX ADMIN — PREGABALIN SCH MG: 25 CAPSULE ORAL at 13:20

## 2020-05-18 RX ADMIN — POTASSIUM CHLORIDE ONE MEQ: 1500 TABLET, EXTENDED RELEASE ORAL at 12:03

## 2020-05-18 RX ADMIN — Medication SCH MG: at 17:40

## 2020-05-18 RX ADMIN — LIDOCAINE SCH EA: 50 PATCH CUTANEOUS at 09:49

## 2020-05-18 RX ADMIN — FLUTICASONE FUROATE AND VILANTEROL TRIFENATATE SCH EACH: 100; 25 POWDER RESPIRATORY (INHALATION) at 09:49

## 2020-05-18 RX ADMIN — INSULIN HUMAN PRN UNIT: 100 INJECTION, SOLUTION PARENTERAL at 12:17

## 2020-05-18 RX ADMIN — PANTOPRAZOLE SODIUM SCH MG: 40 TABLET, DELAYED RELEASE ORAL at 07:47

## 2020-05-18 RX ADMIN — HYDROMORPHONE HYDROCHLORIDE PRN MG: 2 TABLET ORAL at 10:27

## 2020-05-18 RX ADMIN — Medication SCH APPLIC: at 13:21

## 2020-05-18 RX ADMIN — Medication SCH MG: at 08:48

## 2020-05-18 RX ADMIN — PAROXETINE HYDROCHLORIDE SCH MG: 10 TABLET, FILM COATED ORAL at 08:50

## 2020-05-18 RX ADMIN — CLOTRIMAZOLE SCH APPLIC: 1 CREAM TOPICAL at 17:42

## 2020-05-18 RX ADMIN — CLOPIDOGREL BISULFATE SCH MG: 75 TABLET, FILM COATED ORAL at 08:54

## 2020-05-18 RX ADMIN — HYDROMORPHONE HYDROCHLORIDE PRN MG: 2 TABLET ORAL at 15:42

## 2020-05-18 RX ADMIN — POTASSIUM CHLORIDE ONE MEQ: 1500 TABLET, EXTENDED RELEASE ORAL at 11:43

## 2020-05-18 RX ADMIN — Medication SCH MG: at 08:54

## 2020-05-18 RX ADMIN — HYDROMORPHONE HYDROCHLORIDE PRN MG: 2 TABLET ORAL at 00:30

## 2020-05-18 RX ADMIN — ASPIRIN 81 MG SCH MG: 81 TABLET ORAL at 23:29

## 2020-05-18 RX ADMIN — ALBUTEROL SULFATE SCH PUFF: 90 AEROSOL, METERED RESPIRATORY (INHALATION) at 08:17

## 2020-05-18 RX ADMIN — ALBUTEROL SULFATE SCH PUFF: 90 AEROSOL, METERED RESPIRATORY (INHALATION) at 11:41

## 2020-05-18 RX ADMIN — ALBUTEROL SULFATE SCH PUFF: 90 AEROSOL, METERED RESPIRATORY (INHALATION) at 15:38

## 2020-05-18 RX ADMIN — EZETIMIBE SCH MG: 10 TABLET ORAL at 08:50

## 2020-05-18 RX ADMIN — FUROSEMIDE SCH MG: 20 TABLET ORAL at 08:51

## 2020-05-18 RX ADMIN — LINAGLIPTIN SCH MG: 5 TABLET, FILM COATED ORAL at 08:52

## 2020-05-18 RX ADMIN — LOSARTAN POTASSIUM SCH MG: 25 TABLET, FILM COATED ORAL at 08:52

## 2020-05-18 RX ADMIN — VALACYCLOVIR HYDROCHLORIDE SCH MG: 500 TABLET, FILM COATED ORAL at 08:51

## 2020-05-18 RX ADMIN — Medication PRN MG: at 23:29

## 2020-05-18 RX ADMIN — PREGABALIN SCH MG: 25 CAPSULE ORAL at 08:52

## 2020-05-18 RX ADMIN — Medication SCH EACH: at 12:10

## 2020-05-18 RX ADMIN — Medication SCH APPLIC: at 09:50

## 2020-05-18 RX ADMIN — CLOTRIMAZOLE SCH APPLIC: 1 CREAM TOPICAL at 09:50

## 2020-05-18 NOTE — NUR
TELE RN ADMISSION NOTES

PATIENT RE-ADMITTED IN ER; UPON PRIOR DISCHARGE PATIENT C/O SOB. A/OX4. ON 2L NC. SOB UPON 
EXERTION. C/O CHEST PAIN RATED 8/10 THAT RADIATES TO THE BACK AND HEAD. IV PRESENT ON RIGHT 
FA, SIZE 20, INTACT & PATENT, HEP LOCKED. TELE MONITOR READING NSR. PATIENT BELONGING 
REVIEWED, BELONGINGS LIST SIGNED AND PLACED IN CHART. REDNESS PRESENT ON B/L LOWER 
EXTREMITIES; PICTURES TAKEN AND PLACED IN CHART. SAFETY MEASURES IN PLACE AND PATIENT'S 
NEEDS MET. BED LOCKED, HOB ELEVATED, SIDE RAILS X2, WILL CONTINUE TO MONITOR.

## 2020-05-18 NOTE — NUR
RN CLOSING NOTE



PATIENT SENT HOME. PICKED UP BY SISTER IN A PRIVATE CAR. PATIENT TAKEN OUT OF THE UNIT VIA 
WHEELCHAIR BY CANDACE REINOSO. DISCHARGE INSTRUCTIONS PROVIDED TO THE PATIENT. VERBALIZES 
UNDERSTANDING. ARRANGES HOME HEALTH FOR THE PATIENT. INSTRUCTIONS PROVIDED. ALL PATIENT 
NEEDS WERE MET, WOUND PICTURES ARE IN THE CHART FROM THE NIGHT SHIFT, NO CHANGES IN WOUND 
SINCE THEN. ALL SCHEDULED MEDICATION GIVEN ON TIME, PRESCRIPTION GIVEN TO THE PATIENT, ALL 
NEEDS MET, SAFETY WAS MAINTAINED, ID BAND REMOVED, IV REMOVED. BELONGING LIST SIGNED AND 
BELONGINGS RETURNED TO THE PATIENT.

## 2020-05-19 VITALS — DIASTOLIC BLOOD PRESSURE: 54 MMHG | SYSTOLIC BLOOD PRESSURE: 126 MMHG

## 2020-05-19 VITALS — SYSTOLIC BLOOD PRESSURE: 131 MMHG | DIASTOLIC BLOOD PRESSURE: 53 MMHG

## 2020-05-19 VITALS — DIASTOLIC BLOOD PRESSURE: 42 MMHG | SYSTOLIC BLOOD PRESSURE: 130 MMHG

## 2020-05-19 VITALS — SYSTOLIC BLOOD PRESSURE: 168 MMHG | DIASTOLIC BLOOD PRESSURE: 60 MMHG

## 2020-05-19 VITALS — DIASTOLIC BLOOD PRESSURE: 69 MMHG | SYSTOLIC BLOOD PRESSURE: 147 MMHG

## 2020-05-19 VITALS — DIASTOLIC BLOOD PRESSURE: 62 MMHG | SYSTOLIC BLOOD PRESSURE: 141 MMHG

## 2020-05-19 VITALS — SYSTOLIC BLOOD PRESSURE: 181 MMHG | DIASTOLIC BLOOD PRESSURE: 70 MMHG

## 2020-05-19 LAB
BASOPHILS # BLD AUTO: 0.1 /CMM (ref 0–0.2)
BASOPHILS NFR BLD AUTO: 1.1 % (ref 0–2)
BUN SERPL-MCNC: 17 MG/DL (ref 7–18)
CALCIUM SERPL-MCNC: 8.6 MG/DL (ref 8.5–10.1)
CHLORIDE SERPL-SCNC: 94 MMOL/L (ref 98–107)
CO2 SERPL-SCNC: 30 MMOL/L (ref 21–32)
CREAT SERPL-MCNC: 0.7 MG/DL (ref 0.6–1.3)
EOSINOPHIL NFR BLD AUTO: 2.9 % (ref 0–6)
GLUCOSE SERPL-MCNC: 149 MG/DL (ref 74–106)
HCT VFR BLD AUTO: 27 % (ref 33–45)
HGB BLD-MCNC: 9.1 G/DL (ref 11.5–14.8)
LYMPHOCYTES NFR BLD AUTO: 2.7 /CMM (ref 0.8–4.8)
LYMPHOCYTES NFR BLD AUTO: 26.5 % (ref 20–44)
MAGNESIUM SERPL-MCNC: 2 MG/DL (ref 1.8–2.4)
MCHC RBC AUTO-ENTMCNC: 33 G/DL (ref 31–36)
MCV RBC AUTO: 83 FL (ref 82–100)
MONOCYTES NFR BLD AUTO: 0.9 /CMM (ref 0.1–1.3)
MONOCYTES NFR BLD AUTO: 9.3 % (ref 2–12)
NEUTROPHILS # BLD AUTO: 6 /CMM (ref 1.8–8.9)
NEUTROPHILS NFR BLD AUTO: 60.2 % (ref 43–81)
PHOSPHATE SERPL-MCNC: 3.6 MG/DL (ref 2.5–4.9)
PLATELET # BLD AUTO: 336 /CMM (ref 150–450)
POTASSIUM SERPL-SCNC: 4.1 MMOL/L (ref 3.5–5.1)
RBC # BLD AUTO: 3.25 MIL/UL (ref 4–5.2)
SODIUM SERPL-SCNC: 129 MMOL/L (ref 136–145)
WBC NRBC COR # BLD AUTO: 10 K/UL (ref 4.3–11)

## 2020-05-19 RX ADMIN — ASPIRIN 81 MG SCH MG: 81 TABLET ORAL at 09:29

## 2020-05-19 RX ADMIN — HYDROMORPHONE HYDROCHLORIDE PRN MG: 1 INJECTION, SOLUTION INTRAMUSCULAR; INTRAVENOUS; SUBCUTANEOUS at 02:02

## 2020-05-19 RX ADMIN — HYDROMORPHONE HYDROCHLORIDE PRN MG: 1 INJECTION, SOLUTION INTRAMUSCULAR; INTRAVENOUS; SUBCUTANEOUS at 05:54

## 2020-05-19 RX ADMIN — Medication SCH MG: at 16:33

## 2020-05-19 RX ADMIN — HYDROMORPHONE HYDROCHLORIDE PRN MG: 1 INJECTION, SOLUTION INTRAMUSCULAR; INTRAVENOUS; SUBCUTANEOUS at 14:11

## 2020-05-19 RX ADMIN — HYDROMORPHONE HYDROCHLORIDE PRN MG: 1 INJECTION, SOLUTION INTRAMUSCULAR; INTRAVENOUS; SUBCUTANEOUS at 09:59

## 2020-05-19 RX ADMIN — Medication PRN MG: at 03:35

## 2020-05-19 RX ADMIN — INSULIN HUMAN PRN UNIT: 100 INJECTION, SOLUTION PARENTERAL at 17:19

## 2020-05-19 RX ADMIN — INSULIN HUMAN PRN UNIT: 100 INJECTION, SOLUTION PARENTERAL at 12:15

## 2020-05-19 RX ADMIN — Medication SCH EACH: at 12:15

## 2020-05-19 RX ADMIN — Medication SCH MG: at 16:38

## 2020-05-19 RX ADMIN — Medication SCH EACH: at 17:17

## 2020-05-19 NOTE — NUR
RN DISCHARGE NOTE 



Patient is medically stable for discharge to home with home health. Patient refused to DC to 
SNF. ok per NP. MD is aware of discharge. Patient is A/O x4, showing no signs of acute 
distress or SOB, stable on RA. Patient refused skin assessment. IV line removed, ID band 
removed. DC instructions provided and patient verbalized understanding. ALl belongings with 
patient. All patient needs met, all due medications given. Patient kept clean and dry 
throughout shift. Patient picked up by EMS at 1815 en route to home.

## 2020-05-19 NOTE — NUR
TELE RN NOTES

PRN 0.5MG DILAUDID NOT STORED IN [facility]. CHARGE NURSE PULLED 1MG DILAUDID FROM STOCK MED. 
ADMINISTERED 0.5 MG PRN DILAUDID TO PATIENT; WITNESSED WASTE WITH CO-RN JIA MEDINA.

## 2020-05-19 NOTE — NUR
RN OPENING NOTE 



Patient is resting in bed, A/O x4, showing no signs of acute distress or SOB, stable on RA, 
2L NC at the bedside for patient comfort. Patient is complaining of pain in the chest that 
radiates to the back 8/10. IV line in the RFA #20g is clean and intact s/l. Bedside commode 
at the bedside. Bed is in lowest position, side rials x3 in upright position, call light is 
within reach and patient is aware of how to call for assistance when needed. Will continue 
with plan of care.

## 2020-05-19 NOTE — NUR
TELE RN CLOSING NOTES

PATIENT AWAKE IN BED. A/OX 4. ON 2L NC. NO S/S OF ACUTE RESPIRATORY DISTRESS OR C/O PAIN AT 
THIS TIME. TELE MONITOR READING NSR, HEART RATE 60. SAFETY MEASURES IN PLACE. WILL ENDORSE 
TO DAY SHIFT FOR PLAN OF CARE.

## 2020-05-19 NOTE — NUR
RN NOTE 



NP at bedside discussing discharge plan with patient, patient agreed to have CTA to rule out 
pulmonary embolism. Consent signed and placed in chart. Per NP, patient is to be discharged 
once CT results come out negative.

## 2020-09-19 ENCOUNTER — HOSPITAL ENCOUNTER (INPATIENT)
Dept: HOSPITAL 54 - ER | Age: 60
LOS: 5 days | Discharge: NURSING, REHAB OR LONG-TERM CARE | DRG: 194 | End: 2020-09-24
Attending: NURSE PRACTITIONER | Admitting: NURSE PRACTITIONER
Payer: COMMERCIAL

## 2020-09-19 VITALS — BODY MASS INDEX: 45.82 KG/M2 | WEIGHT: 249 LBS | HEIGHT: 62 IN

## 2020-09-19 VITALS — DIASTOLIC BLOOD PRESSURE: 99 MMHG | SYSTOLIC BLOOD PRESSURE: 123 MMHG

## 2020-09-19 DIAGNOSIS — N17.0: ICD-10-CM

## 2020-09-19 DIAGNOSIS — F32.9: ICD-10-CM

## 2020-09-19 DIAGNOSIS — J45.901: ICD-10-CM

## 2020-09-19 DIAGNOSIS — I87.2: ICD-10-CM
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
## 2020-09-19 DIAGNOSIS — N18.9: ICD-10-CM

## 2020-09-19 DIAGNOSIS — I25.10: ICD-10-CM

## 2020-09-19 DIAGNOSIS — S80.822A: ICD-10-CM
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
## 2020-09-19 DIAGNOSIS — G89.4: ICD-10-CM

## 2020-09-19 DIAGNOSIS — Z79.82: ICD-10-CM

## 2020-09-19 DIAGNOSIS — E87.5: ICD-10-CM

## 2020-09-19 DIAGNOSIS — L03.115: ICD-10-CM

## 2020-09-19 DIAGNOSIS — Z87.891: ICD-10-CM

## 2020-09-19 DIAGNOSIS — J44.9: ICD-10-CM

## 2020-09-19 DIAGNOSIS — E66.2: ICD-10-CM

## 2020-09-19 DIAGNOSIS — I69.354: ICD-10-CM

## 2020-09-19 DIAGNOSIS — D50.9: ICD-10-CM

## 2020-09-19 DIAGNOSIS — I27.20: ICD-10-CM

## 2020-09-19 DIAGNOSIS — N32.81: ICD-10-CM

## 2020-09-19 DIAGNOSIS — I13.0: Primary | ICD-10-CM

## 2020-09-19 DIAGNOSIS — E78.5: ICD-10-CM

## 2020-09-19 DIAGNOSIS — Z79.891: ICD-10-CM

## 2020-09-19 DIAGNOSIS — J96.01: ICD-10-CM
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
## 2020-09-19 DIAGNOSIS — Z79.84: ICD-10-CM

## 2020-09-19 DIAGNOSIS — L03.116: ICD-10-CM

## 2020-09-19 DIAGNOSIS — E87.1: ICD-10-CM

## 2020-09-19 DIAGNOSIS — E11.22: ICD-10-CM

## 2020-09-19 DIAGNOSIS — I50.43: ICD-10-CM

## 2020-09-19 LAB
ALBUMIN SERPL BCP-MCNC: 3.7 G/DL (ref 3.4–5)
ALP SERPL-CCNC: 121 U/L (ref 46–116)
ALT SERPL W P-5'-P-CCNC: 48 U/L (ref 12–78)
AST SERPL W P-5'-P-CCNC: 25 U/L (ref 15–37)
BASOPHILS # BLD AUTO: 0.1 /CMM (ref 0–0.2)
BASOPHILS NFR BLD AUTO: 0.7 % (ref 0–2)
BILIRUB DIRECT SERPL-MCNC: 0.2 MG/DL (ref 0–0.2)
BILIRUB SERPL-MCNC: 0.8 MG/DL (ref 0.2–1)
BUN SERPL-MCNC: 43 MG/DL (ref 7–18)
CALCIUM SERPL-MCNC: 9.2 MG/DL (ref 8.5–10.1)
CHLORIDE SERPL-SCNC: 91 MMOL/L (ref 98–107)
CO2 SERPL-SCNC: 27 MMOL/L (ref 21–32)
CREAT SERPL-MCNC: 1 MG/DL (ref 0.6–1.3)
EOSINOPHIL NFR BLD AUTO: 0.3 % (ref 0–6)
GLUCOSE SERPL-MCNC: 204 MG/DL (ref 74–106)
GLUCOSE UR STRIP-MCNC: (no result) MG/DL
HCT VFR BLD AUTO: 33 % (ref 33–45)
HGB BLD-MCNC: 10.7 G/DL (ref 11.5–14.8)
LYMPHOCYTES NFR BLD AUTO: 0.8 /CMM (ref 0.8–4.8)
LYMPHOCYTES NFR BLD AUTO: 7.8 % (ref 20–44)
MCHC RBC AUTO-ENTMCNC: 33 G/DL (ref 31–36)
MCV RBC AUTO: 81 FL (ref 82–100)
MONOCYTES NFR BLD AUTO: 0.2 /CMM (ref 0.1–1.3)
MONOCYTES NFR BLD AUTO: 2.3 % (ref 2–12)
NEUTROPHILS # BLD AUTO: 9.6 /CMM (ref 1.8–8.9)
NEUTROPHILS NFR BLD AUTO: 88.9 % (ref 43–81)
PH UR STRIP: 7 [PH] (ref 5–8)
PLATELET # BLD AUTO: 364 /CMM (ref 150–450)
POTASSIUM SERPL-SCNC: 5.9 MMOL/L (ref 3.5–5.1)
PROT SERPL-MCNC: 7.3 G/DL (ref 6.4–8.2)
RBC # BLD AUTO: 4.02 MIL/UL (ref 4–5.2)
SODIUM SERPL-SCNC: 123 MMOL/L (ref 136–145)
UROBILINOGEN UR STRIP-MCNC: 0.2 EU/DL
WBC NRBC COR # BLD AUTO: 10.8 K/UL (ref 4.3–11)

## 2020-09-19 PROCEDURE — G0378 HOSPITAL OBSERVATION PER HR: HCPCS

## 2020-09-19 RX ADMIN — ATORVASTATIN CALCIUM SCH MG: 40 TABLET, FILM COATED ORAL at 22:54

## 2020-09-19 NOTE — NUR
REC'D REPORT FROM AUBREY EPSTEIN FOR NAHUN. PT RESTING IN BED. NOTED HYPERTENSION, C/O 
BACK PAIN, MD AWARE

## 2020-09-19 NOTE — NUR
TELE RN NOTES



PATIENT ARRIVED ON FLOOR 2200. PATIENT IN BED, SLEEPY EASILY AROUSED. ALERT AND ORIENTED X 
3. BREATHING EVEN AND UNLABORED ON 2L NC. SHOWS NO SIGNS OF ACUTE RESPIRATORY DISTRESS, NO 
ACUTE PAIN. IV ON R WRIST 20G ITS CLEAN DRY AND INTACT. SHOWS NO SIGNS OF INFILTRATION, NO 
REDNESS. SKIN ASSESSMENT COMPLETED, BELONGINGS CHECKLIST COMPLETED. PT ORIENTED TO ROOM. FC 
CLEAN DRY AND INTACT, FLOWING YELLOW CLEAR URINE. TELE MONITOR SR. SAFETY PRECAUTIONS IN 
PLACE. BED IN LOWEST POSITION, LOCKED, AND CALL LIGHT KEPT WITHIN REACH. WILL CONTINUE TO 
MONITOR.

## 2020-09-19 NOTE — NUR
bibra88 home, per ems worsening generalized weakness since yesterday. pt 
verbally responsive. bg 98 pta, c/o lower back pain. On room air, breathing 
evenly and unlabored. connected to the monitor and pulse ox. kept comfortable, 
will continue to monitor accordingly.

## 2020-09-20 VITALS — SYSTOLIC BLOOD PRESSURE: 115 MMHG | DIASTOLIC BLOOD PRESSURE: 59 MMHG

## 2020-09-20 VITALS — SYSTOLIC BLOOD PRESSURE: 156 MMHG | DIASTOLIC BLOOD PRESSURE: 72 MMHG

## 2020-09-20 VITALS — SYSTOLIC BLOOD PRESSURE: 180 MMHG | DIASTOLIC BLOOD PRESSURE: 57 MMHG

## 2020-09-20 VITALS — SYSTOLIC BLOOD PRESSURE: 128 MMHG | DIASTOLIC BLOOD PRESSURE: 50 MMHG

## 2020-09-20 VITALS — DIASTOLIC BLOOD PRESSURE: 52 MMHG | SYSTOLIC BLOOD PRESSURE: 114 MMHG

## 2020-09-20 VITALS — SYSTOLIC BLOOD PRESSURE: 108 MMHG | DIASTOLIC BLOOD PRESSURE: 53 MMHG

## 2020-09-20 VITALS — DIASTOLIC BLOOD PRESSURE: 56 MMHG | SYSTOLIC BLOOD PRESSURE: 153 MMHG

## 2020-09-20 LAB
ALBUMIN SERPL BCP-MCNC: 3.3 G/DL (ref 3.4–5)
ALP SERPL-CCNC: 96 U/L (ref 46–116)
ALT SERPL W P-5'-P-CCNC: 42 U/L (ref 12–78)
AST SERPL W P-5'-P-CCNC: 20 U/L (ref 15–37)
BASOPHILS # BLD AUTO: 0.1 /CMM (ref 0–0.2)
BASOPHILS NFR BLD AUTO: 0.9 % (ref 0–2)
BILIRUB SERPL-MCNC: 0.8 MG/DL (ref 0.2–1)
BUN SERPL-MCNC: 32 MG/DL (ref 7–18)
CALCIUM SERPL-MCNC: 8.9 MG/DL (ref 8.5–10.1)
CHLORIDE SERPL-SCNC: 95 MMOL/L (ref 98–107)
CHOLEST SERPL-MCNC: 126 MG/DL (ref ?–200)
CO2 SERPL-SCNC: 32 MMOL/L (ref 21–32)
CREAT SERPL-MCNC: 0.8 MG/DL (ref 0.6–1.3)
EOSINOPHIL NFR BLD AUTO: 1.7 % (ref 0–6)
GLUCOSE SERPL-MCNC: 87 MG/DL (ref 74–106)
HCT VFR BLD AUTO: 30 % (ref 33–45)
HDLC SERPL-MCNC: 38 MG/DL (ref 40–60)
HGB BLD-MCNC: 9.8 G/DL (ref 11.5–14.8)
LDLC SERPL DIRECT ASSAY-MCNC: 74 MG/DL (ref 0–99)
LYMPHOCYTES NFR BLD AUTO: 1.8 /CMM (ref 0.8–4.8)
LYMPHOCYTES NFR BLD AUTO: 24 % (ref 20–44)
MAGNESIUM SERPL-MCNC: 2.4 MG/DL (ref 1.8–2.4)
MCHC RBC AUTO-ENTMCNC: 33 G/DL (ref 31–36)
MCV RBC AUTO: 80 FL (ref 82–100)
MONOCYTES NFR BLD AUTO: 0.7 /CMM (ref 0.1–1.3)
MONOCYTES NFR BLD AUTO: 10.2 % (ref 2–12)
NEUTROPHILS # BLD AUTO: 4.6 /CMM (ref 1.8–8.9)
NEUTROPHILS NFR BLD AUTO: 63.2 % (ref 43–81)
PHOSPHATE SERPL-MCNC: 3.8 MG/DL (ref 2.5–4.9)
PLATELET # BLD AUTO: 358 /CMM (ref 150–450)
POTASSIUM SERPL-SCNC: 4.6 MMOL/L (ref 3.5–5.1)
PROT SERPL-MCNC: 6.6 G/DL (ref 6.4–8.2)
RBC # BLD AUTO: 3.74 MIL/UL (ref 4–5.2)
SODIUM SERPL-SCNC: 131 MMOL/L (ref 136–145)
TRIGL SERPL-MCNC: 91 MG/DL (ref 30–150)
TSH SERPL DL<=0.005 MIU/L-ACNC: 1.11 UIU/ML (ref 0.36–3.74)
WBC NRBC COR # BLD AUTO: 7.3 K/UL (ref 4.3–11)

## 2020-09-20 RX ADMIN — CLOPIDOGREL BISULFATE SCH MG: 75 TABLET, FILM COATED ORAL at 08:21

## 2020-09-20 RX ADMIN — ALBUTEROL SULFATE PRN MG: 2.5 SOLUTION RESPIRATORY (INHALATION) at 15:31

## 2020-09-20 RX ADMIN — ASPIRIN 81 MG SCH MG: 81 TABLET ORAL at 08:19

## 2020-09-20 RX ADMIN — EZETIMIBE SCH MG: 10 TABLET ORAL at 08:21

## 2020-09-20 RX ADMIN — FUROSEMIDE SCH MG: 10 INJECTION, SOLUTION INTRAMUSCULAR; INTRAVENOUS at 16:03

## 2020-09-20 RX ADMIN — Medication SCH MG: at 15:31

## 2020-09-20 RX ADMIN — LIDOCAINE SCH EA: 50 PATCH CUTANEOUS at 08:49

## 2020-09-20 RX ADMIN — PREGABALIN SCH MG: 25 CAPSULE ORAL at 13:00

## 2020-09-20 RX ADMIN — LOSARTAN POTASSIUM SCH MG: 50 TABLET, FILM COATED ORAL at 08:22

## 2020-09-20 RX ADMIN — Medication SCH MG: at 16:03

## 2020-09-20 RX ADMIN — CLOTRIMAZOLE SCH GM: 1 CREAM TOPICAL at 08:49

## 2020-09-20 RX ADMIN — ACETAMINOPHEN PRN MG: 325 TABLET ORAL at 21:56

## 2020-09-20 RX ADMIN — LINAGLIPTIN SCH MG: 5 TABLET, FILM COATED ORAL at 08:20

## 2020-09-20 RX ADMIN — Medication SCH EACH: at 16:32

## 2020-09-20 RX ADMIN — PREGABALIN SCH MG: 25 CAPSULE ORAL at 16:32

## 2020-09-20 RX ADMIN — BACLOFEN SCH MG: 10 TABLET ORAL at 16:32

## 2020-09-20 RX ADMIN — INSULIN HUMAN PRN UNIT: 100 INJECTION, SOLUTION PARENTERAL at 13:25

## 2020-09-20 RX ADMIN — HYDROMORPHONE HYDROCHLORIDE PRN MG: 2 TABLET ORAL at 12:22

## 2020-09-20 RX ADMIN — Medication SCH EACH: at 12:22

## 2020-09-20 RX ADMIN — BACLOFEN SCH MG: 10 TABLET ORAL at 13:00

## 2020-09-20 RX ADMIN — POLYETHYLENE GLYCOL 3350 PRN GM: 17 POWDER, FOR SOLUTION ORAL at 18:33

## 2020-09-20 RX ADMIN — FUROSEMIDE SCH MG: 10 INJECTION, SOLUTION INTRAMUSCULAR; INTRAVENOUS at 08:14

## 2020-09-20 RX ADMIN — BACLOFEN SCH MG: 10 TABLET ORAL at 08:19

## 2020-09-20 RX ADMIN — ALBUTEROL SULFATE PRN MG: 2.5 SOLUTION RESPIRATORY (INHALATION) at 06:02

## 2020-09-20 RX ADMIN — PREGABALIN SCH MG: 25 CAPSULE ORAL at 08:20

## 2020-09-20 RX ADMIN — ALBUTEROL SULFATE PRN MG: 2.5 SOLUTION RESPIRATORY (INHALATION) at 08:21

## 2020-09-20 RX ADMIN — HYDROMORPHONE HYDROCHLORIDE PRN MG: 2 TABLET ORAL at 18:32

## 2020-09-20 RX ADMIN — Medication SCH EACH: at 21:49

## 2020-09-20 RX ADMIN — PAROXETINE HYDROCHLORIDE SCH MG: 10 TABLET, FILM COATED ORAL at 08:21

## 2020-09-20 RX ADMIN — Medication SCH MG: at 08:22

## 2020-09-20 RX ADMIN — ENOXAPARIN SODIUM SCH MG: 40 INJECTION SUBCUTANEOUS at 12:31

## 2020-09-20 RX ADMIN — DEXTROSE MONOHYDRATE SCH MLS/HR: 50 INJECTION, SOLUTION INTRAVENOUS at 12:23

## 2020-09-20 RX ADMIN — Medication SCH MG: at 19:47

## 2020-09-20 RX ADMIN — Medication SCH MG: at 12:21

## 2020-09-20 RX ADMIN — VALACYCLOVIR HYDROCHLORIDE SCH MG: 500 TABLET, FILM COATED ORAL at 08:37

## 2020-09-20 RX ADMIN — DEXTROSE MONOHYDRATE SCH MLS/HR: 50 INJECTION, SOLUTION INTRAVENOUS at 23:40

## 2020-09-20 RX ADMIN — PREGABALIN SCH MG: 25 CAPSULE ORAL at 12:21

## 2020-09-20 RX ADMIN — ALBUTEROL SULFATE PRN MG: 2.5 SOLUTION RESPIRATORY (INHALATION) at 19:47

## 2020-09-20 RX ADMIN — Medication SCH MG: at 02:50

## 2020-09-20 RX ADMIN — Medication SCH MG: at 08:21

## 2020-09-20 RX ADMIN — Medication SCH EACH: at 07:45

## 2020-09-20 RX ADMIN — BACLOFEN SCH MG: 10 TABLET ORAL at 12:21

## 2020-09-20 RX ADMIN — CLOTRIMAZOLE SCH GM: 1 CREAM TOPICAL at 16:02

## 2020-09-20 RX ADMIN — LIDOCAINE SCH EA: 50 PATCH CUTANEOUS at 08:48

## 2020-09-20 RX ADMIN — ATORVASTATIN CALCIUM SCH MG: 40 TABLET, FILM COATED ORAL at 21:44

## 2020-09-20 RX ADMIN — INSULIN HUMAN PRN UNIT: 100 INJECTION, SOLUTION PARENTERAL at 16:33

## 2020-09-20 RX ADMIN — INSULIN HUMAN PRN UNIT: 100 INJECTION, SOLUTION PARENTERAL at 21:54

## 2020-09-20 NOTE — NUR
MS RN NOTES



PATIENT COMPLAINING OF PAIN 7/10. GIVEN PRN DILAUDID PO AT 0509. VITAL SIGNS WNL. WILL 
CONTINUE TO MONITOR.

## 2020-09-20 NOTE — NUR
TELE RN NOTES



PATIENT IN BED, AWAKE. ALERT AND ORIENTED X 3. BREATHING EVEN AND UNLABORED ON 2L NC. SHOWS 
NO SIGNS OF ACUTE RESPIRATORY DISTRESS, NO ACUTE PAIN. IV ON R WRIST 20G ITS CLEAN DRY AND 
INTACT. SHOWS NO SIGNS OF INFILTRATION, NO REDNESS.  FC CLEAN DRY AND INTACT, FLOWING YELLOW 
CLEAR URINE. TELE MONITOR SR. ALL DUE MEDICATIONS GIVEN. SAFETY PRECAUTIONS IN PLACE. BED IN 
LOWEST POSITION, LOCKED, AND CALL LIGHT KEPT WITHIN REACH. WILL ENDORSE TO ONCOMING NURSE.

## 2020-09-21 VITALS — DIASTOLIC BLOOD PRESSURE: 75 MMHG | SYSTOLIC BLOOD PRESSURE: 179 MMHG

## 2020-09-21 VITALS — DIASTOLIC BLOOD PRESSURE: 52 MMHG | SYSTOLIC BLOOD PRESSURE: 141 MMHG

## 2020-09-21 VITALS — SYSTOLIC BLOOD PRESSURE: 150 MMHG | DIASTOLIC BLOOD PRESSURE: 50 MMHG

## 2020-09-21 VITALS — SYSTOLIC BLOOD PRESSURE: 154 MMHG | DIASTOLIC BLOOD PRESSURE: 76 MMHG

## 2020-09-21 VITALS — DIASTOLIC BLOOD PRESSURE: 66 MMHG | SYSTOLIC BLOOD PRESSURE: 157 MMHG

## 2020-09-21 VITALS — SYSTOLIC BLOOD PRESSURE: 143 MMHG | DIASTOLIC BLOOD PRESSURE: 72 MMHG

## 2020-09-21 LAB
BASE EXCESS BLDA CALC-SCNC: 5.8 MMOL/L
BASOPHILS # BLD AUTO: 0.1 /CMM (ref 0–0.2)
BASOPHILS NFR BLD AUTO: 0.9 % (ref 0–2)
BUN SERPL-MCNC: 21 MG/DL (ref 7–18)
CALCIUM SERPL-MCNC: 9.1 MG/DL (ref 8.5–10.1)
CHLORIDE SERPL-SCNC: 95 MMOL/L (ref 98–107)
CO2 SERPL-SCNC: 34 MMOL/L (ref 21–32)
CREAT SERPL-MCNC: 0.7 MG/DL (ref 0.6–1.3)
DO-HGB MFR BLDA: 29.1 MMHG
EOSINOPHIL NFR BLD AUTO: 1.9 % (ref 0–6)
GLUCOSE SERPL-MCNC: 133 MG/DL (ref 74–106)
HCT VFR BLD AUTO: 31 % (ref 33–45)
HGB BLD-MCNC: 10 G/DL (ref 11.5–14.8)
INHALED O2 CONCENTRATION: 33 %
INHALED O2 FLOW RATE: 2 L/MIN (ref 0–30)
LYMPHOCYTES NFR BLD AUTO: 2.2 /CMM (ref 0.8–4.8)
LYMPHOCYTES NFR BLD AUTO: 23.8 % (ref 20–44)
MCHC RBC AUTO-ENTMCNC: 33 G/DL (ref 31–36)
MCV RBC AUTO: 81 FL (ref 82–100)
MONOCYTES NFR BLD AUTO: 1.2 /CMM (ref 0.1–1.3)
MONOCYTES NFR BLD AUTO: 13.4 % (ref 2–12)
NEUTROPHILS # BLD AUTO: 5.4 /CMM (ref 1.8–8.9)
NEUTROPHILS NFR BLD AUTO: 60 % (ref 43–81)
PCO2 TEMP ADJ BLDA: 50.4 MMHG (ref 35–45)
PH TEMP ADJ BLDA: 7.41 [PH] (ref 7.35–7.45)
PLATELET # BLD AUTO: 340 /CMM (ref 150–450)
PO2 TEMP ADJ BLDA: 147.4 MMHG (ref 75–100)
POTASSIUM SERPL-SCNC: 4.3 MMOL/L (ref 3.5–5.1)
RBC # BLD AUTO: 3.78 MIL/UL (ref 4–5.2)
SAO2 % BLDA: 98.6 % (ref 92–98.5)
SODIUM SERPL-SCNC: 133 MMOL/L (ref 136–145)
WBC NRBC COR # BLD AUTO: 9.1 K/UL (ref 4.3–11)

## 2020-09-21 PROCEDURE — B546ZZA ULTRASONOGRAPHY OF RIGHT SUBCLAVIAN VEIN, GUIDANCE: ICD-10-PCS | Performed by: NURSE PRACTITIONER

## 2020-09-21 PROCEDURE — 05H533Z INSERTION OF INFUSION DEVICE INTO RIGHT SUBCLAVIAN VEIN, PERCUTANEOUS APPROACH: ICD-10-PCS | Performed by: NURSE PRACTITIONER

## 2020-09-21 RX ADMIN — Medication SCH MG: at 01:49

## 2020-09-21 RX ADMIN — HYDROMORPHONE HYDROCHLORIDE PRN MG: 2 TABLET ORAL at 14:46

## 2020-09-21 RX ADMIN — ENOXAPARIN SODIUM SCH MG: 40 INJECTION SUBCUTANEOUS at 08:17

## 2020-09-21 RX ADMIN — ASPIRIN 81 MG SCH MG: 81 TABLET ORAL at 08:12

## 2020-09-21 RX ADMIN — Medication SCH MG: at 13:16

## 2020-09-21 RX ADMIN — ALBUTEROL SULFATE SCH MG: 2.5 SOLUTION RESPIRATORY (INHALATION) at 19:27

## 2020-09-21 RX ADMIN — HYDROMORPHONE HYDROCHLORIDE PRN MG: 2 TABLET ORAL at 22:15

## 2020-09-21 RX ADMIN — Medication SCH EACH: at 16:40

## 2020-09-21 RX ADMIN — CLOTRIMAZOLE SCH GM: 1 CREAM TOPICAL at 16:32

## 2020-09-21 RX ADMIN — Medication SCH EACH: at 12:43

## 2020-09-21 RX ADMIN — LIDOCAINE SCH EA: 50 PATCH CUTANEOUS at 08:23

## 2020-09-21 RX ADMIN — CLOPIDOGREL BISULFATE SCH MG: 75 TABLET, FILM COATED ORAL at 08:11

## 2020-09-21 RX ADMIN — LINAGLIPTIN SCH MG: 5 TABLET, FILM COATED ORAL at 08:11

## 2020-09-21 RX ADMIN — Medication SCH MG: at 08:12

## 2020-09-21 RX ADMIN — FUROSEMIDE SCH MG: 10 INJECTION, SOLUTION INTRAMUSCULAR; INTRAVENOUS at 08:14

## 2020-09-21 RX ADMIN — MUPIROCIN SCH GM: 20 OINTMENT TOPICAL at 10:12

## 2020-09-21 RX ADMIN — PREGABALIN SCH MG: 25 CAPSULE ORAL at 08:10

## 2020-09-21 RX ADMIN — Medication SCH GM: at 12:53

## 2020-09-21 RX ADMIN — LOSARTAN POTASSIUM SCH MG: 50 TABLET, FILM COATED ORAL at 08:14

## 2020-09-21 RX ADMIN — PREGABALIN SCH MG: 25 CAPSULE ORAL at 12:13

## 2020-09-21 RX ADMIN — ALBUTEROL SULFATE PRN MG: 2.5 SOLUTION RESPIRATORY (INHALATION) at 01:50

## 2020-09-21 RX ADMIN — HYDROMORPHONE HYDROCHLORIDE PRN MG: 2 TABLET ORAL at 00:36

## 2020-09-21 RX ADMIN — Medication SCH EACH: at 21:22

## 2020-09-21 RX ADMIN — Medication SCH OZ: at 10:12

## 2020-09-21 RX ADMIN — Medication SCH MG: at 16:39

## 2020-09-21 RX ADMIN — EZETIMIBE SCH MG: 10 TABLET ORAL at 08:26

## 2020-09-21 RX ADMIN — LIDOCAINE SCH EA: 50 PATCH CUTANEOUS at 08:26

## 2020-09-21 RX ADMIN — DEXTROSE MONOHYDRATE SCH MLS/HR: 50 INJECTION, SOLUTION INTRAVENOUS at 12:53

## 2020-09-21 RX ADMIN — VALACYCLOVIR HYDROCHLORIDE SCH MG: 500 TABLET, FILM COATED ORAL at 08:20

## 2020-09-21 RX ADMIN — DEXTROSE MONOHYDRATE SCH MLS/HR: 50 INJECTION, SOLUTION INTRAVENOUS at 23:34

## 2020-09-21 RX ADMIN — INSULIN HUMAN PRN UNIT: 100 INJECTION, SOLUTION PARENTERAL at 21:24

## 2020-09-21 RX ADMIN — FUROSEMIDE SCH MG: 10 INJECTION, SOLUTION INTRAMUSCULAR; INTRAVENOUS at 16:39

## 2020-09-21 RX ADMIN — ATORVASTATIN CALCIUM SCH MG: 40 TABLET, FILM COATED ORAL at 21:22

## 2020-09-21 RX ADMIN — Medication SCH MG: at 12:09

## 2020-09-21 RX ADMIN — HYDROMORPHONE HYDROCHLORIDE PRN MG: 2 TABLET ORAL at 08:14

## 2020-09-21 RX ADMIN — BACLOFEN SCH MG: 10 TABLET ORAL at 12:11

## 2020-09-21 RX ADMIN — PANTOPRAZOLE SODIUM SCH MG: 40 TABLET, DELAYED RELEASE ORAL at 23:00

## 2020-09-21 RX ADMIN — PREGABALIN SCH MG: 25 CAPSULE ORAL at 21:22

## 2020-09-21 RX ADMIN — ALBUTEROL SULFATE PRN MG: 2.5 SOLUTION RESPIRATORY (INHALATION) at 07:37

## 2020-09-21 RX ADMIN — Medication SCH MG: at 07:37

## 2020-09-21 RX ADMIN — BACLOFEN SCH MG: 10 TABLET ORAL at 08:11

## 2020-09-21 RX ADMIN — CLOTRIMAZOLE SCH GM: 1 CREAM TOPICAL at 08:21

## 2020-09-21 RX ADMIN — BACLOFEN SCH MG: 10 TABLET ORAL at 21:22

## 2020-09-21 RX ADMIN — INSULIN HUMAN PRN UNIT: 100 INJECTION, SOLUTION PARENTERAL at 17:16

## 2020-09-21 RX ADMIN — Medication SCH MG: at 19:27

## 2020-09-21 RX ADMIN — ALBUTEROL SULFATE SCH MG: 2.5 SOLUTION RESPIRATORY (INHALATION) at 10:30

## 2020-09-21 RX ADMIN — LIDOCAINE SCH EA: 50 PATCH CUTANEOUS at 08:15

## 2020-09-21 RX ADMIN — ALBUTEROL SULFATE SCH MG: 2.5 SOLUTION RESPIRATORY (INHALATION) at 13:17

## 2020-09-21 RX ADMIN — ACETAMINOPHEN PRN MG: 325 TABLET ORAL at 04:05

## 2020-09-21 RX ADMIN — ACETAMINOPHEN PRN MG: 325 TABLET ORAL at 09:44

## 2020-09-21 RX ADMIN — PAROXETINE HYDROCHLORIDE SCH MG: 10 TABLET, FILM COATED ORAL at 08:11

## 2020-09-21 RX ADMIN — INSULIN HUMAN PRN UNIT: 100 INJECTION, SOLUTION PARENTERAL at 12:48

## 2020-09-21 RX ADMIN — MUPIROCIN SCH GM: 20 OINTMENT TOPICAL at 21:22

## 2020-09-21 RX ADMIN — Medication SCH EACH: at 07:45

## 2020-09-21 NOTE — NUR
WOUND CARE CONSULT: PT PRESENTS WITH REDNESS TO LOWER LEGS AND SOME OPEN BLISTERS TO LEFT 
LOWER LEG AS WELL AS REDNESS/RASH TO BREASTFOLDS, GROIN FOLDS, ABDOMINAL FOLD AND PERINEUM, 
PRESENT ON ADMISSION. RECOMMEND DPM CONSULT. DR GUZMAN NOTIFIED OF CONSULT REQUEST. 
RECOMMENDATIONS  MADE FOR RASH AND SKIN CARE OF BODY. DISCUSSED WITH NURSING STAFF. [facility] [place] [facility]. WILL SEE PRN. MD IN AGREEMENT WITH PLAN OF CARE. CURRENT 
CONRAD SCORE IS 15. 

-------------------------------------------------------------------------------

Addendum: 09/21/20 at 0949 by CELESTINE ALLEN WNDNU

-------------------------------------------------------------------------------

Amended: Links added.

## 2020-09-21 NOTE — NUR
RN TELE OPENING NOTES

 RECEIVED PATIENT AWAKE ALERT AND ORIENTED X4 , ON 1L VIA NC TOLERATING WELL, RESPIRATIONS 
EVEN AND UNLABORED WITH EQUAL RISE AND FALL OF CHEST, ON CARDIAC MONITOR SR 93, DUBOIS 
CATHETER INTACT AND DRAINING, NO IV SITE PRESENT AT THIS TIME, AWAITING MIDLINE INSERTION, 
DENIES ANY PAIN OR DISCOMFORT AT THIS TIME, TOILETING OFFERED, ORIENTED TO STAFF AND CALL 
LIGHT AND KEPT WITHIN REACH, SAFETY PRECAUTIONS PRESENT LOW BED AND LOCKED, BED ALARM 
INTACT, ALL NEEDS ATTENDED AT THIS TIME, WILL CONTINUE TO MONITOR AND ATTEND TO NEEDS.

## 2020-09-21 NOTE — NUR
ERROL OPENING NOTE



 PT IS SITTING POSITION . ALERT ORIENTED X3 VERBALLY RESPONSIVE,FULL CODE, ON TELE 
MONITORING HR:85 SINUS RHYTHM,ON 2L OXYGEN VIA NASAL CANNULA, SATURATING 99% NO SOB NOT 
ACUTE DISTRESS NOTED,IV SITE IS ON RIGHT WRIST INTACT, WELL FLUSHED AND TKO IS RUNNING. 
PATENT,ON DUBOIS CATHETER URINE DRAINING . STRICT I AND O. YELLOW AND CLEAR. CONSENT IS 
SIGNED FOR CAROTID CT SCAN. SAFETY MEASUREMENTS ARE IMPLEMENTED BY HOSPITAL POLICY. CALL 
LIGHT WITHIN THE  REACH. BED LOCKED AND IN LOWEST POSITION AND SIDE RAILS ARE UP X2.WILL 
ENDORSE TO NIGHTSHIFT

-------------------------------------------------------------------------------

Addendum: 09/21/20 at 1926 by ISELA DE JESUS RN

-------------------------------------------------------------------------------

CLOSING

## 2020-09-21 NOTE — NUR
rn tele notes

per patient she takes protonix 40mg qhs at home requested for medication dr carreon made 
aware, with verbal order and read back to start tonight. patient made aware.

## 2020-09-21 NOTE — NUR
rn tele notes

patient complained of pain to left leg 8/10 

requested for dilaudid prn , given as ordered, vs wnl, 

lights dimmed will continue to monitor.

## 2020-09-21 NOTE — NUR
RN NOTES

PT SIGNED CONSENT FORM FOR CAROTID CT SCAN BUT HER R WRIST #18 START LEAKING. TRIED TO START 
AN IV UNSUCCESSFUL. CALLED SUPERVISOR ADAMARIS TO GET MID LINE NURSE.

## 2020-09-21 NOTE — NUR
ERROL OPENING NOTE



RECEIVED PT IN BED RESTING.  ALERT ORIENTED X3 VERBALLY RESPONSIVE,FULL CODE, ON TELE 
MONITORING HR:85 SINUS RHYTHM,ON 2L OXYGEN VIA NASAL CANNULA, SATURATING 99% NO SOB NOT 
ACUTE DISTRESS NOTED,IV SITE IS ON RIGHT WRIST INTACT, WELL FLUSHED AND TKO IS RUNNING. 
PATENT,ON DUBOIS CATHETER URINE DRAINING . STRICT I AND O. YELLOW AND CLEAR. SAFETY 
MEASUREMENTS ARE IMPLEMENTED BY HOSPITAL POLICY. CALL LIGHT WITHIN THE  REACH. BED LOCKED 
AND IN LOWEST POSITION AND SIDE RAILS ARE UP X2.WILL CONTINUE TO MONITOR.

## 2020-09-21 NOTE — NUR
tirate down the oxygen to 1 liter o2 flow due to 147 pao2 and 100% spo2. rn notified on 
changes

-------------------------------------------------------------------------------

Addendum: 09/21/20 at 1303 by ELVIRA CALLAHAN RT

-------------------------------------------------------------------------------

Amended: Links added.

## 2020-09-22 VITALS — SYSTOLIC BLOOD PRESSURE: 163 MMHG | DIASTOLIC BLOOD PRESSURE: 87 MMHG

## 2020-09-22 VITALS — SYSTOLIC BLOOD PRESSURE: 168 MMHG | DIASTOLIC BLOOD PRESSURE: 84 MMHG

## 2020-09-22 VITALS — SYSTOLIC BLOOD PRESSURE: 175 MMHG | DIASTOLIC BLOOD PRESSURE: 75 MMHG

## 2020-09-22 VITALS — DIASTOLIC BLOOD PRESSURE: 72 MMHG | SYSTOLIC BLOOD PRESSURE: 166 MMHG

## 2020-09-22 VITALS — DIASTOLIC BLOOD PRESSURE: 79 MMHG | SYSTOLIC BLOOD PRESSURE: 162 MMHG

## 2020-09-22 LAB
BASOPHILS # BLD AUTO: 0 /CMM (ref 0–0.2)
BASOPHILS NFR BLD AUTO: 0.4 % (ref 0–2)
BUN SERPL-MCNC: 18 MG/DL (ref 7–18)
CALCIUM SERPL-MCNC: 9 MG/DL (ref 8.5–10.1)
CHLORIDE SERPL-SCNC: 89 MMOL/L (ref 98–107)
CO2 SERPL-SCNC: 30 MMOL/L (ref 21–32)
CREAT SERPL-MCNC: 0.9 MG/DL (ref 0.6–1.3)
EOSINOPHIL NFR BLD AUTO: 0 % (ref 0–6)
FERRITIN SERPL-MCNC: 99 NG/ML (ref 8–388)
GLUCOSE SERPL-MCNC: 291 MG/DL (ref 74–106)
HCT VFR BLD AUTO: 34 % (ref 33–45)
HGB BLD-MCNC: 11.1 G/DL (ref 11.5–14.8)
IRON SERPL-MCNC: 55 UG/DL (ref 50–175)
LYMPHOCYTES NFR BLD AUTO: 0.7 /CMM (ref 0.8–4.8)
LYMPHOCYTES NFR BLD AUTO: 9.7 % (ref 20–44)
MCHC RBC AUTO-ENTMCNC: 33 G/DL (ref 31–36)
MCV RBC AUTO: 81 FL (ref 82–100)
MONOCYTES NFR BLD AUTO: 0.2 /CMM (ref 0.1–1.3)
MONOCYTES NFR BLD AUTO: 2.1 % (ref 2–12)
NEUTROPHILS # BLD AUTO: 6.5 /CMM (ref 1.8–8.9)
NEUTROPHILS NFR BLD AUTO: 87.8 % (ref 43–81)
PLATELET # BLD AUTO: 406 /CMM (ref 150–450)
POTASSIUM SERPL-SCNC: 4.4 MMOL/L (ref 3.5–5.1)
RBC # BLD AUTO: 4.17 MIL/UL (ref 4–5.2)
SODIUM SERPL-SCNC: 128 MMOL/L (ref 136–145)
TIBC SERPL-MCNC: 426 UG/DL (ref 250–450)
WBC NRBC COR # BLD AUTO: 7.4 K/UL (ref 4.3–11)

## 2020-09-22 RX ADMIN — POLYETHYLENE GLYCOL 3350 PRN GM: 17 POWDER, FOR SOLUTION ORAL at 12:50

## 2020-09-22 RX ADMIN — ENOXAPARIN SODIUM SCH MG: 40 INJECTION SUBCUTANEOUS at 08:51

## 2020-09-22 RX ADMIN — Medication SCH MG: at 08:52

## 2020-09-22 RX ADMIN — CLOTRIMAZOLE SCH APPLIC: 1 CREAM TOPICAL at 16:13

## 2020-09-22 RX ADMIN — LIDOCAINE SCH EA: 50 PATCH CUTANEOUS at 10:06

## 2020-09-22 RX ADMIN — LINAGLIPTIN SCH MG: 5 TABLET, FILM COATED ORAL at 08:53

## 2020-09-22 RX ADMIN — HYDROMORPHONE HYDROCHLORIDE PRN MG: 2 TABLET ORAL at 10:08

## 2020-09-22 RX ADMIN — INSULIN HUMAN PRN UNIT: 100 INJECTION, SOLUTION PARENTERAL at 14:38

## 2020-09-22 RX ADMIN — HYDROMORPHONE HYDROCHLORIDE PRN MG: 2 TABLET ORAL at 04:18

## 2020-09-22 RX ADMIN — ALBUTEROL SULFATE SCH MG: 2.5 SOLUTION RESPIRATORY (INHALATION) at 07:28

## 2020-09-22 RX ADMIN — Medication SCH EACH: at 21:47

## 2020-09-22 RX ADMIN — FUROSEMIDE SCH MG: 10 INJECTION, SOLUTION INTRAMUSCULAR; INTRAVENOUS at 08:50

## 2020-09-22 RX ADMIN — Medication SCH MG: at 19:59

## 2020-09-22 RX ADMIN — INSULIN HUMAN PRN UNIT: 100 INJECTION, SOLUTION PARENTERAL at 08:17

## 2020-09-22 RX ADMIN — ALBUTEROL SULFATE SCH MG: 2.5 SOLUTION RESPIRATORY (INHALATION) at 19:59

## 2020-09-22 RX ADMIN — INSULIN HUMAN PRN UNIT: 100 INJECTION, SOLUTION PARENTERAL at 18:10

## 2020-09-22 RX ADMIN — FLUCONAZOLE SCH MG: 100 TABLET ORAL at 10:06

## 2020-09-22 RX ADMIN — ATORVASTATIN CALCIUM SCH MG: 40 TABLET, FILM COATED ORAL at 21:34

## 2020-09-22 RX ADMIN — Medication SCH MG: at 08:53

## 2020-09-22 RX ADMIN — Medication SCH MG: at 16:10

## 2020-09-22 RX ADMIN — INSULIN HUMAN PRN UNIT: 100 INJECTION, SOLUTION PARENTERAL at 22:07

## 2020-09-22 RX ADMIN — CLOTRIMAZOLE SCH APPLIC: 1 CREAM TOPICAL at 08:55

## 2020-09-22 RX ADMIN — LIDOCAINE SCH EA: 50 PATCH CUTANEOUS at 08:55

## 2020-09-22 RX ADMIN — LIDOCAINE SCH EA: 50 PATCH CUTANEOUS at 10:07

## 2020-09-22 RX ADMIN — PANTOPRAZOLE SODIUM SCH MG: 40 TABLET, DELAYED RELEASE ORAL at 21:34

## 2020-09-22 RX ADMIN — VALACYCLOVIR HYDROCHLORIDE SCH MG: 500 TABLET, FILM COATED ORAL at 08:51

## 2020-09-22 RX ADMIN — MUPIROCIN SCH APPLIC: 20 OINTMENT TOPICAL at 21:47

## 2020-09-22 RX ADMIN — ASPIRIN 81 MG SCH MG: 81 TABLET ORAL at 08:50

## 2020-09-22 RX ADMIN — FUROSEMIDE SCH MG: 10 INJECTION, SOLUTION INTRAMUSCULAR; INTRAVENOUS at 16:12

## 2020-09-22 RX ADMIN — ACETAMINOPHEN PRN MG: 325 TABLET ORAL at 12:22

## 2020-09-22 RX ADMIN — DEXTROSE MONOHYDRATE SCH MLS/HR: 50 INJECTION, SOLUTION INTRAVENOUS at 12:22

## 2020-09-22 RX ADMIN — Medication SCH MG: at 16:11

## 2020-09-22 RX ADMIN — ACETAMINOPHEN PRN MG: 325 TABLET ORAL at 18:21

## 2020-09-22 RX ADMIN — LOSARTAN POTASSIUM SCH MG: 50 TABLET, FILM COATED ORAL at 08:50

## 2020-09-22 RX ADMIN — ALBUTEROL SULFATE SCH MG: 2.5 SOLUTION RESPIRATORY (INHALATION) at 13:09

## 2020-09-22 RX ADMIN — PREGABALIN SCH MG: 25 CAPSULE ORAL at 21:33

## 2020-09-22 RX ADMIN — Medication SCH EACH: at 18:09

## 2020-09-22 RX ADMIN — Medication SCH APPLIC: at 08:54

## 2020-09-22 RX ADMIN — PREGABALIN SCH MG: 25 CAPSULE ORAL at 04:01

## 2020-09-22 RX ADMIN — HYDROMORPHONE HYDROCHLORIDE PRN MG: 2 TABLET ORAL at 21:34

## 2020-09-22 RX ADMIN — ALBUTEROL SULFATE SCH MG: 2.5 SOLUTION RESPIRATORY (INHALATION) at 00:18

## 2020-09-22 RX ADMIN — Medication SCH MG: at 00:18

## 2020-09-22 RX ADMIN — Medication SCH MG: at 07:28

## 2020-09-22 RX ADMIN — Medication SCH MG: at 12:22

## 2020-09-22 RX ADMIN — BACLOFEN SCH MG: 10 TABLET ORAL at 04:01

## 2020-09-22 RX ADMIN — Medication SCH MG: at 13:09

## 2020-09-22 RX ADMIN — Medication SCH EACH: at 12:20

## 2020-09-22 RX ADMIN — DEXTROSE MONOHYDRATE SCH MLS/HR: 50 INJECTION, SOLUTION INTRAVENOUS at 23:35

## 2020-09-22 RX ADMIN — PREGABALIN SCH MG: 25 CAPSULE ORAL at 12:22

## 2020-09-22 RX ADMIN — Medication SCH EACH: at 08:30

## 2020-09-22 RX ADMIN — Medication SCH OZ: at 08:56

## 2020-09-22 RX ADMIN — EZETIMIBE SCH MG: 10 TABLET ORAL at 08:53

## 2020-09-22 RX ADMIN — MUPIROCIN SCH APPLIC: 20 OINTMENT TOPICAL at 08:54

## 2020-09-22 RX ADMIN — PAROXETINE HYDROCHLORIDE SCH MG: 10 TABLET, FILM COATED ORAL at 08:52

## 2020-09-22 RX ADMIN — HYDROMORPHONE HYDROCHLORIDE PRN MG: 2 TABLET ORAL at 16:11

## 2020-09-22 RX ADMIN — CLOPIDOGREL BISULFATE SCH MG: 75 TABLET, FILM COATED ORAL at 08:52

## 2020-09-22 RX ADMIN — ACETAMINOPHEN PRN MG: 325 TABLET ORAL at 00:20

## 2020-09-22 RX ADMIN — BACLOFEN SCH MG: 10 TABLET ORAL at 21:34

## 2020-09-22 RX ADMIN — BACLOFEN SCH MG: 10 TABLET ORAL at 12:23

## 2020-09-22 NOTE — NUR
rn tele notes

 patient complained of pain to left leg states 10/10, requesting for dilaudid, 

prn given will continue to monitor for effectiveness, patient repositioned.

## 2020-09-22 NOTE — NUR
RN OPENING NOTE

Received patient awake in bed appears calm and relaxed no signs of distress. On NC 1L 
tolerating well. Patient is AO X4. Tele reading SR with occ. PACs 90s. David catheter in 
place draining clear yellow urine by gravity. Noted with L lower leg swelling and redness 
and R lower leg redness. Has JOSE Midline flushes well. Safety measures reinforced. Call 
light within reach. Bed locked and on lowest position. Will cont to monitor.

## 2020-09-22 NOTE — NUR
rn tele notes

 patient complained of headache, requested for tylenol 

prn tylenol given as ordered, will continue to monitor.

## 2020-09-22 NOTE — NUR
PATIENT CAME BACK TO ROOM ASSISTED BY XRAY TECH WITH COMPLAINS OF PAIN ON CHEST RELIEVED BY 
REPOSITIONING AND OXYGEN. COMPAINED OF PAIN ON HER BACK AND L LEG. PT ASKED FOR DILAUDID. 
PAIN RATE 10/10.

## 2020-09-22 NOTE — NUR
RN TELE CLOSING NOTES

 PATIENT AWAKE ALERT AND ORIENTED X4 , ON 1L VIA NC TOLERATING WELL, RESPIRATIONS EVEN AND 
UNLABORED WITH EQUAL RISE AND FALL OF CHEST, ON CARDIAC MONITOR SR 94, DUBOIS CATHETER INTACT 
AND DRAINING 1100 OUTPUT YELLOW URINE, WOUND CARE DONE AS ORDERED, RIGHT UPPER ARM MIDLINE 
INTACT AND PATENT. STATES PAIN HAS GOTTEN BETTER,   AT THIS TIME, TOILETING OFFERED,  CALL 
LIGHT  KEPT WITHIN REACH, SAFETY PRECAUTIONS PRESENT LOW BED AND LOCKED, BED ALARM INTACT, 
ALL NEEDS ATTENDED AT THIS TIME, WILL CONTINUE TO MONITOR AND ATTEND TO NEEDS AND ENDORSE TO 
NEXT SHIFT.

## 2020-09-22 NOTE — NUR
PATIENT ON A CHAIR AWAKE WATCHING TV APPEARS CALM AND RELAXED. NO SIGNS OF DISTRESS. ALL DUE 
MEDS GIVEN KEPT CLEAN AND DRY. APPLIED TREATMENT AS ORDERED. NO CO PAIN OR DISCOMFORT AT 
THIS TIME. VITAL SIGNS WITHIN NORMAL LIMITS. DUBOIS CATHETER DRAINED 2900ML. ENDORSED TO 
NIGHT SHIFT NURSE FOR NAHUN AND FLUID RESTRICTION.

## 2020-09-22 NOTE — NUR
ERROL RN OPENING NOTES



RECEIVED PATIENT IN CHAIR, SITTING, CONSCIOUS, COOPERATIVE, A/O X4, O2 @ 1LPM VIA NASAL 
CANNULA, UNLABORED BREATHING, NO SIGNS OF RESPIRATORY DISTRESS, JOSE MIDLINE, DUBOIS CATH 
ATTACHED WITH YELLOWISH COLORED URINE, NO COMPLAINTS, WILL CONTINUE TO MONITOR PATIENT.

## 2020-09-22 NOTE — NUR
titrate down to 1 liter Oxygen flow due to 99% - 100%

-------------------------------------------------------------------------------

Addendum: 09/22/20 at 0744 by ELVIRA CALLAHAN RT

-------------------------------------------------------------------------------

Amended: Links added.

## 2020-09-23 VITALS — DIASTOLIC BLOOD PRESSURE: 78 MMHG | SYSTOLIC BLOOD PRESSURE: 166 MMHG

## 2020-09-23 VITALS — DIASTOLIC BLOOD PRESSURE: 81 MMHG | SYSTOLIC BLOOD PRESSURE: 182 MMHG

## 2020-09-23 VITALS — SYSTOLIC BLOOD PRESSURE: 173 MMHG | DIASTOLIC BLOOD PRESSURE: 74 MMHG

## 2020-09-23 VITALS — SYSTOLIC BLOOD PRESSURE: 151 MMHG | DIASTOLIC BLOOD PRESSURE: 67 MMHG

## 2020-09-23 VITALS — SYSTOLIC BLOOD PRESSURE: 187 MMHG | DIASTOLIC BLOOD PRESSURE: 75 MMHG

## 2020-09-23 VITALS — DIASTOLIC BLOOD PRESSURE: 67 MMHG | SYSTOLIC BLOOD PRESSURE: 151 MMHG

## 2020-09-23 LAB
ALBUMIN SERPL BCP-MCNC: 3.6 G/DL (ref 3.4–5)
ALP SERPL-CCNC: 97 U/L (ref 46–116)
ALT SERPL W P-5'-P-CCNC: 36 U/L (ref 12–78)
AST SERPL W P-5'-P-CCNC: 15 U/L (ref 15–37)
BILIRUB DIRECT SERPL-MCNC: 0.2 MG/DL (ref 0–0.2)
BILIRUB SERPL-MCNC: 0.6 MG/DL (ref 0.2–1)
BUN SERPL-MCNC: 23 MG/DL (ref 7–18)
CALCIUM SERPL-MCNC: 9.2 MG/DL (ref 8.5–10.1)
CHLORIDE SERPL-SCNC: 88 MMOL/L (ref 98–107)
CHOLEST SERPL-MCNC: 146 MG/DL (ref ?–200)
CO2 SERPL-SCNC: 31 MMOL/L (ref 21–32)
CREAT SERPL-MCNC: 0.9 MG/DL (ref 0.6–1.3)
GLUCOSE SERPL-MCNC: 318 MG/DL (ref 74–106)
HDLC SERPL-MCNC: 47 MG/DL (ref 40–60)
LDLC SERPL DIRECT ASSAY-MCNC: 85 MG/DL (ref 0–99)
NT-PROBNP SERPL-MCNC: 606 PG/ML (ref 0–125)
POTASSIUM SERPL-SCNC: 4.3 MMOL/L (ref 3.5–5.1)
PROT SERPL-MCNC: 7.2 G/DL (ref 6.4–8.2)
SODIUM SERPL-SCNC: 126 MMOL/L (ref 136–145)
TRIGL SERPL-MCNC: 71 MG/DL (ref 30–150)

## 2020-09-23 RX ADMIN — CLOTRIMAZOLE SCH APPLIC: 1 CREAM TOPICAL at 17:54

## 2020-09-23 RX ADMIN — ALBUTEROL SULFATE SCH MG: 2.5 SOLUTION RESPIRATORY (INHALATION) at 20:05

## 2020-09-23 RX ADMIN — PREGABALIN SCH MG: 25 CAPSULE ORAL at 04:21

## 2020-09-23 RX ADMIN — Medication SCH MG: at 08:22

## 2020-09-23 RX ADMIN — POLYETHYLENE GLYCOL 3350 PRN GM: 17 POWDER, FOR SOLUTION ORAL at 09:08

## 2020-09-23 RX ADMIN — HYDROMORPHONE HYDROCHLORIDE PRN MG: 2 TABLET ORAL at 15:51

## 2020-09-23 RX ADMIN — Medication SCH MG: at 20:05

## 2020-09-23 RX ADMIN — Medication SCH MG: at 17:53

## 2020-09-23 RX ADMIN — MUPIROCIN SCH APPLIC: 20 OINTMENT TOPICAL at 09:30

## 2020-09-23 RX ADMIN — Medication SCH MG: at 08:51

## 2020-09-23 RX ADMIN — VALACYCLOVIR HYDROCHLORIDE SCH MG: 500 TABLET, FILM COATED ORAL at 08:22

## 2020-09-23 RX ADMIN — ALBUTEROL SULFATE SCH MG: 2.5 SOLUTION RESPIRATORY (INHALATION) at 12:57

## 2020-09-23 RX ADMIN — BACLOFEN SCH MG: 10 TABLET ORAL at 12:52

## 2020-09-23 RX ADMIN — CLOPIDOGREL BISULFATE SCH MG: 75 TABLET, FILM COATED ORAL at 08:23

## 2020-09-23 RX ADMIN — ACETAMINOPHEN PRN MG: 325 TABLET ORAL at 05:51

## 2020-09-23 RX ADMIN — DEXTROSE MONOHYDRATE SCH MLS/HR: 50 INJECTION, SOLUTION INTRAVENOUS at 23:50

## 2020-09-23 RX ADMIN — PAROXETINE HYDROCHLORIDE SCH MG: 10 TABLET, FILM COATED ORAL at 08:23

## 2020-09-23 RX ADMIN — Medication SCH EACH: at 17:54

## 2020-09-23 RX ADMIN — LABETALOL HCL SCH MG: 100 TABLET, FILM COATED ORAL at 17:53

## 2020-09-23 RX ADMIN — MUPIROCIN SCH APPLIC: 20 OINTMENT TOPICAL at 22:11

## 2020-09-23 RX ADMIN — Medication SCH MG: at 12:57

## 2020-09-23 RX ADMIN — BACLOFEN SCH MG: 10 TABLET ORAL at 04:27

## 2020-09-23 RX ADMIN — VALACYCLOVIR HYDROCHLORIDE SCH MG: 500 TABLET, FILM COATED ORAL at 09:00

## 2020-09-23 RX ADMIN — PANTOPRAZOLE SODIUM SCH MG: 40 TABLET, DELAYED RELEASE ORAL at 21:51

## 2020-09-23 RX ADMIN — Medication SCH EACH: at 12:58

## 2020-09-23 RX ADMIN — HYDROMORPHONE HYDROCHLORIDE PRN MG: 2 TABLET ORAL at 02:49

## 2020-09-23 RX ADMIN — LIDOCAINE SCH EA: 50 PATCH CUTANEOUS at 09:30

## 2020-09-23 RX ADMIN — HYDROMORPHONE HYDROCHLORIDE PRN MG: 2 TABLET ORAL at 08:46

## 2020-09-23 RX ADMIN — INSULIN HUMAN PRN UNIT: 100 INJECTION, SOLUTION PARENTERAL at 06:29

## 2020-09-23 RX ADMIN — LINAGLIPTIN SCH MG: 5 TABLET, FILM COATED ORAL at 08:23

## 2020-09-23 RX ADMIN — Medication SCH EACH: at 06:19

## 2020-09-23 RX ADMIN — PREGABALIN SCH MG: 25 CAPSULE ORAL at 12:52

## 2020-09-23 RX ADMIN — Medication SCH APPLIC: at 09:30

## 2020-09-23 RX ADMIN — ACETAMINOPHEN PRN MG: 325 TABLET ORAL at 17:52

## 2020-09-23 RX ADMIN — LOSARTAN POTASSIUM SCH MG: 50 TABLET, FILM COATED ORAL at 08:22

## 2020-09-23 RX ADMIN — ALBUTEROL SULFATE SCH MG: 2.5 SOLUTION RESPIRATORY (INHALATION) at 08:51

## 2020-09-23 RX ADMIN — HYDROMORPHONE HYDROCHLORIDE PRN MG: 2 TABLET ORAL at 21:52

## 2020-09-23 RX ADMIN — Medication SCH EACH: at 22:11

## 2020-09-23 RX ADMIN — Medication SCH MG: at 08:23

## 2020-09-23 RX ADMIN — DEXTROSE MONOHYDRATE SCH MLS/HR: 50 INJECTION, SOLUTION INTRAVENOUS at 12:58

## 2020-09-23 RX ADMIN — ALBUTEROL SULFATE SCH MG: 2.5 SOLUTION RESPIRATORY (INHALATION) at 02:10

## 2020-09-23 RX ADMIN — ATORVASTATIN CALCIUM SCH MG: 40 TABLET, FILM COATED ORAL at 21:51

## 2020-09-23 RX ADMIN — ENOXAPARIN SODIUM SCH MG: 40 INJECTION SUBCUTANEOUS at 10:01

## 2020-09-23 RX ADMIN — BACLOFEN SCH MG: 10 TABLET ORAL at 21:51

## 2020-09-23 RX ADMIN — CLOTRIMAZOLE SCH APPLIC: 1 CREAM TOPICAL at 09:30

## 2020-09-23 RX ADMIN — FUROSEMIDE SCH MG: 10 INJECTION, SOLUTION INTRAMUSCULAR; INTRAVENOUS at 08:23

## 2020-09-23 RX ADMIN — INSULIN HUMAN PRN UNIT: 100 INJECTION, SOLUTION PARENTERAL at 22:06

## 2020-09-23 RX ADMIN — FLUCONAZOLE SCH MG: 100 TABLET ORAL at 08:23

## 2020-09-23 RX ADMIN — INSULIN HUMAN PRN UNIT: 100 INJECTION, SOLUTION PARENTERAL at 12:55

## 2020-09-23 RX ADMIN — EZETIMIBE SCH MG: 10 TABLET ORAL at 08:23

## 2020-09-23 RX ADMIN — INSULIN HUMAN PRN UNIT: 100 INJECTION, SOLUTION PARENTERAL at 18:20

## 2020-09-23 RX ADMIN — Medication SCH OZ: at 09:30

## 2020-09-23 RX ADMIN — FUROSEMIDE SCH MG: 10 INJECTION, SOLUTION INTRAMUSCULAR; INTRAVENOUS at 17:52

## 2020-09-23 RX ADMIN — PREGABALIN SCH MG: 25 CAPSULE ORAL at 21:51

## 2020-09-23 RX ADMIN — Medication SCH MG: at 12:52

## 2020-09-23 RX ADMIN — Medication SCH MG: at 02:10

## 2020-09-23 RX ADMIN — ASPIRIN 81 MG SCH MG: 81 TABLET ORAL at 08:22

## 2020-09-23 NOTE — NUR
MS/RN NOTES 



PATIENT COMPLAINING OF LOWER LE PAIN. GIVEN DILAUDID 4 MG PO. V/S ARE STABLE. WILL CONTINUE 
TO MONITOR.

## 2020-09-23 NOTE — NUR
RN CLOSING NOTE:



Tavo remains on the chair at bedside. Awake, alert and oriented x4. Able to make needs 
known. Still on cont. o2 via NC @ 1lpm @ 99% saturation. No SOB and not in respiratory 
distress. IV site clean, dry, patent and intact. Pain reported and managed with available 
pain medications with relief reported. On Fluid restriction of 1200/day. David catheter 
patent and in place, draining yellow urine. BP recorded throughout shift as >160. Kimberley Sanchez DNP saw patient with new ordered. Noted and carried out. Call light in reach. Bed 
locked, low and at semi-nicolas's position. Side rails up x3. Safety ensured and observed. 
Due medications given. Treatment given as ordered. Endorsed to oncoming shift for NAHUN.

## 2020-09-23 NOTE — NUR
ERROL RN CLOSING NOTES



ENDORSED PATIENT IN BED, AWAKE, A/O X4, O2 @ 1LPM VIA NASAL CANNULA, UNLABORED BREATHING, NO 
SIGNS OF RESPIRATORY DISTRESS, JOSE MIDLINE, DUBOIS CATH , DUE MEDS GIVEN, MONITOR 
PATIENT BLOOD PRESSURE WHOLE SHIFT, INSTRUCTED PATIENT REGARDING FLUID RESTRICTIONS, PAIN 
MEDS GIVEN, SIDE RAILS UP X 2 FOR SAFETY.

## 2020-09-23 NOTE — NUR
ERROL/RN OPENING NOTES



RECEIVED PATIENT SITTING IN CHAIR. PATIENT IS ALERT AND ORIENTED X 4. PATIENT STATES NO PAIN 
AT THIS TIME. NO SIGNS OF SOB OR RESPIRATORY DISTRESS NOTED. PATIENT HAS MIDLINE JOSE IN 
PLACE *18G. NO SIGNS OF DISTRESS NOTED. SAFETY MEASURES ARE IN PLACE. BED IS LOCKED AND IN 
LOW POSITION. CALL LIGHT WITHIN REACH. WILL CONTINUE TO MONITOR DURING SHIFT.

## 2020-09-23 NOTE — NUR
RN OPENING NOTE:



Received patient in bed. Awake, alert and oriented x4. Able to make needs known. On cont. o2 
via NC @ 1lpm @ 99% saturation. No SOB and not in respiratory distress. IV site clean, dry, 
patent and intact. Reports of pain noted and will be managed with prescribed medications. On 
Fluid restriction of 1200/day. David catheter patent and in place, draining yellow urine. BP 
reported to be high in the 160s. Call light in reach. Bed locked, low and at semi-nicolas's 
position. Side rails up x3. Safety ensured and observed. Will continue to monitor.

## 2020-09-24 ENCOUNTER — HOSPITAL ENCOUNTER (INPATIENT)
Dept: HOSPITAL 12 - REHABOV3 | Age: 60
LOS: 14 days | Discharge: HOME HEALTH | DRG: 194 | End: 2020-10-08
Attending: PHYSICAL MEDICINE & REHABILITATION | Admitting: PHYSICAL MEDICINE & REHABILITATION
Payer: COMMERCIAL

## 2020-09-24 VITALS — SYSTOLIC BLOOD PRESSURE: 156 MMHG | DIASTOLIC BLOOD PRESSURE: 93 MMHG

## 2020-09-24 VITALS — DIASTOLIC BLOOD PRESSURE: 63 MMHG | SYSTOLIC BLOOD PRESSURE: 132 MMHG

## 2020-09-24 VITALS — WEIGHT: 263 LBS | HEIGHT: 62 IN | BODY MASS INDEX: 48.4 KG/M2

## 2020-09-24 VITALS — SYSTOLIC BLOOD PRESSURE: 172 MMHG | DIASTOLIC BLOOD PRESSURE: 82 MMHG

## 2020-09-24 VITALS — SYSTOLIC BLOOD PRESSURE: 122 MMHG | DIASTOLIC BLOOD PRESSURE: 56 MMHG

## 2020-09-24 VITALS — DIASTOLIC BLOOD PRESSURE: 50 MMHG | SYSTOLIC BLOOD PRESSURE: 119 MMHG

## 2020-09-24 VITALS — DIASTOLIC BLOOD PRESSURE: 42 MMHG | SYSTOLIC BLOOD PRESSURE: 143 MMHG

## 2020-09-24 DIAGNOSIS — R07.89: ICD-10-CM

## 2020-09-24 DIAGNOSIS — E03.9: ICD-10-CM

## 2020-09-24 DIAGNOSIS — I70.0: ICD-10-CM

## 2020-09-24 DIAGNOSIS — I87.2: ICD-10-CM

## 2020-09-24 DIAGNOSIS — L03.116: ICD-10-CM

## 2020-09-24 DIAGNOSIS — I65.23: ICD-10-CM

## 2020-09-24 DIAGNOSIS — I13.0: Primary | ICD-10-CM

## 2020-09-24 DIAGNOSIS — R53.1: ICD-10-CM

## 2020-09-24 DIAGNOSIS — Z79.899: ICD-10-CM

## 2020-09-24 DIAGNOSIS — M21.372: ICD-10-CM

## 2020-09-24 DIAGNOSIS — Z98.51: ICD-10-CM

## 2020-09-24 DIAGNOSIS — E11.65: ICD-10-CM

## 2020-09-24 DIAGNOSIS — G89.29: ICD-10-CM

## 2020-09-24 DIAGNOSIS — D64.9: ICD-10-CM

## 2020-09-24 DIAGNOSIS — J45.901: ICD-10-CM

## 2020-09-24 DIAGNOSIS — L97.929: ICD-10-CM

## 2020-09-24 DIAGNOSIS — E11.22: ICD-10-CM

## 2020-09-24 DIAGNOSIS — F32.9: ICD-10-CM

## 2020-09-24 DIAGNOSIS — R32: ICD-10-CM

## 2020-09-24 DIAGNOSIS — D50.9: ICD-10-CM

## 2020-09-24 DIAGNOSIS — I69.398: ICD-10-CM

## 2020-09-24 DIAGNOSIS — E11.42: ICD-10-CM

## 2020-09-24 DIAGNOSIS — N18.9: ICD-10-CM

## 2020-09-24 DIAGNOSIS — F40.240: ICD-10-CM

## 2020-09-24 DIAGNOSIS — N32.81: ICD-10-CM

## 2020-09-24 DIAGNOSIS — Z79.51: ICD-10-CM

## 2020-09-24 DIAGNOSIS — E11.51: ICD-10-CM

## 2020-09-24 DIAGNOSIS — R13.10: ICD-10-CM

## 2020-09-24 DIAGNOSIS — J44.9: ICD-10-CM

## 2020-09-24 DIAGNOSIS — D68.59: ICD-10-CM

## 2020-09-24 DIAGNOSIS — M54.5: ICD-10-CM

## 2020-09-24 DIAGNOSIS — I25.10: ICD-10-CM

## 2020-09-24 DIAGNOSIS — I50.43: ICD-10-CM

## 2020-09-24 DIAGNOSIS — Z86.14: ICD-10-CM

## 2020-09-24 DIAGNOSIS — N17.0: ICD-10-CM

## 2020-09-24 DIAGNOSIS — Z87.440: ICD-10-CM

## 2020-09-24 DIAGNOSIS — E78.5: ICD-10-CM

## 2020-09-24 DIAGNOSIS — K21.9: ICD-10-CM

## 2020-09-24 DIAGNOSIS — Z79.891: ICD-10-CM

## 2020-09-24 DIAGNOSIS — I69.354: ICD-10-CM

## 2020-09-24 DIAGNOSIS — Z88.1: ICD-10-CM

## 2020-09-24 DIAGNOSIS — E87.1: ICD-10-CM

## 2020-09-24 DIAGNOSIS — Z96.652: ICD-10-CM

## 2020-09-24 DIAGNOSIS — M51.36: ICD-10-CM

## 2020-09-24 DIAGNOSIS — E66.2: ICD-10-CM

## 2020-09-24 DIAGNOSIS — L03.115: ICD-10-CM

## 2020-09-24 DIAGNOSIS — J96.01: ICD-10-CM

## 2020-09-24 DIAGNOSIS — Z87.891: ICD-10-CM

## 2020-09-24 LAB
BUN SERPL-MCNC: 25 MG/DL (ref 7–18)
CALCIUM SERPL-MCNC: 9.1 MG/DL (ref 8.5–10.1)
CHLORIDE SERPL-SCNC: 88 MMOL/L (ref 98–107)
CO2 SERPL-SCNC: 32 MMOL/L (ref 21–32)
CREAT SERPL-MCNC: 0.9 MG/DL (ref 0.6–1.3)
GLUCOSE SERPL-MCNC: 298 MG/DL (ref 74–106)
POTASSIUM SERPL-SCNC: 4.4 MMOL/L (ref 3.5–5.1)
SODIUM SERPL-SCNC: 125 MMOL/L (ref 136–145)

## 2020-09-24 PROCEDURE — A4663 DIALYSIS BLOOD PRESSURE CUFF: HCPCS

## 2020-09-24 RX ADMIN — FUROSEMIDE SCH MG: 10 INJECTION, SOLUTION INTRAMUSCULAR; INTRAVENOUS at 08:48

## 2020-09-24 RX ADMIN — INSULIN GLARGINE SCH UNITS: 100 INJECTION, SOLUTION SUBCUTANEOUS at 23:25

## 2020-09-24 RX ADMIN — Medication SCH EACH: at 16:59

## 2020-09-24 RX ADMIN — LINAGLIPTIN SCH MG: 5 TABLET, FILM COATED ORAL at 08:55

## 2020-09-24 RX ADMIN — Medication SCH OZ: at 08:50

## 2020-09-24 RX ADMIN — Medication SCH EACH: at 21:00

## 2020-09-24 RX ADMIN — Medication SCH MG: at 08:47

## 2020-09-24 RX ADMIN — BACLOFEN SCH MG: 10 TABLET ORAL at 04:48

## 2020-09-24 RX ADMIN — Medication SCH MG: at 01:54

## 2020-09-24 RX ADMIN — CLOPIDOGREL BISULFATE SCH MG: 75 TABLET, FILM COATED ORAL at 08:43

## 2020-09-24 RX ADMIN — Medication SCH EACH: at 07:41

## 2020-09-24 RX ADMIN — LOSARTAN POTASSIUM SCH MG: 50 TABLET, FILM COATED ORAL at 08:46

## 2020-09-24 RX ADMIN — LABETALOL HCL SCH MG: 100 TABLET, FILM COATED ORAL at 08:43

## 2020-09-24 RX ADMIN — ENOXAPARIN SODIUM SCH MG: 40 INJECTION SUBCUTANEOUS at 08:39

## 2020-09-24 RX ADMIN — INSULIN HUMAN PRN UNIT: 100 INJECTION, SOLUTION PARENTERAL at 07:58

## 2020-09-24 RX ADMIN — Medication SCH EACH: at 20:48

## 2020-09-24 RX ADMIN — ACETAMINOPHEN PRN MG: 325 TABLET ORAL at 08:46

## 2020-09-24 RX ADMIN — LIDOCAINE SCH EA: 50 PATCH CUTANEOUS at 08:41

## 2020-09-24 RX ADMIN — LIDOCAINE SCH EA: 50 PATCH CUTANEOUS at 08:50

## 2020-09-24 RX ADMIN — Medication SCH MG: at 08:12

## 2020-09-24 RX ADMIN — HYDROMORPHONE HYDROCHLORIDE PRN MG: 2 TABLET ORAL at 20:44

## 2020-09-24 RX ADMIN — Medication SCH APPLIC: at 08:50

## 2020-09-24 RX ADMIN — Medication SCH MG: at 08:45

## 2020-09-24 RX ADMIN — SODIUM CHLORIDE PRN UNIT: 9 INJECTION, SOLUTION INTRAVENOUS at 20:50

## 2020-09-24 RX ADMIN — HYDROMORPHONE HYDROCHLORIDE PRN MG: 2 TABLET ORAL at 04:49

## 2020-09-24 RX ADMIN — SODIUM CHLORIDE PRN UNIT: 9 INJECTION, SOLUTION INTRAVENOUS at 17:19

## 2020-09-24 RX ADMIN — PREGABALIN SCH MG: 25 CAPSULE ORAL at 12:18

## 2020-09-24 RX ADMIN — PREGABALIN SCH MG: 25 CAPSULE ORAL at 22:06

## 2020-09-24 RX ADMIN — BACLOFEN SCH MG: 10 TABLET ORAL at 22:06

## 2020-09-24 RX ADMIN — HYDROMORPHONE HYDROCHLORIDE PRN MG: 2 TABLET ORAL at 11:04

## 2020-09-24 RX ADMIN — BACLOFEN SCH MG: 10 TABLET ORAL at 12:18

## 2020-09-24 RX ADMIN — CLOTRIMAZOLE SCH APPLIC: 1 CREAM TOPICAL at 08:50

## 2020-09-24 RX ADMIN — PAROXETINE HYDROCHLORIDE SCH MG: 10 TABLET, FILM COATED ORAL at 08:45

## 2020-09-24 RX ADMIN — MUPIROCIN SCH GM: 20 OINTMENT TOPICAL at 23:24

## 2020-09-24 RX ADMIN — VALACYCLOVIR HYDROCHLORIDE SCH MG: 500 TABLET, FILM COATED ORAL at 08:47

## 2020-09-24 RX ADMIN — PANTOPRAZOLE SODIUM SCH MG: 40 TABLET, DELAYED RELEASE ORAL at 21:40

## 2020-09-24 RX ADMIN — ALBUTEROL SULFATE SCH MG: 2.5 SOLUTION RESPIRATORY (INHALATION) at 01:54

## 2020-09-24 RX ADMIN — PREGABALIN SCH MG: 25 CAPSULE ORAL at 04:48

## 2020-09-24 RX ADMIN — Medication SCH MG: at 12:18

## 2020-09-24 RX ADMIN — ALBUTEROL SULFATE SCH MG: 2.5 SOLUTION RESPIRATORY (INHALATION) at 08:12

## 2020-09-24 RX ADMIN — DEXTROSE MONOHYDRATE SCH MLS/HR: 50 INJECTION, SOLUTION INTRAVENOUS at 11:55

## 2020-09-24 RX ADMIN — EZETIMIBE SCH MG: 10 TABLET ORAL at 08:45

## 2020-09-24 RX ADMIN — Medication SCH EACH: at 11:44

## 2020-09-24 RX ADMIN — MUPIROCIN SCH APPLIC: 20 OINTMENT TOPICAL at 08:50

## 2020-09-24 RX ADMIN — INSULIN HUMAN PRN UNIT: 100 INJECTION, SOLUTION PARENTERAL at 11:54

## 2020-09-24 RX ADMIN — ASPIRIN 81 MG SCH MG: 81 TABLET ORAL at 08:48

## 2020-09-24 NOTE — NUR
RN OPENING NOTES

Patient remains in bed, A/Ox4, receiving O therapy via NC @ 1L of O2, tolerating well, no 
SOB, no cough, no resp distress noted. Midline noted on JOSE G18, intact and patent. David 
cath in place daring yellow clear urine by gravity,Patient reports moderate pain in her 
back, neck and head on level of 4-5 at this time.

Safety measures implemented, call light in reach, bed in lowest position, HIB elevated, will 
cont to monitor

## 2020-09-24 NOTE — NUR
patient picked up by [facility] transportation team with [employer] [facility]eros and OCTAVIO Larson, 
patient is safe, ID bands removed, discharge instruction provided, report given to [facility]

## 2020-09-24 NOTE — NUR
MS/RN CLOSING NOTES 



PATIENT IN BED RESTING EASY TO WAKE. PATIENT IS ALERT AND ORIENTED X 4. PATIENT STATES NO 
PAIN AT THIS TIME. NO SIGNS OF SOB OR RESPIRATORY DISTRESS NOTED. PATIENT HAS MIDLINE JOSE IN 
PLACE *18G SL. NO SIGNS OF DISTRESS NOTED. PATIENT F/C IN PLACE DRAIN CLEAR YELLOW URINE 
OUTPUT 3,350 CC. PATIENT ON STRICT FLUID INTAKE. ALL PATIENTS NEEDS HAVE BEEN MET DURING 
SHIFT. SAFETY MEASURES ARE IN PLACE. BED IS LOCKED AND IN LOW POSITION SIDE RAILS UP X3. 
CALL LIGHT WITHIN REACH. WILL ENDORSE CARE TO DAY SHIFT.

## 2020-09-25 VITALS — DIASTOLIC BLOOD PRESSURE: 56 MMHG | SYSTOLIC BLOOD PRESSURE: 163 MMHG

## 2020-09-25 VITALS — SYSTOLIC BLOOD PRESSURE: 153 MMHG | DIASTOLIC BLOOD PRESSURE: 58 MMHG

## 2020-09-25 VITALS — DIASTOLIC BLOOD PRESSURE: 50 MMHG | SYSTOLIC BLOOD PRESSURE: 138 MMHG

## 2020-09-25 VITALS — SYSTOLIC BLOOD PRESSURE: 152 MMHG | DIASTOLIC BLOOD PRESSURE: 47 MMHG

## 2020-09-25 LAB
ALP SERPL-CCNC: 98 U/L (ref 50–136)
ALT SERPL W/O P-5'-P-CCNC: 45 U/L (ref 14–59)
APPEARANCE UR: CLEAR
AST SERPL-CCNC: 22 U/L (ref 15–37)
BASOPHILS # BLD AUTO: 0 K/UL (ref 0–8)
BASOPHILS NFR BLD AUTO: 0.4 % (ref 0–2)
BILIRUB SERPL-MCNC: 0.8 MG/DL (ref 0.2–1)
BILIRUB UR QL STRIP: NEGATIVE
BUN SERPL-MCNC: 29 MG/DL (ref 7–18)
CHLORIDE SERPL-SCNC: 89 MMOL/L (ref 98–107)
CHOLEST SERPL-MCNC: 134 MG/DL (ref ?–200)
CO2 SERPL-SCNC: 34 MMOL/L (ref 21–32)
COLOR UR: YELLOW
CREAT SERPL-MCNC: 0.9 MG/DL (ref 0.6–1.3)
CREAT UR-MCNC: 27.3 MG/DL (ref 30–125)
DEPRECATED SQUAMOUS URNS QL MICRO: (no result) /HPF
EOSINOPHIL # BLD AUTO: 0 K/UL (ref 0–0.7)
EOSINOPHIL NFR BLD AUTO: 0 % (ref 0–7)
GLUCOSE SERPL-MCNC: 349 MG/DL (ref 74–106)
GLUCOSE UR STRIP-MCNC: (no result) MG/DL
HCT VFR BLD AUTO: 31.9 % (ref 31.2–41.9)
HDLC SERPL-MCNC: 49 MG/DL (ref 40–60)
HGB BLD-MCNC: 10.8 G/DL (ref 10.9–14.3)
HGB UR QL STRIP: (no result)
IRON SERPL-MCNC: 69 UG/DL (ref 50–175)
KETONES UR STRIP-MCNC: NEGATIVE MG/DL
LEUKOCYTE ESTERASE UR QL STRIP: NEGATIVE
LYMPHOCYTES # BLD AUTO: 0.9 K/UL (ref 20–40)
LYMPHOCYTES NFR BLD AUTO: 9.6 % (ref 20.5–51.5)
MAGNESIUM SERPL-MCNC: 2.7 MG/DL (ref 1.8–2.4)
MCH RBC QN AUTO: 27 UUG (ref 24.7–32.8)
MCHC RBC AUTO-ENTMCNC: 34 G/DL (ref 32.3–35.6)
MCV RBC AUTO: 79.6 FL (ref 75.5–95.3)
MONOCYTES # BLD AUTO: 0.5 K/UL (ref 2–10)
MONOCYTES NFR BLD AUTO: 5.7 % (ref 0–11)
NEUTROPHILS # BLD AUTO: 8 K/UL (ref 1.8–8.9)
NEUTROPHILS NFR BLD AUTO: 84.3 % (ref 38.5–71.5)
NITRITE UR QL STRIP: NEGATIVE
PH UR STRIP: 6.5 [PH] (ref 5–8)
PHOSPHATE SERPL-MCNC: 2.9 MG/DL (ref 2.5–4.9)
PLATELET # BLD AUTO: 340 K/UL (ref 179–408)
POTASSIUM SERPL-SCNC: 4.5 MMOL/L (ref 3.5–5.1)
PROT UR-MCNC: < 6 MG/DL
RBC # BLD AUTO: 4.01 MIL/UL (ref 3.63–4.92)
RBC #/AREA URNS HPF: (no result) /HPF (ref 0–3)
SP GR UR STRIP: 1.01 (ref 1–1.03)
TRIGL SERPL-MCNC: 68 MG/DL (ref 30–150)
TSH SERPL DL<=0.005 MIU/L-ACNC: 0.21 MIU/ML (ref 0.36–3.74)
UROBILINOGEN UR STRIP-MCNC: 0.2 E.U./DL
WBC # BLD AUTO: 9.5 K/UL (ref 3.8–11.8)
WBC #/AREA URNS HPF: (no result) /HPF
WBC #/AREA URNS HPF: (no result) /HPF (ref 0–3)
WS STN SPEC: 6.8 G/DL (ref 6.4–8.2)
YEAST URNS QL MICRO: (no result) /HPF

## 2020-09-25 RX ADMIN — ACETAMINOPHEN PRN MG: 325 TABLET ORAL at 13:32

## 2020-09-25 RX ADMIN — DOCUSATE SODIUM SCH MG: 100 CAPSULE, LIQUID FILLED ORAL at 08:22

## 2020-09-25 RX ADMIN — IPRATROPIUM BROMIDE PRN MG: 0.5 SOLUTION RESPIRATORY (INHALATION) at 12:46

## 2020-09-25 RX ADMIN — ALBUTEROL SULFATE PRN MG: 2.5 SOLUTION RESPIRATORY (INHALATION) at 23:44

## 2020-09-25 RX ADMIN — LABETALOL HYDROCHLORIDE SCH MG: 200 TABLET, FILM COATED ORAL at 05:45

## 2020-09-25 RX ADMIN — ALBUTEROL SULFATE PRN MG: 2.5 SOLUTION RESPIRATORY (INHALATION) at 12:46

## 2020-09-25 RX ADMIN — Medication SCH EACH: at 05:55

## 2020-09-25 RX ADMIN — HYDROCORTISONE SODIUM SUCCINATE SCH MG: 100 INJECTION, POWDER, FOR SOLUTION INTRAMUSCULAR; INTRAVASCULAR at 08:21

## 2020-09-25 RX ADMIN — SODIUM CHLORIDE PRN UNIT: 9 INJECTION, SOLUTION INTRAVENOUS at 07:51

## 2020-09-25 RX ADMIN — ALBUTEROL SULFATE PRN MG: 2.5 SOLUTION RESPIRATORY (INHALATION) at 03:22

## 2020-09-25 RX ADMIN — LOSARTAN POTASSIUM SCH MG: 50 TABLET, FILM COATED ORAL at 08:22

## 2020-09-25 RX ADMIN — POLYETHYLENE GLYCOL 3350 PRN GM: 17 POWDER, FOR SOLUTION ORAL at 13:32

## 2020-09-25 RX ADMIN — SODIUM CHLORIDE PRN UNIT: 9 INJECTION, SOLUTION INTRAVENOUS at 11:47

## 2020-09-25 RX ADMIN — ALBUTEROL SULFATE PRN MG: 2.5 SOLUTION RESPIRATORY (INHALATION) at 07:54

## 2020-09-25 RX ADMIN — IPRATROPIUM BROMIDE PRN MG: 0.5 SOLUTION RESPIRATORY (INHALATION) at 19:05

## 2020-09-25 RX ADMIN — Medication SCH MG: at 08:24

## 2020-09-25 RX ADMIN — LIDOCAINE SCH PATCH: 50 PATCH CUTANEOUS at 08:24

## 2020-09-25 RX ADMIN — PREGABALIN SCH MG: 25 CAPSULE ORAL at 21:07

## 2020-09-25 RX ADMIN — MUPIROCIN SCH GM: 20 OINTMENT TOPICAL at 09:51

## 2020-09-25 RX ADMIN — DOCUSATE SODIUM SCH MG: 100 CAPSULE, LIQUID FILLED ORAL at 16:26

## 2020-09-25 RX ADMIN — DEXTROSE SCH MLS/HR: 50 INJECTION, SOLUTION INTRAVENOUS at 15:11

## 2020-09-25 RX ADMIN — HYDROMORPHONE HYDROCHLORIDE PRN MG: 2 TABLET ORAL at 09:14

## 2020-09-25 RX ADMIN — Medication SCH EACH: at 16:40

## 2020-09-25 RX ADMIN — LABETALOL HYDROCHLORIDE SCH MG: 200 TABLET, FILM COATED ORAL at 21:10

## 2020-09-25 RX ADMIN — INSULIN GLARGINE SCH UNITS: 100 INJECTION, SOLUTION SUBCUTANEOUS at 20:33

## 2020-09-25 RX ADMIN — MUPIROCIN SCH GM: 20 OINTMENT TOPICAL at 20:22

## 2020-09-25 RX ADMIN — ASPIRIN SCH MG: 81 TABLET, COATED ORAL at 08:22

## 2020-09-25 RX ADMIN — LABETALOL HYDROCHLORIDE SCH MG: 200 TABLET, FILM COATED ORAL at 14:59

## 2020-09-25 RX ADMIN — ATORVASTATIN CALCIUM SCH MG: 40 TABLET, FILM COATED ORAL at 20:20

## 2020-09-25 RX ADMIN — CLOTRIMAZOLE SCH GM: 1 CREAM TOPICAL at 16:28

## 2020-09-25 RX ADMIN — PANTOPRAZOLE SODIUM SCH MG: 40 TABLET, DELAYED RELEASE ORAL at 20:21

## 2020-09-25 RX ADMIN — Medication SCH EACH: at 11:44

## 2020-09-25 RX ADMIN — Medication SCH EACH: at 20:48

## 2020-09-25 RX ADMIN — ALBUTEROL SULFATE PRN MG: 2.5 SOLUTION RESPIRATORY (INHALATION) at 19:05

## 2020-09-25 RX ADMIN — SODIUM CHLORIDE PRN UNIT: 9 INJECTION, SOLUTION INTRAVENOUS at 16:42

## 2020-09-25 RX ADMIN — PREGABALIN SCH MG: 25 CAPSULE ORAL at 14:59

## 2020-09-25 RX ADMIN — BACLOFEN SCH MG: 10 TABLET ORAL at 14:59

## 2020-09-25 RX ADMIN — TRIAMCINOLONE ACETONIDE SCH GM: 0.25 OINTMENT TOPICAL at 09:51

## 2020-09-25 RX ADMIN — ENOXAPARIN SODIUM SCH MG: 40 INJECTION SUBCUTANEOUS at 08:25

## 2020-09-25 RX ADMIN — IPRATROPIUM BROMIDE PRN MG: 0.5 SOLUTION RESPIRATORY (INHALATION) at 23:44

## 2020-09-25 RX ADMIN — BACLOFEN SCH MG: 10 TABLET ORAL at 05:44

## 2020-09-25 RX ADMIN — BACLOFEN SCH MG: 10 TABLET ORAL at 21:07

## 2020-09-25 RX ADMIN — HYDROMORPHONE HYDROCHLORIDE PRN MG: 2 TABLET ORAL at 16:41

## 2020-09-25 RX ADMIN — IPRATROPIUM BROMIDE PRN MG: 0.5 SOLUTION RESPIRATORY (INHALATION) at 03:22

## 2020-09-25 RX ADMIN — Medication SCH MG: at 08:22

## 2020-09-25 RX ADMIN — IPRATROPIUM BROMIDE PRN MG: 0.5 SOLUTION RESPIRATORY (INHALATION) at 07:54

## 2020-09-25 RX ADMIN — SODIUM CHLORIDE PRN UNIT: 9 INJECTION, SOLUTION INTRAVENOUS at 20:30

## 2020-09-25 RX ADMIN — Medication SCH MG: at 16:26

## 2020-09-25 RX ADMIN — CLOPIDOGREL BISULFATE SCH MG: 75 TABLET ORAL at 08:23

## 2020-09-25 RX ADMIN — PREGABALIN SCH MG: 25 CAPSULE ORAL at 09:19

## 2020-09-25 RX ADMIN — HYDROCORTISONE SODIUM SUCCINATE SCH MG: 100 INJECTION, POWDER, FOR SOLUTION INTRAMUSCULAR; INTRAVASCULAR at 00:19

## 2020-09-25 RX ADMIN — HYDROMORPHONE HYDROCHLORIDE PRN MG: 2 TABLET ORAL at 03:09

## 2020-09-25 RX ADMIN — Medication SCH MG: at 13:36

## 2020-09-26 VITALS — SYSTOLIC BLOOD PRESSURE: 177 MMHG | DIASTOLIC BLOOD PRESSURE: 54 MMHG

## 2020-09-26 VITALS — DIASTOLIC BLOOD PRESSURE: 46 MMHG | SYSTOLIC BLOOD PRESSURE: 134 MMHG

## 2020-09-26 VITALS — SYSTOLIC BLOOD PRESSURE: 170 MMHG | DIASTOLIC BLOOD PRESSURE: 54 MMHG

## 2020-09-26 VITALS — DIASTOLIC BLOOD PRESSURE: 48 MMHG | SYSTOLIC BLOOD PRESSURE: 164 MMHG

## 2020-09-26 LAB
BUN SERPL-MCNC: 25 MG/DL (ref 7–18)
CHLORIDE SERPL-SCNC: 95 MMOL/L (ref 98–107)
CO2 SERPL-SCNC: 33 MMOL/L (ref 21–32)
CREAT SERPL-MCNC: 0.9 MG/DL (ref 0.6–1.3)
GLUCOSE SERPL-MCNC: 241 MG/DL (ref 74–106)
POTASSIUM SERPL-SCNC: 4.5 MMOL/L (ref 3.5–5.1)

## 2020-09-26 RX ADMIN — SODIUM CHLORIDE PRN UNIT: 9 INJECTION, SOLUTION INTRAVENOUS at 12:53

## 2020-09-26 RX ADMIN — HYDROMORPHONE HYDROCHLORIDE PRN MG: 2 TABLET ORAL at 00:25

## 2020-09-26 RX ADMIN — Medication SCH MG: at 08:13

## 2020-09-26 RX ADMIN — Medication SCH MG: at 17:01

## 2020-09-26 RX ADMIN — ACETAMINOPHEN PRN MG: 325 TABLET ORAL at 10:10

## 2020-09-26 RX ADMIN — LIDOCAINE SCH PATCH: 50 PATCH CUTANEOUS at 08:22

## 2020-09-26 RX ADMIN — LABETALOL HYDROCHLORIDE SCH MG: 200 TABLET, FILM COATED ORAL at 13:52

## 2020-09-26 RX ADMIN — Medication SCH EACH: at 06:53

## 2020-09-26 RX ADMIN — DEXTROSE SCH MLS/HR: 50 INJECTION, SOLUTION INTRAVENOUS at 08:24

## 2020-09-26 RX ADMIN — Medication SCH MG: at 13:46

## 2020-09-26 RX ADMIN — POLYETHYLENE GLYCOL 3350 PRN GM: 17 POWDER, FOR SOLUTION ORAL at 12:36

## 2020-09-26 RX ADMIN — ALBUTEROL SULFATE PRN MG: 2.5 SOLUTION RESPIRATORY (INHALATION) at 23:41

## 2020-09-26 RX ADMIN — ALBUTEROL SULFATE PRN MG: 2.5 SOLUTION RESPIRATORY (INHALATION) at 13:04

## 2020-09-26 RX ADMIN — Medication SCH EACH: at 21:12

## 2020-09-26 RX ADMIN — IPRATROPIUM BROMIDE PRN MG: 0.5 SOLUTION RESPIRATORY (INHALATION) at 18:19

## 2020-09-26 RX ADMIN — CLOTRIMAZOLE SCH APP: 1 CREAM TOPICAL at 17:18

## 2020-09-26 RX ADMIN — BACLOFEN SCH MG: 20 TABLET ORAL at 21:08

## 2020-09-26 RX ADMIN — DOCUSATE SODIUM SCH MG: 100 CAPSULE, LIQUID FILLED ORAL at 17:01

## 2020-09-26 RX ADMIN — ENOXAPARIN SODIUM SCH MG: 40 INJECTION SUBCUTANEOUS at 08:25

## 2020-09-26 RX ADMIN — ASPIRIN SCH MG: 81 TABLET, COATED ORAL at 08:12

## 2020-09-26 RX ADMIN — SODIUM CHLORIDE PRN UNIT: 9 INJECTION, SOLUTION INTRAVENOUS at 08:27

## 2020-09-26 RX ADMIN — TRIAMCINOLONE ACETONIDE SCH APP: 0.25 OINTMENT TOPICAL at 08:28

## 2020-09-26 RX ADMIN — MUPIROCIN SCH APPLIC: 20 OINTMENT TOPICAL at 21:07

## 2020-09-26 RX ADMIN — CLOTRIMAZOLE SCH APP: 1 CREAM TOPICAL at 08:28

## 2020-09-26 RX ADMIN — PREGABALIN SCH MG: 25 CAPSULE ORAL at 13:46

## 2020-09-26 RX ADMIN — SODIUM CHLORIDE PRN UNIT: 9 INJECTION, SOLUTION INTRAVENOUS at 17:55

## 2020-09-26 RX ADMIN — ATORVASTATIN CALCIUM SCH MG: 40 TABLET, FILM COATED ORAL at 21:08

## 2020-09-26 RX ADMIN — HYDROMORPHONE HYDROCHLORIDE PRN MG: 2 TABLET ORAL at 12:36

## 2020-09-26 RX ADMIN — HYDROMORPHONE HYDROCHLORIDE PRN MG: 2 TABLET ORAL at 18:39

## 2020-09-26 RX ADMIN — Medication SCH EACH: at 17:18

## 2020-09-26 RX ADMIN — ALBUTEROL SULFATE PRN MG: 2.5 SOLUTION RESPIRATORY (INHALATION) at 18:19

## 2020-09-26 RX ADMIN — PREGABALIN SCH MG: 25 CAPSULE ORAL at 21:06

## 2020-09-26 RX ADMIN — LOSARTAN POTASSIUM SCH MG: 50 TABLET, FILM COATED ORAL at 21:08

## 2020-09-26 RX ADMIN — PREGABALIN SCH MG: 25 CAPSULE ORAL at 05:41

## 2020-09-26 RX ADMIN — BACLOFEN SCH MG: 10 TABLET ORAL at 13:46

## 2020-09-26 RX ADMIN — IPRATROPIUM BROMIDE PRN MG: 0.5 SOLUTION RESPIRATORY (INHALATION) at 23:41

## 2020-09-26 RX ADMIN — SODIUM CHLORIDE PRN UNIT: 9 INJECTION, SOLUTION INTRAVENOUS at 21:25

## 2020-09-26 RX ADMIN — LABETALOL HYDROCHLORIDE SCH MG: 200 TABLET, FILM COATED ORAL at 05:42

## 2020-09-26 RX ADMIN — INSULIN GLARGINE SCH UNITS: 100 INJECTION, SOLUTION SUBCUTANEOUS at 21:26

## 2020-09-26 RX ADMIN — MUPIROCIN SCH APP: 20 OINTMENT TOPICAL at 08:28

## 2020-09-26 RX ADMIN — Medication SCH EACH: at 11:30

## 2020-09-26 RX ADMIN — Medication SCH MG: at 08:29

## 2020-09-26 RX ADMIN — BACLOFEN SCH MG: 10 TABLET ORAL at 05:41

## 2020-09-26 RX ADMIN — DOCUSATE SODIUM SCH MG: 100 CAPSULE, LIQUID FILLED ORAL at 08:12

## 2020-09-26 RX ADMIN — MAGNESIUM HYDROXIDE PRN ML: 400 SUSPENSION ORAL at 18:39

## 2020-09-26 RX ADMIN — IPRATROPIUM BROMIDE PRN MG: 0.5 SOLUTION RESPIRATORY (INHALATION) at 13:04

## 2020-09-26 RX ADMIN — LABETALOL HYDROCHLORIDE SCH MG: 200 TABLET, FILM COATED ORAL at 21:07

## 2020-09-26 RX ADMIN — HYDROMORPHONE HYDROCHLORIDE PRN MG: 2 TABLET ORAL at 22:28

## 2020-09-26 RX ADMIN — LOSARTAN POTASSIUM SCH MG: 50 TABLET, FILM COATED ORAL at 08:22

## 2020-09-26 RX ADMIN — CLOPIDOGREL BISULFATE SCH MG: 75 TABLET ORAL at 08:12

## 2020-09-27 VITALS — SYSTOLIC BLOOD PRESSURE: 156 MMHG | DIASTOLIC BLOOD PRESSURE: 58 MMHG

## 2020-09-27 VITALS — SYSTOLIC BLOOD PRESSURE: 158 MMHG | DIASTOLIC BLOOD PRESSURE: 52 MMHG

## 2020-09-27 VITALS — DIASTOLIC BLOOD PRESSURE: 55 MMHG | SYSTOLIC BLOOD PRESSURE: 164 MMHG

## 2020-09-27 VITALS — DIASTOLIC BLOOD PRESSURE: 50 MMHG | SYSTOLIC BLOOD PRESSURE: 146 MMHG

## 2020-09-27 VITALS — DIASTOLIC BLOOD PRESSURE: 44 MMHG | SYSTOLIC BLOOD PRESSURE: 133 MMHG

## 2020-09-27 LAB
BASOPHILS # BLD AUTO: 0 K/UL (ref 0–8)
BASOPHILS NFR BLD AUTO: 0.2 % (ref 0–2)
BUN SERPL-MCNC: 21 MG/DL (ref 7–18)
CHLORIDE SERPL-SCNC: 94 MMOL/L (ref 98–107)
CO2 SERPL-SCNC: 30 MMOL/L (ref 21–32)
CREAT SERPL-MCNC: 0.9 MG/DL (ref 0.6–1.3)
EOSINOPHIL # BLD AUTO: 0.1 K/UL (ref 0–0.7)
EOSINOPHIL NFR BLD AUTO: 0.8 % (ref 0–7)
GLUCOSE SERPL-MCNC: 274 MG/DL (ref 74–106)
HCT VFR BLD AUTO: 32.1 % (ref 31.2–41.9)
HGB BLD-MCNC: 10.3 G/DL (ref 10.9–14.3)
LYMPHOCYTES # BLD AUTO: 3.5 K/UL (ref 20–40)
LYMPHOCYTES NFR BLD AUTO: 23.4 % (ref 20.5–51.5)
MCH RBC QN AUTO: 26.1 UUG (ref 24.7–32.8)
MCHC RBC AUTO-ENTMCNC: 32 G/DL (ref 32.3–35.6)
MCV RBC AUTO: 81.1 FL (ref 75.5–95.3)
MONOCYTES # BLD AUTO: 1.1 K/UL (ref 2–10)
MONOCYTES NFR BLD AUTO: 7.4 % (ref 0–11)
NEUTROPHILS # BLD AUTO: 10.2 K/UL (ref 1.8–8.9)
NEUTROPHILS NFR BLD AUTO: 68.2 % (ref 38.5–71.5)
PLATELET # BLD AUTO: 339 K/UL (ref 179–408)
POTASSIUM SERPL-SCNC: 4.5 MMOL/L (ref 3.5–5.1)
RBC # BLD AUTO: 3.96 MIL/UL (ref 3.63–4.92)
WBC # BLD AUTO: 15 K/UL (ref 3.8–11.8)

## 2020-09-27 RX ADMIN — PREGABALIN SCH MG: 25 CAPSULE ORAL at 08:14

## 2020-09-27 RX ADMIN — ALBUTEROL SULFATE PRN MG: 2.5 SOLUTION RESPIRATORY (INHALATION) at 20:12

## 2020-09-27 RX ADMIN — Medication SCH MG: at 13:52

## 2020-09-27 RX ADMIN — Medication SCH MG: at 08:00

## 2020-09-27 RX ADMIN — ALBUTEROL SULFATE PRN MG: 2.5 SOLUTION RESPIRATORY (INHALATION) at 12:47

## 2020-09-27 RX ADMIN — ATORVASTATIN CALCIUM SCH MG: 40 TABLET, FILM COATED ORAL at 20:59

## 2020-09-27 RX ADMIN — CLOPIDOGREL BISULFATE SCH MG: 75 TABLET ORAL at 08:01

## 2020-09-27 RX ADMIN — IPRATROPIUM BROMIDE PRN MG: 0.5 SOLUTION RESPIRATORY (INHALATION) at 20:12

## 2020-09-27 RX ADMIN — LABETALOL HYDROCHLORIDE SCH MG: 200 TABLET, FILM COATED ORAL at 13:52

## 2020-09-27 RX ADMIN — Medication SCH EACH: at 06:33

## 2020-09-27 RX ADMIN — LABETALOL HYDROCHLORIDE SCH MG: 200 TABLET, FILM COATED ORAL at 06:05

## 2020-09-27 RX ADMIN — ALBUTEROL SULFATE PRN MG: 2.5 SOLUTION RESPIRATORY (INHALATION) at 06:20

## 2020-09-27 RX ADMIN — LOSARTAN POTASSIUM SCH MG: 50 TABLET, FILM COATED ORAL at 20:59

## 2020-09-27 RX ADMIN — DOCUSATE SODIUM SCH MG: 100 CAPSULE, LIQUID FILLED ORAL at 08:00

## 2020-09-27 RX ADMIN — Medication SCH EACH: at 21:00

## 2020-09-27 RX ADMIN — Medication SCH MG: at 08:14

## 2020-09-27 RX ADMIN — DOCUSATE SODIUM SCH MG: 100 CAPSULE, LIQUID FILLED ORAL at 16:51

## 2020-09-27 RX ADMIN — BACLOFEN SCH MG: 20 TABLET ORAL at 06:04

## 2020-09-27 RX ADMIN — LIDOCAINE SCH PATCH: 50 PATCH CUTANEOUS at 08:16

## 2020-09-27 RX ADMIN — FUROSEMIDE SCH MG: 20 TABLET ORAL at 08:01

## 2020-09-27 RX ADMIN — SODIUM CHLORIDE PRN UNIT: 9 INJECTION, SOLUTION INTRAVENOUS at 16:13

## 2020-09-27 RX ADMIN — MUPIROCIN SCH APPLIC: 20 OINTMENT TOPICAL at 21:03

## 2020-09-27 RX ADMIN — HYDROMORPHONE HYDROCHLORIDE PRN MG: 2 TABLET ORAL at 10:36

## 2020-09-27 RX ADMIN — MAGNESIUM HYDROXIDE PRN ML: 400 SUSPENSION ORAL at 17:47

## 2020-09-27 RX ADMIN — PREGABALIN SCH MG: 25 CAPSULE ORAL at 13:51

## 2020-09-27 RX ADMIN — BACLOFEN SCH MG: 20 TABLET ORAL at 21:13

## 2020-09-27 RX ADMIN — PANTOPRAZOLE SODIUM SCH MG: 40 TABLET, DELAYED RELEASE ORAL at 06:04

## 2020-09-27 RX ADMIN — SODIUM CHLORIDE PRN UNIT: 9 INJECTION, SOLUTION INTRAVENOUS at 21:03

## 2020-09-27 RX ADMIN — IPRATROPIUM BROMIDE PRN MG: 0.5 SOLUTION RESPIRATORY (INHALATION) at 12:47

## 2020-09-27 RX ADMIN — TRIAMCINOLONE ACETONIDE SCH APP: 0.25 OINTMENT TOPICAL at 09:01

## 2020-09-27 RX ADMIN — SODIUM CHLORIDE PRN UNIT: 9 INJECTION, SOLUTION INTRAVENOUS at 11:15

## 2020-09-27 RX ADMIN — IPRATROPIUM BROMIDE PRN MG: 0.5 SOLUTION RESPIRATORY (INHALATION) at 06:20

## 2020-09-27 RX ADMIN — HYDROMORPHONE HYDROCHLORIDE PRN MG: 2 TABLET ORAL at 16:54

## 2020-09-27 RX ADMIN — POLYETHYLENE GLYCOL 3350 PRN GM: 17 POWDER, FOR SOLUTION ORAL at 17:47

## 2020-09-27 RX ADMIN — MUPIROCIN SCH APPLIC: 20 OINTMENT TOPICAL at 08:02

## 2020-09-27 RX ADMIN — LOSARTAN POTASSIUM SCH MG: 50 TABLET, FILM COATED ORAL at 08:05

## 2020-09-27 RX ADMIN — CLOTRIMAZOLE SCH APP: 1 CREAM TOPICAL at 09:01

## 2020-09-27 RX ADMIN — DEXTROSE SCH MLS/HR: 50 INJECTION, SOLUTION INTRAVENOUS at 02:02

## 2020-09-27 RX ADMIN — Medication SCH MG: at 16:51

## 2020-09-27 RX ADMIN — ASPIRIN SCH MG: 81 TABLET, COATED ORAL at 08:00

## 2020-09-27 RX ADMIN — CLOTRIMAZOLE SCH APP: 1 CREAM TOPICAL at 17:03

## 2020-09-27 RX ADMIN — HYDROMORPHONE HYDROCHLORIDE PRN MG: 2 TABLET ORAL at 00:40

## 2020-09-27 RX ADMIN — LABETALOL HYDROCHLORIDE SCH MG: 200 TABLET, FILM COATED ORAL at 21:13

## 2020-09-27 RX ADMIN — ENOXAPARIN SODIUM SCH MG: 40 INJECTION SUBCUTANEOUS at 08:16

## 2020-09-27 RX ADMIN — Medication SCH EACH: at 11:13

## 2020-09-27 RX ADMIN — PREGABALIN SCH MG: 25 CAPSULE ORAL at 21:13

## 2020-09-27 RX ADMIN — BACLOFEN SCH MG: 20 TABLET ORAL at 13:52

## 2020-09-27 RX ADMIN — Medication SCH EACH: at 16:13

## 2020-09-27 RX ADMIN — HYDROMORPHONE HYDROCHLORIDE PRN MG: 2 TABLET ORAL at 06:10

## 2020-09-27 RX ADMIN — ACETAMINOPHEN PRN MG: 325 TABLET ORAL at 04:14

## 2020-09-27 RX ADMIN — SODIUM CHLORIDE PRN UNIT: 9 INJECTION, SOLUTION INTRAVENOUS at 07:55

## 2020-09-27 RX ADMIN — HYDROMORPHONE HYDROCHLORIDE PRN MG: 2 TABLET ORAL at 21:29

## 2020-09-28 VITALS — DIASTOLIC BLOOD PRESSURE: 47 MMHG | SYSTOLIC BLOOD PRESSURE: 149 MMHG

## 2020-09-28 VITALS — SYSTOLIC BLOOD PRESSURE: 101 MMHG | DIASTOLIC BLOOD PRESSURE: 33 MMHG

## 2020-09-28 VITALS — DIASTOLIC BLOOD PRESSURE: 41 MMHG | SYSTOLIC BLOOD PRESSURE: 136 MMHG

## 2020-09-28 VITALS — DIASTOLIC BLOOD PRESSURE: 45 MMHG | SYSTOLIC BLOOD PRESSURE: 120 MMHG

## 2020-09-28 LAB
BASOPHILS # BLD AUTO: 0.1 K/UL (ref 0–8)
BASOPHILS NFR BLD AUTO: 0.8 % (ref 0–2)
BUN SERPL-MCNC: 19 MG/DL (ref 7–18)
CHLORIDE SERPL-SCNC: 95 MMOL/L (ref 98–107)
CO2 SERPL-SCNC: 33 MMOL/L (ref 21–32)
CREAT SERPL-MCNC: 0.8 MG/DL (ref 0.6–1.3)
EOSINOPHIL # BLD AUTO: 0.3 K/UL (ref 0–0.7)
EOSINOPHIL NFR BLD AUTO: 1.9 % (ref 0–7)
GLUCOSE SERPL-MCNC: 169 MG/DL (ref 74–106)
HCT VFR BLD AUTO: 30.1 % (ref 31.2–41.9)
HGB BLD-MCNC: 9.7 G/DL (ref 10.9–14.3)
LYMPHOCYTES # BLD AUTO: 5.8 K/UL (ref 20–40)
LYMPHOCYTES NFR BLD AUTO: 36.3 % (ref 20.5–51.5)
MCH RBC QN AUTO: 26.1 UUG (ref 24.7–32.8)
MCHC RBC AUTO-ENTMCNC: 32 G/DL (ref 32.3–35.6)
MCV RBC AUTO: 80.8 FL (ref 75.5–95.3)
MONOCYTES # BLD AUTO: 1.3 K/UL (ref 2–10)
MONOCYTES NFR BLD AUTO: 8.1 % (ref 0–11)
NEUTROPHILS # BLD AUTO: 8.5 K/UL (ref 1.8–8.9)
NEUTROPHILS NFR BLD AUTO: 52.9 % (ref 38.5–71.5)
PLATELET # BLD AUTO: 324 K/UL (ref 179–408)
POTASSIUM SERPL-SCNC: 4.4 MMOL/L (ref 3.5–5.1)
RBC # BLD AUTO: 3.73 MIL/UL (ref 3.63–4.92)
WBC # BLD AUTO: 16 K/UL (ref 3.8–11.8)

## 2020-09-28 RX ADMIN — HYDROMORPHONE HYDROCHLORIDE PRN MG: 2 TABLET ORAL at 02:15

## 2020-09-28 RX ADMIN — ACETAMINOPHEN PRN MG: 325 TABLET ORAL at 11:58

## 2020-09-28 RX ADMIN — ALBUTEROL SULFATE PRN MG: 2.5 SOLUTION RESPIRATORY (INHALATION) at 20:53

## 2020-09-28 RX ADMIN — LIDOCAINE SCH PATCH: 50 PATCH CUTANEOUS at 09:49

## 2020-09-28 RX ADMIN — ASPIRIN SCH MG: 81 TABLET, COATED ORAL at 08:40

## 2020-09-28 RX ADMIN — Medication SCH EACH: at 17:03

## 2020-09-28 RX ADMIN — IPRATROPIUM BROMIDE PRN MG: 0.5 SOLUTION RESPIRATORY (INHALATION) at 20:53

## 2020-09-28 RX ADMIN — BACLOFEN SCH MG: 20 TABLET ORAL at 21:04

## 2020-09-28 RX ADMIN — IPRATROPIUM BROMIDE PRN MG: 0.5 SOLUTION RESPIRATORY (INHALATION) at 00:51

## 2020-09-28 RX ADMIN — MUPIROCIN SCH APPLIC: 20 OINTMENT TOPICAL at 09:55

## 2020-09-28 RX ADMIN — PREGABALIN SCH MG: 25 CAPSULE ORAL at 13:53

## 2020-09-28 RX ADMIN — GUAIFENESIN AND DEXTROMETHORPHAN PRN ML: 100; 10 SYRUP ORAL at 18:14

## 2020-09-28 RX ADMIN — LABETALOL HYDROCHLORIDE SCH MG: 200 TABLET, FILM COATED ORAL at 21:04

## 2020-09-28 RX ADMIN — ENOXAPARIN SODIUM SCH MG: 40 INJECTION SUBCUTANEOUS at 09:53

## 2020-09-28 RX ADMIN — Medication SCH MG: at 09:49

## 2020-09-28 RX ADMIN — ALBUTEROL SULFATE PRN MG: 2.5 SOLUTION RESPIRATORY (INHALATION) at 07:14

## 2020-09-28 RX ADMIN — DOCUSATE SODIUM SCH MG: 100 CAPSULE, LIQUID FILLED ORAL at 15:59

## 2020-09-28 RX ADMIN — Medication SCH MG: at 16:04

## 2020-09-28 RX ADMIN — PREGABALIN SCH MG: 25 CAPSULE ORAL at 21:04

## 2020-09-28 RX ADMIN — CLOTRIMAZOLE SCH APP: 1 CREAM TOPICAL at 16:05

## 2020-09-28 RX ADMIN — Medication SCH EACH: at 20:57

## 2020-09-28 RX ADMIN — BACLOFEN SCH MG: 20 TABLET ORAL at 06:17

## 2020-09-28 RX ADMIN — LOSARTAN POTASSIUM SCH MG: 50 TABLET, FILM COATED ORAL at 20:57

## 2020-09-28 RX ADMIN — HYDROMORPHONE HYDROCHLORIDE PRN MG: 2 TABLET ORAL at 09:50

## 2020-09-28 RX ADMIN — FUROSEMIDE SCH MG: 20 TABLET ORAL at 08:40

## 2020-09-28 RX ADMIN — ALBUTEROL SULFATE PRN MG: 2.5 SOLUTION RESPIRATORY (INHALATION) at 00:51

## 2020-09-28 RX ADMIN — ALBUTEROL SULFATE PRN MG: 2.5 SOLUTION RESPIRATORY (INHALATION) at 13:37

## 2020-09-28 RX ADMIN — PANTOPRAZOLE SODIUM SCH MG: 40 TABLET, DELAYED RELEASE ORAL at 06:17

## 2020-09-28 RX ADMIN — Medication SCH EACH: at 06:34

## 2020-09-28 RX ADMIN — Medication SCH MG: at 08:41

## 2020-09-28 RX ADMIN — CLOPIDOGREL BISULFATE SCH MG: 75 TABLET ORAL at 08:40

## 2020-09-28 RX ADMIN — HYDROMORPHONE HYDROCHLORIDE PRN MG: 2 TABLET ORAL at 22:38

## 2020-09-28 RX ADMIN — SODIUM CHLORIDE PRN UNIT: 9 INJECTION, SOLUTION INTRAVENOUS at 08:43

## 2020-09-28 RX ADMIN — LABETALOL HYDROCHLORIDE SCH MG: 200 TABLET, FILM COATED ORAL at 06:17

## 2020-09-28 RX ADMIN — PREGABALIN SCH MG: 25 CAPSULE ORAL at 06:17

## 2020-09-28 RX ADMIN — HYDROMORPHONE HYDROCHLORIDE PRN MG: 2 TABLET ORAL at 16:01

## 2020-09-28 RX ADMIN — SODIUM CHLORIDE PRN UNIT: 9 INJECTION, SOLUTION INTRAVENOUS at 17:07

## 2020-09-28 RX ADMIN — IPRATROPIUM BROMIDE PRN MG: 0.5 SOLUTION RESPIRATORY (INHALATION) at 07:14

## 2020-09-28 RX ADMIN — ACETAMINOPHEN PRN MG: 325 TABLET ORAL at 05:50

## 2020-09-28 RX ADMIN — Medication SCH EACH: at 12:02

## 2020-09-28 RX ADMIN — LABETALOL HYDROCHLORIDE SCH MG: 200 TABLET, FILM COATED ORAL at 14:00

## 2020-09-28 RX ADMIN — ATORVASTATIN CALCIUM SCH MG: 40 TABLET, FILM COATED ORAL at 20:57

## 2020-09-28 RX ADMIN — CLOTRIMAZOLE SCH APP: 1 CREAM TOPICAL at 09:57

## 2020-09-28 RX ADMIN — IPRATROPIUM BROMIDE PRN MG: 0.5 SOLUTION RESPIRATORY (INHALATION) at 13:37

## 2020-09-28 RX ADMIN — LOSARTAN POTASSIUM SCH MG: 50 TABLET, FILM COATED ORAL at 08:41

## 2020-09-28 RX ADMIN — POLYETHYLENE GLYCOL 3350 PRN GM: 17 POWDER, FOR SOLUTION ORAL at 11:58

## 2020-09-28 RX ADMIN — INSULIN GLARGINE SCH UNITS: 100 INJECTION, SOLUTION SUBCUTANEOUS at 21:29

## 2020-09-28 RX ADMIN — Medication SCH MG: at 12:07

## 2020-09-28 RX ADMIN — TRIAMCINOLONE ACETONIDE SCH APP: 0.25 OINTMENT TOPICAL at 09:56

## 2020-09-28 RX ADMIN — DOCUSATE SODIUM SCH MG: 100 CAPSULE, LIQUID FILLED ORAL at 08:40

## 2020-09-28 RX ADMIN — SODIUM CHLORIDE PRN UNIT: 9 INJECTION, SOLUTION INTRAVENOUS at 12:09

## 2020-09-28 RX ADMIN — BACLOFEN SCH MG: 20 TABLET ORAL at 13:53

## 2020-09-28 RX ADMIN — FLUTICASONE FUROATE AND VILANTEROL TRIFENATATE SCH EACH: 100; 25 POWDER RESPIRATORY (INHALATION) at 09:48

## 2020-09-28 RX ADMIN — SODIUM CHLORIDE PRN UNIT: 9 INJECTION, SOLUTION INTRAVENOUS at 21:07

## 2020-09-29 VITALS — DIASTOLIC BLOOD PRESSURE: 42 MMHG | SYSTOLIC BLOOD PRESSURE: 149 MMHG

## 2020-09-29 VITALS — SYSTOLIC BLOOD PRESSURE: 103 MMHG | DIASTOLIC BLOOD PRESSURE: 31 MMHG

## 2020-09-29 VITALS — SYSTOLIC BLOOD PRESSURE: 119 MMHG | DIASTOLIC BLOOD PRESSURE: 42 MMHG

## 2020-09-29 LAB
BASOPHILS # BLD AUTO: 0 K/UL (ref 0–8)
BASOPHILS NFR BLD AUTO: 0.3 % (ref 0–2)
BUN SERPL-MCNC: 21 MG/DL (ref 7–18)
CHLORIDE SERPL-SCNC: 94 MMOL/L (ref 98–107)
CO2 SERPL-SCNC: 33 MMOL/L (ref 21–32)
CREAT SERPL-MCNC: 1 MG/DL (ref 0.6–1.3)
EOSINOPHIL # BLD AUTO: 0.2 K/UL (ref 0–0.7)
EOSINOPHIL NFR BLD AUTO: 1.6 % (ref 0–7)
GLUCOSE SERPL-MCNC: 132 MG/DL (ref 74–106)
HCT VFR BLD AUTO: 29.3 % (ref 31.2–41.9)
HGB BLD-MCNC: 9.7 G/DL (ref 10.9–14.3)
LYMPHOCYTES # BLD AUTO: 4.1 K/UL (ref 20–40)
LYMPHOCYTES NFR BLD AUTO: 28.8 % (ref 20.5–51.5)
MAGNESIUM SERPL-MCNC: 2.6 MG/DL (ref 1.8–2.4)
MCH RBC QN AUTO: 26.6 UUG (ref 24.7–32.8)
MCHC RBC AUTO-ENTMCNC: 33 G/DL (ref 32.3–35.6)
MCV RBC AUTO: 80.7 FL (ref 75.5–95.3)
MONOCYTES # BLD AUTO: 1.2 K/UL (ref 2–10)
MONOCYTES NFR BLD AUTO: 8.7 % (ref 0–11)
NEUTROPHILS # BLD AUTO: 8.5 K/UL (ref 1.8–8.9)
NEUTROPHILS NFR BLD AUTO: 60.6 % (ref 38.5–71.5)
PHOSPHATE SERPL-MCNC: 4.3 MG/DL (ref 2.5–4.9)
PLATELET # BLD AUTO: 330 K/UL (ref 179–408)
POTASSIUM SERPL-SCNC: 4.8 MMOL/L (ref 3.5–5.1)
RBC # BLD AUTO: 3.63 MIL/UL (ref 3.63–4.92)
WBC # BLD AUTO: 14.1 K/UL (ref 3.8–11.8)

## 2020-09-29 PROCEDURE — 0JBP0ZZ EXCISION OF LEFT LOWER LEG SUBCUTANEOUS TISSUE AND FASCIA, OPEN APPROACH: ICD-10-PCS

## 2020-09-29 RX ADMIN — ALBUTEROL SULFATE PRN MG: 2.5 SOLUTION RESPIRATORY (INHALATION) at 22:38

## 2020-09-29 RX ADMIN — Medication SCH MG: at 08:58

## 2020-09-29 RX ADMIN — BACLOFEN SCH MG: 20 TABLET ORAL at 06:18

## 2020-09-29 RX ADMIN — DOCUSATE SODIUM SCH MG: 100 CAPSULE, LIQUID FILLED ORAL at 08:57

## 2020-09-29 RX ADMIN — IPRATROPIUM BROMIDE PRN MG: 0.5 SOLUTION RESPIRATORY (INHALATION) at 17:02

## 2020-09-29 RX ADMIN — IPRATROPIUM BROMIDE PRN MG: 0.5 SOLUTION RESPIRATORY (INHALATION) at 22:38

## 2020-09-29 RX ADMIN — DOCUSATE SODIUM SCH MG: 100 CAPSULE, LIQUID FILLED ORAL at 17:14

## 2020-09-29 RX ADMIN — LABETALOL HYDROCHLORIDE SCH MG: 200 TABLET, FILM COATED ORAL at 15:06

## 2020-09-29 RX ADMIN — ALBUTEROL SULFATE PRN MG: 2.5 SOLUTION RESPIRATORY (INHALATION) at 11:27

## 2020-09-29 RX ADMIN — ACETAMINOPHEN PRN MG: 325 TABLET ORAL at 18:49

## 2020-09-29 RX ADMIN — IPRATROPIUM BROMIDE PRN MG: 0.5 SOLUTION RESPIRATORY (INHALATION) at 11:27

## 2020-09-29 RX ADMIN — Medication SCH EACH: at 16:22

## 2020-09-29 RX ADMIN — LOSARTAN POTASSIUM SCH MG: 50 TABLET, FILM COATED ORAL at 20:24

## 2020-09-29 RX ADMIN — Medication SCH MG: at 12:17

## 2020-09-29 RX ADMIN — LIDOCAINE SCH PATCH: 50 PATCH CUTANEOUS at 08:59

## 2020-09-29 RX ADMIN — POLYETHYLENE GLYCOL 3350 PRN GM: 17 POWDER, FOR SOLUTION ORAL at 14:23

## 2020-09-29 RX ADMIN — PREGABALIN SCH MG: 25 CAPSULE ORAL at 14:23

## 2020-09-29 RX ADMIN — LOSARTAN POTASSIUM SCH MG: 50 TABLET, FILM COATED ORAL at 08:58

## 2020-09-29 RX ADMIN — MAGNESIUM HYDROXIDE PRN ML: 400 SUSPENSION ORAL at 06:51

## 2020-09-29 RX ADMIN — ALBUTEROL SULFATE PRN MG: 2.5 SOLUTION RESPIRATORY (INHALATION) at 01:32

## 2020-09-29 RX ADMIN — INSULIN GLARGINE SCH UNITS: 100 INJECTION, SOLUTION SUBCUTANEOUS at 21:04

## 2020-09-29 RX ADMIN — FUROSEMIDE SCH MG: 20 TABLET ORAL at 08:57

## 2020-09-29 RX ADMIN — ASPIRIN SCH MG: 81 TABLET, COATED ORAL at 08:59

## 2020-09-29 RX ADMIN — ATORVASTATIN CALCIUM SCH MG: 40 TABLET, FILM COATED ORAL at 20:23

## 2020-09-29 RX ADMIN — BACLOFEN SCH MG: 20 TABLET ORAL at 21:46

## 2020-09-29 RX ADMIN — CLOTRIMAZOLE SCH APP: 1 CREAM TOPICAL at 17:14

## 2020-09-29 RX ADMIN — GUAIFENESIN AND DEXTROMETHORPHAN PRN ML: 100; 10 SYRUP ORAL at 03:06

## 2020-09-29 RX ADMIN — CLOPIDOGREL BISULFATE SCH MG: 75 TABLET ORAL at 08:58

## 2020-09-29 RX ADMIN — ANORECTAL OINTMENT SCH GM: 15.7; .44; 24; 20.6 OINTMENT TOPICAL at 09:10

## 2020-09-29 RX ADMIN — PREGABALIN SCH MG: 25 CAPSULE ORAL at 09:29

## 2020-09-29 RX ADMIN — HYDROMORPHONE HYDROCHLORIDE PRN MG: 2 TABLET ORAL at 09:38

## 2020-09-29 RX ADMIN — Medication SCH EACH: at 20:32

## 2020-09-29 RX ADMIN — LABETALOL HYDROCHLORIDE SCH MG: 200 TABLET, FILM COATED ORAL at 06:18

## 2020-09-29 RX ADMIN — ENOXAPARIN SODIUM SCH MG: 40 INJECTION SUBCUTANEOUS at 09:00

## 2020-09-29 RX ADMIN — ALBUTEROL SULFATE PRN MG: 2.5 SOLUTION RESPIRATORY (INHALATION) at 17:02

## 2020-09-29 RX ADMIN — PREGABALIN SCH MG: 25 CAPSULE ORAL at 21:46

## 2020-09-29 RX ADMIN — FLUTICASONE FUROATE AND VILANTEROL TRIFENATATE SCH EACH: 100; 25 POWDER RESPIRATORY (INHALATION) at 08:57

## 2020-09-29 RX ADMIN — SODIUM CHLORIDE PRN UNIT: 9 INJECTION, SOLUTION INTRAVENOUS at 20:38

## 2020-09-29 RX ADMIN — SODIUM CHLORIDE PRN UNIT: 9 INJECTION, SOLUTION INTRAVENOUS at 17:14

## 2020-09-29 RX ADMIN — Medication SCH MG: at 17:14

## 2020-09-29 RX ADMIN — LABETALOL HYDROCHLORIDE SCH MG: 200 TABLET, FILM COATED ORAL at 21:47

## 2020-09-29 RX ADMIN — Medication SCH EACH: at 06:24

## 2020-09-29 RX ADMIN — CLOTRIMAZOLE SCH APP: 1 CREAM TOPICAL at 09:11

## 2020-09-29 RX ADMIN — HYDROMORPHONE HYDROCHLORIDE PRN MG: 2 TABLET ORAL at 20:59

## 2020-09-29 RX ADMIN — SODIUM CHLORIDE PRN UNIT: 9 INJECTION, SOLUTION INTRAVENOUS at 09:27

## 2020-09-29 RX ADMIN — BACLOFEN SCH MG: 20 TABLET ORAL at 14:23

## 2020-09-29 RX ADMIN — IPRATROPIUM BROMIDE PRN MG: 0.5 SOLUTION RESPIRATORY (INHALATION) at 01:32

## 2020-09-29 RX ADMIN — SODIUM CHLORIDE PRN UNIT: 9 INJECTION, SOLUTION INTRAVENOUS at 12:19

## 2020-09-29 RX ADMIN — PANTOPRAZOLE SODIUM SCH MG: 40 TABLET, DELAYED RELEASE ORAL at 06:18

## 2020-09-29 RX ADMIN — TRIAMCINOLONE ACETONIDE SCH APP: 0.25 OINTMENT TOPICAL at 09:12

## 2020-09-29 RX ADMIN — GUAIFENESIN AND DEXTROMETHORPHAN PRN ML: 100; 10 SYRUP ORAL at 18:07

## 2020-09-29 RX ADMIN — Medication SCH EACH: at 11:48

## 2020-09-30 VITALS — DIASTOLIC BLOOD PRESSURE: 63 MMHG | SYSTOLIC BLOOD PRESSURE: 137 MMHG

## 2020-09-30 VITALS — SYSTOLIC BLOOD PRESSURE: 167 MMHG | DIASTOLIC BLOOD PRESSURE: 57 MMHG

## 2020-09-30 VITALS — SYSTOLIC BLOOD PRESSURE: 126 MMHG | DIASTOLIC BLOOD PRESSURE: 46 MMHG

## 2020-09-30 VITALS — SYSTOLIC BLOOD PRESSURE: 118 MMHG | DIASTOLIC BLOOD PRESSURE: 47 MMHG

## 2020-09-30 VITALS — SYSTOLIC BLOOD PRESSURE: 141 MMHG | DIASTOLIC BLOOD PRESSURE: 44 MMHG

## 2020-09-30 LAB
BUN SERPL-MCNC: 21 MG/DL (ref 7–18)
CHLORIDE SERPL-SCNC: 94 MMOL/L (ref 98–107)
CO2 SERPL-SCNC: 33 MMOL/L (ref 21–32)
CREAT SERPL-MCNC: 0.8 MG/DL (ref 0.6–1.3)
GLUCOSE SERPL-MCNC: 128 MG/DL (ref 74–106)
POTASSIUM SERPL-SCNC: 4.7 MMOL/L (ref 3.5–5.1)

## 2020-09-30 RX ADMIN — HYDROMORPHONE HYDROCHLORIDE PRN MG: 2 TABLET ORAL at 09:40

## 2020-09-30 RX ADMIN — Medication SCH EACH: at 20:17

## 2020-09-30 RX ADMIN — ALBUTEROL SULFATE SCH MG: 2.5 SOLUTION RESPIRATORY (INHALATION) at 19:47

## 2020-09-30 RX ADMIN — Medication SCH EACH: at 06:05

## 2020-09-30 RX ADMIN — PREGABALIN SCH MG: 25 CAPSULE ORAL at 13:01

## 2020-09-30 RX ADMIN — CLOPIDOGREL BISULFATE SCH MG: 75 TABLET ORAL at 09:14

## 2020-09-30 RX ADMIN — GUAIFENESIN AND DEXTROMETHORPHAN PRN ML: 100; 10 SYRUP ORAL at 05:59

## 2020-09-30 RX ADMIN — PANTOPRAZOLE SODIUM SCH MG: 40 TABLET, DELAYED RELEASE ORAL at 05:54

## 2020-09-30 RX ADMIN — Medication SCH MG: at 09:14

## 2020-09-30 RX ADMIN — DOCUSATE SODIUM SCH MG: 100 CAPSULE, LIQUID FILLED ORAL at 09:13

## 2020-09-30 RX ADMIN — IPRATROPIUM BROMIDE SCH MG: 0.5 SOLUTION RESPIRATORY (INHALATION) at 13:40

## 2020-09-30 RX ADMIN — FLUTICASONE FUROATE AND VILANTEROL TRIFENATATE SCH EACH: 100; 25 POWDER RESPIRATORY (INHALATION) at 09:15

## 2020-09-30 RX ADMIN — GUAIFENESIN AND DEXTROMETHORPHAN PRN ML: 100; 10 SYRUP ORAL at 00:01

## 2020-09-30 RX ADMIN — ANORECTAL OINTMENT SCH GM: 15.7; .44; 24; 20.6 OINTMENT TOPICAL at 09:18

## 2020-09-30 RX ADMIN — SODIUM CHLORIDE PRN UNIT: 9 INJECTION, SOLUTION INTRAVENOUS at 20:21

## 2020-09-30 RX ADMIN — DOCUSATE SODIUM SCH MG: 100 CAPSULE, LIQUID FILLED ORAL at 16:55

## 2020-09-30 RX ADMIN — POLYETHYLENE GLYCOL 3350 PRN GM: 17 POWDER, FOR SOLUTION ORAL at 14:22

## 2020-09-30 RX ADMIN — LABETALOL HYDROCHLORIDE SCH MG: 200 TABLET, FILM COATED ORAL at 14:00

## 2020-09-30 RX ADMIN — LOSARTAN POTASSIUM SCH MG: 50 TABLET, FILM COATED ORAL at 09:14

## 2020-09-30 RX ADMIN — Medication SCH MG: at 09:15

## 2020-09-30 RX ADMIN — Medication SCH EACH: at 16:13

## 2020-09-30 RX ADMIN — PREGABALIN SCH MG: 25 CAPSULE ORAL at 05:47

## 2020-09-30 RX ADMIN — MAGNESIUM HYDROXIDE PRN ML: 400 SUSPENSION ORAL at 14:22

## 2020-09-30 RX ADMIN — GUAIFENESIN AND DEXTROMETHORPHAN PRN ML: 100; 10 SYRUP ORAL at 12:19

## 2020-09-30 RX ADMIN — FUROSEMIDE SCH MG: 20 TABLET ORAL at 09:14

## 2020-09-30 RX ADMIN — SODIUM CHLORIDE PRN UNIT: 9 INJECTION, SOLUTION INTRAVENOUS at 16:16

## 2020-09-30 RX ADMIN — TRIAMCINOLONE ACETONIDE SCH APP: 0.25 OINTMENT TOPICAL at 09:18

## 2020-09-30 RX ADMIN — SODIUM CHLORIDE PRN UNIT: 9 INJECTION, SOLUTION INTRAVENOUS at 11:31

## 2020-09-30 RX ADMIN — LOSARTAN POTASSIUM SCH MG: 50 TABLET, FILM COATED ORAL at 20:24

## 2020-09-30 RX ADMIN — LIDOCAINE SCH PATCH: 50 PATCH CUTANEOUS at 09:17

## 2020-09-30 RX ADMIN — Medication SCH MG: at 16:55

## 2020-09-30 RX ADMIN — ALBUTEROL SULFATE SCH MG: 2.5 SOLUTION RESPIRATORY (INHALATION) at 13:40

## 2020-09-30 RX ADMIN — LABETALOL HYDROCHLORIDE SCH MG: 200 TABLET, FILM COATED ORAL at 05:54

## 2020-09-30 RX ADMIN — BACLOFEN SCH MG: 20 TABLET ORAL at 13:01

## 2020-09-30 RX ADMIN — ASPIRIN SCH MG: 81 TABLET, COATED ORAL at 09:13

## 2020-09-30 RX ADMIN — BACLOFEN SCH MG: 20 TABLET ORAL at 05:49

## 2020-09-30 RX ADMIN — HYDROMORPHONE HYDROCHLORIDE PRN MG: 2 TABLET ORAL at 03:38

## 2020-09-30 RX ADMIN — PREGABALIN SCH MG: 25 CAPSULE ORAL at 21:15

## 2020-09-30 RX ADMIN — IPRATROPIUM BROMIDE SCH MG: 0.5 SOLUTION RESPIRATORY (INHALATION) at 19:47

## 2020-09-30 RX ADMIN — ATORVASTATIN CALCIUM SCH MG: 40 TABLET, FILM COATED ORAL at 20:12

## 2020-09-30 RX ADMIN — LABETALOL HYDROCHLORIDE SCH MG: 200 TABLET, FILM COATED ORAL at 21:16

## 2020-09-30 RX ADMIN — BACLOFEN SCH MG: 20 TABLET ORAL at 21:18

## 2020-09-30 RX ADMIN — Medication SCH MG: at 12:19

## 2020-09-30 RX ADMIN — ENOXAPARIN SODIUM SCH MG: 40 INJECTION SUBCUTANEOUS at 09:17

## 2020-09-30 RX ADMIN — ALBUTEROL SULFATE PRN MG: 2.5 SOLUTION RESPIRATORY (INHALATION) at 19:47

## 2020-09-30 RX ADMIN — INSULIN GLARGINE SCH UNITS: 100 INJECTION, SOLUTION SUBCUTANEOUS at 20:22

## 2020-09-30 RX ADMIN — Medication SCH EACH: at 11:28

## 2020-10-01 VITALS — DIASTOLIC BLOOD PRESSURE: 65 MMHG | SYSTOLIC BLOOD PRESSURE: 160 MMHG

## 2020-10-01 VITALS — DIASTOLIC BLOOD PRESSURE: 40 MMHG | SYSTOLIC BLOOD PRESSURE: 139 MMHG

## 2020-10-01 VITALS — SYSTOLIC BLOOD PRESSURE: 155 MMHG | DIASTOLIC BLOOD PRESSURE: 47 MMHG

## 2020-10-01 LAB
BASOPHILS # BLD AUTO: 0.2 K/UL (ref 0–8)
BASOPHILS NFR BLD AUTO: 1.2 % (ref 0–2)
BUN SERPL-MCNC: 18 MG/DL (ref 7–18)
CHLORIDE SERPL-SCNC: 95 MMOL/L (ref 98–107)
CO2 SERPL-SCNC: 32 MMOL/L (ref 21–32)
CREAT SERPL-MCNC: 0.8 MG/DL (ref 0.6–1.3)
EOSINOPHIL # BLD AUTO: 0.1 K/UL (ref 0–0.7)
EOSINOPHIL NFR BLD AUTO: 0.9 % (ref 0–7)
GLUCOSE SERPL-MCNC: 134 MG/DL (ref 74–106)
HCT VFR BLD AUTO: 29.1 % (ref 31.2–41.9)
HGB BLD-MCNC: 9.7 G/DL (ref 10.9–14.3)
LYMPHOCYTES # BLD AUTO: 3.2 K/UL (ref 20–40)
LYMPHOCYTES NFR BLD AUTO: 26.4 % (ref 20.5–51.5)
MAGNESIUM SERPL-MCNC: 2.5 MG/DL (ref 1.8–2.4)
MCH RBC QN AUTO: 26.9 UUG (ref 24.7–32.8)
MCHC RBC AUTO-ENTMCNC: 33 G/DL (ref 32.3–35.6)
MCV RBC AUTO: 80.7 FL (ref 75.5–95.3)
MONOCYTES # BLD AUTO: 1.1 K/UL (ref 2–10)
MONOCYTES NFR BLD AUTO: 9 % (ref 0–11)
NEUTROPHILS # BLD AUTO: 7.7 K/UL (ref 1.8–8.9)
NEUTROPHILS NFR BLD AUTO: 62.5 % (ref 38.5–71.5)
PHOSPHATE SERPL-MCNC: 3.7 MG/DL (ref 2.5–4.9)
PLATELET # BLD AUTO: 318 K/UL (ref 179–408)
POTASSIUM SERPL-SCNC: 3.9 MMOL/L (ref 3.5–5.1)
RBC # BLD AUTO: 3.61 MIL/UL (ref 3.63–4.92)
WBC # BLD AUTO: 12.3 K/UL (ref 3.8–11.8)

## 2020-10-01 RX ADMIN — GUAIFENESIN AND DEXTROMETHORPHAN PRN ML: 100; 10 SYRUP ORAL at 18:43

## 2020-10-01 RX ADMIN — IPRATROPIUM BROMIDE SCH MG: 0.5 SOLUTION RESPIRATORY (INHALATION) at 08:21

## 2020-10-01 RX ADMIN — IPRATROPIUM BROMIDE SCH MG: 0.5 SOLUTION RESPIRATORY (INHALATION) at 19:47

## 2020-10-01 RX ADMIN — LOSARTAN POTASSIUM SCH MG: 50 TABLET, FILM COATED ORAL at 21:01

## 2020-10-01 RX ADMIN — DOCUSATE SODIUM SCH MG: 100 CAPSULE, LIQUID FILLED ORAL at 17:11

## 2020-10-01 RX ADMIN — Medication SCH MG: at 09:10

## 2020-10-01 RX ADMIN — Medication SCH EACH: at 21:02

## 2020-10-01 RX ADMIN — GUAIFENESIN AND DEXTROMETHORPHAN PRN ML: 100; 10 SYRUP ORAL at 12:38

## 2020-10-01 RX ADMIN — TRIAMCINOLONE ACETONIDE SCH APP: 0.25 OINTMENT TOPICAL at 09:16

## 2020-10-01 RX ADMIN — GUAIFENESIN AND DEXTROMETHORPHAN PRN ML: 100; 10 SYRUP ORAL at 05:52

## 2020-10-01 RX ADMIN — IPRATROPIUM BROMIDE SCH MG: 0.5 SOLUTION RESPIRATORY (INHALATION) at 13:59

## 2020-10-01 RX ADMIN — ALBUTEROL SULFATE SCH MG: 2.5 SOLUTION RESPIRATORY (INHALATION) at 20:04

## 2020-10-01 RX ADMIN — BACLOFEN SCH MG: 20 TABLET ORAL at 21:01

## 2020-10-01 RX ADMIN — ATORVASTATIN CALCIUM SCH MG: 40 TABLET, FILM COATED ORAL at 21:01

## 2020-10-01 RX ADMIN — LABETALOL HYDROCHLORIDE SCH MG: 200 TABLET, FILM COATED ORAL at 21:01

## 2020-10-01 RX ADMIN — Medication SCH EACH: at 06:58

## 2020-10-01 RX ADMIN — SODIUM CHLORIDE PRN UNIT: 9 INJECTION, SOLUTION INTRAVENOUS at 12:25

## 2020-10-01 RX ADMIN — FUROSEMIDE SCH MG: 20 TABLET ORAL at 09:10

## 2020-10-01 RX ADMIN — CLOPIDOGREL BISULFATE SCH MG: 75 TABLET ORAL at 09:10

## 2020-10-01 RX ADMIN — SODIUM CHLORIDE PRN UNIT: 9 INJECTION, SOLUTION INTRAVENOUS at 08:14

## 2020-10-01 RX ADMIN — LOSARTAN POTASSIUM SCH MG: 50 TABLET, FILM COATED ORAL at 09:00

## 2020-10-01 RX ADMIN — LABETALOL HYDROCHLORIDE SCH MG: 200 TABLET, FILM COATED ORAL at 06:57

## 2020-10-01 RX ADMIN — HYDROMORPHONE HYDROCHLORIDE PRN MG: 2 TABLET ORAL at 09:11

## 2020-10-01 RX ADMIN — PREGABALIN SCH MG: 25 CAPSULE ORAL at 21:01

## 2020-10-01 RX ADMIN — PREGABALIN SCH MG: 25 CAPSULE ORAL at 15:08

## 2020-10-01 RX ADMIN — ALBUTEROL SULFATE SCH MG: 2.5 SOLUTION RESPIRATORY (INHALATION) at 13:59

## 2020-10-01 RX ADMIN — ALBUTEROL SULFATE SCH MG: 2.5 SOLUTION RESPIRATORY (INHALATION) at 08:21

## 2020-10-01 RX ADMIN — BACLOFEN SCH MG: 20 TABLET ORAL at 06:55

## 2020-10-01 RX ADMIN — Medication SCH MG: at 15:07

## 2020-10-01 RX ADMIN — Medication SCH EACH: at 17:03

## 2020-10-01 RX ADMIN — ALBUTEROL SULFATE PRN MG: 2.5 SOLUTION RESPIRATORY (INHALATION) at 19:47

## 2020-10-01 RX ADMIN — ENOXAPARIN SODIUM SCH MG: 40 INJECTION SUBCUTANEOUS at 08:22

## 2020-10-01 RX ADMIN — SODIUM CHLORIDE PRN UNIT: 9 INJECTION, SOLUTION INTRAVENOUS at 17:11

## 2020-10-01 RX ADMIN — HYDROMORPHONE HYDROCHLORIDE PRN MG: 2 TABLET ORAL at 00:46

## 2020-10-01 RX ADMIN — ALBUTEROL SULFATE PRN MG: 2.5 SOLUTION RESPIRATORY (INHALATION) at 19:52

## 2020-10-01 RX ADMIN — HYDROMORPHONE HYDROCHLORIDE PRN MG: 2 TABLET ORAL at 15:20

## 2020-10-01 RX ADMIN — GUAIFENESIN AND DEXTROMETHORPHAN PRN ML: 100; 10 SYRUP ORAL at 00:47

## 2020-10-01 RX ADMIN — PANTOPRAZOLE SODIUM SCH MG: 40 TABLET, DELAYED RELEASE ORAL at 06:57

## 2020-10-01 RX ADMIN — LIDOCAINE SCH PATCH: 50 PATCH CUTANEOUS at 09:11

## 2020-10-01 RX ADMIN — Medication SCH MG: at 17:11

## 2020-10-01 RX ADMIN — Medication SCH EACH: at 11:42

## 2020-10-01 RX ADMIN — LABETALOL HYDROCHLORIDE SCH MG: 200 TABLET, FILM COATED ORAL at 15:08

## 2020-10-01 RX ADMIN — DOCUSATE SODIUM SCH MG: 100 CAPSULE, LIQUID FILLED ORAL at 09:10

## 2020-10-01 RX ADMIN — HYDROMORPHONE HYDROCHLORIDE PRN MG: 2 TABLET ORAL at 21:48

## 2020-10-01 RX ADMIN — ANORECTAL OINTMENT SCH GM: 15.7; .44; 24; 20.6 OINTMENT TOPICAL at 09:17

## 2020-10-01 RX ADMIN — POLYETHYLENE GLYCOL 3350 PRN GM: 17 POWDER, FOR SOLUTION ORAL at 17:11

## 2020-10-01 RX ADMIN — INSULIN GLARGINE SCH UNITS: 100 INJECTION, SOLUTION SUBCUTANEOUS at 21:04

## 2020-10-01 RX ADMIN — ACETAMINOPHEN PRN MG: 325 TABLET ORAL at 12:44

## 2020-10-01 RX ADMIN — PREGABALIN SCH MG: 25 CAPSULE ORAL at 09:30

## 2020-10-01 RX ADMIN — ASPIRIN SCH MG: 81 TABLET, COATED ORAL at 09:10

## 2020-10-01 RX ADMIN — BACLOFEN SCH MG: 20 TABLET ORAL at 15:08

## 2020-10-01 RX ADMIN — SODIUM CHLORIDE PRN UNIT: 9 INJECTION, SOLUTION INTRAVENOUS at 21:03

## 2020-10-01 RX ADMIN — FLUTICASONE FUROATE AND VILANTEROL TRIFENATATE SCH EACH: 100; 25 POWDER RESPIRATORY (INHALATION) at 09:00

## 2020-10-02 VITALS — DIASTOLIC BLOOD PRESSURE: 55 MMHG | SYSTOLIC BLOOD PRESSURE: 150 MMHG

## 2020-10-02 VITALS — DIASTOLIC BLOOD PRESSURE: 66 MMHG | SYSTOLIC BLOOD PRESSURE: 146 MMHG

## 2020-10-02 VITALS — DIASTOLIC BLOOD PRESSURE: 53 MMHG | SYSTOLIC BLOOD PRESSURE: 136 MMHG

## 2020-10-02 VITALS — DIASTOLIC BLOOD PRESSURE: 49 MMHG | SYSTOLIC BLOOD PRESSURE: 141 MMHG

## 2020-10-02 LAB
BASOPHILS # BLD AUTO: 0.1 K/UL (ref 0–8)
BASOPHILS NFR BLD AUTO: 0.7 % (ref 0–2)
BUN SERPL-MCNC: 18 MG/DL (ref 7–18)
CHLORIDE SERPL-SCNC: 91 MMOL/L (ref 98–107)
CO2 SERPL-SCNC: 32 MMOL/L (ref 21–32)
CREAT SERPL-MCNC: 0.7 MG/DL (ref 0.6–1.3)
EOSINOPHIL # BLD AUTO: 0.1 K/UL (ref 0–0.7)
EOSINOPHIL NFR BLD AUTO: 1 % (ref 0–7)
GLUCOSE SERPL-MCNC: 75 MG/DL (ref 74–106)
HCT VFR BLD AUTO: 27.3 % (ref 31.2–41.9)
HGB BLD-MCNC: 8.9 G/DL (ref 10.9–14.3)
LYMPHOCYTES # BLD AUTO: 3.6 K/UL (ref 20–40)
LYMPHOCYTES NFR BLD AUTO: 28.5 % (ref 20.5–51.5)
MAGNESIUM SERPL-MCNC: 2.1 MG/DL (ref 1.8–2.4)
MCH RBC QN AUTO: 26.3 UUG (ref 24.7–32.8)
MCHC RBC AUTO-ENTMCNC: 33 G/DL (ref 32.3–35.6)
MCV RBC AUTO: 80.3 FL (ref 75.5–95.3)
MONOCYTES # BLD AUTO: 1.2 K/UL (ref 2–10)
MONOCYTES NFR BLD AUTO: 9.8 % (ref 0–11)
NEUTROPHILS # BLD AUTO: 7.5 K/UL (ref 1.8–8.9)
NEUTROPHILS NFR BLD AUTO: 60 % (ref 38.5–71.5)
PHOSPHATE SERPL-MCNC: 4 MG/DL (ref 2.5–4.9)
PLATELET # BLD AUTO: 320 K/UL (ref 179–408)
POTASSIUM SERPL-SCNC: 3.8 MMOL/L (ref 3.5–5.1)
RBC # BLD AUTO: 3.39 MIL/UL (ref 3.63–4.92)
WBC # BLD AUTO: 12.6 K/UL (ref 3.8–11.8)

## 2020-10-02 RX ADMIN — HYDROMORPHONE HYDROCHLORIDE PRN MG: 2 TABLET ORAL at 16:25

## 2020-10-02 RX ADMIN — TOLTERODINE TARTRATE SCH MG: 2 TABLET, FILM COATED ORAL at 20:41

## 2020-10-02 RX ADMIN — TRIAMCINOLONE ACETONIDE SCH APP: 0.25 OINTMENT TOPICAL at 08:18

## 2020-10-02 RX ADMIN — GUAIFENESIN AND DEXTROMETHORPHAN PRN ML: 100; 10 SYRUP ORAL at 19:21

## 2020-10-02 RX ADMIN — INSULIN GLARGINE SCH UNITS: 100 INJECTION, SOLUTION SUBCUTANEOUS at 20:48

## 2020-10-02 RX ADMIN — SODIUM CHLORIDE PRN UNIT: 9 INJECTION, SOLUTION INTRAVENOUS at 17:09

## 2020-10-02 RX ADMIN — ATORVASTATIN CALCIUM SCH MG: 40 TABLET, FILM COATED ORAL at 20:40

## 2020-10-02 RX ADMIN — DOCUSATE SODIUM SCH MG: 100 CAPSULE, LIQUID FILLED ORAL at 08:13

## 2020-10-02 RX ADMIN — PREGABALIN SCH MG: 25 CAPSULE ORAL at 21:16

## 2020-10-02 RX ADMIN — ALBUTEROL SULFATE PRN MG: 2.5 SOLUTION RESPIRATORY (INHALATION) at 19:25

## 2020-10-02 RX ADMIN — ALBUTEROL SULFATE SCH MG: 2.5 SOLUTION RESPIRATORY (INHALATION) at 13:16

## 2020-10-02 RX ADMIN — FUROSEMIDE SCH MG: 20 TABLET ORAL at 08:08

## 2020-10-02 RX ADMIN — BACLOFEN SCH MG: 20 TABLET ORAL at 06:04

## 2020-10-02 RX ADMIN — HYDROMORPHONE HYDROCHLORIDE PRN MG: 2 TABLET ORAL at 09:38

## 2020-10-02 RX ADMIN — LABETALOL HYDROCHLORIDE SCH MG: 200 TABLET, FILM COATED ORAL at 06:05

## 2020-10-02 RX ADMIN — LABETALOL HYDROCHLORIDE SCH MG: 200 TABLET, FILM COATED ORAL at 21:16

## 2020-10-02 RX ADMIN — Medication SCH MG: at 08:11

## 2020-10-02 RX ADMIN — Medication SCH EACH: at 20:46

## 2020-10-02 RX ADMIN — Medication SCH EACH: at 17:07

## 2020-10-02 RX ADMIN — CLOPIDOGREL BISULFATE SCH MG: 75 TABLET ORAL at 08:11

## 2020-10-02 RX ADMIN — POLYETHYLENE GLYCOL 3350 PRN GM: 17 POWDER, FOR SOLUTION ORAL at 11:55

## 2020-10-02 RX ADMIN — IPRATROPIUM BROMIDE SCH MG: 0.5 SOLUTION RESPIRATORY (INHALATION) at 19:25

## 2020-10-02 RX ADMIN — HYDROMORPHONE HYDROCHLORIDE PRN MG: 2 TABLET ORAL at 22:31

## 2020-10-02 RX ADMIN — BACLOFEN SCH MG: 20 TABLET ORAL at 21:16

## 2020-10-02 RX ADMIN — PREGABALIN SCH MG: 25 CAPSULE ORAL at 13:07

## 2020-10-02 RX ADMIN — ACETAMINOPHEN PRN MG: 325 TABLET ORAL at 08:23

## 2020-10-02 RX ADMIN — BACLOFEN SCH MG: 20 TABLET ORAL at 13:06

## 2020-10-02 RX ADMIN — GUAIFENESIN AND DEXTROMETHORPHAN PRN ML: 100; 10 SYRUP ORAL at 13:12

## 2020-10-02 RX ADMIN — PREGABALIN SCH MG: 25 CAPSULE ORAL at 06:04

## 2020-10-02 RX ADMIN — LOSARTAN POTASSIUM SCH MG: 50 TABLET, FILM COATED ORAL at 20:41

## 2020-10-02 RX ADMIN — IPRATROPIUM BROMIDE SCH MG: 0.5 SOLUTION RESPIRATORY (INHALATION) at 01:21

## 2020-10-02 RX ADMIN — ANORECTAL OINTMENT SCH GM: 15.7; .44; 24; 20.6 OINTMENT TOPICAL at 08:18

## 2020-10-02 RX ADMIN — IPRATROPIUM BROMIDE SCH MG: 0.5 SOLUTION RESPIRATORY (INHALATION) at 07:46

## 2020-10-02 RX ADMIN — GUAIFENESIN AND DEXTROMETHORPHAN PRN ML: 100; 10 SYRUP ORAL at 00:57

## 2020-10-02 RX ADMIN — DOCUSATE SODIUM SCH MG: 100 CAPSULE, LIQUID FILLED ORAL at 17:08

## 2020-10-02 RX ADMIN — IPRATROPIUM BROMIDE SCH MG: 0.5 SOLUTION RESPIRATORY (INHALATION) at 13:16

## 2020-10-02 RX ADMIN — HYDROMORPHONE HYDROCHLORIDE PRN MG: 2 TABLET ORAL at 03:48

## 2020-10-02 RX ADMIN — ASPIRIN SCH MG: 81 TABLET, COATED ORAL at 08:12

## 2020-10-02 RX ADMIN — LOSARTAN POTASSIUM SCH MG: 50 TABLET, FILM COATED ORAL at 08:13

## 2020-10-02 RX ADMIN — Medication SCH MG: at 08:10

## 2020-10-02 RX ADMIN — SODIUM CHLORIDE PRN UNIT: 9 INJECTION, SOLUTION INTRAVENOUS at 20:48

## 2020-10-02 RX ADMIN — GUAIFENESIN AND DEXTROMETHORPHAN PRN ML: 100; 10 SYRUP ORAL at 08:08

## 2020-10-02 RX ADMIN — FLUTICASONE FUROATE AND VILANTEROL TRIFENATATE SCH EACH: 100; 25 POWDER RESPIRATORY (INHALATION) at 08:15

## 2020-10-02 RX ADMIN — LABETALOL HYDROCHLORIDE SCH MG: 200 TABLET, FILM COATED ORAL at 13:07

## 2020-10-02 RX ADMIN — PANTOPRAZOLE SODIUM SCH MG: 40 TABLET, DELAYED RELEASE ORAL at 06:04

## 2020-10-02 RX ADMIN — SODIUM CHLORIDE PRN UNIT: 9 INJECTION, SOLUTION INTRAVENOUS at 11:58

## 2020-10-02 RX ADMIN — Medication SCH MG: at 08:12

## 2020-10-02 RX ADMIN — LIDOCAINE SCH PATCH: 50 PATCH CUTANEOUS at 08:08

## 2020-10-02 RX ADMIN — Medication SCH EACH: at 11:54

## 2020-10-02 RX ADMIN — ALBUTEROL SULFATE SCH MG: 2.5 SOLUTION RESPIRATORY (INHALATION) at 19:30

## 2020-10-02 RX ADMIN — ALBUTEROL SULFATE SCH MG: 2.5 SOLUTION RESPIRATORY (INHALATION) at 07:46

## 2020-10-02 RX ADMIN — Medication SCH EACH: at 06:55

## 2020-10-03 VITALS — DIASTOLIC BLOOD PRESSURE: 52 MMHG | SYSTOLIC BLOOD PRESSURE: 136 MMHG

## 2020-10-03 VITALS — SYSTOLIC BLOOD PRESSURE: 159 MMHG | DIASTOLIC BLOOD PRESSURE: 47 MMHG

## 2020-10-03 VITALS — DIASTOLIC BLOOD PRESSURE: 48 MMHG | SYSTOLIC BLOOD PRESSURE: 145 MMHG

## 2020-10-03 VITALS — SYSTOLIC BLOOD PRESSURE: 126 MMHG | DIASTOLIC BLOOD PRESSURE: 44 MMHG

## 2020-10-03 LAB
APPEARANCE UR: CLEAR
BASOPHILS # BLD AUTO: 0.1 K/UL (ref 0–8)
BASOPHILS NFR BLD AUTO: 0.5 % (ref 0–2)
BILIRUB UR QL STRIP: NEGATIVE
BUN SERPL-MCNC: 17 MG/DL (ref 7–18)
CHLORIDE SERPL-SCNC: 93 MMOL/L (ref 98–107)
CO2 SERPL-SCNC: 31 MMOL/L (ref 21–32)
COLOR UR: YELLOW
CREAT SERPL-MCNC: 0.9 MG/DL (ref 0.6–1.3)
CREAT UR-MCNC: 22 MG/DL (ref 30–125)
EOSINOPHIL # BLD AUTO: 0.1 K/UL (ref 0–0.7)
EOSINOPHIL NFR BLD AUTO: 1.1 % (ref 0–7)
GLUCOSE SERPL-MCNC: 87 MG/DL (ref 74–106)
GLUCOSE UR STRIP-MCNC: (no result) MG/DL
HCT VFR BLD AUTO: 27 % (ref 31.2–41.9)
HGB BLD-MCNC: 8.9 G/DL (ref 10.9–14.3)
HGB UR QL STRIP: NEGATIVE
KETONES UR STRIP-MCNC: NEGATIVE MG/DL
LEUKOCYTE ESTERASE UR QL STRIP: NEGATIVE
LYMPHOCYTES # BLD AUTO: 4.3 K/UL (ref 20–40)
LYMPHOCYTES NFR BLD AUTO: 35.1 % (ref 20.5–51.5)
MAGNESIUM SERPL-MCNC: 2.3 MG/DL (ref 1.8–2.4)
MCH RBC QN AUTO: 26.3 UUG (ref 24.7–32.8)
MCHC RBC AUTO-ENTMCNC: 33 G/DL (ref 32.3–35.6)
MCV RBC AUTO: 79.7 FL (ref 75.5–95.3)
MONOCYTES # BLD AUTO: 1.3 K/UL (ref 2–10)
MONOCYTES NFR BLD AUTO: 10.8 % (ref 0–11)
NEUTROPHILS # BLD AUTO: 6.4 K/UL (ref 1.8–8.9)
NEUTROPHILS NFR BLD AUTO: 52.5 % (ref 38.5–71.5)
NITRITE UR QL STRIP: NEGATIVE
PH UR STRIP: 7 [PH] (ref 5–8)
PHOSPHATE SERPL-MCNC: 3.2 MG/DL (ref 2.5–4.9)
PLATELET # BLD AUTO: 314 K/UL (ref 179–408)
POTASSIUM SERPL-SCNC: 3.8 MMOL/L (ref 3.5–5.1)
PROT UR-MCNC: < 6 MG/DL
RBC # BLD AUTO: 3.39 MIL/UL (ref 3.63–4.92)
SP GR UR STRIP: 1.01 (ref 1–1.03)
UROBILINOGEN UR STRIP-MCNC: 0.2 E.U./DL
WBC # BLD AUTO: 12.3 K/UL (ref 3.8–11.8)

## 2020-10-03 RX ADMIN — Medication SCH MG: at 08:56

## 2020-10-03 RX ADMIN — SODIUM CHLORIDE PRN UNIT: 9 INJECTION, SOLUTION INTRAVENOUS at 23:27

## 2020-10-03 RX ADMIN — IPRATROPIUM BROMIDE SCH MG: 0.5 SOLUTION RESPIRATORY (INHALATION) at 19:05

## 2020-10-03 RX ADMIN — HYDROMORPHONE HYDROCHLORIDE PRN MG: 2 TABLET ORAL at 21:43

## 2020-10-03 RX ADMIN — MAGNESIUM HYDROXIDE PRN ML: 400 SUSPENSION ORAL at 08:59

## 2020-10-03 RX ADMIN — PREGABALIN SCH MG: 25 CAPSULE ORAL at 13:29

## 2020-10-03 RX ADMIN — LOSARTAN POTASSIUM SCH MG: 50 TABLET, FILM COATED ORAL at 08:56

## 2020-10-03 RX ADMIN — TOLTERODINE TARTRATE SCH MG: 2 TABLET, FILM COATED ORAL at 21:43

## 2020-10-03 RX ADMIN — LIDOCAINE SCH PATCH: 50 PATCH CUTANEOUS at 08:57

## 2020-10-03 RX ADMIN — ACETAMINOPHEN PRN MG: 325 TABLET ORAL at 11:51

## 2020-10-03 RX ADMIN — BACLOFEN SCH MG: 20 TABLET ORAL at 21:42

## 2020-10-03 RX ADMIN — SODIUM CHLORIDE PRN UNIT: 9 INJECTION, SOLUTION INTRAVENOUS at 16:36

## 2020-10-03 RX ADMIN — PREGABALIN SCH MG: 25 CAPSULE ORAL at 06:24

## 2020-10-03 RX ADMIN — TOLTERODINE TARTRATE SCH MG: 2 TABLET, FILM COATED ORAL at 08:58

## 2020-10-03 RX ADMIN — GUAIFENESIN AND DEXTROMETHORPHAN PRN ML: 100; 10 SYRUP ORAL at 02:38

## 2020-10-03 RX ADMIN — ATORVASTATIN CALCIUM SCH MG: 40 TABLET, FILM COATED ORAL at 21:44

## 2020-10-03 RX ADMIN — GUAIFENESIN AND DEXTROMETHORPHAN PRN ML: 100; 10 SYRUP ORAL at 16:42

## 2020-10-03 RX ADMIN — ASPIRIN SCH MG: 81 TABLET, COATED ORAL at 08:59

## 2020-10-03 RX ADMIN — IPRATROPIUM BROMIDE PRN MG: 0.5 SOLUTION RESPIRATORY (INHALATION) at 00:30

## 2020-10-03 RX ADMIN — POLYETHYLENE GLYCOL 3350 PRN GM: 17 POWDER, FOR SOLUTION ORAL at 15:14

## 2020-10-03 RX ADMIN — BACLOFEN SCH MG: 20 TABLET ORAL at 13:31

## 2020-10-03 RX ADMIN — FLUTICASONE FUROATE AND VILANTEROL TRIFENATATE SCH EACH: 100; 25 POWDER RESPIRATORY (INHALATION) at 09:00

## 2020-10-03 RX ADMIN — INSULIN GLARGINE SCH UNITS: 100 INJECTION, SOLUTION SUBCUTANEOUS at 23:26

## 2020-10-03 RX ADMIN — PANTOPRAZOLE SODIUM SCH MG: 40 TABLET, DELAYED RELEASE ORAL at 06:24

## 2020-10-03 RX ADMIN — Medication SCH EACH: at 16:29

## 2020-10-03 RX ADMIN — ACETAMINOPHEN PRN MG: 325 TABLET ORAL at 02:38

## 2020-10-03 RX ADMIN — ANORECTAL OINTMENT SCH GM: 15.7; .44; 24; 20.6 OINTMENT TOPICAL at 09:00

## 2020-10-03 RX ADMIN — GUAIFENESIN AND DEXTROMETHORPHAN PRN ML: 100; 10 SYRUP ORAL at 08:56

## 2020-10-03 RX ADMIN — DOCUSATE SODIUM SCH MG: 100 CAPSULE, LIQUID FILLED ORAL at 08:56

## 2020-10-03 RX ADMIN — LOSARTAN POTASSIUM SCH MG: 50 TABLET, FILM COATED ORAL at 21:44

## 2020-10-03 RX ADMIN — LABETALOL HYDROCHLORIDE SCH MG: 200 TABLET, FILM COATED ORAL at 13:29

## 2020-10-03 RX ADMIN — Medication SCH MG: at 08:59

## 2020-10-03 RX ADMIN — HYDROMORPHONE HYDROCHLORIDE PRN MG: 2 TABLET ORAL at 09:10

## 2020-10-03 RX ADMIN — IPRATROPIUM BROMIDE SCH MG: 0.5 SOLUTION RESPIRATORY (INHALATION) at 07:47

## 2020-10-03 RX ADMIN — ALBUTEROL SULFATE SCH MG: 2.5 SOLUTION RESPIRATORY (INHALATION) at 19:05

## 2020-10-03 RX ADMIN — LABETALOL HYDROCHLORIDE SCH MG: 200 TABLET, FILM COATED ORAL at 06:25

## 2020-10-03 RX ADMIN — LABETALOL HYDROCHLORIDE SCH MG: 200 TABLET, FILM COATED ORAL at 21:43

## 2020-10-03 RX ADMIN — DOCUSATE SODIUM SCH MG: 100 CAPSULE, LIQUID FILLED ORAL at 16:48

## 2020-10-03 RX ADMIN — HYDROMORPHONE HYDROCHLORIDE PRN MG: 2 TABLET ORAL at 15:13

## 2020-10-03 RX ADMIN — Medication SCH EACH: at 06:33

## 2020-10-03 RX ADMIN — PREGABALIN SCH MG: 25 CAPSULE ORAL at 21:42

## 2020-10-03 RX ADMIN — Medication PRN MG: at 15:12

## 2020-10-03 RX ADMIN — CLOPIDOGREL BISULFATE SCH MG: 75 TABLET ORAL at 08:57

## 2020-10-03 RX ADMIN — Medication SCH EACH: at 11:47

## 2020-10-03 RX ADMIN — Medication SCH EACH: at 21:47

## 2020-10-03 RX ADMIN — IPRATROPIUM BROMIDE SCH MG: 0.5 SOLUTION RESPIRATORY (INHALATION) at 13:14

## 2020-10-03 RX ADMIN — ALBUTEROL SULFATE PRN MG: 2.5 SOLUTION RESPIRATORY (INHALATION) at 00:31

## 2020-10-03 RX ADMIN — TRIAMCINOLONE ACETONIDE SCH APP: 0.25 OINTMENT TOPICAL at 09:00

## 2020-10-03 RX ADMIN — FUROSEMIDE SCH MG: 20 TABLET ORAL at 08:56

## 2020-10-03 RX ADMIN — ALBUTEROL SULFATE SCH MG: 2.5 SOLUTION RESPIRATORY (INHALATION) at 13:14

## 2020-10-03 RX ADMIN — BACLOFEN SCH MG: 20 TABLET ORAL at 06:26

## 2020-10-03 RX ADMIN — ALBUTEROL SULFATE SCH MG: 2.5 SOLUTION RESPIRATORY (INHALATION) at 07:47

## 2020-10-04 VITALS — SYSTOLIC BLOOD PRESSURE: 138 MMHG | DIASTOLIC BLOOD PRESSURE: 41 MMHG

## 2020-10-04 VITALS — SYSTOLIC BLOOD PRESSURE: 124 MMHG | DIASTOLIC BLOOD PRESSURE: 45 MMHG

## 2020-10-04 VITALS — SYSTOLIC BLOOD PRESSURE: 135 MMHG | DIASTOLIC BLOOD PRESSURE: 45 MMHG

## 2020-10-04 VITALS — DIASTOLIC BLOOD PRESSURE: 53 MMHG | SYSTOLIC BLOOD PRESSURE: 147 MMHG

## 2020-10-04 LAB
BASOPHILS # BLD AUTO: 0.1 K/UL (ref 0–8)
BASOPHILS NFR BLD AUTO: 0.4 % (ref 0–2)
BUN SERPL-MCNC: 20 MG/DL (ref 7–18)
CHLORIDE SERPL-SCNC: 94 MMOL/L (ref 98–107)
CO2 SERPL-SCNC: 32 MMOL/L (ref 21–32)
CREAT SERPL-MCNC: 0.8 MG/DL (ref 0.6–1.3)
EOSINOPHIL # BLD AUTO: 0.1 K/UL (ref 0–0.7)
EOSINOPHIL NFR BLD AUTO: 1.1 % (ref 0–7)
GLUCOSE SERPL-MCNC: 126 MG/DL (ref 74–106)
HCT VFR BLD AUTO: 26.6 % (ref 31.2–41.9)
HGB BLD-MCNC: 8.8 G/DL (ref 10.9–14.3)
LYMPHOCYTES # BLD AUTO: 3.4 K/UL (ref 20–40)
LYMPHOCYTES NFR BLD AUTO: 25.8 % (ref 20.5–51.5)
MAGNESIUM SERPL-MCNC: 2.3 MG/DL (ref 1.8–2.4)
MCH RBC QN AUTO: 26.5 UUG (ref 24.7–32.8)
MCHC RBC AUTO-ENTMCNC: 33 G/DL (ref 32.3–35.6)
MCV RBC AUTO: 80.4 FL (ref 75.5–95.3)
MONOCYTES # BLD AUTO: 1.3 K/UL (ref 2–10)
MONOCYTES NFR BLD AUTO: 9.8 % (ref 0–11)
NEUTROPHILS # BLD AUTO: 8.3 K/UL (ref 1.8–8.9)
NEUTROPHILS NFR BLD AUTO: 62.9 % (ref 38.5–71.5)
PHOSPHATE SERPL-MCNC: 3.3 MG/DL (ref 2.5–4.9)
PLATELET # BLD AUTO: 299 K/UL (ref 179–408)
POTASSIUM SERPL-SCNC: 4.1 MMOL/L (ref 3.5–5.1)
RBC # BLD AUTO: 3.31 MIL/UL (ref 3.63–4.92)
WBC # BLD AUTO: 13.1 K/UL (ref 3.8–11.8)

## 2020-10-04 RX ADMIN — POLYETHYLENE GLYCOL 3350 PRN GM: 17 POWDER, FOR SOLUTION ORAL at 17:07

## 2020-10-04 RX ADMIN — LIDOCAINE SCH PATCH: 50 PATCH CUTANEOUS at 09:19

## 2020-10-04 RX ADMIN — LOSARTAN POTASSIUM SCH MG: 50 TABLET, FILM COATED ORAL at 20:56

## 2020-10-04 RX ADMIN — ANORECTAL OINTMENT SCH GM: 15.7; .44; 24; 20.6 OINTMENT TOPICAL at 09:28

## 2020-10-04 RX ADMIN — BACLOFEN SCH MG: 20 TABLET ORAL at 06:00

## 2020-10-04 RX ADMIN — GUAIFENESIN AND DEXTROMETHORPHAN PRN ML: 100; 10 SYRUP ORAL at 18:25

## 2020-10-04 RX ADMIN — TOLTERODINE TARTRATE SCH MG: 2 TABLET, FILM COATED ORAL at 09:17

## 2020-10-04 RX ADMIN — HYDROMORPHONE HYDROCHLORIDE PRN MG: 2 TABLET ORAL at 21:36

## 2020-10-04 RX ADMIN — PREGABALIN SCH MG: 25 CAPSULE ORAL at 09:18

## 2020-10-04 RX ADMIN — ALBUTEROL SULFATE SCH MG: 2.5 SOLUTION RESPIRATORY (INHALATION) at 19:41

## 2020-10-04 RX ADMIN — FLUTICASONE FUROATE AND VILANTEROL TRIFENATATE SCH EACH: 100; 25 POWDER RESPIRATORY (INHALATION) at 09:27

## 2020-10-04 RX ADMIN — Medication SCH EACH: at 16:53

## 2020-10-04 RX ADMIN — Medication SCH EACH: at 06:50

## 2020-10-04 RX ADMIN — Medication SCH MG: at 09:18

## 2020-10-04 RX ADMIN — PREGABALIN SCH MG: 25 CAPSULE ORAL at 21:01

## 2020-10-04 RX ADMIN — PREGABALIN SCH MG: 25 CAPSULE ORAL at 15:38

## 2020-10-04 RX ADMIN — Medication SCH EACH: at 20:56

## 2020-10-04 RX ADMIN — Medication SCH EACH: at 11:48

## 2020-10-04 RX ADMIN — TRIAMCINOLONE ACETONIDE SCH APP: 0.25 OINTMENT TOPICAL at 09:28

## 2020-10-04 RX ADMIN — IPRATROPIUM BROMIDE SCH MG: 0.5 SOLUTION RESPIRATORY (INHALATION) at 19:41

## 2020-10-04 RX ADMIN — ALBUTEROL SULFATE SCH MG: 2.5 SOLUTION RESPIRATORY (INHALATION) at 13:39

## 2020-10-04 RX ADMIN — FUROSEMIDE SCH MG: 20 TABLET ORAL at 09:17

## 2020-10-04 RX ADMIN — ATORVASTATIN CALCIUM SCH MG: 40 TABLET, FILM COATED ORAL at 20:56

## 2020-10-04 RX ADMIN — LABETALOL HYDROCHLORIDE SCH MG: 200 TABLET, FILM COATED ORAL at 06:00

## 2020-10-04 RX ADMIN — DOCUSATE SODIUM SCH MG: 100 CAPSULE, LIQUID FILLED ORAL at 17:07

## 2020-10-04 RX ADMIN — LABETALOL HYDROCHLORIDE SCH MG: 200 TABLET, FILM COATED ORAL at 15:38

## 2020-10-04 RX ADMIN — ASPIRIN SCH MG: 81 TABLET, COATED ORAL at 09:17

## 2020-10-04 RX ADMIN — SODIUM CHLORIDE PRN UNIT: 9 INJECTION, SOLUTION INTRAVENOUS at 11:51

## 2020-10-04 RX ADMIN — DOCUSATE SODIUM SCH MG: 100 CAPSULE, LIQUID FILLED ORAL at 09:17

## 2020-10-04 RX ADMIN — GUAIFENESIN AND DEXTROMETHORPHAN PRN ML: 100; 10 SYRUP ORAL at 09:19

## 2020-10-04 RX ADMIN — PANTOPRAZOLE SODIUM SCH MG: 40 TABLET, DELAYED RELEASE ORAL at 09:18

## 2020-10-04 RX ADMIN — LOSARTAN POTASSIUM SCH MG: 50 TABLET, FILM COATED ORAL at 09:17

## 2020-10-04 RX ADMIN — GUAIFENESIN AND DEXTROMETHORPHAN PRN ML: 100; 10 SYRUP ORAL at 00:33

## 2020-10-04 RX ADMIN — IPRATROPIUM BROMIDE SCH MG: 0.5 SOLUTION RESPIRATORY (INHALATION) at 07:15

## 2020-10-04 RX ADMIN — BACLOFEN SCH MG: 20 TABLET ORAL at 15:38

## 2020-10-04 RX ADMIN — ALBUTEROL SULFATE SCH MG: 2.5 SOLUTION RESPIRATORY (INHALATION) at 07:15

## 2020-10-04 RX ADMIN — SODIUM CHLORIDE PRN UNIT: 9 INJECTION, SOLUTION INTRAVENOUS at 21:06

## 2020-10-04 RX ADMIN — ALBUTEROL SULFATE PRN MG: 2.5 SOLUTION RESPIRATORY (INHALATION) at 01:15

## 2020-10-04 RX ADMIN — BACLOFEN SCH MG: 20 TABLET ORAL at 21:01

## 2020-10-04 RX ADMIN — Medication SCH MG: at 09:17

## 2020-10-04 RX ADMIN — CLOPIDOGREL BISULFATE SCH MG: 75 TABLET ORAL at 09:17

## 2020-10-04 RX ADMIN — IPRATROPIUM BROMIDE SCH MG: 0.5 SOLUTION RESPIRATORY (INHALATION) at 13:39

## 2020-10-04 RX ADMIN — SODIUM CHLORIDE PRN UNIT: 9 INJECTION, SOLUTION INTRAVENOUS at 17:06

## 2020-10-04 RX ADMIN — LABETALOL HYDROCHLORIDE SCH MG: 200 TABLET, FILM COATED ORAL at 21:01

## 2020-10-04 RX ADMIN — ACETAMINOPHEN PRN MG: 325 TABLET ORAL at 00:33

## 2020-10-04 RX ADMIN — HYDROMORPHONE HYDROCHLORIDE PRN MG: 2 TABLET ORAL at 09:27

## 2020-10-04 RX ADMIN — INSULIN GLARGINE SCH UNITS: 100 INJECTION, SOLUTION SUBCUTANEOUS at 21:07

## 2020-10-04 RX ADMIN — TOLTERODINE TARTRATE SCH MG: 2 TABLET, FILM COATED ORAL at 20:56

## 2020-10-04 RX ADMIN — HYDROMORPHONE HYDROCHLORIDE PRN MG: 2 TABLET ORAL at 15:39

## 2020-10-05 VITALS — SYSTOLIC BLOOD PRESSURE: 147 MMHG | DIASTOLIC BLOOD PRESSURE: 49 MMHG

## 2020-10-05 VITALS — SYSTOLIC BLOOD PRESSURE: 178 MMHG | DIASTOLIC BLOOD PRESSURE: 64 MMHG

## 2020-10-05 VITALS — DIASTOLIC BLOOD PRESSURE: 57 MMHG | SYSTOLIC BLOOD PRESSURE: 178 MMHG

## 2020-10-05 VITALS — DIASTOLIC BLOOD PRESSURE: 65 MMHG | SYSTOLIC BLOOD PRESSURE: 189 MMHG

## 2020-10-05 VITALS — DIASTOLIC BLOOD PRESSURE: 40 MMHG | SYSTOLIC BLOOD PRESSURE: 111 MMHG

## 2020-10-05 VITALS — DIASTOLIC BLOOD PRESSURE: 58 MMHG | SYSTOLIC BLOOD PRESSURE: 175 MMHG

## 2020-10-05 VITALS — SYSTOLIC BLOOD PRESSURE: 134 MMHG | DIASTOLIC BLOOD PRESSURE: 52 MMHG

## 2020-10-05 LAB
BASOPHILS # BLD AUTO: 0.1 K/UL (ref 0–8)
BASOPHILS NFR BLD AUTO: 0.5 % (ref 0–2)
BUN SERPL-MCNC: 18 MG/DL (ref 7–18)
CHLORIDE SERPL-SCNC: 97 MMOL/L (ref 98–107)
CO2 SERPL-SCNC: 33 MMOL/L (ref 21–32)
CREAT SERPL-MCNC: 0.8 MG/DL (ref 0.6–1.3)
EOSINOPHIL # BLD AUTO: 0 K/UL (ref 0–0.7)
EOSINOPHIL NFR BLD AUTO: 0.3 % (ref 0–7)
GLUCOSE SERPL-MCNC: 163 MG/DL (ref 74–106)
HCT VFR BLD AUTO: 28 % (ref 31.2–41.9)
HGB BLD-MCNC: 9.2 G/DL (ref 10.9–14.3)
LYMPHOCYTES # BLD AUTO: 1.7 K/UL (ref 20–40)
LYMPHOCYTES NFR BLD AUTO: 14.4 % (ref 20.5–51.5)
MAGNESIUM SERPL-MCNC: 2.3 MG/DL (ref 1.8–2.4)
MCH RBC QN AUTO: 26.6 UUG (ref 24.7–32.8)
MCHC RBC AUTO-ENTMCNC: 33 G/DL (ref 32.3–35.6)
MCV RBC AUTO: 80.6 FL (ref 75.5–95.3)
MONOCYTES # BLD AUTO: 1 K/UL (ref 2–10)
MONOCYTES NFR BLD AUTO: 8.4 % (ref 0–11)
NEUTROPHILS # BLD AUTO: 9 K/UL (ref 1.8–8.9)
NEUTROPHILS NFR BLD AUTO: 76.4 % (ref 38.5–71.5)
PHOSPHATE SERPL-MCNC: 3.5 MG/DL (ref 2.5–4.9)
PLATELET # BLD AUTO: 315 K/UL (ref 179–408)
POTASSIUM SERPL-SCNC: 3.8 MMOL/L (ref 3.5–5.1)
RBC # BLD AUTO: 3.47 MIL/UL (ref 3.63–4.92)
WBC # BLD AUTO: 11.7 K/UL (ref 3.8–11.8)

## 2020-10-05 RX ADMIN — Medication SCH MG: at 08:35

## 2020-10-05 RX ADMIN — ASPIRIN SCH MG: 81 TABLET, COATED ORAL at 08:34

## 2020-10-05 RX ADMIN — ALBUTEROL SULFATE PRN MG: 2.5 SOLUTION RESPIRATORY (INHALATION) at 00:35

## 2020-10-05 RX ADMIN — LOSARTAN POTASSIUM SCH MG: 50 TABLET, FILM COATED ORAL at 21:26

## 2020-10-05 RX ADMIN — Medication SCH EACH: at 17:26

## 2020-10-05 RX ADMIN — FLUTICASONE FUROATE AND VILANTEROL TRIFENATATE SCH EACH: 100; 25 POWDER RESPIRATORY (INHALATION) at 08:33

## 2020-10-05 RX ADMIN — LIDOCAINE SCH PATCH: 50 PATCH CUTANEOUS at 08:37

## 2020-10-05 RX ADMIN — LABETALOL HYDROCHLORIDE SCH MG: 200 TABLET, FILM COATED ORAL at 21:25

## 2020-10-05 RX ADMIN — TOLTERODINE TARTRATE SCH MG: 2 TABLET, FILM COATED ORAL at 08:34

## 2020-10-05 RX ADMIN — TRIAMCINOLONE ACETONIDE SCH APPLIC: 0.25 OINTMENT TOPICAL at 09:18

## 2020-10-05 RX ADMIN — SODIUM CHLORIDE PRN UNIT: 9 INJECTION, SOLUTION INTRAVENOUS at 11:41

## 2020-10-05 RX ADMIN — IPRATROPIUM BROMIDE SCH MG: 0.5 SOLUTION RESPIRATORY (INHALATION) at 14:20

## 2020-10-05 RX ADMIN — DOCUSATE SODIUM SCH MG: 100 CAPSULE, LIQUID FILLED ORAL at 08:33

## 2020-10-05 RX ADMIN — Medication SCH MG: at 08:33

## 2020-10-05 RX ADMIN — IPRATROPIUM BROMIDE SCH MG: 0.5 SOLUTION RESPIRATORY (INHALATION) at 19:28

## 2020-10-05 RX ADMIN — SODIUM CHLORIDE PRN UNIT: 9 INJECTION, SOLUTION INTRAVENOUS at 08:36

## 2020-10-05 RX ADMIN — Medication SCH EACH: at 06:36

## 2020-10-05 RX ADMIN — HYDROMORPHONE HYDROCHLORIDE PRN MG: 2 TABLET ORAL at 06:27

## 2020-10-05 RX ADMIN — FUROSEMIDE SCH MG: 20 TABLET ORAL at 08:34

## 2020-10-05 RX ADMIN — BACLOFEN SCH MG: 20 TABLET ORAL at 21:25

## 2020-10-05 RX ADMIN — CLOPIDOGREL BISULFATE SCH MG: 75 TABLET ORAL at 08:34

## 2020-10-05 RX ADMIN — PREGABALIN SCH MG: 25 CAPSULE ORAL at 14:59

## 2020-10-05 RX ADMIN — IPRATROPIUM BROMIDE PRN MG: 0.5 SOLUTION RESPIRATORY (INHALATION) at 00:35

## 2020-10-05 RX ADMIN — ACETAMINOPHEN PRN MG: 325 TABLET ORAL at 14:59

## 2020-10-05 RX ADMIN — GUAIFENESIN AND DEXTROMETHORPHAN PRN ML: 100; 10 SYRUP ORAL at 01:40

## 2020-10-05 RX ADMIN — LOSARTAN POTASSIUM SCH MG: 50 TABLET, FILM COATED ORAL at 08:34

## 2020-10-05 RX ADMIN — ANORECTAL OINTMENT SCH APPLIC: 15.7; .44; 24; 20.6 OINTMENT TOPICAL at 09:17

## 2020-10-05 RX ADMIN — PREGABALIN SCH MG: 25 CAPSULE ORAL at 21:25

## 2020-10-05 RX ADMIN — BACLOFEN SCH MG: 20 TABLET ORAL at 14:58

## 2020-10-05 RX ADMIN — DOCUSATE SODIUM SCH MG: 100 CAPSULE, LIQUID FILLED ORAL at 17:30

## 2020-10-05 RX ADMIN — INSULIN GLARGINE SCH UNITS: 100 INJECTION, SOLUTION SUBCUTANEOUS at 20:35

## 2020-10-05 RX ADMIN — POLYETHYLENE GLYCOL 3350 PRN GM: 17 POWDER, FOR SOLUTION ORAL at 10:20

## 2020-10-05 RX ADMIN — LABETALOL HYDROCHLORIDE SCH MG: 200 TABLET, FILM COATED ORAL at 14:00

## 2020-10-05 RX ADMIN — LABETALOL HYDROCHLORIDE SCH MG: 200 TABLET, FILM COATED ORAL at 06:06

## 2020-10-05 RX ADMIN — ALBUTEROL SULFATE SCH MG: 2.5 SOLUTION RESPIRATORY (INHALATION) at 14:20

## 2020-10-05 RX ADMIN — Medication SCH EACH: at 11:32

## 2020-10-05 RX ADMIN — PREGABALIN SCH MG: 25 CAPSULE ORAL at 06:06

## 2020-10-05 RX ADMIN — IPRATROPIUM BROMIDE SCH MG: 0.5 SOLUTION RESPIRATORY (INHALATION) at 07:57

## 2020-10-05 RX ADMIN — Medication SCH EACH: at 20:28

## 2020-10-05 RX ADMIN — SODIUM CHLORIDE PRN UNIT: 9 INJECTION, SOLUTION INTRAVENOUS at 17:29

## 2020-10-05 RX ADMIN — PANTOPRAZOLE SODIUM SCH MG: 40 TABLET, DELAYED RELEASE ORAL at 06:06

## 2020-10-05 RX ADMIN — BACLOFEN SCH MG: 20 TABLET ORAL at 06:03

## 2020-10-05 RX ADMIN — GUAIFENESIN AND DEXTROMETHORPHAN PRN ML: 100; 10 SYRUP ORAL at 12:38

## 2020-10-05 RX ADMIN — GUAIFENESIN AND DEXTROMETHORPHAN PRN ML: 100; 10 SYRUP ORAL at 19:30

## 2020-10-05 RX ADMIN — TOLTERODINE TARTRATE SCH MG: 2 TABLET, FILM COATED ORAL at 21:26

## 2020-10-05 RX ADMIN — ALBUTEROL SULFATE SCH MG: 2.5 SOLUTION RESPIRATORY (INHALATION) at 19:28

## 2020-10-05 RX ADMIN — ATORVASTATIN CALCIUM SCH MG: 40 TABLET, FILM COATED ORAL at 21:26

## 2020-10-05 RX ADMIN — SODIUM CHLORIDE PRN UNIT: 9 INJECTION, SOLUTION INTRAVENOUS at 20:33

## 2020-10-05 RX ADMIN — HYDROMORPHONE HYDROCHLORIDE PRN MG: 2 TABLET ORAL at 19:31

## 2020-10-05 RX ADMIN — HYDROMORPHONE HYDROCHLORIDE PRN MG: 2 TABLET ORAL at 12:51

## 2020-10-05 RX ADMIN — ALBUTEROL SULFATE SCH MG: 2.5 SOLUTION RESPIRATORY (INHALATION) at 07:57

## 2020-10-06 VITALS — SYSTOLIC BLOOD PRESSURE: 117 MMHG | DIASTOLIC BLOOD PRESSURE: 36 MMHG

## 2020-10-06 VITALS — SYSTOLIC BLOOD PRESSURE: 129 MMHG | DIASTOLIC BLOOD PRESSURE: 54 MMHG

## 2020-10-06 VITALS — SYSTOLIC BLOOD PRESSURE: 125 MMHG | DIASTOLIC BLOOD PRESSURE: 39 MMHG

## 2020-10-06 VITALS — DIASTOLIC BLOOD PRESSURE: 39 MMHG | SYSTOLIC BLOOD PRESSURE: 125 MMHG

## 2020-10-06 LAB
BASOPHILS # BLD AUTO: 0.1 K/UL (ref 0–8)
BASOPHILS NFR BLD AUTO: 0.9 % (ref 0–2)
BUN SERPL-MCNC: 18 MG/DL (ref 7–18)
CHLORIDE SERPL-SCNC: 99 MMOL/L (ref 98–107)
CO2 SERPL-SCNC: 31 MMOL/L (ref 21–32)
CREAT SERPL-MCNC: 0.8 MG/DL (ref 0.6–1.3)
EOSINOPHIL # BLD AUTO: 0.1 K/UL (ref 0–0.7)
EOSINOPHIL NFR BLD AUTO: 0.8 % (ref 0–7)
GLUCOSE SERPL-MCNC: 83 MG/DL (ref 74–106)
HCT VFR BLD AUTO: 26.9 % (ref 31.2–41.9)
HGB BLD-MCNC: 8.9 G/DL (ref 10.9–14.3)
LYMPHOCYTES # BLD AUTO: 3.1 K/UL (ref 20–40)
LYMPHOCYTES NFR BLD AUTO: 24.1 % (ref 20.5–51.5)
MAGNESIUM SERPL-MCNC: 2.3 MG/DL (ref 1.8–2.4)
MCH RBC QN AUTO: 26.8 UUG (ref 24.7–32.8)
MCHC RBC AUTO-ENTMCNC: 33 G/DL (ref 32.3–35.6)
MCV RBC AUTO: 80.9 FL (ref 75.5–95.3)
MONOCYTES # BLD AUTO: 1.1 K/UL (ref 2–10)
MONOCYTES NFR BLD AUTO: 8.5 % (ref 0–11)
NEUTROPHILS # BLD AUTO: 8.4 K/UL (ref 1.8–8.9)
NEUTROPHILS NFR BLD AUTO: 65.7 % (ref 38.5–71.5)
PHOSPHATE SERPL-MCNC: 3.6 MG/DL (ref 2.5–4.9)
PLATELET # BLD AUTO: 323 K/UL (ref 179–408)
POTASSIUM SERPL-SCNC: 3.9 MMOL/L (ref 3.5–5.1)
RBC # BLD AUTO: 3.33 MIL/UL (ref 3.63–4.92)
WBC # BLD AUTO: 12.7 K/UL (ref 3.8–11.8)

## 2020-10-06 RX ADMIN — Medication SCH EACH: at 11:52

## 2020-10-06 RX ADMIN — Medication SCH MG: at 08:06

## 2020-10-06 RX ADMIN — BACLOFEN SCH MG: 20 TABLET ORAL at 14:10

## 2020-10-06 RX ADMIN — LABETALOL HYDROCHLORIDE SCH MG: 200 TABLET, FILM COATED ORAL at 06:14

## 2020-10-06 RX ADMIN — TOLTERODINE TARTRATE SCH MG: 2 TABLET, FILM COATED ORAL at 08:04

## 2020-10-06 RX ADMIN — HYDROMORPHONE HYDROCHLORIDE PRN MG: 2 TABLET ORAL at 18:14

## 2020-10-06 RX ADMIN — BACLOFEN SCH MG: 20 TABLET ORAL at 06:13

## 2020-10-06 RX ADMIN — PREGABALIN SCH MG: 25 CAPSULE ORAL at 21:03

## 2020-10-06 RX ADMIN — Medication SCH EACH: at 06:43

## 2020-10-06 RX ADMIN — ALBUTEROL SULFATE PRN MG: 2.5 SOLUTION RESPIRATORY (INHALATION) at 20:06

## 2020-10-06 RX ADMIN — POLYETHYLENE GLYCOL 3350 PRN GM: 17 POWDER, FOR SOLUTION ORAL at 22:21

## 2020-10-06 RX ADMIN — PREGABALIN SCH MG: 25 CAPSULE ORAL at 15:03

## 2020-10-06 RX ADMIN — HYDROMORPHONE HYDROCHLORIDE PRN MG: 2 TABLET ORAL at 06:12

## 2020-10-06 RX ADMIN — Medication SCH EACH: at 16:36

## 2020-10-06 RX ADMIN — FLUTICASONE FUROATE AND VILANTEROL TRIFENATATE SCH EACH: 100; 25 POWDER RESPIRATORY (INHALATION) at 08:13

## 2020-10-06 RX ADMIN — HYDROMORPHONE HYDROCHLORIDE PRN MG: 2 TABLET ORAL at 12:13

## 2020-10-06 RX ADMIN — Medication PRN MG: at 11:53

## 2020-10-06 RX ADMIN — Medication SCH MG: at 08:03

## 2020-10-06 RX ADMIN — DOCUSATE SODIUM SCH MG: 100 CAPSULE, LIQUID FILLED ORAL at 08:05

## 2020-10-06 RX ADMIN — TOLTERODINE TARTRATE SCH MG: 2 TABLET, FILM COATED ORAL at 21:04

## 2020-10-06 RX ADMIN — IPRATROPIUM BROMIDE SCH MG: 0.5 SOLUTION RESPIRATORY (INHALATION) at 20:06

## 2020-10-06 RX ADMIN — LABETALOL HYDROCHLORIDE SCH MG: 200 TABLET, FILM COATED ORAL at 22:00

## 2020-10-06 RX ADMIN — BACLOFEN SCH MG: 20 TABLET ORAL at 21:03

## 2020-10-06 RX ADMIN — ACETAMINOPHEN PRN MG: 325 TABLET ORAL at 22:21

## 2020-10-06 RX ADMIN — ANORECTAL OINTMENT SCH GM: 15.7; .44; 24; 20.6 OINTMENT TOPICAL at 08:14

## 2020-10-06 RX ADMIN — Medication SCH EACH: at 21:06

## 2020-10-06 RX ADMIN — CLOPIDOGREL BISULFATE SCH MG: 75 TABLET ORAL at 08:04

## 2020-10-06 RX ADMIN — ASPIRIN SCH MG: 81 TABLET, COATED ORAL at 08:10

## 2020-10-06 RX ADMIN — SODIUM CHLORIDE PRN UNIT: 9 INJECTION, SOLUTION INTRAVENOUS at 21:14

## 2020-10-06 RX ADMIN — TRIAMCINOLONE ACETONIDE SCH APP: 0.25 OINTMENT TOPICAL at 08:14

## 2020-10-06 RX ADMIN — ALBUTEROL SULFATE SCH MG: 2.5 SOLUTION RESPIRATORY (INHALATION) at 20:06

## 2020-10-06 RX ADMIN — PANTOPRAZOLE SODIUM SCH MG: 40 TABLET, DELAYED RELEASE ORAL at 06:13

## 2020-10-06 RX ADMIN — SODIUM CHLORIDE PRN UNIT: 9 INJECTION, SOLUTION INTRAVENOUS at 16:37

## 2020-10-06 RX ADMIN — LOSARTAN POTASSIUM SCH MG: 50 TABLET, FILM COATED ORAL at 08:08

## 2020-10-06 RX ADMIN — LABETALOL HYDROCHLORIDE SCH MG: 200 TABLET, FILM COATED ORAL at 15:10

## 2020-10-06 RX ADMIN — ALBUTEROL SULFATE SCH MG: 2.5 SOLUTION RESPIRATORY (INHALATION) at 13:59

## 2020-10-06 RX ADMIN — LIDOCAINE SCH PATCH: 50 PATCH CUTANEOUS at 08:09

## 2020-10-06 RX ADMIN — DOCUSATE SODIUM SCH MG: 100 CAPSULE, LIQUID FILLED ORAL at 16:35

## 2020-10-06 RX ADMIN — MAGNESIUM HYDROXIDE PRN ML: 400 SUSPENSION ORAL at 18:11

## 2020-10-06 RX ADMIN — ACETAMINOPHEN PRN MG: 325 TABLET ORAL at 11:53

## 2020-10-06 RX ADMIN — ATORVASTATIN CALCIUM SCH MG: 40 TABLET, FILM COATED ORAL at 21:04

## 2020-10-06 RX ADMIN — IPRATROPIUM BROMIDE SCH MG: 0.5 SOLUTION RESPIRATORY (INHALATION) at 07:43

## 2020-10-06 RX ADMIN — GUAIFENESIN AND DEXTROMETHORPHAN PRN ML: 100; 10 SYRUP ORAL at 06:10

## 2020-10-06 RX ADMIN — GUAIFENESIN AND DEXTROMETHORPHAN PRN ML: 100; 10 SYRUP ORAL at 12:11

## 2020-10-06 RX ADMIN — ALBUTEROL SULFATE SCH MG: 2.5 SOLUTION RESPIRATORY (INHALATION) at 07:43

## 2020-10-06 RX ADMIN — IPRATROPIUM BROMIDE SCH MG: 0.5 SOLUTION RESPIRATORY (INHALATION) at 13:59

## 2020-10-06 RX ADMIN — INSULIN GLARGINE SCH UNITS: 100 INJECTION, SOLUTION SUBCUTANEOUS at 21:15

## 2020-10-06 RX ADMIN — Medication SCH GM: at 08:13

## 2020-10-06 RX ADMIN — GUAIFENESIN AND DEXTROMETHORPHAN PRN ML: 100; 10 SYRUP ORAL at 18:12

## 2020-10-06 RX ADMIN — FUROSEMIDE SCH MG: 20 TABLET ORAL at 08:06

## 2020-10-06 RX ADMIN — LOSARTAN POTASSIUM SCH MG: 50 TABLET, FILM COATED ORAL at 21:04

## 2020-10-06 RX ADMIN — PREGABALIN SCH MG: 25 CAPSULE ORAL at 06:13

## 2020-10-07 VITALS — SYSTOLIC BLOOD PRESSURE: 129 MMHG | DIASTOLIC BLOOD PRESSURE: 54 MMHG

## 2020-10-07 VITALS — SYSTOLIC BLOOD PRESSURE: 106 MMHG | DIASTOLIC BLOOD PRESSURE: 38 MMHG

## 2020-10-07 VITALS — SYSTOLIC BLOOD PRESSURE: 106 MMHG | DIASTOLIC BLOOD PRESSURE: 40 MMHG

## 2020-10-07 VITALS — DIASTOLIC BLOOD PRESSURE: 49 MMHG | SYSTOLIC BLOOD PRESSURE: 149 MMHG

## 2020-10-07 VITALS — DIASTOLIC BLOOD PRESSURE: 49 MMHG | SYSTOLIC BLOOD PRESSURE: 140 MMHG

## 2020-10-07 VITALS — SYSTOLIC BLOOD PRESSURE: 102 MMHG | DIASTOLIC BLOOD PRESSURE: 43 MMHG

## 2020-10-07 VITALS — DIASTOLIC BLOOD PRESSURE: 52 MMHG | SYSTOLIC BLOOD PRESSURE: 115 MMHG

## 2020-10-07 LAB
BASOPHILS # BLD AUTO: 0.1 K/UL (ref 0–8)
BASOPHILS NFR BLD AUTO: 0.9 % (ref 0–2)
BUN SERPL-MCNC: 18 MG/DL (ref 7–18)
CHLORIDE SERPL-SCNC: 97 MMOL/L (ref 98–107)
CO2 SERPL-SCNC: 32 MMOL/L (ref 21–32)
CREAT SERPL-MCNC: 1 MG/DL (ref 0.6–1.3)
EOSINOPHIL # BLD AUTO: 0.1 K/UL (ref 0–0.7)
EOSINOPHIL NFR BLD AUTO: 0.8 % (ref 0–7)
GLUCOSE SERPL-MCNC: 170 MG/DL (ref 74–106)
HCT VFR BLD AUTO: 26.4 % (ref 31.2–41.9)
HGB BLD-MCNC: 8.7 G/DL (ref 10.9–14.3)
LYMPHOCYTES # BLD AUTO: 3 K/UL (ref 20–40)
LYMPHOCYTES NFR BLD AUTO: 27.9 % (ref 20.5–51.5)
MCH RBC QN AUTO: 26.9 UUG (ref 24.7–32.8)
MCHC RBC AUTO-ENTMCNC: 33 G/DL (ref 32.3–35.6)
MCV RBC AUTO: 81.5 FL (ref 75.5–95.3)
MONOCYTES # BLD AUTO: 0.8 K/UL (ref 2–10)
MONOCYTES NFR BLD AUTO: 7.3 % (ref 0–11)
NEUTROPHILS # BLD AUTO: 6.8 K/UL (ref 1.8–8.9)
NEUTROPHILS NFR BLD AUTO: 63.1 % (ref 38.5–71.5)
PLATELET # BLD AUTO: 311 K/UL (ref 179–408)
POTASSIUM SERPL-SCNC: 4.2 MMOL/L (ref 3.5–5.1)
RBC # BLD AUTO: 3.24 MIL/UL (ref 3.63–4.92)
WBC # BLD AUTO: 10.8 K/UL (ref 3.8–11.8)

## 2020-10-07 RX ADMIN — LIDOCAINE SCH PATCH: 50 PATCH CUTANEOUS at 08:35

## 2020-10-07 RX ADMIN — SODIUM CHLORIDE PRN UNIT: 9 INJECTION, SOLUTION INTRAVENOUS at 08:25

## 2020-10-07 RX ADMIN — TRIAMCINOLONE ACETONIDE SCH APP: 0.25 OINTMENT TOPICAL at 11:27

## 2020-10-07 RX ADMIN — MAGNESIUM HYDROXIDE PRN ML: 400 SUSPENSION ORAL at 08:39

## 2020-10-07 RX ADMIN — PANTOPRAZOLE SODIUM SCH MG: 40 TABLET, DELAYED RELEASE ORAL at 08:27

## 2020-10-07 RX ADMIN — LABETALOL HYDROCHLORIDE SCH MG: 200 TABLET, FILM COATED ORAL at 14:32

## 2020-10-07 RX ADMIN — ALBUTEROL SULFATE SCH MG: 2.5 SOLUTION RESPIRATORY (INHALATION) at 13:43

## 2020-10-07 RX ADMIN — LABETALOL HYDROCHLORIDE SCH MG: 200 TABLET, FILM COATED ORAL at 06:04

## 2020-10-07 RX ADMIN — PREGABALIN SCH MG: 25 CAPSULE ORAL at 20:32

## 2020-10-07 RX ADMIN — TOLTERODINE TARTRATE SCH MG: 2 TABLET, FILM COATED ORAL at 20:31

## 2020-10-07 RX ADMIN — ACETAMINOPHEN PRN MG: 325 TABLET ORAL at 11:19

## 2020-10-07 RX ADMIN — SODIUM CHLORIDE PRN UNIT: 9 INJECTION, SOLUTION INTRAVENOUS at 17:16

## 2020-10-07 RX ADMIN — IPRATROPIUM BROMIDE SCH MG: 0.5 SOLUTION RESPIRATORY (INHALATION) at 08:07

## 2020-10-07 RX ADMIN — GUAIFENESIN AND DEXTROMETHORPHAN PRN ML: 100; 10 SYRUP ORAL at 20:30

## 2020-10-07 RX ADMIN — BACLOFEN SCH MG: 20 TABLET ORAL at 14:11

## 2020-10-07 RX ADMIN — FUROSEMIDE SCH MG: 20 TABLET ORAL at 08:27

## 2020-10-07 RX ADMIN — LOSARTAN POTASSIUM SCH MG: 50 TABLET, FILM COATED ORAL at 20:31

## 2020-10-07 RX ADMIN — PREGABALIN SCH MG: 25 CAPSULE ORAL at 14:11

## 2020-10-07 RX ADMIN — ALBUTEROL SULFATE SCH MG: 2.5 SOLUTION RESPIRATORY (INHALATION) at 19:38

## 2020-10-07 RX ADMIN — HYDROMORPHONE HYDROCHLORIDE PRN MG: 2 TABLET ORAL at 20:31

## 2020-10-07 RX ADMIN — DOCUSATE SODIUM SCH MG: 100 CAPSULE, LIQUID FILLED ORAL at 17:14

## 2020-10-07 RX ADMIN — Medication SCH MG: at 08:27

## 2020-10-07 RX ADMIN — BACLOFEN SCH MG: 20 TABLET ORAL at 06:04

## 2020-10-07 RX ADMIN — Medication SCH EACH: at 16:54

## 2020-10-07 RX ADMIN — SODIUM CHLORIDE PRN UNIT: 9 INJECTION, SOLUTION INTRAVENOUS at 20:36

## 2020-10-07 RX ADMIN — HYDROMORPHONE HYDROCHLORIDE PRN MG: 2 TABLET ORAL at 00:14

## 2020-10-07 RX ADMIN — PREGABALIN SCH MG: 25 CAPSULE ORAL at 06:04

## 2020-10-07 RX ADMIN — Medication SCH EACH: at 11:32

## 2020-10-07 RX ADMIN — ANORECTAL OINTMENT SCH GM: 15.7; .44; 24; 20.6 OINTMENT TOPICAL at 11:26

## 2020-10-07 RX ADMIN — Medication SCH GM: at 11:25

## 2020-10-07 RX ADMIN — CLOPIDOGREL BISULFATE SCH MG: 75 TABLET ORAL at 08:27

## 2020-10-07 RX ADMIN — LOSARTAN POTASSIUM SCH MG: 50 TABLET, FILM COATED ORAL at 08:51

## 2020-10-07 RX ADMIN — IPRATROPIUM BROMIDE SCH MG: 0.5 SOLUTION RESPIRATORY (INHALATION) at 13:43

## 2020-10-07 RX ADMIN — ASPIRIN SCH MG: 81 TABLET, COATED ORAL at 08:27

## 2020-10-07 RX ADMIN — Medication SCH EACH: at 20:33

## 2020-10-07 RX ADMIN — POLYETHYLENE GLYCOL 3350 PRN GM: 17 POWDER, FOR SOLUTION ORAL at 08:39

## 2020-10-07 RX ADMIN — TOLTERODINE TARTRATE SCH MG: 2 TABLET, FILM COATED ORAL at 08:27

## 2020-10-07 RX ADMIN — HYDROMORPHONE HYDROCHLORIDE PRN MG: 2 TABLET ORAL at 12:45

## 2020-10-07 RX ADMIN — ATORVASTATIN CALCIUM SCH MG: 40 TABLET, FILM COATED ORAL at 20:32

## 2020-10-07 RX ADMIN — ALBUTEROL SULFATE PRN MG: 2.5 SOLUTION RESPIRATORY (INHALATION) at 00:48

## 2020-10-07 RX ADMIN — BACLOFEN SCH MG: 20 TABLET ORAL at 20:31

## 2020-10-07 RX ADMIN — IPRATROPIUM BROMIDE SCH MG: 0.5 SOLUTION RESPIRATORY (INHALATION) at 19:38

## 2020-10-07 RX ADMIN — Medication SCH MG: at 08:26

## 2020-10-07 RX ADMIN — GUAIFENESIN AND DEXTROMETHORPHAN PRN ML: 100; 10 SYRUP ORAL at 06:16

## 2020-10-07 RX ADMIN — DOCUSATE SODIUM SCH MG: 100 CAPSULE, LIQUID FILLED ORAL at 08:27

## 2020-10-07 RX ADMIN — SODIUM CHLORIDE PRN UNIT: 9 INJECTION, SOLUTION INTRAVENOUS at 12:38

## 2020-10-07 RX ADMIN — GUAIFENESIN AND DEXTROMETHORPHAN PRN ML: 100; 10 SYRUP ORAL at 00:13

## 2020-10-07 RX ADMIN — INSULIN GLARGINE SCH UNITS: 100 INJECTION, SOLUTION SUBCUTANEOUS at 20:36

## 2020-10-07 RX ADMIN — ALBUTEROL SULFATE SCH MG: 2.5 SOLUTION RESPIRATORY (INHALATION) at 08:08

## 2020-10-07 RX ADMIN — PANTOPRAZOLE SODIUM SCH MG: 40 TABLET, DELAYED RELEASE ORAL at 06:04

## 2020-10-07 RX ADMIN — GUAIFENESIN AND DEXTROMETHORPHAN PRN ML: 100; 10 SYRUP ORAL at 12:44

## 2020-10-07 RX ADMIN — LABETALOL HYDROCHLORIDE SCH MG: 200 TABLET, FILM COATED ORAL at 20:33

## 2020-10-07 RX ADMIN — IPRATROPIUM BROMIDE PRN MG: 0.5 SOLUTION RESPIRATORY (INHALATION) at 00:47

## 2020-10-07 RX ADMIN — HYDROMORPHONE HYDROCHLORIDE PRN MG: 2 TABLET ORAL at 06:17

## 2020-10-07 RX ADMIN — Medication SCH EACH: at 06:46

## 2020-10-07 RX ADMIN — FLUTICASONE FUROATE AND VILANTEROL TRIFENATATE SCH EACH: 100; 25 POWDER RESPIRATORY (INHALATION) at 12:38

## 2020-10-08 VITALS — SYSTOLIC BLOOD PRESSURE: 175 MMHG | DIASTOLIC BLOOD PRESSURE: 57 MMHG

## 2020-10-08 VITALS — DIASTOLIC BLOOD PRESSURE: 38 MMHG | SYSTOLIC BLOOD PRESSURE: 137 MMHG

## 2020-10-08 VITALS — DIASTOLIC BLOOD PRESSURE: 46 MMHG | SYSTOLIC BLOOD PRESSURE: 131 MMHG

## 2020-10-08 RX ADMIN — LABETALOL HYDROCHLORIDE SCH MG: 200 TABLET, FILM COATED ORAL at 14:09

## 2020-10-08 RX ADMIN — CLOPIDOGREL BISULFATE SCH MG: 75 TABLET ORAL at 08:12

## 2020-10-08 RX ADMIN — HYDROMORPHONE HYDROCHLORIDE PRN MG: 2 TABLET ORAL at 04:35

## 2020-10-08 RX ADMIN — PREGABALIN SCH MG: 25 CAPSULE ORAL at 14:09

## 2020-10-08 RX ADMIN — Medication SCH MG: at 08:12

## 2020-10-08 RX ADMIN — GUAIFENESIN AND DEXTROMETHORPHAN PRN ML: 100; 10 SYRUP ORAL at 14:01

## 2020-10-08 RX ADMIN — Medication SCH EACH: at 06:38

## 2020-10-08 RX ADMIN — IPRATROPIUM BROMIDE SCH MG: 0.5 SOLUTION RESPIRATORY (INHALATION) at 07:48

## 2020-10-08 RX ADMIN — Medication SCH GM: at 08:17

## 2020-10-08 RX ADMIN — ALBUTEROL SULFATE SCH MG: 2.5 SOLUTION RESPIRATORY (INHALATION) at 07:48

## 2020-10-08 RX ADMIN — ALBUTEROL SULFATE SCH MG: 2.5 SOLUTION RESPIRATORY (INHALATION) at 13:43

## 2020-10-08 RX ADMIN — IPRATROPIUM BROMIDE SCH MG: 0.5 SOLUTION RESPIRATORY (INHALATION) at 13:43

## 2020-10-08 RX ADMIN — PREGABALIN SCH MG: 25 CAPSULE ORAL at 06:35

## 2020-10-08 RX ADMIN — IPRATROPIUM BROMIDE PRN MG: 0.5 SOLUTION RESPIRATORY (INHALATION) at 00:30

## 2020-10-08 RX ADMIN — ASPIRIN SCH MG: 81 TABLET, COATED ORAL at 08:13

## 2020-10-08 RX ADMIN — BACLOFEN SCH MG: 20 TABLET ORAL at 14:09

## 2020-10-08 RX ADMIN — TRIAMCINOLONE ACETONIDE SCH APP: 0.25 OINTMENT TOPICAL at 08:17

## 2020-10-08 RX ADMIN — ACETAMINOPHEN PRN MG: 325 TABLET ORAL at 14:01

## 2020-10-08 RX ADMIN — LIDOCAINE SCH PATCH: 50 PATCH CUTANEOUS at 08:16

## 2020-10-08 RX ADMIN — TOLTERODINE TARTRATE SCH MG: 2 TABLET, FILM COATED ORAL at 08:12

## 2020-10-08 RX ADMIN — DOCUSATE SODIUM SCH MG: 100 CAPSULE, LIQUID FILLED ORAL at 08:13

## 2020-10-08 RX ADMIN — ALBUTEROL SULFATE PRN MG: 2.5 SOLUTION RESPIRATORY (INHALATION) at 00:30

## 2020-10-08 RX ADMIN — BACLOFEN SCH MG: 20 TABLET ORAL at 06:35

## 2020-10-08 RX ADMIN — SODIUM CHLORIDE PRN UNIT: 9 INJECTION, SOLUTION INTRAVENOUS at 08:10

## 2020-10-08 RX ADMIN — FLUTICASONE FUROATE AND VILANTEROL TRIFENATATE SCH EACH: 100; 25 POWDER RESPIRATORY (INHALATION) at 08:14

## 2020-10-08 RX ADMIN — ANORECTAL OINTMENT SCH GM: 15.7; .44; 24; 20.6 OINTMENT TOPICAL at 08:17

## 2020-10-08 RX ADMIN — LABETALOL HYDROCHLORIDE SCH MG: 200 TABLET, FILM COATED ORAL at 06:38

## 2020-10-08 RX ADMIN — Medication SCH EACH: at 12:05

## 2020-10-08 RX ADMIN — LOSARTAN POTASSIUM SCH MG: 50 TABLET, FILM COATED ORAL at 08:13

## 2020-10-08 RX ADMIN — PANTOPRAZOLE SODIUM SCH MG: 40 TABLET, DELAYED RELEASE ORAL at 06:35

## 2020-10-08 RX ADMIN — HYDROMORPHONE HYDROCHLORIDE PRN MG: 2 TABLET ORAL at 12:02

## 2020-10-15 ENCOUNTER — HOSPITAL ENCOUNTER (INPATIENT)
Dept: HOSPITAL 54 - ER | Age: 60
LOS: 4 days | Discharge: HOME | DRG: 812 | End: 2020-10-19
Attending: INTERNAL MEDICINE | Admitting: INTERNAL MEDICINE
Payer: COMMERCIAL

## 2020-10-15 VITALS — BODY MASS INDEX: 52.44 KG/M2 | WEIGHT: 285 LBS | HEIGHT: 62 IN

## 2020-10-15 DIAGNOSIS — I69.354: ICD-10-CM

## 2020-10-15 DIAGNOSIS — Z79.4: ICD-10-CM

## 2020-10-15 DIAGNOSIS — I11.0: ICD-10-CM

## 2020-10-15 DIAGNOSIS — D50.9: ICD-10-CM

## 2020-10-15 DIAGNOSIS — G89.4: ICD-10-CM

## 2020-10-15 DIAGNOSIS — Z79.51: ICD-10-CM

## 2020-10-15 DIAGNOSIS — F32.9: ICD-10-CM

## 2020-10-15 DIAGNOSIS — I87.2: ICD-10-CM

## 2020-10-15 DIAGNOSIS — E66.2: ICD-10-CM

## 2020-10-15 DIAGNOSIS — J44.9: ICD-10-CM

## 2020-10-15 DIAGNOSIS — G92: ICD-10-CM

## 2020-10-15 DIAGNOSIS — E11.51: ICD-10-CM

## 2020-10-15 DIAGNOSIS — N32.81: ICD-10-CM

## 2020-10-15 DIAGNOSIS — F11.90: ICD-10-CM
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
## 2020-10-15 DIAGNOSIS — T40.2X1A: Primary | ICD-10-CM

## 2020-10-15 DIAGNOSIS — E11.40: ICD-10-CM

## 2020-10-15 DIAGNOSIS — Z98.890: ICD-10-CM

## 2020-10-15 DIAGNOSIS — Z79.02: ICD-10-CM

## 2020-10-15 DIAGNOSIS — Z79.899: ICD-10-CM

## 2020-10-15 DIAGNOSIS — I50.9: ICD-10-CM

## 2020-10-15 DIAGNOSIS — N17.0: ICD-10-CM

## 2020-10-15 DIAGNOSIS — E78.5: ICD-10-CM

## 2020-10-15 DIAGNOSIS — I25.10: ICD-10-CM

## 2020-10-15 DIAGNOSIS — I67.2: ICD-10-CM

## 2020-10-15 DIAGNOSIS — Z88.1: ICD-10-CM

## 2020-10-15 LAB
ALBUMIN SERPL BCP-MCNC: 3.4 G/DL (ref 3.4–5)
ALP SERPL-CCNC: 86 U/L (ref 46–116)
ALT SERPL W P-5'-P-CCNC: 48 U/L (ref 12–78)
AST SERPL W P-5'-P-CCNC: 15 U/L (ref 15–37)
BASOPHILS # BLD AUTO: 0.1 /CMM (ref 0–0.2)
BASOPHILS NFR BLD AUTO: 0.9 % (ref 0–2)
BILIRUB DIRECT SERPL-MCNC: 0.2 MG/DL (ref 0–0.2)
BILIRUB SERPL-MCNC: 1.1 MG/DL (ref 0.2–1)
BUN SERPL-MCNC: 35 MG/DL (ref 7–18)
CALCIUM SERPL-MCNC: 9.2 MG/DL (ref 8.5–10.1)
CHLORIDE SERPL-SCNC: 100 MMOL/L (ref 98–107)
CO2 SERPL-SCNC: 33 MMOL/L (ref 21–32)
CREAT SERPL-MCNC: 1.5 MG/DL (ref 0.6–1.3)
EOSINOPHIL NFR BLD AUTO: 3 % (ref 0–6)
GLUCOSE SERPL-MCNC: 218 MG/DL (ref 74–106)
HCT VFR BLD AUTO: 29 % (ref 33–45)
HGB BLD-MCNC: 9.6 G/DL (ref 11.5–14.8)
LYMPHOCYTES NFR BLD AUTO: 1.9 /CMM (ref 0.8–4.8)
LYMPHOCYTES NFR BLD AUTO: 18 % (ref 20–44)
MCHC RBC AUTO-ENTMCNC: 33 G/DL (ref 31–36)
MCV RBC AUTO: 84 FL (ref 82–100)
MONOCYTES NFR BLD AUTO: 0.8 /CMM (ref 0.1–1.3)
MONOCYTES NFR BLD AUTO: 7.4 % (ref 2–12)
NEUTROPHILS # BLD AUTO: 7.5 /CMM (ref 1.8–8.9)
NEUTROPHILS NFR BLD AUTO: 70.7 % (ref 43–81)
PLATELET # BLD AUTO: 298 /CMM (ref 150–450)
POTASSIUM SERPL-SCNC: 4.9 MMOL/L (ref 3.5–5.1)
PROT SERPL-MCNC: 6.3 G/DL (ref 6.4–8.2)
RBC # BLD AUTO: 3.52 MIL/UL (ref 4–5.2)
SODIUM SERPL-SCNC: 137 MMOL/L (ref 136–145)
WBC NRBC COR # BLD AUTO: 10.6 K/UL (ref 4.3–11)

## 2020-10-15 PROCEDURE — A6253 ABSORPT DRG > 48 SQ IN W/O B: HCPCS

## 2020-10-15 PROCEDURE — A6403 STERILE GAUZE>16 <= 48 SQ IN: HCPCS

## 2020-10-15 PROCEDURE — C9803 HOPD COVID-19 SPEC COLLECT: HCPCS

## 2020-10-15 PROCEDURE — G0378 HOSPITAL OBSERVATION PER HR: HCPCS

## 2020-10-15 NOTE — NUR
PT QUIRINO FROM HOME C/O MULTIPLE SYNCOPE EPISODES. PT STATES EPISODES HAPPENED 
APPROXIMATELY 9 HR PTA. PT STATES "I WAS EATING, FELT SOME CHEST PAIN BUT 
IGNORED IT AND PASSED OUT A COUPLE TIMES WHILE I WAS SITTING DOWN" PT STATES IT 
WAS WITNESSED BY DAUGHTER AND . PT AAOX4. RESPIRATIONS EVEN AND 
UNLABORED. NOTED BILATERAL LOWER EXT SWELLING AND REDDNESS ON L LOWER EXT. NO 
ACUTE DISTRESS NOTED AT THIS TIME. PLACED ON CONTINUOUS CARDIAC MONITOR AND 
PULSE OX, WILL CONTINUE TO MONITOR

## 2020-10-15 NOTE — NUR
IV INITIATED LAC 20G. LABS DRAWN FROM SITE. PHLEBOTOMIST AT BEDSIDE FOR 
COLLECTION. IV INTACT AND PATENT, PLACED ON SALINE LOCK

## 2020-10-16 VITALS — DIASTOLIC BLOOD PRESSURE: 71 MMHG | SYSTOLIC BLOOD PRESSURE: 139 MMHG

## 2020-10-16 VITALS — DIASTOLIC BLOOD PRESSURE: 51 MMHG | SYSTOLIC BLOOD PRESSURE: 135 MMHG

## 2020-10-16 VITALS — SYSTOLIC BLOOD PRESSURE: 134 MMHG | DIASTOLIC BLOOD PRESSURE: 54 MMHG

## 2020-10-16 LAB
BASE EXCESS BLDA CALC-SCNC: 2.1 MMOL/L
DO-HGB MFR BLDA: 41 MMHG
FERRITIN SERPL-MCNC: 65 NG/ML (ref 8–388)
INHALED O2 CONCENTRATION: 30 %
INHALED O2 FLOW RATE: 3 L/MIN (ref 0–30)
IRON SERPL-MCNC: 39 UG/DL (ref 50–175)
PCO2 TEMP ADJ BLDA: 44.7 MMHG (ref 35–45)
PH TEMP ADJ BLDA: 7.4 [PH] (ref 7.35–7.45)
PO2 TEMP ADJ BLDA: 120.4 MMHG (ref 75–100)
SAO2 % BLDA: 97.9 % (ref 92–98.5)
TIBC SERPL-MCNC: 315 UG/DL (ref 250–450)

## 2020-10-16 RX ADMIN — EZETIMIBE SCH MG: 10 TABLET ORAL at 09:14

## 2020-10-16 RX ADMIN — BACLOFEN SCH MG: 10 TABLET ORAL at 21:17

## 2020-10-16 RX ADMIN — PREGABALIN SCH MG: 25 CAPSULE ORAL at 05:23

## 2020-10-16 RX ADMIN — SODIUM CHLORIDE PRN MLS/HR: 9 INJECTION, SOLUTION INTRAVENOUS at 22:52

## 2020-10-16 RX ADMIN — Medication SCH MG: at 12:28

## 2020-10-16 RX ADMIN — ATORVASTATIN CALCIUM SCH MG: 40 TABLET, FILM COATED ORAL at 21:17

## 2020-10-16 RX ADMIN — CLOTRIMAZOLE SCH GM: 1 CREAM TOPICAL at 17:37

## 2020-10-16 RX ADMIN — Medication SCH MG: at 17:42

## 2020-10-16 RX ADMIN — BACLOFEN SCH MG: 10 TABLET ORAL at 05:23

## 2020-10-16 RX ADMIN — Medication SCH EACH: at 16:38

## 2020-10-16 RX ADMIN — Medication SCH EACH: at 17:58

## 2020-10-16 RX ADMIN — CLOTRIMAZOLE SCH GM: 1 CREAM TOPICAL at 17:38

## 2020-10-16 RX ADMIN — LINAGLIPTIN SCH MG: 5 TABLET, FILM COATED ORAL at 09:16

## 2020-10-16 RX ADMIN — CLOPIDOGREL BISULFATE SCH MG: 75 TABLET, FILM COATED ORAL at 09:15

## 2020-10-16 RX ADMIN — PREGABALIN SCH MG: 25 CAPSULE ORAL at 21:14

## 2020-10-16 RX ADMIN — PAROXETINE HYDROCHLORIDE SCH MG: 10 TABLET, FILM COATED ORAL at 09:15

## 2020-10-16 RX ADMIN — Medication SCH MG: at 09:17

## 2020-10-16 RX ADMIN — BACLOFEN SCH MG: 10 TABLET ORAL at 12:28

## 2020-10-16 RX ADMIN — CLOTRIMAZOLE SCH GM: 1 CREAM TOPICAL at 09:18

## 2020-10-16 RX ADMIN — ACETAMINOPHEN PRN MG: 325 TABLET ORAL at 18:44

## 2020-10-16 RX ADMIN — HYDROMORPHONE HYDROCHLORIDE PRN MG: 2 TABLET ORAL at 04:05

## 2020-10-16 RX ADMIN — LOSARTAN POTASSIUM SCH MG: 50 TABLET, FILM COATED ORAL at 09:17

## 2020-10-16 RX ADMIN — PANTOPRAZOLE SODIUM SCH MG: 40 TABLET, DELAYED RELEASE ORAL at 21:17

## 2020-10-16 RX ADMIN — PREGABALIN SCH MG: 25 CAPSULE ORAL at 12:28

## 2020-10-16 RX ADMIN — ASPIRIN 81 MG SCH MG: 81 TABLET ORAL at 09:14

## 2020-10-16 RX ADMIN — ENOXAPARIN SODIUM SCH MG: 40 INJECTION SUBCUTANEOUS at 09:26

## 2020-10-16 RX ADMIN — INSULIN GLARGINE SCH UNIT: 100 INJECTION, SOLUTION SUBCUTANEOUS at 22:00

## 2020-10-16 RX ADMIN — Medication SCH MG: at 09:14

## 2020-10-16 RX ADMIN — SODIUM CHLORIDE PRN MLS/HR: 9 INJECTION, SOLUTION INTRAVENOUS at 13:10

## 2020-10-16 RX ADMIN — LABETALOL HCL SCH MG: 100 TABLET, FILM COATED ORAL at 09:00

## 2020-10-16 RX ADMIN — LABETALOL HCL SCH MG: 100 TABLET, FILM COATED ORAL at 17:42

## 2020-10-16 NOTE — NUR
-------------------------------------------------------------------------------

          *** Note undone in ED - 10/16/20 at 0218 by ALFONSO ***           

-------------------------------------------------------------------------------

PT TRANSFERRED PER ACLS PROTOCOL

## 2020-10-16 NOTE — NUR
TELE RN NOTES



PATIENTS VITALS ARE FINE BUT PATIENT IS VERY SLEEPY. SHE CAN NOT KEEP UP WITH THE 
CONVERSATION AND FALLS ASLEEP AS SHE TALKS. CAN'T KEEP AROUSED. NOTIFIED CHARGE NURSE AND 
ATTENDING PHYSICIAN.

## 2020-10-16 NOTE — NUR
TELE RN OPENING NOTES



RECEIVED PATIENT IN BED, IN AND OUT OF SLEEP, A/O X3. PATIENT ON OXYGEN THERAPY SATURATING 
WELL AT 97%; BREATHING EVEN, UNLABORED, NO SOB NOTED AT THIS TIME. TELE MONITOR WITH A SIGN 
OF SINUS RHYTHM. NO COMPLAINS OF PAIN AT THIS TIME. LAC G # 20 ACCESS INTACT AND PATENT. 
SAFETY PRECAUTIONS IN PLACE; BED IN LOW POSITION AND LOCKED, RAILS UP X2, CALL LIGHT WITHIN 
REACH. WILL CONTINUE TO MONITOR PATIENT.

## 2020-10-16 NOTE — NUR
TELE RN NOTES



UNABLE TO TAKE ORTHOSTATICS TODAY; PATIENT UNABLE TO SEAT OR STAND DUE TO SLEEPINESS AND 
TIREDNESS.

## 2020-10-16 NOTE — NUR
TELE RN CLOSING NOTES



PATIENT IN BED, IN AND OUT OF SLEEP, A/O X3. DURING THE DAY SLEPT A LOT BUT WAS AWAKE AND 
WAS WATCHING TV. ALSO ATE WELL FOR LUNCH. PATIENT ON OXYGEN THERAPY SATURATING WELL AT 97%; 
BREATHING EVEN, UNLABORED, NO SOB NOTED DURING THE DAY. PAIN WAS TREATED WITH PRN TYLENOL. 
TELE MONITOR WITH A SIGN OF SINUS RHYTHM 63. LAC G # 20 ACCESS INTACT AND PATENT WITH NS 
RUNNING  MLS/HR. ALL NEEDS ATTENDED THROUGHOUT THE DAY. SAFETY PRECAUTIONS IN PLACE; 
BED IN LOW POSITION AND LOCKED, RAILS UP X2, CALL LIGHT WITHIN REACH. WILL ENDORSE TO NIGHT 
SHIFT NURSE.

## 2020-10-16 NOTE — NUR
tele lvn closing notes

 pt resting at this time but aroused to touch  and to her name. all due meds given and all 
needs met. Tele SR per monitor. kept her warm and comfortable at all times. no signs of any 
acute distress noted. still with o2 at 2liters via nasal canula. no sob noted. on semi 
fowlers position with side rails X2 up and bed alarm set for pt safety. will endorse to am 
nurse for continuity of care.

## 2020-10-16 NOTE — NUR
TELE/RN OPENING NOTES 



RECEIVED PATIENT IN BED RESTING. PATIENT IS ALERT AND ORIENTED X 3. NO SIGNS OF SOB OR 
RESPIRATORY DISTRESS NOTED. BREATHING IS EVEN AND UNLABORED. PATIENT WAS COMPLAINING OF 
PAIN, DIDN'T STATE WERE PAIN IS COMING FROM. PATIENT HAS IV ACCESS ON LEFT AC #20 RUNNING NS 
 ML/HR. TELE READING SR 80'S. SAFETY MEASURES ARE IN PLACE, BED IS LOCKED AND PLACED 
IN THE LOWEST POSITION, SIDE RAILS UP X 2, CALL LIGHT IS WITHIN REACH. WILL CONTINUE TO 
MONITOR DURING SHIFT.

## 2020-10-16 NOTE — NUR
WOUND CARE CONSULT: PT PRESENTS WITH DISCOLORATION TO ABDOMEN, RASH/REDNESS TO 
ABDOMINAL/GROIN FOLDS AND REDNESS WITH CLOSED BLISTERED AREAS TO LOWER LEGS, PRESENT ON 
ADMISSION. RECOMMEND DPM CONSULT. DR GUZMAN NOTIFIED OF CONSULT REQUEST. RECOMMENDATIONS 
MADE FOR SKIN PROTECTION. DISCUSSED WITH NURSING STAFF. WILL SEE PRN. MD IN AGREEMENT WITH 
PLAN OF CARE. 

-------------------------------------------------------------------------------

Addendum: 10/16/20 at 0855 by CELESTINE ALLEN WNDNU

-------------------------------------------------------------------------------

Amended: Links added.

## 2020-10-16 NOTE — NUR
tele lvn initial notes

 admit pt from ER via gurney accompanied by ER Tech and nurse. DX of Syncope . Pt is alert 
oriented X3 , able to give information about herself and answered my question. Aware where 
she at and how to used the call light system. According to her she's here like a month ago 
but different problem. Skin warm to touch noted redness like cellulitis on both lower legs. 
edema also noted. put  pt also on O2 at 2 liters via nasal Cannula. Applied tele Sinus 
Rhythm per monitor. Vital signs as ff: /71, Pulse 88, Resp. 22, Temp 98.4 and O2 sat 
97 %. Assessment done and recorded. kept her warm and position on Semi fowlers Position with 
side rails X2 up and place call light at reach. will continue monitoring.

## 2020-10-16 NOTE — NUR
TELE/RN NOTES 



PATIENT ACCU CHEK READING 91. LANTUS 8 UNITS WAS HELD. PATIENT GIVEN SNACKS AND JUICE. WILL 
CONTINUE TO MONITOR.

## 2020-10-17 VITALS — SYSTOLIC BLOOD PRESSURE: 134 MMHG | DIASTOLIC BLOOD PRESSURE: 55 MMHG

## 2020-10-17 VITALS — DIASTOLIC BLOOD PRESSURE: 58 MMHG | SYSTOLIC BLOOD PRESSURE: 139 MMHG

## 2020-10-17 VITALS — DIASTOLIC BLOOD PRESSURE: 83 MMHG | SYSTOLIC BLOOD PRESSURE: 142 MMHG

## 2020-10-17 VITALS — SYSTOLIC BLOOD PRESSURE: 125 MMHG | DIASTOLIC BLOOD PRESSURE: 57 MMHG

## 2020-10-17 VITALS — DIASTOLIC BLOOD PRESSURE: 48 MMHG | SYSTOLIC BLOOD PRESSURE: 122 MMHG

## 2020-10-17 VITALS — SYSTOLIC BLOOD PRESSURE: 153 MMHG | DIASTOLIC BLOOD PRESSURE: 65 MMHG

## 2020-10-17 LAB
ALBUMIN SERPL BCP-MCNC: 2.8 G/DL (ref 3.4–5)
ALP SERPL-CCNC: 64 U/L (ref 46–116)
ALT SERPL W P-5'-P-CCNC: 34 U/L (ref 12–78)
AST SERPL W P-5'-P-CCNC: 14 U/L (ref 15–37)
BASOPHILS # BLD AUTO: 0 /CMM (ref 0–0.2)
BASOPHILS NFR BLD AUTO: 0.7 % (ref 0–2)
BILIRUB SERPL-MCNC: 0.8 MG/DL (ref 0.2–1)
BUN SERPL-MCNC: 23 MG/DL (ref 7–18)
CALCIUM SERPL-MCNC: 8.7 MG/DL (ref 8.5–10.1)
CHLORIDE SERPL-SCNC: 102 MMOL/L (ref 98–107)
CHOLEST SERPL-MCNC: 120 MG/DL (ref ?–200)
CO2 SERPL-SCNC: 28 MMOL/L (ref 21–32)
CREAT SERPL-MCNC: 0.8 MG/DL (ref 0.6–1.3)
EOSINOPHIL NFR BLD AUTO: 3.7 % (ref 0–6)
GLUCOSE SERPL-MCNC: 147 MG/DL (ref 74–106)
HCT VFR BLD AUTO: 27 % (ref 33–45)
HDLC SERPL-MCNC: 40 MG/DL (ref 40–60)
HGB BLD-MCNC: 8.6 G/DL (ref 11.5–14.8)
LDLC SERPL DIRECT ASSAY-MCNC: 68 MG/DL (ref 0–99)
LYMPHOCYTES NFR BLD AUTO: 2.2 /CMM (ref 0.8–4.8)
LYMPHOCYTES NFR BLD AUTO: 33 % (ref 20–44)
MAGNESIUM SERPL-MCNC: 2.4 MG/DL (ref 1.8–2.4)
MCHC RBC AUTO-ENTMCNC: 32 G/DL (ref 31–36)
MCV RBC AUTO: 83 FL (ref 82–100)
MONOCYTES NFR BLD AUTO: 0.6 /CMM (ref 0.1–1.3)
MONOCYTES NFR BLD AUTO: 9.3 % (ref 2–12)
NEUTROPHILS # BLD AUTO: 3.5 /CMM (ref 1.8–8.9)
NEUTROPHILS NFR BLD AUTO: 53.3 % (ref 43–81)
PHOSPHATE SERPL-MCNC: 3.8 MG/DL (ref 2.5–4.9)
PLATELET # BLD AUTO: 274 /CMM (ref 150–450)
POTASSIUM SERPL-SCNC: 4.4 MMOL/L (ref 3.5–5.1)
PROT SERPL-MCNC: 5.6 G/DL (ref 6.4–8.2)
RBC # BLD AUTO: 3.22 MIL/UL (ref 4–5.2)
SODIUM SERPL-SCNC: 137 MMOL/L (ref 136–145)
TRIGL SERPL-MCNC: 92 MG/DL (ref 30–150)
WBC NRBC COR # BLD AUTO: 6.6 K/UL (ref 4.3–11)

## 2020-10-17 RX ADMIN — Medication SCH EACH: at 05:35

## 2020-10-17 RX ADMIN — HYDROMORPHONE HYDROCHLORIDE PRN MG: 2 TABLET ORAL at 16:32

## 2020-10-17 RX ADMIN — ACETAMINOPHEN PRN MG: 325 TABLET ORAL at 14:20

## 2020-10-17 RX ADMIN — CLOTRIMAZOLE SCH GM: 1 CREAM TOPICAL at 09:00

## 2020-10-17 RX ADMIN — Medication SCH EACH: at 16:33

## 2020-10-17 RX ADMIN — INSULIN GLARGINE SCH UNIT: 100 INJECTION, SOLUTION SUBCUTANEOUS at 21:50

## 2020-10-17 RX ADMIN — INSULIN HUMAN PRN UNIT: 100 INJECTION, SOLUTION PARENTERAL at 16:37

## 2020-10-17 RX ADMIN — CLOTRIMAZOLE SCH GM: 1 CREAM TOPICAL at 16:38

## 2020-10-17 RX ADMIN — Medication SCH GM: at 09:00

## 2020-10-17 RX ADMIN — Medication SCH EACH: at 00:27

## 2020-10-17 RX ADMIN — LOSARTAN POTASSIUM SCH MG: 50 TABLET, FILM COATED ORAL at 10:18

## 2020-10-17 RX ADMIN — Medication SCH EACH: at 12:09

## 2020-10-17 RX ADMIN — LABETALOL HCL SCH MG: 100 TABLET, FILM COATED ORAL at 16:31

## 2020-10-17 RX ADMIN — Medication SCH MG: at 16:31

## 2020-10-17 RX ADMIN — EZETIMIBE SCH MG: 10 TABLET ORAL at 10:17

## 2020-10-17 RX ADMIN — ACETAMINOPHEN PRN MG: 325 TABLET ORAL at 23:38

## 2020-10-17 RX ADMIN — INSULIN HUMAN PRN UNIT: 100 INJECTION, SOLUTION PARENTERAL at 05:40

## 2020-10-17 RX ADMIN — HYDROMORPHONE HYDROCHLORIDE PRN MG: 2 TABLET ORAL at 10:17

## 2020-10-17 RX ADMIN — Medication SCH MG: at 10:17

## 2020-10-17 RX ADMIN — ACETAMINOPHEN PRN MG: 325 TABLET ORAL at 02:14

## 2020-10-17 RX ADMIN — ASPIRIN 81 MG SCH MG: 81 TABLET ORAL at 10:18

## 2020-10-17 RX ADMIN — ENOXAPARIN SODIUM SCH MG: 40 INJECTION SUBCUTANEOUS at 10:21

## 2020-10-17 RX ADMIN — Medication SCH MG: at 19:37

## 2020-10-17 RX ADMIN — LINAGLIPTIN SCH MG: 5 TABLET, FILM COATED ORAL at 10:20

## 2020-10-17 RX ADMIN — PAROXETINE HYDROCHLORIDE SCH MG: 10 TABLET, FILM COATED ORAL at 10:23

## 2020-10-17 RX ADMIN — HYDROMORPHONE HYDROCHLORIDE PRN MG: 2 TABLET ORAL at 22:36

## 2020-10-17 RX ADMIN — ALBUTEROL SULFATE SCH MG: 2.5 SOLUTION RESPIRATORY (INHALATION) at 07:47

## 2020-10-17 RX ADMIN — Medication SCH MG: at 13:21

## 2020-10-17 RX ADMIN — CLOPIDOGREL BISULFATE SCH MG: 75 TABLET, FILM COATED ORAL at 10:20

## 2020-10-17 RX ADMIN — ATORVASTATIN CALCIUM SCH MG: 40 TABLET, FILM COATED ORAL at 21:35

## 2020-10-17 RX ADMIN — LABETALOL HCL SCH MG: 100 TABLET, FILM COATED ORAL at 10:19

## 2020-10-17 RX ADMIN — PREGABALIN SCH MG: 25 CAPSULE ORAL at 12:09

## 2020-10-17 RX ADMIN — PREGABALIN SCH MG: 25 CAPSULE ORAL at 05:24

## 2020-10-17 RX ADMIN — Medication SCH MG: at 12:08

## 2020-10-17 RX ADMIN — PREGABALIN SCH MG: 25 CAPSULE ORAL at 21:35

## 2020-10-17 RX ADMIN — BACLOFEN SCH MG: 10 TABLET ORAL at 05:24

## 2020-10-17 RX ADMIN — SODIUM CHLORIDE PRN MLS/HR: 9 INJECTION, SOLUTION INTRAVENOUS at 22:02

## 2020-10-17 RX ADMIN — ALBUTEROL SULFATE SCH MG: 2.5 SOLUTION RESPIRATORY (INHALATION) at 13:21

## 2020-10-17 RX ADMIN — SODIUM CHLORIDE SCH MLS/HR: 9 INJECTION, SOLUTION INTRAVENOUS at 14:31

## 2020-10-17 RX ADMIN — BACLOFEN SCH MG: 10 TABLET ORAL at 21:35

## 2020-10-17 RX ADMIN — SODIUM CHLORIDE PRN MLS/HR: 9 INJECTION, SOLUTION INTRAVENOUS at 10:15

## 2020-10-17 RX ADMIN — MAGNESIUM HYDROXIDE PRN ML: 400 SUSPENSION ORAL at 22:04

## 2020-10-17 RX ADMIN — INSULIN HUMAN PRN UNIT: 100 INJECTION, SOLUTION PARENTERAL at 01:00

## 2020-10-17 RX ADMIN — Medication SCH MG: at 07:47

## 2020-10-17 RX ADMIN — Medication SCH EACH: at 21:35

## 2020-10-17 RX ADMIN — BACLOFEN SCH MG: 10 TABLET ORAL at 12:08

## 2020-10-17 RX ADMIN — ALBUTEROL SULFATE SCH MG: 2.5 SOLUTION RESPIRATORY (INHALATION) at 19:38

## 2020-10-17 RX ADMIN — PANTOPRAZOLE SODIUM SCH MG: 40 TABLET, DELAYED RELEASE ORAL at 21:35

## 2020-10-17 NOTE — NUR
TELE RN NOTES



PATIENT ADMITTED 10/16, HX OF DIABETES NOTED; eMAR STATES REGULAR INSULIN ORDER, BUT NO 
SLIDING SCALE; MD TRES HERNANDEZ NOTIFIED, AWAITING MD ORDER; WILL CONTINUE TO MONITOR 

-------------------------------------------------------------------------------

Addendum: 10/17/20 at 0040 by NAOMI STALLWORTH RN

-------------------------------------------------------------------------------

PER MD TRES HERNANDEZ, ORDER FOR MILD SLIDING SCALE; ORDERS RECEIVED AND CARRIED OUT; WILL 
CONTINUE TO MONITOR

## 2020-10-17 NOTE — NUR
TRANSFER OF CARE



PT ASLEEP AND EASILY AWAKEN NOT IN DISTRESS, STABLE SAFETY MEASURES AT ALL TIMES. WILL CONT 
TO MONITOR

## 2020-10-17 NOTE — NUR
RN NOTES

PATIENT ASKED FOR MOM- GOT AN ORDER FROM DR HERNANDEZ , MOM PO qHS PRN, ORDER NOTED AND CARRIED 
OUT

## 2020-10-17 NOTE — NUR
RN NOTES

RECEIVED PT. AWAKE ON BED, TALKING TO HER DAUGHTER OVER THE PHONE, SR WITH PAC ON TELE 
MONITOR HR-67, NOTICED LEFT SIDED WEAKNESS, NOT IN DISTRESS, DENIES PAIN AT THIS TIME, CALL 
LIGHT WITHIN REACH, SIDERAILSUPX2, CONTINUE TO MONITOR

## 2020-10-17 NOTE — NUR
TELE RN CLOSING NOTES



PT AAOX4, NOT IN ACUTE RESPIRATORY DISTRESS, TOLERATING 02 AT 2LPM VIA N/C. NO BM TODAY WITH 
URGE PER PT. SKIN WARM TO TOUCH, NO NEW SKIN BREAKDOWN, BLE OFFLOAD, ELEVATED DUE TO EDEMA 
R/T CELLULITIS. PT PAIN CONTROLLED BY DILAUDID AS ORDERED, EFFECTIVE PER PT. NO SYNCOPE 
NOTED DURING THE SHIFT. IV SITE AT LEFT AC #20 RUNNING NS  ML/HR, PATENT IN FLUSHING, 
SITE HAS NO S/X OF INFILTRATION. CALL LIGHT WITHIN REACH. SRX2 UP FOR SAFETY. TELE MONITOR 
SHOWS SINUS RHYTHM. ENDORSED CARE TO NEXT SHIFT

## 2020-10-17 NOTE — NUR
TELE RN OPENING NOTES



RECEIVED PT ON BED, AAOX4, RESPONSIVE TO ALL STIMULI. NO PRESENCE OF ACUTE RESPIRATORY 
DISTRESS, ON O2 AT 2LPM VIA N/C, MAURY WELL. ABD SOFT AND NON DISTENDED WITH ACTIVE BOWEL 
SOUNDS. SKIN WARM TO TOUCH AND DRY, BLE OFFLOAD, EDEMA PRESENCE D/T CELLULITIS, NEGATIVE 
DOPPLER US STUDIES. PT DENIES PAIN AND DISCOMFORT. IV SITE AT LEFT AC #20 RUNNING NS  
ML/HR, PATENT IN FLUSHING, SITE HAS NO S/X OF INFILTRATION. CALL LIGHT WITHIN REACH. SRX2 UP 
FOR SAFETY. TELE MONITOR SHOWS SINUS RHYTHM 65 WITH PAC. WILL CONTINUE TO MONITOR CARE.

## 2020-10-17 NOTE — NUR
MS RN



 PT MONITORED ACCORDINGLY, ALL NEEDS ATTENDED AND ANTICIPATED, GOOD SKIN CARE AT ALL TIME, 
NOT IN DISTRESS, KEPT CLEAN, DRY AND COMFORTABLE, AM CARE RENDERED, DENIES PAIN. SAFETY 
MEASURES AT ALL TIMES. WILL ENDORSE TO NEXT SHIFT.

-------------------------------------------------------------------------------

Addendum: 10/17/20 at 0620 by SANJIV GALVIN RN

-------------------------------------------------------------------------------

ON CARDIAC MONITORING 73'S HR WITH PAC.

## 2020-10-18 VITALS — SYSTOLIC BLOOD PRESSURE: 146 MMHG | DIASTOLIC BLOOD PRESSURE: 67 MMHG

## 2020-10-18 VITALS — SYSTOLIC BLOOD PRESSURE: 126 MMHG | DIASTOLIC BLOOD PRESSURE: 59 MMHG

## 2020-10-18 VITALS — SYSTOLIC BLOOD PRESSURE: 126 MMHG | DIASTOLIC BLOOD PRESSURE: 61 MMHG

## 2020-10-18 VITALS — SYSTOLIC BLOOD PRESSURE: 135 MMHG | DIASTOLIC BLOOD PRESSURE: 54 MMHG

## 2020-10-18 VITALS — SYSTOLIC BLOOD PRESSURE: 152 MMHG | DIASTOLIC BLOOD PRESSURE: 63 MMHG

## 2020-10-18 VITALS — SYSTOLIC BLOOD PRESSURE: 135 MMHG | DIASTOLIC BLOOD PRESSURE: 95 MMHG

## 2020-10-18 LAB
BASOPHILS # BLD AUTO: 0 /CMM (ref 0–0.2)
BASOPHILS NFR BLD AUTO: 0.8 % (ref 0–2)
BUN SERPL-MCNC: 15 MG/DL (ref 7–18)
CALCIUM SERPL-MCNC: 8.4 MG/DL (ref 8.5–10.1)
CHLORIDE SERPL-SCNC: 107 MMOL/L (ref 98–107)
CO2 SERPL-SCNC: 29 MMOL/L (ref 21–32)
CREAT SERPL-MCNC: 0.6 MG/DL (ref 0.6–1.3)
EOSINOPHIL NFR BLD AUTO: 6.3 % (ref 0–6)
GLUCOSE SERPL-MCNC: 144 MG/DL (ref 74–106)
HCT VFR BLD AUTO: 26 % (ref 33–45)
HGB BLD-MCNC: 8.5 G/DL (ref 11.5–14.8)
LYMPHOCYTES NFR BLD AUTO: 1.9 /CMM (ref 0.8–4.8)
LYMPHOCYTES NFR BLD AUTO: 31 % (ref 20–44)
MAGNESIUM SERPL-MCNC: 2.3 MG/DL (ref 1.8–2.4)
MCHC RBC AUTO-ENTMCNC: 32 G/DL (ref 31–36)
MCV RBC AUTO: 83 FL (ref 82–100)
MONOCYTES NFR BLD AUTO: 0.6 /CMM (ref 0.1–1.3)
MONOCYTES NFR BLD AUTO: 10 % (ref 2–12)
NEUTROPHILS # BLD AUTO: 3.1 /CMM (ref 1.8–8.9)
NEUTROPHILS NFR BLD AUTO: 51.9 % (ref 43–81)
PHOSPHATE SERPL-MCNC: 2.9 MG/DL (ref 2.5–4.9)
PLATELET # BLD AUTO: 273 /CMM (ref 150–450)
POTASSIUM SERPL-SCNC: 4.2 MMOL/L (ref 3.5–5.1)
RBC # BLD AUTO: 3.17 MIL/UL (ref 4–5.2)
SODIUM SERPL-SCNC: 140 MMOL/L (ref 136–145)
WBC NRBC COR # BLD AUTO: 6 K/UL (ref 4.3–11)

## 2020-10-18 RX ADMIN — EZETIMIBE SCH MG: 10 TABLET ORAL at 08:24

## 2020-10-18 RX ADMIN — ALBUTEROL SULFATE SCH MG: 2.5 SOLUTION RESPIRATORY (INHALATION) at 13:30

## 2020-10-18 RX ADMIN — ALBUTEROL SULFATE SCH MG: 2.5 SOLUTION RESPIRATORY (INHALATION) at 20:00

## 2020-10-18 RX ADMIN — LABETALOL HCL SCH MG: 100 TABLET, FILM COATED ORAL at 16:28

## 2020-10-18 RX ADMIN — PREGABALIN SCH MG: 25 CAPSULE ORAL at 21:14

## 2020-10-18 RX ADMIN — INSULIN HUMAN PRN UNIT: 100 INJECTION, SOLUTION PARENTERAL at 06:28

## 2020-10-18 RX ADMIN — ATORVASTATIN CALCIUM SCH MG: 40 TABLET, FILM COATED ORAL at 21:15

## 2020-10-18 RX ADMIN — PAROXETINE HYDROCHLORIDE SCH MG: 10 TABLET, FILM COATED ORAL at 08:25

## 2020-10-18 RX ADMIN — LABETALOL HCL SCH MG: 100 TABLET, FILM COATED ORAL at 08:24

## 2020-10-18 RX ADMIN — Medication SCH MG: at 16:27

## 2020-10-18 RX ADMIN — Medication SCH MG: at 07:44

## 2020-10-18 RX ADMIN — LOSARTAN POTASSIUM SCH MG: 50 TABLET, FILM COATED ORAL at 08:25

## 2020-10-18 RX ADMIN — CLOPIDOGREL BISULFATE SCH MG: 75 TABLET, FILM COATED ORAL at 08:23

## 2020-10-18 RX ADMIN — PREGABALIN SCH MG: 25 CAPSULE ORAL at 04:42

## 2020-10-18 RX ADMIN — Medication SCH MG: at 13:29

## 2020-10-18 RX ADMIN — PANTOPRAZOLE SODIUM SCH MG: 40 TABLET, DELAYED RELEASE ORAL at 21:15

## 2020-10-18 RX ADMIN — LIDOCAINE SCH EA: 50 PATCH CUTANEOUS at 14:14

## 2020-10-18 RX ADMIN — ALBUTEROL SULFATE SCH MG: 2.5 SOLUTION RESPIRATORY (INHALATION) at 01:40

## 2020-10-18 RX ADMIN — Medication SCH EACH: at 11:34

## 2020-10-18 RX ADMIN — Medication SCH MG: at 20:00

## 2020-10-18 RX ADMIN — BACLOFEN SCH MG: 10 TABLET ORAL at 04:42

## 2020-10-18 RX ADMIN — CLOTRIMAZOLE SCH GM: 1 CREAM TOPICAL at 08:38

## 2020-10-18 RX ADMIN — Medication SCH EACH: at 07:22

## 2020-10-18 RX ADMIN — Medication SCH MG: at 08:24

## 2020-10-18 RX ADMIN — INSULIN HUMAN PRN UNIT: 100 INJECTION, SOLUTION PARENTERAL at 21:32

## 2020-10-18 RX ADMIN — Medication SCH MG: at 08:25

## 2020-10-18 RX ADMIN — INSULIN HUMAN PRN UNIT: 100 INJECTION, SOLUTION PARENTERAL at 11:36

## 2020-10-18 RX ADMIN — Medication SCH MG: at 01:40

## 2020-10-18 RX ADMIN — CLOTRIMAZOLE SCH GM: 1 CREAM TOPICAL at 16:29

## 2020-10-18 RX ADMIN — Medication SCH GM: at 08:38

## 2020-10-18 RX ADMIN — BACLOFEN SCH MG: 10 TABLET ORAL at 12:41

## 2020-10-18 RX ADMIN — INSULIN GLARGINE SCH UNIT: 100 INJECTION, SOLUTION SUBCUTANEOUS at 21:30

## 2020-10-18 RX ADMIN — ASPIRIN 81 MG SCH MG: 81 TABLET ORAL at 08:24

## 2020-10-18 RX ADMIN — PREGABALIN SCH MG: 25 CAPSULE ORAL at 12:41

## 2020-10-18 RX ADMIN — ENOXAPARIN SODIUM SCH MG: 40 INJECTION SUBCUTANEOUS at 08:27

## 2020-10-18 RX ADMIN — FUROSEMIDE SCH MG: 40 TABLET ORAL at 16:27

## 2020-10-18 RX ADMIN — Medication SCH EACH: at 17:25

## 2020-10-18 RX ADMIN — HYDROMORPHONE HYDROCHLORIDE PRN MG: 2 TABLET ORAL at 05:38

## 2020-10-18 RX ADMIN — Medication SCH EACH: at 21:33

## 2020-10-18 RX ADMIN — SODIUM CHLORIDE SCH MLS/HR: 9 INJECTION, SOLUTION INTRAVENOUS at 15:04

## 2020-10-18 RX ADMIN — Medication SCH MG: at 12:41

## 2020-10-18 RX ADMIN — HYDROMORPHONE HYDROCHLORIDE PRN MG: 2 TABLET ORAL at 11:40

## 2020-10-18 RX ADMIN — LINAGLIPTIN SCH MG: 5 TABLET, FILM COATED ORAL at 08:25

## 2020-10-18 RX ADMIN — BACLOFEN SCH MG: 10 TABLET ORAL at 21:15

## 2020-10-18 RX ADMIN — ALBUTEROL SULFATE SCH MG: 2.5 SOLUTION RESPIRATORY (INHALATION) at 07:44

## 2020-10-18 NOTE — NUR
RN NOTES



PATIENT IN BED RESTING COMFORTABLY IN MODERATE HIGH BACK REST. A/O X4. ON OXYGEN 1LPM VIA 
NC. NO SIGNS OF DISTRESS NOTED THROUGHOUT THE SHIFT. IV ACCESS ON LAC #20, SL, PATENT AND 
INTACT. SAFETY MEASURES IN PLACE, BED IN LOW LOCKED POSITION, CALL LIGHT WITHIN REACH, 
SIDERAILSUPX2, WILL ENDORSE TO NIGHT SHIFT NURSE FOR NAHUN.

## 2020-10-18 NOTE — NUR
RN NOTES



RECEIVED PATIENT IN BED RESTING COMFORTABLY IN MODERATE HIGH BACK REST. A/O X4. ON OXYGEN 
2LPM VIA NC. NO SIGNS OF DISTRESS NOTED AT THIS TIME. ON TELE MONITOR WITH CURRENT READING 
OF SR WITH PAC. SAFETY MEASURES IN PLACE, BED IN LOW LOCKED POSITION, CALL LIGHT WITHIN 
REACH, SIDERAILSUPX2, WILL CONTINUE TO MONITOR.

## 2020-10-18 NOTE — NUR
RN NOTES

RECEIVED PT. SLEEPING BUT AROUSABLE, A/OX4, NOT IN DISTRESS, SR ON TELE MONITOR HR-70, BED 
IN LOCKED POSITION, CALL LIGHT WITHIN REACH, SIDERAILSUPX2, CONTINUE TO MONITOR

## 2020-10-18 NOTE — NUR
RN NOTES



DR. WALKER ORDER FUROSEMIDE 40 MG BID, MIRALAX 17G DAILY PO AND LIDOCAINE PATCH 5% PATCH 
DAILY. ORDERS MADE AND CARRIED OUT.

## 2020-10-19 VITALS — SYSTOLIC BLOOD PRESSURE: 132 MMHG | DIASTOLIC BLOOD PRESSURE: 48 MMHG

## 2020-10-19 VITALS — DIASTOLIC BLOOD PRESSURE: 52 MMHG | SYSTOLIC BLOOD PRESSURE: 120 MMHG

## 2020-10-19 VITALS — DIASTOLIC BLOOD PRESSURE: 66 MMHG | SYSTOLIC BLOOD PRESSURE: 160 MMHG

## 2020-10-19 VITALS — DIASTOLIC BLOOD PRESSURE: 69 MMHG | SYSTOLIC BLOOD PRESSURE: 153 MMHG

## 2020-10-19 VITALS — DIASTOLIC BLOOD PRESSURE: 65 MMHG | SYSTOLIC BLOOD PRESSURE: 175 MMHG

## 2020-10-19 RX ADMIN — Medication SCH MG: at 01:33

## 2020-10-19 RX ADMIN — Medication SCH EACH: at 08:46

## 2020-10-19 RX ADMIN — Medication SCH GM: at 09:13

## 2020-10-19 RX ADMIN — FUROSEMIDE SCH MG: 40 TABLET ORAL at 17:14

## 2020-10-19 RX ADMIN — FUROSEMIDE SCH MG: 40 TABLET ORAL at 08:51

## 2020-10-19 RX ADMIN — LIDOCAINE SCH EA: 50 PATCH CUTANEOUS at 15:10

## 2020-10-19 RX ADMIN — LABETALOL HCL SCH MG: 100 TABLET, FILM COATED ORAL at 08:49

## 2020-10-19 RX ADMIN — LINAGLIPTIN SCH MG: 5 TABLET, FILM COATED ORAL at 08:52

## 2020-10-19 RX ADMIN — ASPIRIN 81 MG SCH MG: 81 TABLET ORAL at 08:46

## 2020-10-19 RX ADMIN — EZETIMIBE SCH MG: 10 TABLET ORAL at 08:52

## 2020-10-19 RX ADMIN — ALBUTEROL SULFATE SCH MG: 2.5 SOLUTION RESPIRATORY (INHALATION) at 13:21

## 2020-10-19 RX ADMIN — Medication SCH MG: at 17:08

## 2020-10-19 RX ADMIN — LABETALOL HCL SCH MG: 100 TABLET, FILM COATED ORAL at 17:14

## 2020-10-19 RX ADMIN — Medication SCH EACH: at 12:03

## 2020-10-19 RX ADMIN — Medication SCH MG: at 12:15

## 2020-10-19 RX ADMIN — BACLOFEN SCH MG: 10 TABLET ORAL at 12:15

## 2020-10-19 RX ADMIN — PAROXETINE HYDROCHLORIDE SCH MG: 10 TABLET, FILM COATED ORAL at 08:51

## 2020-10-19 RX ADMIN — Medication SCH MG: at 13:21

## 2020-10-19 RX ADMIN — Medication SCH MG: at 08:47

## 2020-10-19 RX ADMIN — LOSARTAN POTASSIUM SCH MG: 50 TABLET, FILM COATED ORAL at 08:48

## 2020-10-19 RX ADMIN — PREGABALIN SCH MG: 25 CAPSULE ORAL at 05:21

## 2020-10-19 RX ADMIN — MAGNESIUM HYDROXIDE PRN ML: 400 SUSPENSION ORAL at 00:08

## 2020-10-19 RX ADMIN — ALBUTEROL SULFATE SCH MG: 2.5 SOLUTION RESPIRATORY (INHALATION) at 01:33

## 2020-10-19 RX ADMIN — SODIUM CHLORIDE SCH MLS/HR: 9 INJECTION, SOLUTION INTRAVENOUS at 15:10

## 2020-10-19 RX ADMIN — Medication SCH EACH: at 17:32

## 2020-10-19 RX ADMIN — INSULIN HUMAN PRN UNIT: 100 INJECTION, SOLUTION PARENTERAL at 17:28

## 2020-10-19 RX ADMIN — HYDROMORPHONE HYDROCHLORIDE PRN MG: 2 TABLET ORAL at 12:17

## 2020-10-19 RX ADMIN — Medication SCH MG: at 08:51

## 2020-10-19 RX ADMIN — PREGABALIN SCH MG: 25 CAPSULE ORAL at 12:15

## 2020-10-19 RX ADMIN — HYDROMORPHONE HYDROCHLORIDE PRN MG: 2 TABLET ORAL at 00:09

## 2020-10-19 RX ADMIN — Medication SCH MG: at 08:03

## 2020-10-19 RX ADMIN — BACLOFEN SCH MG: 10 TABLET ORAL at 05:21

## 2020-10-19 RX ADMIN — CLOPIDOGREL BISULFATE SCH MG: 75 TABLET, FILM COATED ORAL at 08:51

## 2020-10-19 RX ADMIN — ACETAMINOPHEN PRN MG: 325 TABLET ORAL at 08:50

## 2020-10-19 RX ADMIN — HYDROMORPHONE HYDROCHLORIDE PRN MG: 2 TABLET ORAL at 06:21

## 2020-10-19 RX ADMIN — ALBUTEROL SULFATE SCH MG: 2.5 SOLUTION RESPIRATORY (INHALATION) at 08:03

## 2020-10-19 RX ADMIN — CLOTRIMAZOLE SCH GM: 1 CREAM TOPICAL at 09:13

## 2020-10-19 RX ADMIN — Medication SCH MG: at 17:15

## 2020-10-19 RX ADMIN — ENOXAPARIN SODIUM SCH MG: 40 INJECTION SUBCUTANEOUS at 09:09

## 2020-10-19 RX ADMIN — HYDROMORPHONE HYDROCHLORIDE PRN MG: 2 TABLET ORAL at 18:10

## 2020-10-19 NOTE — NUR
MS/RN   Exit care



Discharge paperwork prepared, chart copied ready for discharge to home later today.

## 2020-10-19 NOTE — NUR
MS/RN   Left hand pain



Patient complaining of pain to left hand, stating that when getting up from the toilet, her 
hand was banged on the wall. Left hand very slightly swollen around knuckles. Dr Gleason 
made aware, stat x-ray of hand ordered to rule out any possible fracture.

## 2020-10-19 NOTE — NUR
MS/RN   Discharge



Patient discharged to home in stable condition via ambulance.

All personal property including cell phone with patient and signed for on belongings list. 
Heplock, name bands and tele monitor removed.

Exit care signed, provided with copies of medical record to take to cardiology appointment, 
exit care and disc of all imaging studies from this admission.

Educated patient as to the importance of taking all medications as ordered and with 
following up with primary care doctor. Patient stated understanding and that she would be 
making an appointment tomorrow. Instructed as to what medications had already been 
administered today and which still needed to be take., patient again stated understanding.

Instructed to return to the nearest emergency room if experiencing any further chest pain or 
syncopal episodes.

Left floor via gurney chair.

## 2020-10-19 NOTE — NUR
RN NOTES

COMPLAINED OF ABDOMINAL PAIN- DILAUDID 4 MG PO GIVEN AS ORDERED, V/S STABLE, MORNING CARE 
RENDERED, NOT IN DISTRESS, CALL LIGHT WITHIN REACH, SIDERAILSUPX2, PT. NEEDS ATTENDED

## 2020-10-19 NOTE — NUR
RN NOTES

NEW IV LINE INSERTED ON THE RIGHT A/C GAUGE 22, COMPLAINED OF BACK PAIN-DILAUDID 4MG PO 
GIVEN AS ORDERED, V/S STABLE

## 2020-11-20 ENCOUNTER — HOSPITAL ENCOUNTER (INPATIENT)
Dept: HOSPITAL 54 - ER | Age: 60
LOS: 5 days | Discharge: NURSING, REHAB OR LONG-TERM CARE | DRG: 469 | End: 2020-11-25
Attending: NURSE PRACTITIONER | Admitting: NURSE PRACTITIONER
Payer: COMMERCIAL

## 2020-11-20 VITALS — WEIGHT: 254 LBS | HEIGHT: 65 IN | BODY MASS INDEX: 42.32 KG/M2

## 2020-11-20 DIAGNOSIS — I25.10: ICD-10-CM

## 2020-11-20 DIAGNOSIS — E11.22: ICD-10-CM

## 2020-11-20 DIAGNOSIS — J45.909: ICD-10-CM

## 2020-11-20 DIAGNOSIS — G89.4: ICD-10-CM

## 2020-11-20 DIAGNOSIS — I69.354: ICD-10-CM

## 2020-11-20 DIAGNOSIS — I50.43: ICD-10-CM

## 2020-11-20 DIAGNOSIS — I13.0: ICD-10-CM

## 2020-11-20 DIAGNOSIS — J96.01: ICD-10-CM

## 2020-11-20 DIAGNOSIS — E66.2: ICD-10-CM

## 2020-11-20 DIAGNOSIS — N18.9: ICD-10-CM

## 2020-11-20 DIAGNOSIS — F32.9: ICD-10-CM

## 2020-11-20 DIAGNOSIS — Z79.02: ICD-10-CM

## 2020-11-20 DIAGNOSIS — E46: ICD-10-CM

## 2020-11-20 DIAGNOSIS — Z79.899: ICD-10-CM

## 2020-11-20 DIAGNOSIS — N17.0: Primary | ICD-10-CM

## 2020-11-20 DIAGNOSIS — Z71.3: ICD-10-CM

## 2020-11-20 DIAGNOSIS — L03.116: ICD-10-CM

## 2020-11-20 DIAGNOSIS — Z79.84: ICD-10-CM

## 2020-11-20 DIAGNOSIS — D63.8: ICD-10-CM

## 2020-11-20 DIAGNOSIS — N32.81: ICD-10-CM

## 2020-11-20 DIAGNOSIS — L03.115: ICD-10-CM

## 2020-11-20 DIAGNOSIS — Z22.322: ICD-10-CM

## 2020-11-20 DIAGNOSIS — E87.5: ICD-10-CM

## 2020-11-20 DIAGNOSIS — E78.5: ICD-10-CM

## 2020-11-20 DIAGNOSIS — Z20.828: ICD-10-CM

## 2020-11-20 DIAGNOSIS — M54.5: ICD-10-CM

## 2020-11-20 LAB
ALBUMIN SERPL BCP-MCNC: 3.6 G/DL (ref 3.4–5)
ALP SERPL-CCNC: 113 U/L (ref 46–116)
ALT SERPL W P-5'-P-CCNC: 35 U/L (ref 12–78)
AST SERPL W P-5'-P-CCNC: 18 U/L (ref 15–37)
BASOPHILS # BLD AUTO: 0.1 /CMM (ref 0–0.2)
BASOPHILS NFR BLD AUTO: 1.3 % (ref 0–2)
BILIRUB DIRECT SERPL-MCNC: 0.1 MG/DL (ref 0–0.2)
BILIRUB SERPL-MCNC: 0.5 MG/DL (ref 0.2–1)
BUN SERPL-MCNC: 56 MG/DL (ref 7–18)
CALCIUM SERPL-MCNC: 9.4 MG/DL (ref 8.5–10.1)
CHLORIDE SERPL-SCNC: 99 MMOL/L (ref 98–107)
CO2 SERPL-SCNC: 29 MMOL/L (ref 21–32)
CREAT SERPL-MCNC: 1.9 MG/DL (ref 0.6–1.3)
EOSINOPHIL NFR BLD AUTO: 2.4 % (ref 0–6)
GLUCOSE SERPL-MCNC: 159 MG/DL (ref 74–106)
HCT VFR BLD AUTO: 29 % (ref 33–45)
HGB BLD-MCNC: 9.5 G/DL (ref 11.5–14.8)
LYMPHOCYTES NFR BLD AUTO: 1.7 /CMM (ref 0.8–4.8)
LYMPHOCYTES NFR BLD AUTO: 18.8 % (ref 20–44)
MCHC RBC AUTO-ENTMCNC: 33 G/DL (ref 31–36)
MCV RBC AUTO: 85 FL (ref 82–100)
MONOCYTES NFR BLD AUTO: 0.8 /CMM (ref 0.1–1.3)
MONOCYTES NFR BLD AUTO: 8.7 % (ref 2–12)
NEUTROPHILS # BLD AUTO: 6.3 /CMM (ref 1.8–8.9)
NEUTROPHILS NFR BLD AUTO: 68.8 % (ref 43–81)
NT-PROBNP SERPL-MCNC: 236 PG/ML (ref 0–125)
PLATELET # BLD AUTO: 343 /CMM (ref 150–450)
POTASSIUM SERPL-SCNC: 5.8 MMOL/L (ref 3.5–5.1)
PROT SERPL-MCNC: 6.7 G/DL (ref 6.4–8.2)
RBC # BLD AUTO: 3.39 MIL/UL (ref 4–5.2)
SODIUM SERPL-SCNC: 134 MMOL/L (ref 136–145)
WBC NRBC COR # BLD AUTO: 9.2 K/UL (ref 4.3–11)

## 2020-11-20 PROCEDURE — C9803 HOPD COVID-19 SPEC COLLECT: HCPCS

## 2020-11-20 PROCEDURE — G0378 HOSPITAL OBSERVATION PER HR: HCPCS

## 2020-11-20 NOTE — NUR
PT QUIRINO FROM HOME C/O WEAKNESS ON THE R SIDE AND MUSCLE TWITCHES ON UPPER 
EXTREMITY X 2 DAYS. PT STATES "I HAVE A HX OF CHF AND I WANT TO GET MY BLOOD 
CHECKED.". PT AAOX4, VSS, RESPIRATIONS EVEN AND UNLABORED ON RA W/ NAD NOTED. 
PT CONNECTED TO THE CARDIAC MONITOR AND POX

## 2020-11-21 VITALS — DIASTOLIC BLOOD PRESSURE: 87 MMHG | SYSTOLIC BLOOD PRESSURE: 131 MMHG

## 2020-11-21 VITALS — SYSTOLIC BLOOD PRESSURE: 128 MMHG | DIASTOLIC BLOOD PRESSURE: 64 MMHG

## 2020-11-21 VITALS — DIASTOLIC BLOOD PRESSURE: 48 MMHG | SYSTOLIC BLOOD PRESSURE: 98 MMHG

## 2020-11-21 VITALS — SYSTOLIC BLOOD PRESSURE: 124 MMHG | DIASTOLIC BLOOD PRESSURE: 69 MMHG

## 2020-11-21 VITALS — SYSTOLIC BLOOD PRESSURE: 133 MMHG | DIASTOLIC BLOOD PRESSURE: 51 MMHG

## 2020-11-21 VITALS — DIASTOLIC BLOOD PRESSURE: 62 MMHG | SYSTOLIC BLOOD PRESSURE: 130 MMHG

## 2020-11-21 LAB
ALBUMIN SERPL BCP-MCNC: 3.2 G/DL (ref 3.4–5)
ALP SERPL-CCNC: 106 U/L (ref 46–116)
ALT SERPL W P-5'-P-CCNC: 36 U/L (ref 12–78)
AST SERPL W P-5'-P-CCNC: 15 U/L (ref 15–37)
BASOPHILS # BLD AUTO: 0.1 /CMM (ref 0–0.2)
BASOPHILS NFR BLD AUTO: 1.1 % (ref 0–2)
BILIRUB SERPL-MCNC: 0.5 MG/DL (ref 0.2–1)
BUN SERPL-MCNC: 51 MG/DL (ref 7–18)
CALCIUM SERPL-MCNC: 9.2 MG/DL (ref 8.5–10.1)
CHLORIDE SERPL-SCNC: 102 MMOL/L (ref 98–107)
CO2 SERPL-SCNC: 27 MMOL/L (ref 21–32)
CREAT SERPL-MCNC: 1.4 MG/DL (ref 0.6–1.3)
EOSINOPHIL NFR BLD AUTO: 2.7 % (ref 0–6)
GLUCOSE SERPL-MCNC: 141 MG/DL (ref 74–106)
HCT VFR BLD AUTO: 29 % (ref 33–45)
HGB BLD-MCNC: 9.6 G/DL (ref 11.5–14.8)
LYMPHOCYTES NFR BLD AUTO: 2 /CMM (ref 0.8–4.8)
LYMPHOCYTES NFR BLD AUTO: 26.4 % (ref 20–44)
MAGNESIUM SERPL-MCNC: 2.7 MG/DL (ref 1.8–2.4)
MCHC RBC AUTO-ENTMCNC: 33 G/DL (ref 31–36)
MCV RBC AUTO: 85 FL (ref 82–100)
MONOCYTES NFR BLD AUTO: 0.7 /CMM (ref 0.1–1.3)
MONOCYTES NFR BLD AUTO: 8.9 % (ref 2–12)
NEUTROPHILS # BLD AUTO: 4.6 /CMM (ref 1.8–8.9)
NEUTROPHILS NFR BLD AUTO: 60.9 % (ref 43–81)
PHOSPHATE SERPL-MCNC: 5.7 MG/DL (ref 2.5–4.9)
PLATELET # BLD AUTO: 327 /CMM (ref 150–450)
POTASSIUM SERPL-SCNC: 5.4 MMOL/L (ref 3.5–5.1)
PROT SERPL-MCNC: 6.3 G/DL (ref 6.4–8.2)
RBC # BLD AUTO: 3.44 MIL/UL (ref 4–5.2)
SODIUM SERPL-SCNC: 135 MMOL/L (ref 136–145)
WBC NRBC COR # BLD AUTO: 7.5 K/UL (ref 4.3–11)

## 2020-11-21 PROCEDURE — 05H933Z INSERTION OF INFUSION DEVICE INTO RIGHT BRACHIAL VEIN, PERCUTANEOUS APPROACH: ICD-10-PCS | Performed by: NURSE PRACTITIONER

## 2020-11-21 RX ADMIN — BACLOFEN SCH MG: 10 TABLET ORAL at 20:29

## 2020-11-21 RX ADMIN — Medication SCH MG: at 19:56

## 2020-11-21 RX ADMIN — LOSARTAN POTASSIUM SCH MG: 50 TABLET, FILM COATED ORAL at 09:38

## 2020-11-21 RX ADMIN — ATORVASTATIN CALCIUM SCH MG: 40 TABLET, FILM COATED ORAL at 22:48

## 2020-11-21 RX ADMIN — ACETAMINOPHEN PRN MG: 325 TABLET ORAL at 23:19

## 2020-11-21 RX ADMIN — PREGABALIN SCH MG: 25 CAPSULE ORAL at 05:21

## 2020-11-21 RX ADMIN — DEXTROSE MONOHYDRATE SCH MLS/HR: 50 INJECTION, SOLUTION INTRAVENOUS at 20:36

## 2020-11-21 RX ADMIN — ALBUTEROL SULFATE SCH MG: 2.5 SOLUTION RESPIRATORY (INHALATION) at 08:09

## 2020-11-21 RX ADMIN — LINAGLIPTIN SCH MG: 5 TABLET, FILM COATED ORAL at 09:31

## 2020-11-21 RX ADMIN — EZETIMIBE SCH MG: 10 TABLET ORAL at 09:38

## 2020-11-21 RX ADMIN — CLOTRIMAZOLE SCH GM: 1 CREAM TOPICAL at 17:04

## 2020-11-21 RX ADMIN — Medication SCH MG: at 09:39

## 2020-11-21 RX ADMIN — ALBUTEROL SULFATE SCH MG: 2.5 SOLUTION RESPIRATORY (INHALATION) at 01:01

## 2020-11-21 RX ADMIN — ALBUTEROL SULFATE SCH MG: 2.5 SOLUTION RESPIRATORY (INHALATION) at 13:20

## 2020-11-21 RX ADMIN — BACLOFEN SCH MG: 10 TABLET ORAL at 13:33

## 2020-11-21 RX ADMIN — Medication SCH MG: at 13:20

## 2020-11-21 RX ADMIN — PANTOPRAZOLE SODIUM SCH MG: 40 TABLET, DELAYED RELEASE ORAL at 22:49

## 2020-11-21 RX ADMIN — PREGABALIN SCH MG: 25 CAPSULE ORAL at 20:29

## 2020-11-21 RX ADMIN — Medication SCH MG: at 13:33

## 2020-11-21 RX ADMIN — CLOTRIMAZOLE SCH GM: 1 CREAM TOPICAL at 09:31

## 2020-11-21 RX ADMIN — Medication SCH MG: at 01:01

## 2020-11-21 RX ADMIN — Medication SCH EACH: at 23:08

## 2020-11-21 RX ADMIN — LIDOCAINE SCH EA: 50 PATCH CUTANEOUS at 09:30

## 2020-11-21 RX ADMIN — LABETALOL HCL SCH MG: 100 TABLET, FILM COATED ORAL at 20:33

## 2020-11-21 RX ADMIN — INSULIN HUMAN PRN UNIT: 100 INJECTION, SOLUTION PARENTERAL at 08:06

## 2020-11-21 RX ADMIN — Medication SCH EACH: at 17:40

## 2020-11-21 RX ADMIN — Medication SCH EACH: at 12:30

## 2020-11-21 RX ADMIN — CLOPIDOGREL BISULFATE SCH MG: 75 TABLET, FILM COATED ORAL at 09:32

## 2020-11-21 RX ADMIN — HUMAN INSULIN PRN UNIT: 100 INJECTION, SOLUTION SUBCUTANEOUS at 23:18

## 2020-11-21 RX ADMIN — INSULIN HUMAN PRN UNIT: 100 INJECTION, SOLUTION PARENTERAL at 18:24

## 2020-11-21 RX ADMIN — Medication SCH MG: at 17:04

## 2020-11-21 RX ADMIN — Medication SCH MG: at 08:08

## 2020-11-21 RX ADMIN — HYDROMORPHONE HYDROCHLORIDE PRN MG: 2 TABLET ORAL at 18:09

## 2020-11-21 RX ADMIN — Medication SCH MG: at 09:31

## 2020-11-21 RX ADMIN — BACLOFEN SCH MG: 10 TABLET ORAL at 05:21

## 2020-11-21 RX ADMIN — ALBUTEROL SULFATE SCH MG: 2.5 SOLUTION RESPIRATORY (INHALATION) at 19:56

## 2020-11-21 RX ADMIN — PREGABALIN SCH MG: 25 CAPSULE ORAL at 13:33

## 2020-11-21 RX ADMIN — ASPIRIN 81 MG SCH MG: 81 TABLET ORAL at 09:38

## 2020-11-21 RX ADMIN — Medication SCH EACH: at 07:54

## 2020-11-21 RX ADMIN — LABETALOL HCL SCH MG: 100 TABLET, FILM COATED ORAL at 09:38

## 2020-11-21 NOTE — NUR
-------------------------------------------------------------------------------

          *** Note undone in [facility] - 11/21/20 at 0041 by AKILAH ***          

-------------------------------------------------------------------------------

M/S 326-2

## 2020-11-21 NOTE — NUR
RN OPENING NOTES



RECEIVED PT AWAKE, A/O X4. ON O2 2LPM VIA NASAL CANNULA, O2 SAT 98%. NO SOB OR ANY S/S OF 
DISTRESS NOTED AT THIS TIME. TELE MONITOR SHOWS SR. RIGHT HAND #22, INTACT AND PATENT. IVF 
NS @ 50ML/HR INFUSING WELL. NO PAIN REPORTED AT THIS TIME. SAFETY MEASURES OBSERVED. CALL 
LIGHT WITHIN REACH. BED LOCKED AND IN LOWEST POSITION. WILL CONTINUE TO MONITOR

## 2020-11-21 NOTE — NUR
RN NOTES

 PATIENT IN BED AWAKE  A/O X4, ABLE TO VERBALIZE NEEDS. ON 02 @ 2LPM VIA NASAL CANNULA. NO 
SOB OR RESPIRATORY DISTRESS AT THIS TIME.  HEAD TO TOE ASSESSMENT DONE. NOTED WITH SACRAL 
REDNESS, LEFT LEG SCAB, AND BILATERAL LOWER EXTREMITY PITTING EDEMA +4, AND LEFT ARM EDEMA. 
SACRAL KEPT CLEAN AND DRY. BILATERAL LOWER EXTREMITIES AND LEFT ARM ELEVATED WITH PILLOWS. 
INITIAL ASSESSMENT DONE. VITAL SIGNS CHECKED AND RECORDED. WITH IV ON RIGHT WRIST #22G 
PATENT AND FLUSHED.MIDLINE ON RIGHT UPPER ARM INTACT AND PATENT ,IVF OF  NS AT 50CC/HR 
INFUSING  WELL . DUE ALL MEDS GIVEN AS ORDERED ,NO ASE  NOTED . BED LOCKED AND IN LOWEST 
POSITION. SIDE RAILS UP X2, SAFETY MEASURES IMPLEMENTED. CALL LIGHT WITHIN REACH. WILL 
CONTINUE TO MONITOR.

## 2020-11-21 NOTE — NUR
RN NOTES



PATIENT SLEEPING, EASILY AROUSED. NO SOB OR ANY S/S OF DISTRESS. ON 02 2LPM VIA NASAL 
CANNULA, O2 SAT 97%. ON TELE MONITOR HR 66. WITH RIGHT HAND #22, INTACT AND PATENT. IVF NS @ 
50ML/HR INFUSING WELL. NO S/S OF INFILTRATION. KEPT CLEAN AND DRY. ALL NEEDS ATTENDED 
PROMPTLY. BED LOCKED AND IN LOWEST POSITION. SAFETY MEASURES IMPLEMENTED. CALL LIGHT WITHIN 
REACH. ENDORSED TO NEXT SHIFT.

## 2020-11-21 NOTE — NUR
tele rn notes

spoke to gian crawford  pts  an  multiple time  piv attempted  with order  for midline  
insertion  order noted and carried out brian  supervisor  made aware of order.

## 2020-11-21 NOTE — NUR
MS RN NOTES 

LABETALOL DOSE FOR 9 PM WAS  NOT ADMINISTERED D/T  BLOOD PRESSURE LOW , BP  98 /48  WILL 
CONTINUE TO MONITOR PTS.

## 2020-11-21 NOTE — NUR
RN CLOSING NOTES



PT RESTING IN BED, A/O X4. ON O2 2LPM VIA NASAL CANNULA, O2 SAT 98%. NO SOB OR ANY S/S OF 
DISTRESS NOTED AT THIS TIME. TELE MONITOR SHOWS SR. RIGHT HAND #22, INTACT AND PATENT. JOSE 
MIDLINE #18 INTACT AND PATENT. NS @50MLS/HR INFUSING WELL. NO PAIN REPORTED AT THIS TIME. 
SAFETY MEASURES OBSERVED. CALL LIGHT WITHIN REACH. BED LOCKED AND IN LOWEST POSITION. WILL 
ENDORSE TO NIGHT NURSE FOR NAHUN

## 2020-11-21 NOTE — NUR
RN NOTES

STILL IN PAIN DESPITE PAIN MEDICATION. CLARIFIED DILAUDID ORDER FROM DELIA FOSTER NP. WILL 
INCREASE DOSE OF IV DILAUDID AND DISCONTINUE ORAL DILAUDID. WILL ALSO GIVE ONE TIME 1MG 
DILAUDID IV NOW. PRIMARY RN ARTURO MADE AWARE.

## 2020-11-21 NOTE — NUR
RN NOTES



ADMITTED PATIENT FROM ER TO ROOM 107 VIA [facility]. PATIENT IS A/O X4, ABLE TO VERBALIZE NEEDS. 
ON 02 @ 2LPM VIA NASAL CANNULA. NO SOB OR RESPIRATORY DISTRESS AT THIS TIME. SAFELY 
TRANSFERRED FROM [facility] TO BED. PUT ON TELE MONITOR, SINUS RHYTHM HR 70. HEAD TO TOE 
ASSESSMENT DONE. NOTED WITH SACRAL REDNESS, LEFT LEG SCAB, AND BILATERAL LOWER EXTREMITY 
PITTING EDEMA +4, AND LEFT ARM EDEMA. SACRAL KEPT CLEAN AND DRY. BILATERAL LOWER EXTREMITIES 
AND LEFT ARM ELEVATED WITH PILLOWS. INITIAL ASSESSMENT DONE. VITAL SIGNS CHECKED AND 
RECORDED. WITH IV ON RIGHT WRIST #22G PATENT AND FLUSHED. BED LOCKED AND IN LOWEST POSITION. 
SIDE RAILS UP X2, SAFETY MEASURES IMPLEMENTED. CALL LIGHT WITHIN REACH. WILL CONTINUE TO 
MONITOR.

## 2020-11-22 VITALS — DIASTOLIC BLOOD PRESSURE: 53 MMHG | SYSTOLIC BLOOD PRESSURE: 137 MMHG

## 2020-11-22 VITALS — SYSTOLIC BLOOD PRESSURE: 163 MMHG | DIASTOLIC BLOOD PRESSURE: 61 MMHG

## 2020-11-22 VITALS — SYSTOLIC BLOOD PRESSURE: 133 MMHG | DIASTOLIC BLOOD PRESSURE: 48 MMHG

## 2020-11-22 LAB
ALBUMIN SERPL BCP-MCNC: 3 G/DL (ref 3.4–5)
ALP SERPL-CCNC: 106 U/L (ref 46–116)
ALT SERPL W P-5'-P-CCNC: 32 U/L (ref 12–78)
AST SERPL W P-5'-P-CCNC: 18 U/L (ref 15–37)
BASOPHILS # BLD AUTO: 0.1 /CMM (ref 0–0.2)
BASOPHILS NFR BLD AUTO: 1.1 % (ref 0–2)
BILIRUB SERPL-MCNC: 0.4 MG/DL (ref 0.2–1)
BILIRUB UR QL STRIP: NEGATIVE
BUN SERPL-MCNC: 47 MG/DL (ref 7–18)
CALCIUM SERPL-MCNC: 8.8 MG/DL (ref 8.5–10.1)
CHLORIDE SERPL-SCNC: 99 MMOL/L (ref 98–107)
CHOLEST SERPL-MCNC: 211 MG/DL (ref ?–200)
CO2 SERPL-SCNC: 27 MMOL/L (ref 21–32)
COLOR UR: YELLOW
CREAT SERPL-MCNC: 1.2 MG/DL (ref 0.6–1.3)
CREAT UR-MCNC: 41.2 MG/DL (ref 30–125)
EOSINOPHIL NFR BLD AUTO: 3.2 % (ref 0–6)
GLUCOSE SERPL-MCNC: 151 MG/DL (ref 74–106)
GLUCOSE UR STRIP-MCNC: NEGATIVE MG/DL
HCT VFR BLD AUTO: 28 % (ref 33–45)
HDLC SERPL-MCNC: 34 MG/DL (ref 40–60)
HGB BLD-MCNC: 9.1 G/DL (ref 11.5–14.8)
HGB UR QL STRIP: NEGATIVE ERY/UL
LDLC SERPL DIRECT ASSAY-MCNC: 149 MG/DL (ref 0–99)
LEUKOCYTE ESTERASE UR QL STRIP: (no result)
LYMPHOCYTES NFR BLD AUTO: 2.1 /CMM (ref 0.8–4.8)
LYMPHOCYTES NFR BLD AUTO: 29.4 % (ref 20–44)
MAGNESIUM SERPL-MCNC: 2.8 MG/DL (ref 1.8–2.4)
MCHC RBC AUTO-ENTMCNC: 32 G/DL (ref 31–36)
MCV RBC AUTO: 86 FL (ref 82–100)
MONOCYTES NFR BLD AUTO: 0.6 /CMM (ref 0.1–1.3)
MONOCYTES NFR BLD AUTO: 7.9 % (ref 2–12)
NEUTROPHILS # BLD AUTO: 4.2 /CMM (ref 1.8–8.9)
NEUTROPHILS NFR BLD AUTO: 58.4 % (ref 43–81)
NITRITE UR QL STRIP: NEGATIVE
PH UR STRIP: 6 [PH] (ref 5–8)
PHOSPHATE SERPL-MCNC: 4.7 MG/DL (ref 2.5–4.9)
PLATELET # BLD AUTO: 266 /CMM (ref 150–450)
POTASSIUM SERPL-SCNC: 5.4 MMOL/L (ref 3.5–5.1)
PROT SERPL-MCNC: 6.1 G/DL (ref 6.4–8.2)
PROT UR QL STRIP: NEGATIVE MG/DL
PROT UR-MCNC: 6.4 MG/DL (ref 0–11.9)
RBC # BLD AUTO: 3.26 MIL/UL (ref 4–5.2)
RBC #/AREA URNS HPF: (no result) /HPF (ref 0–2)
SODIUM SERPL-SCNC: 132 MMOL/L (ref 136–145)
SODIUM UR-SCNC: 9 MMOL/L (ref 40–220)
TRIGL SERPL-MCNC: 163 MG/DL (ref 30–150)
UROBILINOGEN UR STRIP-MCNC: 0.2 EU/DL
WBC NRBC COR # BLD AUTO: 7.1 K/UL (ref 4.3–11)

## 2020-11-22 RX ADMIN — LIDOCAINE SCH EA: 50 PATCH CUTANEOUS at 08:25

## 2020-11-22 RX ADMIN — ASPIRIN 81 MG SCH MG: 81 TABLET ORAL at 08:21

## 2020-11-22 RX ADMIN — LOSARTAN POTASSIUM SCH MG: 50 TABLET, FILM COATED ORAL at 08:22

## 2020-11-22 RX ADMIN — Medication SCH MG: at 08:23

## 2020-11-22 RX ADMIN — CLOTRIMAZOLE SCH GM: 1 CREAM TOPICAL at 16:23

## 2020-11-22 RX ADMIN — INSULIN HUMAN PRN UNIT: 100 INJECTION, SOLUTION PARENTERAL at 12:10

## 2020-11-22 RX ADMIN — ALBUTEROL SULFATE SCH MG: 2.5 SOLUTION RESPIRATORY (INHALATION) at 19:48

## 2020-11-22 RX ADMIN — Medication SCH MG: at 16:21

## 2020-11-22 RX ADMIN — INSULIN HUMAN PRN UNIT: 100 INJECTION, SOLUTION PARENTERAL at 08:04

## 2020-11-22 RX ADMIN — HYDROMORPHONE HYDROCHLORIDE PRN MG: 2 TABLET ORAL at 18:55

## 2020-11-22 RX ADMIN — Medication SCH EACH: at 08:18

## 2020-11-22 RX ADMIN — Medication SCH MG: at 13:44

## 2020-11-22 RX ADMIN — FUROSEMIDE SCH MG: 10 INJECTION, SOLUTION INTRAMUSCULAR; INTRAVENOUS at 16:21

## 2020-11-22 RX ADMIN — Medication SCH MG: at 19:47

## 2020-11-22 RX ADMIN — ATORVASTATIN CALCIUM SCH MG: 40 TABLET, FILM COATED ORAL at 22:17

## 2020-11-22 RX ADMIN — EZETIMIBE SCH MG: 10 TABLET ORAL at 08:23

## 2020-11-22 RX ADMIN — HYDROMORPHONE HYDROCHLORIDE PRN MG: 2 TABLET ORAL at 00:21

## 2020-11-22 RX ADMIN — DEXTROSE MONOHYDRATE SCH MLS/HR: 50 INJECTION, SOLUTION INTRAVENOUS at 15:24

## 2020-11-22 RX ADMIN — ALBUTEROL SULFATE SCH MG: 2.5 SOLUTION RESPIRATORY (INHALATION) at 00:42

## 2020-11-22 RX ADMIN — POLYETHYLENE GLYCOL 3350 PRN GM: 17 POWDER, FOR SOLUTION ORAL at 17:56

## 2020-11-22 RX ADMIN — HYDROMORPHONE HYDROCHLORIDE PRN MG: 2 TABLET ORAL at 12:29

## 2020-11-22 RX ADMIN — PREGABALIN SCH MG: 25 CAPSULE ORAL at 12:28

## 2020-11-22 RX ADMIN — PREGABALIN SCH MG: 25 CAPSULE ORAL at 20:19

## 2020-11-22 RX ADMIN — BACLOFEN SCH MG: 10 TABLET ORAL at 12:28

## 2020-11-22 RX ADMIN — CLOPIDOGREL BISULFATE SCH MG: 75 TABLET, FILM COATED ORAL at 08:24

## 2020-11-22 RX ADMIN — PANTOPRAZOLE SODIUM SCH MG: 40 TABLET, DELAYED RELEASE ORAL at 08:23

## 2020-11-22 RX ADMIN — BACLOFEN SCH MG: 10 TABLET ORAL at 20:17

## 2020-11-22 RX ADMIN — Medication SCH MG: at 12:28

## 2020-11-22 RX ADMIN — FUROSEMIDE SCH MG: 10 INJECTION, SOLUTION INTRAMUSCULAR; INTRAVENOUS at 20:17

## 2020-11-22 RX ADMIN — Medication SCH EACH: at 17:22

## 2020-11-22 RX ADMIN — FUROSEMIDE SCH MG: 10 INJECTION, SOLUTION INTRAMUSCULAR; INTRAVENOUS at 11:59

## 2020-11-22 RX ADMIN — Medication SCH EACH: at 22:17

## 2020-11-22 RX ADMIN — ALBUTEROL SULFATE SCH MG: 2.5 SOLUTION RESPIRATORY (INHALATION) at 13:44

## 2020-11-22 RX ADMIN — LINAGLIPTIN SCH MG: 5 TABLET, FILM COATED ORAL at 08:24

## 2020-11-22 RX ADMIN — Medication SCH EACH: at 11:47

## 2020-11-22 RX ADMIN — HUMAN INSULIN PRN UNIT: 100 INJECTION, SOLUTION SUBCUTANEOUS at 22:16

## 2020-11-22 RX ADMIN — FLUCONAZOLE SCH MG: 100 TABLET ORAL at 18:51

## 2020-11-22 RX ADMIN — PREGABALIN SCH MG: 25 CAPSULE ORAL at 05:52

## 2020-11-22 RX ADMIN — Medication SCH MG: at 08:12

## 2020-11-22 RX ADMIN — BACLOFEN SCH MG: 10 TABLET ORAL at 05:51

## 2020-11-22 RX ADMIN — CLOTRIMAZOLE SCH GM: 1 CREAM TOPICAL at 08:25

## 2020-11-22 RX ADMIN — HYDROMORPHONE HYDROCHLORIDE PRN MG: 2 TABLET ORAL at 06:03

## 2020-11-22 RX ADMIN — LABETALOL HCL SCH MG: 100 TABLET, FILM COATED ORAL at 20:18

## 2020-11-22 RX ADMIN — LABETALOL HCL SCH MG: 100 TABLET, FILM COATED ORAL at 08:24

## 2020-11-22 RX ADMIN — Medication SCH MG: at 00:42

## 2020-11-22 RX ADMIN — ALBUTEROL SULFATE SCH MG: 2.5 SOLUTION RESPIRATORY (INHALATION) at 08:12

## 2020-11-22 NOTE — NUR
RN CLOSING NOTES



PT RESTING IN BED, A/O X4. ON O2 2LPM VIA NASAL CANNULA, O2 SAT 98%. NO SOB OR ANY S/S OF 
DISTRESS NOTED AT THIS TIME. TELE MONITOR SHOWS SR. RIGHT HAND #22, INTACT AND PATENT. JOSE 
MIDLINE #18 INTACT AND PATENT.ALL DUE MED AT 6AM GIVEN . SAFETY MEASURES OBSERVED. CALL 
LIGHT WITHIN REACH. BED LOCKED AND IN LOWEST POSITION. WILL ENDORSE TO DAY SHIFT NURSE FOR 
NAHUN

## 2020-11-22 NOTE — NUR
RN NOTES



PATIENT ALERT ORIENTED X 4, VERBALLY RESPONSIVE. ON OXYGEN VIA NASAL CANNULA AT 2 LPM WITH 
O2SAT AT 99% NO SOB NOTED, NOT IN ANY ACUTE DISTRESS. WITH + 4 EDEMA ON LEFT ARM AND 
BILATERAL LOWER EXTREMITY. REDNESS NOTED ON BILATERAL LOWER LEG. WITH RIGHT UPPER ARM 
MIDLINE PATENT AND INTACT, DRESSING CLEAN AND DRY. RIGHT HAND IV GAUGE 22 PATENT AND INTACT, 
FLUSHED WITH 10 ML NS. NO SIGNS AND SYMPTOMS OF HYPO/HYPERGLYCEMIA NOTED. ALL SAFETY MEASURE 
IMPLEMENTED AS PER PROTOCOL, CALL LIGHT WITHIN REACH. BED LOCKED IN LOWEST POSITION.

## 2020-11-22 NOTE — NUR
RN NOTES



PATIENT IN BED, ALERT ORIENTED X 4, ABLE TO COMMUNICATE NEEDS. ON O2 VIA NASAL CANNULA AT 2 
LPM, SATURATING 96 %. NOT SOB NO PAIN NOTED. WITH LEFT ARM EDEMA AND BILATERAL LEG EDEMA 
NOTED WITH REDNESS. WITH RIGHT UPPER ARM MIDLINE AND PIV ON RIGHT HAND, PATENT AND INTACT. 
ALL SAFETY MEASURES IMPLEMENTED PER PROTOCOL. BED IN LOWEST POSITION, BED LOCKED. SIDE RAILS 
UP X 2. CALL LIGHT WITHIN REACH.

## 2020-11-23 VITALS — DIASTOLIC BLOOD PRESSURE: 50 MMHG | SYSTOLIC BLOOD PRESSURE: 116 MMHG

## 2020-11-23 VITALS — DIASTOLIC BLOOD PRESSURE: 65 MMHG | SYSTOLIC BLOOD PRESSURE: 130 MMHG

## 2020-11-23 VITALS — DIASTOLIC BLOOD PRESSURE: 69 MMHG | SYSTOLIC BLOOD PRESSURE: 146 MMHG

## 2020-11-23 LAB
ALBUMIN SERPL BCP-MCNC: 2.9 G/DL (ref 3.4–5)
ALP SERPL-CCNC: 89 U/L (ref 46–116)
ALT SERPL W P-5'-P-CCNC: 30 U/L (ref 12–78)
AST SERPL W P-5'-P-CCNC: 15 U/L (ref 15–37)
BASOPHILS # BLD AUTO: 0.1 /CMM (ref 0–0.2)
BASOPHILS NFR BLD AUTO: 0.8 % (ref 0–2)
BILIRUB SERPL-MCNC: 0.6 MG/DL (ref 0.2–1)
BUN SERPL-MCNC: 36 MG/DL (ref 7–18)
CALCIUM SERPL-MCNC: 8.5 MG/DL (ref 8.5–10.1)
CHLORIDE SERPL-SCNC: 99 MMOL/L (ref 98–107)
CO2 SERPL-SCNC: 32 MMOL/L (ref 21–32)
CREAT SERPL-MCNC: 0.8 MG/DL (ref 0.6–1.3)
EOSINOPHIL NFR BLD AUTO: 3.9 % (ref 0–6)
GLUCOSE SERPL-MCNC: 167 MG/DL (ref 74–106)
HCT VFR BLD AUTO: 26 % (ref 33–45)
HGB BLD-MCNC: 8.8 G/DL (ref 11.5–14.8)
LYMPHOCYTES NFR BLD AUTO: 2.1 /CMM (ref 0.8–4.8)
LYMPHOCYTES NFR BLD AUTO: 28.4 % (ref 20–44)
MAGNESIUM SERPL-MCNC: 2.2 MG/DL (ref 1.8–2.4)
MCHC RBC AUTO-ENTMCNC: 33 G/DL (ref 31–36)
MCV RBC AUTO: 85 FL (ref 82–100)
MONOCYTES NFR BLD AUTO: 0.7 /CMM (ref 0.1–1.3)
MONOCYTES NFR BLD AUTO: 9.8 % (ref 2–12)
NEUTROPHILS # BLD AUTO: 4.3 /CMM (ref 1.8–8.9)
NEUTROPHILS NFR BLD AUTO: 57.1 % (ref 43–81)
PHOSPHATE SERPL-MCNC: 3.6 MG/DL (ref 2.5–4.9)
PLATELET # BLD AUTO: 284 /CMM (ref 150–450)
POTASSIUM SERPL-SCNC: 4.6 MMOL/L (ref 3.5–5.1)
PROT SERPL-MCNC: 5.9 G/DL (ref 6.4–8.2)
RBC # BLD AUTO: 3.09 MIL/UL (ref 4–5.2)
SODIUM SERPL-SCNC: 134 MMOL/L (ref 136–145)
WBC NRBC COR # BLD AUTO: 7.5 K/UL (ref 4.3–11)

## 2020-11-23 RX ADMIN — ALBUTEROL SULFATE SCH MG: 2.5 SOLUTION RESPIRATORY (INHALATION) at 13:13

## 2020-11-23 RX ADMIN — PREGABALIN SCH MG: 25 CAPSULE ORAL at 12:27

## 2020-11-23 RX ADMIN — LABETALOL HCL SCH MG: 100 TABLET, FILM COATED ORAL at 08:33

## 2020-11-23 RX ADMIN — BACLOFEN SCH MG: 10 TABLET ORAL at 05:32

## 2020-11-23 RX ADMIN — Medication SCH MG: at 20:17

## 2020-11-23 RX ADMIN — CLOTRIMAZOLE SCH GM: 1 CREAM TOPICAL at 16:57

## 2020-11-23 RX ADMIN — POLYETHYLENE GLYCOL 3350 PRN GM: 17 POWDER, FOR SOLUTION ORAL at 11:00

## 2020-11-23 RX ADMIN — Medication SCH EACH: at 21:08

## 2020-11-23 RX ADMIN — HYDROMORPHONE HYDROCHLORIDE PRN MG: 2 TABLET ORAL at 08:39

## 2020-11-23 RX ADMIN — Medication SCH MG: at 01:40

## 2020-11-23 RX ADMIN — ASPIRIN 81 MG SCH MG: 81 TABLET ORAL at 08:36

## 2020-11-23 RX ADMIN — LIDOCAINE SCH EA: 50 PATCH CUTANEOUS at 08:39

## 2020-11-23 RX ADMIN — Medication SCH MG: at 12:26

## 2020-11-23 RX ADMIN — INSULIN HUMAN PRN UNIT: 100 INJECTION, SOLUTION PARENTERAL at 18:20

## 2020-11-23 RX ADMIN — ALBUTEROL SULFATE SCH MG: 2.5 SOLUTION RESPIRATORY (INHALATION) at 08:01

## 2020-11-23 RX ADMIN — LABETALOL HCL SCH MG: 100 TABLET, FILM COATED ORAL at 20:53

## 2020-11-23 RX ADMIN — CLOTRIMAZOLE SCH GM: 1 CREAM TOPICAL at 17:30

## 2020-11-23 RX ADMIN — HYDROMORPHONE HYDROCHLORIDE PRN MG: 2 TABLET ORAL at 18:57

## 2020-11-23 RX ADMIN — ATORVASTATIN CALCIUM SCH MG: 40 TABLET, FILM COATED ORAL at 21:00

## 2020-11-23 RX ADMIN — ACETAMINOPHEN PRN MG: 325 TABLET ORAL at 05:55

## 2020-11-23 RX ADMIN — PANTOPRAZOLE SODIUM SCH MG: 40 TABLET, DELAYED RELEASE ORAL at 20:53

## 2020-11-23 RX ADMIN — PREGABALIN SCH MG: 25 CAPSULE ORAL at 20:59

## 2020-11-23 RX ADMIN — Medication SCH MG: at 13:13

## 2020-11-23 RX ADMIN — Medication SCH MG: at 08:01

## 2020-11-23 RX ADMIN — CLOPIDOGREL BISULFATE SCH MG: 75 TABLET, FILM COATED ORAL at 08:35

## 2020-11-23 RX ADMIN — Medication SCH MG: at 08:36

## 2020-11-23 RX ADMIN — ALBUTEROL SULFATE SCH MG: 2.5 SOLUTION RESPIRATORY (INHALATION) at 01:41

## 2020-11-23 RX ADMIN — Medication SCH MG: at 17:29

## 2020-11-23 RX ADMIN — HUMAN INSULIN PRN UNIT: 100 INJECTION, SOLUTION SUBCUTANEOUS at 21:09

## 2020-11-23 RX ADMIN — Medication SCH MG: at 08:32

## 2020-11-23 RX ADMIN — LINAGLIPTIN SCH MG: 5 TABLET, FILM COATED ORAL at 08:34

## 2020-11-23 RX ADMIN — Medication SCH EA: at 15:22

## 2020-11-23 RX ADMIN — ALBUTEROL SULFATE SCH MG: 2.5 SOLUTION RESPIRATORY (INHALATION) at 20:17

## 2020-11-23 RX ADMIN — PREGABALIN SCH MG: 25 CAPSULE ORAL at 05:32

## 2020-11-23 RX ADMIN — BACLOFEN SCH MG: 10 TABLET ORAL at 20:52

## 2020-11-23 RX ADMIN — ENOXAPARIN SODIUM SCH MG: 40 INJECTION SUBCUTANEOUS at 17:30

## 2020-11-23 RX ADMIN — GUAIFENESIN AND DEXTROMETHORPHAN PRN ML: 100; 10 SYRUP ORAL at 22:51

## 2020-11-23 RX ADMIN — DEXTROSE MONOHYDRATE SCH MLS/HR: 50 INJECTION, SOLUTION INTRAVENOUS at 08:41

## 2020-11-23 RX ADMIN — Medication SCH EACH: at 12:26

## 2020-11-23 RX ADMIN — EZETIMIBE SCH MG: 10 TABLET ORAL at 08:34

## 2020-11-23 RX ADMIN — FLUCONAZOLE SCH MG: 100 TABLET ORAL at 08:31

## 2020-11-23 RX ADMIN — INSULIN HUMAN PRN UNIT: 100 INJECTION, SOLUTION PARENTERAL at 12:33

## 2020-11-23 RX ADMIN — Medication SCH EACH: at 08:29

## 2020-11-23 RX ADMIN — Medication SCH EACH: at 18:19

## 2020-11-23 RX ADMIN — BACLOFEN SCH MG: 10 TABLET ORAL at 12:27

## 2020-11-23 NOTE — NUR
RN MS1 



PATIENT IN BED AWAKE ALERT AND ORIENTED X4 PATIENT NO PAIN, NO SOB, NO ACUTE RESPIRATORY 
DISTRESS AT THIS TIME. PATIENON 3 LMP NC TOLERATING WELL > 95 %  PATIENT VITALS STABLE AT 
THIS TIME. PATIENT HAS JOSE LINE PATENT AND INTACT  NO SIGNS OF INFILTRATION OR SIGNS OF 
INFECTION. PATIENT DOES HAVE BLE SWELL  WITH +4 LEFT LEG AND +3 RIGHT LEGG. PATIENT ION 
CARDIAC DIET. PATIENT  R HAND 20 # PATENT INTACT NO SIGNS OF INFECTION OR INFILTRATION . BED 
LOCKED LOWEST POSITION CALL LIGHT WITH IN REACH ALL SAFETY MEASURES IMPLEMENTED PER HOSPITAL 
POLICY

## 2020-11-23 NOTE — NUR
RN notes



Received patient awake sitting in chair watching TV with no distress noted. Breathing even 
and unlabored. On 3 lpm O2 via nasal cannula tolerating well. Alert and oriented x 4. 
Verbally able to communicate needs. On lasix, with heavy urine output x 5. Complaining of 
generalized pain. Dilaudid and tylenol given as requested, with help. No significant change 
of condition. Vital signs WNL. Kept clean and dry. Endorsed to next shift for continuity of 
care.

## 2020-11-23 NOTE — NUR
RN MS NOTES, 



PATIENT SITTING ON CHAIR,  AWAKE ALERT AND ORIENTED X4 PATIENT, ABLE TO VERBALIZED NEEDS AN 
CONCERNS, BREATHING EVEN AND UNLABORED,   NO SOB/ACUTE RESPIRATORY DISTRESS NOTED, ON 3 LMP 
NC TOLERATING WELL WITH OPTIMAL O2 SAT LEVEL, JOSE LINE AND   R HAND 20 # BOTH PATENT INTACT, 
NO SIGNS OF INFECTION OR INFILTRATION,  NOTED  BLE SWOLLEN MORE LEFT THAT RIGHT,   BED 
LOCKED AND  LOWEST POSITION,  CALL LIGHT WITH IN REACH,  ALL SAFETY MEASURES IN PLACE, WILL 
CONTINUE TO MONITOR CLOSELY.

## 2020-11-23 NOTE — NUR
RN OPENING NOTE



PT IS AWAKE IN BED, A/Ox4 ON NC 2LPM SPO2 98%, NO SIGNS OF RESP DISTRESS OR SOB. PT STATES 
PAIN 10/10, WILL ADMIN PAIN MEDS PER PROTOCOL. PT HAS BLE EDEMA RT +3 AND LT+4. PT HAS RT 
HAND #22 AND JOSE MIDLINE BOTH FLUSHED, PATENT AND SALINE LOCKED, NO SIGNS OF INFILTRATION OR 
INFECTION. PT SAFETY PRECAUTIONS IN PLACE- BED LOCKED AND IN LOWEST POSITION, CALL LIGHT 
WITHIN REACH, SR UP x2, PT IN SEMIFOWLER'S. WILL CONTINUE TO MONITOR.

## 2020-11-24 VITALS — DIASTOLIC BLOOD PRESSURE: 55 MMHG | SYSTOLIC BLOOD PRESSURE: 124 MMHG

## 2020-11-24 VITALS — DIASTOLIC BLOOD PRESSURE: 72 MMHG | SYSTOLIC BLOOD PRESSURE: 130 MMHG

## 2020-11-24 VITALS — SYSTOLIC BLOOD PRESSURE: 160 MMHG | DIASTOLIC BLOOD PRESSURE: 65 MMHG

## 2020-11-24 VITALS — DIASTOLIC BLOOD PRESSURE: 77 MMHG | SYSTOLIC BLOOD PRESSURE: 169 MMHG

## 2020-11-24 LAB
BASE EXCESS BLDA CALC-SCNC: 5 MMOL/L
BASOPHILS # BLD AUTO: 0.1 /CMM (ref 0–0.2)
BASOPHILS NFR BLD AUTO: 1.4 % (ref 0–2)
BUN SERPL-MCNC: 23 MG/DL (ref 7–18)
CALCIUM SERPL-MCNC: 8.7 MG/DL (ref 8.5–10.1)
CHLORIDE SERPL-SCNC: 99 MMOL/L (ref 98–107)
CO2 SERPL-SCNC: 33 MMOL/L (ref 21–32)
CREAT SERPL-MCNC: 0.7 MG/DL (ref 0.6–1.3)
DO-HGB MFR BLDA: 25.9 MMHG
EOSINOPHIL NFR BLD AUTO: 4.1 % (ref 0–6)
GLUCOSE SERPL-MCNC: 96 MG/DL (ref 74–106)
HCT VFR BLD AUTO: 27 % (ref 33–45)
HGB BLD-MCNC: 8.8 G/DL (ref 11.5–14.8)
INHALED O2 CONCENTRATION: 21 %
LYMPHOCYTES NFR BLD AUTO: 1.9 /CMM (ref 0.8–4.8)
LYMPHOCYTES NFR BLD AUTO: 26.9 % (ref 20–44)
MAGNESIUM SERPL-MCNC: 2.2 MG/DL (ref 1.8–2.4)
MCHC RBC AUTO-ENTMCNC: 33 G/DL (ref 31–36)
MCV RBC AUTO: 85 FL (ref 82–100)
MONOCYTES NFR BLD AUTO: 0.7 /CMM (ref 0.1–1.3)
MONOCYTES NFR BLD AUTO: 10 % (ref 2–12)
NEUTROPHILS # BLD AUTO: 4.2 /CMM (ref 1.8–8.9)
NEUTROPHILS NFR BLD AUTO: 57.6 % (ref 43–81)
PCO2 TEMP ADJ BLDA: 50 MMHG (ref 35–45)
PH TEMP ADJ BLDA: 7.4 [PH] (ref 7.35–7.45)
PHOSPHATE SERPL-MCNC: 2.6 MG/DL (ref 2.5–4.9)
PLATELET # BLD AUTO: 286 /CMM (ref 150–450)
PO2 TEMP ADJ BLDA: 64 MMHG (ref 75–100)
POTASSIUM SERPL-SCNC: 4.6 MMOL/L (ref 3.5–5.1)
RBC # BLD AUTO: 3.14 MIL/UL (ref 4–5.2)
SAO2 % BLDA: 92.3 % (ref 92–98.5)
SODIUM SERPL-SCNC: 134 MMOL/L (ref 136–145)
VENTILATION MODE VENT: (no result)
WBC NRBC COR # BLD AUTO: 7.2 K/UL (ref 4.3–11)

## 2020-11-24 RX ADMIN — Medication SCH EA: at 12:49

## 2020-11-24 RX ADMIN — Medication SCH EACH: at 21:38

## 2020-11-24 RX ADMIN — Medication SCH MG: at 08:44

## 2020-11-24 RX ADMIN — CLOTRIMAZOLE SCH GM: 1 CREAM TOPICAL at 17:00

## 2020-11-24 RX ADMIN — Medication SCH MG: at 19:16

## 2020-11-24 RX ADMIN — ALBUTEROL SULFATE SCH MG: 2.5 SOLUTION RESPIRATORY (INHALATION) at 19:16

## 2020-11-24 RX ADMIN — GUAIFENESIN AND DEXTROMETHORPHAN PRN ML: 100; 10 SYRUP ORAL at 04:53

## 2020-11-24 RX ADMIN — LABETALOL HCL SCH MG: 100 TABLET, FILM COATED ORAL at 08:46

## 2020-11-24 RX ADMIN — DEXTROSE MONOHYDRATE SCH MLS/HR: 50 INJECTION, SOLUTION INTRAVENOUS at 02:39

## 2020-11-24 RX ADMIN — PANTOPRAZOLE SODIUM SCH MG: 40 TABLET, DELAYED RELEASE ORAL at 21:30

## 2020-11-24 RX ADMIN — LIDOCAINE SCH EA: 50 PATCH CUTANEOUS at 08:44

## 2020-11-24 RX ADMIN — ENOXAPARIN SODIUM SCH MG: 40 INJECTION SUBCUTANEOUS at 21:31

## 2020-11-24 RX ADMIN — HYDROMORPHONE HYDROCHLORIDE PRN MG: 2 TABLET ORAL at 18:41

## 2020-11-24 RX ADMIN — BACLOFEN SCH MG: 10 TABLET ORAL at 04:53

## 2020-11-24 RX ADMIN — BACLOFEN SCH MG: 10 TABLET ORAL at 12:27

## 2020-11-24 RX ADMIN — Medication SCH EACH: at 12:49

## 2020-11-24 RX ADMIN — BACLOFEN SCH MG: 10 TABLET ORAL at 20:46

## 2020-11-24 RX ADMIN — Medication SCH MG: at 18:38

## 2020-11-24 RX ADMIN — LIDOCAINE SCH EA: 50 PATCH CUTANEOUS at 12:49

## 2020-11-24 RX ADMIN — CLOPIDOGREL BISULFATE SCH MG: 75 TABLET, FILM COATED ORAL at 08:41

## 2020-11-24 RX ADMIN — ACETAMINOPHEN PRN MG: 325 TABLET ORAL at 20:45

## 2020-11-24 RX ADMIN — ATORVASTATIN CALCIUM SCH MG: 40 TABLET, FILM COATED ORAL at 21:30

## 2020-11-24 RX ADMIN — INSULIN HUMAN PRN UNIT: 100 INJECTION, SOLUTION PARENTERAL at 12:42

## 2020-11-24 RX ADMIN — ALBUTEROL SULFATE SCH MG: 2.5 SOLUTION RESPIRATORY (INHALATION) at 13:06

## 2020-11-24 RX ADMIN — CLOTRIMAZOLE SCH GM: 1 CREAM TOPICAL at 08:45

## 2020-11-24 RX ADMIN — Medication SCH MG: at 02:21

## 2020-11-24 RX ADMIN — GUAIFENESIN AND DEXTROMETHORPHAN PRN ML: 100; 10 SYRUP ORAL at 11:08

## 2020-11-24 RX ADMIN — Medication SCH MG: at 08:23

## 2020-11-24 RX ADMIN — HUMAN INSULIN PRN UNIT: 100 INJECTION, SOLUTION SUBCUTANEOUS at 21:39

## 2020-11-24 RX ADMIN — Medication SCH MG: at 12:27

## 2020-11-24 RX ADMIN — LABETALOL HCL SCH MG: 100 TABLET, FILM COATED ORAL at 20:45

## 2020-11-24 RX ADMIN — DEXTROSE MONOHYDRATE SCH MLS/HR: 50 INJECTION, SOLUTION INTRAVENOUS at 20:44

## 2020-11-24 RX ADMIN — HYDROMORPHONE HYDROCHLORIDE PRN MG: 2 TABLET ORAL at 03:58

## 2020-11-24 RX ADMIN — ALBUTEROL SULFATE SCH MG: 2.5 SOLUTION RESPIRATORY (INHALATION) at 02:21

## 2020-11-24 RX ADMIN — ALBUTEROL SULFATE SCH MG: 2.5 SOLUTION RESPIRATORY (INHALATION) at 08:23

## 2020-11-24 RX ADMIN — PREGABALIN SCH MG: 25 CAPSULE ORAL at 04:53

## 2020-11-24 RX ADMIN — Medication SCH EACH: at 18:39

## 2020-11-24 RX ADMIN — Medication SCH MG: at 18:39

## 2020-11-24 RX ADMIN — ASPIRIN 81 MG SCH MG: 81 TABLET ORAL at 08:43

## 2020-11-24 RX ADMIN — DEXTROSE MONOHYDRATE SCH MG: 50 INJECTION, SOLUTION INTRAVENOUS at 08:45

## 2020-11-24 RX ADMIN — Medication SCH EACH: at 08:45

## 2020-11-24 RX ADMIN — GUAIFENESIN AND DEXTROMETHORPHAN PRN ML: 100; 10 SYRUP ORAL at 18:40

## 2020-11-24 RX ADMIN — DEXTROSE MONOHYDRATE SCH MG: 50 INJECTION, SOLUTION INTRAVENOUS at 18:38

## 2020-11-24 RX ADMIN — Medication SCH MG: at 08:43

## 2020-11-24 RX ADMIN — EZETIMIBE SCH MG: 10 TABLET ORAL at 08:44

## 2020-11-24 RX ADMIN — LINAGLIPTIN SCH MG: 5 TABLET, FILM COATED ORAL at 08:44

## 2020-11-24 RX ADMIN — Medication SCH MG: at 13:06

## 2020-11-24 RX ADMIN — PREGABALIN SCH MG: 25 CAPSULE ORAL at 12:27

## 2020-11-24 RX ADMIN — PREGABALIN SCH MG: 25 CAPSULE ORAL at 20:45

## 2020-11-24 RX ADMIN — HYDROMORPHONE HYDROCHLORIDE PRN MG: 2 TABLET ORAL at 11:44

## 2020-11-24 NOTE — NUR
RN MS CLOSING NOTES, 



PATIENT   AWAKE ALERT AND ORIENTED X4 PATIENT, BREATHING EVEN AND UNLABORED,   NO SOB/ACUTE 
RESPIRATORY DISTRESS NOTED, ON 3 LMP NC TOLERATING WELL WITH OPTIMAL O2 SAT LEVEL, JOSE LINE 
AND   R HAND 20 # BOTH PATENT INTACT, NO CHANGE IN CONDITION DURING THE NIGHT,   BED LOCKED 
AND  LOWEST POSITION,  CALL LIGHT WITH IN REACH,  ALL SAFETY MEASURES IN PLACE, WILL ENDORSE 
CONTINUITY OF CARE TO ONCOMING NURSE.

## 2020-11-24 NOTE — NUR
PT RECEIVED IN BED, ALERT AND ORIENTED X 4. PT ON 3L NC O2 SATURATION 98%. NO RESPIRATORY 
DISTRESS OR SOB. PT HAS LE EDEMA +4, LL EDEMA +3. PT AMBULATORY TO BATHROOM WITH WALKER. PT 
ON CARDIAC DIET, RIGHT HAND 22G, JOSE MIDLINE 18G INTACT. PATENT AND FLUSHED WELL. NO SIGNS 
OF INFECTION OR INFILTRATION. BED IN LOCKED LOWEST POSITION. CALL LIGHT WITHIN REACH. ALL 
SAFETY MEASURES IN PLACE. WILL CONTINUE TO MONITOR CLOSELY.

## 2020-11-24 NOTE — NUR
RECEIVED PT ON CHAIR AWAKE A/O X4 ABLE TO VERBALIZE NEEDS, ON O2 2L VIA NC SPO2 96% NO SIGN 
AND SYMPTOMS OF RESPIRATORY DISTRESS, HAVE JOSE MIDLINE  AND RHAND IV # 22 PATENT AND FLUSH, 
CALL LIGHT WITHIN REACH WILL CONT TO MONITOR THE PT

## 2020-11-24 NOTE — NUR
PT BLOOD PRESSURE ELEVATED 150/65. NP JORGE LUIS AWARE, IS OK WITH RESULT. STATES THAT PT IS NOW 
ON LOOP DIURETIC BUMEX, NO NEW ORDERS AT THIS TIME.

## 2020-11-24 NOTE — NUR
PT REMAINS IN CHAIR, ALERT AND ORIENTED X 4. PT ON 2 L O2 SATURATION 94-98%. PT TAKES OFF NC 
OCCASIONALLY THIS SHIFT, NO RESPIRATORY DISTRESS OR SOB. PT IS AMBULATORY X 1 ASSIST WITH 
WALKER TO BATHROOM, TOTAL URINE OUTPUT 2200 ML. TOTAL INTAKE 880 ML. BOTH PT IV RIGHT HAND 
22 AND JOSE MIDLINE 18 G REMAIN SL AND INTACT, FLUSHED WELL NO SIGNS OF INFECTION OR 
INFILTRATION. BED IN LOCKED LOWEST POSITION, CALL LIGHT WITHIN REACH. ALL SAFETY MEASURES IN 
PLACE. REPORT GIVEN TO AUBREY CHAVARRIA FOR [facility].

## 2020-11-25 ENCOUNTER — HOSPITAL ENCOUNTER (INPATIENT)
Dept: HOSPITAL 12 - REHABOV3 | Age: 60
LOS: 14 days | Discharge: HOME HEALTH | DRG: 194 | End: 2020-12-09
Attending: PHYSICAL MEDICINE & REHABILITATION | Admitting: PHYSICAL MEDICINE & REHABILITATION
Payer: COMMERCIAL

## 2020-11-25 VITALS — SYSTOLIC BLOOD PRESSURE: 142 MMHG | DIASTOLIC BLOOD PRESSURE: 74 MMHG

## 2020-11-25 VITALS — HEIGHT: 62 IN | BODY MASS INDEX: 45.45 KG/M2 | WEIGHT: 247 LBS

## 2020-11-25 VITALS — DIASTOLIC BLOOD PRESSURE: 80 MMHG | SYSTOLIC BLOOD PRESSURE: 148 MMHG

## 2020-11-25 VITALS — SYSTOLIC BLOOD PRESSURE: 147 MMHG | DIASTOLIC BLOOD PRESSURE: 48 MMHG

## 2020-11-25 VITALS — DIASTOLIC BLOOD PRESSURE: 43 MMHG | SYSTOLIC BLOOD PRESSURE: 116 MMHG

## 2020-11-25 VITALS — SYSTOLIC BLOOD PRESSURE: 153 MMHG | DIASTOLIC BLOOD PRESSURE: 63 MMHG

## 2020-11-25 DIAGNOSIS — I50.43: ICD-10-CM

## 2020-11-25 DIAGNOSIS — R19.5: ICD-10-CM

## 2020-11-25 DIAGNOSIS — R32: ICD-10-CM

## 2020-11-25 DIAGNOSIS — J96.01: ICD-10-CM

## 2020-11-25 DIAGNOSIS — I69.354: ICD-10-CM

## 2020-11-25 DIAGNOSIS — E11.22: ICD-10-CM

## 2020-11-25 DIAGNOSIS — I87.2: ICD-10-CM

## 2020-11-25 DIAGNOSIS — Z88.1: ICD-10-CM

## 2020-11-25 DIAGNOSIS — D68.59: ICD-10-CM

## 2020-11-25 DIAGNOSIS — I25.10: ICD-10-CM

## 2020-11-25 DIAGNOSIS — L03.116: ICD-10-CM

## 2020-11-25 DIAGNOSIS — R20.0: ICD-10-CM

## 2020-11-25 DIAGNOSIS — N17.0: ICD-10-CM

## 2020-11-25 DIAGNOSIS — F39: ICD-10-CM

## 2020-11-25 DIAGNOSIS — M54.5: ICD-10-CM

## 2020-11-25 DIAGNOSIS — D63.8: ICD-10-CM

## 2020-11-25 DIAGNOSIS — N32.81: ICD-10-CM

## 2020-11-25 DIAGNOSIS — E11.65: ICD-10-CM

## 2020-11-25 DIAGNOSIS — G62.9: ICD-10-CM

## 2020-11-25 DIAGNOSIS — R07.89: ICD-10-CM

## 2020-11-25 DIAGNOSIS — E66.2: ICD-10-CM

## 2020-11-25 DIAGNOSIS — I65.22: ICD-10-CM

## 2020-11-25 DIAGNOSIS — J45.909: ICD-10-CM

## 2020-11-25 DIAGNOSIS — G89.4: ICD-10-CM

## 2020-11-25 DIAGNOSIS — E22.2: ICD-10-CM

## 2020-11-25 DIAGNOSIS — I13.0: Primary | ICD-10-CM

## 2020-11-25 DIAGNOSIS — E78.5: ICD-10-CM

## 2020-11-25 DIAGNOSIS — I89.0: ICD-10-CM

## 2020-11-25 DIAGNOSIS — L03.115: ICD-10-CM

## 2020-11-25 DIAGNOSIS — N18.9: ICD-10-CM

## 2020-11-25 DIAGNOSIS — Z91.81: ICD-10-CM

## 2020-11-25 LAB
BUN SERPL-MCNC: 21 MG/DL (ref 7–18)
CALCIUM SERPL-MCNC: 8.8 MG/DL (ref 8.5–10.1)
CHLORIDE SERPL-SCNC: 96 MMOL/L (ref 98–107)
CO2 SERPL-SCNC: 31 MMOL/L (ref 21–32)
CREAT SERPL-MCNC: 0.8 MG/DL (ref 0.6–1.3)
GLUCOSE SERPL-MCNC: 89 MG/DL (ref 74–106)
POTASSIUM SERPL-SCNC: 4.1 MMOL/L (ref 3.5–5.1)
SODIUM SERPL-SCNC: 134 MMOL/L (ref 136–145)

## 2020-11-25 PROCEDURE — A4663 DIALYSIS BLOOD PRESSURE CUFF: HCPCS

## 2020-11-25 RX ADMIN — GUAIFENESIN AND DEXTROMETHORPHAN PRN ML: 100; 10 SYRUP ORAL at 20:03

## 2020-11-25 RX ADMIN — Medication SCH EA: at 08:25

## 2020-11-25 RX ADMIN — ALBUTEROL SULFATE SCH MG: 2.5 SOLUTION RESPIRATORY (INHALATION) at 19:30

## 2020-11-25 RX ADMIN — Medication SCH MG: at 08:24

## 2020-11-25 RX ADMIN — GUAIFENESIN AND DEXTROMETHORPHAN PRN ML: 100; 10 SYRUP ORAL at 06:40

## 2020-11-25 RX ADMIN — Medication SCH MG: at 17:44

## 2020-11-25 RX ADMIN — ACETAMINOPHEN PRN MG: 325 TABLET ORAL at 11:37

## 2020-11-25 RX ADMIN — ALBUTEROL SULFATE SCH MG: 2.5 SOLUTION RESPIRATORY (INHALATION) at 12:48

## 2020-11-25 RX ADMIN — Medication SCH MG: at 12:48

## 2020-11-25 RX ADMIN — LINAGLIPTIN SCH MG: 5 TABLET, FILM COATED ORAL at 08:25

## 2020-11-25 RX ADMIN — HYDROMORPHONE HYDROCHLORIDE PRN MG: 2 TABLET ORAL at 20:03

## 2020-11-25 RX ADMIN — Medication SCH MG: at 08:51

## 2020-11-25 RX ADMIN — Medication SCH EACH: at 07:30

## 2020-11-25 RX ADMIN — CLOTRIMAZOLE SCH GM: 1 CREAM TOPICAL at 09:05

## 2020-11-25 RX ADMIN — PREGABALIN SCH MG: 25 CAPSULE ORAL at 13:00

## 2020-11-25 RX ADMIN — LABETALOL HCL SCH MG: 100 TABLET, FILM COATED ORAL at 09:00

## 2020-11-25 RX ADMIN — ALBUTEROL SULFATE SCH MG: 2.5 SOLUTION RESPIRATORY (INHALATION) at 01:31

## 2020-11-25 RX ADMIN — Medication SCH MG: at 17:45

## 2020-11-25 RX ADMIN — ASPIRIN 81 MG SCH MG: 81 TABLET ORAL at 08:24

## 2020-11-25 RX ADMIN — HYDROMORPHONE HYDROCHLORIDE PRN MG: 2 TABLET ORAL at 06:41

## 2020-11-25 RX ADMIN — Medication SCH MG: at 01:30

## 2020-11-25 RX ADMIN — HYDROMORPHONE HYDROCHLORIDE PRN MG: 2 TABLET ORAL at 12:59

## 2020-11-25 RX ADMIN — GUAIFENESIN AND DEXTROMETHORPHAN PRN ML: 100; 10 SYRUP ORAL at 12:58

## 2020-11-25 RX ADMIN — ALBUTEROL SULFATE SCH MG: 2.5 SOLUTION RESPIRATORY (INHALATION) at 08:51

## 2020-11-25 RX ADMIN — BACLOFEN SCH MG: 10 TABLET ORAL at 05:07

## 2020-11-25 RX ADMIN — CLOPIDOGREL BISULFATE SCH MG: 75 TABLET, FILM COATED ORAL at 08:25

## 2020-11-25 RX ADMIN — Medication SCH MG: at 19:30

## 2020-11-25 RX ADMIN — DEXTROSE MONOHYDRATE SCH MG: 50 INJECTION, SOLUTION INTRAVENOUS at 17:45

## 2020-11-25 RX ADMIN — HYDROMORPHONE HYDROCHLORIDE PRN MG: 2 TABLET ORAL at 01:11

## 2020-11-25 RX ADMIN — DEXTROSE MONOHYDRATE SCH MG: 50 INJECTION, SOLUTION INTRAVENOUS at 08:24

## 2020-11-25 RX ADMIN — CLOTRIMAZOLE SCH GM: 1 CREAM TOPICAL at 17:59

## 2020-11-25 RX ADMIN — Medication SCH MG: at 13:00

## 2020-11-25 RX ADMIN — GUAIFENESIN AND DEXTROMETHORPHAN PRN ML: 100; 10 SYRUP ORAL at 01:11

## 2020-11-25 RX ADMIN — Medication SCH EACH: at 17:41

## 2020-11-25 RX ADMIN — PREGABALIN SCH MG: 25 CAPSULE ORAL at 05:07

## 2020-11-25 RX ADMIN — BACLOFEN SCH MG: 10 TABLET ORAL at 13:13

## 2020-11-25 RX ADMIN — EZETIMIBE SCH MG: 10 TABLET ORAL at 08:24

## 2020-11-25 RX ADMIN — LIDOCAINE SCH EA: 50 PATCH CUTANEOUS at 08:24

## 2020-11-25 RX ADMIN — Medication SCH EACH: at 11:46

## 2020-11-25 NOTE — NUR
RN DISCHARGE NOTE



BILLY EMT ARRIVED AT UNIT AT 1940, WITH ANOTHER EMT PERSONNEL. REPORT AND  
ANGELA BURGOS AT [facility] GIVEN TO BILLY EMT. PATIENT IS ALERT AND ORIENTED X4 ENGLISH  
SPEAKING. PATIENT MOST RECENT V/S TAKEN  AND RECORDED /80, T 97.6, HR 65, RR 20, NO 
ACUTE DISTRESS NOTED, SATURATING AT 97% AT 2L VIA NC. NOTED JOSE MIDLINE INTACT, NO INFECTION 
NOTED.  ALL PATIENT BELONGINGS AND DISCHARGE PACKET TRANSFERRED WITH THE PATIENT. PATIENT 
SAFETY WAS MAINTAINED. PATIENT TRANSFERRED TO [facility] VIA GURNEY AND LEFT AT 2000. ANGELA BURGOS AT [facility] [phone], AND  ELIJAH [phone] WAS NOTIFIED VIA PHONE 
CALL.  CHARGE RN MADE AWARE.

## 2020-11-25 NOTE — NUR
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 
REDNESS TO BILATERAL LOWER LEGS WITH DRY WOUNDS TO LEFT LATERAL LOWER LEG, PRESENT ON 
ADMISSION. RECOMMEND DPM CONSULT. DR NORWOOD NOTIFIED OF CONSULT REQUEST. RECOMMENDATIONS 
MADE FOR SKIN PROTECTION AND CARE OF GLUTEAL CREASE ISSUE. DISCUSSED WITH NURSING STAFF. 
WILL SEE PRN. PT IS AMBULATORY TO BATHROOM. MD IN AGREEMENT WITH PLAN OF CARE. 

-------------------------------------------------------------------------------

Addendum: 11/25/20 at 0941 by CELESTINE ALLEN WNDNU

-------------------------------------------------------------------------------

Amended: Links added.

## 2020-11-25 NOTE — NUR
RN OPENING NOTE



PT SITTING COMFORTABLY IN HER CHAIR, AWAKE, A/Ox4 ON NC 2LPM, NO SIGNS OF RESP DISTRESS OR 
SOB, BREATHING IS EVEN AND UNLABORED, SPO2 96%. PT STATES PAIN 3/10 FOLLOWING DILAUDED ADMIN 
FROM PREVIOUS SHIFT. WILL CONTINUE TO MONITOR. PT HAS BLE REDNESS, EDEMA AND CELLULITIS. PT 
HAS A JOSE MIDLINE THAT IS FLUSHED PATENT, SALINE LOCKED WITH NO SIGNS OF INFILTRATION OR 
INFECTION. PT AMBULATORY WITH 1 ASSIST AND HER WALKER FOR BATHROOM PRIVILEGES. PT SAFETY 
PRECAUTIONS IN PLACE- CALL LIGHT WITHIN REACH. WILL CONTINUE TO MONITOR.

## 2020-11-25 NOTE — NUR
Received patient via ambulance from [facility]. Awake, alert and oriented with a visible weakness 
on the left side, with hand roll in placed. No s/s of respiratory distress with continuos O2 
at 2L/min via NC. Transferred gently to bed with 2 people assist. Able to repositioned self 
in bed. Routine admission care done. Plan of care initiated.

## 2020-11-25 NOTE — NUR
RN NOTE



RECEIVED PATIENT IN BED, A0 X 4. PATIENT IN NO S/SX OF ACUTE DISTRESS AT THIS TIME. 
PATIENT'S BREATHING IS EVEN AND UNLABORED. PATIENT IS ON 2 L OF OXYGEN VIA NC; TOLERATING 
WELL, SATURATING AT 97%, HR IS 65. NOTED JOSE MIDLINE PATENT AND FLUSHING, NO INFECTION 
NOTED. PATIENT IS AMBULATORY WITH WALKER. SAFETY MEASURES IMPLEMENTED PER PROTOCOL. CALL 
LIGHT WITHIN REACH OF THE PATIENT. WILL CONTINUE TO MONITOR AND REASSESS FOR ANY CHANGES. 
[facility]

## 2020-11-25 NOTE — NUR
RN CLOSING NOTE



PT REPORT GIVEN TO RN AT [facility]. PT A/Ox4, ON NC 2LPM, NO SIGNS OF RESP DISTRESS OR SOB. 
PT RESTING IN BED COMFORTABLY. NO CHANGES TO PATIENT STATUS. SAFETY PRECAUTIONS IN PLACE- 
BED LOCKED AND IN LOWEST POSITION, SR UP x2, CALL LIGHT WITHIN REACH, PT IN SEMIFOWLER'S 
POSITION. PT NAHUN ENDORSED TO ONCOMING RN.

## 2020-11-26 VITALS — DIASTOLIC BLOOD PRESSURE: 54 MMHG | SYSTOLIC BLOOD PRESSURE: 153 MMHG

## 2020-11-26 VITALS — SYSTOLIC BLOOD PRESSURE: 149 MMHG | DIASTOLIC BLOOD PRESSURE: 47 MMHG

## 2020-11-26 VITALS — DIASTOLIC BLOOD PRESSURE: 67 MMHG | SYSTOLIC BLOOD PRESSURE: 176 MMHG

## 2020-11-26 VITALS — SYSTOLIC BLOOD PRESSURE: 157 MMHG | DIASTOLIC BLOOD PRESSURE: 58 MMHG

## 2020-11-26 LAB
BASOPHILS # BLD AUTO: 0.1 K/UL (ref 0–8)
BASOPHILS NFR BLD AUTO: 1.4 % (ref 0–2)
BUN SERPL-MCNC: 19 MG/DL (ref 7–18)
CHLORIDE SERPL-SCNC: 93 MMOL/L (ref 98–107)
CO2 SERPL-SCNC: 35 MMOL/L (ref 21–32)
CREAT SERPL-MCNC: 0.8 MG/DL (ref 0.6–1.3)
EOSINOPHIL # BLD AUTO: 0.3 K/UL (ref 0–0.7)
EOSINOPHIL NFR BLD AUTO: 4.2 % (ref 0–7)
GLUCOSE SERPL-MCNC: 84 MG/DL (ref 74–106)
HCT VFR BLD AUTO: 26.9 % (ref 31.2–41.9)
HGB BLD-MCNC: 9.2 G/DL (ref 10.9–14.3)
LYMPHOCYTES # BLD AUTO: 2.1 K/UL (ref 20–40)
LYMPHOCYTES NFR BLD AUTO: 26.8 % (ref 20.5–51.5)
MCH RBC QN AUTO: 28.8 UUG (ref 24.7–32.8)
MCHC RBC AUTO-ENTMCNC: 34 G/DL (ref 32.3–35.6)
MCV RBC AUTO: 84.3 FL (ref 75.5–95.3)
MONOCYTES # BLD AUTO: 0.9 K/UL (ref 2–10)
MONOCYTES NFR BLD AUTO: 11.7 % (ref 0–11)
NEUTROPHILS # BLD AUTO: 4.4 K/UL (ref 1.8–8.9)
NEUTROPHILS NFR BLD AUTO: 55.9 % (ref 38.5–71.5)
PLATELET # BLD AUTO: 297 K/UL (ref 179–408)
POTASSIUM SERPL-SCNC: 3.8 MMOL/L (ref 3.5–5.1)
RBC # BLD AUTO: 3.2 MIL/UL (ref 3.63–4.92)
WBC # BLD AUTO: 7.9 K/UL (ref 3.8–11.8)

## 2020-11-26 RX ADMIN — CLOPIDOGREL BISULFATE SCH MG: 75 TABLET ORAL at 08:52

## 2020-11-26 RX ADMIN — DOCUSATE SODIUM SCH MG: 100 CAPSULE, LIQUID FILLED ORAL at 08:51

## 2020-11-26 RX ADMIN — ACETAMINOPHEN PRN MG: 325 TABLET ORAL at 17:50

## 2020-11-26 RX ADMIN — Medication SCH MG: at 13:14

## 2020-11-26 RX ADMIN — PREGABALIN SCH MG: 25 CAPSULE ORAL at 21:23

## 2020-11-26 RX ADMIN — ENOXAPARIN SODIUM SCH MG: 40 INJECTION SUBCUTANEOUS at 08:57

## 2020-11-26 RX ADMIN — CLOTRIMAZOLE AND BETAMETHASONE DIPROPIONATE SCH GM: 10; .5 CREAM TOPICAL at 13:14

## 2020-11-26 RX ADMIN — DOCUSATE SODIUM SCH MG: 100 CAPSULE, LIQUID FILLED ORAL at 17:25

## 2020-11-26 RX ADMIN — Medication SCH EACH: at 06:46

## 2020-11-26 RX ADMIN — ATORVASTATIN CALCIUM SCH MG: 40 TABLET, FILM COATED ORAL at 20:35

## 2020-11-26 RX ADMIN — BACLOFEN SCH MG: 10 TABLET ORAL at 21:23

## 2020-11-26 RX ADMIN — GUAIFENESIN AND DEXTROMETHORPHAN PRN ML: 100; 10 SYRUP ORAL at 08:58

## 2020-11-26 RX ADMIN — HYDROMORPHONE HYDROCHLORIDE PRN MG: 2 TABLET ORAL at 20:36

## 2020-11-26 RX ADMIN — Medication SCH MG: at 08:53

## 2020-11-26 RX ADMIN — EZETIMIBE SCH MG: 10 TABLET ORAL at 08:52

## 2020-11-26 RX ADMIN — ALBUTEROL SULFATE SCH MG: 1.25 SOLUTION RESPIRATORY (INHALATION) at 19:38

## 2020-11-26 RX ADMIN — IPRATROPIUM BROMIDE SCH MG: 0.5 SOLUTION RESPIRATORY (INHALATION) at 19:38

## 2020-11-26 RX ADMIN — LIDOCAINE SCH PATCH: 50 PATCH CUTANEOUS at 16:11

## 2020-11-26 RX ADMIN — BACLOFEN SCH MG: 10 TABLET ORAL at 06:30

## 2020-11-26 RX ADMIN — Medication SCH EACH: at 21:19

## 2020-11-26 RX ADMIN — DEXTROSE SCH MLS/HR: 50 INJECTION, SOLUTION INTRAVENOUS at 00:15

## 2020-11-26 RX ADMIN — BACLOFEN SCH MG: 10 TABLET ORAL at 13:20

## 2020-11-26 RX ADMIN — IPRATROPIUM BROMIDE SCH MG: 0.5 SOLUTION RESPIRATORY (INHALATION) at 02:00

## 2020-11-26 RX ADMIN — ALBUTEROL SULFATE SCH MG: 1.25 SOLUTION RESPIRATORY (INHALATION) at 08:10

## 2020-11-26 RX ADMIN — LINAGLIPTIN SCH MG: 5 TABLET, FILM COATED ORAL at 13:14

## 2020-11-26 RX ADMIN — CLOTRIMAZOLE AND BETAMETHASONE DIPROPIONATE SCH GM: 10; .5 CREAM TOPICAL at 17:28

## 2020-11-26 RX ADMIN — LOSARTAN POTASSIUM SCH MG: 50 TABLET, FILM COATED ORAL at 09:00

## 2020-11-26 RX ADMIN — CLONIDINE HYDROCHLORIDE PRN MG: 0.1 TABLET ORAL at 21:24

## 2020-11-26 RX ADMIN — PANTOPRAZOLE SODIUM SCH MG: 40 TABLET, DELAYED RELEASE ORAL at 20:35

## 2020-11-26 RX ADMIN — FUROSEMIDE SCH MG: 40 TABLET ORAL at 08:52

## 2020-11-26 RX ADMIN — Medication SCH MG: at 17:26

## 2020-11-26 RX ADMIN — LABETALOL HYDROCHLORIDE SCH MG: 200 TABLET, FILM COATED ORAL at 17:26

## 2020-11-26 RX ADMIN — Medication SCH EA: at 13:39

## 2020-11-26 RX ADMIN — LABETALOL HYDROCHLORIDE SCH MG: 200 TABLET, FILM COATED ORAL at 09:00

## 2020-11-26 RX ADMIN — Medication SCH EACH: at 17:25

## 2020-11-26 RX ADMIN — DEXTROSE SCH MLS/HR: 50 INJECTION, SOLUTION INTRAVENOUS at 01:29

## 2020-11-26 RX ADMIN — FUROSEMIDE SCH MG: 40 TABLET ORAL at 17:25

## 2020-11-26 RX ADMIN — POLYETHYLENE GLYCOL 3350 SCH GM: 17 POWDER, FOR SOLUTION ORAL at 13:15

## 2020-11-26 RX ADMIN — LIDOCAINE SCH PATCH: 50 PATCH CUTANEOUS at 16:12

## 2020-11-26 RX ADMIN — GUAIFENESIN AND DEXTROMETHORPHAN PRN ML: 100; 10 SYRUP ORAL at 22:29

## 2020-11-26 RX ADMIN — ALBUTEROL SULFATE SCH MG: 1.25 SOLUTION RESPIRATORY (INHALATION) at 13:33

## 2020-11-26 RX ADMIN — GUAIFENESIN AND DEXTROMETHORPHAN PRN ML: 100; 10 SYRUP ORAL at 15:56

## 2020-11-26 RX ADMIN — SODIUM CHLORIDE PRN UNIT: 9 INJECTION, SOLUTION INTRAVENOUS at 21:25

## 2020-11-26 RX ADMIN — Medication SCH EACH: at 12:26

## 2020-11-26 RX ADMIN — HYDROMORPHONE HYDROCHLORIDE PRN MG: 2 TABLET ORAL at 12:57

## 2020-11-26 RX ADMIN — IPRATROPIUM BROMIDE SCH MG: 0.5 SOLUTION RESPIRATORY (INHALATION) at 13:33

## 2020-11-26 RX ADMIN — GUAIFENESIN AND DEXTROMETHORPHAN PRN ML: 100; 10 SYRUP ORAL at 01:52

## 2020-11-26 RX ADMIN — IPRATROPIUM BROMIDE SCH MG: 0.5 SOLUTION RESPIRATORY (INHALATION) at 08:09

## 2020-11-26 RX ADMIN — ALBUTEROL SULFATE SCH MG: 1.25 SOLUTION RESPIRATORY (INHALATION) at 02:00

## 2020-11-26 NOTE — NUR
both cellulitis still swollen, red. med as requested due to headache, back and left thigh 
discomfort. appreciative of care.

## 2020-11-27 VITALS — SYSTOLIC BLOOD PRESSURE: 174 MMHG | DIASTOLIC BLOOD PRESSURE: 63 MMHG

## 2020-11-27 VITALS — SYSTOLIC BLOOD PRESSURE: 127 MMHG | DIASTOLIC BLOOD PRESSURE: 41 MMHG

## 2020-11-27 VITALS — SYSTOLIC BLOOD PRESSURE: 156 MMHG | DIASTOLIC BLOOD PRESSURE: 54 MMHG

## 2020-11-27 VITALS — DIASTOLIC BLOOD PRESSURE: 56 MMHG | SYSTOLIC BLOOD PRESSURE: 125 MMHG

## 2020-11-27 VITALS — SYSTOLIC BLOOD PRESSURE: 139 MMHG | DIASTOLIC BLOOD PRESSURE: 51 MMHG

## 2020-11-27 LAB
BUN SERPL-MCNC: 16 MG/DL (ref 7–18)
CHLORIDE SERPL-SCNC: 94 MMOL/L (ref 98–107)
CO2 SERPL-SCNC: 29 MMOL/L (ref 21–32)
CREAT SERPL-MCNC: 1 MG/DL (ref 0.6–1.3)
GLUCOSE SERPL-MCNC: 166 MG/DL (ref 74–106)
POTASSIUM SERPL-SCNC: 4 MMOL/L (ref 3.5–5.1)

## 2020-11-27 RX ADMIN — IPRATROPIUM BROMIDE SCH MG: 0.5 SOLUTION RESPIRATORY (INHALATION) at 01:28

## 2020-11-27 RX ADMIN — BACLOFEN SCH MG: 10 TABLET ORAL at 05:29

## 2020-11-27 RX ADMIN — PREGABALIN SCH MG: 25 CAPSULE ORAL at 21:00

## 2020-11-27 RX ADMIN — DOCUSATE SODIUM SCH MG: 100 CAPSULE, LIQUID FILLED ORAL at 16:06

## 2020-11-27 RX ADMIN — PANTOPRAZOLE SODIUM SCH MG: 40 TABLET, DELAYED RELEASE ORAL at 20:59

## 2020-11-27 RX ADMIN — GUAIFENESIN AND DEXTROMETHORPHAN PRN ML: 100; 10 SYRUP ORAL at 06:04

## 2020-11-27 RX ADMIN — LOSARTAN POTASSIUM SCH MG: 50 TABLET, FILM COATED ORAL at 08:15

## 2020-11-27 RX ADMIN — LABETALOL HYDROCHLORIDE SCH MG: 200 TABLET, FILM COATED ORAL at 16:07

## 2020-11-27 RX ADMIN — CLOPIDOGREL BISULFATE SCH MG: 75 TABLET ORAL at 08:15

## 2020-11-27 RX ADMIN — HYDROMORPHONE HYDROCHLORIDE PRN MG: 2 TABLET ORAL at 23:44

## 2020-11-27 RX ADMIN — Medication SCH EACH: at 11:26

## 2020-11-27 RX ADMIN — DOCUSATE SODIUM SCH MG: 100 CAPSULE, LIQUID FILLED ORAL at 08:15

## 2020-11-27 RX ADMIN — EZETIMIBE SCH MG: 10 TABLET ORAL at 08:15

## 2020-11-27 RX ADMIN — Medication SCH MG: at 16:06

## 2020-11-27 RX ADMIN — BACLOFEN SCH MG: 10 TABLET ORAL at 13:50

## 2020-11-27 RX ADMIN — ENOXAPARIN SODIUM SCH MG: 40 INJECTION SUBCUTANEOUS at 08:16

## 2020-11-27 RX ADMIN — HYDROMORPHONE HYDROCHLORIDE PRN MG: 2 TABLET ORAL at 17:42

## 2020-11-27 RX ADMIN — GUAIFENESIN AND DEXTROMETHORPHAN PRN ML: 100; 10 SYRUP ORAL at 19:47

## 2020-11-27 RX ADMIN — ALBUTEROL SULFATE SCH MG: 1.25 SOLUTION RESPIRATORY (INHALATION) at 19:18

## 2020-11-27 RX ADMIN — CLONIDINE HYDROCHLORIDE PRN MG: 0.1 TABLET ORAL at 05:29

## 2020-11-27 RX ADMIN — LABETALOL HYDROCHLORIDE SCH MG: 200 TABLET, FILM COATED ORAL at 08:15

## 2020-11-27 RX ADMIN — Medication SCH EACH: at 06:33

## 2020-11-27 RX ADMIN — LIDOCAINE SCH PATCH: 50 PATCH CUTANEOUS at 08:14

## 2020-11-27 RX ADMIN — MAGNESIUM HYDROXIDE PRN ML: 400 SUSPENSION ORAL at 22:48

## 2020-11-27 RX ADMIN — PREGABALIN SCH MG: 25 CAPSULE ORAL at 13:50

## 2020-11-27 RX ADMIN — ATORVASTATIN CALCIUM SCH MG: 40 TABLET, FILM COATED ORAL at 20:59

## 2020-11-27 RX ADMIN — ALBUTEROL SULFATE SCH MG: 1.25 SOLUTION RESPIRATORY (INHALATION) at 12:43

## 2020-11-27 RX ADMIN — BACLOFEN SCH MG: 10 TABLET ORAL at 21:00

## 2020-11-27 RX ADMIN — Medication SCH MG: at 08:15

## 2020-11-27 RX ADMIN — FUROSEMIDE SCH MG: 40 TABLET ORAL at 16:07

## 2020-11-27 RX ADMIN — PREGABALIN SCH MG: 25 CAPSULE ORAL at 05:29

## 2020-11-27 RX ADMIN — HYDROMORPHONE HYDROCHLORIDE PRN MG: 2 TABLET ORAL at 09:59

## 2020-11-27 RX ADMIN — IPRATROPIUM BROMIDE SCH MG: 0.5 SOLUTION RESPIRATORY (INHALATION) at 07:49

## 2020-11-27 RX ADMIN — Medication SCH MG: at 12:06

## 2020-11-27 RX ADMIN — HYDROMORPHONE HYDROCHLORIDE PRN MG: 2 TABLET ORAL at 02:47

## 2020-11-27 RX ADMIN — SODIUM CHLORIDE PRN UNIT: 9 INJECTION, SOLUTION INTRAVENOUS at 11:27

## 2020-11-27 RX ADMIN — DEXTROSE SCH MLS/HR: 5 SOLUTION INTRAVENOUS at 02:48

## 2020-11-27 RX ADMIN — CLOTRIMAZOLE AND BETAMETHASONE DIPROPIONATE SCH GM: 10; .5 CREAM TOPICAL at 08:30

## 2020-11-27 RX ADMIN — IPRATROPIUM BROMIDE SCH MG: 0.5 SOLUTION RESPIRATORY (INHALATION) at 12:43

## 2020-11-27 RX ADMIN — IPRATROPIUM BROMIDE SCH MG: 0.5 SOLUTION RESPIRATORY (INHALATION) at 19:18

## 2020-11-27 RX ADMIN — ALBUTEROL SULFATE SCH MG: 1.25 SOLUTION RESPIRATORY (INHALATION) at 07:48

## 2020-11-27 RX ADMIN — SODIUM CHLORIDE PRN UNIT: 9 INJECTION, SOLUTION INTRAVENOUS at 21:11

## 2020-11-27 RX ADMIN — POLYETHYLENE GLYCOL 3350 SCH GM: 17 POWDER, FOR SOLUTION ORAL at 08:14

## 2020-11-27 RX ADMIN — CLOTRIMAZOLE AND BETAMETHASONE DIPROPIONATE SCH GM: 10; .5 CREAM TOPICAL at 16:07

## 2020-11-27 RX ADMIN — LINAGLIPTIN SCH MG: 5 TABLET, FILM COATED ORAL at 08:15

## 2020-11-27 RX ADMIN — Medication SCH EACH: at 16:07

## 2020-11-27 RX ADMIN — ALBUTEROL SULFATE SCH MG: 1.25 SOLUTION RESPIRATORY (INHALATION) at 01:28

## 2020-11-27 RX ADMIN — Medication SCH EA: at 08:14

## 2020-11-27 RX ADMIN — Medication SCH EACH: at 21:09

## 2020-11-27 RX ADMIN — GUAIFENESIN AND DEXTROMETHORPHAN PRN ML: 100; 10 SYRUP ORAL at 12:06

## 2020-11-27 RX ADMIN — FUROSEMIDE SCH MG: 40 TABLET ORAL at 08:15

## 2020-11-27 NOTE — NUR
Received pt sitting in the wheelchair at bedside. AAO x4. On 3L O2 via NC. Noted to have 
nonproductive cough, Pt assisted back in bed safely. Turned and repositioned. Both heels 
offloaded. Safety measures maintained. Call light and personal items within reach. Will 
continue to monitor.

## 2020-11-27 NOTE — NUR
AMANDA PH9 Post Stroke Depression Screening:



 met with patient and conducted a PHQ-9 (Post Stroke Depression Screening) in 
which the patient score of a level of 3 for depression. Patient does not require a 
psychiatric consult at this time.

## 2020-11-27 NOTE — NUR
Pt had a witnessed fall at 0505. Pt fell on her left buttock. On 2L via NC, No acute 
distress noted. No wound/ discoloration noted. Assisted back to bed safely. Vital signs: BP 
174/63, HR 73, RR 20, TEMP 98.2. Pt's BP already elevated prior to the fall. Complain of 
generalized pain, but pt denies to have greater pain on the buttock area where she landed 
on. Notified Dr. Bae, with order to monitor pt and reports if pain gets worse and follow 
up again with MD. Safety measures maintained. Call light and personal items within reach. 
Will continue to monitor.

## 2020-11-27 NOTE — NUR
Social Work Stroke Resources: 



 met with patient and provided stroke referrals such as: Stroke Family Warmline 
at (0-975-4-STROKE) and additional information on caring for a stroke survivor 
(1-763.585.7446).  also educated patient on the signs of Stroke and to 
immediately call 911.  provided education on the signs of stroke and provided 
educational packet with information: Dietary food, what stroke is, risks, emotional support, 
finding support, medical management, and effects of stroke.

## 2020-11-28 VITALS — SYSTOLIC BLOOD PRESSURE: 140 MMHG | DIASTOLIC BLOOD PRESSURE: 58 MMHG

## 2020-11-28 VITALS — SYSTOLIC BLOOD PRESSURE: 124 MMHG | DIASTOLIC BLOOD PRESSURE: 88 MMHG

## 2020-11-28 VITALS — SYSTOLIC BLOOD PRESSURE: 141 MMHG | DIASTOLIC BLOOD PRESSURE: 50 MMHG

## 2020-11-28 VITALS — DIASTOLIC BLOOD PRESSURE: 55 MMHG | SYSTOLIC BLOOD PRESSURE: 132 MMHG

## 2020-11-28 LAB
APPEARANCE UR: CLEAR
BILIRUB UR QL STRIP: NEGATIVE
BUN SERPL-MCNC: 15 MG/DL (ref 7–18)
CHLORIDE SERPL-SCNC: 88 MMOL/L (ref 98–107)
CO2 SERPL-SCNC: 35 MMOL/L (ref 21–32)
COLOR UR: YELLOW
CREAT SERPL-MCNC: 1 MG/DL (ref 0.6–1.3)
CREAT UR-MCNC: 25.8 MG/DL (ref 30–125)
GLUCOSE SERPL-MCNC: 71 MG/DL (ref 74–106)
GLUCOSE UR STRIP-MCNC: NEGATIVE MG/DL
HGB UR QL STRIP: NEGATIVE
KETONES UR STRIP-MCNC: NEGATIVE MG/DL
LEUKOCYTE ESTERASE UR QL STRIP: NEGATIVE
MAGNESIUM SERPL-MCNC: 2.1 MG/DL (ref 1.8–2.4)
NITRITE UR QL STRIP: NEGATIVE
PH UR STRIP: 7 [PH] (ref 5–8)
PHOSPHATE SERPL-MCNC: 3.9 MG/DL (ref 2.5–4.9)
POTASSIUM SERPL-SCNC: 3.8 MMOL/L (ref 3.5–5.1)
PROT UR-MCNC: < 6 MG/DL
SP GR UR STRIP: 1.01 (ref 1–1.03)
TSH SERPL DL<=0.005 MIU/L-ACNC: 2.78 MIU/ML (ref 0.36–3.74)
URATE SERPL-MCNC: 5.3 MG/DL (ref 2.6–6)
UROBILINOGEN UR STRIP-MCNC: 0.2 E.U./DL

## 2020-11-28 RX ADMIN — ALBUTEROL SULFATE SCH MG: 1.25 SOLUTION RESPIRATORY (INHALATION) at 08:23

## 2020-11-28 RX ADMIN — EZETIMIBE SCH MG: 10 TABLET ORAL at 08:06

## 2020-11-28 RX ADMIN — BACLOFEN SCH MG: 10 TABLET ORAL at 14:15

## 2020-11-28 RX ADMIN — Medication SCH MG: at 16:20

## 2020-11-28 RX ADMIN — Medication SCH EACH: at 16:21

## 2020-11-28 RX ADMIN — GUAIFENESIN AND DEXTROMETHORPHAN PRN ML: 100; 10 SYRUP ORAL at 15:40

## 2020-11-28 RX ADMIN — DOCUSATE SODIUM SCH MG: 100 CAPSULE, LIQUID FILLED ORAL at 08:06

## 2020-11-28 RX ADMIN — Medication SCH EA: at 08:05

## 2020-11-28 RX ADMIN — LOSARTAN POTASSIUM SCH MG: 50 TABLET, FILM COATED ORAL at 08:06

## 2020-11-28 RX ADMIN — Medication SCH EACH: at 07:06

## 2020-11-28 RX ADMIN — IPRATROPIUM BROMIDE SCH MG: 0.5 SOLUTION RESPIRATORY (INHALATION) at 08:23

## 2020-11-28 RX ADMIN — LABETALOL HYDROCHLORIDE SCH MG: 200 TABLET, FILM COATED ORAL at 16:20

## 2020-11-28 RX ADMIN — Medication SCH EACH: at 10:45

## 2020-11-28 RX ADMIN — ATORVASTATIN CALCIUM SCH MG: 40 TABLET, FILM COATED ORAL at 21:12

## 2020-11-28 RX ADMIN — LIDOCAINE SCH PATCH: 50 PATCH CUTANEOUS at 08:07

## 2020-11-28 RX ADMIN — Medication SCH MG: at 12:17

## 2020-11-28 RX ADMIN — POLYETHYLENE GLYCOL 3350 SCH GM: 17 POWDER, FOR SOLUTION ORAL at 08:07

## 2020-11-28 RX ADMIN — FUROSEMIDE SCH MG: 40 TABLET ORAL at 16:20

## 2020-11-28 RX ADMIN — HYDROMORPHONE HYDROCHLORIDE PRN MG: 2 TABLET ORAL at 12:18

## 2020-11-28 RX ADMIN — IPRATROPIUM BROMIDE SCH MG: 0.5 SOLUTION RESPIRATORY (INHALATION) at 13:44

## 2020-11-28 RX ADMIN — Medication SCH MG: at 08:06

## 2020-11-28 RX ADMIN — FUROSEMIDE SCH MG: 40 TABLET ORAL at 08:06

## 2020-11-28 RX ADMIN — CLOTRIMAZOLE AND BETAMETHASONE DIPROPIONATE SCH GM: 10; .5 CREAM TOPICAL at 16:21

## 2020-11-28 RX ADMIN — GUAIFENESIN AND DEXTROMETHORPHAN PRN ML: 100; 10 SYRUP ORAL at 05:09

## 2020-11-28 RX ADMIN — PREGABALIN SCH MG: 25 CAPSULE ORAL at 21:12

## 2020-11-28 RX ADMIN — CLOTRIMAZOLE AND BETAMETHASONE DIPROPIONATE SCH GM: 10; .5 CREAM TOPICAL at 08:07

## 2020-11-28 RX ADMIN — LINAGLIPTIN SCH MG: 5 TABLET, FILM COATED ORAL at 08:06

## 2020-11-28 RX ADMIN — BACLOFEN SCH MG: 10 TABLET ORAL at 05:32

## 2020-11-28 RX ADMIN — PREGABALIN SCH MG: 25 CAPSULE ORAL at 14:15

## 2020-11-28 RX ADMIN — CLOPIDOGREL BISULFATE SCH MG: 75 TABLET ORAL at 08:06

## 2020-11-28 RX ADMIN — ALBUTEROL SULFATE SCH MG: 1.25 SOLUTION RESPIRATORY (INHALATION) at 19:50

## 2020-11-28 RX ADMIN — PANTOPRAZOLE SODIUM SCH MG: 40 TABLET, DELAYED RELEASE ORAL at 21:12

## 2020-11-28 RX ADMIN — LABETALOL HYDROCHLORIDE SCH MG: 200 TABLET, FILM COATED ORAL at 08:06

## 2020-11-28 RX ADMIN — DEXTROSE SCH MLS/HR: 5 SOLUTION INTRAVENOUS at 02:25

## 2020-11-28 RX ADMIN — SODIUM CHLORIDE PRN UNIT: 9 INJECTION, SOLUTION INTRAVENOUS at 11:24

## 2020-11-28 RX ADMIN — IPRATROPIUM BROMIDE SCH MG: 0.5 SOLUTION RESPIRATORY (INHALATION) at 00:35

## 2020-11-28 RX ADMIN — ENOXAPARIN SODIUM SCH MG: 40 INJECTION SUBCUTANEOUS at 08:05

## 2020-11-28 RX ADMIN — DOCUSATE SODIUM SCH MG: 100 CAPSULE, LIQUID FILLED ORAL at 16:20

## 2020-11-28 RX ADMIN — Medication SCH EACH: at 21:12

## 2020-11-28 RX ADMIN — ALBUTEROL SULFATE SCH MG: 1.25 SOLUTION RESPIRATORY (INHALATION) at 13:44

## 2020-11-28 RX ADMIN — ALBUTEROL SULFATE SCH MG: 1.25 SOLUTION RESPIRATORY (INHALATION) at 00:35

## 2020-11-28 RX ADMIN — IPRATROPIUM BROMIDE SCH MG: 0.5 SOLUTION RESPIRATORY (INHALATION) at 19:50

## 2020-11-28 RX ADMIN — SODIUM CHLORIDE PRN UNIT: 9 INJECTION, SOLUTION INTRAVENOUS at 21:15

## 2020-11-28 RX ADMIN — BACLOFEN SCH MG: 10 TABLET ORAL at 21:12

## 2020-11-28 RX ADMIN — PREGABALIN SCH MG: 25 CAPSULE ORAL at 05:33

## 2020-11-28 NOTE — NUR
Received patient resting in bed. No s/s of acute distress noted at this time. Pt in 2L NC 
and denies SOB. HOB >45 degrees. Safety measures in place and will continue to monitor.

## 2020-11-28 NOTE — NUR
Patient slept intermittently throughout the night. Attempts made to get patient to be 
comfortable. Pain medication given Q6H on the dot, per patient request. Patient was adamant 
about walking to restroom but was educated that she cannot ambulate in her current 
state---bedside commode/diaper used. O2 running at 2L via NC. 



Will continue to monitor and assess.

## 2020-11-29 VITALS — SYSTOLIC BLOOD PRESSURE: 161 MMHG | DIASTOLIC BLOOD PRESSURE: 60 MMHG

## 2020-11-29 VITALS — DIASTOLIC BLOOD PRESSURE: 61 MMHG | SYSTOLIC BLOOD PRESSURE: 146 MMHG

## 2020-11-29 VITALS — DIASTOLIC BLOOD PRESSURE: 53 MMHG | SYSTOLIC BLOOD PRESSURE: 122 MMHG

## 2020-11-29 VITALS — SYSTOLIC BLOOD PRESSURE: 153 MMHG | DIASTOLIC BLOOD PRESSURE: 43 MMHG

## 2020-11-29 VITALS — SYSTOLIC BLOOD PRESSURE: 136 MMHG | DIASTOLIC BLOOD PRESSURE: 61 MMHG

## 2020-11-29 LAB
BUN SERPL-MCNC: 17 MG/DL (ref 7–18)
CHLORIDE SERPL-SCNC: 89 MMOL/L (ref 98–107)
CO2 SERPL-SCNC: 33 MMOL/L (ref 21–32)
CREAT SERPL-MCNC: 1 MG/DL (ref 0.6–1.3)
GLUCOSE SERPL-MCNC: 113 MG/DL (ref 74–106)
POTASSIUM SERPL-SCNC: 4 MMOL/L (ref 3.5–5.1)

## 2020-11-29 RX ADMIN — CLOTRIMAZOLE AND BETAMETHASONE DIPROPIONATE SCH GM: 10; .5 CREAM TOPICAL at 16:24

## 2020-11-29 RX ADMIN — PREGABALIN SCH MG: 25 CAPSULE ORAL at 13:32

## 2020-11-29 RX ADMIN — DOCUSATE SODIUM SCH MG: 100 CAPSULE, LIQUID FILLED ORAL at 16:23

## 2020-11-29 RX ADMIN — IPRATROPIUM BROMIDE SCH MG: 0.5 SOLUTION RESPIRATORY (INHALATION) at 07:33

## 2020-11-29 RX ADMIN — SODIUM CHLORIDE PRN UNIT: 9 INJECTION, SOLUTION INTRAVENOUS at 11:36

## 2020-11-29 RX ADMIN — Medication SCH EA: at 08:08

## 2020-11-29 RX ADMIN — Medication SCH MG: at 16:23

## 2020-11-29 RX ADMIN — Medication SCH MG: at 12:11

## 2020-11-29 RX ADMIN — ALBUTEROL SULFATE SCH MG: 1.25 SOLUTION RESPIRATORY (INHALATION) at 13:28

## 2020-11-29 RX ADMIN — BACLOFEN SCH MG: 10 TABLET ORAL at 21:04

## 2020-11-29 RX ADMIN — GUAIFENESIN AND DEXTROMETHORPHAN PRN ML: 100; 10 SYRUP ORAL at 02:04

## 2020-11-29 RX ADMIN — Medication SCH EACH: at 10:42

## 2020-11-29 RX ADMIN — IPRATROPIUM BROMIDE SCH MG: 0.5 SOLUTION RESPIRATORY (INHALATION) at 01:28

## 2020-11-29 RX ADMIN — Medication SCH EACH: at 16:27

## 2020-11-29 RX ADMIN — Medication SCH EACH: at 21:18

## 2020-11-29 RX ADMIN — POLYETHYLENE GLYCOL 3350 SCH GM: 17 POWDER, FOR SOLUTION ORAL at 08:08

## 2020-11-29 RX ADMIN — ALBUTEROL SULFATE SCH MG: 1.25 SOLUTION RESPIRATORY (INHALATION) at 07:33

## 2020-11-29 RX ADMIN — ATORVASTATIN CALCIUM SCH MG: 40 TABLET, FILM COATED ORAL at 21:04

## 2020-11-29 RX ADMIN — IPRATROPIUM BROMIDE SCH MG: 0.5 SOLUTION RESPIRATORY (INHALATION) at 13:28

## 2020-11-29 RX ADMIN — FUROSEMIDE SCH MG: 10 INJECTION, SOLUTION INTRAMUSCULAR; INTRAVENOUS at 21:05

## 2020-11-29 RX ADMIN — HYDROMORPHONE HYDROCHLORIDE PRN MG: 2 TABLET ORAL at 12:11

## 2020-11-29 RX ADMIN — LABETALOL HYDROCHLORIDE SCH MG: 200 TABLET, FILM COATED ORAL at 08:09

## 2020-11-29 RX ADMIN — DEXTROSE SCH MLS/HR: 5 SOLUTION INTRAVENOUS at 02:04

## 2020-11-29 RX ADMIN — LABETALOL HYDROCHLORIDE SCH MG: 200 TABLET, FILM COATED ORAL at 16:23

## 2020-11-29 RX ADMIN — DOCUSATE SODIUM SCH MG: 100 CAPSULE, LIQUID FILLED ORAL at 08:09

## 2020-11-29 RX ADMIN — PREGABALIN SCH MG: 25 CAPSULE ORAL at 21:04

## 2020-11-29 RX ADMIN — CLONIDINE HYDROCHLORIDE PRN MG: 0.1 TABLET ORAL at 05:13

## 2020-11-29 RX ADMIN — CLOTRIMAZOLE AND BETAMETHASONE DIPROPIONATE SCH GM: 10; .5 CREAM TOPICAL at 09:16

## 2020-11-29 RX ADMIN — CLOPIDOGREL BISULFATE SCH MG: 75 TABLET ORAL at 08:09

## 2020-11-29 RX ADMIN — SODIUM CHLORIDE PRN UNIT: 9 INJECTION, SOLUTION INTRAVENOUS at 21:22

## 2020-11-29 RX ADMIN — Medication SCH MG: at 08:08

## 2020-11-29 RX ADMIN — Medication SCH EACH: at 06:33

## 2020-11-29 RX ADMIN — ALBUTEROL SULFATE SCH MG: 1.25 SOLUTION RESPIRATORY (INHALATION) at 01:28

## 2020-11-29 RX ADMIN — HYDROMORPHONE HYDROCHLORIDE PRN MG: 2 TABLET ORAL at 19:50

## 2020-11-29 RX ADMIN — LOSARTAN POTASSIUM SCH MG: 50 TABLET, FILM COATED ORAL at 08:09

## 2020-11-29 RX ADMIN — BACLOFEN SCH MG: 10 TABLET ORAL at 13:32

## 2020-11-29 RX ADMIN — ENOXAPARIN SODIUM SCH MG: 40 INJECTION SUBCUTANEOUS at 09:16

## 2020-11-29 RX ADMIN — IPRATROPIUM BROMIDE SCH MG: 0.5 SOLUTION RESPIRATORY (INHALATION) at 19:10

## 2020-11-29 RX ADMIN — LIDOCAINE SCH PATCH: 50 PATCH CUTANEOUS at 08:08

## 2020-11-29 RX ADMIN — ALBUTEROL SULFATE SCH MG: 1.25 SOLUTION RESPIRATORY (INHALATION) at 19:10

## 2020-11-29 RX ADMIN — EZETIMIBE SCH MG: 10 TABLET ORAL at 08:09

## 2020-11-29 RX ADMIN — GUAIFENESIN AND DEXTROMETHORPHAN PRN ML: 100; 10 SYRUP ORAL at 15:53

## 2020-11-29 RX ADMIN — LINAGLIPTIN SCH MG: 5 TABLET, FILM COATED ORAL at 08:08

## 2020-11-29 RX ADMIN — HYDROMORPHONE HYDROCHLORIDE PRN MG: 2 TABLET ORAL at 02:05

## 2020-11-29 RX ADMIN — FUROSEMIDE SCH MG: 40 TABLET ORAL at 08:09

## 2020-11-29 RX ADMIN — PANTOPRAZOLE SODIUM SCH MG: 40 TABLET, DELAYED RELEASE ORAL at 21:04

## 2020-11-29 RX ADMIN — PREGABALIN SCH MG: 25 CAPSULE ORAL at 06:24

## 2020-11-29 RX ADMIN — BACLOFEN SCH MG: 10 TABLET ORAL at 06:24

## 2020-11-29 RX ADMIN — GUAIFENESIN AND DEXTROMETHORPHAN PRN ML: 100; 10 SYRUP ORAL at 21:53

## 2020-11-29 NOTE — NUR
Received pt awake and alert. Reports pain 7/10 in head. Was given dilaudid 4mg and was 
tolerated well. Will continue to to monitor.

## 2020-11-30 VITALS — SYSTOLIC BLOOD PRESSURE: 120 MMHG | DIASTOLIC BLOOD PRESSURE: 50 MMHG

## 2020-11-30 VITALS — SYSTOLIC BLOOD PRESSURE: 125 MMHG | DIASTOLIC BLOOD PRESSURE: 49 MMHG

## 2020-11-30 VITALS — SYSTOLIC BLOOD PRESSURE: 129 MMHG | DIASTOLIC BLOOD PRESSURE: 46 MMHG

## 2020-11-30 VITALS — SYSTOLIC BLOOD PRESSURE: 99 MMHG | DIASTOLIC BLOOD PRESSURE: 49 MMHG

## 2020-11-30 LAB
BUN SERPL-MCNC: 17 MG/DL (ref 7–18)
CHLORIDE SERPL-SCNC: 91 MMOL/L (ref 98–107)
CO2 SERPL-SCNC: 31 MMOL/L (ref 21–32)
CREAT SERPL-MCNC: 1.1 MG/DL (ref 0.6–1.3)
GLUCOSE SERPL-MCNC: 80 MG/DL (ref 74–106)
POTASSIUM SERPL-SCNC: 3.9 MMOL/L (ref 3.5–5.1)

## 2020-11-30 RX ADMIN — EZETIMIBE SCH MG: 10 TABLET ORAL at 08:37

## 2020-11-30 RX ADMIN — LABETALOL HYDROCHLORIDE SCH MG: 200 TABLET, FILM COATED ORAL at 08:39

## 2020-11-30 RX ADMIN — IPRATROPIUM BROMIDE SCH MG: 0.5 SOLUTION RESPIRATORY (INHALATION) at 01:22

## 2020-11-30 RX ADMIN — FUROSEMIDE SCH MG: 10 INJECTION, SOLUTION INTRAMUSCULAR; INTRAVENOUS at 08:38

## 2020-11-30 RX ADMIN — ALBUTEROL SULFATE SCH MG: 1.25 SOLUTION RESPIRATORY (INHALATION) at 01:22

## 2020-11-30 RX ADMIN — Medication SCH EACH: at 16:16

## 2020-11-30 RX ADMIN — DOCUSATE SODIUM SCH MG: 100 CAPSULE, LIQUID FILLED ORAL at 17:02

## 2020-11-30 RX ADMIN — POLYETHYLENE GLYCOL 3350 SCH GM: 17 POWDER, FOR SOLUTION ORAL at 08:39

## 2020-11-30 RX ADMIN — Medication SCH EACH: at 06:34

## 2020-11-30 RX ADMIN — ATORVASTATIN CALCIUM SCH MG: 40 TABLET, FILM COATED ORAL at 21:00

## 2020-11-30 RX ADMIN — LINAGLIPTIN SCH MG: 5 TABLET, FILM COATED ORAL at 08:38

## 2020-11-30 RX ADMIN — IPRATROPIUM BROMIDE SCH MG: 0.5 SOLUTION RESPIRATORY (INHALATION) at 08:37

## 2020-11-30 RX ADMIN — CLOTRIMAZOLE AND BETAMETHASONE DIPROPIONATE SCH GM: 10; .5 CREAM TOPICAL at 17:13

## 2020-11-30 RX ADMIN — HYDROMORPHONE HYDROCHLORIDE PRN MG: 2 TABLET ORAL at 04:20

## 2020-11-30 RX ADMIN — IPRATROPIUM BROMIDE SCH MG: 0.5 SOLUTION RESPIRATORY (INHALATION) at 13:28

## 2020-11-30 RX ADMIN — BACLOFEN SCH MG: 10 TABLET ORAL at 13:52

## 2020-11-30 RX ADMIN — Medication SCH MG: at 17:02

## 2020-11-30 RX ADMIN — ALBUTEROL SULFATE SCH MG: 1.25 SOLUTION RESPIRATORY (INHALATION) at 19:31

## 2020-11-30 RX ADMIN — ALBUTEROL SULFATE SCH MG: 1.25 SOLUTION RESPIRATORY (INHALATION) at 13:28

## 2020-11-30 RX ADMIN — IPRATROPIUM BROMIDE SCH MG: 0.5 SOLUTION RESPIRATORY (INHALATION) at 19:31

## 2020-11-30 RX ADMIN — DEXTROSE SCH MLS/HR: 5 SOLUTION INTRAVENOUS at 01:57

## 2020-11-30 RX ADMIN — PANTOPRAZOLE SODIUM SCH MG: 40 TABLET, DELAYED RELEASE ORAL at 21:00

## 2020-11-30 RX ADMIN — CLOTRIMAZOLE AND BETAMETHASONE DIPROPIONATE SCH GM: 10; .5 CREAM TOPICAL at 08:46

## 2020-11-30 RX ADMIN — FLUTICASONE FUROATE AND VILANTEROL TRIFENATATE SCH EACH: 100; 25 POWDER RESPIRATORY (INHALATION) at 17:30

## 2020-11-30 RX ADMIN — LABETALOL HYDROCHLORIDE SCH MG: 200 TABLET, FILM COATED ORAL at 17:02

## 2020-11-30 RX ADMIN — Medication SCH MG: at 08:38

## 2020-11-30 RX ADMIN — Medication SCH EACH: at 11:03

## 2020-11-30 RX ADMIN — CLOPIDOGREL BISULFATE SCH MG: 75 TABLET ORAL at 08:38

## 2020-11-30 RX ADMIN — LIDOCAINE SCH PATCH: 50 PATCH CUTANEOUS at 08:40

## 2020-11-30 RX ADMIN — PREGABALIN SCH MG: 25 CAPSULE ORAL at 13:52

## 2020-11-30 RX ADMIN — FUROSEMIDE SCH MG: 10 INJECTION, SOLUTION INTRAMUSCULAR; INTRAVENOUS at 21:00

## 2020-11-30 RX ADMIN — SODIUM CHLORIDE PRN UNIT: 9 INJECTION, SOLUTION INTRAVENOUS at 12:11

## 2020-11-30 RX ADMIN — Medication SCH EA: at 08:40

## 2020-11-30 RX ADMIN — HYDROMORPHONE HYDROCHLORIDE PRN MG: 2 TABLET ORAL at 10:57

## 2020-11-30 RX ADMIN — SODIUM CHLORIDE PRN UNIT: 9 INJECTION, SOLUTION INTRAVENOUS at 21:16

## 2020-11-30 RX ADMIN — ALBUTEROL SULFATE SCH MG: 1.25 SOLUTION RESPIRATORY (INHALATION) at 08:36

## 2020-11-30 RX ADMIN — Medication SCH MG: at 12:04

## 2020-11-30 RX ADMIN — LOSARTAN POTASSIUM SCH MG: 50 TABLET, FILM COATED ORAL at 09:01

## 2020-11-30 RX ADMIN — ENOXAPARIN SODIUM SCH MG: 40 INJECTION SUBCUTANEOUS at 09:05

## 2020-11-30 RX ADMIN — Medication SCH EACH: at 21:09

## 2020-11-30 RX ADMIN — BACLOFEN SCH MG: 10 TABLET ORAL at 21:00

## 2020-11-30 RX ADMIN — ACETAMINOPHEN PRN MG: 325 TABLET ORAL at 01:13

## 2020-11-30 RX ADMIN — GUAIFENESIN AND DEXTROMETHORPHAN PRN ML: 100; 10 SYRUP ORAL at 04:20

## 2020-11-30 RX ADMIN — PREGABALIN SCH MG: 25 CAPSULE ORAL at 21:00

## 2020-11-30 RX ADMIN — PREGABALIN SCH MG: 25 CAPSULE ORAL at 06:29

## 2020-11-30 RX ADMIN — GUAIFENESIN AND DEXTROMETHORPHAN PRN ML: 100; 10 SYRUP ORAL at 10:57

## 2020-11-30 RX ADMIN — HYDROMORPHONE HYDROCHLORIDE PRN MG: 2 TABLET ORAL at 17:01

## 2020-11-30 RX ADMIN — BACLOFEN SCH MG: 10 TABLET ORAL at 06:29

## 2020-11-30 RX ADMIN — MAGNESIUM HYDROXIDE PRN ML: 400 SUSPENSION ORAL at 04:20

## 2020-11-30 RX ADMIN — GUAIFENESIN AND DEXTROMETHORPHAN PRN ML: 100; 10 SYRUP ORAL at 17:00

## 2020-11-30 RX ADMIN — DOCUSATE SODIUM SCH MG: 100 CAPSULE, LIQUID FILLED ORAL at 08:38

## 2020-11-30 RX ADMIN — FUROSEMIDE SCH MG: 10 INJECTION, SOLUTION INTRAMUSCULAR; INTRAVENOUS at 22:50

## 2020-11-30 NOTE — NUR
PATIENT IS IN BED RESTING CONTINUE WITH IVF AS ORDERED WITH NO S/S OF INFILTERATION ON HER 
RIGHT AC SITE SHE HAS OCASSIONAL COUGH EPISODES ABLE TO AMBULATE TO AND FROM THE BATHROOM 
WITH SOBE WIT ADEQUATE SATS WHEN BACK AT REST CALL LIGHTS AND PERSONAL BELONGINGS ARE WITHIN 
EASY REACH AT THIS TIME WILL CONTINUE TO OBSERVE AND PROVIDE COMFORT.

## 2020-11-30 NOTE — NUR
Pt slept intermittently. Reported mild discomfort throughout the night and was given pain 
meds, tolerated well. Vanco trough was drawn at 0100H and was below 20. All medications 
given were tolerated well. Pt had a cough that eased after respiratory treatments and cough 
medication was given. Will endorse to day shift.

## 2020-11-30 NOTE — NUR
Pt received awake and alert in bed. No signs or symptoms of any acute distress at this time. 
Pt denies pain currently. On 2L NC. Will continue to monitor.

## 2020-11-30 NOTE — NUR
RECEIVED PATIENT IN BED ASLEEP BUT EASILY AROUSABLE WHEN TOUCHED OR WHEN NAME IS CALLED SHE 
IS ALERT AND ORIENTED NO S/S OF PAIN OR DISCOMFORTS AT THIS TIME SHE IS ON O2 AT 2L/M BY 
NASAL CANULA WITH NO SHORTNESS OF BREATH AT THIS TIME NO S/S OF HYPO/HYPERGLYCEMIC REACTIONS 
AT THIS TIME CALL LIGHTS TELEPHONE AND ALL HER PERSONAL BELONGINGS ARE WITHIN EASY REACH BLE 
ELEVATED TO REDUCE SWELLING WILL CONTINUE TO OBSERVE.

## 2020-11-30 NOTE — NUR
Lasix was given late due to low BP. BP was rechecked and was 158/48 and Lasix was given. Pt 
tolerated medication well.

## 2020-12-01 VITALS — DIASTOLIC BLOOD PRESSURE: 61 MMHG | SYSTOLIC BLOOD PRESSURE: 156 MMHG

## 2020-12-01 VITALS — DIASTOLIC BLOOD PRESSURE: 58 MMHG | SYSTOLIC BLOOD PRESSURE: 169 MMHG

## 2020-12-01 VITALS — SYSTOLIC BLOOD PRESSURE: 149 MMHG | DIASTOLIC BLOOD PRESSURE: 50 MMHG

## 2020-12-01 LAB
ALP SERPL-CCNC: 83 U/L (ref 50–136)
ALT SERPL W/O P-5'-P-CCNC: 47 U/L (ref 14–59)
AST SERPL-CCNC: 37 U/L (ref 15–37)
BASOPHILS # BLD AUTO: 0.1 K/UL (ref 0–8)
BASOPHILS NFR BLD AUTO: 0.8 % (ref 0–2)
BILIRUB DIRECT SERPL-MCNC: 0.1 MG/DL (ref 0–0.2)
BILIRUB SERPL-MCNC: 0.5 MG/DL (ref 0.2–1)
BUN SERPL-MCNC: 19 MG/DL (ref 7–18)
CHLORIDE SERPL-SCNC: 92 MMOL/L (ref 98–107)
CO2 SERPL-SCNC: 30 MMOL/L (ref 21–32)
CREAT SERPL-MCNC: 1 MG/DL (ref 0.6–1.3)
EOSINOPHIL # BLD AUTO: 0.2 K/UL (ref 0–0.7)
EOSINOPHIL NFR BLD AUTO: 2.7 % (ref 0–7)
GLUCOSE SERPL-MCNC: 103 MG/DL (ref 74–106)
HCT VFR BLD AUTO: 23.7 % (ref 31.2–41.9)
HGB BLD-MCNC: 8.1 G/DL (ref 10.9–14.3)
LYMPHOCYTES # BLD AUTO: 1.2 K/UL (ref 20–40)
LYMPHOCYTES NFR BLD AUTO: 14.5 % (ref 20.5–51.5)
MAGNESIUM SERPL-MCNC: 2.2 MG/DL (ref 1.8–2.4)
MCH RBC QN AUTO: 28.7 UUG (ref 24.7–32.8)
MCHC RBC AUTO-ENTMCNC: 34 G/DL (ref 32.3–35.6)
MCV RBC AUTO: 84.3 FL (ref 75.5–95.3)
MONOCYTES # BLD AUTO: 1 K/UL (ref 2–10)
MONOCYTES NFR BLD AUTO: 12.5 % (ref 0–11)
NEUTROPHILS # BLD AUTO: 5.6 K/UL (ref 1.8–8.9)
NEUTROPHILS NFR BLD AUTO: 69.5 % (ref 38.5–71.5)
PHOSPHATE SERPL-MCNC: 4.2 MG/DL (ref 2.5–4.9)
PLATELET # BLD AUTO: 268 K/UL (ref 179–408)
POTASSIUM SERPL-SCNC: 4.2 MMOL/L (ref 3.5–5.1)
RBC # BLD AUTO: 2.81 MIL/UL (ref 3.63–4.92)
WBC # BLD AUTO: 8.1 K/UL (ref 3.8–11.8)
WS STN SPEC: 6.2 G/DL (ref 6.4–8.2)

## 2020-12-01 RX ADMIN — HYDROMORPHONE HYDROCHLORIDE PRN MG: 2 TABLET ORAL at 22:34

## 2020-12-01 RX ADMIN — SODIUM CHLORIDE PRN UNIT: 9 INJECTION, SOLUTION INTRAVENOUS at 12:36

## 2020-12-01 RX ADMIN — BACLOFEN SCH MG: 20 TABLET ORAL at 08:55

## 2020-12-01 RX ADMIN — PREGABALIN SCH MG: 25 CAPSULE ORAL at 13:38

## 2020-12-01 RX ADMIN — POLYETHYLENE GLYCOL 3350 SCH GM: 17 POWDER, FOR SOLUTION ORAL at 08:54

## 2020-12-01 RX ADMIN — GUAIFENESIN AND DEXTROMETHORPHAN PRN ML: 100; 10 SYRUP ORAL at 09:06

## 2020-12-01 RX ADMIN — PANTOPRAZOLE SODIUM SCH MG: 40 TABLET, DELAYED RELEASE ORAL at 20:51

## 2020-12-01 RX ADMIN — GUAIFENESIN AND DEXTROMETHORPHAN PRN ML: 100; 10 SYRUP ORAL at 15:40

## 2020-12-01 RX ADMIN — IPRATROPIUM BROMIDE SCH MG: 0.5 SOLUTION RESPIRATORY (INHALATION) at 01:15

## 2020-12-01 RX ADMIN — FUROSEMIDE SCH MG: 10 INJECTION, SOLUTION INTRAMUSCULAR; INTRAVENOUS at 09:05

## 2020-12-01 RX ADMIN — HYDROMORPHONE HYDROCHLORIDE PRN MG: 2 TABLET ORAL at 09:06

## 2020-12-01 RX ADMIN — LINAGLIPTIN SCH MG: 5 TABLET, FILM COATED ORAL at 08:55

## 2020-12-01 RX ADMIN — LIDOCAINE SCH PATCH: 50 PATCH CUTANEOUS at 08:54

## 2020-12-01 RX ADMIN — SODIUM CHLORIDE PRN UNIT: 9 INJECTION, SOLUTION INTRAVENOUS at 21:11

## 2020-12-01 RX ADMIN — ENOXAPARIN SODIUM SCH MG: 40 INJECTION SUBCUTANEOUS at 09:07

## 2020-12-01 RX ADMIN — Medication SCH EA: at 08:54

## 2020-12-01 RX ADMIN — ALBUTEROL SULFATE SCH MG: 1.25 SOLUTION RESPIRATORY (INHALATION) at 12:44

## 2020-12-01 RX ADMIN — IPRATROPIUM BROMIDE SCH MG: 0.5 SOLUTION RESPIRATORY (INHALATION) at 07:52

## 2020-12-01 RX ADMIN — EZETIMIBE SCH MG: 10 TABLET ORAL at 09:05

## 2020-12-01 RX ADMIN — ACETAMINOPHEN PRN MG: 325 TABLET ORAL at 00:45

## 2020-12-01 RX ADMIN — FLUTICASONE FUROATE AND VILANTEROL TRIFENATATE SCH EACH: 100; 25 POWDER RESPIRATORY (INHALATION) at 08:58

## 2020-12-01 RX ADMIN — BACLOFEN SCH MG: 20 TABLET ORAL at 17:40

## 2020-12-01 RX ADMIN — BACLOFEN SCH MG: 20 TABLET ORAL at 13:38

## 2020-12-01 RX ADMIN — LOSARTAN POTASSIUM SCH MG: 50 TABLET, FILM COATED ORAL at 08:58

## 2020-12-01 RX ADMIN — CLOPIDOGREL BISULFATE SCH MG: 75 TABLET ORAL at 08:58

## 2020-12-01 RX ADMIN — ALBUTEROL SULFATE SCH MG: 1.25 SOLUTION RESPIRATORY (INHALATION) at 07:52

## 2020-12-01 RX ADMIN — ALBUTEROL SULFATE SCH MG: 1.25 SOLUTION RESPIRATORY (INHALATION) at 01:16

## 2020-12-01 RX ADMIN — ACETAMINOPHEN PRN MG: 325 TABLET ORAL at 19:40

## 2020-12-01 RX ADMIN — OXYBUTYNIN CHLORIDE SCH MG: 5 TABLET, EXTENDED RELEASE ORAL at 08:55

## 2020-12-01 RX ADMIN — IPRATROPIUM BROMIDE SCH MG: 0.5 SOLUTION RESPIRATORY (INHALATION) at 12:44

## 2020-12-01 RX ADMIN — Medication SCH EACH: at 21:01

## 2020-12-01 RX ADMIN — ATORVASTATIN CALCIUM SCH MG: 10 TABLET, FILM COATED ORAL at 20:50

## 2020-12-01 RX ADMIN — DOCUSATE SODIUM SCH MG: 100 CAPSULE, LIQUID FILLED ORAL at 08:55

## 2020-12-01 RX ADMIN — ALBUTEROL SULFATE SCH MG: 1.25 SOLUTION RESPIRATORY (INHALATION) at 19:43

## 2020-12-01 RX ADMIN — HYDROMORPHONE HYDROCHLORIDE PRN MG: 2 TABLET ORAL at 02:10

## 2020-12-01 RX ADMIN — IPRATROPIUM BROMIDE SCH MG: 0.5 SOLUTION RESPIRATORY (INHALATION) at 19:43

## 2020-12-01 RX ADMIN — SODIUM CHLORIDE PRN UNIT: 9 INJECTION, SOLUTION INTRAVENOUS at 17:43

## 2020-12-01 RX ADMIN — Medication SCH EACH: at 06:53

## 2020-12-01 RX ADMIN — GUAIFENESIN AND DEXTROMETHORPHAN PRN ML: 100; 10 SYRUP ORAL at 00:39

## 2020-12-01 RX ADMIN — LABETALOL HYDROCHLORIDE SCH MG: 200 TABLET, FILM COATED ORAL at 17:53

## 2020-12-01 RX ADMIN — Medication SCH EACH: at 11:30

## 2020-12-01 RX ADMIN — DOCUSATE SODIUM SCH MG: 100 CAPSULE, LIQUID FILLED ORAL at 17:40

## 2020-12-01 RX ADMIN — ASPIRIN SCH MG: 81 TABLET, CHEWABLE ORAL at 08:56

## 2020-12-01 RX ADMIN — CLOTRIMAZOLE AND BETAMETHASONE DIPROPIONATE SCH GM: 10; .5 CREAM TOPICAL at 08:59

## 2020-12-01 RX ADMIN — Medication SCH EACH: at 17:13

## 2020-12-01 RX ADMIN — PREGABALIN SCH MG: 25 CAPSULE ORAL at 21:04

## 2020-12-01 RX ADMIN — GUAIFENESIN AND DEXTROMETHORPHAN PRN ML: 100; 10 SYRUP ORAL at 22:33

## 2020-12-01 RX ADMIN — LIDOCAINE SCH PATCH: 50 PATCH CUTANEOUS at 09:07

## 2020-12-01 RX ADMIN — HYDROMORPHONE HYDROCHLORIDE PRN MG: 2 TABLET ORAL at 15:40

## 2020-12-01 RX ADMIN — FUROSEMIDE SCH MG: 10 INJECTION, SOLUTION INTRAMUSCULAR; INTRAVENOUS at 20:51

## 2020-12-01 RX ADMIN — PREGABALIN SCH MG: 25 CAPSULE ORAL at 06:04

## 2020-12-01 RX ADMIN — LABETALOL HYDROCHLORIDE SCH MG: 200 TABLET, FILM COATED ORAL at 08:57

## 2020-12-01 RX ADMIN — CLOTRIMAZOLE AND BETAMETHASONE DIPROPIONATE SCH GM: 10; .5 CREAM TOPICAL at 17:53

## 2020-12-01 NOTE — NUR
Scanned Dilaudid PO and Robitussin and later realized that they did not go through.  
Medications given to patient after coming back from therapy.

## 2020-12-01 NOTE — NUR
Patient is resting in her chair.  she said she has a productive cough. Gave a a collection 
container to collect specimen.  All medications given as ordered.  Safety precautions in 
place with call light and belongings within reach.  will endorse to oncoming nurse.

## 2020-12-01 NOTE — NUR
Received patient sitting up in wheelchair, awake alert and oriented times 4.  No sign of 
distress noted at this time.  Safety precautions are in place.  Will continue to monitor.

## 2020-12-01 NOTE — NUR
Pt slept intermittently throughout the night. Cough was better tonight. Denies any SOB or 
chest pain. Pt is on 2L sating at 98%. No acute distress noted at this time. Safety and 
comfort provided. Will endorse to day shift.

## 2020-12-01 NOTE — NUR
WOUND CARE CONSULT: PT PRESENTS WITH REDNESS, EDEMA AND DRY WOUND TO LEFT LOWER LEG, PRESENT 
ON ADMISSION. RECOMMEND DPM CONSULT. DR GUZMAN NOTIFIED OF CONSULT REQUEST. PT IS 
AMBULATORY WITH ASSISTANCE AND WALKER BUT IS INCONTINENT AT TIMES. RECOMMENDATIONS  MADE FOR 
SKIN PROTECTION. DISCUSSED WITH NURSING STAFF. MD IN AGREEMENT WITH PLAN OF CARE.

## 2020-12-02 VITALS — SYSTOLIC BLOOD PRESSURE: 131 MMHG | DIASTOLIC BLOOD PRESSURE: 42 MMHG

## 2020-12-02 VITALS — SYSTOLIC BLOOD PRESSURE: 127 MMHG | DIASTOLIC BLOOD PRESSURE: 97 MMHG

## 2020-12-02 VITALS — DIASTOLIC BLOOD PRESSURE: 49 MMHG | SYSTOLIC BLOOD PRESSURE: 123 MMHG

## 2020-12-02 LAB
BUN SERPL-MCNC: 20 MG/DL (ref 7–18)
CHLORIDE SERPL-SCNC: 91 MMOL/L (ref 98–107)
CO2 SERPL-SCNC: 34 MMOL/L (ref 21–32)
CREAT SERPL-MCNC: 0.9 MG/DL (ref 0.6–1.3)
GLUCOSE SERPL-MCNC: 87 MG/DL (ref 74–106)
POTASSIUM SERPL-SCNC: 3.9 MMOL/L (ref 3.5–5.1)

## 2020-12-02 RX ADMIN — HYDROMORPHONE HYDROCHLORIDE PRN MG: 2 TABLET ORAL at 17:06

## 2020-12-02 RX ADMIN — IPRATROPIUM BROMIDE SCH MG: 0.5 SOLUTION RESPIRATORY (INHALATION) at 19:20

## 2020-12-02 RX ADMIN — DOCUSATE SODIUM SCH MG: 100 CAPSULE, LIQUID FILLED ORAL at 17:05

## 2020-12-02 RX ADMIN — ENOXAPARIN SODIUM SCH MG: 40 INJECTION SUBCUTANEOUS at 08:36

## 2020-12-02 RX ADMIN — LABETALOL HYDROCHLORIDE SCH MG: 200 TABLET, FILM COATED ORAL at 17:08

## 2020-12-02 RX ADMIN — Medication SCH EACH: at 06:32

## 2020-12-02 RX ADMIN — PREGABALIN SCH MG: 25 CAPSULE ORAL at 13:18

## 2020-12-02 RX ADMIN — LIDOCAINE SCH PATCH: 50 PATCH CUTANEOUS at 08:34

## 2020-12-02 RX ADMIN — Medication SCH EACH: at 21:41

## 2020-12-02 RX ADMIN — ASPIRIN SCH MG: 81 TABLET, CHEWABLE ORAL at 08:35

## 2020-12-02 RX ADMIN — GUAIFENESIN AND DEXTROMETHORPHAN PRN ML: 100; 10 SYRUP ORAL at 17:06

## 2020-12-02 RX ADMIN — OXYBUTYNIN CHLORIDE SCH MG: 5 TABLET, EXTENDED RELEASE ORAL at 08:35

## 2020-12-02 RX ADMIN — Medication SCH EACH: at 12:02

## 2020-12-02 RX ADMIN — BACLOFEN SCH MG: 20 TABLET ORAL at 17:05

## 2020-12-02 RX ADMIN — PREGABALIN SCH MG: 25 CAPSULE ORAL at 21:51

## 2020-12-02 RX ADMIN — LIDOCAINE SCH PATCH: 50 PATCH CUTANEOUS at 08:40

## 2020-12-02 RX ADMIN — DOCUSATE SODIUM SCH MG: 100 CAPSULE, LIQUID FILLED ORAL at 08:35

## 2020-12-02 RX ADMIN — PREGABALIN SCH MG: 25 CAPSULE ORAL at 05:36

## 2020-12-02 RX ADMIN — CLOTRIMAZOLE AND BETAMETHASONE DIPROPIONATE SCH GM: 10; .5 CREAM TOPICAL at 17:08

## 2020-12-02 RX ADMIN — POLYETHYLENE GLYCOL 3350 SCH GM: 17 POWDER, FOR SOLUTION ORAL at 08:34

## 2020-12-02 RX ADMIN — BACLOFEN SCH MG: 20 TABLET ORAL at 08:35

## 2020-12-02 RX ADMIN — FLUTICASONE FUROATE AND VILANTEROL TRIFENATATE SCH EACH: 100; 25 POWDER RESPIRATORY (INHALATION) at 08:39

## 2020-12-02 RX ADMIN — IPRATROPIUM BROMIDE SCH MG: 0.5 SOLUTION RESPIRATORY (INHALATION) at 14:05

## 2020-12-02 RX ADMIN — FUROSEMIDE SCH MG: 10 INJECTION, SOLUTION INTRAMUSCULAR; INTRAVENOUS at 21:30

## 2020-12-02 RX ADMIN — GUAIFENESIN AND DEXTROMETHORPHAN PRN ML: 100; 10 SYRUP ORAL at 09:00

## 2020-12-02 RX ADMIN — ALBUTEROL SULFATE SCH MG: 1.25 SOLUTION RESPIRATORY (INHALATION) at 19:20

## 2020-12-02 RX ADMIN — ENOXAPARIN SODIUM SCH MG: 40 INJECTION SUBCUTANEOUS at 09:00

## 2020-12-02 RX ADMIN — SODIUM CHLORIDE PRN UNIT: 9 INJECTION, SOLUTION INTRAVENOUS at 21:44

## 2020-12-02 RX ADMIN — CLOPIDOGREL BISULFATE SCH MG: 75 TABLET ORAL at 08:35

## 2020-12-02 RX ADMIN — IPRATROPIUM BROMIDE SCH MG: 0.5 SOLUTION RESPIRATORY (INHALATION) at 01:42

## 2020-12-02 RX ADMIN — ATORVASTATIN CALCIUM SCH MG: 10 TABLET, FILM COATED ORAL at 21:30

## 2020-12-02 RX ADMIN — LABETALOL HYDROCHLORIDE SCH MG: 200 TABLET, FILM COATED ORAL at 08:50

## 2020-12-02 RX ADMIN — ALBUTEROL SULFATE SCH MG: 1.25 SOLUTION RESPIRATORY (INHALATION) at 01:43

## 2020-12-02 RX ADMIN — LOSARTAN POTASSIUM SCH MG: 50 TABLET, FILM COATED ORAL at 09:00

## 2020-12-02 RX ADMIN — PANTOPRAZOLE SODIUM SCH MG: 40 TABLET, DELAYED RELEASE ORAL at 21:30

## 2020-12-02 RX ADMIN — Medication SCH ML: at 21:46

## 2020-12-02 RX ADMIN — CLOTRIMAZOLE AND BETAMETHASONE DIPROPIONATE SCH GM: 10; .5 CREAM TOPICAL at 08:40

## 2020-12-02 RX ADMIN — LINAGLIPTIN SCH MG: 5 TABLET, FILM COATED ORAL at 08:35

## 2020-12-02 RX ADMIN — BACLOFEN SCH MG: 20 TABLET ORAL at 13:18

## 2020-12-02 RX ADMIN — ALBUTEROL SULFATE SCH MG: 1.25 SOLUTION RESPIRATORY (INHALATION) at 14:05

## 2020-12-02 RX ADMIN — FUROSEMIDE SCH MG: 10 INJECTION, SOLUTION INTRAMUSCULAR; INTRAVENOUS at 08:35

## 2020-12-02 RX ADMIN — Medication SCH ML: at 17:22

## 2020-12-02 RX ADMIN — Medication SCH EACH: at 17:23

## 2020-12-02 RX ADMIN — Medication SCH EA: at 08:39

## 2020-12-02 RX ADMIN — IPRATROPIUM BROMIDE SCH MG: 0.5 SOLUTION RESPIRATORY (INHALATION) at 07:17

## 2020-12-02 RX ADMIN — ALBUTEROL SULFATE SCH MG: 1.25 SOLUTION RESPIRATORY (INHALATION) at 07:17

## 2020-12-02 RX ADMIN — HYDROMORPHONE HYDROCHLORIDE PRN MG: 2 TABLET ORAL at 08:36

## 2020-12-02 RX ADMIN — SODIUM CHLORIDE PRN UNIT: 9 INJECTION, SOLUTION INTRAVENOUS at 12:28

## 2020-12-02 NOTE — NUR
OOB in chair when received. AAOx4 Patient very needy.

On pain management. Patient receiving Dilaudid 4mg po

q6hrs PRN plus cough medicine q6 hrs PRN also. Patient 

all due meds given without any difficulty. Incontinent of

urine and BM, no BM noted this shift. Kept clean and dry.

Will monitor patient. VSS. On telemetry, patient Sinus rhythm

No complaints of chest pain or chest discomfort. Respiratory

treatments given as needed. Accucheck 159. Fluid restrictions

maintained. Patient non compliant.

## 2020-12-02 NOTE — NUR
Received patient resting in bed awake alert and oriented times 4.  No sign of distress noted 
at this time.  Safety precautions are in place with call light and belongings within reach.  
Will continue to monitor.

## 2020-12-02 NOTE — NUR
NP Bal in patient room reinforcing need for 1000 ml fluid restriction per day .  500 in the 
am shift and 500 in pm.  Will continue to monitor.

## 2020-12-02 NOTE — NUR
End of shift notes: Quiet night. Slept well most of the shift. 

Needs attended. Accucheck 100. Will monitor patient.

## 2020-12-02 NOTE — NUR
Patient is resting in her chair. No sign of distress noted at this time.  All medications 
given as ordered.  Safety precautions in place with call light and belongings within reach.  
will endorse to oncoming nurse.

## 2020-12-02 NOTE — NUR
Patient was bleeding at the injection site.  Despite the application of dressing the patient 
bled through it onto her diaper.  Will make MD aware.  Will continue to monitor.

## 2020-12-02 NOTE — NUR
Awake, up in a wheelchair in her room. No s/s of pain/discomforts noted. With continuous O2 
at 2L/min via NC. No s/s of respiratory distress noted. Safety measures and fall prevention 
maintained. Continue care as planned.

## 2020-12-03 VITALS — DIASTOLIC BLOOD PRESSURE: 67 MMHG | SYSTOLIC BLOOD PRESSURE: 130 MMHG

## 2020-12-03 VITALS — SYSTOLIC BLOOD PRESSURE: 131 MMHG | DIASTOLIC BLOOD PRESSURE: 38 MMHG

## 2020-12-03 VITALS — SYSTOLIC BLOOD PRESSURE: 191 MMHG | DIASTOLIC BLOOD PRESSURE: 63 MMHG

## 2020-12-03 VITALS — SYSTOLIC BLOOD PRESSURE: 143 MMHG | DIASTOLIC BLOOD PRESSURE: 65 MMHG

## 2020-12-03 VITALS — SYSTOLIC BLOOD PRESSURE: 152 MMHG | DIASTOLIC BLOOD PRESSURE: 56 MMHG

## 2020-12-03 RX ADMIN — GUAIFENESIN AND DEXTROMETHORPHAN PRN ML: 100; 10 SYRUP ORAL at 20:53

## 2020-12-03 RX ADMIN — BACLOFEN SCH MG: 20 TABLET ORAL at 16:44

## 2020-12-03 RX ADMIN — ALBUTEROL SULFATE SCH MG: 1.25 SOLUTION RESPIRATORY (INHALATION) at 19:42

## 2020-12-03 RX ADMIN — SODIUM CHLORIDE PRN UNIT: 9 INJECTION, SOLUTION INTRAVENOUS at 21:02

## 2020-12-03 RX ADMIN — LOSARTAN POTASSIUM SCH MG: 50 TABLET, FILM COATED ORAL at 09:36

## 2020-12-03 RX ADMIN — PREGABALIN SCH MG: 25 CAPSULE ORAL at 05:23

## 2020-12-03 RX ADMIN — Medication SCH EACH: at 06:23

## 2020-12-03 RX ADMIN — BACLOFEN SCH MG: 20 TABLET ORAL at 12:37

## 2020-12-03 RX ADMIN — HYDROMORPHONE HYDROCHLORIDE PRN MG: 2 TABLET ORAL at 12:38

## 2020-12-03 RX ADMIN — Medication SCH ML: at 21:12

## 2020-12-03 RX ADMIN — ENOXAPARIN SODIUM SCH MG: 40 INJECTION SUBCUTANEOUS at 08:11

## 2020-12-03 RX ADMIN — IPRATROPIUM BROMIDE SCH MG: 0.5 SOLUTION RESPIRATORY (INHALATION) at 19:42

## 2020-12-03 RX ADMIN — ATORVASTATIN CALCIUM SCH MG: 10 TABLET, FILM COATED ORAL at 20:55

## 2020-12-03 RX ADMIN — ALBUTEROL SULFATE SCH MG: 1.25 SOLUTION RESPIRATORY (INHALATION) at 00:30

## 2020-12-03 RX ADMIN — PREGABALIN SCH MG: 25 CAPSULE ORAL at 14:15

## 2020-12-03 RX ADMIN — GUAIFENESIN AND DEXTROMETHORPHAN PRN ML: 100; 10 SYRUP ORAL at 06:15

## 2020-12-03 RX ADMIN — CLOTRIMAZOLE AND BETAMETHASONE DIPROPIONATE SCH GM: 10; .5 CREAM TOPICAL at 17:30

## 2020-12-03 RX ADMIN — OXYBUTYNIN CHLORIDE SCH MG: 5 TABLET, EXTENDED RELEASE ORAL at 08:10

## 2020-12-03 RX ADMIN — Medication SCH EACH: at 20:58

## 2020-12-03 RX ADMIN — Medication SCH EACH: at 15:44

## 2020-12-03 RX ADMIN — ASPIRIN SCH MG: 81 TABLET, CHEWABLE ORAL at 08:10

## 2020-12-03 RX ADMIN — IPRATROPIUM BROMIDE SCH MG: 0.5 SOLUTION RESPIRATORY (INHALATION) at 00:30

## 2020-12-03 RX ADMIN — POLYETHYLENE GLYCOL 3350 SCH GM: 17 POWDER, FOR SOLUTION ORAL at 08:09

## 2020-12-03 RX ADMIN — Medication SCH ML: at 17:30

## 2020-12-03 RX ADMIN — Medication SCH EACH: at 11:34

## 2020-12-03 RX ADMIN — SODIUM CHLORIDE PRN UNIT: 9 INJECTION, SOLUTION INTRAVENOUS at 12:00

## 2020-12-03 RX ADMIN — BACLOFEN SCH MG: 20 TABLET ORAL at 08:10

## 2020-12-03 RX ADMIN — PREGABALIN SCH MG: 25 CAPSULE ORAL at 22:06

## 2020-12-03 RX ADMIN — IPRATROPIUM BROMIDE SCH MG: 0.5 SOLUTION RESPIRATORY (INHALATION) at 13:36

## 2020-12-03 RX ADMIN — Medication SCH ML: at 12:37

## 2020-12-03 RX ADMIN — LABETALOL HYDROCHLORIDE SCH MG: 200 TABLET, FILM COATED ORAL at 16:44

## 2020-12-03 RX ADMIN — FUROSEMIDE SCH MG: 10 INJECTION, SOLUTION INTRAMUSCULAR; INTRAVENOUS at 20:54

## 2020-12-03 RX ADMIN — DOCUSATE SODIUM SCH MG: 100 CAPSULE, LIQUID FILLED ORAL at 16:44

## 2020-12-03 RX ADMIN — CLOTRIMAZOLE AND BETAMETHASONE DIPROPIONATE SCH GM: 10; .5 CREAM TOPICAL at 09:33

## 2020-12-03 RX ADMIN — ACETAMINOPHEN PRN MG: 325 TABLET ORAL at 16:44

## 2020-12-03 RX ADMIN — IPRATROPIUM BROMIDE SCH MG: 0.5 SOLUTION RESPIRATORY (INHALATION) at 07:26

## 2020-12-03 RX ADMIN — Medication SCH EA: at 09:31

## 2020-12-03 RX ADMIN — Medication SCH ML: at 09:32

## 2020-12-03 RX ADMIN — ALBUTEROL SULFATE SCH MG: 1.25 SOLUTION RESPIRATORY (INHALATION) at 07:26

## 2020-12-03 RX ADMIN — LABETALOL HYDROCHLORIDE SCH MG: 200 TABLET, FILM COATED ORAL at 08:10

## 2020-12-03 RX ADMIN — FLUTICASONE FUROATE AND VILANTEROL TRIFENATATE SCH EACH: 100; 25 POWDER RESPIRATORY (INHALATION) at 09:31

## 2020-12-03 RX ADMIN — ALBUTEROL SULFATE SCH MG: 1.25 SOLUTION RESPIRATORY (INHALATION) at 13:36

## 2020-12-03 RX ADMIN — PANTOPRAZOLE SODIUM SCH MG: 40 TABLET, DELAYED RELEASE ORAL at 20:54

## 2020-12-03 RX ADMIN — DOCUSATE SODIUM SCH MG: 100 CAPSULE, LIQUID FILLED ORAL at 08:10

## 2020-12-03 RX ADMIN — HYDROMORPHONE HYDROCHLORIDE PRN MG: 2 TABLET ORAL at 06:22

## 2020-12-03 RX ADMIN — GUAIFENESIN AND DEXTROMETHORPHAN PRN ML: 100; 10 SYRUP ORAL at 14:15

## 2020-12-03 RX ADMIN — LIDOCAINE SCH PATCH: 50 PATCH CUTANEOUS at 08:09

## 2020-12-03 RX ADMIN — CLOPIDOGREL BISULFATE SCH MG: 75 TABLET ORAL at 08:10

## 2020-12-03 RX ADMIN — HYDROMORPHONE HYDROCHLORIDE PRN MG: 2 TABLET ORAL at 20:54

## 2020-12-03 RX ADMIN — FUROSEMIDE SCH MG: 10 INJECTION, SOLUTION INTRAMUSCULAR; INTRAVENOUS at 08:16

## 2020-12-03 RX ADMIN — LINAGLIPTIN SCH MG: 5 TABLET, FILM COATED ORAL at 08:10

## 2020-12-04 VITALS — DIASTOLIC BLOOD PRESSURE: 55 MMHG | SYSTOLIC BLOOD PRESSURE: 148 MMHG

## 2020-12-04 VITALS — DIASTOLIC BLOOD PRESSURE: 48 MMHG | SYSTOLIC BLOOD PRESSURE: 155 MMHG

## 2020-12-04 VITALS — SYSTOLIC BLOOD PRESSURE: 97 MMHG | DIASTOLIC BLOOD PRESSURE: 50 MMHG

## 2020-12-04 VITALS — SYSTOLIC BLOOD PRESSURE: 157 MMHG | DIASTOLIC BLOOD PRESSURE: 52 MMHG

## 2020-12-04 LAB
ALP SERPL-CCNC: 85 U/L (ref 50–136)
ALT SERPL W/O P-5'-P-CCNC: 37 U/L (ref 14–59)
AST SERPL-CCNC: 19 U/L (ref 15–37)
BASOPHILS # BLD AUTO: 0.1 K/UL (ref 0–8)
BASOPHILS NFR BLD AUTO: 0.9 % (ref 0–2)
BILIRUB SERPL-MCNC: 0.6 MG/DL (ref 0.2–1)
BUN SERPL-MCNC: 15 MG/DL (ref 7–18)
CHLORIDE SERPL-SCNC: 96 MMOL/L (ref 98–107)
CO2 SERPL-SCNC: 37 MMOL/L (ref 21–32)
CREAT SERPL-MCNC: 0.9 MG/DL (ref 0.6–1.3)
EOSINOPHIL # BLD AUTO: 0.2 K/UL (ref 0–0.7)
EOSINOPHIL NFR BLD AUTO: 3.3 % (ref 0–7)
GLUCOSE SERPL-MCNC: 91 MG/DL (ref 74–106)
HCT VFR BLD AUTO: 24.5 % (ref 31.2–41.9)
HGB BLD-MCNC: 8.2 G/DL (ref 10.9–14.3)
LYMPHOCYTES # BLD AUTO: 1.6 K/UL (ref 20–40)
LYMPHOCYTES NFR BLD AUTO: 21.1 % (ref 20.5–51.5)
MAGNESIUM SERPL-MCNC: 2.5 MG/DL (ref 1.8–2.4)
MCH RBC QN AUTO: 28.4 UUG (ref 24.7–32.8)
MCHC RBC AUTO-ENTMCNC: 34 G/DL (ref 32.3–35.6)
MCV RBC AUTO: 84.6 FL (ref 75.5–95.3)
MONOCYTES # BLD AUTO: 0.9 K/UL (ref 2–10)
MONOCYTES NFR BLD AUTO: 12.3 % (ref 0–11)
NEUTROPHILS # BLD AUTO: 4.6 K/UL (ref 1.8–8.9)
NEUTROPHILS NFR BLD AUTO: 62.4 % (ref 38.5–71.5)
PHOSPHATE SERPL-MCNC: 3.7 MG/DL (ref 2.5–4.9)
PLATELET # BLD AUTO: 294 K/UL (ref 179–408)
POTASSIUM SERPL-SCNC: 3.7 MMOL/L (ref 3.5–5.1)
RBC # BLD AUTO: 2.9 MIL/UL (ref 3.63–4.92)
WBC # BLD AUTO: 7.4 K/UL (ref 3.8–11.8)
WS STN SPEC: 6.3 G/DL (ref 6.4–8.2)

## 2020-12-04 RX ADMIN — PANTOPRAZOLE SODIUM SCH MG: 40 TABLET, DELAYED RELEASE ORAL at 21:04

## 2020-12-04 RX ADMIN — BACLOFEN SCH MG: 20 TABLET ORAL at 08:50

## 2020-12-04 RX ADMIN — FUROSEMIDE SCH MG: 10 INJECTION, SOLUTION INTRAMUSCULAR; INTRAVENOUS at 21:04

## 2020-12-04 RX ADMIN — Medication SCH ML: at 13:07

## 2020-12-04 RX ADMIN — LIDOCAINE SCH PATCH: 50 PATCH CUTANEOUS at 09:06

## 2020-12-04 RX ADMIN — LABETALOL HYDROCHLORIDE SCH MG: 200 TABLET, FILM COATED ORAL at 09:32

## 2020-12-04 RX ADMIN — POLYETHYLENE GLYCOL 3350 SCH GM: 17 POWDER, FOR SOLUTION ORAL at 08:54

## 2020-12-04 RX ADMIN — LINAGLIPTIN SCH MG: 5 TABLET, FILM COATED ORAL at 08:51

## 2020-12-04 RX ADMIN — ALBUTEROL SULFATE SCH MG: 1.25 SOLUTION RESPIRATORY (INHALATION) at 07:49

## 2020-12-04 RX ADMIN — ATORVASTATIN CALCIUM SCH MG: 10 TABLET, FILM COATED ORAL at 21:04

## 2020-12-04 RX ADMIN — Medication SCH EACH: at 05:34

## 2020-12-04 RX ADMIN — CLOTRIMAZOLE AND BETAMETHASONE DIPROPIONATE SCH GM: 10; .5 CREAM TOPICAL at 09:00

## 2020-12-04 RX ADMIN — PREGABALIN SCH MG: 25 CAPSULE ORAL at 22:31

## 2020-12-04 RX ADMIN — ALBUTEROL SULFATE SCH MG: 1.25 SOLUTION RESPIRATORY (INHALATION) at 21:06

## 2020-12-04 RX ADMIN — GUAIFENESIN AND DEXTROMETHORPHAN PRN ML: 100; 10 SYRUP ORAL at 17:27

## 2020-12-04 RX ADMIN — HYDROMORPHONE HYDROCHLORIDE PRN MG: 2 TABLET ORAL at 14:45

## 2020-12-04 RX ADMIN — ALBUTEROL SULFATE SCH MG: 1.25 SOLUTION RESPIRATORY (INHALATION) at 13:51

## 2020-12-04 RX ADMIN — FUROSEMIDE SCH MG: 10 INJECTION, SOLUTION INTRAMUSCULAR; INTRAVENOUS at 09:30

## 2020-12-04 RX ADMIN — FLUTICASONE PROPIONATE SCH GM: 50 SPRAY, METERED NASAL at 21:08

## 2020-12-04 RX ADMIN — CLOTRIMAZOLE AND BETAMETHASONE DIPROPIONATE SCH GM: 10; .5 CREAM TOPICAL at 17:00

## 2020-12-04 RX ADMIN — IPRATROPIUM BROMIDE SCH MG: 0.5 SOLUTION RESPIRATORY (INHALATION) at 13:51

## 2020-12-04 RX ADMIN — BACLOFEN SCH MG: 20 TABLET ORAL at 17:29

## 2020-12-04 RX ADMIN — Medication SCH EACH: at 17:36

## 2020-12-04 RX ADMIN — HYDROMORPHONE HYDROCHLORIDE PRN MG: 2 TABLET ORAL at 21:11

## 2020-12-04 RX ADMIN — LOSARTAN POTASSIUM SCH MG: 50 TABLET, FILM COATED ORAL at 08:53

## 2020-12-04 RX ADMIN — GUAIFENESIN AND DEXTROMETHORPHAN PRN ML: 100; 10 SYRUP ORAL at 03:03

## 2020-12-04 RX ADMIN — OXYBUTYNIN CHLORIDE SCH MG: 5 TABLET, EXTENDED RELEASE ORAL at 08:51

## 2020-12-04 RX ADMIN — Medication SCH EA: at 09:05

## 2020-12-04 RX ADMIN — HYDROMORPHONE HYDROCHLORIDE PRN MG: 2 TABLET ORAL at 09:03

## 2020-12-04 RX ADMIN — BACLOFEN SCH MG: 20 TABLET ORAL at 13:03

## 2020-12-04 RX ADMIN — IPRATROPIUM BROMIDE SCH MG: 0.5 SOLUTION RESPIRATORY (INHALATION) at 07:49

## 2020-12-04 RX ADMIN — ASPIRIN SCH MG: 81 TABLET, CHEWABLE ORAL at 08:51

## 2020-12-04 RX ADMIN — DOCUSATE SODIUM SCH MG: 100 CAPSULE, LIQUID FILLED ORAL at 08:52

## 2020-12-04 RX ADMIN — HYDROMORPHONE HYDROCHLORIDE PRN MG: 2 TABLET ORAL at 02:59

## 2020-12-04 RX ADMIN — ACETAMINOPHEN PRN MG: 325 TABLET ORAL at 11:11

## 2020-12-04 RX ADMIN — ENOXAPARIN SODIUM SCH MG: 40 INJECTION SUBCUTANEOUS at 09:20

## 2020-12-04 RX ADMIN — PREGABALIN SCH MG: 25 CAPSULE ORAL at 05:27

## 2020-12-04 RX ADMIN — Medication SCH ML: at 21:05

## 2020-12-04 RX ADMIN — Medication SCH ML: at 17:29

## 2020-12-04 RX ADMIN — IPRATROPIUM BROMIDE SCH MG: 0.5 SOLUTION RESPIRATORY (INHALATION) at 21:06

## 2020-12-04 RX ADMIN — PREGABALIN SCH MG: 25 CAPSULE ORAL at 15:57

## 2020-12-04 RX ADMIN — FLUTICASONE FUROATE AND VILANTEROL TRIFENATATE SCH EACH: 100; 25 POWDER RESPIRATORY (INHALATION) at 09:09

## 2020-12-04 RX ADMIN — LABETALOL HYDROCHLORIDE SCH MG: 200 TABLET, FILM COATED ORAL at 17:00

## 2020-12-04 RX ADMIN — ALBUTEROL SULFATE SCH MG: 1.25 SOLUTION RESPIRATORY (INHALATION) at 00:30

## 2020-12-04 RX ADMIN — Medication SCH ML: at 09:30

## 2020-12-04 RX ADMIN — Medication SCH EACH: at 13:11

## 2020-12-04 RX ADMIN — IPRATROPIUM BROMIDE SCH MG: 0.5 SOLUTION RESPIRATORY (INHALATION) at 00:30

## 2020-12-04 RX ADMIN — GUAIFENESIN AND DEXTROMETHORPHAN PRN ML: 100; 10 SYRUP ORAL at 09:09

## 2020-12-04 RX ADMIN — CLOPIDOGREL BISULFATE SCH MG: 75 TABLET ORAL at 08:50

## 2020-12-04 RX ADMIN — DOCUSATE SODIUM SCH MG: 100 CAPSULE, LIQUID FILLED ORAL at 17:28

## 2020-12-04 RX ADMIN — ACETAMINOPHEN PRN MG: 325 TABLET ORAL at 04:43

## 2020-12-04 RX ADMIN — PREGABALIN SCH MG: 25 CAPSULE ORAL at 21:04

## 2020-12-04 NOTE — NUR
Nurse on break, pt requested Dilaudid PRN  for 8/10 head pain and Robitussin for her cough 
PRN., administered.

## 2020-12-05 VITALS — SYSTOLIC BLOOD PRESSURE: 153 MMHG | DIASTOLIC BLOOD PRESSURE: 68 MMHG

## 2020-12-05 VITALS — DIASTOLIC BLOOD PRESSURE: 46 MMHG | SYSTOLIC BLOOD PRESSURE: 115 MMHG

## 2020-12-05 VITALS — DIASTOLIC BLOOD PRESSURE: 53 MMHG | SYSTOLIC BLOOD PRESSURE: 148 MMHG

## 2020-12-05 VITALS — SYSTOLIC BLOOD PRESSURE: 113 MMHG | DIASTOLIC BLOOD PRESSURE: 58 MMHG

## 2020-12-05 RX ADMIN — DOCUSATE SODIUM SCH MG: 100 CAPSULE, LIQUID FILLED ORAL at 10:08

## 2020-12-05 RX ADMIN — ASPIRIN SCH MG: 81 TABLET, CHEWABLE ORAL at 10:07

## 2020-12-05 RX ADMIN — LABETALOL HYDROCHLORIDE SCH MG: 200 TABLET, FILM COATED ORAL at 10:07

## 2020-12-05 RX ADMIN — ALBUTEROL SULFATE SCH MG: 1.25 SOLUTION RESPIRATORY (INHALATION) at 20:45

## 2020-12-05 RX ADMIN — PANTOPRAZOLE SODIUM SCH MG: 40 TABLET, DELAYED RELEASE ORAL at 21:05

## 2020-12-05 RX ADMIN — Medication SCH EA: at 10:11

## 2020-12-05 RX ADMIN — LIDOCAINE SCH PATCH: 50 PATCH CUTANEOUS at 10:10

## 2020-12-05 RX ADMIN — LOSARTAN POTASSIUM SCH MG: 50 TABLET, FILM COATED ORAL at 10:07

## 2020-12-05 RX ADMIN — LABETALOL HYDROCHLORIDE SCH MG: 200 TABLET, FILM COATED ORAL at 17:00

## 2020-12-05 RX ADMIN — Medication SCH ML: at 21:06

## 2020-12-05 RX ADMIN — ATORVASTATIN CALCIUM SCH MG: 10 TABLET, FILM COATED ORAL at 21:05

## 2020-12-05 RX ADMIN — ALBUTEROL SULFATE SCH MG: 1.25 SOLUTION RESPIRATORY (INHALATION) at 13:47

## 2020-12-05 RX ADMIN — FLUTICASONE PROPIONATE SCH SPRAYS: 50 SPRAY, METERED NASAL at 13:16

## 2020-12-05 RX ADMIN — HYDROMORPHONE HYDROCHLORIDE PRN MG: 2 TABLET ORAL at 05:19

## 2020-12-05 RX ADMIN — Medication SCH ML: at 10:14

## 2020-12-05 RX ADMIN — BACLOFEN SCH MG: 20 TABLET ORAL at 17:00

## 2020-12-05 RX ADMIN — LIDOCAINE SCH PATCH: 50 PATCH CUTANEOUS at 10:08

## 2020-12-05 RX ADMIN — ALBUTEROL SULFATE SCH MG: 1.25 SOLUTION RESPIRATORY (INHALATION) at 07:59

## 2020-12-05 RX ADMIN — IPRATROPIUM BROMIDE SCH MG: 0.5 SOLUTION RESPIRATORY (INHALATION) at 20:45

## 2020-12-05 RX ADMIN — PREGABALIN SCH MG: 25 CAPSULE ORAL at 21:05

## 2020-12-05 RX ADMIN — IPRATROPIUM BROMIDE SCH MG: 0.5 SOLUTION RESPIRATORY (INHALATION) at 08:00

## 2020-12-05 RX ADMIN — GUAIFENESIN AND DEXTROMETHORPHAN PRN ML: 100; 10 SYRUP ORAL at 10:33

## 2020-12-05 RX ADMIN — GUAIFENESIN AND DEXTROMETHORPHAN PRN ML: 100; 10 SYRUP ORAL at 16:58

## 2020-12-05 RX ADMIN — ENOXAPARIN SODIUM SCH MG: 40 INJECTION SUBCUTANEOUS at 09:00

## 2020-12-05 RX ADMIN — PREGABALIN SCH MG: 25 CAPSULE ORAL at 06:03

## 2020-12-05 RX ADMIN — DOCUSATE SODIUM SCH MG: 100 CAPSULE, LIQUID FILLED ORAL at 17:00

## 2020-12-05 RX ADMIN — CLOTRIMAZOLE AND BETAMETHASONE DIPROPIONATE SCH GM: 10; .5 CREAM TOPICAL at 17:09

## 2020-12-05 RX ADMIN — GUAIFENESIN AND DEXTROMETHORPHAN PRN ML: 100; 10 SYRUP ORAL at 05:19

## 2020-12-05 RX ADMIN — IPRATROPIUM BROMIDE SCH MG: 0.5 SOLUTION RESPIRATORY (INHALATION) at 01:30

## 2020-12-05 RX ADMIN — FUROSEMIDE SCH MG: 10 INJECTION, SOLUTION INTRAMUSCULAR; INTRAVENOUS at 10:08

## 2020-12-05 RX ADMIN — Medication SCH ML: at 17:08

## 2020-12-05 RX ADMIN — BACLOFEN SCH MG: 20 TABLET ORAL at 10:11

## 2020-12-05 RX ADMIN — FUROSEMIDE SCH MG: 10 INJECTION, SOLUTION INTRAMUSCULAR; INTRAVENOUS at 21:05

## 2020-12-05 RX ADMIN — CLOPIDOGREL BISULFATE SCH MG: 75 TABLET ORAL at 10:07

## 2020-12-05 RX ADMIN — ALBUTEROL SULFATE SCH MG: 1.25 SOLUTION RESPIRATORY (INHALATION) at 01:30

## 2020-12-05 RX ADMIN — ACETAMINOPHEN PRN MG: 325 TABLET ORAL at 14:12

## 2020-12-05 RX ADMIN — HYDROMORPHONE HYDROCHLORIDE PRN MG: 2 TABLET ORAL at 16:59

## 2020-12-05 RX ADMIN — LINAGLIPTIN SCH MG: 5 TABLET, FILM COATED ORAL at 10:07

## 2020-12-05 RX ADMIN — BACLOFEN SCH MG: 20 TABLET ORAL at 13:19

## 2020-12-05 RX ADMIN — CLOTRIMAZOLE AND BETAMETHASONE DIPROPIONATE SCH GM: 10; .5 CREAM TOPICAL at 13:17

## 2020-12-05 RX ADMIN — TOLTERODINE TARTRATE SCH MG: 2 CAPSULE, EXTENDED RELEASE ORAL at 10:06

## 2020-12-05 RX ADMIN — Medication SCH ML: at 13:20

## 2020-12-05 RX ADMIN — FLUTICASONE FUROATE AND VILANTEROL TRIFENATATE SCH EACH: 100; 25 POWDER RESPIRATORY (INHALATION) at 10:09

## 2020-12-05 RX ADMIN — POLYETHYLENE GLYCOL 3350 SCH GM: 17 POWDER, FOR SOLUTION ORAL at 10:12

## 2020-12-05 RX ADMIN — IPRATROPIUM BROMIDE SCH MG: 0.5 SOLUTION RESPIRATORY (INHALATION) at 13:47

## 2020-12-05 RX ADMIN — HYDROMORPHONE HYDROCHLORIDE PRN MG: 2 TABLET ORAL at 10:31

## 2020-12-05 RX ADMIN — PREGABALIN SCH MG: 25 CAPSULE ORAL at 14:15

## 2020-12-06 VITALS — SYSTOLIC BLOOD PRESSURE: 150 MMHG | DIASTOLIC BLOOD PRESSURE: 58 MMHG

## 2020-12-06 VITALS — DIASTOLIC BLOOD PRESSURE: 52 MMHG | SYSTOLIC BLOOD PRESSURE: 157 MMHG

## 2020-12-06 VITALS — SYSTOLIC BLOOD PRESSURE: 123 MMHG

## 2020-12-06 VITALS — SYSTOLIC BLOOD PRESSURE: 125 MMHG | DIASTOLIC BLOOD PRESSURE: 50 MMHG

## 2020-12-06 LAB
BASOPHILS # BLD AUTO: 0.1 K/UL (ref 0–8)
BASOPHILS NFR BLD AUTO: 1.3 % (ref 0–2)
BUN SERPL-MCNC: 14 MG/DL (ref 7–18)
CHLORIDE SERPL-SCNC: 98 MMOL/L (ref 98–107)
CO2 SERPL-SCNC: 36 MMOL/L (ref 21–32)
CREAT SERPL-MCNC: 0.8 MG/DL (ref 0.6–1.3)
EOSINOPHIL # BLD AUTO: 0.2 K/UL (ref 0–0.7)
EOSINOPHIL NFR BLD AUTO: 3.5 % (ref 0–7)
FERRITIN SERPL-MCNC: 128 NG/ML (ref 8–252)
GLUCOSE SERPL-MCNC: 115 MG/DL (ref 74–106)
HCT VFR BLD AUTO: 25.6 % (ref 31.2–41.9)
HGB BLD-MCNC: 8.7 G/DL (ref 10.9–14.3)
IRON SERPL-MCNC: 49 UG/DL (ref 50–175)
LYMPHOCYTES # BLD AUTO: 1.5 K/UL (ref 20–40)
LYMPHOCYTES NFR BLD AUTO: 21.5 % (ref 20.5–51.5)
MAGNESIUM SERPL-MCNC: 2.5 MG/DL (ref 1.8–2.4)
MCH RBC QN AUTO: 28.5 UUG (ref 24.7–32.8)
MCHC RBC AUTO-ENTMCNC: 34 G/DL (ref 32.3–35.6)
MCV RBC AUTO: 83.8 FL (ref 75.5–95.3)
MONOCYTES # BLD AUTO: 0.8 K/UL (ref 2–10)
MONOCYTES NFR BLD AUTO: 10.9 % (ref 0–11)
NEUTROPHILS # BLD AUTO: 4.5 K/UL (ref 1.8–8.9)
NEUTROPHILS NFR BLD AUTO: 62.8 % (ref 38.5–71.5)
PHOSPHATE SERPL-MCNC: 4 MG/DL (ref 2.5–4.9)
PLATELET # BLD AUTO: 323 K/UL (ref 179–408)
POTASSIUM SERPL-SCNC: 3.8 MMOL/L (ref 3.5–5.1)
RBC # BLD AUTO: 3.06 MIL/UL (ref 3.63–4.92)
WBC # BLD AUTO: 7.1 K/UL (ref 3.8–11.8)

## 2020-12-06 RX ADMIN — FLUTICASONE PROPIONATE SCH SPRAYS: 50 SPRAY, METERED NASAL at 11:12

## 2020-12-06 RX ADMIN — HYDROMORPHONE HYDROCHLORIDE PRN MG: 2 TABLET ORAL at 19:05

## 2020-12-06 RX ADMIN — IPRATROPIUM BROMIDE SCH MG: 0.5 SOLUTION RESPIRATORY (INHALATION) at 01:30

## 2020-12-06 RX ADMIN — Medication SCH EACH: at 21:10

## 2020-12-06 RX ADMIN — POLYETHYLENE GLYCOL 3350 SCH GM: 17 POWDER, FOR SOLUTION ORAL at 11:12

## 2020-12-06 RX ADMIN — LOSARTAN POTASSIUM SCH MG: 50 TABLET, FILM COATED ORAL at 11:13

## 2020-12-06 RX ADMIN — ALBUTEROL SULFATE SCH MG: 1.25 SOLUTION RESPIRATORY (INHALATION) at 01:30

## 2020-12-06 RX ADMIN — FUROSEMIDE SCH MG: 10 INJECTION, SOLUTION INTRAMUSCULAR; INTRAVENOUS at 11:13

## 2020-12-06 RX ADMIN — ACETAMINOPHEN PRN MG: 325 TABLET ORAL at 18:12

## 2020-12-06 RX ADMIN — PANTOPRAZOLE SODIUM SCH MG: 40 TABLET, DELAYED RELEASE ORAL at 21:09

## 2020-12-06 RX ADMIN — HYDROMORPHONE HYDROCHLORIDE PRN MG: 2 TABLET ORAL at 06:40

## 2020-12-06 RX ADMIN — GUAIFENESIN AND DEXTROMETHORPHAN PRN ML: 100; 10 SYRUP ORAL at 19:05

## 2020-12-06 RX ADMIN — LINAGLIPTIN SCH MG: 5 TABLET, FILM COATED ORAL at 11:14

## 2020-12-06 RX ADMIN — GUAIFENESIN AND DEXTROMETHORPHAN PRN ML: 100; 10 SYRUP ORAL at 01:05

## 2020-12-06 RX ADMIN — BACLOFEN SCH MG: 20 TABLET ORAL at 13:41

## 2020-12-06 RX ADMIN — TOLTERODINE TARTRATE SCH MG: 2 CAPSULE, EXTENDED RELEASE ORAL at 11:15

## 2020-12-06 RX ADMIN — CLOTRIMAZOLE AND BETAMETHASONE DIPROPIONATE SCH GM: 10; .5 CREAM TOPICAL at 17:51

## 2020-12-06 RX ADMIN — IPRATROPIUM BROMIDE SCH MG: 0.5 SOLUTION RESPIRATORY (INHALATION) at 20:53

## 2020-12-06 RX ADMIN — ALBUTEROL SULFATE SCH MG: 1.25 SOLUTION RESPIRATORY (INHALATION) at 07:51

## 2020-12-06 RX ADMIN — IPRATROPIUM BROMIDE SCH MG: 0.5 SOLUTION RESPIRATORY (INHALATION) at 07:51

## 2020-12-06 RX ADMIN — PREGABALIN SCH MG: 25 CAPSULE ORAL at 13:40

## 2020-12-06 RX ADMIN — ACETAMINOPHEN PRN MG: 325 TABLET ORAL at 06:01

## 2020-12-06 RX ADMIN — PREGABALIN SCH MG: 25 CAPSULE ORAL at 21:09

## 2020-12-06 RX ADMIN — Medication SCH EACH: at 06:39

## 2020-12-06 RX ADMIN — DOCUSATE SODIUM SCH MG: 100 CAPSULE, LIQUID FILLED ORAL at 11:13

## 2020-12-06 RX ADMIN — ENOXAPARIN SODIUM SCH MG: 40 INJECTION SUBCUTANEOUS at 11:18

## 2020-12-06 RX ADMIN — CLOPIDOGREL BISULFATE SCH MG: 75 TABLET ORAL at 11:15

## 2020-12-06 RX ADMIN — CLOTRIMAZOLE AND BETAMETHASONE DIPROPIONATE SCH GM: 10; .5 CREAM TOPICAL at 11:21

## 2020-12-06 RX ADMIN — Medication SCH ML: at 18:05

## 2020-12-06 RX ADMIN — DOCUSATE SODIUM SCH MG: 100 CAPSULE, LIQUID FILLED ORAL at 17:49

## 2020-12-06 RX ADMIN — FUROSEMIDE SCH MG: 10 INJECTION, SOLUTION INTRAMUSCULAR; INTRAVENOUS at 21:11

## 2020-12-06 RX ADMIN — GUAIFENESIN AND DEXTROMETHORPHAN PRN ML: 100; 10 SYRUP ORAL at 13:41

## 2020-12-06 RX ADMIN — GUAIFENESIN AND DEXTROMETHORPHAN PRN ML: 100; 10 SYRUP ORAL at 06:40

## 2020-12-06 RX ADMIN — LABETALOL HYDROCHLORIDE SCH MG: 200 TABLET, FILM COATED ORAL at 11:14

## 2020-12-06 RX ADMIN — ALBUTEROL SULFATE SCH MG: 1.25 SOLUTION RESPIRATORY (INHALATION) at 20:53

## 2020-12-06 RX ADMIN — HYDROMORPHONE HYDROCHLORIDE PRN MG: 2 TABLET ORAL at 01:06

## 2020-12-06 RX ADMIN — HYDROMORPHONE HYDROCHLORIDE PRN MG: 2 TABLET ORAL at 13:40

## 2020-12-06 RX ADMIN — Medication SCH ML: at 21:10

## 2020-12-06 RX ADMIN — ASPIRIN SCH MG: 81 TABLET, CHEWABLE ORAL at 11:13

## 2020-12-06 RX ADMIN — FLUTICASONE FUROATE AND VILANTEROL TRIFENATATE SCH EACH: 100; 25 POWDER RESPIRATORY (INHALATION) at 11:10

## 2020-12-06 RX ADMIN — Medication SCH ML: at 13:41

## 2020-12-06 RX ADMIN — SODIUM CHLORIDE PRN UNIT: 9 INJECTION, SOLUTION INTRAVENOUS at 21:12

## 2020-12-06 RX ADMIN — Medication SCH ML: at 11:20

## 2020-12-06 RX ADMIN — Medication SCH EA: at 11:19

## 2020-12-06 RX ADMIN — BACLOFEN SCH MG: 20 TABLET ORAL at 11:16

## 2020-12-06 RX ADMIN — IPRATROPIUM BROMIDE SCH MG: 0.5 SOLUTION RESPIRATORY (INHALATION) at 14:51

## 2020-12-06 RX ADMIN — Medication SCH EACH: at 12:13

## 2020-12-06 RX ADMIN — ALBUTEROL SULFATE SCH MG: 1.25 SOLUTION RESPIRATORY (INHALATION) at 14:51

## 2020-12-06 RX ADMIN — LABETALOL HYDROCHLORIDE SCH MG: 200 TABLET, FILM COATED ORAL at 17:48

## 2020-12-06 RX ADMIN — PREGABALIN SCH MG: 25 CAPSULE ORAL at 05:53

## 2020-12-06 RX ADMIN — Medication SCH EACH: at 16:30

## 2020-12-06 RX ADMIN — LIDOCAINE SCH PATCH: 50 PATCH CUTANEOUS at 11:11

## 2020-12-06 RX ADMIN — ATORVASTATIN CALCIUM SCH MG: 10 TABLET, FILM COATED ORAL at 21:09

## 2020-12-06 RX ADMIN — BACLOFEN SCH MG: 20 TABLET ORAL at 17:48

## 2020-12-06 NOTE — NUR
Received pt resting in bed. AAO x3-4. On 2L O2 via NC, no acute distress noted. Pt received 
her breathing treatment. Denies pain/discomfort. Due meds given as ordered. Accucheck 174, 
insulin coverage as per sliding scale. Snack given. Pt on fluid restriction 1,000mL/ day. 
Noted non- productive cough. Right upper arm midline, patent and intact. Safety measures 
maintained. Call light and personal items within reach. Will continue to monitor.

## 2020-12-06 NOTE — NUR
awake alert and oriented x4 Needs attended. All due meds given as ordered.

Medicated for pain as needed. Incontinent of urine x2 Kept clean and  dry.

No acute distress noted. Will monitor patient. VSS.

## 2020-12-06 NOTE — NUR
End of shift notes: Medicated with Dilaudid 4mg plus Robitussin given.

No distress noted. Kept comfortable. VSS.Incontinent of urine x2.

Kept clean and dry.

## 2020-12-07 VITALS — DIASTOLIC BLOOD PRESSURE: 43 MMHG | SYSTOLIC BLOOD PRESSURE: 123 MMHG

## 2020-12-07 VITALS — SYSTOLIC BLOOD PRESSURE: 167 MMHG | DIASTOLIC BLOOD PRESSURE: 46 MMHG

## 2020-12-07 VITALS — SYSTOLIC BLOOD PRESSURE: 167 MMHG | DIASTOLIC BLOOD PRESSURE: 58 MMHG

## 2020-12-07 VITALS — DIASTOLIC BLOOD PRESSURE: 63 MMHG | SYSTOLIC BLOOD PRESSURE: 117 MMHG

## 2020-12-07 RX ADMIN — ASPIRIN SCH MG: 81 TABLET, CHEWABLE ORAL at 09:34

## 2020-12-07 RX ADMIN — BACLOFEN SCH MG: 20 TABLET ORAL at 13:56

## 2020-12-07 RX ADMIN — ALBUTEROL SULFATE SCH MG: 1.25 SOLUTION RESPIRATORY (INHALATION) at 08:20

## 2020-12-07 RX ADMIN — ATORVASTATIN CALCIUM SCH MG: 10 TABLET, FILM COATED ORAL at 21:20

## 2020-12-07 RX ADMIN — FUROSEMIDE SCH MG: 10 INJECTION, SOLUTION INTRAMUSCULAR; INTRAVENOUS at 09:35

## 2020-12-07 RX ADMIN — GUAIFENESIN AND DEXTROMETHORPHAN PRN ML: 100; 10 SYRUP ORAL at 03:16

## 2020-12-07 RX ADMIN — BACLOFEN SCH MG: 20 TABLET ORAL at 17:29

## 2020-12-07 RX ADMIN — LABETALOL HYDROCHLORIDE SCH MG: 200 TABLET, FILM COATED ORAL at 17:00

## 2020-12-07 RX ADMIN — IPRATROPIUM BROMIDE SCH MG: 0.5 SOLUTION RESPIRATORY (INHALATION) at 08:21

## 2020-12-07 RX ADMIN — CLOTRIMAZOLE AND BETAMETHASONE DIPROPIONATE SCH GM: 10; .5 CREAM TOPICAL at 10:57

## 2020-12-07 RX ADMIN — GUAIFENESIN AND DEXTROMETHORPHAN PRN ML: 100; 10 SYRUP ORAL at 15:59

## 2020-12-07 RX ADMIN — Medication SCH ML: at 10:57

## 2020-12-07 RX ADMIN — PREGABALIN SCH MG: 25 CAPSULE ORAL at 21:20

## 2020-12-07 RX ADMIN — FUROSEMIDE SCH MG: 10 INJECTION, SOLUTION INTRAMUSCULAR; INTRAVENOUS at 21:33

## 2020-12-07 RX ADMIN — PREGABALIN SCH MG: 25 CAPSULE ORAL at 13:56

## 2020-12-07 RX ADMIN — Medication SCH EA: at 09:00

## 2020-12-07 RX ADMIN — DOCUSATE SODIUM SCH MG: 100 CAPSULE, LIQUID FILLED ORAL at 17:29

## 2020-12-07 RX ADMIN — LABETALOL HYDROCHLORIDE SCH MG: 200 TABLET, FILM COATED ORAL at 11:06

## 2020-12-07 RX ADMIN — Medication SCH EACH: at 21:15

## 2020-12-07 RX ADMIN — Medication SCH ML: at 13:58

## 2020-12-07 RX ADMIN — LOSARTAN POTASSIUM SCH MG: 50 TABLET, FILM COATED ORAL at 11:05

## 2020-12-07 RX ADMIN — HYDROMORPHONE HYDROCHLORIDE PRN MG: 2 TABLET ORAL at 03:16

## 2020-12-07 RX ADMIN — BACLOFEN SCH MG: 20 TABLET ORAL at 09:34

## 2020-12-07 RX ADMIN — FLUTICASONE PROPIONATE SCH SPRAYS: 50 SPRAY, METERED NASAL at 09:39

## 2020-12-07 RX ADMIN — ALBUTEROL SULFATE SCH MG: 1.25 SOLUTION RESPIRATORY (INHALATION) at 01:09

## 2020-12-07 RX ADMIN — PANTOPRAZOLE SODIUM SCH MG: 40 TABLET, DELAYED RELEASE ORAL at 21:20

## 2020-12-07 RX ADMIN — HYDROMORPHONE HYDROCHLORIDE PRN MG: 2 TABLET ORAL at 09:29

## 2020-12-07 RX ADMIN — IPRATROPIUM BROMIDE SCH MG: 0.5 SOLUTION RESPIRATORY (INHALATION) at 14:13

## 2020-12-07 RX ADMIN — LINAGLIPTIN SCH MG: 5 TABLET, FILM COATED ORAL at 09:34

## 2020-12-07 RX ADMIN — IPRATROPIUM BROMIDE SCH MG: 0.5 SOLUTION RESPIRATORY (INHALATION) at 01:09

## 2020-12-07 RX ADMIN — TOLTERODINE TARTRATE SCH MG: 2 CAPSULE, EXTENDED RELEASE ORAL at 09:34

## 2020-12-07 RX ADMIN — ACETAMINOPHEN PRN MG: 325 TABLET ORAL at 00:12

## 2020-12-07 RX ADMIN — DOCUSATE SODIUM SCH MG: 100 CAPSULE, LIQUID FILLED ORAL at 09:34

## 2020-12-07 RX ADMIN — Medication SCH EACH: at 11:38

## 2020-12-07 RX ADMIN — CLOTRIMAZOLE AND BETAMETHASONE DIPROPIONATE SCH GM: 10; .5 CREAM TOPICAL at 17:30

## 2020-12-07 RX ADMIN — IPRATROPIUM BROMIDE SCH MG: 0.5 SOLUTION RESPIRATORY (INHALATION) at 19:45

## 2020-12-07 RX ADMIN — HYDROMORPHONE HYDROCHLORIDE PRN MG: 2 TABLET ORAL at 15:41

## 2020-12-07 RX ADMIN — Medication SCH EACH: at 06:31

## 2020-12-07 RX ADMIN — ALBUTEROL SULFATE SCH MG: 1.25 SOLUTION RESPIRATORY (INHALATION) at 19:45

## 2020-12-07 RX ADMIN — FLUTICASONE FUROATE AND VILANTEROL TRIFENATATE SCH EACH: 100; 25 POWDER RESPIRATORY (INHALATION) at 09:39

## 2020-12-07 RX ADMIN — PREGABALIN SCH MG: 25 CAPSULE ORAL at 05:00

## 2020-12-07 RX ADMIN — SODIUM CHLORIDE PRN UNIT: 9 INJECTION, SOLUTION INTRAVENOUS at 21:16

## 2020-12-07 RX ADMIN — CLOPIDOGREL BISULFATE SCH MG: 75 TABLET ORAL at 09:35

## 2020-12-07 RX ADMIN — Medication SCH EACH: at 17:07

## 2020-12-07 RX ADMIN — LIDOCAINE SCH PATCH: 50 PATCH CUTANEOUS at 10:57

## 2020-12-07 RX ADMIN — POLYETHYLENE GLYCOL 3350 SCH GM: 17 POWDER, FOR SOLUTION ORAL at 10:57

## 2020-12-07 RX ADMIN — GUAIFENESIN AND DEXTROMETHORPHAN PRN ML: 100; 10 SYRUP ORAL at 09:32

## 2020-12-07 RX ADMIN — ACETAMINOPHEN PRN MG: 325 TABLET ORAL at 18:39

## 2020-12-07 RX ADMIN — ALBUTEROL SULFATE SCH MG: 1.25 SOLUTION RESPIRATORY (INHALATION) at 14:14

## 2020-12-07 RX ADMIN — ENOXAPARIN SODIUM SCH MG: 40 INJECTION SUBCUTANEOUS at 10:58

## 2020-12-07 RX ADMIN — SODIUM CHLORIDE PRN UNIT: 9 INJECTION, SOLUTION INTRAVENOUS at 11:39

## 2020-12-07 RX ADMIN — CLONIDINE HYDROCHLORIDE PRN MG: 0.1 TABLET ORAL at 04:44

## 2020-12-07 NOTE — NUR
Patient seen by Dr. Barrett and gave an order for Lidoderm patch apply topically on the 
posterior neck.

## 2020-12-07 NOTE — NUR
Informed Dr. Klein who is in the unit regarding patient's complain of episode of bleeding on 
the injection site of Lovenox and patient's request of discontinuing medication. MD ordered 
to discontinue Lovenox. Endorsed accordingly to night shift nurse.

## 2020-12-07 NOTE — NUR
Patient states that she will have family member bring her home medication paroxetine. 
Medication not available at this time.

## 2020-12-08 VITALS — SYSTOLIC BLOOD PRESSURE: 121 MMHG | DIASTOLIC BLOOD PRESSURE: 38 MMHG

## 2020-12-08 VITALS — DIASTOLIC BLOOD PRESSURE: 56 MMHG | SYSTOLIC BLOOD PRESSURE: 166 MMHG

## 2020-12-08 VITALS — DIASTOLIC BLOOD PRESSURE: 32 MMHG | SYSTOLIC BLOOD PRESSURE: 144 MMHG

## 2020-12-08 VITALS — SYSTOLIC BLOOD PRESSURE: 117 MMHG | DIASTOLIC BLOOD PRESSURE: 54 MMHG

## 2020-12-08 RX ADMIN — GUAIFENESIN AND DEXTROMETHORPHAN PRN ML: 100; 10 SYRUP ORAL at 09:11

## 2020-12-08 RX ADMIN — Medication SCH EACH: at 21:22

## 2020-12-08 RX ADMIN — CLOTRIMAZOLE AND BETAMETHASONE DIPROPIONATE SCH GM: 10; .5 CREAM TOPICAL at 09:09

## 2020-12-08 RX ADMIN — FUROSEMIDE SCH MG: 10 INJECTION, SOLUTION INTRAMUSCULAR; INTRAVENOUS at 21:08

## 2020-12-08 RX ADMIN — GUAIFENESIN AND DEXTROMETHORPHAN PRN ML: 100; 10 SYRUP ORAL at 15:20

## 2020-12-08 RX ADMIN — HYDROMORPHONE HYDROCHLORIDE PRN MG: 2 TABLET ORAL at 21:09

## 2020-12-08 RX ADMIN — PANTOPRAZOLE SODIUM SCH MG: 40 TABLET, DELAYED RELEASE ORAL at 21:08

## 2020-12-08 RX ADMIN — FLUTICASONE FUROATE AND VILANTEROL TRIFENATATE SCH EACH: 100; 25 POWDER RESPIRATORY (INHALATION) at 09:08

## 2020-12-08 RX ADMIN — LIDOCAINE SCH PATCH: 50 PATCH CUTANEOUS at 09:10

## 2020-12-08 RX ADMIN — HYDROMORPHONE HYDROCHLORIDE PRN MG: 2 TABLET ORAL at 01:20

## 2020-12-08 RX ADMIN — IPRATROPIUM BROMIDE SCH MG: 0.5 SOLUTION RESPIRATORY (INHALATION) at 00:57

## 2020-12-08 RX ADMIN — GUAIFENESIN AND DEXTROMETHORPHAN PRN ML: 100; 10 SYRUP ORAL at 21:09

## 2020-12-08 RX ADMIN — BACLOFEN SCH MG: 20 TABLET ORAL at 17:08

## 2020-12-08 RX ADMIN — CLOTRIMAZOLE AND BETAMETHASONE DIPROPIONATE SCH GM: 10; .5 CREAM TOPICAL at 17:13

## 2020-12-08 RX ADMIN — LABETALOL HYDROCHLORIDE SCH MG: 200 TABLET, FILM COATED ORAL at 17:13

## 2020-12-08 RX ADMIN — FUROSEMIDE SCH MG: 10 INJECTION, SOLUTION INTRAMUSCULAR; INTRAVENOUS at 09:08

## 2020-12-08 RX ADMIN — BACLOFEN SCH MG: 20 TABLET ORAL at 09:05

## 2020-12-08 RX ADMIN — Medication SCH EACH: at 06:30

## 2020-12-08 RX ADMIN — IPRATROPIUM BROMIDE SCH MG: 0.5 SOLUTION RESPIRATORY (INHALATION) at 19:07

## 2020-12-08 RX ADMIN — BACLOFEN SCH MG: 20 TABLET ORAL at 13:07

## 2020-12-08 RX ADMIN — SODIUM CHLORIDE PRN UNIT: 9 INJECTION, SOLUTION INTRAVENOUS at 17:46

## 2020-12-08 RX ADMIN — ALBUTEROL SULFATE SCH MG: 1.25 SOLUTION RESPIRATORY (INHALATION) at 00:57

## 2020-12-08 RX ADMIN — PREGABALIN SCH MG: 25 CAPSULE ORAL at 13:07

## 2020-12-08 RX ADMIN — LABETALOL HYDROCHLORIDE SCH MG: 200 TABLET, FILM COATED ORAL at 09:07

## 2020-12-08 RX ADMIN — GUAIFENESIN AND DEXTROMETHORPHAN PRN ML: 100; 10 SYRUP ORAL at 01:20

## 2020-12-08 RX ADMIN — IPRATROPIUM BROMIDE SCH MG: 0.5 SOLUTION RESPIRATORY (INHALATION) at 07:14

## 2020-12-08 RX ADMIN — ACETAMINOPHEN PRN MG: 325 TABLET ORAL at 19:21

## 2020-12-08 RX ADMIN — LINAGLIPTIN SCH MG: 5 TABLET, FILM COATED ORAL at 09:05

## 2020-12-08 RX ADMIN — FLUTICASONE PROPIONATE SCH SPRAYS: 50 SPRAY, METERED NASAL at 09:08

## 2020-12-08 RX ADMIN — ATORVASTATIN CALCIUM SCH MG: 10 TABLET, FILM COATED ORAL at 21:08

## 2020-12-08 RX ADMIN — Medication SCH EACH: at 17:07

## 2020-12-08 RX ADMIN — HYDROMORPHONE HYDROCHLORIDE PRN MG: 2 TABLET ORAL at 09:06

## 2020-12-08 RX ADMIN — PREGABALIN SCH MG: 25 CAPSULE ORAL at 05:12

## 2020-12-08 RX ADMIN — ACETAMINOPHEN PRN MG: 325 TABLET ORAL at 13:07

## 2020-12-08 RX ADMIN — CLONIDINE HYDROCHLORIDE PRN MG: 0.1 TABLET ORAL at 05:13

## 2020-12-08 RX ADMIN — ASPIRIN SCH MG: 81 TABLET, CHEWABLE ORAL at 09:05

## 2020-12-08 RX ADMIN — POLYETHYLENE GLYCOL 3350 SCH GM: 17 POWDER, FOR SOLUTION ORAL at 09:09

## 2020-12-08 RX ADMIN — DOCUSATE SODIUM SCH MG: 100 CAPSULE, LIQUID FILLED ORAL at 09:05

## 2020-12-08 RX ADMIN — TOLTERODINE TARTRATE SCH MG: 2 CAPSULE, EXTENDED RELEASE ORAL at 09:06

## 2020-12-08 RX ADMIN — CLOPIDOGREL BISULFATE SCH MG: 75 TABLET ORAL at 09:05

## 2020-12-08 RX ADMIN — Medication SCH EA: at 11:48

## 2020-12-08 RX ADMIN — DOCUSATE SODIUM SCH MG: 100 CAPSULE, LIQUID FILLED ORAL at 17:08

## 2020-12-08 RX ADMIN — SODIUM CHLORIDE PRN UNIT: 9 INJECTION, SOLUTION INTRAVENOUS at 13:09

## 2020-12-08 RX ADMIN — IPRATROPIUM BROMIDE SCH MG: 0.5 SOLUTION RESPIRATORY (INHALATION) at 15:08

## 2020-12-08 RX ADMIN — SODIUM CHLORIDE PRN UNIT: 9 INJECTION, SOLUTION INTRAVENOUS at 21:25

## 2020-12-08 RX ADMIN — LOSARTAN POTASSIUM SCH MG: 50 TABLET, FILM COATED ORAL at 09:06

## 2020-12-08 RX ADMIN — HYDROMORPHONE HYDROCHLORIDE PRN MG: 2 TABLET ORAL at 15:21

## 2020-12-08 RX ADMIN — ALBUTEROL SULFATE SCH MG: 1.25 SOLUTION RESPIRATORY (INHALATION) at 13:55

## 2020-12-08 RX ADMIN — ALBUTEROL SULFATE SCH MG: 1.25 SOLUTION RESPIRATORY (INHALATION) at 07:14

## 2020-12-08 RX ADMIN — ALBUTEROL SULFATE SCH MG: 1.25 SOLUTION RESPIRATORY (INHALATION) at 15:08

## 2020-12-08 RX ADMIN — ALBUTEROL SULFATE SCH MG: 1.25 SOLUTION RESPIRATORY (INHALATION) at 19:08

## 2020-12-08 RX ADMIN — IPRATROPIUM BROMIDE SCH MG: 0.5 SOLUTION RESPIRATORY (INHALATION) at 13:55

## 2020-12-08 RX ADMIN — TOPIRAMATE SCH MG: 25 TABLET, COATED ORAL at 21:08

## 2020-12-08 RX ADMIN — Medication SCH EACH: at 11:57

## 2020-12-08 RX ADMIN — ACETAMINOPHEN PRN MG: 325 TABLET ORAL at 06:21

## 2020-12-08 RX ADMIN — LIDOCAINE SCH PATCH: 50 PATCH CUTANEOUS at 09:07

## 2020-12-08 NOTE — NUR
Patient is resting in her chair. No sign of distress noted at this time.  All medications 
given as ordered. Patient's fluid restriction observed Safety precautions in place with call 
light and belongings within reach.  Will endorse to oncoming nurse.

## 2020-12-09 VITALS — DIASTOLIC BLOOD PRESSURE: 53 MMHG | SYSTOLIC BLOOD PRESSURE: 142 MMHG

## 2020-12-09 VITALS — DIASTOLIC BLOOD PRESSURE: 42 MMHG | SYSTOLIC BLOOD PRESSURE: 150 MMHG

## 2020-12-09 VITALS — SYSTOLIC BLOOD PRESSURE: 156 MMHG | DIASTOLIC BLOOD PRESSURE: 49 MMHG

## 2020-12-09 LAB
BASOPHILS # BLD AUTO: 0.1 K/UL (ref 0–8)
BASOPHILS NFR BLD AUTO: 0.8 % (ref 0–2)
BUN SERPL-MCNC: 17 MG/DL (ref 7–18)
CHLORIDE SERPL-SCNC: 102 MMOL/L (ref 98–107)
CO2 SERPL-SCNC: 29 MMOL/L (ref 21–32)
CREAT SERPL-MCNC: 0.9 MG/DL (ref 0.6–1.3)
EOSINOPHIL # BLD AUTO: 0.2 K/UL (ref 0–0.7)
EOSINOPHIL NFR BLD AUTO: 3.6 % (ref 0–7)
GLUCOSE SERPL-MCNC: 169 MG/DL (ref 74–106)
HCT VFR BLD AUTO: 28.4 % (ref 31.2–41.9)
HGB BLD-MCNC: 9.5 G/DL (ref 10.9–14.3)
LYMPHOCYTES # BLD AUTO: 1.8 K/UL (ref 20–40)
LYMPHOCYTES NFR BLD AUTO: 25.9 % (ref 20.5–51.5)
MCH RBC QN AUTO: 28.3 UUG (ref 24.7–32.8)
MCHC RBC AUTO-ENTMCNC: 33 G/DL (ref 32.3–35.6)
MCV RBC AUTO: 84.7 FL (ref 75.5–95.3)
MONOCYTES # BLD AUTO: 0.6 K/UL (ref 2–10)
MONOCYTES NFR BLD AUTO: 8.8 % (ref 0–11)
NEUTROPHILS # BLD AUTO: 4.2 K/UL (ref 1.8–8.9)
NEUTROPHILS NFR BLD AUTO: 60.9 % (ref 38.5–71.5)
PLATELET # BLD AUTO: 339 K/UL (ref 179–408)
POTASSIUM SERPL-SCNC: 4.1 MMOL/L (ref 3.5–5.1)
RBC # BLD AUTO: 3.35 MIL/UL (ref 3.63–4.92)
WBC # BLD AUTO: 7 K/UL (ref 3.8–11.8)

## 2020-12-09 RX ADMIN — HYDROMORPHONE HYDROCHLORIDE PRN MG: 2 TABLET ORAL at 11:14

## 2020-12-09 RX ADMIN — LOSARTAN POTASSIUM SCH MG: 50 TABLET, FILM COATED ORAL at 09:05

## 2020-12-09 RX ADMIN — ALBUTEROL SULFATE SCH MG: 1.25 SOLUTION RESPIRATORY (INHALATION) at 14:18

## 2020-12-09 RX ADMIN — CLOPIDOGREL BISULFATE SCH MG: 75 TABLET ORAL at 09:02

## 2020-12-09 RX ADMIN — BACLOFEN SCH MG: 20 TABLET ORAL at 13:14

## 2020-12-09 RX ADMIN — LIDOCAINE SCH PATCH: 50 PATCH CUTANEOUS at 09:06

## 2020-12-09 RX ADMIN — IPRATROPIUM BROMIDE SCH MG: 0.5 SOLUTION RESPIRATORY (INHALATION) at 07:34

## 2020-12-09 RX ADMIN — DOCUSATE SODIUM SCH MG: 100 CAPSULE, LIQUID FILLED ORAL at 16:17

## 2020-12-09 RX ADMIN — ALBUTEROL SULFATE SCH MG: 1.25 SOLUTION RESPIRATORY (INHALATION) at 07:34

## 2020-12-09 RX ADMIN — BACLOFEN SCH MG: 20 TABLET ORAL at 16:17

## 2020-12-09 RX ADMIN — ALBUTEROL SULFATE SCH MG: 1.25 SOLUTION RESPIRATORY (INHALATION) at 00:30

## 2020-12-09 RX ADMIN — Medication SCH EACH: at 11:27

## 2020-12-09 RX ADMIN — Medication SCH EA: at 09:04

## 2020-12-09 RX ADMIN — SODIUM CHLORIDE PRN UNIT: 9 INJECTION, SOLUTION INTRAVENOUS at 09:15

## 2020-12-09 RX ADMIN — TOPIRAMATE SCH MG: 25 TABLET, COATED ORAL at 09:05

## 2020-12-09 RX ADMIN — FUROSEMIDE SCH MG: 10 INJECTION, SOLUTION INTRAMUSCULAR; INTRAVENOUS at 11:16

## 2020-12-09 RX ADMIN — LABETALOL HYDROCHLORIDE SCH MG: 200 TABLET, FILM COATED ORAL at 09:05

## 2020-12-09 RX ADMIN — CLOTRIMAZOLE AND BETAMETHASONE DIPROPIONATE SCH GM: 10; .5 CREAM TOPICAL at 16:21

## 2020-12-09 RX ADMIN — BACLOFEN SCH MG: 20 TABLET ORAL at 09:03

## 2020-12-09 RX ADMIN — Medication SCH EACH: at 06:05

## 2020-12-09 RX ADMIN — DOCUSATE SODIUM SCH MG: 100 CAPSULE, LIQUID FILLED ORAL at 09:02

## 2020-12-09 RX ADMIN — IPRATROPIUM BROMIDE SCH MG: 0.5 SOLUTION RESPIRATORY (INHALATION) at 14:18

## 2020-12-09 RX ADMIN — GUAIFENESIN AND DEXTROMETHORPHAN PRN ML: 100; 10 SYRUP ORAL at 04:45

## 2020-12-09 RX ADMIN — ACETAMINOPHEN PRN MG: 325 TABLET ORAL at 16:17

## 2020-12-09 RX ADMIN — HYDROMORPHONE HYDROCHLORIDE PRN MG: 2 TABLET ORAL at 04:45

## 2020-12-09 RX ADMIN — FLUTICASONE FUROATE AND VILANTEROL TRIFENATATE SCH EACH: 100; 25 POWDER RESPIRATORY (INHALATION) at 09:03

## 2020-12-09 RX ADMIN — GUAIFENESIN AND DEXTROMETHORPHAN PRN ML: 100; 10 SYRUP ORAL at 11:13

## 2020-12-09 RX ADMIN — POLYETHYLENE GLYCOL 3350 SCH GM: 17 POWDER, FOR SOLUTION ORAL at 09:04

## 2020-12-09 RX ADMIN — TOLTERODINE TARTRATE SCH MG: 2 CAPSULE, EXTENDED RELEASE ORAL at 09:03

## 2020-12-09 RX ADMIN — FLUTICASONE PROPIONATE SCH SPRAYS: 50 SPRAY, METERED NASAL at 09:14

## 2020-12-09 RX ADMIN — LINAGLIPTIN SCH MG: 5 TABLET, FILM COATED ORAL at 09:02

## 2020-12-09 RX ADMIN — IPRATROPIUM BROMIDE SCH MG: 0.5 SOLUTION RESPIRATORY (INHALATION) at 00:30

## 2020-12-09 RX ADMIN — ASPIRIN SCH MG: 81 TABLET, CHEWABLE ORAL at 09:04

## 2020-12-09 RX ADMIN — CLOTRIMAZOLE AND BETAMETHASONE DIPROPIONATE SCH GM: 10; .5 CREAM TOPICAL at 09:14

## 2020-12-09 RX ADMIN — LABETALOL HYDROCHLORIDE SCH MG: 200 TABLET, FILM COATED ORAL at 16:17

## 2020-12-09 NOTE — NUR
Pt received this morning, NAD, no SOB, and denies pain at this time. Pt requests pain 
medication upon return from therapy. Pt compliant with routine medications and therapy as 
offered. Plan for today discussed including pending D/C later today. All comfort and safety 
measures implemented. Call light within reach. Will continue to monitor.

## 2020-12-09 NOTE — NUR
Pt in stable condition at time of discharge. Discharge order received from MD. Pt seen by 
Md. ZAMZAM signed and faxed to preferred pharmacy. Medications, Pt education, and discharge 
paperwork reviewed with Pt, originals signed, given to Pt, and copies placed in chart. Home 
medications returned to Pt. No new photos to take at this time. IV removed intact. Report 
given to EMTs. Pt safety transferred from bed to [facility] and escorted out of the building. 
Will remove Pt from system shortly.

## 2021-02-20 ENCOUNTER — HOSPITAL ENCOUNTER (INPATIENT)
Dept: HOSPITAL 54 - ER | Age: 61
LOS: 8 days | Discharge: HOME HEALTH | DRG: 194 | End: 2021-02-28
Attending: NURSE PRACTITIONER | Admitting: NURSE PRACTITIONER
Payer: COMMERCIAL

## 2021-02-20 VITALS — WEIGHT: 244 LBS | BODY MASS INDEX: 44.9 KG/M2 | HEIGHT: 62 IN

## 2021-02-20 DIAGNOSIS — Z79.02: ICD-10-CM

## 2021-02-20 DIAGNOSIS — D64.9: ICD-10-CM

## 2021-02-20 DIAGNOSIS — L03.116: ICD-10-CM

## 2021-02-20 DIAGNOSIS — E66.2: ICD-10-CM

## 2021-02-20 DIAGNOSIS — G89.29: ICD-10-CM

## 2021-02-20 DIAGNOSIS — E44.1: ICD-10-CM

## 2021-02-20 DIAGNOSIS — I69.354: ICD-10-CM

## 2021-02-20 DIAGNOSIS — N25.0: ICD-10-CM

## 2021-02-20 DIAGNOSIS — B96.1: ICD-10-CM

## 2021-02-20 DIAGNOSIS — Z79.899: ICD-10-CM

## 2021-02-20 DIAGNOSIS — N17.0: ICD-10-CM

## 2021-02-20 DIAGNOSIS — Z79.4: ICD-10-CM

## 2021-02-20 DIAGNOSIS — I13.0: Primary | ICD-10-CM

## 2021-02-20 DIAGNOSIS — R53.1: ICD-10-CM

## 2021-02-20 DIAGNOSIS — N18.9: ICD-10-CM

## 2021-02-20 DIAGNOSIS — I25.10: ICD-10-CM

## 2021-02-20 DIAGNOSIS — N39.0: ICD-10-CM

## 2021-02-20 DIAGNOSIS — J45.909: ICD-10-CM

## 2021-02-20 DIAGNOSIS — I50.43: ICD-10-CM

## 2021-02-20 DIAGNOSIS — Z20.822: ICD-10-CM

## 2021-02-20 DIAGNOSIS — L97.828: ICD-10-CM

## 2021-02-20 DIAGNOSIS — E11.22: ICD-10-CM

## 2021-02-20 DIAGNOSIS — I87.312: ICD-10-CM

## 2021-02-20 DIAGNOSIS — E78.5: ICD-10-CM

## 2021-02-20 LAB
BASOPHILS # BLD AUTO: 0.1 /CMM (ref 0–0.2)
BASOPHILS NFR BLD AUTO: 0.9 % (ref 0–2)
EOSINOPHIL NFR BLD AUTO: 1.1 % (ref 0–6)
HCT VFR BLD AUTO: 31 % (ref 33–45)
HGB BLD-MCNC: 10 G/DL (ref 11.5–14.8)
LYMPHOCYTES NFR BLD AUTO: 1.4 /CMM (ref 0.8–4.8)
LYMPHOCYTES NFR BLD AUTO: 14.7 % (ref 20–44)
MCHC RBC AUTO-ENTMCNC: 33 G/DL (ref 31–36)
MCV RBC AUTO: 84 FL (ref 82–100)
MONOCYTES NFR BLD AUTO: 0.6 /CMM (ref 0.1–1.3)
MONOCYTES NFR BLD AUTO: 6.4 % (ref 2–12)
NEUTROPHILS # BLD AUTO: 7.6 /CMM (ref 1.8–8.9)
NEUTROPHILS NFR BLD AUTO: 76.9 % (ref 43–81)
PLATELET # BLD AUTO: 382 /CMM (ref 150–450)
RBC # BLD AUTO: 3.66 MIL/UL (ref 4–5.2)
WBC NRBC COR # BLD AUTO: 9.8 K/UL (ref 4.3–11)

## 2021-02-20 PROCEDURE — G0378 HOSPITAL OBSERVATION PER HR: HCPCS

## 2021-02-20 PROCEDURE — C9803 HOPD COVID-19 SPEC COLLECT: HCPCS

## 2021-02-20 NOTE — NUR
QUIRINO FROM HOME TO ER BED 6. AAOX4. NOT IN RESP DISTRESS, BREATHING EVEN AND 
UNLABORED, DESPITE C/O SOB, SATTING @ 96% ON RA. BROUGTH IN FOR WEAKNESS AND 
LETHARGY FOR THE ENTIRE DAY. PT REPORTS THAT SHE IS FEELING WEAK, SLEPT THE 
ENTIRE DAY AND FEELS LETHARGIC. PT ALSO REPORTS THAT SHE FEELS SOB AND COUGHING 
W/ PHLEGM. PT IS NOTED WITH BILAT LOWER LEG EDEMA WITH BLISTER. PT HAVE L SIDED 
DEFICIT FROM HX OF STROKE IN 2018. PT IS AFEBRILE. PT IS PLACED ON MONITOR. 
SHANIKA PATEL MD FOR LACEYAL

## 2021-02-21 VITALS — SYSTOLIC BLOOD PRESSURE: 131 MMHG | DIASTOLIC BLOOD PRESSURE: 54 MMHG

## 2021-02-21 VITALS — DIASTOLIC BLOOD PRESSURE: 75 MMHG | SYSTOLIC BLOOD PRESSURE: 143 MMHG

## 2021-02-21 VITALS — SYSTOLIC BLOOD PRESSURE: 139 MMHG | DIASTOLIC BLOOD PRESSURE: 69 MMHG

## 2021-02-21 VITALS — SYSTOLIC BLOOD PRESSURE: 124 MMHG | DIASTOLIC BLOOD PRESSURE: 64 MMHG

## 2021-02-21 LAB
BUN SERPL-MCNC: 44 MG/DL (ref 7–18)
CALCIUM SERPL-MCNC: 9.3 MG/DL (ref 8.5–10.1)
CHLORIDE SERPL-SCNC: 103 MMOL/L (ref 98–107)
CO2 SERPL-SCNC: 30 MMOL/L (ref 21–32)
CREAT SERPL-MCNC: 1.4 MG/DL (ref 0.6–1.3)
GLUCOSE SERPL-MCNC: 257 MG/DL (ref 74–106)
NT-PROBNP SERPL-MCNC: 250 PG/ML (ref 0–125)
POTASSIUM SERPL-SCNC: 4.6 MMOL/L (ref 3.5–5.1)
SODIUM SERPL-SCNC: 142 MMOL/L (ref 136–145)

## 2021-02-21 RX ADMIN — ATORVASTATIN CALCIUM SCH MG: 40 TABLET, FILM COATED ORAL at 21:21

## 2021-02-21 RX ADMIN — INSULIN HUMAN PRN UNIT: 100 INJECTION, SOLUTION PARENTERAL at 17:50

## 2021-02-21 RX ADMIN — Medication SCH MG: at 08:35

## 2021-02-21 RX ADMIN — PANTOPRAZOLE SODIUM SCH MG: 40 TABLET, DELAYED RELEASE ORAL at 21:21

## 2021-02-21 RX ADMIN — ALBUTEROL SULFATE SCH MG: 2.5 SOLUTION RESPIRATORY (INHALATION) at 20:29

## 2021-02-21 RX ADMIN — Medication SCH MG: at 12:26

## 2021-02-21 RX ADMIN — BACLOFEN SCH MG: 10 TABLET ORAL at 21:21

## 2021-02-21 RX ADMIN — Medication SCH MG: at 13:53

## 2021-02-21 RX ADMIN — ASPIRIN 81 MG SCH MG: 81 TABLET ORAL at 08:16

## 2021-02-21 RX ADMIN — Medication SCH EACH: at 12:26

## 2021-02-21 RX ADMIN — Medication SCH EACH: at 21:44

## 2021-02-21 RX ADMIN — PREGABALIN SCH MG: 25 CAPSULE ORAL at 12:26

## 2021-02-21 RX ADMIN — ALBUTEROL SULFATE SCH MG: 2.5 SOLUTION RESPIRATORY (INHALATION) at 07:54

## 2021-02-21 RX ADMIN — PREGABALIN SCH MG: 25 CAPSULE ORAL at 21:20

## 2021-02-21 RX ADMIN — Medication SCH MG: at 16:52

## 2021-02-21 RX ADMIN — LABETALOL HCL SCH MG: 100 TABLET, FILM COATED ORAL at 16:51

## 2021-02-21 RX ADMIN — PREGABALIN SCH MG: 25 CAPSULE ORAL at 05:18

## 2021-02-21 RX ADMIN — CLOPIDOGREL BISULFATE SCH MG: 75 TABLET, FILM COATED ORAL at 08:35

## 2021-02-21 RX ADMIN — Medication SCH EACH: at 06:34

## 2021-02-21 RX ADMIN — ALBUTEROL SULFATE SCH MG: 2.5 SOLUTION RESPIRATORY (INHALATION) at 13:53

## 2021-02-21 RX ADMIN — LOSARTAN POTASSIUM SCH MG: 50 TABLET, FILM COATED ORAL at 08:36

## 2021-02-21 RX ADMIN — HEPARIN SODIUM SCH UNITS: 5000 INJECTION INTRAVENOUS; SUBCUTANEOUS at 21:22

## 2021-02-21 RX ADMIN — Medication SCH MG: at 08:40

## 2021-02-21 RX ADMIN — HYDROMORPHONE HYDROCHLORIDE PRN MG: 2 TABLET ORAL at 20:01

## 2021-02-21 RX ADMIN — Medication SCH MG: at 20:29

## 2021-02-21 RX ADMIN — PAROXETINE HYDROCHLORIDE SCH MG: 10 TABLET, FILM COATED ORAL at 08:36

## 2021-02-21 RX ADMIN — CLOTRIMAZOLE SCH GM: 1 CREAM TOPICAL at 16:52

## 2021-02-21 RX ADMIN — HUMAN INSULIN PRN UNIT: 100 INJECTION, SOLUTION SUBCUTANEOUS at 21:40

## 2021-02-21 RX ADMIN — INSULIN HUMAN PRN UNIT: 100 INJECTION, SOLUTION PARENTERAL at 12:30

## 2021-02-21 RX ADMIN — LIDOCAINE SCH EA: 50 PATCH CUTANEOUS at 08:16

## 2021-02-21 RX ADMIN — LABETALOL HCL SCH MG: 100 TABLET, FILM COATED ORAL at 08:37

## 2021-02-21 RX ADMIN — BACLOFEN SCH MG: 10 TABLET ORAL at 05:17

## 2021-02-21 RX ADMIN — DEXTROSE MONOHYDRATE SCH MG: 50 INJECTION, SOLUTION INTRAVENOUS at 08:16

## 2021-02-21 RX ADMIN — Medication SCH EACH: at 17:49

## 2021-02-21 RX ADMIN — Medication SCH MG: at 07:54

## 2021-02-21 RX ADMIN — DEXTROSE MONOHYDRATE SCH MLS/HR: 50 INJECTION, SOLUTION INTRAVENOUS at 12:26

## 2021-02-21 RX ADMIN — HYDROMORPHONE HYDROCHLORIDE PRN MG: 2 TABLET ORAL at 11:49

## 2021-02-21 RX ADMIN — BACLOFEN SCH MG: 10 TABLET ORAL at 12:26

## 2021-02-21 RX ADMIN — EZETIMIBE SCH MG: 10 TABLET ORAL at 08:35

## 2021-02-21 RX ADMIN — CLOTRIMAZOLE SCH GM: 1 CREAM TOPICAL at 08:16

## 2021-02-21 NOTE — NUR
RN NOTES

PT. COMPLAINING THAT SHE HAVEN'T BEEN URINATING FOR 8 HRS ALREADY , GOT AN ORDER FROM DR. WALKER STRAIGHT CATH AND UA, ORDER NOTED AND CARRIED OUT

## 2021-02-21 NOTE — NUR
TELE/RN CLOSING NOTES



PATIENT IN BED RESTING. PATIENT IS ALERT AND ORIENTED X 4. PATIENT BREATHING IS EVEN AND 
UNLABORED. PATIENT IN NO SIGNS OF RESPIRATORY DISTRESS OR SOB NOTED. PATIENT HAS  IV ACCESS 
INTACT FLUSHING WELL. PATIENT IN NO ACUTE DISTRESS. PATIENT IS NOTED WITH LEFT SIDED 
WEAKNESS. ALL NEEDS HAVE BEEN MET DURING SHIFT. SAFETY MEASURES ARE IN PLACE, BED IS LOCKED 
AND PLACED IN THE LOW POSITION, SIDE RAILS UP X 3, CALL LIGHT WITHIN REACH. WILL ENDORSE 
CARE TO DAY SHIFT NURSE.

## 2021-02-21 NOTE — NUR
TELE/RN OPENING NOTES 



RECEIVED REPORT FROM ED RN IZABELA AT 0332HRS FOR PATIENT COMING TO ROOM 327-2. PATIENT 
TRANSFERRED FROM ED TO BED WITH NO INJURIES SUSTAINED. PATIENTS BREATHING IS EVEN AND 
UNLABORED. NO SIGNS OF SOB OR RESPIRATORY DISTRESS NOTED. PATIENT IS ALERT AND ORIENTED X 4. 
PATIENT DENIES PAIN AT THIS TIME, NO CHEST PAIN NOTED. PATIENT HAD RIGHT HAND IV 22G INTACT 
FLUSHING WELL. PATIENT HAS LEFT SIDED WEAKNESS. PATIENT V/S ARE WITHIN NORMAL LIMITS. 
PATIENT TOLERATING 2L NC WELL. PATIENT HAS BLE REDNESS AND SWELLING,WITH BLISTERS. PATIENT 
REFUSED FULL BODY SKIN ASSESSMENT AT THIS TIME, STATED SHE WAS VERY COLD TIRED AND WANTED TO 
SLEEP AT THIS TIME. SAFETY MEASURES ARE IN PLACE, BED IS LOCKED AND PLACED IN THE LOW 
POSITION, SIDE RAILS UP X 2, CALL LIGHT IS WITHIN REACH. WILL CONTINUE TO MONITOR.

## 2021-02-21 NOTE — NUR
RN NOTES

RECEIVED PT. AWAKE ON BED, NEEDY SR ON TELE MONITOR HR-75, COMPLAINED OF BACK PAIN AND 
BILATERAL LOWER EXTREMITIES- DILAUDID 4MG PO GIVEN AS ORDERED, V/S STABLE, CALL LIGHT WITHIN 
REACH, SIDERAILSUPX2, WILL CONTINUE TO MONITOR

## 2021-02-21 NOTE — NUR
TELE RN OPENING NOTE



PATIENT IS IN BED RESTING. PATIENT IS IN NO ACUTE DISTRESS. PATIENT IS ON OXYGEN 2L ON NC. 
TOLERATING WELL. NO SOB NOTED. PATIENT IS ON TELE MONITOR READING SR 70. SAFETY PRECAUTIONS 
ARE IN PLACE. BED IN THE LOWEST POSITION, SIDE RAILS ARE UP. CALL LIGHT WITHIN REACH. WILL 
CONTINUE TO MONITOR CLOSELY.

## 2021-02-21 NOTE — NUR
TELE RN CLOSING NOTE



PATIENT IS IN BED RESTING. PATIENT IS IN NO ACUTE DISTRESS. PATIENT IS ON OXYGEN 2L ON NC. 
TOLERATING WELL. NO SOB NOTED. PATIENT IS ON TELE MONITOR READING SR 70S. SAFETY PRECAUTIONS 
ARE IN PLACE. BED IN THE LOWEST POSITION, SIDE RAILS ARE UP. CALL LIGHT WITHIN REACH. 
ENDORSE PATIENT TO NIGHT SHIFT NURSE FOR NAHUN.

## 2021-02-22 VITALS — SYSTOLIC BLOOD PRESSURE: 138 MMHG | DIASTOLIC BLOOD PRESSURE: 59 MMHG

## 2021-02-22 VITALS — SYSTOLIC BLOOD PRESSURE: 137 MMHG | DIASTOLIC BLOOD PRESSURE: 68 MMHG

## 2021-02-22 VITALS — DIASTOLIC BLOOD PRESSURE: 52 MMHG | SYSTOLIC BLOOD PRESSURE: 146 MMHG

## 2021-02-22 VITALS — DIASTOLIC BLOOD PRESSURE: 46 MMHG | SYSTOLIC BLOOD PRESSURE: 131 MMHG

## 2021-02-22 VITALS — DIASTOLIC BLOOD PRESSURE: 57 MMHG | SYSTOLIC BLOOD PRESSURE: 135 MMHG

## 2021-02-22 VITALS — DIASTOLIC BLOOD PRESSURE: 64 MMHG | SYSTOLIC BLOOD PRESSURE: 142 MMHG

## 2021-02-22 LAB
ALBUMIN SERPL BCP-MCNC: 3 G/DL (ref 3.4–5)
ALP SERPL-CCNC: 86 U/L (ref 46–116)
ALT SERPL W P-5'-P-CCNC: 25 U/L (ref 12–78)
AST SERPL W P-5'-P-CCNC: 14 U/L (ref 15–37)
BASOPHILS # BLD AUTO: 0.1 /CMM (ref 0–0.2)
BASOPHILS NFR BLD AUTO: 1.2 % (ref 0–2)
BILIRUB SERPL-MCNC: 0.6 MG/DL (ref 0.2–1)
BILIRUB UR QL STRIP: NEGATIVE
BUN SERPL-MCNC: 29 MG/DL (ref 7–18)
CALCIUM SERPL-MCNC: 9.5 MG/DL (ref 8.5–10.1)
CHLORIDE SERPL-SCNC: 104 MMOL/L (ref 98–107)
CK SERPL-CCNC: 52 U/L (ref 26–192)
CO2 SERPL-SCNC: 31 MMOL/L (ref 21–32)
COLOR UR: YELLOW
CREAT SERPL-MCNC: 0.8 MG/DL (ref 0.6–1.3)
CREAT UR-MCNC: 67.9 MG/DL (ref 30–125)
EOSINOPHIL NFR BLD AUTO: 3.1 % (ref 0–6)
FERRITIN SERPL-MCNC: 107 NG/ML (ref 8–388)
GLUCOSE SERPL-MCNC: 153 MG/DL (ref 74–106)
GLUCOSE UR STRIP-MCNC: NEGATIVE MG/DL
HCT VFR BLD AUTO: 28 % (ref 33–45)
HGB BLD-MCNC: 9.3 G/DL (ref 11.5–14.8)
IRON SERPL-MCNC: 55 UG/DL (ref 50–175)
LEUKOCYTE ESTERASE UR QL STRIP: (no result)
LYMPHOCYTES NFR BLD AUTO: 2.1 /CMM (ref 0.8–4.8)
LYMPHOCYTES NFR BLD AUTO: 28.3 % (ref 20–44)
MAGNESIUM SERPL-MCNC: 2.3 MG/DL (ref 1.8–2.4)
MCHC RBC AUTO-ENTMCNC: 33 G/DL (ref 31–36)
MCV RBC AUTO: 83 FL (ref 82–100)
MONOCYTES NFR BLD AUTO: 0.7 /CMM (ref 0.1–1.3)
MONOCYTES NFR BLD AUTO: 9.4 % (ref 2–12)
NEUTROPHILS # BLD AUTO: 4.4 /CMM (ref 1.8–8.9)
NEUTROPHILS NFR BLD AUTO: 58 % (ref 43–81)
NITRITE UR QL STRIP: POSITIVE
PH UR STRIP: 5.5 [PH] (ref 5–8)
PHOSPHATE SERPL-MCNC: 3.6 MG/DL (ref 2.5–4.9)
PLATELET # BLD AUTO: 362 /CMM (ref 150–450)
POTASSIUM SERPL-SCNC: 4.2 MMOL/L (ref 3.5–5.1)
PROT SERPL-MCNC: 6.2 G/DL (ref 6.4–8.2)
PROT UR QL STRIP: NEGATIVE MG/DL
PROT UR-MCNC: 10.3 MG/DL (ref 0–11.9)
RBC # BLD AUTO: 3.39 MIL/UL (ref 4–5.2)
RBC #/AREA URNS HPF: (no result) /HPF (ref 0–2)
SODIUM SERPL-SCNC: 141 MMOL/L (ref 136–145)
SODIUM UR-SCNC: 33 MMOL/L (ref 40–220)
TIBC SERPL-MCNC: 298 UG/DL (ref 250–450)
UROBILINOGEN UR STRIP-MCNC: 0.2 EU/DL
WBC #/AREA URNS HPF: (no result) /HPF (ref 0–3)
WBC NRBC COR # BLD AUTO: 7.6 K/UL (ref 4.3–11)

## 2021-02-22 RX ADMIN — ATORVASTATIN CALCIUM SCH MG: 40 TABLET, FILM COATED ORAL at 21:50

## 2021-02-22 RX ADMIN — SODIUM CHLORIDE SCH MG: 9 INJECTION, SOLUTION INTRAVENOUS at 09:30

## 2021-02-22 RX ADMIN — Medication SCH EACH: at 17:20

## 2021-02-22 RX ADMIN — LIDOCAINE SCH EA: 50 PATCH CUTANEOUS at 08:25

## 2021-02-22 RX ADMIN — LOSARTAN POTASSIUM SCH MG: 50 TABLET, FILM COATED ORAL at 08:24

## 2021-02-22 RX ADMIN — Medication SCH MG: at 19:56

## 2021-02-22 RX ADMIN — HEPARIN SODIUM SCH UNITS: 5000 INJECTION INTRAVENOUS; SUBCUTANEOUS at 21:45

## 2021-02-22 RX ADMIN — Medication SCH EACH: at 22:01

## 2021-02-22 RX ADMIN — ALBUTEROL SULFATE SCH MG: 2.5 SOLUTION RESPIRATORY (INHALATION) at 02:18

## 2021-02-22 RX ADMIN — DEXTROSE MONOHYDRATE SCH MG: 50 INJECTION, SOLUTION INTRAVENOUS at 08:21

## 2021-02-22 RX ADMIN — ACETAMINOPHEN PRN MG: 325 TABLET ORAL at 12:11

## 2021-02-22 RX ADMIN — BACLOFEN SCH MG: 10 TABLET ORAL at 05:14

## 2021-02-22 RX ADMIN — ALBUTEROL SULFATE SCH MG: 2.5 SOLUTION RESPIRATORY (INHALATION) at 13:21

## 2021-02-22 RX ADMIN — HEPARIN SODIUM SCH UNITS: 5000 INJECTION INTRAVENOUS; SUBCUTANEOUS at 08:29

## 2021-02-22 RX ADMIN — ALBUTEROL SULFATE SCH MG: 2.5 SOLUTION RESPIRATORY (INHALATION) at 08:08

## 2021-02-22 RX ADMIN — CLOTRIMAZOLE SCH GM: 1 CREAM TOPICAL at 08:25

## 2021-02-22 RX ADMIN — PANTOPRAZOLE SODIUM SCH MG: 40 TABLET, DELAYED RELEASE ORAL at 21:49

## 2021-02-22 RX ADMIN — Medication SCH EACH: at 11:56

## 2021-02-22 RX ADMIN — Medication SCH MG: at 12:00

## 2021-02-22 RX ADMIN — PREGABALIN SCH MG: 25 CAPSULE ORAL at 05:14

## 2021-02-22 RX ADMIN — POLYETHYLENE GLYCOL 3350 PRN GM: 17 POWDER, FOR SOLUTION ORAL at 14:59

## 2021-02-22 RX ADMIN — LABETALOL HCL SCH MG: 100 TABLET, FILM COATED ORAL at 16:50

## 2021-02-22 RX ADMIN — CLOTRIMAZOLE SCH GM: 1 CREAM TOPICAL at 16:51

## 2021-02-22 RX ADMIN — HUMAN INSULIN PRN UNIT: 100 INJECTION, SOLUTION SUBCUTANEOUS at 22:02

## 2021-02-22 RX ADMIN — INSULIN HUMAN PRN UNIT: 100 INJECTION, SOLUTION PARENTERAL at 17:30

## 2021-02-22 RX ADMIN — Medication SCH EACH: at 06:39

## 2021-02-22 RX ADMIN — HYDROMORPHONE HYDROCHLORIDE PRN MG: 2 TABLET ORAL at 14:29

## 2021-02-22 RX ADMIN — HYDROMORPHONE HYDROCHLORIDE PRN MG: 2 TABLET ORAL at 21:50

## 2021-02-22 RX ADMIN — SODIUM CHLORIDE SCH MG: 9 INJECTION, SOLUTION INTRAVENOUS at 14:25

## 2021-02-22 RX ADMIN — EZETIMIBE SCH MG: 10 TABLET ORAL at 08:21

## 2021-02-22 RX ADMIN — SODIUM CHLORIDE SCH MG: 9 INJECTION, SOLUTION INTRAVENOUS at 16:51

## 2021-02-22 RX ADMIN — ALBUTEROL SULFATE SCH MG: 2.5 SOLUTION RESPIRATORY (INHALATION) at 19:56

## 2021-02-22 RX ADMIN — HYDROMORPHONE HYDROCHLORIDE PRN MG: 2 TABLET ORAL at 02:02

## 2021-02-22 RX ADMIN — Medication SCH MG: at 08:21

## 2021-02-22 RX ADMIN — Medication SCH MG: at 08:08

## 2021-02-22 RX ADMIN — Medication SCH MG: at 02:18

## 2021-02-22 RX ADMIN — PAROXETINE HYDROCHLORIDE SCH MG: 10 TABLET, FILM COATED ORAL at 08:21

## 2021-02-22 RX ADMIN — PREGABALIN SCH MG: 25 CAPSULE ORAL at 12:00

## 2021-02-22 RX ADMIN — MAGNESIUM HYDROXIDE PRN ML: 400 SUSPENSION ORAL at 14:59

## 2021-02-22 RX ADMIN — HYDROMORPHONE HYDROCHLORIDE PRN MG: 2 TABLET ORAL at 08:20

## 2021-02-22 RX ADMIN — Medication SCH MG: at 13:21

## 2021-02-22 RX ADMIN — INSULIN HUMAN PRN UNIT: 100 INJECTION, SOLUTION PARENTERAL at 06:40

## 2021-02-22 RX ADMIN — ASPIRIN 81 MG SCH MG: 81 TABLET ORAL at 08:19

## 2021-02-22 RX ADMIN — INSULIN HUMAN PRN UNIT: 100 INJECTION, SOLUTION PARENTERAL at 11:59

## 2021-02-22 RX ADMIN — Medication SCH MG: at 16:50

## 2021-02-22 RX ADMIN — LABETALOL HCL SCH MG: 100 TABLET, FILM COATED ORAL at 08:23

## 2021-02-22 RX ADMIN — BACLOFEN SCH MG: 10 TABLET ORAL at 21:50

## 2021-02-22 RX ADMIN — BACLOFEN SCH MG: 10 TABLET ORAL at 12:00

## 2021-02-22 RX ADMIN — PREGABALIN SCH MG: 25 CAPSULE ORAL at 21:50

## 2021-02-22 RX ADMIN — DEXTROSE MONOHYDRATE SCH MLS/HR: 50 INJECTION, SOLUTION INTRAVENOUS at 11:55

## 2021-02-22 RX ADMIN — CLOPIDOGREL BISULFATE SCH MG: 75 TABLET, FILM COATED ORAL at 08:21

## 2021-02-22 NOTE — NUR
TELE RN OPENING NOTES



PATIENT IN BED. A/O X4. NC O2 @ 2 LPM. IN NO APPARENT DISTRESS. BREATHING IS EVEN AND 
UNLABORED. IV ACCESS ON R AC #22 G, INTACT. SAFETY MEASURES MAINTAINED. BED IN LOWEST 
POSITION, BRAKES LOCKED. SIDE RAILS UP X 2. CALL LIGHT WITHIN REACH. WILL CONTINUE PLAN OF 
CARE.

## 2021-02-22 NOTE — NUR
RN NOTES

COMPLAINED OF BILATERAL. LOWER EXTREMITIES PAIN AND BACK PAIN- DILAUDID 4 MG PO GIVEN AS 
ORDERED, V/S STABLE

## 2021-02-22 NOTE — NUR
TELE/RN OPENING NOTES 



RECEIVED PATIENT RESTING, SITTING IN CHAIR. PATIENT IS ALERT AND ORIENTED X 3. PATIENTS 
BREATHING IS EVEN AND UNLABORED. NO SIGNS OF SOB OR RESPIRATORY DISTRESS NOTED. PATIENT 
STATES NO PAIN AT THIS TIME. PATIENT IN NO SIGNS OF ACUTE DISTRESS. IV ACCESS INTACT 
FLUSHING WELL. SAFETY MEASURES ARE IN PLACE, BED IS LOCKED AND PLACED IN THE LOW POSITION, 
SIDE RAILS UP X 2, CALL LIGHT IS WITHIN REACH. WILL CONTINUE TO MONITOR THROUGH OUT SHIFT.

## 2021-02-22 NOTE — NUR
TELE RN CLOSING NOTES



PATIENT IN BED. A/O X4. NC O2 @ 2 LPM. NO SOB NOTED. NO S/S OF RESPIRATORY DISTRESS. IV 
ACCESS ON R HAND #22 G, INTACT AND PATENT. ALL NEEDS HAVE BEEN MET. ROUTINE MEDS WERE GIVEN 
AS ORDERED. SAFETY MEASURES MAINTAINED. BED IN LOWEST POSITION, BRAKES LOCKED. SIDE RAILS UP 
X 2. CALL LIGHT WITHIN REACH. WILL ENDORSE TO NIGHT SHIFT FOR CONTINUITY OF CARE.

## 2021-02-22 NOTE — NUR
RN NOTES

AWAKE, MORNING CARE RENDERD, DEMIES PAIN, NO SOB, MORNING CARE RENDERED, CALL LIGHT , WITHIN 
REACH, SIDERAILSUPX2, PT. NEEDS ATTENDED

## 2021-02-23 VITALS — DIASTOLIC BLOOD PRESSURE: 63 MMHG | SYSTOLIC BLOOD PRESSURE: 152 MMHG

## 2021-02-23 VITALS — SYSTOLIC BLOOD PRESSURE: 137 MMHG | DIASTOLIC BLOOD PRESSURE: 65 MMHG

## 2021-02-23 VITALS — DIASTOLIC BLOOD PRESSURE: 50 MMHG | SYSTOLIC BLOOD PRESSURE: 128 MMHG

## 2021-02-23 VITALS — SYSTOLIC BLOOD PRESSURE: 127 MMHG | DIASTOLIC BLOOD PRESSURE: 50 MMHG

## 2021-02-23 VITALS — SYSTOLIC BLOOD PRESSURE: 116 MMHG | DIASTOLIC BLOOD PRESSURE: 48 MMHG

## 2021-02-23 LAB
ALBUMIN SERPL BCP-MCNC: 3.1 G/DL (ref 3.4–5)
ALBUMIN SERPL ELPH-MCNC: 2.9 G/DL (ref 2.9–4.4)
ALBUMIN/GLOB SERPL: 1 {RATIO} (ref 0.7–1.7)
ALP SERPL-CCNC: 97 U/L (ref 46–116)
ALPHA1 GLOB SERPL ELPH-MCNC: 0.2 G/DL (ref 0–0.4)
ALPHA2 GLOB SERPL ELPH-MCNC: 0.9 G/DL (ref 0.4–1)
ALT SERPL W P-5'-P-CCNC: 30 U/L (ref 12–78)
AST SERPL W P-5'-P-CCNC: 18 U/L (ref 15–37)
B-GLOBULIN SERPL ELPH-MCNC: 1 G/DL (ref 0.7–1.3)
BASOPHILS # BLD AUTO: 0.1 /CMM (ref 0–0.2)
BASOPHILS NFR BLD AUTO: 1.5 % (ref 0–2)
BILIRUB SERPL-MCNC: 0.5 MG/DL (ref 0.2–1)
BUN SERPL-MCNC: 30 MG/DL (ref 7–18)
CALCIUM SERPL-MCNC: 9 MG/DL (ref 8.5–10.1)
CHLORIDE SERPL-SCNC: 100 MMOL/L (ref 98–107)
CO2 SERPL-SCNC: 33 MMOL/L (ref 21–32)
CREAT SERPL-MCNC: 0.9 MG/DL (ref 0.6–1.3)
EOSINOPHIL NFR BLD AUTO: 3.9 % (ref 0–6)
GAMMA GLOB SERPL ELPH-MCNC: 0.6 G/DL (ref 0.4–1.8)
GLOBULIN SER CALC-MCNC: 2.8 G/DL (ref 2.2–3.9)
GLUCOSE SERPL-MCNC: 209 MG/DL (ref 74–106)
HCT VFR BLD AUTO: 30 % (ref 33–45)
HGB BLD-MCNC: 9.9 G/DL (ref 11.5–14.8)
LYMPHOCYTES NFR BLD AUTO: 2.5 /CMM (ref 0.8–4.8)
LYMPHOCYTES NFR BLD AUTO: 30.6 % (ref 20–44)
MAGNESIUM SERPL-MCNC: 2.2 MG/DL (ref 1.8–2.4)
MCHC RBC AUTO-ENTMCNC: 33 G/DL (ref 31–36)
MCV RBC AUTO: 83 FL (ref 82–100)
MONOCYTES NFR BLD AUTO: 0.8 /CMM (ref 0.1–1.3)
MONOCYTES NFR BLD AUTO: 9.4 % (ref 2–12)
NEUTROPHILS # BLD AUTO: 4.4 /CMM (ref 1.8–8.9)
NEUTROPHILS NFR BLD AUTO: 54.6 % (ref 43–81)
PHOSPHATE SERPL-MCNC: 3.4 MG/DL (ref 2.5–4.9)
PLATELET # BLD AUTO: 342 /CMM (ref 150–450)
POTASSIUM SERPL-SCNC: 3.9 MMOL/L (ref 3.5–5.1)
PROT SERPL-MCNC: 6.4 G/DL (ref 6.4–8.2)
PTH-INTACT SERPL-MCNC: 28 PG/ML (ref 15–65)
RBC # BLD AUTO: 3.6 MIL/UL (ref 4–5.2)
SODIUM SERPL-SCNC: 139 MMOL/L (ref 136–145)
WBC NRBC COR # BLD AUTO: 8.1 K/UL (ref 4.3–11)

## 2021-02-23 RX ADMIN — PREGABALIN SCH MG: 25 CAPSULE ORAL at 05:30

## 2021-02-23 RX ADMIN — CLOTRIMAZOLE SCH GM: 1 CREAM TOPICAL at 10:16

## 2021-02-23 RX ADMIN — Medication SCH MG: at 19:42

## 2021-02-23 RX ADMIN — Medication SCH MG: at 07:51

## 2021-02-23 RX ADMIN — LIDOCAINE SCH EA: 50 PATCH CUTANEOUS at 10:13

## 2021-02-23 RX ADMIN — Medication SCH MG: at 01:37

## 2021-02-23 RX ADMIN — Medication SCH EACH: at 06:36

## 2021-02-23 RX ADMIN — Medication SCH MG: at 12:14

## 2021-02-23 RX ADMIN — PAROXETINE HYDROCHLORIDE SCH MG: 10 TABLET, FILM COATED ORAL at 10:12

## 2021-02-23 RX ADMIN — Medication SCH MG: at 10:12

## 2021-02-23 RX ADMIN — PANTOPRAZOLE SODIUM SCH MG: 40 TABLET, DELAYED RELEASE ORAL at 22:31

## 2021-02-23 RX ADMIN — ALBUTEROL SULFATE SCH MG: 2.5 SOLUTION RESPIRATORY (INHALATION) at 19:42

## 2021-02-23 RX ADMIN — ATORVASTATIN CALCIUM SCH MG: 40 TABLET, FILM COATED ORAL at 22:30

## 2021-02-23 RX ADMIN — HYDROMORPHONE HYDROCHLORIDE PRN MG: 2 TABLET ORAL at 03:53

## 2021-02-23 RX ADMIN — Medication SCH MG: at 10:07

## 2021-02-23 RX ADMIN — MAGNESIUM HYDROXIDE PRN ML: 400 SUSPENSION ORAL at 10:30

## 2021-02-23 RX ADMIN — HYDROMORPHONE HYDROCHLORIDE PRN MG: 2 TABLET ORAL at 17:34

## 2021-02-23 RX ADMIN — ALBUTEROL SULFATE SCH MG: 2.5 SOLUTION RESPIRATORY (INHALATION) at 07:50

## 2021-02-23 RX ADMIN — CLOPIDOGREL BISULFATE SCH MG: 75 TABLET, FILM COATED ORAL at 10:09

## 2021-02-23 RX ADMIN — HUMAN INSULIN PRN UNIT: 100 INJECTION, SOLUTION SUBCUTANEOUS at 22:46

## 2021-02-23 RX ADMIN — LABETALOL HCL SCH MG: 100 TABLET, FILM COATED ORAL at 17:00

## 2021-02-23 RX ADMIN — PREGABALIN SCH MG: 25 CAPSULE ORAL at 12:14

## 2021-02-23 RX ADMIN — HEPARIN SODIUM SCH UNITS: 5000 INJECTION INTRAVENOUS; SUBCUTANEOUS at 10:07

## 2021-02-23 RX ADMIN — HEPARIN SODIUM SCH UNITS: 5000 INJECTION INTRAVENOUS; SUBCUTANEOUS at 22:33

## 2021-02-23 RX ADMIN — DEXTROSE MONOHYDRATE SCH MLS/HR: 50 INJECTION, SOLUTION INTRAVENOUS at 23:59

## 2021-02-23 RX ADMIN — Medication SCH MG: at 17:33

## 2021-02-23 RX ADMIN — ALBUTEROL SULFATE SCH MG: 2.5 SOLUTION RESPIRATORY (INHALATION) at 13:36

## 2021-02-23 RX ADMIN — LABETALOL HCL SCH MG: 100 TABLET, FILM COATED ORAL at 10:10

## 2021-02-23 RX ADMIN — LOSARTAN POTASSIUM SCH MG: 50 TABLET, FILM COATED ORAL at 10:09

## 2021-02-23 RX ADMIN — INSULIN HUMAN PRN UNIT: 100 INJECTION, SOLUTION PARENTERAL at 06:39

## 2021-02-23 RX ADMIN — FUROSEMIDE SCH MG: 10 INJECTION, SOLUTION INTRAMUSCULAR; INTRAVENOUS at 10:14

## 2021-02-23 RX ADMIN — HYDROMORPHONE HYDROCHLORIDE PRN MG: 2 TABLET ORAL at 10:11

## 2021-02-23 RX ADMIN — BACLOFEN SCH MG: 10 TABLET ORAL at 12:14

## 2021-02-23 RX ADMIN — ASPIRIN 81 MG SCH MG: 81 TABLET ORAL at 10:09

## 2021-02-23 RX ADMIN — BACLOFEN SCH MG: 10 TABLET ORAL at 05:30

## 2021-02-23 RX ADMIN — Medication SCH EACH: at 18:21

## 2021-02-23 RX ADMIN — Medication SCH EACH: at 12:21

## 2021-02-23 RX ADMIN — CLOTRIMAZOLE SCH GM: 1 CREAM TOPICAL at 18:25

## 2021-02-23 RX ADMIN — PREGABALIN SCH MG: 25 CAPSULE ORAL at 22:30

## 2021-02-23 RX ADMIN — Medication SCH EACH: at 22:45

## 2021-02-23 RX ADMIN — INSULIN HUMAN PRN UNIT: 100 INJECTION, SOLUTION PARENTERAL at 18:20

## 2021-02-23 RX ADMIN — EZETIMIBE SCH MG: 10 TABLET ORAL at 10:06

## 2021-02-23 RX ADMIN — Medication SCH MG: at 17:34

## 2021-02-23 RX ADMIN — DEXTROSE MONOHYDRATE SCH MLS/HR: 50 INJECTION, SOLUTION INTRAVENOUS at 05:29

## 2021-02-23 RX ADMIN — BACLOFEN SCH MG: 10 TABLET ORAL at 22:31

## 2021-02-23 RX ADMIN — ACETAMINOPHEN PRN MG: 325 TABLET ORAL at 15:20

## 2021-02-23 RX ADMIN — INSULIN HUMAN PRN UNIT: 100 INJECTION, SOLUTION PARENTERAL at 12:16

## 2021-02-23 RX ADMIN — POLYETHYLENE GLYCOL 3350 PRN GM: 17 POWDER, FOR SOLUTION ORAL at 10:05

## 2021-02-23 RX ADMIN — FUROSEMIDE SCH MG: 10 INJECTION, SOLUTION INTRAMUSCULAR; INTRAVENOUS at 17:33

## 2021-02-23 RX ADMIN — HYDROMORPHONE HYDROCHLORIDE PRN MG: 2 TABLET ORAL at 23:40

## 2021-02-23 RX ADMIN — ACETAMINOPHEN PRN MG: 325 TABLET ORAL at 22:30

## 2021-02-23 RX ADMIN — ALBUTEROL SULFATE SCH MG: 2.5 SOLUTION RESPIRATORY (INHALATION) at 01:37

## 2021-02-23 RX ADMIN — Medication SCH MG: at 13:36

## 2021-02-23 NOTE — NUR
RN MS NOTES

PT AWAKE, ALERT AND ORIENTED, SITTING IN HER CHAIR, PAIN MEDS GIVEN AS ORDERED, VERBALIZED 
RELIEF, NOT IN DISTRESS, CALL LIGHT WITHIN REACH, SEEN BY PHYSICAL THERAPIST TODAY, ABLE TO 
WALK WITH CANE TO THE BATHROOM WITH MININAL ASSISTANCE, PM MEDS GIVEN, ALL NEEDS ATTENDED.

## 2021-02-23 NOTE — NUR
MS/RN NOTES 



PATIENT STATING PAIN AND REQUESTING PAIN MEDICATION. PATIENT GIVEN DILAUDID 4 MG PO. V/S ARE 
STABLE, WILL CONTINUE TO MONITOR.

## 2021-02-23 NOTE — NUR
TELE/RN OPENING NOTES 



RECEIVED PATIENT RESTING. PATIENT IS ALERT AND ORIENTED X 4. PATIENTS BREATHING IS EVEN AND 
UNLABORED. NO SIGNS OF SOB OR RESPIRATORY DISTRESS NOTED. PATIENT IN NO SIGNS OF ACUTE 
DISTRESS. IV ACCESS INTACT FLUSHING WELL. SAFETY MEASURES ARE IN PLACE, BED IS LOCKED AND 
PLACED IN THE LOW POSITION, SIDE RAILS UP X 2, CALL LIGHT IS WITHIN REACH. WILL CONTINUE TO 
MONITOR THROUGH OUT SHIFT.

## 2021-02-23 NOTE — NUR
TELE/RN CLOSING NOTES 



PATIENT RESTING. PATIENT IS ALERT AND ORIENTED X 3. PATIENTS BREATHING IS EVEN AND 
UNLABORED. NO SIGNS OF SOB OR RESPIRATORY DISTRESS NOTED. PATIENT IN NO SIGNS OF ACUTE 
DISTRESS. IV ACCESS INTACT FLUSHING WELL. ALL NEEDS MET .SAFETY MEASURES ARE IN PLACE, BED 
IS LOCKED AND PLACED IN THE LOW POSITION, SIDE RAILS UP X 2, CALL LIGHT IS WITHIN REACH. 
WILL ENDORSE CARE TO DAY SHIFT NURSE.

## 2021-02-24 VITALS — SYSTOLIC BLOOD PRESSURE: 163 MMHG | DIASTOLIC BLOOD PRESSURE: 75 MMHG

## 2021-02-24 VITALS — DIASTOLIC BLOOD PRESSURE: 63 MMHG | SYSTOLIC BLOOD PRESSURE: 159 MMHG

## 2021-02-24 VITALS — SYSTOLIC BLOOD PRESSURE: 123 MMHG | DIASTOLIC BLOOD PRESSURE: 48 MMHG

## 2021-02-24 VITALS — SYSTOLIC BLOOD PRESSURE: 136 MMHG | DIASTOLIC BLOOD PRESSURE: 58 MMHG

## 2021-02-24 LAB
BASOPHILS # BLD AUTO: 0.1 /CMM (ref 0–0.2)
BASOPHILS NFR BLD AUTO: 1 % (ref 0–2)
BUN SERPL-MCNC: 28 MG/DL (ref 7–18)
CALCIUM SERPL-MCNC: 9.1 MG/DL (ref 8.5–10.1)
CHLORIDE SERPL-SCNC: 99 MMOL/L (ref 98–107)
CO2 SERPL-SCNC: 32 MMOL/L (ref 21–32)
CREAT SERPL-MCNC: 0.8 MG/DL (ref 0.6–1.3)
EOSINOPHIL NFR BLD AUTO: 3.8 % (ref 0–6)
GLUCOSE SERPL-MCNC: 197 MG/DL (ref 74–106)
HCT VFR BLD AUTO: 31 % (ref 33–45)
HGB BLD-MCNC: 10.1 G/DL (ref 11.5–14.8)
LYMPHOCYTES NFR BLD AUTO: 2.3 /CMM (ref 0.8–4.8)
LYMPHOCYTES NFR BLD AUTO: 31.8 % (ref 20–44)
MCHC RBC AUTO-ENTMCNC: 33 G/DL (ref 31–36)
MCV RBC AUTO: 83 FL (ref 82–100)
MONOCYTES NFR BLD AUTO: 0.6 /CMM (ref 0.1–1.3)
MONOCYTES NFR BLD AUTO: 8 % (ref 2–12)
NEUTROPHILS # BLD AUTO: 4.1 /CMM (ref 1.8–8.9)
NEUTROPHILS NFR BLD AUTO: 55.4 % (ref 43–81)
PLATELET # BLD AUTO: 332 /CMM (ref 150–450)
POTASSIUM SERPL-SCNC: 3.6 MMOL/L (ref 3.5–5.1)
RBC # BLD AUTO: 3.68 MIL/UL (ref 4–5.2)
SODIUM SERPL-SCNC: 139 MMOL/L (ref 136–145)
WBC NRBC COR # BLD AUTO: 7.4 K/UL (ref 4.3–11)

## 2021-02-24 PROCEDURE — 0JBP0ZZ EXCISION OF LEFT LOWER LEG SUBCUTANEOUS TISSUE AND FASCIA, OPEN APPROACH: ICD-10-PCS | Performed by: STUDENT IN AN ORGANIZED HEALTH CARE EDUCATION/TRAINING PROGRAM

## 2021-02-24 RX ADMIN — HEPARIN SODIUM SCH UNITS: 5000 INJECTION INTRAVENOUS; SUBCUTANEOUS at 21:48

## 2021-02-24 RX ADMIN — ACETAMINOPHEN PRN MG: 325 TABLET ORAL at 09:33

## 2021-02-24 RX ADMIN — HYDROMORPHONE HYDROCHLORIDE PRN MG: 2 TABLET ORAL at 23:40

## 2021-02-24 RX ADMIN — Medication SCH MG: at 12:21

## 2021-02-24 RX ADMIN — Medication SCH MG: at 13:54

## 2021-02-24 RX ADMIN — INSULIN HUMAN PRN UNIT: 100 INJECTION, SOLUTION PARENTERAL at 06:39

## 2021-02-24 RX ADMIN — HEPARIN SODIUM SCH UNITS: 5000 INJECTION INTRAVENOUS; SUBCUTANEOUS at 09:16

## 2021-02-24 RX ADMIN — LIDOCAINE SCH EA: 50 PATCH CUTANEOUS at 09:08

## 2021-02-24 RX ADMIN — DEXTROSE MONOHYDRATE SCH MLS/HR: 50 INJECTION, SOLUTION INTRAVENOUS at 17:41

## 2021-02-24 RX ADMIN — HYDROMORPHONE HYDROCHLORIDE PRN MG: 2 TABLET ORAL at 06:04

## 2021-02-24 RX ADMIN — Medication SCH MG: at 07:40

## 2021-02-24 RX ADMIN — LABETALOL HCL SCH MG: 100 TABLET, FILM COATED ORAL at 16:34

## 2021-02-24 RX ADMIN — BACLOFEN SCH MG: 10 TABLET ORAL at 21:56

## 2021-02-24 RX ADMIN — BACLOFEN SCH MG: 10 TABLET ORAL at 05:09

## 2021-02-24 RX ADMIN — Medication SCH EACH: at 06:38

## 2021-02-24 RX ADMIN — HYDROMORPHONE HYDROCHLORIDE PRN MG: 2 TABLET ORAL at 12:36

## 2021-02-24 RX ADMIN — Medication SCH MG: at 16:35

## 2021-02-24 RX ADMIN — ALBUTEROL SULFATE SCH MG: 2.5 SOLUTION RESPIRATORY (INHALATION) at 13:54

## 2021-02-24 RX ADMIN — MAGNESIUM HYDROXIDE PRN ML: 400 SUSPENSION ORAL at 09:25

## 2021-02-24 RX ADMIN — FUROSEMIDE SCH MG: 10 INJECTION, SOLUTION INTRAMUSCULAR; INTRAVENOUS at 09:11

## 2021-02-24 RX ADMIN — ALBUTEROL SULFATE SCH MG: 2.5 SOLUTION RESPIRATORY (INHALATION) at 19:58

## 2021-02-24 RX ADMIN — Medication SCH EACH: at 17:34

## 2021-02-24 RX ADMIN — LABETALOL HCL SCH MG: 100 TABLET, FILM COATED ORAL at 09:00

## 2021-02-24 RX ADMIN — EZETIMIBE SCH MG: 10 TABLET ORAL at 09:09

## 2021-02-24 RX ADMIN — ASPIRIN 81 MG SCH MG: 81 TABLET ORAL at 09:09

## 2021-02-24 RX ADMIN — LOSARTAN POTASSIUM SCH MG: 50 TABLET, FILM COATED ORAL at 09:10

## 2021-02-24 RX ADMIN — Medication SCH EACH: at 21:56

## 2021-02-24 RX ADMIN — ALBUTEROL SULFATE SCH MG: 2.5 SOLUTION RESPIRATORY (INHALATION) at 01:36

## 2021-02-24 RX ADMIN — CLOPIDOGREL BISULFATE SCH MG: 75 TABLET, FILM COATED ORAL at 09:08

## 2021-02-24 RX ADMIN — Medication SCH MG: at 01:36

## 2021-02-24 RX ADMIN — ALBUTEROL SULFATE SCH MG: 2.5 SOLUTION RESPIRATORY (INHALATION) at 07:40

## 2021-02-24 RX ADMIN — HUMAN INSULIN PRN UNIT: 100 INJECTION, SOLUTION SUBCUTANEOUS at 21:47

## 2021-02-24 RX ADMIN — Medication SCH MG: at 19:58

## 2021-02-24 RX ADMIN — PREGABALIN SCH MG: 25 CAPSULE ORAL at 12:21

## 2021-02-24 RX ADMIN — POLYETHYLENE GLYCOL 3350 PRN GM: 17 POWDER, FOR SOLUTION ORAL at 09:25

## 2021-02-24 RX ADMIN — CLOTRIMAZOLE SCH GM: 1 CREAM TOPICAL at 16:45

## 2021-02-24 RX ADMIN — INSULIN HUMAN PRN UNIT: 100 INJECTION, SOLUTION PARENTERAL at 12:30

## 2021-02-24 RX ADMIN — PANTOPRAZOLE SODIUM SCH MG: 40 TABLET, DELAYED RELEASE ORAL at 21:56

## 2021-02-24 RX ADMIN — Medication SCH EACH: at 12:30

## 2021-02-24 RX ADMIN — ATORVASTATIN CALCIUM SCH MG: 40 TABLET, FILM COATED ORAL at 21:55

## 2021-02-24 RX ADMIN — Medication SCH MG: at 09:08

## 2021-02-24 RX ADMIN — FUROSEMIDE SCH MG: 10 INJECTION, SOLUTION INTRAMUSCULAR; INTRAVENOUS at 16:31

## 2021-02-24 RX ADMIN — CLOTRIMAZOLE SCH GM: 1 CREAM TOPICAL at 09:37

## 2021-02-24 RX ADMIN — PREGABALIN SCH MG: 25 CAPSULE ORAL at 05:09

## 2021-02-24 RX ADMIN — PREGABALIN SCH MG: 25 CAPSULE ORAL at 21:55

## 2021-02-24 RX ADMIN — INSULIN HUMAN PRN UNIT: 100 INJECTION, SOLUTION PARENTERAL at 17:34

## 2021-02-24 RX ADMIN — BACLOFEN SCH MG: 10 TABLET ORAL at 12:20

## 2021-02-24 RX ADMIN — PAROXETINE HYDROCHLORIDE SCH MG: 10 TABLET, FILM COATED ORAL at 09:09

## 2021-02-24 RX ADMIN — ACETAMINOPHEN PRN MG: 325 TABLET ORAL at 16:50

## 2021-02-24 NOTE — NUR
RN MS NOTES

PT SEEN BY DR. GUZMAN, BEDSIDE WOUND TREATMENT AND DRESSING CHANGE DONE, PT TOLERATED 
PROCEDURE WELL.

## 2021-02-24 NOTE — NUR
TELE/RN CLOSING NOTES 



PATIENT RESTING IN BED. PATIENT IS ALERT AND ORIENTED X 4. PATIENTS BREATHING IS EVEN AND 
UNLABORED. NO SIGNS OF SOB OR RESPIRATORY DISTRESS NOTED. PATIENT IN NO SIGNS OF ACUTE 
DISTRESS. IV ACCESS INTACT FLUSHING WELL. ALL NEEDS HAVE BEEN MET DURING.  SAFETY MEASURES 
ARE IN PLACE, BED IS LOCKED AND PLACED IN THE LOW POSITION, SIDE RAILS UP X 2, CALL LIGHT IS 
WITHIN REACH. WILL ENDORSE CARE TO DAY SHIFT NURSE.

## 2021-02-24 NOTE — NUR
RN MS NOTES

PT IN BED, AWAKE, ALERT AND ORIENTED, EATING BREAKFAST, NO COMPLAINT OF PAIN AT THIS TIME, 
NOT IN DISTRESS, CALL LIGHT WITHIN REACH, ASSISTED TO BATHROOM AS NEEDED, KEPT COMFORTABLE.

## 2021-02-24 NOTE — NUR
RN MS NOTES



PT awake alert sitting in chair, assisted to bathroom PRN. Sizeable BM after lunchtime. 
Medication given for pain as needed, tolerated medications well, seen by PT. Able to walk 
along the hallway with PT and use of cane. Needs attended to.

## 2021-02-24 NOTE — NUR
WOUND CARE CONSULT: PT PRESENTS WITH REDNESS TO BILATERAL LOWER LEGS AND EDEMA WITH BLISTERS 
TO LEFT LOWER LEG, PRESENT ON ADMISSION. RECOMMEND DPM CONSULT. DR GUZMAN NOTIFIED OF 
CONSULT REQUEST. RECOMMENDATIONS MADE FOR SKIN PROTECTION. DISCUSSED WITH NURSING STAFF. MD 
IN AGREEMENT WITH PLAN OF CARE.

## 2021-02-25 VITALS — DIASTOLIC BLOOD PRESSURE: 57 MMHG | SYSTOLIC BLOOD PRESSURE: 137 MMHG

## 2021-02-25 VITALS — SYSTOLIC BLOOD PRESSURE: 158 MMHG | DIASTOLIC BLOOD PRESSURE: 64 MMHG

## 2021-02-25 VITALS — SYSTOLIC BLOOD PRESSURE: 134 MMHG | DIASTOLIC BLOOD PRESSURE: 73 MMHG

## 2021-02-25 LAB
BASOPHILS # BLD AUTO: 0.1 /CMM (ref 0–0.2)
BASOPHILS NFR BLD AUTO: 1.2 % (ref 0–2)
BUN SERPL-MCNC: 27 MG/DL (ref 7–18)
CALCIUM SERPL-MCNC: 9.1 MG/DL (ref 8.5–10.1)
CHLORIDE SERPL-SCNC: 98 MMOL/L (ref 98–107)
CO2 SERPL-SCNC: 34 MMOL/L (ref 21–32)
CREAT SERPL-MCNC: 0.9 MG/DL (ref 0.6–1.3)
EOSINOPHIL NFR BLD AUTO: 3.8 % (ref 0–6)
GLUCOSE SERPL-MCNC: 184 MG/DL (ref 74–106)
HCT VFR BLD AUTO: 29 % (ref 33–45)
HGB BLD-MCNC: 9.5 G/DL (ref 11.5–14.8)
LYMPHOCYTES NFR BLD AUTO: 2.4 /CMM (ref 0.8–4.8)
LYMPHOCYTES NFR BLD AUTO: 29.8 % (ref 20–44)
MCHC RBC AUTO-ENTMCNC: 33 G/DL (ref 31–36)
MCV RBC AUTO: 82 FL (ref 82–100)
MONOCYTES NFR BLD AUTO: 0.7 /CMM (ref 0.1–1.3)
MONOCYTES NFR BLD AUTO: 8.7 % (ref 2–12)
NEUTROPHILS # BLD AUTO: 4.5 /CMM (ref 1.8–8.9)
NEUTROPHILS NFR BLD AUTO: 56.5 % (ref 43–81)
PLATELET # BLD AUTO: 341 /CMM (ref 150–450)
POTASSIUM SERPL-SCNC: 3.9 MMOL/L (ref 3.5–5.1)
RBC # BLD AUTO: 3.49 MIL/UL (ref 4–5.2)
SODIUM SERPL-SCNC: 137 MMOL/L (ref 136–145)
WBC NRBC COR # BLD AUTO: 8 K/UL (ref 4.3–11)

## 2021-02-25 RX ADMIN — Medication SCH MG: at 12:53

## 2021-02-25 RX ADMIN — CLOPIDOGREL BISULFATE SCH MG: 75 TABLET, FILM COATED ORAL at 09:13

## 2021-02-25 RX ADMIN — LIDOCAINE SCH EA: 50 PATCH CUTANEOUS at 09:13

## 2021-02-25 RX ADMIN — Medication SCH MG: at 17:21

## 2021-02-25 RX ADMIN — ACETAMINOPHEN PRN MG: 325 TABLET ORAL at 10:45

## 2021-02-25 RX ADMIN — HUMAN INSULIN PRN UNIT: 100 INJECTION, SOLUTION SUBCUTANEOUS at 11:59

## 2021-02-25 RX ADMIN — HYDROMORPHONE HYDROCHLORIDE PRN MG: 2 TABLET ORAL at 20:47

## 2021-02-25 RX ADMIN — BACLOFEN SCH MG: 10 TABLET ORAL at 05:03

## 2021-02-25 RX ADMIN — ALBUTEROL SULFATE SCH MG: 2.5 SOLUTION RESPIRATORY (INHALATION) at 07:48

## 2021-02-25 RX ADMIN — PREGABALIN SCH MG: 25 CAPSULE ORAL at 12:54

## 2021-02-25 RX ADMIN — CLOTRIMAZOLE SCH GM: 1 CREAM TOPICAL at 09:16

## 2021-02-25 RX ADMIN — HYDROMORPHONE HYDROCHLORIDE PRN MG: 2 TABLET ORAL at 05:03

## 2021-02-25 RX ADMIN — Medication SCH MG: at 01:54

## 2021-02-25 RX ADMIN — PIPERACILLIN SODIUM AND TAZOBACTAM SODIUM SCH MLS/HR: .375; 3 INJECTION, POWDER, LYOPHILIZED, FOR SOLUTION INTRAVENOUS at 20:48

## 2021-02-25 RX ADMIN — SILVER SULFADIAZINE SCH GM: 10 CREAM TOPICAL at 09:18

## 2021-02-25 RX ADMIN — Medication SCH MG: at 09:12

## 2021-02-25 RX ADMIN — HEPARIN SODIUM SCH UNITS: 5000 INJECTION INTRAVENOUS; SUBCUTANEOUS at 09:15

## 2021-02-25 RX ADMIN — PREGABALIN SCH MG: 25 CAPSULE ORAL at 20:48

## 2021-02-25 RX ADMIN — LOSARTAN POTASSIUM SCH MG: 50 TABLET, FILM COATED ORAL at 09:12

## 2021-02-25 RX ADMIN — FUROSEMIDE SCH MG: 10 INJECTION, SOLUTION INTRAMUSCULAR; INTRAVENOUS at 17:21

## 2021-02-25 RX ADMIN — BACLOFEN SCH MG: 10 TABLET ORAL at 20:47

## 2021-02-25 RX ADMIN — Medication SCH MG: at 17:19

## 2021-02-25 RX ADMIN — Medication SCH MG: at 13:07

## 2021-02-25 RX ADMIN — Medication SCH MG: at 09:11

## 2021-02-25 RX ADMIN — Medication SCH MG: at 07:47

## 2021-02-25 RX ADMIN — ALBUTEROL SULFATE SCH MG: 2.5 SOLUTION RESPIRATORY (INHALATION) at 01:54

## 2021-02-25 RX ADMIN — DEXTROSE MONOHYDRATE SCH MLS/HR: 50 INJECTION, SOLUTION INTRAVENOUS at 11:56

## 2021-02-25 RX ADMIN — PANTOPRAZOLE SODIUM SCH MG: 40 TABLET, DELAYED RELEASE ORAL at 20:48

## 2021-02-25 RX ADMIN — PIPERACILLIN SODIUM AND TAZOBACTAM SODIUM SCH MLS/HR: .375; 3 INJECTION, POWDER, LYOPHILIZED, FOR SOLUTION INTRAVENOUS at 09:37

## 2021-02-25 RX ADMIN — Medication SCH EACH: at 06:30

## 2021-02-25 RX ADMIN — BACLOFEN SCH MG: 10 TABLET ORAL at 12:53

## 2021-02-25 RX ADMIN — ALBUTEROL SULFATE SCH MG: 2.5 SOLUTION RESPIRATORY (INHALATION) at 13:07

## 2021-02-25 RX ADMIN — Medication SCH EACH: at 22:13

## 2021-02-25 RX ADMIN — Medication SCH EACH: at 17:21

## 2021-02-25 RX ADMIN — HEPARIN SODIUM SCH UNITS: 5000 INJECTION INTRAVENOUS; SUBCUTANEOUS at 20:46

## 2021-02-25 RX ADMIN — PAROXETINE HYDROCHLORIDE SCH MG: 10 TABLET, FILM COATED ORAL at 09:13

## 2021-02-25 RX ADMIN — INSULIN HUMAN PRN UNIT: 100 INJECTION, SOLUTION PARENTERAL at 06:30

## 2021-02-25 RX ADMIN — ALBUTEROL SULFATE SCH MG: 2.5 SOLUTION RESPIRATORY (INHALATION) at 19:48

## 2021-02-25 RX ADMIN — HYDROMORPHONE HYDROCHLORIDE PRN MG: 2 TABLET ORAL at 13:19

## 2021-02-25 RX ADMIN — LABETALOL HCL SCH MG: 100 TABLET, FILM COATED ORAL at 09:11

## 2021-02-25 RX ADMIN — Medication SCH MG: at 19:48

## 2021-02-25 RX ADMIN — LABETALOL HCL SCH MG: 100 TABLET, FILM COATED ORAL at 17:19

## 2021-02-25 RX ADMIN — EZETIMIBE SCH MG: 10 TABLET ORAL at 09:12

## 2021-02-25 RX ADMIN — ATORVASTATIN CALCIUM SCH MG: 40 TABLET, FILM COATED ORAL at 20:48

## 2021-02-25 RX ADMIN — PREGABALIN SCH MG: 25 CAPSULE ORAL at 05:02

## 2021-02-25 RX ADMIN — HUMAN INSULIN PRN UNIT: 100 INJECTION, SOLUTION SUBCUTANEOUS at 21:13

## 2021-02-25 RX ADMIN — FUROSEMIDE SCH MG: 10 INJECTION, SOLUTION INTRAMUSCULAR; INTRAVENOUS at 09:11

## 2021-02-25 RX ADMIN — Medication SCH EACH: at 12:06

## 2021-02-25 RX ADMIN — CLOTRIMAZOLE SCH GM: 1 CREAM TOPICAL at 17:27

## 2021-02-25 RX ADMIN — ASPIRIN 81 MG SCH MG: 81 TABLET ORAL at 09:12

## 2021-02-25 RX ADMIN — HUMAN INSULIN PRN UNIT: 100 INJECTION, SOLUTION SUBCUTANEOUS at 17:23

## 2021-02-25 RX ADMIN — PIPERACILLIN SODIUM AND TAZOBACTAM SODIUM SCH MLS/HR: .375; 3 INJECTION, POWDER, LYOPHILIZED, FOR SOLUTION INTRAVENOUS at 15:15

## 2021-02-25 NOTE — NUR
RN Closing note



Patient in chair watching TV. Skin is warm to touch kept clean.dry, dressing changed on left 
lower leg,   keep intact new IV site IV site on right hand 22g. Respiratory even and 
unlabored with oxygen at 2LPM. Keep elevated HOB for ensure airway and aspiration precaution 
and lowest bed position for safety. stable vital sign. Call light within reach, will endorse 
night shift.

## 2021-02-25 NOTE — NUR
RN Opening note



Received patient in bed  AO x 4, does no appears pain or distress, skin is warm to touch  
keep clean/dry, intact IV site on right hand G 22. Respiratory even and unlabored with 
oxygen at 2LPM. Kept elevated HOB for ensure airway and aspiration precaution also lower bed 
position with bed alarm on for safety. Call light within reach, will continue to monitor.

## 2021-02-26 VITALS — SYSTOLIC BLOOD PRESSURE: 129 MMHG | DIASTOLIC BLOOD PRESSURE: 69 MMHG

## 2021-02-26 VITALS — SYSTOLIC BLOOD PRESSURE: 142 MMHG | DIASTOLIC BLOOD PRESSURE: 57 MMHG

## 2021-02-26 VITALS — SYSTOLIC BLOOD PRESSURE: 128 MMHG | DIASTOLIC BLOOD PRESSURE: 58 MMHG

## 2021-02-26 VITALS — DIASTOLIC BLOOD PRESSURE: 56 MMHG | SYSTOLIC BLOOD PRESSURE: 118 MMHG

## 2021-02-26 LAB
BASOPHILS # BLD AUTO: 0.1 /CMM (ref 0–0.2)
BASOPHILS NFR BLD AUTO: 0.8 % (ref 0–2)
BUN SERPL-MCNC: 33 MG/DL (ref 7–18)
CALCIUM SERPL-MCNC: 8.8 MG/DL (ref 8.5–10.1)
CHLORIDE SERPL-SCNC: 96 MMOL/L (ref 98–107)
CO2 SERPL-SCNC: 32 MMOL/L (ref 21–32)
CREAT SERPL-MCNC: 1.2 MG/DL (ref 0.6–1.3)
EOSINOPHIL NFR BLD AUTO: 3.2 % (ref 0–6)
GLUCOSE SERPL-MCNC: 204 MG/DL (ref 74–106)
HCT VFR BLD AUTO: 27 % (ref 33–45)
HGB BLD-MCNC: 9 G/DL (ref 11.5–14.8)
LYMPHOCYTES NFR BLD AUTO: 1.8 /CMM (ref 0.8–4.8)
LYMPHOCYTES NFR BLD AUTO: 20.2 % (ref 20–44)
MCHC RBC AUTO-ENTMCNC: 33 G/DL (ref 31–36)
MCV RBC AUTO: 82 FL (ref 82–100)
MONOCYTES NFR BLD AUTO: 0.8 /CMM (ref 0.1–1.3)
MONOCYTES NFR BLD AUTO: 9.4 % (ref 2–12)
NEUTROPHILS # BLD AUTO: 5.9 /CMM (ref 1.8–8.9)
NEUTROPHILS NFR BLD AUTO: 66.4 % (ref 43–81)
PLATELET # BLD AUTO: 312 /CMM (ref 150–450)
POTASSIUM SERPL-SCNC: 3.8 MMOL/L (ref 3.5–5.1)
RBC # BLD AUTO: 3.31 MIL/UL (ref 4–5.2)
SODIUM SERPL-SCNC: 136 MMOL/L (ref 136–145)
WBC NRBC COR # BLD AUTO: 9 K/UL (ref 4.3–11)

## 2021-02-26 RX ADMIN — ALBUTEROL SULFATE SCH MG: 2.5 SOLUTION RESPIRATORY (INHALATION) at 13:41

## 2021-02-26 RX ADMIN — PREGABALIN SCH MG: 25 CAPSULE ORAL at 05:32

## 2021-02-26 RX ADMIN — Medication SCH MG: at 07:42

## 2021-02-26 RX ADMIN — PANTOPRAZOLE SODIUM SCH MG: 40 TABLET, DELAYED RELEASE ORAL at 21:08

## 2021-02-26 RX ADMIN — ASPIRIN 81 MG SCH MG: 81 TABLET ORAL at 09:07

## 2021-02-26 RX ADMIN — INSULIN HUMAN PRN UNIT: 100 INJECTION, SOLUTION PARENTERAL at 06:22

## 2021-02-26 RX ADMIN — Medication SCH EACH: at 17:19

## 2021-02-26 RX ADMIN — Medication SCH EACH: at 06:19

## 2021-02-26 RX ADMIN — Medication SCH MG: at 17:20

## 2021-02-26 RX ADMIN — HYDROMORPHONE HYDROCHLORIDE PRN MG: 2 TABLET ORAL at 21:07

## 2021-02-26 RX ADMIN — ALBUTEROL SULFATE SCH MG: 2.5 SOLUTION RESPIRATORY (INHALATION) at 01:36

## 2021-02-26 RX ADMIN — CLOPIDOGREL BISULFATE SCH MG: 75 TABLET, FILM COATED ORAL at 09:08

## 2021-02-26 RX ADMIN — Medication SCH MG: at 01:36

## 2021-02-26 RX ADMIN — BACLOFEN SCH MG: 10 TABLET ORAL at 12:29

## 2021-02-26 RX ADMIN — HUMAN INSULIN PRN UNIT: 100 INJECTION, SOLUTION SUBCUTANEOUS at 21:27

## 2021-02-26 RX ADMIN — ACETAMINOPHEN PRN MG: 325 TABLET ORAL at 01:11

## 2021-02-26 RX ADMIN — Medication SCH MG: at 12:29

## 2021-02-26 RX ADMIN — PIPERACILLIN SODIUM AND TAZOBACTAM SODIUM SCH MLS/HR: .375; 3 INJECTION, POWDER, LYOPHILIZED, FOR SOLUTION INTRAVENOUS at 14:58

## 2021-02-26 RX ADMIN — EZETIMIBE SCH MG: 10 TABLET ORAL at 09:08

## 2021-02-26 RX ADMIN — Medication SCH MG: at 09:08

## 2021-02-26 RX ADMIN — INSULIN HUMAN PRN UNIT: 100 INJECTION, SOLUTION PARENTERAL at 11:48

## 2021-02-26 RX ADMIN — DEXTROSE MONOHYDRATE SCH MLS/HR: 50 INJECTION, SOLUTION INTRAVENOUS at 05:31

## 2021-02-26 RX ADMIN — Medication SCH EACH: at 21:25

## 2021-02-26 RX ADMIN — SILVER SULFADIAZINE SCH GM: 10 CREAM TOPICAL at 09:07

## 2021-02-26 RX ADMIN — HEPARIN SODIUM SCH UNITS: 5000 INJECTION INTRAVENOUS; SUBCUTANEOUS at 09:09

## 2021-02-26 RX ADMIN — DEXTROSE MONOHYDRATE SCH MLS/HR: 50 INJECTION, SOLUTION INTRAVENOUS at 23:27

## 2021-02-26 RX ADMIN — PAROXETINE HYDROCHLORIDE SCH MG: 10 TABLET, FILM COATED ORAL at 09:08

## 2021-02-26 RX ADMIN — INSULIN HUMAN PRN UNIT: 100 INJECTION, SOLUTION PARENTERAL at 17:21

## 2021-02-26 RX ADMIN — PREGABALIN SCH MG: 25 CAPSULE ORAL at 21:08

## 2021-02-26 RX ADMIN — BACLOFEN SCH MG: 10 TABLET ORAL at 21:08

## 2021-02-26 RX ADMIN — BACLOFEN SCH MG: 10 TABLET ORAL at 05:32

## 2021-02-26 RX ADMIN — Medication SCH MG: at 13:42

## 2021-02-26 RX ADMIN — ATORVASTATIN CALCIUM SCH MG: 40 TABLET, FILM COATED ORAL at 21:08

## 2021-02-26 RX ADMIN — LIDOCAINE SCH EA: 50 PATCH CUTANEOUS at 09:00

## 2021-02-26 RX ADMIN — FUROSEMIDE SCH MG: 10 INJECTION, SOLUTION INTRAMUSCULAR; INTRAVENOUS at 17:20

## 2021-02-26 RX ADMIN — Medication SCH EACH: at 11:46

## 2021-02-26 RX ADMIN — PIPERACILLIN SODIUM AND TAZOBACTAM SODIUM SCH MLS/HR: .375; 3 INJECTION, POWDER, LYOPHILIZED, FOR SOLUTION INTRAVENOUS at 09:07

## 2021-02-26 RX ADMIN — CLOTRIMAZOLE SCH GM: 1 CREAM TOPICAL at 09:07

## 2021-02-26 RX ADMIN — ALBUTEROL SULFATE SCH MG: 2.5 SOLUTION RESPIRATORY (INHALATION) at 19:40

## 2021-02-26 RX ADMIN — PREGABALIN SCH MG: 25 CAPSULE ORAL at 12:29

## 2021-02-26 RX ADMIN — LABETALOL HCL SCH MG: 100 TABLET, FILM COATED ORAL at 09:48

## 2021-02-26 RX ADMIN — Medication SCH MG: at 19:40

## 2021-02-26 RX ADMIN — HYDROMORPHONE HYDROCHLORIDE PRN MG: 2 TABLET ORAL at 12:30

## 2021-02-26 RX ADMIN — PIPERACILLIN SODIUM AND TAZOBACTAM SODIUM SCH MLS/HR: .375; 3 INJECTION, POWDER, LYOPHILIZED, FOR SOLUTION INTRAVENOUS at 21:10

## 2021-02-26 RX ADMIN — LOSARTAN POTASSIUM SCH MG: 50 TABLET, FILM COATED ORAL at 09:47

## 2021-02-26 RX ADMIN — HEPARIN SODIUM SCH UNITS: 5000 INJECTION INTRAVENOUS; SUBCUTANEOUS at 21:08

## 2021-02-26 RX ADMIN — ALBUTEROL SULFATE SCH MG: 2.5 SOLUTION RESPIRATORY (INHALATION) at 07:43

## 2021-02-26 RX ADMIN — CLOTRIMAZOLE SCH GM: 1 CREAM TOPICAL at 17:24

## 2021-02-26 RX ADMIN — HYDROMORPHONE HYDROCHLORIDE PRN MG: 2 TABLET ORAL at 03:40

## 2021-02-26 RX ADMIN — PIPERACILLIN SODIUM AND TAZOBACTAM SODIUM SCH MLS/HR: .375; 3 INJECTION, POWDER, LYOPHILIZED, FOR SOLUTION INTRAVENOUS at 03:38

## 2021-02-26 RX ADMIN — LABETALOL HCL SCH MG: 100 TABLET, FILM COATED ORAL at 17:20

## 2021-02-26 RX ADMIN — FUROSEMIDE SCH MG: 10 INJECTION, SOLUTION INTRAMUSCULAR; INTRAVENOUS at 09:08

## 2021-02-26 NOTE — NUR
MS RN NOTES - PAIN



PATENT C/O 7/10 HEADACHE. ADMINISTERED DILAUDID AS ORDERED. WILL CONTINUE TO ASSESS FOR PAIN

## 2021-02-26 NOTE — NUR
MS RN NOTES - COUGH 



PATIENT C/O COUGH AND REQUESTED FOR ROBITUSSIN DM. RECEIVED ORDERS FROM JEREMIAS MAYS NP 
FOR ORDERS FOR ROBITUSSIN 10ML.  PATIENT DENIES SORE THROAT. ADMINISTERED MEDICATION AS 
ORDERED. WILL CONTINUE TO MONITOR FOR COUGH OR RESPIRATORY DISTRESS. 

-------------------------------------------------------------------------------

Addendum: 02/27/21 at 0332 by CINDY VINCENT RN

-------------------------------------------------------------------------------

DATE ERROR - PLEASE DISREGARD

## 2021-02-26 NOTE — NUR
TELE RN CLOSING NOTE



PATIENT IS IN BED RESTING. PATIENT IS IN NO ACUTE DISTRESS. PATIENT IS ON 2L OXYGEN ON AND 
OFF. TOLERATING WELL. NO SOB NOTED. PATIENT IS ON FLUID RESTRICTION OF 1.5L WITH DAILY 
WEIGHT MEASUREMENTS. SAFETY PRECAUTIONS ARE IN PLACE. BED IN THE LOWEST POSITION, SIDE RAILS 
ARE UP. CALL LIGHT WITHIN REACH. ENDORSE PATIENT TO THE NIGHT SHIFT NURSE FOR NAHUN.

## 2021-02-26 NOTE — NUR
MS RN OPENING NOTE



PATIENT IS IN BED RESTING. PATIENT IS IN NO ACUTE DISTRESS. PATIENT IS ON OXYGEN 2L ON NC. 
TOLERATING WELL. NO SOB NOTED. PATIENT IS ON FLUID RESTRICTION OF 1.5L WITH DAILY WEIGHT 
MEASUREMENTS. SAFETY PRECAUTIONS ARE IN PLACE. BED IN THE LOWEST POSITION, SIDE RAILS ARE 
UP. CALL LIGHT WITHIN REACH. WILL CONTINUE TO MONITOR CLOSELY.

## 2021-02-26 NOTE — NUR
RN CLOSING NOTE:

PT IN BED IN NO APPARENT DISTRES. DRESSING TO LEFT LEG CDI. RIGHT WRIST/HAND 22 GAUGE PATENT 
WITH NO S/S OF INFILTRATION RUNNING VANCOMYCIN. RESP EVEN AND UNLABORED ON O2 AT 2LNC. HOB 
ELEVATED 30 DEGREES. BED DOWN LOCKED CALL LIGHT IN REACH WILL ENDORSE TO ONCOMING SHIFT. 
DAILY . PT REPORTS SHE HAD ONE BM YESTERDAY. PT DRANK 600 ML OF WATER. 



Patient in chair watching TV. Skin is warm to touch kept clean.dry, dressing changed on left 
lower leg,   keep intact new IV site IV site on right hand 22g. Respiratory even and 
unlabored with oxygen at 2LPM. Keep elevated HOB for ensure airway and aspiration precaution 
and lowest bed position for safety. stable vital sign. Call light within reach, will endorse 
night shift.

## 2021-02-26 NOTE — NUR
MS RN OPENING NOTE



PATIENT A/O X4; ABLE TO MAKE NEEDS KNOWN; RESTING COMFORTABLY IN BED. PATIENT ON OXYGEN 2L 
ON NC; TOLERATING WELL WITH NO SOB NOTED. PATIENT ON FLUID RESTRICTION OF 1.5L, STRICT I&O, 
AND DAILY WEIGHT MEASUREMENTS. SAFETY PRECAUTIONS ARE IN PLACE; BED IN THE LOWEST POSITION, 
SIDE RAILS ARE UP X3; CALL LIGHT WITHIN REACH. PATIENT STABLE AT THIS TIME.

## 2021-02-27 VITALS — SYSTOLIC BLOOD PRESSURE: 146 MMHG | DIASTOLIC BLOOD PRESSURE: 59 MMHG

## 2021-02-27 VITALS — DIASTOLIC BLOOD PRESSURE: 61 MMHG | SYSTOLIC BLOOD PRESSURE: 143 MMHG

## 2021-02-27 VITALS — DIASTOLIC BLOOD PRESSURE: 59 MMHG | SYSTOLIC BLOOD PRESSURE: 118 MMHG

## 2021-02-27 VITALS — DIASTOLIC BLOOD PRESSURE: 62 MMHG | SYSTOLIC BLOOD PRESSURE: 120 MMHG

## 2021-02-27 VITALS — DIASTOLIC BLOOD PRESSURE: 44 MMHG | SYSTOLIC BLOOD PRESSURE: 105 MMHG

## 2021-02-27 LAB
BASOPHILS # BLD AUTO: 0.1 /CMM (ref 0–0.2)
BASOPHILS NFR BLD AUTO: 0.9 % (ref 0–2)
BUN SERPL-MCNC: 23 MG/DL (ref 7–18)
CALCIUM SERPL-MCNC: 9.2 MG/DL (ref 8.5–10.1)
CHLORIDE SERPL-SCNC: 97 MMOL/L (ref 98–107)
CO2 SERPL-SCNC: 33 MMOL/L (ref 21–32)
CREAT SERPL-MCNC: 0.9 MG/DL (ref 0.6–1.3)
EOSINOPHIL NFR BLD AUTO: 2.7 % (ref 0–6)
GLUCOSE SERPL-MCNC: 238 MG/DL (ref 74–106)
HCT VFR BLD AUTO: 29 % (ref 33–45)
HGB BLD-MCNC: 9.4 G/DL (ref 11.5–14.8)
LYMPHOCYTES NFR BLD AUTO: 2.3 /CMM (ref 0.8–4.8)
LYMPHOCYTES NFR BLD AUTO: 29 % (ref 20–44)
MCHC RBC AUTO-ENTMCNC: 33 G/DL (ref 31–36)
MCV RBC AUTO: 83 FL (ref 82–100)
MONOCYTES NFR BLD AUTO: 0.7 /CMM (ref 0.1–1.3)
MONOCYTES NFR BLD AUTO: 8.5 % (ref 2–12)
NEUTROPHILS # BLD AUTO: 4.8 /CMM (ref 1.8–8.9)
NEUTROPHILS NFR BLD AUTO: 58.9 % (ref 43–81)
PLATELET # BLD AUTO: 312 /CMM (ref 150–450)
POTASSIUM SERPL-SCNC: 3.8 MMOL/L (ref 3.5–5.1)
RBC # BLD AUTO: 3.49 MIL/UL (ref 4–5.2)
SODIUM SERPL-SCNC: 137 MMOL/L (ref 136–145)
WBC NRBC COR # BLD AUTO: 8.1 K/UL (ref 4.3–11)

## 2021-02-27 RX ADMIN — ALBUTEROL SULFATE SCH MG: 2.5 SOLUTION RESPIRATORY (INHALATION) at 19:01

## 2021-02-27 RX ADMIN — ALBUTEROL SULFATE SCH MG: 2.5 SOLUTION RESPIRATORY (INHALATION) at 01:49

## 2021-02-27 RX ADMIN — BACLOFEN SCH MG: 10 TABLET ORAL at 21:55

## 2021-02-27 RX ADMIN — PIPERACILLIN SODIUM AND TAZOBACTAM SODIUM SCH MLS/HR: .375; 3 INJECTION, POWDER, LYOPHILIZED, FOR SOLUTION INTRAVENOUS at 14:11

## 2021-02-27 RX ADMIN — ALBUTEROL SULFATE SCH MG: 2.5 SOLUTION RESPIRATORY (INHALATION) at 07:55

## 2021-02-27 RX ADMIN — Medication SCH MG: at 16:49

## 2021-02-27 RX ADMIN — Medication SCH MG: at 07:55

## 2021-02-27 RX ADMIN — HYDROMORPHONE HYDROCHLORIDE PRN MG: 2 TABLET ORAL at 21:57

## 2021-02-27 RX ADMIN — POLYETHYLENE GLYCOL 3350 PRN GM: 17 POWDER, FOR SOLUTION ORAL at 11:47

## 2021-02-27 RX ADMIN — HYDROMORPHONE HYDROCHLORIDE PRN MG: 2 TABLET ORAL at 09:27

## 2021-02-27 RX ADMIN — Medication SCH MG: at 12:48

## 2021-02-27 RX ADMIN — BACLOFEN SCH MG: 10 TABLET ORAL at 12:21

## 2021-02-27 RX ADMIN — Medication SCH EACH: at 06:15

## 2021-02-27 RX ADMIN — ATORVASTATIN CALCIUM SCH MG: 40 TABLET, FILM COATED ORAL at 21:55

## 2021-02-27 RX ADMIN — PIPERACILLIN SODIUM AND TAZOBACTAM SODIUM SCH MLS/HR: .375; 3 INJECTION, POWDER, LYOPHILIZED, FOR SOLUTION INTRAVENOUS at 03:12

## 2021-02-27 RX ADMIN — FUROSEMIDE SCH MG: 10 INJECTION, SOLUTION INTRAMUSCULAR; INTRAVENOUS at 09:08

## 2021-02-27 RX ADMIN — HEPARIN SODIUM SCH UNITS: 5000 INJECTION INTRAVENOUS; SUBCUTANEOUS at 21:56

## 2021-02-27 RX ADMIN — INSULIN HUMAN PRN UNIT: 100 INJECTION, SOLUTION PARENTERAL at 12:23

## 2021-02-27 RX ADMIN — PANTOPRAZOLE SODIUM SCH MG: 40 TABLET, DELAYED RELEASE ORAL at 21:55

## 2021-02-27 RX ADMIN — CLOPIDOGREL BISULFATE SCH MG: 75 TABLET, FILM COATED ORAL at 09:02

## 2021-02-27 RX ADMIN — PIPERACILLIN SODIUM AND TAZOBACTAM SODIUM SCH MLS/HR: .375; 3 INJECTION, POWDER, LYOPHILIZED, FOR SOLUTION INTRAVENOUS at 21:54

## 2021-02-27 RX ADMIN — Medication SCH MG: at 09:01

## 2021-02-27 RX ADMIN — HUMAN INSULIN PRN UNIT: 100 INJECTION, SOLUTION SUBCUTANEOUS at 22:17

## 2021-02-27 RX ADMIN — PREGABALIN SCH MG: 25 CAPSULE ORAL at 21:54

## 2021-02-27 RX ADMIN — GUAIFENESIN AND DEXTROMETHORPHAN PRN ML: 100; 10 SYRUP ORAL at 15:54

## 2021-02-27 RX ADMIN — ASPIRIN 81 MG SCH MG: 81 TABLET ORAL at 09:01

## 2021-02-27 RX ADMIN — LABETALOL HCL SCH MG: 100 TABLET, FILM COATED ORAL at 09:00

## 2021-02-27 RX ADMIN — PREGABALIN SCH MG: 25 CAPSULE ORAL at 05:39

## 2021-02-27 RX ADMIN — INSULIN HUMAN PRN UNIT: 100 INJECTION, SOLUTION PARENTERAL at 06:16

## 2021-02-27 RX ADMIN — Medication SCH MG: at 01:49

## 2021-02-27 RX ADMIN — PIPERACILLIN SODIUM AND TAZOBACTAM SODIUM SCH MLS/HR: .375; 3 INJECTION, POWDER, LYOPHILIZED, FOR SOLUTION INTRAVENOUS at 09:16

## 2021-02-27 RX ADMIN — Medication SCH EACH: at 22:15

## 2021-02-27 RX ADMIN — HYDROMORPHONE HYDROCHLORIDE PRN MG: 2 TABLET ORAL at 15:57

## 2021-02-27 RX ADMIN — Medication SCH MG: at 19:01

## 2021-02-27 RX ADMIN — HEPARIN SODIUM SCH UNITS: 5000 INJECTION INTRAVENOUS; SUBCUTANEOUS at 09:04

## 2021-02-27 RX ADMIN — Medication SCH EACH: at 17:34

## 2021-02-27 RX ADMIN — LOSARTAN POTASSIUM SCH MG: 50 TABLET, FILM COATED ORAL at 09:00

## 2021-02-27 RX ADMIN — EZETIMIBE SCH MG: 10 TABLET ORAL at 09:02

## 2021-02-27 RX ADMIN — CLOTRIMAZOLE SCH GM: 1 CREAM TOPICAL at 16:40

## 2021-02-27 RX ADMIN — Medication SCH MG: at 12:21

## 2021-02-27 RX ADMIN — HYDROMORPHONE HYDROCHLORIDE PRN MG: 2 TABLET ORAL at 03:12

## 2021-02-27 RX ADMIN — ALBUTEROL SULFATE SCH MG: 2.5 SOLUTION RESPIRATORY (INHALATION) at 12:48

## 2021-02-27 RX ADMIN — LABETALOL HCL SCH MG: 100 TABLET, FILM COATED ORAL at 18:07

## 2021-02-27 RX ADMIN — PAROXETINE HYDROCHLORIDE SCH MG: 10 TABLET, FILM COATED ORAL at 09:01

## 2021-02-27 RX ADMIN — CLOTRIMAZOLE SCH GM: 1 CREAM TOPICAL at 08:41

## 2021-02-27 RX ADMIN — BACLOFEN SCH MG: 10 TABLET ORAL at 05:39

## 2021-02-27 RX ADMIN — Medication SCH EACH: at 12:08

## 2021-02-27 RX ADMIN — INSULIN HUMAN PRN UNIT: 100 INJECTION, SOLUTION PARENTERAL at 17:43

## 2021-02-27 RX ADMIN — DEXTROSE MONOHYDRATE SCH MLS/HR: 50 INJECTION, SOLUTION INTRAVENOUS at 17:55

## 2021-02-27 RX ADMIN — FUROSEMIDE SCH MG: 10 INJECTION, SOLUTION INTRAMUSCULAR; INTRAVENOUS at 16:49

## 2021-02-27 RX ADMIN — Medication SCH MG: at 09:02

## 2021-02-27 RX ADMIN — SILVER SULFADIAZINE SCH GM: 10 CREAM TOPICAL at 08:42

## 2021-02-27 RX ADMIN — PREGABALIN SCH MG: 25 CAPSULE ORAL at 12:21

## 2021-02-27 RX ADMIN — LIDOCAINE SCH EA: 50 PATCH CUTANEOUS at 09:00

## 2021-02-27 NOTE — NUR
MS RN OPENING NOTE



PATIENT A/O X4; ABLE TO MAKE NEEDS KNOWN; SITTING COMFORTABLY ON CHAIR. PATIENT ON OXYGEN 2L 
ON NC; TOLERATING WELL WITH NO SOB NOTED. PATIENT ON FLUID RESTRICTION OF 1.5L, STRICT I&O, 
AND DAILY WEIGHT MEASUREMENTS. IV #22G TO RIGHT HAND; PATENT AND INTACT. ALL NEEDS MET AT 
THIS TIME. SAFETY PRECAUTIONS ARE IN PLACE; BED IN THE LOWEST POSITION, SIDE RAILS ARE UP 
X2; CALL LIGHT WITHIN REACH. PATIENT MEDICALLY STABLE AT THIS TIME.

## 2021-02-27 NOTE — NUR
MS RN CLOSING NOTE



PATIENT SITTING IN CHAIR, AWAKE, A/O X4, RESTING COMFORTABLY. PATIENT ON OXYGEN 2L ON NC; 
TOLERATING WELL WITH NO SOB NOTED. PATIENT ON FLUID RESTRICTION OF 1.5L, STRICT I&O, AND 
DAILY WEIGHT MEASUREMENTS. R WRIST IV ACCESS G # 22 PRESENT AND INTACT. ALL NEEDS ATTENDED 
THROUGHOUT THE DAY. POSSIBLE DISCHARGE TOMORROW. SAFETY PRECAUTIONS IN PLACE; BED IN LOW 
POSITION AND LOCKED, RAILS UP X2, CALL LIGHT WITHIN REACH. WILL ENDORSE TO NIGHT SHIFT 
NURSE.

## 2021-02-27 NOTE — NUR
MS RN CLOSING NOTE



PATIENT A/O X4; ABLE TO MAKE NEEDS KNOWN; RESTING COMFORTABLY IN BED. PATIENT ON OXYGEN 2L 
ON NC; TOLERATING WELL WITH NO SOB NOTED. PATIENT ON FLUID RESTRICTION OF 1.5L, STRICT I&O, 
AND DAILY WEIGHT MEASUREMENTS. NOTIFIED WEIGHT TO CHARGE NURSE. SAFETY PRECAUTIONS ARE IN 
PLACE; BED IN THE LOWEST POSITION, SIDE RAILS ARE UP X3; CALL LIGHT WITHIN REACH. PATIENT 
STABLE AT THIS TIME. WILL ENDORSE PLAN OF CARE TO ONCOMING MORNING RN.

## 2021-02-27 NOTE — NUR
MS RN NOTES



PATIENT COMPLAINING OF HEADACHE AND BACK PAIN; 7 OUT OF 10; REQUESTING PAIN MEDICATION. PRN 
PAIN MEDICATION GIVEN.

## 2021-02-27 NOTE — NUR
MS RN NOTES - COUGH 



PATIENT C/O COUGH AND REQUESTED FOR ROBITUSSIN DM. RECEIVED ORDERS FROM JEREMIAS MAYS NP 
FOR ORDERS FOR ROBITUSSIN 10ML.  PATIENT DENIES SORE THROAT. ADMINISTERED MEDICATION AS 
ORDERED. WILL CONTINUE TO MONITOR FOR COUGH OR RESPIRATORY DISTRESS.

## 2021-02-27 NOTE — NUR
MS RN OPENING NOTE



RECEIVED PATIENT IN BED, ASLEEP, RESTING COMFORTABLY. PATIENT ON OXYGEN 2L ON NC; TOLERATING 
WELL WITH NO SOB NOTED. PATIENT ON FLUID RESTRICTION OF 1.5L, STRICT I&O, AND DAILY WEIGHT 
MEASUREMENTS. R WRIST IV ACCESS G # 22 PRESENT AND INTACT; SL. SAFETY PRECAUTIONS IN PLACE; 
BED IN LOW POSITION AND LOCKED, RAILS UP X2, CALL LIGHT WITHIN REACH. WILL CONTINUE TO 
MONITOR PATIENT.

## 2021-02-28 VITALS — DIASTOLIC BLOOD PRESSURE: 54 MMHG | SYSTOLIC BLOOD PRESSURE: 127 MMHG

## 2021-02-28 LAB
BUN SERPL-MCNC: 22 MG/DL (ref 7–18)
CALCIUM SERPL-MCNC: 8.7 MG/DL (ref 8.5–10.1)
CHLORIDE SERPL-SCNC: 96 MMOL/L (ref 98–107)
CO2 SERPL-SCNC: 34 MMOL/L (ref 21–32)
CREAT SERPL-MCNC: 1 MG/DL (ref 0.6–1.3)
GLUCOSE SERPL-MCNC: 189 MG/DL (ref 74–106)
POTASSIUM SERPL-SCNC: 3.9 MMOL/L (ref 3.5–5.1)
SODIUM SERPL-SCNC: 135 MMOL/L (ref 136–145)

## 2021-02-28 RX ADMIN — DEXTROSE MONOHYDRATE SCH MLS/HR: 50 INJECTION, SOLUTION INTRAVENOUS at 12:16

## 2021-02-28 RX ADMIN — HYDROMORPHONE HYDROCHLORIDE PRN MG: 2 TABLET ORAL at 10:52

## 2021-02-28 RX ADMIN — CLOPIDOGREL BISULFATE SCH MG: 75 TABLET, FILM COATED ORAL at 08:54

## 2021-02-28 RX ADMIN — ACETAMINOPHEN PRN MG: 325 TABLET ORAL at 14:54

## 2021-02-28 RX ADMIN — Medication SCH EACH: at 06:35

## 2021-02-28 RX ADMIN — Medication SCH MG: at 12:27

## 2021-02-28 RX ADMIN — SILVER SULFADIAZINE SCH GM: 10 CREAM TOPICAL at 09:02

## 2021-02-28 RX ADMIN — ALBUTEROL SULFATE SCH MG: 2.5 SOLUTION RESPIRATORY (INHALATION) at 12:27

## 2021-02-28 RX ADMIN — LOSARTAN POTASSIUM SCH MG: 50 TABLET, FILM COATED ORAL at 08:55

## 2021-02-28 RX ADMIN — GUAIFENESIN AND DEXTROMETHORPHAN PRN ML: 100; 10 SYRUP ORAL at 02:41

## 2021-02-28 RX ADMIN — CLOTRIMAZOLE SCH GM: 1 CREAM TOPICAL at 09:02

## 2021-02-28 RX ADMIN — Medication SCH EACH: at 16:59

## 2021-02-28 RX ADMIN — LABETALOL HCL SCH MG: 100 TABLET, FILM COATED ORAL at 16:35

## 2021-02-28 RX ADMIN — PIPERACILLIN SODIUM AND TAZOBACTAM SODIUM SCH MLS/HR: .375; 3 INJECTION, POWDER, LYOPHILIZED, FOR SOLUTION INTRAVENOUS at 14:52

## 2021-02-28 RX ADMIN — LABETALOL HCL SCH MG: 100 TABLET, FILM COATED ORAL at 08:56

## 2021-02-28 RX ADMIN — Medication SCH MG: at 12:52

## 2021-02-28 RX ADMIN — CLOTRIMAZOLE SCH GM: 1 CREAM TOPICAL at 16:36

## 2021-02-28 RX ADMIN — BACLOFEN SCH MG: 10 TABLET ORAL at 12:52

## 2021-02-28 RX ADMIN — Medication SCH EACH: at 12:01

## 2021-02-28 RX ADMIN — PREGABALIN SCH MG: 25 CAPSULE ORAL at 04:05

## 2021-02-28 RX ADMIN — Medication SCH MG: at 16:34

## 2021-02-28 RX ADMIN — HEPARIN SODIUM SCH UNITS: 5000 INJECTION INTRAVENOUS; SUBCUTANEOUS at 09:00

## 2021-02-28 RX ADMIN — INSULIN HUMAN PRN UNIT: 100 INJECTION, SOLUTION PARENTERAL at 12:08

## 2021-02-28 RX ADMIN — PIPERACILLIN SODIUM AND TAZOBACTAM SODIUM SCH MLS/HR: .375; 3 INJECTION, POWDER, LYOPHILIZED, FOR SOLUTION INTRAVENOUS at 09:23

## 2021-02-28 RX ADMIN — ASPIRIN 81 MG SCH MG: 81 TABLET ORAL at 08:54

## 2021-02-28 RX ADMIN — ALBUTEROL SULFATE SCH MG: 2.5 SOLUTION RESPIRATORY (INHALATION) at 01:04

## 2021-02-28 RX ADMIN — BACLOFEN SCH MG: 10 TABLET ORAL at 04:05

## 2021-02-28 RX ADMIN — Medication SCH MG: at 01:04

## 2021-02-28 RX ADMIN — EZETIMIBE SCH MG: 10 TABLET ORAL at 08:55

## 2021-02-28 RX ADMIN — FUROSEMIDE SCH MG: 10 INJECTION, SOLUTION INTRAMUSCULAR; INTRAVENOUS at 08:55

## 2021-02-28 RX ADMIN — INSULIN HUMAN PRN UNIT: 100 INJECTION, SOLUTION PARENTERAL at 06:37

## 2021-02-28 RX ADMIN — Medication SCH MG: at 08:55

## 2021-02-28 RX ADMIN — Medication SCH MG: at 07:44

## 2021-02-28 RX ADMIN — GUAIFENESIN AND DEXTROMETHORPHAN PRN ML: 100; 10 SYRUP ORAL at 08:12

## 2021-02-28 RX ADMIN — INSULIN HUMAN PRN UNIT: 100 INJECTION, SOLUTION PARENTERAL at 17:02

## 2021-02-28 RX ADMIN — FUROSEMIDE SCH MG: 10 INJECTION, SOLUTION INTRAMUSCULAR; INTRAVENOUS at 16:35

## 2021-02-28 RX ADMIN — PIPERACILLIN SODIUM AND TAZOBACTAM SODIUM SCH MLS/HR: .375; 3 INJECTION, POWDER, LYOPHILIZED, FOR SOLUTION INTRAVENOUS at 02:36

## 2021-02-28 RX ADMIN — Medication SCH MG: at 16:35

## 2021-02-28 RX ADMIN — LIDOCAINE SCH EA: 50 PATCH CUTANEOUS at 08:53

## 2021-02-28 RX ADMIN — Medication SCH MG: at 08:54

## 2021-02-28 RX ADMIN — GUAIFENESIN AND DEXTROMETHORPHAN PRN ML: 100; 10 SYRUP ORAL at 14:10

## 2021-02-28 RX ADMIN — PAROXETINE HYDROCHLORIDE SCH MG: 10 TABLET, FILM COATED ORAL at 08:58

## 2021-02-28 RX ADMIN — HYDROMORPHONE HYDROCHLORIDE PRN MG: 2 TABLET ORAL at 04:05

## 2021-02-28 RX ADMIN — HYDROMORPHONE HYDROCHLORIDE PRN MG: 2 TABLET ORAL at 16:49

## 2021-02-28 RX ADMIN — ALBUTEROL SULFATE SCH MG: 2.5 SOLUTION RESPIRATORY (INHALATION) at 07:44

## 2021-02-28 RX ADMIN — PREGABALIN SCH MG: 25 CAPSULE ORAL at 12:52

## 2021-02-28 NOTE — NUR
MS RN NOTES



PATIENT RESTING IN BED, AWAKE AND VERBALLY RESPONSIVE. A/O X4, ABLE TO MAKE NEEDS KNOWN. 
BREATHING EVEN AND UNLABORED, CURRENTLY ON OXYGEN AT 2LPM VIA NC, NO SOB NOTED. ON FLUID 
RESTRICTION OF 1.5L, STRICT I&O, AND DAILY WEIGHTS. IV LINE ON RIGHT WRIST #22 PATENT AND 
INTACT. SAFETY PRECAUTIONS IN PLACE. WILL CONTINUE TO MONITOR.

## 2021-02-28 NOTE — NUR
RN DISCHARGE NOTES



PATIENT SEEN BY DR. HERNANDEZ TODAY W/ ORDER FOR DISCHARGE TO HOME W/ HOME HEALTH F/U AS PER 
. DISCHARGE MEDICATIONS RECONCILED BY DR. HERNANDEZ AND ELECTRONICALLY SENT. PHOTOS 
OF SKIN ISSUES TAKEN AND PLACED IN CHART. IV LINE REMOVED. PATIENT OK W/ LEAVING NAME 
ARMBAND UPON DISCHARGE AND WILL REMOVE IT AT HOME. PATIENT WAS ACCOMPANIED BY ME AND CANDACE REINOSO, VIA WHEELCHAIR TO THE LOBBY AND WAS PICKED UP BY [facility]LVARO N[facility]. CHARGE NURSE AND MD AWARE 
OF DISCHARGE.

## 2021-02-28 NOTE — NUR
RN NOTES



DISCHARGE INSTRUCTIONS AND EDUCATION PROVIDED TO PATIENT; DISCHARGE FORM AND BELONGINGS LIST 
FORM SIGNED BY PATIENT. DISCHARGE MEDS ALREADY SENT TO PHARMACY AND PATIENT VERBALIZED 
UNDERSTANDING AND AGREEMENT.

## 2021-02-28 NOTE — NUR
MS RN CLOSING NOTE



PATIENT LYING IN BED, AWAKE, A/O X4, RESTING COMFORTABLY. PATIENT ON OXYGEN 2L ON NC; 
TOLERATING WELL WITH NO SOB NOTED. PATIENT ON FLUID RESTRICTION OF 1.5L, STRICT I&O, AND 
DAILY WEIGHT MEASUREMENTS. R WRIST IV ACCESS G # 22; PATENT AND INTACT. ALL NEEDS MET. 
SAFETY PRECAUTIONS IN PLACE; BED IN LOW POSITION AND LOCKED, RAILS UP X2, CALL LIGHT WITHIN 
REACH. WILL ENDORSE TO MORNING RN NURSE.

## 2021-04-15 ENCOUNTER — HOSPITAL ENCOUNTER (OUTPATIENT)
Dept: HOSPITAL 54 - MSC | Age: 61
Discharge: HOME | End: 2021-04-15
Attending: INTERNAL MEDICINE
Payer: COMMERCIAL

## 2021-04-15 ENCOUNTER — HOSPITAL ENCOUNTER (INPATIENT)
Dept: HOSPITAL 12 - ER | Age: 61
LOS: 6 days | Discharge: NURSING, REHAB OR LONG-TERM CARE | DRG: 602 | End: 2021-04-21
Payer: COMMERCIAL

## 2021-04-15 VITALS — BODY MASS INDEX: 47.48 KG/M2 | WEIGHT: 258 LBS | HEIGHT: 62 IN

## 2021-04-15 DIAGNOSIS — I25.10: ICD-10-CM

## 2021-04-15 DIAGNOSIS — D64.9: ICD-10-CM

## 2021-04-15 DIAGNOSIS — I50.43: ICD-10-CM
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
## 2021-04-15 DIAGNOSIS — Z88.1: ICD-10-CM

## 2021-04-15 DIAGNOSIS — Z51.89: Primary | ICD-10-CM

## 2021-04-15 DIAGNOSIS — G89.4: ICD-10-CM

## 2021-04-15 DIAGNOSIS — J45.909: ICD-10-CM

## 2021-04-15 DIAGNOSIS — E11.22: ICD-10-CM

## 2021-04-15 DIAGNOSIS — Z79.899: ICD-10-CM

## 2021-04-15 DIAGNOSIS — Z79.02: ICD-10-CM

## 2021-04-15 DIAGNOSIS — I13.0: ICD-10-CM

## 2021-04-15 DIAGNOSIS — I69.354: ICD-10-CM

## 2021-04-15 DIAGNOSIS — E78.5: ICD-10-CM

## 2021-04-15 DIAGNOSIS — L03.115: ICD-10-CM

## 2021-04-15 DIAGNOSIS — I65.22: ICD-10-CM

## 2021-04-15 DIAGNOSIS — R53.1: ICD-10-CM
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
## 2021-04-15 DIAGNOSIS — S00.11XA: ICD-10-CM

## 2021-04-15 DIAGNOSIS — Z20.822: ICD-10-CM

## 2021-04-15 DIAGNOSIS — E11.9: ICD-10-CM

## 2021-04-15 DIAGNOSIS — Z79.84: ICD-10-CM

## 2021-04-15 DIAGNOSIS — E87.1: ICD-10-CM

## 2021-04-15 DIAGNOSIS — I50.9: ICD-10-CM

## 2021-04-15 DIAGNOSIS — L03.116: Primary | ICD-10-CM

## 2021-04-15 DIAGNOSIS — Z79.82: ICD-10-CM

## 2021-04-15 DIAGNOSIS — I65.29: ICD-10-CM

## 2021-04-15 DIAGNOSIS — G43.909: ICD-10-CM

## 2021-04-15 DIAGNOSIS — G89.29: ICD-10-CM

## 2021-04-15 DIAGNOSIS — N17.0: ICD-10-CM

## 2021-04-15 DIAGNOSIS — G47.33: ICD-10-CM

## 2021-04-15 DIAGNOSIS — N39.0: ICD-10-CM

## 2021-04-15 DIAGNOSIS — R60.0: ICD-10-CM

## 2021-04-15 DIAGNOSIS — F39: ICD-10-CM

## 2021-04-15 DIAGNOSIS — G62.9: ICD-10-CM

## 2021-04-15 DIAGNOSIS — N18.9: ICD-10-CM

## 2021-04-15 DIAGNOSIS — Z86.73: ICD-10-CM

## 2021-04-15 DIAGNOSIS — I11.0: ICD-10-CM

## 2021-04-15 DIAGNOSIS — E66.01: ICD-10-CM

## 2021-04-15 LAB
ALP SERPL-CCNC: 121 U/L (ref 50–136)
ALT SERPL W/O P-5'-P-CCNC: 29 U/L (ref 14–59)
AST SERPL-CCNC: 18 U/L (ref 15–37)
BASOPHILS # BLD AUTO: 0.1 K/UL (ref 0–8)
BASOPHILS NFR BLD AUTO: 0.7 % (ref 0–2)
BILIRUB DIRECT SERPL-MCNC: 0.1 MG/DL (ref 0–0.2)
BILIRUB SERPL-MCNC: 0.5 MG/DL (ref 0.2–1)
BUN SERPL-MCNC: 30 MG/DL (ref 7–18)
CHLORIDE SERPL-SCNC: 99 MMOL/L (ref 98–107)
CO2 SERPL-SCNC: 31 MMOL/L (ref 21–32)
CREAT SERPL-MCNC: 1.2 MG/DL (ref 0.6–1.3)
EOSINOPHIL # BLD AUTO: 0.3 K/UL (ref 0–0.7)
EOSINOPHIL NFR BLD AUTO: 2.6 % (ref 0–7)
GLUCOSE SERPL-MCNC: 272 MG/DL (ref 74–106)
HCT VFR BLD AUTO: 29.4 % (ref 31.2–41.9)
HGB BLD-MCNC: 9.4 G/DL (ref 10.9–14.3)
LYMPHOCYTES # BLD AUTO: 1.4 K/UL (ref 20–40)
LYMPHOCYTES NFR BLD AUTO: 12.3 % (ref 20.5–51.5)
MCH RBC QN AUTO: 26.8 UUG (ref 24.7–32.8)
MCHC RBC AUTO-ENTMCNC: 32 G/DL (ref 32.3–35.6)
MCV RBC AUTO: 84.1 FL (ref 75.5–95.3)
MONOCYTES # BLD AUTO: 0.8 K/UL (ref 2–10)
MONOCYTES NFR BLD AUTO: 6.7 % (ref 0–11)
NEUTROPHILS # BLD AUTO: 9.1 K/UL (ref 1.8–8.9)
NEUTROPHILS NFR BLD AUTO: 77.7 % (ref 38.5–71.5)
PLATELET # BLD AUTO: 332 K/UL (ref 179–408)
POTASSIUM SERPL-SCNC: 4.8 MMOL/L (ref 3.5–5.1)
RBC # BLD AUTO: 3.49 MIL/UL (ref 3.63–4.92)
WBC # BLD AUTO: 11.7 K/UL (ref 3.8–11.8)
WS STN SPEC: 6.8 G/DL (ref 6.4–8.2)

## 2021-04-15 PROCEDURE — G0378 HOSPITAL OBSERVATION PER HR: HCPCS

## 2021-04-15 PROCEDURE — A9150 MISC/EXPER NON-PRESCRIPT DRU: HCPCS

## 2021-04-15 PROCEDURE — A4663 DIALYSIS BLOOD PRESSURE CUFF: HCPCS

## 2021-04-16 VITALS — DIASTOLIC BLOOD PRESSURE: 41 MMHG | SYSTOLIC BLOOD PRESSURE: 102 MMHG

## 2021-04-16 VITALS — SYSTOLIC BLOOD PRESSURE: 117 MMHG | DIASTOLIC BLOOD PRESSURE: 43 MMHG

## 2021-04-16 VITALS — SYSTOLIC BLOOD PRESSURE: 124 MMHG | DIASTOLIC BLOOD PRESSURE: 39 MMHG

## 2021-04-16 VITALS — DIASTOLIC BLOOD PRESSURE: 67 MMHG | SYSTOLIC BLOOD PRESSURE: 144 MMHG

## 2021-04-16 VITALS — SYSTOLIC BLOOD PRESSURE: 139 MMHG | DIASTOLIC BLOOD PRESSURE: 40 MMHG

## 2021-04-16 RX ADMIN — CLOTRIMAZOLE SCH APPLIC: 1 CREAM TOPICAL at 17:11

## 2021-04-16 RX ADMIN — ATORVASTATIN CALCIUM SCH MG: 10 TABLET, FILM COATED ORAL at 20:51

## 2021-04-16 RX ADMIN — SODIUM CHLORIDE SCH MLS/HR: 9 INJECTION, SOLUTION INTRAVENOUS at 20:46

## 2021-04-16 RX ADMIN — BACLOFEN SCH MG: 20 TABLET ORAL at 14:12

## 2021-04-16 RX ADMIN — SENNOSIDES SCH TAB: 8.6 TABLET, COATED ORAL at 09:27

## 2021-04-16 RX ADMIN — Medication SCH MG: at 09:27

## 2021-04-16 RX ADMIN — HYDROMORPHONE HYDROCHLORIDE PRN MG: 2 TABLET ORAL at 08:55

## 2021-04-16 RX ADMIN — Medication SCH EACH: at 16:58

## 2021-04-16 RX ADMIN — Medication SCH EACH: at 20:50

## 2021-04-16 RX ADMIN — Medication SCH EACH: at 11:41

## 2021-04-16 RX ADMIN — Medication SCH EA: at 20:59

## 2021-04-16 RX ADMIN — ACETAMINOPHEN PRN MG: 325 TABLET ORAL at 17:32

## 2021-04-16 RX ADMIN — CLOTRIMAZOLE SCH APPLIC: 1 CREAM TOPICAL at 14:24

## 2021-04-16 RX ADMIN — HYDROMORPHONE HYDROCHLORIDE PRN MG: 1 INJECTION, SOLUTION INTRAMUSCULAR; INTRAVENOUS; SUBCUTANEOUS at 04:38

## 2021-04-16 RX ADMIN — EZETIMIBE SCH MG: 10 TABLET ORAL at 20:51

## 2021-04-16 RX ADMIN — INSULIN GLARGINE SCH UNITS: 100 INJECTION, SOLUTION SUBCUTANEOUS at 21:06

## 2021-04-16 RX ADMIN — FUROSEMIDE SCH MG: 40 TABLET ORAL at 09:27

## 2021-04-16 RX ADMIN — DOCUSATE SODIUM SCH MG: 100 CAPSULE, LIQUID FILLED ORAL at 09:27

## 2021-04-16 RX ADMIN — PANTOPRAZOLE SODIUM SCH MG: 40 TABLET, DELAYED RELEASE ORAL at 21:11

## 2021-04-16 RX ADMIN — LINAGLIPTIN SCH MG: 5 TABLET, FILM COATED ORAL at 09:26

## 2021-04-16 RX ADMIN — ALBUTEROL SULFATE SCH MG: 2.5 SOLUTION RESPIRATORY (INHALATION) at 19:48

## 2021-04-16 RX ADMIN — LABETALOL HYDROCHLORIDE SCH MG: 200 TABLET, FILM COATED ORAL at 14:13

## 2021-04-16 RX ADMIN — PREGABALIN SCH MG: 50 CAPSULE ORAL at 14:12

## 2021-04-16 RX ADMIN — LOSARTAN POTASSIUM SCH MG: 50 TABLET, FILM COATED ORAL at 09:33

## 2021-04-16 RX ADMIN — HYDROMORPHONE HYDROCHLORIDE PRN MG: 1 INJECTION, SOLUTION INTRAMUSCULAR; INTRAVENOUS; SUBCUTANEOUS at 21:11

## 2021-04-16 RX ADMIN — BACLOFEN SCH MG: 20 TABLET ORAL at 23:01

## 2021-04-16 RX ADMIN — SODIUM CHLORIDE PRN UNIT: 9 INJECTION, SOLUTION INTRAVENOUS at 13:31

## 2021-04-16 RX ADMIN — Medication SCH EA: at 09:26

## 2021-04-16 RX ADMIN — TOLTERODINE TARTRATE SCH MG: 2 CAPSULE, EXTENDED RELEASE ORAL at 09:27

## 2021-04-16 RX ADMIN — IPRATROPIUM BROMIDE SCH MG: 0.5 SOLUTION RESPIRATORY (INHALATION) at 13:51

## 2021-04-16 RX ADMIN — SODIUM CHLORIDE PRN UNIT: 9 INJECTION, SOLUTION INTRAVENOUS at 17:00

## 2021-04-16 RX ADMIN — FUROSEMIDE SCH MG: 40 TABLET ORAL at 17:11

## 2021-04-16 RX ADMIN — LABETALOL HYDROCHLORIDE SCH MG: 200 TABLET, FILM COATED ORAL at 23:01

## 2021-04-16 RX ADMIN — HYDROMORPHONE HYDROCHLORIDE PRN MG: 2 TABLET ORAL at 14:13

## 2021-04-16 RX ADMIN — CLOPIDOGREL BISULFATE SCH MG: 75 TABLET ORAL at 09:27

## 2021-04-16 RX ADMIN — DOCUSATE SODIUM SCH MG: 100 CAPSULE, LIQUID FILLED ORAL at 17:11

## 2021-04-16 RX ADMIN — IPRATROPIUM BROMIDE SCH MG: 0.5 SOLUTION RESPIRATORY (INHALATION) at 19:48

## 2021-04-16 RX ADMIN — ACETAMINOPHEN PRN MG: 325 TABLET ORAL at 11:29

## 2021-04-16 RX ADMIN — ALBUTEROL SULFATE SCH MG: 2.5 SOLUTION RESPIRATORY (INHALATION) at 13:51

## 2021-04-16 RX ADMIN — ASPIRIN SCH MG: 81 TABLET, CHEWABLE ORAL at 09:33

## 2021-04-16 RX ADMIN — PREGABALIN SCH MG: 50 CAPSULE ORAL at 23:01

## 2021-04-16 RX ADMIN — ALBUTEROL SULFATE PRN MG: 2.5 SOLUTION RESPIRATORY (INHALATION) at 19:48

## 2021-04-16 RX ADMIN — TRIAMCINOLONE ACETONIDE SCH APPLIC: 0.25 OINTMENT TOPICAL at 14:24

## 2021-04-16 RX ADMIN — SODIUM CHLORIDE PRN UNIT: 9 INJECTION, SOLUTION INTRAVENOUS at 21:07

## 2021-04-16 NOTE — NUR
received change of shift report, pt a/ox4 with c/o of cellulitis of left lower leg. pt on 2L 
NC saturating at 96%, no signs of distress, pain 10/10 medication given as ordered. pt on 
bedrest, unable to fully move upper extremities, left sided weakness from history of stroke, 
right shoulder pain from fall, pt has bruise on right eye. pt voids via diaper. IV access on 
the L FA 22g saline lock. bed in low and locked position, call light within reach, safety 
and fall precautions in place, will continue with plan of care.

## 2021-04-16 NOTE — NUR
pt in bed resting, awake alert and oriented x3, sometimes confused, pt on room air no signs 
of distress noted, no reports of pain at this time. pt able to ambulate with physical 
therapy, pt incontinent of bowel and bladder. IV access on the left FA 22g  saline lock, bed 
in low and locked position, call light within reach, fall and safety precautions in place, 
will continue with plan of care. 

-------------------------------------------------------------------------------

Addendum: 04/16/21 at 1214 by LUCIANA SAHU RN

-------------------------------------------------------------------------------

WRONG PATIENT

## 2021-04-16 NOTE — NUR
PATIENT ALERT ORIENTED, NO SOB NO CHEST PAIN. PATIENT HAD SOME DISCOMFORT ON R SHOULDER DUE 
S/P FALL FROM HOME, LEG WITH WHIPPING CELLULITIS. NOTIFY CE ABBOTT NP REGARDING LIDOCAINE 5% 
PATCH, ORDER NEEDS TO BE CHANGED TO Q 24, AND ON FOR 12HRS AND OFF FOR 12 HRS. SCAR OKEYED 
THE ORDER. CONT ON PAIN MANAGEMENT.

## 2021-04-16 NOTE — NUR
Patient taken to Room 322 under the care of Amanda BURGOS. Belongings with 
patient. VSS. Stable condition.

## 2021-04-16 NOTE — NUR
pt in bed resting, a/o x4, edema 2+ pitting edema on the left lower leg, on 2L NC saturating 
at 98%, no signs of distress noted, no reports of pain at this time. pt has been able to 
move right should more than this morning, pt had soft formed BM, IV access on left FA 22g 
saline lock, all medications given as ordered. bed in low and locked position, call light 
within reach, safety and fall precautions in place, will endorse to oncoming nurse.

## 2021-04-16 NOTE — NUR
59 y/o female presents to ED via EMS from home for a non-syncopal fall. Patient 
is complaining of 10/10 non-radiating Right Shoulder pain. Patient is familiar 
to me from a prior admission. 



Patient is A&Ox4. Cooperative. No SI/HI/AH/VH. Cranial nerves I-XII intact. 



Patients 12-Lead EKG is NSR. No chest pain, diaphoresis, chills, or 
palpitations. 



Saturations were <94% on Room Air so patient was put on 2L via Nasal Canula now 
saturating >94% - Patient has oxygen at home that she uses when she is SOB. Hx 
of CHF. 



All four of the patients extremities are edematous - particulary her lower. 
Left lower leg shows signs of possible Cellulitis. Patient is incontinent at 
nights - wears a diaper. No reports of constipation or diarrhea.

## 2021-04-16 NOTE — NUR
Pt admitted at 0330H. Assessment complete. All belongings accounted for. Pictures taken and 
placed in chart. Dr. Darling notified and home meds faxed to pharmacy. Home meds at bedside 
brought to pharmacy. No discomfort or SOB noted at this time. Dilaudid for pain prn. No 
other issues or concerns at this time.

## 2021-04-17 VITALS — DIASTOLIC BLOOD PRESSURE: 60 MMHG | SYSTOLIC BLOOD PRESSURE: 123 MMHG

## 2021-04-17 VITALS — DIASTOLIC BLOOD PRESSURE: 57 MMHG | SYSTOLIC BLOOD PRESSURE: 154 MMHG

## 2021-04-17 VITALS — SYSTOLIC BLOOD PRESSURE: 115 MMHG | DIASTOLIC BLOOD PRESSURE: 62 MMHG

## 2021-04-17 VITALS — DIASTOLIC BLOOD PRESSURE: 50 MMHG | SYSTOLIC BLOOD PRESSURE: 150 MMHG

## 2021-04-17 LAB
ALP SERPL-CCNC: 101 U/L (ref 50–136)
ALT SERPL W/O P-5'-P-CCNC: 25 U/L (ref 14–59)
APPEARANCE UR: (no result)
AST SERPL-CCNC: 12 U/L (ref 15–37)
BASOPHILS # BLD AUTO: 0.1 K/UL (ref 0–8)
BASOPHILS NFR BLD AUTO: 0.8 % (ref 0–2)
BILIRUB SERPL-MCNC: 0.7 MG/DL (ref 0.2–1)
BILIRUB UR QL STRIP: NEGATIVE
BUN SERPL-MCNC: 21 MG/DL (ref 7–18)
CHLORIDE SERPL-SCNC: 103 MMOL/L (ref 98–107)
CO2 SERPL-SCNC: 32 MMOL/L (ref 21–32)
COLOR UR: YELLOW
CREAT SERPL-MCNC: 0.9 MG/DL (ref 0.6–1.3)
CREAT UR-MCNC: 41.1 MG/DL (ref 30–125)
DEPRECATED SQUAMOUS URNS QL MICRO: (no result) /HPF
EOSINOPHIL # BLD AUTO: 0.2 K/UL (ref 0–0.7)
EOSINOPHIL NFR BLD AUTO: 2.1 % (ref 0–7)
GLUCOSE SERPL-MCNC: 184 MG/DL (ref 74–106)
GLUCOSE UR STRIP-MCNC: NEGATIVE MG/DL
HCT VFR BLD AUTO: 27.5 % (ref 31.2–41.9)
HGB BLD-MCNC: 9 G/DL (ref 10.9–14.3)
HGB UR QL STRIP: NEGATIVE
KETONES UR STRIP-MCNC: NEGATIVE MG/DL
LEUKOCYTE ESTERASE UR QL STRIP: (no result)
LYMPHOCYTES # BLD AUTO: 1.7 K/UL (ref 20–40)
LYMPHOCYTES NFR BLD AUTO: 19.1 % (ref 20.5–51.5)
MAGNESIUM SERPL-MCNC: 2.5 MG/DL (ref 1.8–2.4)
MCH RBC QN AUTO: 27.4 UUG (ref 24.7–32.8)
MCHC RBC AUTO-ENTMCNC: 33 G/DL (ref 32.3–35.6)
MCV RBC AUTO: 84 FL (ref 75.5–95.3)
MONOCYTES # BLD AUTO: 0.9 K/UL (ref 2–10)
MONOCYTES NFR BLD AUTO: 10.3 % (ref 0–11)
NEUTROPHILS # BLD AUTO: 6.1 K/UL (ref 1.8–8.9)
NEUTROPHILS NFR BLD AUTO: 67.7 % (ref 38.5–71.5)
NITRITE UR QL STRIP: NEGATIVE
PH UR STRIP: 7 [PH] (ref 5–8)
PHOSPHATE SERPL-MCNC: 4 MG/DL (ref 2.5–4.9)
PLATELET # BLD AUTO: 308 K/UL (ref 179–408)
POTASSIUM SERPL-SCNC: 3.7 MMOL/L (ref 3.5–5.1)
PROT UR-MCNC: < 6 MG/DL
RBC # BLD AUTO: 3.28 MIL/UL (ref 3.63–4.92)
RBC #/AREA URNS HPF: (no result) /HPF (ref 0–3)
SP GR UR STRIP: 1.01 (ref 1–1.03)
UROBILINOGEN UR STRIP-MCNC: 0.2 E.U./DL
WBC # BLD AUTO: 9 K/UL (ref 3.8–11.8)
WBC #/AREA URNS HPF: (no result) /HPF
WBC #/AREA URNS HPF: (no result) /HPF (ref 0–3)
WS STN SPEC: 6.2 G/DL (ref 6.4–8.2)

## 2021-04-17 PROCEDURE — B546ZZA ULTRASONOGRAPHY OF RIGHT SUBCLAVIAN VEIN, GUIDANCE: ICD-10-PCS

## 2021-04-17 PROCEDURE — 05H533Z INSERTION OF INFUSION DEVICE INTO RIGHT SUBCLAVIAN VEIN, PERCUTANEOUS APPROACH: ICD-10-PCS

## 2021-04-17 RX ADMIN — FUROSEMIDE SCH MG: 40 TABLET ORAL at 08:02

## 2021-04-17 RX ADMIN — Medication SCH EA: at 08:29

## 2021-04-17 RX ADMIN — ACETAMINOPHEN PRN MG: 325 TABLET ORAL at 21:50

## 2021-04-17 RX ADMIN — HYDROMORPHONE HYDROCHLORIDE PRN MG: 2 TABLET ORAL at 12:31

## 2021-04-17 RX ADMIN — ACETAMINOPHEN PRN MG: 325 TABLET ORAL at 09:51

## 2021-04-17 RX ADMIN — Medication SCH EA: at 20:49

## 2021-04-17 RX ADMIN — TOLTERODINE TARTRATE SCH MG: 2 CAPSULE, EXTENDED RELEASE ORAL at 08:01

## 2021-04-17 RX ADMIN — LIDOCAINE SCH PATCH: 50 PATCH CUTANEOUS at 08:06

## 2021-04-17 RX ADMIN — ALBUTEROL SULFATE SCH MG: 2.5 SOLUTION RESPIRATORY (INHALATION) at 13:38

## 2021-04-17 RX ADMIN — BACLOFEN SCH MG: 20 TABLET ORAL at 14:19

## 2021-04-17 RX ADMIN — PREGABALIN SCH MG: 50 CAPSULE ORAL at 21:51

## 2021-04-17 RX ADMIN — DEXTROSE SCH MLS/HR: 50 INJECTION, SOLUTION INTRAVENOUS at 20:32

## 2021-04-17 RX ADMIN — DOCUSATE SODIUM SCH MG: 100 CAPSULE, LIQUID FILLED ORAL at 16:45

## 2021-04-17 RX ADMIN — BACLOFEN SCH MG: 20 TABLET ORAL at 06:27

## 2021-04-17 RX ADMIN — SODIUM CHLORIDE SCH MLS/HR: 9 INJECTION, SOLUTION INTRAVENOUS at 23:47

## 2021-04-17 RX ADMIN — CLOTRIMAZOLE SCH APPLIC: 1 CREAM TOPICAL at 08:29

## 2021-04-17 RX ADMIN — SODIUM CHLORIDE PRN UNIT: 9 INJECTION, SOLUTION INTRAVENOUS at 07:21

## 2021-04-17 RX ADMIN — ALBUTEROL SULFATE PRN MG: 2.5 SOLUTION RESPIRATORY (INHALATION) at 00:57

## 2021-04-17 RX ADMIN — SODIUM CHLORIDE PRN UNIT: 9 INJECTION, SOLUTION INTRAVENOUS at 11:17

## 2021-04-17 RX ADMIN — PANTOPRAZOLE SODIUM SCH MG: 40 TABLET, DELAYED RELEASE ORAL at 20:33

## 2021-04-17 RX ADMIN — LABETALOL HYDROCHLORIDE SCH MG: 200 TABLET, FILM COATED ORAL at 14:19

## 2021-04-17 RX ADMIN — SENNOSIDES SCH TAB: 8.6 TABLET, COATED ORAL at 08:01

## 2021-04-17 RX ADMIN — SODIUM CHLORIDE SCH MLS/HR: 9 INJECTION, SOLUTION INTRAVENOUS at 15:37

## 2021-04-17 RX ADMIN — IPRATROPIUM BROMIDE SCH MG: 0.5 SOLUTION RESPIRATORY (INHALATION) at 08:34

## 2021-04-17 RX ADMIN — ALBUTEROL SULFATE PRN MG: 2.5 SOLUTION RESPIRATORY (INHALATION) at 00:58

## 2021-04-17 RX ADMIN — CLOPIDOGREL BISULFATE SCH MG: 75 TABLET ORAL at 08:01

## 2021-04-17 RX ADMIN — BACLOFEN SCH MG: 20 TABLET ORAL at 21:51

## 2021-04-17 RX ADMIN — SODIUM CHLORIDE PRN UNIT: 9 INJECTION, SOLUTION INTRAVENOUS at 16:49

## 2021-04-17 RX ADMIN — TRIAMCINOLONE ACETONIDE SCH APPLIC: 0.25 OINTMENT TOPICAL at 08:29

## 2021-04-17 RX ADMIN — ACETAMINOPHEN PRN MG: 325 TABLET ORAL at 01:26

## 2021-04-17 RX ADMIN — PREGABALIN SCH MG: 50 CAPSULE ORAL at 06:28

## 2021-04-17 RX ADMIN — HYDROMORPHONE HYDROCHLORIDE PRN MG: 2 TABLET ORAL at 06:50

## 2021-04-17 RX ADMIN — ALBUTEROL SULFATE SCH MG: 2.5 SOLUTION RESPIRATORY (INHALATION) at 08:34

## 2021-04-17 RX ADMIN — Medication SCH EACH: at 10:54

## 2021-04-17 RX ADMIN — ALBUTEROL SULFATE SCH MG: 2.5 SOLUTION RESPIRATORY (INHALATION) at 19:52

## 2021-04-17 RX ADMIN — EZETIMIBE SCH MG: 10 TABLET ORAL at 20:33

## 2021-04-17 RX ADMIN — INSULIN GLARGINE SCH UNITS: 100 INJECTION, SOLUTION SUBCUTANEOUS at 21:03

## 2021-04-17 RX ADMIN — SODIUM CHLORIDE PRN UNIT: 9 INJECTION, SOLUTION INTRAVENOUS at 21:05

## 2021-04-17 RX ADMIN — LABETALOL HYDROCHLORIDE SCH MG: 200 TABLET, FILM COATED ORAL at 06:28

## 2021-04-17 RX ADMIN — Medication SCH EACH: at 20:54

## 2021-04-17 RX ADMIN — IPRATROPIUM BROMIDE SCH MG: 0.5 SOLUTION RESPIRATORY (INHALATION) at 00:57

## 2021-04-17 RX ADMIN — CLOTRIMAZOLE SCH APPLIC: 1 CREAM TOPICAL at 16:45

## 2021-04-17 RX ADMIN — ASPIRIN SCH MG: 81 TABLET, CHEWABLE ORAL at 08:01

## 2021-04-17 RX ADMIN — LINAGLIPTIN SCH MG: 5 TABLET, FILM COATED ORAL at 08:06

## 2021-04-17 RX ADMIN — POLYETHYLENE GLYCOL 3350 PRN GM: 17 POWDER, FOR SOLUTION ORAL at 16:45

## 2021-04-17 RX ADMIN — ATORVASTATIN CALCIUM SCH MG: 10 TABLET, FILM COATED ORAL at 20:33

## 2021-04-17 RX ADMIN — Medication SCH EACH: at 16:02

## 2021-04-17 RX ADMIN — Medication SCH MG: at 08:01

## 2021-04-17 RX ADMIN — SODIUM CHLORIDE SCH MLS/HR: 9 INJECTION, SOLUTION INTRAVENOUS at 08:06

## 2021-04-17 RX ADMIN — ALBUTEROL SULFATE SCH MG: 2.5 SOLUTION RESPIRATORY (INHALATION) at 00:58

## 2021-04-17 RX ADMIN — LOSARTAN POTASSIUM SCH MG: 50 TABLET, FILM COATED ORAL at 08:02

## 2021-04-17 RX ADMIN — DOCUSATE SODIUM SCH MG: 100 CAPSULE, LIQUID FILLED ORAL at 08:01

## 2021-04-17 RX ADMIN — IPRATROPIUM BROMIDE SCH MG: 0.5 SOLUTION RESPIRATORY (INHALATION) at 19:52

## 2021-04-17 RX ADMIN — IPRATROPIUM BROMIDE SCH MG: 0.5 SOLUTION RESPIRATORY (INHALATION) at 13:38

## 2021-04-17 RX ADMIN — Medication SCH EACH: at 06:23

## 2021-04-17 RX ADMIN — FUROSEMIDE SCH MG: 40 TABLET ORAL at 16:45

## 2021-04-17 RX ADMIN — HYDROMORPHONE HYDROCHLORIDE PRN MG: 2 TABLET ORAL at 18:39

## 2021-04-17 RX ADMIN — PREGABALIN SCH MG: 50 CAPSULE ORAL at 14:19

## 2021-04-17 RX ADMIN — LABETALOL HYDROCHLORIDE SCH MG: 200 TABLET, FILM COATED ORAL at 21:51

## 2021-04-17 NOTE — NUR
PATIENT ALERT ORIENTED, NO SOB NO CHEST PAIN. NO COMPLAIN OF PAIN AT THIS TIME, CONT ABX FOR 
CELLULITIS. KEPT CLEAN AND DRY, ASSISTED WITH TURNING AND POSITIONING, CALL LIGHT WITHIN 
REACH.

## 2021-04-17 NOTE — NUR
PATIENT ASLEEP BUT AROUSABLE, NO COMPLAIN OF PAIN, CONT ON PAIN MANAGEMENT OF R SHOULDER, 
AND BACK, TURN AND REPOSITION, KEPT CLEAN AND DRY. PATIENT LEFT LEG CELLULITIS WITH REDNESS 
AND DRAINING WITH CLEAN COLOR FLUIDS KEPT IT ELEVATED, CALL LIGHT WITHIN REACH.

## 2021-04-18 VITALS — SYSTOLIC BLOOD PRESSURE: 141 MMHG | DIASTOLIC BLOOD PRESSURE: 43 MMHG

## 2021-04-18 VITALS — DIASTOLIC BLOOD PRESSURE: 56 MMHG | SYSTOLIC BLOOD PRESSURE: 112 MMHG

## 2021-04-18 VITALS — SYSTOLIC BLOOD PRESSURE: 148 MMHG | DIASTOLIC BLOOD PRESSURE: 49 MMHG

## 2021-04-18 VITALS — DIASTOLIC BLOOD PRESSURE: 70 MMHG | SYSTOLIC BLOOD PRESSURE: 146 MMHG

## 2021-04-18 LAB
BASOPHILS # BLD AUTO: 0.1 K/UL (ref 0–8)
BASOPHILS NFR BLD AUTO: 0.9 % (ref 0–2)
BUN SERPL-MCNC: 17 MG/DL (ref 7–18)
CHLORIDE SERPL-SCNC: 103 MMOL/L (ref 98–107)
CO2 SERPL-SCNC: 33 MMOL/L (ref 21–32)
CREAT SERPL-MCNC: 0.8 MG/DL (ref 0.6–1.3)
EOSINOPHIL # BLD AUTO: 0.3 K/UL (ref 0–0.7)
EOSINOPHIL NFR BLD AUTO: 3.4 % (ref 0–7)
GLUCOSE SERPL-MCNC: 152 MG/DL (ref 74–106)
HCT VFR BLD AUTO: 27.7 % (ref 31.2–41.9)
HGB BLD-MCNC: 8.9 G/DL (ref 10.9–14.3)
LYMPHOCYTES # BLD AUTO: 2.2 K/UL (ref 20–40)
LYMPHOCYTES NFR BLD AUTO: 26.3 % (ref 20.5–51.5)
MAGNESIUM SERPL-MCNC: 2.4 MG/DL (ref 1.8–2.4)
MCH RBC QN AUTO: 27 UUG (ref 24.7–32.8)
MCHC RBC AUTO-ENTMCNC: 32 G/DL (ref 32.3–35.6)
MCV RBC AUTO: 84 FL (ref 75.5–95.3)
MONOCYTES # BLD AUTO: 0.7 K/UL (ref 2–10)
MONOCYTES NFR BLD AUTO: 9.2 % (ref 0–11)
NEUTROPHILS # BLD AUTO: 4.9 K/UL (ref 1.8–8.9)
NEUTROPHILS NFR BLD AUTO: 60.2 % (ref 38.5–71.5)
PHOSPHATE SERPL-MCNC: 3.9 MG/DL (ref 2.5–4.9)
PLATELET # BLD AUTO: 291 K/UL (ref 179–408)
POTASSIUM SERPL-SCNC: 3.6 MMOL/L (ref 3.5–5.1)
RBC # BLD AUTO: 3.3 MIL/UL (ref 3.63–4.92)
WBC # BLD AUTO: 8.2 K/UL (ref 3.8–11.8)

## 2021-04-18 RX ADMIN — ALBUTEROL SULFATE SCH MG: 2.5 SOLUTION RESPIRATORY (INHALATION) at 20:39

## 2021-04-18 RX ADMIN — ATORVASTATIN CALCIUM SCH MG: 10 TABLET, FILM COATED ORAL at 21:09

## 2021-04-18 RX ADMIN — LOSARTAN POTASSIUM SCH MG: 50 TABLET, FILM COATED ORAL at 08:36

## 2021-04-18 RX ADMIN — HYDROMORPHONE HYDROCHLORIDE PRN MG: 2 TABLET ORAL at 00:45

## 2021-04-18 RX ADMIN — IPRATROPIUM BROMIDE SCH MG: 0.5 SOLUTION RESPIRATORY (INHALATION) at 20:39

## 2021-04-18 RX ADMIN — ACETAMINOPHEN PRN MG: 325 TABLET ORAL at 06:01

## 2021-04-18 RX ADMIN — LABETALOL HYDROCHLORIDE SCH MG: 200 TABLET, FILM COATED ORAL at 21:09

## 2021-04-18 RX ADMIN — Medication SCH EACH: at 21:06

## 2021-04-18 RX ADMIN — SODIUM CHLORIDE PRN UNIT: 9 INJECTION, SOLUTION INTRAVENOUS at 21:12

## 2021-04-18 RX ADMIN — SODIUM CHLORIDE PRN UNIT: 9 INJECTION, SOLUTION INTRAVENOUS at 08:44

## 2021-04-18 RX ADMIN — Medication SCH EACH: at 17:08

## 2021-04-18 RX ADMIN — EZETIMIBE SCH MG: 10 TABLET ORAL at 21:09

## 2021-04-18 RX ADMIN — Medication SCH EA: at 08:41

## 2021-04-18 RX ADMIN — TRIAMCINOLONE ACETONIDE SCH APPLIC: 0.25 OINTMENT TOPICAL at 08:42

## 2021-04-18 RX ADMIN — ALBUTEROL SULFATE SCH MG: 2.5 SOLUTION RESPIRATORY (INHALATION) at 13:19

## 2021-04-18 RX ADMIN — ACETAMINOPHEN PRN MG: 325 TABLET ORAL at 21:22

## 2021-04-18 RX ADMIN — Medication SCH MG: at 08:37

## 2021-04-18 RX ADMIN — FUROSEMIDE SCH MG: 40 TABLET ORAL at 08:37

## 2021-04-18 RX ADMIN — POLYETHYLENE GLYCOL 3350 PRN GM: 17 POWDER, FOR SOLUTION ORAL at 14:39

## 2021-04-18 RX ADMIN — PREGABALIN SCH MG: 50 CAPSULE ORAL at 21:09

## 2021-04-18 RX ADMIN — CLOPIDOGREL BISULFATE SCH MG: 75 TABLET ORAL at 08:37

## 2021-04-18 RX ADMIN — PREGABALIN SCH MG: 50 CAPSULE ORAL at 14:28

## 2021-04-18 RX ADMIN — HYDROMORPHONE HYDROCHLORIDE PRN MG: 2 TABLET ORAL at 11:47

## 2021-04-18 RX ADMIN — HYDROMORPHONE HYDROCHLORIDE PRN MG: 2 TABLET ORAL at 18:38

## 2021-04-18 RX ADMIN — Medication SCH EACH: at 11:43

## 2021-04-18 RX ADMIN — ACETAMINOPHEN PRN MG: 325 TABLET ORAL at 14:35

## 2021-04-18 RX ADMIN — Medication SCH EACH: at 06:45

## 2021-04-18 RX ADMIN — SODIUM CHLORIDE PRN UNIT: 9 INJECTION, SOLUTION INTRAVENOUS at 17:38

## 2021-04-18 RX ADMIN — DOCUSATE SODIUM SCH MG: 100 CAPSULE, LIQUID FILLED ORAL at 17:46

## 2021-04-18 RX ADMIN — SODIUM CHLORIDE SCH MLS/HR: 9 INJECTION, SOLUTION INTRAVENOUS at 15:38

## 2021-04-18 RX ADMIN — PREGABALIN SCH MG: 50 CAPSULE ORAL at 06:02

## 2021-04-18 RX ADMIN — BACLOFEN SCH MG: 20 TABLET ORAL at 21:09

## 2021-04-18 RX ADMIN — SENNOSIDES SCH TAB: 8.6 TABLET, COATED ORAL at 08:35

## 2021-04-18 RX ADMIN — CLOTRIMAZOLE SCH APPLIC: 1 CREAM TOPICAL at 08:42

## 2021-04-18 RX ADMIN — TOLTERODINE TARTRATE SCH MG: 2 CAPSULE, EXTENDED RELEASE ORAL at 08:35

## 2021-04-18 RX ADMIN — CLOTRIMAZOLE SCH APPLIC: 1 CREAM TOPICAL at 17:46

## 2021-04-18 RX ADMIN — DOCUSATE SODIUM SCH MG: 100 CAPSULE, LIQUID FILLED ORAL at 08:35

## 2021-04-18 RX ADMIN — LABETALOL HYDROCHLORIDE SCH MG: 200 TABLET, FILM COATED ORAL at 06:03

## 2021-04-18 RX ADMIN — IPRATROPIUM BROMIDE SCH MG: 0.5 SOLUTION RESPIRATORY (INHALATION) at 01:38

## 2021-04-18 RX ADMIN — LABETALOL HYDROCHLORIDE SCH MG: 200 TABLET, FILM COATED ORAL at 14:28

## 2021-04-18 RX ADMIN — IPRATROPIUM BROMIDE SCH MG: 0.5 SOLUTION RESPIRATORY (INHALATION) at 13:19

## 2021-04-18 RX ADMIN — LINAGLIPTIN SCH MG: 5 TABLET, FILM COATED ORAL at 08:41

## 2021-04-18 RX ADMIN — SODIUM CHLORIDE PRN UNIT: 9 INJECTION, SOLUTION INTRAVENOUS at 12:44

## 2021-04-18 RX ADMIN — BACLOFEN SCH MG: 20 TABLET ORAL at 14:29

## 2021-04-18 RX ADMIN — ASPIRIN SCH MG: 81 TABLET, CHEWABLE ORAL at 08:35

## 2021-04-18 RX ADMIN — PANTOPRAZOLE SODIUM SCH MG: 40 TABLET, DELAYED RELEASE ORAL at 21:09

## 2021-04-18 RX ADMIN — ALBUTEROL SULFATE SCH MG: 2.5 SOLUTION RESPIRATORY (INHALATION) at 01:38

## 2021-04-18 RX ADMIN — FUROSEMIDE SCH MG: 40 TABLET ORAL at 17:46

## 2021-04-18 RX ADMIN — SODIUM CHLORIDE SCH MLS/HR: 9 INJECTION, SOLUTION INTRAVENOUS at 08:35

## 2021-04-18 RX ADMIN — INSULIN GLARGINE SCH UNITS: 100 INJECTION, SOLUTION SUBCUTANEOUS at 21:12

## 2021-04-18 RX ADMIN — BACLOFEN SCH MG: 20 TABLET ORAL at 06:02

## 2021-04-18 RX ADMIN — DEXTROSE SCH MLS/HR: 50 INJECTION, SOLUTION INTRAVENOUS at 20:12

## 2021-04-18 RX ADMIN — Medication SCH EA: at 21:22

## 2021-04-18 RX ADMIN — HYDROMORPHONE HYDROCHLORIDE PRN MG: 2 TABLET ORAL at 10:47

## 2021-04-18 RX ADMIN — IPRATROPIUM BROMIDE SCH MG: 0.5 SOLUTION RESPIRATORY (INHALATION) at 07:46

## 2021-04-18 RX ADMIN — LIDOCAINE SCH PATCH: 50 PATCH CUTANEOUS at 08:41

## 2021-04-18 RX ADMIN — ALBUTEROL SULFATE SCH MG: 2.5 SOLUTION RESPIRATORY (INHALATION) at 07:46

## 2021-04-18 NOTE — NUR
PATIENT SLEPT MOST OF THE NIGHT, CONT ON PAIN MANAGEMENT DUE R SHOULD PAIN AND BACK PAIN, 
LEFT LEG CELLULITIS STILL HAS REDNESS, SWELLING IS IMPROVING, CONT TO MONITOR.

## 2021-04-18 NOTE — NUR
Patient is alert and oriented x 4 complains of shoulder pain, lidocaine patch given as 
ordered and all medications given per MD order. left leg is warm to touch with no weeping 
noted. Repositioning rendered for comfort. VS WNL. Call light placed within reach. All needs 
met promptly.

## 2021-04-19 VITALS — DIASTOLIC BLOOD PRESSURE: 62 MMHG | SYSTOLIC BLOOD PRESSURE: 154 MMHG

## 2021-04-19 VITALS — SYSTOLIC BLOOD PRESSURE: 120 MMHG | DIASTOLIC BLOOD PRESSURE: 44 MMHG

## 2021-04-19 VITALS — DIASTOLIC BLOOD PRESSURE: 51 MMHG | SYSTOLIC BLOOD PRESSURE: 152 MMHG

## 2021-04-19 VITALS — DIASTOLIC BLOOD PRESSURE: 68 MMHG | SYSTOLIC BLOOD PRESSURE: 199 MMHG

## 2021-04-19 LAB
BASOPHILS # BLD AUTO: 0.1 K/UL (ref 0–8)
BASOPHILS NFR BLD AUTO: 1 % (ref 0–2)
BUN SERPL-MCNC: 16 MG/DL (ref 7–18)
CHLORIDE SERPL-SCNC: 101 MMOL/L (ref 98–107)
CO2 SERPL-SCNC: 33 MMOL/L (ref 21–32)
CREAT SERPL-MCNC: 0.9 MG/DL (ref 0.6–1.3)
EOSINOPHIL # BLD AUTO: 0.3 K/UL (ref 0–0.7)
EOSINOPHIL NFR BLD AUTO: 4.1 % (ref 0–7)
GLUCOSE SERPL-MCNC: 132 MG/DL (ref 74–106)
HCT VFR BLD AUTO: 26.4 % (ref 31.2–41.9)
HGB BLD-MCNC: 8.8 G/DL (ref 10.9–14.3)
LYMPHOCYTES # BLD AUTO: 2.3 K/UL (ref 20–40)
LYMPHOCYTES NFR BLD AUTO: 27.5 % (ref 20.5–51.5)
MAGNESIUM SERPL-MCNC: 2.3 MG/DL (ref 1.8–2.4)
MCH RBC QN AUTO: 27.7 UUG (ref 24.7–32.8)
MCHC RBC AUTO-ENTMCNC: 33 G/DL (ref 32.3–35.6)
MCV RBC AUTO: 83.4 FL (ref 75.5–95.3)
MONOCYTES # BLD AUTO: 0.8 K/UL (ref 2–10)
MONOCYTES NFR BLD AUTO: 9.9 % (ref 0–11)
NEUTROPHILS # BLD AUTO: 4.9 K/UL (ref 1.8–8.9)
NEUTROPHILS NFR BLD AUTO: 57.5 % (ref 38.5–71.5)
PHOSPHATE SERPL-MCNC: 3.4 MG/DL (ref 2.5–4.9)
PLATELET # BLD AUTO: 311 K/UL (ref 179–408)
POTASSIUM SERPL-SCNC: 3.6 MMOL/L (ref 3.5–5.1)
RBC # BLD AUTO: 3.17 MIL/UL (ref 3.63–4.92)
WBC # BLD AUTO: 8.4 K/UL (ref 3.8–11.8)

## 2021-04-19 RX ADMIN — LIDOCAINE SCH PATCH: 50 PATCH CUTANEOUS at 08:15

## 2021-04-19 RX ADMIN — TOLTERODINE TARTRATE SCH MG: 2 CAPSULE, EXTENDED RELEASE ORAL at 08:16

## 2021-04-19 RX ADMIN — ALBUTEROL SULFATE SCH MG: 2.5 SOLUTION RESPIRATORY (INHALATION) at 13:07

## 2021-04-19 RX ADMIN — Medication SCH EACH: at 06:33

## 2021-04-19 RX ADMIN — SODIUM CHLORIDE SCH MLS/HR: 9 INJECTION, SOLUTION INTRAVENOUS at 23:43

## 2021-04-19 RX ADMIN — Medication SCH EA: at 08:15

## 2021-04-19 RX ADMIN — SODIUM CHLORIDE PRN UNIT: 9 INJECTION, SOLUTION INTRAVENOUS at 20:16

## 2021-04-19 RX ADMIN — LABETALOL HYDROCHLORIDE SCH MG: 200 TABLET, FILM COATED ORAL at 06:05

## 2021-04-19 RX ADMIN — TRIAMCINOLONE ACETONIDE SCH APPLIC: 0.25 OINTMENT TOPICAL at 08:17

## 2021-04-19 RX ADMIN — ATORVASTATIN CALCIUM SCH MG: 10 TABLET, FILM COATED ORAL at 20:14

## 2021-04-19 RX ADMIN — BACLOFEN SCH MG: 20 TABLET ORAL at 13:11

## 2021-04-19 RX ADMIN — DOCUSATE SODIUM SCH MG: 100 CAPSULE, LIQUID FILLED ORAL at 08:15

## 2021-04-19 RX ADMIN — FUROSEMIDE SCH MG: 40 TABLET ORAL at 08:16

## 2021-04-19 RX ADMIN — PREGABALIN SCH MG: 50 CAPSULE ORAL at 13:11

## 2021-04-19 RX ADMIN — INSULIN GLARGINE SCH UNITS: 100 INJECTION, SOLUTION SUBCUTANEOUS at 20:28

## 2021-04-19 RX ADMIN — ACETAMINOPHEN PRN MG: 325 TABLET ORAL at 03:45

## 2021-04-19 RX ADMIN — Medication SCH EACH: at 10:37

## 2021-04-19 RX ADMIN — BACLOFEN SCH MG: 20 TABLET ORAL at 06:05

## 2021-04-19 RX ADMIN — PANTOPRAZOLE SODIUM SCH MG: 40 TABLET, DELAYED RELEASE ORAL at 20:14

## 2021-04-19 RX ADMIN — PREGABALIN SCH MG: 50 CAPSULE ORAL at 22:20

## 2021-04-19 RX ADMIN — SODIUM CHLORIDE SCH MLS/HR: 9 INJECTION, SOLUTION INTRAVENOUS at 15:10

## 2021-04-19 RX ADMIN — SENNOSIDES SCH TAB: 8.6 TABLET, COATED ORAL at 08:16

## 2021-04-19 RX ADMIN — IPRATROPIUM BROMIDE SCH MG: 0.5 SOLUTION RESPIRATORY (INHALATION) at 07:44

## 2021-04-19 RX ADMIN — ACETAMINOPHEN PRN MG: 325 TABLET ORAL at 10:14

## 2021-04-19 RX ADMIN — ALBUTEROL SULFATE SCH MG: 2.5 SOLUTION RESPIRATORY (INHALATION) at 07:44

## 2021-04-19 RX ADMIN — ALBUTEROL SULFATE SCH MG: 2.5 SOLUTION RESPIRATORY (INHALATION) at 01:38

## 2021-04-19 RX ADMIN — IPRATROPIUM BROMIDE SCH MG: 0.5 SOLUTION RESPIRATORY (INHALATION) at 19:44

## 2021-04-19 RX ADMIN — IPRATROPIUM BROMIDE SCH MG: 0.5 SOLUTION RESPIRATORY (INHALATION) at 13:07

## 2021-04-19 RX ADMIN — EZETIMIBE SCH MG: 10 TABLET ORAL at 20:13

## 2021-04-19 RX ADMIN — CLOPIDOGREL BISULFATE SCH MG: 75 TABLET ORAL at 08:16

## 2021-04-19 RX ADMIN — HYDROMORPHONE HYDROCHLORIDE PRN MG: 2 TABLET ORAL at 20:25

## 2021-04-19 RX ADMIN — Medication SCH MG: at 08:16

## 2021-04-19 RX ADMIN — LINAGLIPTIN SCH MG: 5 TABLET, FILM COATED ORAL at 08:16

## 2021-04-19 RX ADMIN — DOCUSATE SODIUM SCH MG: 100 CAPSULE, LIQUID FILLED ORAL at 16:19

## 2021-04-19 RX ADMIN — Medication SCH EACH: at 16:23

## 2021-04-19 RX ADMIN — SODIUM CHLORIDE SCH MLS/HR: 9 INJECTION, SOLUTION INTRAVENOUS at 07:45

## 2021-04-19 RX ADMIN — SODIUM CHLORIDE PRN UNIT: 9 INJECTION, SOLUTION INTRAVENOUS at 16:24

## 2021-04-19 RX ADMIN — ACETAMINOPHEN PRN MG: 325 TABLET ORAL at 10:47

## 2021-04-19 RX ADMIN — LOSARTAN POTASSIUM SCH MG: 50 TABLET, FILM COATED ORAL at 08:16

## 2021-04-19 RX ADMIN — SODIUM CHLORIDE SCH MLS/HR: 9 INJECTION, SOLUTION INTRAVENOUS at 00:34

## 2021-04-19 RX ADMIN — SODIUM CHLORIDE PRN UNIT: 9 INJECTION, SOLUTION INTRAVENOUS at 11:21

## 2021-04-19 RX ADMIN — Medication SCH EA: at 20:14

## 2021-04-19 RX ADMIN — BACLOFEN SCH MG: 20 TABLET ORAL at 22:20

## 2021-04-19 RX ADMIN — LABETALOL HYDROCHLORIDE SCH MG: 200 TABLET, FILM COATED ORAL at 13:11

## 2021-04-19 RX ADMIN — POLYETHYLENE GLYCOL 3350 PRN GM: 17 POWDER, FOR SOLUTION ORAL at 13:16

## 2021-04-19 RX ADMIN — Medication SCH EACH: at 20:15

## 2021-04-19 RX ADMIN — CLOTRIMAZOLE SCH APPLIC: 1 CREAM TOPICAL at 08:17

## 2021-04-19 RX ADMIN — HYDROMORPHONE HYDROCHLORIDE PRN MG: 2 TABLET ORAL at 07:04

## 2021-04-19 RX ADMIN — ALBUTEROL SULFATE SCH MG: 2.5 SOLUTION RESPIRATORY (INHALATION) at 19:44

## 2021-04-19 RX ADMIN — IPRATROPIUM BROMIDE SCH MG: 0.5 SOLUTION RESPIRATORY (INHALATION) at 01:38

## 2021-04-19 RX ADMIN — PREGABALIN SCH MG: 50 CAPSULE ORAL at 06:05

## 2021-04-19 RX ADMIN — CLOTRIMAZOLE SCH APPLIC: 1 CREAM TOPICAL at 16:20

## 2021-04-19 RX ADMIN — ASPIRIN SCH MG: 81 TABLET, CHEWABLE ORAL at 08:15

## 2021-04-19 RX ADMIN — HYDROMORPHONE HYDROCHLORIDE PRN MG: 2 TABLET ORAL at 00:47

## 2021-04-19 RX ADMIN — LABETALOL HYDROCHLORIDE SCH MG: 200 TABLET, FILM COATED ORAL at 22:20

## 2021-04-19 RX ADMIN — SODIUM CHLORIDE PRN UNIT: 9 INJECTION, SOLUTION INTRAVENOUS at 07:32

## 2021-04-19 NOTE — NUR
End of Shift: Pt AOx4, vital signs WNL, on 2L NC O2 sating around 96-99%. All due 
medications given and tolerated well. Pt c/o of pain in her left leg and right shoulder, PRN 
pain meds given and pain level decreased. Pt has RUFINO midline intact and patent TKO via NS 
10cc/hr. Left leg cellulitis shows redness and some blisters. Will endorse to the oncoming 
day shift nurse.

## 2021-04-19 NOTE — NUR
Received patient lying in bed. Asleep, arouse to verbal stimuli. Appears weak. No signs or 
symptoms of pain or SOB noted. Appears calm and comfortable. Midline on Left upper arm 
intact and patent. Bruise on right lucio orbital area visible. Skin intact. Safety measure 
initiated and call bell within reached. Continue to monitor.

## 2021-04-20 VITALS — SYSTOLIC BLOOD PRESSURE: 148 MMHG | DIASTOLIC BLOOD PRESSURE: 56 MMHG

## 2021-04-20 VITALS — SYSTOLIC BLOOD PRESSURE: 180 MMHG | DIASTOLIC BLOOD PRESSURE: 48 MMHG

## 2021-04-20 VITALS — DIASTOLIC BLOOD PRESSURE: 57 MMHG | SYSTOLIC BLOOD PRESSURE: 156 MMHG

## 2021-04-20 VITALS — DIASTOLIC BLOOD PRESSURE: 40 MMHG | SYSTOLIC BLOOD PRESSURE: 145 MMHG

## 2021-04-20 VITALS — DIASTOLIC BLOOD PRESSURE: 47 MMHG | SYSTOLIC BLOOD PRESSURE: 156 MMHG

## 2021-04-20 LAB
BUN SERPL-MCNC: 15 MG/DL (ref 7–18)
CHLORIDE SERPL-SCNC: 100 MMOL/L (ref 98–107)
CO2 SERPL-SCNC: 34 MMOL/L (ref 21–32)
CREAT SERPL-MCNC: 0.8 MG/DL (ref 0.6–1.3)
GLUCOSE SERPL-MCNC: 247 MG/DL (ref 74–106)
POTASSIUM SERPL-SCNC: 3.7 MMOL/L (ref 3.5–5.1)

## 2021-04-20 RX ADMIN — CLOTRIMAZOLE SCH APPLIC: 1 CREAM TOPICAL at 09:00

## 2021-04-20 RX ADMIN — SODIUM CHLORIDE PRN UNIT: 9 INJECTION, SOLUTION INTRAVENOUS at 12:40

## 2021-04-20 RX ADMIN — IPRATROPIUM BROMIDE SCH MG: 0.5 SOLUTION RESPIRATORY (INHALATION) at 13:40

## 2021-04-20 RX ADMIN — ATORVASTATIN CALCIUM SCH MG: 10 TABLET, FILM COATED ORAL at 20:37

## 2021-04-20 RX ADMIN — SODIUM CHLORIDE PRN UNIT: 9 INJECTION, SOLUTION INTRAVENOUS at 17:43

## 2021-04-20 RX ADMIN — BACLOFEN SCH MG: 20 TABLET ORAL at 14:35

## 2021-04-20 RX ADMIN — DOCUSATE SODIUM SCH MG: 100 CAPSULE, LIQUID FILLED ORAL at 08:58

## 2021-04-20 RX ADMIN — Medication SCH EACH: at 12:09

## 2021-04-20 RX ADMIN — ALBUTEROL SULFATE SCH MG: 2.5 SOLUTION RESPIRATORY (INHALATION) at 13:40

## 2021-04-20 RX ADMIN — ALBUTEROL SULFATE SCH MG: 2.5 SOLUTION RESPIRATORY (INHALATION) at 00:30

## 2021-04-20 RX ADMIN — SENNOSIDES SCH TAB: 8.6 TABLET, COATED ORAL at 08:58

## 2021-04-20 RX ADMIN — LINAGLIPTIN SCH MG: 5 TABLET, FILM COATED ORAL at 08:58

## 2021-04-20 RX ADMIN — HYDROMORPHONE HYDROCHLORIDE PRN MG: 2 TABLET ORAL at 06:35

## 2021-04-20 RX ADMIN — LABETALOL HYDROCHLORIDE SCH MG: 200 TABLET, FILM COATED ORAL at 05:01

## 2021-04-20 RX ADMIN — Medication SCH EACH: at 16:30

## 2021-04-20 RX ADMIN — ASPIRIN SCH MG: 81 TABLET, CHEWABLE ORAL at 08:57

## 2021-04-20 RX ADMIN — DOCUSATE SODIUM SCH MG: 100 CAPSULE, LIQUID FILLED ORAL at 16:48

## 2021-04-20 RX ADMIN — Medication SCH EACH: at 20:40

## 2021-04-20 RX ADMIN — Medication SCH EA: at 20:38

## 2021-04-20 RX ADMIN — ALBUTEROL SULFATE SCH MG: 2.5 SOLUTION RESPIRATORY (INHALATION) at 14:01

## 2021-04-20 RX ADMIN — LABETALOL HYDROCHLORIDE SCH MG: 200 TABLET, FILM COATED ORAL at 20:46

## 2021-04-20 RX ADMIN — DEXTROSE SCH MLS/HR: 5 SOLUTION INTRAVENOUS at 20:46

## 2021-04-20 RX ADMIN — SODIUM CHLORIDE PRN UNIT: 9 INJECTION, SOLUTION INTRAVENOUS at 08:25

## 2021-04-20 RX ADMIN — IPRATROPIUM BROMIDE SCH MG: 0.5 SOLUTION RESPIRATORY (INHALATION) at 14:01

## 2021-04-20 RX ADMIN — IPRATROPIUM BROMIDE SCH MG: 0.5 SOLUTION RESPIRATORY (INHALATION) at 00:30

## 2021-04-20 RX ADMIN — SODIUM CHLORIDE SCH MLS/HR: 9 INJECTION, SOLUTION INTRAVENOUS at 08:23

## 2021-04-20 RX ADMIN — PREGABALIN SCH MG: 50 CAPSULE ORAL at 14:36

## 2021-04-20 RX ADMIN — HYDROMORPHONE HYDROCHLORIDE PRN MG: 2 TABLET ORAL at 20:46

## 2021-04-20 RX ADMIN — BACLOFEN SCH MG: 20 TABLET ORAL at 05:00

## 2021-04-20 RX ADMIN — ALBUTEROL SULFATE SCH MG: 2.5 SOLUTION RESPIRATORY (INHALATION) at 08:16

## 2021-04-20 RX ADMIN — ALBUTEROL SULFATE SCH MG: 2.5 SOLUTION RESPIRATORY (INHALATION) at 19:23

## 2021-04-20 RX ADMIN — Medication SCH EA: at 08:59

## 2021-04-20 RX ADMIN — IPRATROPIUM BROMIDE SCH MG: 0.5 SOLUTION RESPIRATORY (INHALATION) at 08:16

## 2021-04-20 RX ADMIN — PREGABALIN SCH MG: 50 CAPSULE ORAL at 22:27

## 2021-04-20 RX ADMIN — Medication SCH MG: at 08:58

## 2021-04-20 RX ADMIN — TOLTERODINE TARTRATE SCH MG: 2 CAPSULE, EXTENDED RELEASE ORAL at 08:58

## 2021-04-20 RX ADMIN — LOSARTAN POTASSIUM SCH MG: 50 TABLET, FILM COATED ORAL at 09:10

## 2021-04-20 RX ADMIN — CLOPIDOGREL BISULFATE SCH MG: 75 TABLET ORAL at 08:58

## 2021-04-20 RX ADMIN — TRIAMCINOLONE ACETONIDE SCH APPLIC: 0.25 OINTMENT TOPICAL at 09:00

## 2021-04-20 RX ADMIN — EZETIMIBE SCH MG: 10 TABLET ORAL at 20:37

## 2021-04-20 RX ADMIN — LABETALOL HYDROCHLORIDE SCH MG: 200 TABLET, FILM COATED ORAL at 14:38

## 2021-04-20 RX ADMIN — Medication SCH EACH: at 06:31

## 2021-04-20 RX ADMIN — PREGABALIN SCH MG: 50 CAPSULE ORAL at 05:00

## 2021-04-20 RX ADMIN — SODIUM CHLORIDE SCH MLS/HR: 9 INJECTION, SOLUTION INTRAVENOUS at 16:48

## 2021-04-20 RX ADMIN — FUROSEMIDE SCH MG: 40 TABLET ORAL at 16:48

## 2021-04-20 RX ADMIN — CLOTRIMAZOLE SCH APPLIC: 1 CREAM TOPICAL at 16:48

## 2021-04-20 RX ADMIN — HYDROMORPHONE HYDROCHLORIDE PRN MG: 2 TABLET ORAL at 12:49

## 2021-04-20 RX ADMIN — INSULIN GLARGINE SCH UNITS: 100 INJECTION, SOLUTION SUBCUTANEOUS at 20:47

## 2021-04-20 RX ADMIN — FUROSEMIDE SCH MG: 40 TABLET ORAL at 09:21

## 2021-04-20 RX ADMIN — IPRATROPIUM BROMIDE SCH MG: 0.5 SOLUTION RESPIRATORY (INHALATION) at 19:23

## 2021-04-20 RX ADMIN — SODIUM CHLORIDE PRN UNIT: 9 INJECTION, SOLUTION INTRAVENOUS at 20:48

## 2021-04-20 RX ADMIN — BACLOFEN SCH MG: 20 TABLET ORAL at 22:27

## 2021-04-20 RX ADMIN — ACETAMINOPHEN PRN MG: 325 TABLET ORAL at 01:36

## 2021-04-20 RX ADMIN — PANTOPRAZOLE SODIUM SCH MG: 40 TABLET, DELAYED RELEASE ORAL at 20:37

## 2021-04-20 RX ADMIN — LIDOCAINE SCH PATCH: 50 PATCH CUTANEOUS at 08:57

## 2021-04-20 NOTE — NUR
AAOx4. In no acute distress. O2 at 2LPM via NC in place. Midline on Left upper arm intact 
and patent. No adverse reaction noted from IV ABX. Needs attended to and met. Safety measure 
maintained and call bell within reached.

## 2021-04-20 NOTE — NUR
Patient is resting in chair.  No sign of distress noted at this time.  Pt is still on oxygen 
2L NC.  All medications given as ordered.  All safety precautions implemented. Will endorse 
to the night shift.

## 2021-04-20 NOTE — NUR
Patient received in bed awake alert and oriented times 4.  No sign of distress noted. Pt is 
2 L of oxygen.  Pt has a bruise on her right lucio orbital.  Safety precautions implemented.  
Will continue to monitor.

## 2021-04-21 VITALS — DIASTOLIC BLOOD PRESSURE: 50 MMHG | SYSTOLIC BLOOD PRESSURE: 145 MMHG

## 2021-04-21 VITALS — DIASTOLIC BLOOD PRESSURE: 53 MMHG | SYSTOLIC BLOOD PRESSURE: 159 MMHG

## 2021-04-21 VITALS — DIASTOLIC BLOOD PRESSURE: 50 MMHG | SYSTOLIC BLOOD PRESSURE: 133 MMHG

## 2021-04-21 VITALS — SYSTOLIC BLOOD PRESSURE: 147 MMHG | DIASTOLIC BLOOD PRESSURE: 41 MMHG

## 2021-04-21 LAB
BASOPHILS # BLD AUTO: 0.1 K/UL (ref 0–8)
BASOPHILS NFR BLD AUTO: 1 % (ref 0–2)
BUN SERPL-MCNC: 14 MG/DL (ref 7–18)
CHLORIDE SERPL-SCNC: 99 MMOL/L (ref 98–107)
CO2 SERPL-SCNC: 32 MMOL/L (ref 21–32)
CREAT SERPL-MCNC: 0.8 MG/DL (ref 0.6–1.3)
EOSINOPHIL # BLD AUTO: 0.4 K/UL (ref 0–0.7)
EOSINOPHIL NFR BLD AUTO: 3.8 % (ref 0–7)
GLUCOSE SERPL-MCNC: 218 MG/DL (ref 74–106)
HCT VFR BLD AUTO: 28.2 % (ref 31.2–41.9)
HGB BLD-MCNC: 9.4 G/DL (ref 10.9–14.3)
LYMPHOCYTES # BLD AUTO: 2.5 K/UL (ref 20–40)
LYMPHOCYTES NFR BLD AUTO: 25.4 % (ref 20.5–51.5)
MCH RBC QN AUTO: 27.9 UUG (ref 24.7–32.8)
MCHC RBC AUTO-ENTMCNC: 33 G/DL (ref 32.3–35.6)
MCV RBC AUTO: 83.8 FL (ref 75.5–95.3)
MONOCYTES # BLD AUTO: 0.9 K/UL (ref 2–10)
MONOCYTES NFR BLD AUTO: 9.1 % (ref 0–11)
NEUTROPHILS # BLD AUTO: 6.1 K/UL (ref 1.8–8.9)
NEUTROPHILS NFR BLD AUTO: 60.7 % (ref 38.5–71.5)
PLATELET # BLD AUTO: 340 K/UL (ref 179–408)
POTASSIUM SERPL-SCNC: 3.8 MMOL/L (ref 3.5–5.1)
RBC # BLD AUTO: 3.36 MIL/UL (ref 3.63–4.92)
WBC # BLD AUTO: 10 K/UL (ref 3.8–11.8)

## 2021-04-21 RX ADMIN — ALBUTEROL SULFATE SCH MG: 2.5 SOLUTION RESPIRATORY (INHALATION) at 19:28

## 2021-04-21 RX ADMIN — ALBUTEROL SULFATE SCH MG: 2.5 SOLUTION RESPIRATORY (INHALATION) at 07:55

## 2021-04-21 RX ADMIN — CLOPIDOGREL BISULFATE SCH MG: 75 TABLET ORAL at 08:53

## 2021-04-21 RX ADMIN — DOCUSATE SODIUM SCH MG: 100 CAPSULE, LIQUID FILLED ORAL at 08:53

## 2021-04-21 RX ADMIN — SENNOSIDES SCH TAB: 8.6 TABLET, COATED ORAL at 08:52

## 2021-04-21 RX ADMIN — LIDOCAINE SCH PATCH: 50 PATCH CUTANEOUS at 08:54

## 2021-04-21 RX ADMIN — ATORVASTATIN CALCIUM SCH MG: 10 TABLET, FILM COATED ORAL at 21:09

## 2021-04-21 RX ADMIN — BACLOFEN SCH MG: 20 TABLET ORAL at 14:53

## 2021-04-21 RX ADMIN — INSULIN GLARGINE SCH UNITS: 100 INJECTION, SOLUTION SUBCUTANEOUS at 21:14

## 2021-04-21 RX ADMIN — DOCUSATE SODIUM SCH MG: 100 CAPSULE, LIQUID FILLED ORAL at 17:47

## 2021-04-21 RX ADMIN — IPRATROPIUM BROMIDE SCH MG: 0.5 SOLUTION RESPIRATORY (INHALATION) at 07:55

## 2021-04-21 RX ADMIN — BACLOFEN SCH MG: 20 TABLET ORAL at 06:21

## 2021-04-21 RX ADMIN — HYDROMORPHONE HYDROCHLORIDE PRN MG: 2 TABLET ORAL at 10:47

## 2021-04-21 RX ADMIN — ACETAMINOPHEN PRN MG: 325 TABLET ORAL at 01:54

## 2021-04-21 RX ADMIN — HYDROMORPHONE HYDROCHLORIDE PRN MG: 2 TABLET ORAL at 04:19

## 2021-04-21 RX ADMIN — ASPIRIN SCH MG: 81 TABLET, CHEWABLE ORAL at 08:51

## 2021-04-21 RX ADMIN — FUROSEMIDE SCH MG: 40 TABLET ORAL at 08:52

## 2021-04-21 RX ADMIN — IPRATROPIUM BROMIDE SCH MG: 0.5 SOLUTION RESPIRATORY (INHALATION) at 13:40

## 2021-04-21 RX ADMIN — IPRATROPIUM BROMIDE SCH MG: 0.5 SOLUTION RESPIRATORY (INHALATION) at 19:28

## 2021-04-21 RX ADMIN — Medication SCH EACH: at 06:30

## 2021-04-21 RX ADMIN — CLOTRIMAZOLE SCH APPLIC: 1 CREAM TOPICAL at 08:54

## 2021-04-21 RX ADMIN — SODIUM CHLORIDE PRN UNIT: 9 INJECTION, SOLUTION INTRAVENOUS at 13:13

## 2021-04-21 RX ADMIN — POLYETHYLENE GLYCOL 3350 PRN GM: 17 POWDER, FOR SOLUTION ORAL at 18:09

## 2021-04-21 RX ADMIN — ALBUTEROL SULFATE SCH MG: 2.5 SOLUTION RESPIRATORY (INHALATION) at 13:40

## 2021-04-21 RX ADMIN — SODIUM CHLORIDE PRN UNIT: 9 INJECTION, SOLUTION INTRAVENOUS at 18:03

## 2021-04-21 RX ADMIN — SODIUM CHLORIDE SCH MLS/HR: 9 INJECTION, SOLUTION INTRAVENOUS at 08:02

## 2021-04-21 RX ADMIN — DEXTROSE SCH MLS/HR: 5 SOLUTION INTRAVENOUS at 14:53

## 2021-04-21 RX ADMIN — LOSARTAN POTASSIUM SCH MG: 50 TABLET, FILM COATED ORAL at 09:08

## 2021-04-21 RX ADMIN — Medication SCH EACH: at 17:37

## 2021-04-21 RX ADMIN — LABETALOL HYDROCHLORIDE SCH MG: 200 TABLET, FILM COATED ORAL at 14:53

## 2021-04-21 RX ADMIN — TOLTERODINE TARTRATE SCH MG: 2 CAPSULE, EXTENDED RELEASE ORAL at 08:52

## 2021-04-21 RX ADMIN — CLOTRIMAZOLE SCH APPLIC: 1 CREAM TOPICAL at 17:47

## 2021-04-21 RX ADMIN — ACETAMINOPHEN PRN MG: 325 TABLET ORAL at 09:38

## 2021-04-21 RX ADMIN — IPRATROPIUM BROMIDE SCH MG: 0.5 SOLUTION RESPIRATORY (INHALATION) at 00:30

## 2021-04-21 RX ADMIN — Medication SCH EACH: at 12:27

## 2021-04-21 RX ADMIN — Medication SCH MG: at 08:53

## 2021-04-21 RX ADMIN — PREGABALIN SCH MG: 50 CAPSULE ORAL at 06:21

## 2021-04-21 RX ADMIN — LABETALOL HYDROCHLORIDE SCH MG: 200 TABLET, FILM COATED ORAL at 06:21

## 2021-04-21 RX ADMIN — SODIUM CHLORIDE SCH MLS/HR: 9 INJECTION, SOLUTION INTRAVENOUS at 17:30

## 2021-04-21 RX ADMIN — LINAGLIPTIN SCH MG: 5 TABLET, FILM COATED ORAL at 08:51

## 2021-04-21 RX ADMIN — SODIUM CHLORIDE PRN UNIT: 9 INJECTION, SOLUTION INTRAVENOUS at 21:17

## 2021-04-21 RX ADMIN — ALBUTEROL SULFATE SCH MG: 2.5 SOLUTION RESPIRATORY (INHALATION) at 00:30

## 2021-04-21 RX ADMIN — Medication SCH EA: at 09:31

## 2021-04-21 RX ADMIN — SODIUM CHLORIDE SCH MLS/HR: 9 INJECTION, SOLUTION INTRAVENOUS at 00:19

## 2021-04-21 RX ADMIN — TRIAMCINOLONE ACETONIDE SCH APPLIC: 0.25 OINTMENT TOPICAL at 08:58

## 2021-04-21 RX ADMIN — PREGABALIN SCH MG: 50 CAPSULE ORAL at 14:53

## 2021-04-21 NOTE — NUR
PATIENT ALERT ORIENTED, NO SOB NO CHEST PAIN, PATIENT  BY AMBULANCE IN STABLE 
CONDITION, PATIENT TOOK ALL BELONGINGS, LEFT UPPER MIDLINE INTACT PATENT, DRESSING INTACT. 
REPORT GIVEN TO AMBULANCE STAFF.

## 2021-04-21 NOTE — NUR
pt is being discharged to [facility], report given to Jose RN at 7:10pm 
ambulance ride was arrange for pickup around 8:00PM.   all paperwork signed and in chart. 
discharge and teaching completed.

## 2021-05-25 ENCOUNTER — HOSPITAL ENCOUNTER (OUTPATIENT)
Dept: HOSPITAL 54 - MSC | Age: 61
Discharge: HOME | End: 2021-05-25
Attending: INTERNAL MEDICINE
Payer: COMMERCIAL

## 2021-05-25 DIAGNOSIS — E11.9: ICD-10-CM

## 2021-05-25 DIAGNOSIS — L03.119: Primary | ICD-10-CM

## 2021-05-25 DIAGNOSIS — I65.29: ICD-10-CM

## 2021-05-25 DIAGNOSIS — Z86.73: ICD-10-CM

## 2021-05-25 DIAGNOSIS — R60.0: ICD-10-CM

## 2021-05-25 DIAGNOSIS — N39.0: ICD-10-CM

## 2021-05-25 DIAGNOSIS — G43.909: ICD-10-CM

## 2021-05-25 DIAGNOSIS — I50.9: ICD-10-CM

## 2021-05-25 DIAGNOSIS — Z79.899: ICD-10-CM

## 2021-05-25 DIAGNOSIS — G89.29: ICD-10-CM

## 2021-05-25 DIAGNOSIS — J45.909: ICD-10-CM

## 2021-05-25 DIAGNOSIS — G62.9: ICD-10-CM

## 2021-05-25 DIAGNOSIS — I11.0: ICD-10-CM

## 2021-06-02 ENCOUNTER — HOSPITAL ENCOUNTER (EMERGENCY)
Dept: HOSPITAL 12 - ER | Age: 61
Discharge: NURSING, REHAB OR LONG-TERM CARE | End: 2021-06-02
Payer: COMMERCIAL

## 2021-06-02 VITALS — WEIGHT: 242 LBS | HEIGHT: 62 IN | BODY MASS INDEX: 44.53 KG/M2

## 2021-06-02 DIAGNOSIS — E78.5: ICD-10-CM

## 2021-06-02 DIAGNOSIS — Z91.81: ICD-10-CM

## 2021-06-02 DIAGNOSIS — M54.5: ICD-10-CM

## 2021-06-02 DIAGNOSIS — R79.89: ICD-10-CM

## 2021-06-02 DIAGNOSIS — Z79.899: ICD-10-CM

## 2021-06-02 DIAGNOSIS — L03.116: Primary | ICD-10-CM

## 2021-06-02 DIAGNOSIS — E11.65: ICD-10-CM

## 2021-06-02 DIAGNOSIS — G89.29: ICD-10-CM

## 2021-06-02 DIAGNOSIS — I11.9: ICD-10-CM

## 2021-06-02 DIAGNOSIS — D64.9: ICD-10-CM

## 2021-06-02 DIAGNOSIS — N39.0: ICD-10-CM

## 2021-06-02 DIAGNOSIS — Z88.1: ICD-10-CM

## 2021-06-02 DIAGNOSIS — Z20.822: ICD-10-CM

## 2021-06-02 DIAGNOSIS — I69.354: ICD-10-CM

## 2021-06-02 DIAGNOSIS — Z79.84: ICD-10-CM

## 2021-06-02 DIAGNOSIS — E86.0: ICD-10-CM

## 2021-06-02 DIAGNOSIS — E66.01: ICD-10-CM

## 2021-06-02 LAB
ALP SERPL-CCNC: 123 U/L (ref 50–136)
ALT SERPL W/O P-5'-P-CCNC: 21 U/L (ref 14–59)
AST SERPL-CCNC: 11 U/L (ref 15–37)
BASOPHILS # BLD AUTO: 0.1 K/UL (ref 0–8)
BASOPHILS NFR BLD AUTO: 1.1 % (ref 0–2)
BILIRUB DIRECT SERPL-MCNC: 0.1 MG/DL (ref 0–0.2)
BILIRUB SERPL-MCNC: 0.3 MG/DL (ref 0.2–1)
BUN SERPL-MCNC: 50 MG/DL (ref 7–18)
CHLORIDE SERPL-SCNC: 96 MMOL/L (ref 98–107)
CO2 SERPL-SCNC: 31 MMOL/L (ref 21–32)
CREAT SERPL-MCNC: 1.1 MG/DL (ref 0.6–1.3)
EOSINOPHIL # BLD AUTO: 0.2 K/UL (ref 0–0.7)
EOSINOPHIL NFR BLD AUTO: 3.3 % (ref 0–7)
GLUCOSE SERPL-MCNC: 197 MG/DL (ref 74–106)
HCT VFR BLD AUTO: 27.6 % (ref 31.2–41.9)
HGB BLD-MCNC: 9 G/DL (ref 10.9–14.3)
LIPASE SERPL-CCNC: 171 U/L (ref 73–393)
LYMPHOCYTES # BLD AUTO: 1.8 K/UL (ref 20–40)
LYMPHOCYTES NFR BLD AUTO: 28.8 % (ref 20.5–51.5)
MCH RBC QN AUTO: 26.5 UUG (ref 24.7–32.8)
MCHC RBC AUTO-ENTMCNC: 33 G/DL (ref 32.3–35.6)
MCV RBC AUTO: 81.2 FL (ref 75.5–95.3)
MONOCYTES # BLD AUTO: 0.7 K/UL (ref 2–10)
MONOCYTES NFR BLD AUTO: 11.6 % (ref 0–11)
NEUTROPHILS # BLD AUTO: 3.5 K/UL (ref 1.8–8.9)
NEUTROPHILS NFR BLD AUTO: 55.2 % (ref 38.5–71.5)
PLATELET # BLD AUTO: 355 K/UL (ref 179–408)
POTASSIUM SERPL-SCNC: 4.8 MMOL/L (ref 3.5–5.1)
RBC # BLD AUTO: 3.4 MIL/UL (ref 3.63–4.92)
WBC # BLD AUTO: 6.3 K/UL (ref 3.8–11.8)
WS STN SPEC: 6.6 G/DL (ref 6.4–8.2)

## 2021-06-02 PROCEDURE — 93970 EXTREMITY STUDY: CPT

## 2021-06-02 PROCEDURE — 83605 ASSAY OF LACTIC ACID: CPT

## 2021-06-02 PROCEDURE — 80076 HEPATIC FUNCTION PANEL: CPT

## 2021-06-02 PROCEDURE — 71045 X-RAY EXAM CHEST 1 VIEW: CPT

## 2021-06-02 PROCEDURE — 99285 EMERGENCY DEPT VISIT HI MDM: CPT

## 2021-06-02 PROCEDURE — 87040 BLOOD CULTURE FOR BACTERIA: CPT

## 2021-06-02 PROCEDURE — 96366 THER/PROPH/DIAG IV INF ADDON: CPT

## 2021-06-02 PROCEDURE — 72192 CT PELVIS W/O DYE: CPT

## 2021-06-02 PROCEDURE — 36415 COLL VENOUS BLD VENIPUNCTURE: CPT

## 2021-06-02 PROCEDURE — 96375 TX/PRO/DX INJ NEW DRUG ADDON: CPT

## 2021-06-02 PROCEDURE — 85025 COMPLETE CBC W/AUTO DIFF WBC: CPT

## 2021-06-02 PROCEDURE — 93005 ELECTROCARDIOGRAM TRACING: CPT

## 2021-06-02 PROCEDURE — 85730 THROMBOPLASTIN TIME PARTIAL: CPT

## 2021-06-02 PROCEDURE — 82962 GLUCOSE BLOOD TEST: CPT

## 2021-06-02 PROCEDURE — 83690 ASSAY OF LIPASE: CPT

## 2021-06-02 PROCEDURE — 87426 SARSCOV CORONAVIRUS AG IA: CPT

## 2021-06-02 PROCEDURE — 80048 BASIC METABOLIC PNL TOTAL CA: CPT

## 2021-06-02 PROCEDURE — 94640 AIRWAY INHALATION TREATMENT: CPT

## 2021-06-02 PROCEDURE — 84484 ASSAY OF TROPONIN QUANT: CPT

## 2021-06-02 PROCEDURE — 73030 X-RAY EXAM OF SHOULDER: CPT

## 2021-06-02 PROCEDURE — 96365 THER/PROPH/DIAG IV INF INIT: CPT

## 2021-06-02 PROCEDURE — 96376 TX/PRO/DX INJ SAME DRUG ADON: CPT

## 2021-06-02 PROCEDURE — 96372 THER/PROPH/DIAG INJ SC/IM: CPT

## 2021-06-02 PROCEDURE — A4663 DIALYSIS BLOOD PRESSURE CUFF: HCPCS

## 2021-06-02 NOTE — NUR
pt will be transfered to [facility] based on [facility] decision.

## 2021-06-02 NOTE — NUR
Pt BIB  d/t mechanical fall from home. Pt c/o left hip and lower back 
pain and headache PL:10/10. A/O x4, no SOB or labored breathing. Denies KO. 
Patient states, "I was trying to walk to the kitchen and my walker fell and 
then I fell with it". Bed in lowest position, educated on safety and fall 
precautions, verbalized understanding. 



Dr. Yee at bedside for MSE.

## 2021-06-24 ENCOUNTER — HOSPITAL ENCOUNTER (INPATIENT)
Dept: HOSPITAL 54 - ER | Age: 61
LOS: 4 days | Discharge: HOME HEALTH | DRG: 190 | End: 2021-06-28
Attending: INTERNAL MEDICINE | Admitting: INTERNAL MEDICINE
Payer: COMMERCIAL

## 2021-06-24 VITALS — HEIGHT: 65 IN | WEIGHT: 221 LBS | BODY MASS INDEX: 36.82 KG/M2

## 2021-06-24 VITALS — DIASTOLIC BLOOD PRESSURE: 34 MMHG | SYSTOLIC BLOOD PRESSURE: 104 MMHG

## 2021-06-24 DIAGNOSIS — G89.4: ICD-10-CM

## 2021-06-24 DIAGNOSIS — I25.2: ICD-10-CM

## 2021-06-24 DIAGNOSIS — Z20.822: ICD-10-CM

## 2021-06-24 DIAGNOSIS — Z79.84: ICD-10-CM

## 2021-06-24 DIAGNOSIS — I69.354: ICD-10-CM

## 2021-06-24 DIAGNOSIS — E66.9: ICD-10-CM

## 2021-06-24 DIAGNOSIS — J44.1: Primary | ICD-10-CM

## 2021-06-24 DIAGNOSIS — L03.115: ICD-10-CM
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
## 2021-06-24 DIAGNOSIS — L03.116: ICD-10-CM

## 2021-06-24 DIAGNOSIS — N17.0: ICD-10-CM

## 2021-06-24 DIAGNOSIS — I87.2: ICD-10-CM

## 2021-06-24 DIAGNOSIS — I25.10: ICD-10-CM

## 2021-06-24 DIAGNOSIS — Z79.02: ICD-10-CM

## 2021-06-24 DIAGNOSIS — Z87.440: ICD-10-CM

## 2021-06-24 DIAGNOSIS — R07.9: ICD-10-CM

## 2021-06-24 DIAGNOSIS — E66.2: ICD-10-CM

## 2021-06-24 DIAGNOSIS — I34.0: ICD-10-CM

## 2021-06-24 DIAGNOSIS — D50.9: ICD-10-CM

## 2021-06-24 DIAGNOSIS — E78.5: ICD-10-CM

## 2021-06-24 DIAGNOSIS — N18.4: ICD-10-CM

## 2021-06-24 DIAGNOSIS — E11.22: ICD-10-CM

## 2021-06-24 DIAGNOSIS — I50.9: ICD-10-CM

## 2021-06-24 DIAGNOSIS — I13.0: ICD-10-CM

## 2021-06-24 LAB
ALBUMIN SERPL BCP-MCNC: 3.7 G/DL (ref 3.4–5)
ALP SERPL-CCNC: 99 U/L (ref 46–116)
ALT SERPL W P-5'-P-CCNC: 30 U/L (ref 12–78)
AST SERPL W P-5'-P-CCNC: 19 U/L (ref 15–37)
BASE EXCESS BLDA CALC-SCNC: 0.6 MMOL/L
BASOPHILS # BLD AUTO: 0.1 K/UL (ref 0–0.2)
BASOPHILS NFR BLD AUTO: 0.7 % (ref 0–2)
BILIRUB DIRECT SERPL-MCNC: 0.1 MG/DL (ref 0–0.2)
BILIRUB SERPL-MCNC: 0.6 MG/DL (ref 0.2–1)
BUN SERPL-MCNC: 52 MG/DL (ref 7–18)
CALCIUM SERPL-MCNC: 9.2 MG/DL (ref 8.5–10.1)
CHLORIDE SERPL-SCNC: 101 MMOL/L (ref 98–107)
CO2 SERPL-SCNC: 28 MMOL/L (ref 21–32)
COLOR UR: YELLOW
CREAT SERPL-MCNC: 1.8 MG/DL (ref 0.6–1.3)
DO-HGB MFR BLDA: 31.7 MMHG
EOSINOPHIL NFR BLD AUTO: 3 % (ref 0–6)
GLUCOSE SERPL-MCNC: 173 MG/DL (ref 74–106)
HCT VFR BLD AUTO: 29 % (ref 33–45)
HGB BLD-MCNC: 9.2 G/DL (ref 11.5–14.8)
INHALED O2 CONCENTRATION: 36 %
INHALED O2 FLOW RATE: 4 L/MIN (ref 0–30)
LIPASE SERPL-CCNC: 83 U/L (ref 73–393)
LYMPHOCYTES NFR BLD AUTO: 1.5 K/UL (ref 0.8–4.8)
LYMPHOCYTES NFR BLD AUTO: 15.8 % (ref 20–44)
MCHC RBC AUTO-ENTMCNC: 32 G/DL (ref 31–36)
MCV RBC AUTO: 82 FL (ref 82–100)
MONOCYTES NFR BLD AUTO: 0.8 K/UL (ref 0.1–1.3)
MONOCYTES NFR BLD AUTO: 8.6 % (ref 2–12)
NEUTROPHILS # BLD AUTO: 6.7 K/UL (ref 1.8–8.9)
NEUTROPHILS NFR BLD AUTO: 71.9 % (ref 43–81)
PCO2 TEMP ADJ BLDA: 47.4 MMHG (ref 35–45)
PH TEMP ADJ BLDA: 7.36 [PH] (ref 7.35–7.45)
PH UR STRIP: 6 [PH] (ref 5–8)
PLATELET # BLD AUTO: 333 K/UL (ref 150–450)
PO2 TEMP ADJ BLDA: 170 MMHG (ref 75–100)
POTASSIUM SERPL-SCNC: 4.8 MMOL/L (ref 3.5–5.1)
PROT SERPL-MCNC: 7.1 G/DL (ref 6.4–8.2)
RBC # BLD AUTO: 3.5 MIL/UL (ref 4–5.2)
RBC #/AREA URNS HPF: (no result) /HPF (ref 0–2)
SAO2 % BLDA: 98.1 % (ref 92–98.5)
SODIUM SERPL-SCNC: 137 MMOL/L (ref 136–145)
UROBILINOGEN UR STRIP-MCNC: 0.2 EU/DL
WBC #/AREA URNS HPF: (no result) /HPF (ref 0–3)
WBC NRBC COR # BLD AUTO: 9.3 K/UL (ref 4.3–11)

## 2021-06-24 PROCEDURE — C9803 HOPD COVID-19 SPEC COLLECT: HCPCS

## 2021-06-24 PROCEDURE — G0378 HOSPITAL OBSERVATION PER HR: HCPCS

## 2021-06-24 RX ADMIN — PANTOPRAZOLE SODIUM SCH MG: 40 TABLET, DELAYED RELEASE ORAL at 22:00

## 2021-06-24 RX ADMIN — ATORVASTATIN CALCIUM SCH MG: 10 TABLET, FILM COATED ORAL at 22:00

## 2021-06-24 NOTE — NUR
TELE RN NOTES



PT ARRIVED TO [facility] VIA GURNEY ACCOMPANIED BY EMT AND RN. PT LETHARGIC A/O X2  PT ABLE TO 
MAKE NEEDS KNOW NODS YES OR NO TO QUESTIONS. PT HAILEY TO ASSIST IN TRANSFER TO BED. PT HAS A 
DUBOIS CATHETER WITH CLEAR YELLOW URINE OUTPUT. PT IN NO RESPIRATORY DISTRESS OR PAIN AT THIS 
TIME. PT HAS IV ACCESS ON THE  RIGHT WRIST SALINE LOCKED INTACT NO REDNESS OR SWELLING 
NOTED. PT HAS BLE REDNESS AND NON PITTING EDEMA.  PT BOWEL SOUNDS PRESENT IN ALL FOUR 
QUADRANTS NO ABDOMINAL TENDERNESS DISTENTION NOTED. PT ON TELE MONITOR WITH SR. PT ORIENTED 
TO ROOM AN UNIT. CALL LIGHT PLACED WITHIN REACH BED LOCKED IN POSITION BILATERAL SIDE RAILS 
UP FOR SAFETY HOB ELEVATED. WILL CONTINUE TO MONITOR.

## 2021-06-24 NOTE — NUR
PT BIBRA FROM HOME TO ED BED 03. PER EMS REPORT, PT WAS C/O CHEST PAIN X TODAY. 
PT WAS GIVEN FULL DOSE ASPIRIN AND NITRO X 1. PT IS SLEEPING PTA ARTOUSABLE TO 
PAIN STIMULI. PLACED ON MONITOR. PLACED ON O2@2l/MIN. STABLE VITALS. NOTED BLE 
EDEMA. AWAITING MD MARIE.

## 2021-06-25 VITALS — DIASTOLIC BLOOD PRESSURE: 70 MMHG | SYSTOLIC BLOOD PRESSURE: 150 MMHG

## 2021-06-25 VITALS — SYSTOLIC BLOOD PRESSURE: 120 MMHG | DIASTOLIC BLOOD PRESSURE: 61 MMHG

## 2021-06-25 VITALS — SYSTOLIC BLOOD PRESSURE: 107 MMHG | DIASTOLIC BLOOD PRESSURE: 48 MMHG

## 2021-06-25 VITALS — SYSTOLIC BLOOD PRESSURE: 146 MMHG | DIASTOLIC BLOOD PRESSURE: 57 MMHG

## 2021-06-25 VITALS — SYSTOLIC BLOOD PRESSURE: 172 MMHG | DIASTOLIC BLOOD PRESSURE: 78 MMHG

## 2021-06-25 VITALS — DIASTOLIC BLOOD PRESSURE: 57 MMHG | SYSTOLIC BLOOD PRESSURE: 146 MMHG

## 2021-06-25 VITALS — DIASTOLIC BLOOD PRESSURE: 61 MMHG | SYSTOLIC BLOOD PRESSURE: 125 MMHG

## 2021-06-25 LAB
BASOPHILS # BLD AUTO: 0 K/UL (ref 0–0.2)
BASOPHILS NFR BLD AUTO: 0.5 % (ref 0–2)
BUN SERPL-MCNC: 54 MG/DL (ref 7–18)
CALCIUM SERPL-MCNC: 9.3 MG/DL (ref 8.5–10.1)
CHLORIDE SERPL-SCNC: 104 MMOL/L (ref 98–107)
CHOLEST SERPL-MCNC: 185 MG/DL (ref ?–200)
CO2 SERPL-SCNC: 29 MMOL/L (ref 21–32)
CREAT SERPL-MCNC: 1.8 MG/DL (ref 0.6–1.3)
EOSINOPHIL NFR BLD AUTO: 2.6 % (ref 0–6)
FERRITIN SERPL-MCNC: 59 NG/ML (ref 8–388)
GLUCOSE SERPL-MCNC: 133 MG/DL (ref 74–106)
HCT VFR BLD AUTO: 29 % (ref 33–45)
HDLC SERPL-MCNC: 38 MG/DL (ref 40–60)
HGB BLD-MCNC: 9.3 G/DL (ref 11.5–14.8)
IRON SERPL-MCNC: 36 UG/DL (ref 50–175)
LDLC SERPL DIRECT ASSAY-MCNC: 114 MG/DL (ref 0–99)
LYMPHOCYTES NFR BLD AUTO: 1.2 K/UL (ref 0.8–4.8)
LYMPHOCYTES NFR BLD AUTO: 14.6 % (ref 20–44)
MCHC RBC AUTO-ENTMCNC: 33 G/DL (ref 31–36)
MCV RBC AUTO: 82 FL (ref 82–100)
MONOCYTES NFR BLD AUTO: 0.6 K/UL (ref 0.1–1.3)
MONOCYTES NFR BLD AUTO: 7.6 % (ref 2–12)
NEUTROPHILS # BLD AUTO: 6.3 K/UL (ref 1.8–8.9)
NEUTROPHILS NFR BLD AUTO: 74.7 % (ref 43–81)
PLATELET # BLD AUTO: 303 K/UL (ref 150–450)
POTASSIUM SERPL-SCNC: 4.4 MMOL/L (ref 3.5–5.1)
RBC # BLD AUTO: 3.52 MIL/UL (ref 4–5.2)
SODIUM SERPL-SCNC: 140 MMOL/L (ref 136–145)
TIBC SERPL-MCNC: 334 UG/DL (ref 250–450)
TRIGL SERPL-MCNC: 221 MG/DL (ref 30–150)
TSH SERPL DL<=0.005 MIU/L-ACNC: 2.24 UIU/ML (ref 0.36–3.74)
WBC NRBC COR # BLD AUTO: 8.4 K/UL (ref 4.3–11)

## 2021-06-25 RX ADMIN — Medication SCH MG: at 19:23

## 2021-06-25 RX ADMIN — ALBUTEROL SULFATE SCH MG: 2.5 SOLUTION RESPIRATORY (INHALATION) at 09:00

## 2021-06-25 RX ADMIN — INSULIN HUMAN PRN UNITS: 100 INJECTION, SOLUTION PARENTERAL at 12:02

## 2021-06-25 RX ADMIN — PREGABALIN SCH MG: 25 CAPSULE ORAL at 20:39

## 2021-06-25 RX ADMIN — INSULIN HUMAN PRN UNITS: 100 INJECTION, SOLUTION PARENTERAL at 17:56

## 2021-06-25 RX ADMIN — ATORVASTATIN CALCIUM SCH MG: 10 TABLET, FILM COATED ORAL at 21:54

## 2021-06-25 RX ADMIN — Medication SCH MG: at 17:09

## 2021-06-25 RX ADMIN — Medication SCH MG: at 05:50

## 2021-06-25 RX ADMIN — Medication SCH EACH: at 17:33

## 2021-06-25 RX ADMIN — CLOPIDOGREL BISULFATE SCH MG: 75 TABLET, FILM COATED ORAL at 08:19

## 2021-06-25 RX ADMIN — Medication PRN OZ: at 17:11

## 2021-06-25 RX ADMIN — HEPARIN SODIUM SCH UNITS: 5000 INJECTION INTRAVENOUS; SUBCUTANEOUS at 20:43

## 2021-06-25 RX ADMIN — TOLTERODINE TARTRATE SCH MG: 2 CAPSULE, EXTENDED RELEASE ORAL at 08:19

## 2021-06-25 RX ADMIN — CIPROFLOXACIN SCH MG: 500 TABLET, FILM COATED ORAL at 09:12

## 2021-06-25 RX ADMIN — ALBUTEROL SULFATE SCH MG: 2.5 SOLUTION RESPIRATORY (INHALATION) at 11:18

## 2021-06-25 RX ADMIN — ALBUTEROL SULFATE SCH MG: 2.5 SOLUTION RESPIRATORY (INHALATION) at 05:46

## 2021-06-25 RX ADMIN — HUMAN INSULIN PRN UNIT: 100 INJECTION, SOLUTION SUBCUTANEOUS at 22:59

## 2021-06-25 RX ADMIN — ACETAMINOPHEN PRN MG: 160 SOLUTION ORAL at 20:44

## 2021-06-25 RX ADMIN — OXYBUTYNIN CHLORIDE SCH MG: 5 TABLET, FILM COATED, EXTENDED RELEASE ORAL at 08:19

## 2021-06-25 RX ADMIN — EZETIMIBE SCH MG: 10 TABLET ORAL at 08:19

## 2021-06-25 RX ADMIN — PANTOPRAZOLE SODIUM SCH MG: 40 TABLET, DELAYED RELEASE ORAL at 21:54

## 2021-06-25 RX ADMIN — ALBUTEROL SULFATE SCH MG: 2.5 SOLUTION RESPIRATORY (INHALATION) at 05:50

## 2021-06-25 RX ADMIN — BACLOFEN SCH MG: 10 TABLET ORAL at 12:40

## 2021-06-25 RX ADMIN — HEPARIN SODIUM SCH UNITS: 5000 INJECTION INTRAVENOUS; SUBCUTANEOUS at 08:21

## 2021-06-25 RX ADMIN — Medication SCH MG: at 09:00

## 2021-06-25 RX ADMIN — PREGABALIN SCH MG: 25 CAPSULE ORAL at 06:00

## 2021-06-25 RX ADMIN — Medication SCH OZ: at 17:12

## 2021-06-25 RX ADMIN — Medication SCH EACH: at 06:34

## 2021-06-25 RX ADMIN — PREGABALIN SCH MG: 25 CAPSULE ORAL at 12:40

## 2021-06-25 RX ADMIN — Medication SCH MG: at 09:12

## 2021-06-25 RX ADMIN — Medication SCH EACH: at 12:02

## 2021-06-25 RX ADMIN — ALBUTEROL SULFATE SCH MG: 2.5 SOLUTION RESPIRATORY (INHALATION) at 19:23

## 2021-06-25 RX ADMIN — Medication SCH MG: at 08:19

## 2021-06-25 RX ADMIN — BACLOFEN SCH MG: 10 TABLET ORAL at 06:00

## 2021-06-25 RX ADMIN — CLOTRIMAZOLE SCH GM: 1 CREAM TOPICAL at 17:11

## 2021-06-25 RX ADMIN — Medication SCH MG: at 05:46

## 2021-06-25 RX ADMIN — CIPROFLOXACIN SCH MG: 500 TABLET, FILM COATED ORAL at 20:39

## 2021-06-25 RX ADMIN — Medication SCH EACH: at 22:42

## 2021-06-25 RX ADMIN — BACLOFEN SCH MG: 10 TABLET ORAL at 20:39

## 2021-06-25 RX ADMIN — Medication SCH MG: at 11:18

## 2021-06-25 NOTE — NUR
RN TELE NOTES



CALLED PERSON TO NOTIFT TO OBTAIN CONSENT AS PT IS CONFUSED. DID NOT ANSWER PHONE LEFT A 
MESSAGE. WILL ENDORSE TO DAY SHIFT NURSE.

## 2021-06-25 NOTE — NUR
TELE RN NOTES



PT CONFUSED A/O X2 DUBOIS CATHETER WITH CLEAR YELLOW URINE OUTPUT. PT IN NO RESPIRATORY 
DISTRESS OR PAIN AT THIS TIME. PT HAS IV ACCESS ON THE  RIGHT WRIST SALINE LOCKED INTACT NO 
REDNESS OR SWELLING NOTED. PT HAS BLE REDNESS AND NON PITTING EDEMA. ALL NURSING NEEDS MET. 
CALL LIGHT PLACED WITHIN REACH BED LOCKED IN POSITION BILATERAL SIDE RAILS UP FOR SAFETY HOB 
ELEVATED. WILL ENDORSE CARE TO DAY SHIFT NURSE.

## 2021-06-25 NOTE — NUR
TELE/RN NOTES

BLOOD SUGAR 183MG/DL PATIENT REFUSED 3 UNIT REGULAR INSULIN SUBCUTANEOUS EXPLAINED THE RISK 
AND BENEFITS. WILL CONTINUE TO MONITOR.

## 2021-06-25 NOTE — NUR
WOUND CARE CONSULT: PT PRESENTS WITH REDNESS TO BILATERAL LOWER LEGS, PRESENT ON ADMISSION. 
SOME REDNESS NOTED TO BREASTFOLDS, PRESENT ON ADMISSION. RECOMMENDATIONS MADE FOR SKIN 
PROTECTION. DISCUSSED WITH NURSING STAFF. MD IN AGREEMENT WITH PLAN OF CARE. 

-------------------------------------------------------------------------------

Addendum: 06/25/21 at 1058 by CELESTINE ALLEN WNDNU

-------------------------------------------------------------------------------

Amended: Links added.

## 2021-06-25 NOTE — NUR
TELE RN NOTES



PATIENT IS ON BED ALERT AND ORIENTED X3. PATIENT IN 2L OXYGEN VIA NASAL CANNULA SATURATING 
WELL. PATIENT IN NO APPARENT RESPIRATORY DISTRESS NOTED. NO COMPLAINED OF PAIN AT THIS TIME. 
TELE MONITOR SINUS RHYTHM 82 BPM. SAFETY PRECAUTION IN PLACED. BED IN LOWEST POSITION AND 
LOCKED. CALL LIGHT WITHIN REACH. WILL CONTINUE TO MONITOR.

## 2021-06-25 NOTE — NUR
TELE/RN NOTES

BLOOD SUGAR 172MG/DL PATIENT REFUSED 3 UNIT REGULAR INSULIN SUBCUTANEOUS EXPLAINED THE RISK 
AND BENEFITS. WILL CONTINUE TO MONITOR.

## 2021-06-25 NOTE — NUR
TELE/RN  OPENING NOTES

RECEIVED PATIENT ON BED. PATIENT IS ON 2L OXYGEN VIA NASAL CANNULA. PATIENT IN NO APPARENT 
DISTRESS NOTED. NO COMPLAINED OF PAIN NOTED AT THIS TIME. TELE MONITOR READING SINUS RHYTHM 
89 BPM. WILL CONTINUE TO MONITOR.

## 2021-06-25 NOTE — NUR
TELE/RN OPENING NOTES

PATIENT IS ON BED ALERT AND ORIENTED X3. PATIENT IN 2L OXYGEN VIA NASAL CANNULA SATURATING 
WELL. PATIENT IN NO APPARENT RESPIRATORY DISTRESS NOTED. NO COMPLAINED OF PAIN AT THIS TIME. 
TELE MONITOR SINUS RHYTHM 82 BPM. SEEN AND EXAMINED BY MD WITH ORDERS MADE AND CARRIED OUT. 
ALL DUE MEDICATIONS WAS GIVEN. SAFETY PRECAUTION IN PLACED. BED IN LOWEST POSITION AND 
LOCKED. CALL LIGHT WITHIN REACH. WILL ENDORSED TO NIGHT SHIFT FOR NAHUN.

## 2021-06-25 NOTE — NUR
TELE/RN NOTES

PATIENT VERBALIZED THAT SHE DOESN'T HAVE ALLERGY WITH TYLENOL (ACETAMINOPHEN) WITNESS BY 
SEAN BURGOS.

## 2021-06-26 VITALS — DIASTOLIC BLOOD PRESSURE: 50 MMHG | SYSTOLIC BLOOD PRESSURE: 141 MMHG

## 2021-06-26 VITALS — DIASTOLIC BLOOD PRESSURE: 60 MMHG | SYSTOLIC BLOOD PRESSURE: 130 MMHG

## 2021-06-26 VITALS — SYSTOLIC BLOOD PRESSURE: 125 MMHG | DIASTOLIC BLOOD PRESSURE: 54 MMHG

## 2021-06-26 VITALS — SYSTOLIC BLOOD PRESSURE: 165 MMHG | DIASTOLIC BLOOD PRESSURE: 68 MMHG

## 2021-06-26 VITALS — SYSTOLIC BLOOD PRESSURE: 180 MMHG | DIASTOLIC BLOOD PRESSURE: 80 MMHG

## 2021-06-26 VITALS — DIASTOLIC BLOOD PRESSURE: 49 MMHG | SYSTOLIC BLOOD PRESSURE: 145 MMHG

## 2021-06-26 VITALS — DIASTOLIC BLOOD PRESSURE: 68 MMHG | SYSTOLIC BLOOD PRESSURE: 165 MMHG

## 2021-06-26 VITALS — DIASTOLIC BLOOD PRESSURE: 48 MMHG | SYSTOLIC BLOOD PRESSURE: 145 MMHG

## 2021-06-26 VITALS — SYSTOLIC BLOOD PRESSURE: 147 MMHG | DIASTOLIC BLOOD PRESSURE: 78 MMHG

## 2021-06-26 LAB
ALBUMIN SERPL BCP-MCNC: 3.6 G/DL (ref 3.4–5)
ALP SERPL-CCNC: 88 U/L (ref 46–116)
ALT SERPL W P-5'-P-CCNC: 29 U/L (ref 12–78)
AST SERPL W P-5'-P-CCNC: 18 U/L (ref 15–37)
BASOPHILS # BLD AUTO: 0 K/UL (ref 0–0.2)
BASOPHILS NFR BLD AUTO: 0.6 % (ref 0–2)
BILIRUB SERPL-MCNC: 0.7 MG/DL (ref 0.2–1)
BUN SERPL-MCNC: 37 MG/DL (ref 7–18)
CALCIUM SERPL-MCNC: 9.2 MG/DL (ref 8.5–10.1)
CHLORIDE SERPL-SCNC: 104 MMOL/L (ref 98–107)
CO2 SERPL-SCNC: 36 MMOL/L (ref 21–32)
CREAT SERPL-MCNC: 1.2 MG/DL (ref 0.6–1.3)
EOSINOPHIL NFR BLD AUTO: 0.1 % (ref 0–6)
GLUCOSE SERPL-MCNC: 169 MG/DL (ref 74–106)
HCT VFR BLD AUTO: 29 % (ref 33–45)
HGB BLD-MCNC: 9.6 G/DL (ref 11.5–14.8)
LYMPHOCYTES NFR BLD AUTO: 1.4 K/UL (ref 0.8–4.8)
LYMPHOCYTES NFR BLD AUTO: 18.3 % (ref 20–44)
MAGNESIUM SERPL-MCNC: 2.5 MG/DL (ref 1.8–2.4)
MCHC RBC AUTO-ENTMCNC: 33 G/DL (ref 31–36)
MCV RBC AUTO: 81 FL (ref 82–100)
MONOCYTES NFR BLD AUTO: 0.7 K/UL (ref 0.1–1.3)
MONOCYTES NFR BLD AUTO: 9.1 % (ref 2–12)
NEUTROPHILS # BLD AUTO: 5.4 K/UL (ref 1.8–8.9)
NEUTROPHILS NFR BLD AUTO: 71.9 % (ref 43–81)
PHOSPHATE SERPL-MCNC: 3.9 MG/DL (ref 2.5–4.9)
PLATELET # BLD AUTO: 337 K/UL (ref 150–450)
POTASSIUM SERPL-SCNC: 3.5 MMOL/L (ref 3.5–5.1)
PROT SERPL-MCNC: 7 G/DL (ref 6.4–8.2)
RBC # BLD AUTO: 3.61 MIL/UL (ref 4–5.2)
SODIUM SERPL-SCNC: 144 MMOL/L (ref 136–145)
WBC NRBC COR # BLD AUTO: 7.5 K/UL (ref 4.3–11)

## 2021-06-26 RX ADMIN — SODIUM CHLORIDE SCH MLS/HR: 9 INJECTION, SOLUTION INTRAVENOUS at 15:29

## 2021-06-26 RX ADMIN — LISINOPRIL SCH MG: 20 TABLET ORAL at 17:06

## 2021-06-26 RX ADMIN — CLOTRIMAZOLE SCH GM: 1 CREAM TOPICAL at 09:23

## 2021-06-26 RX ADMIN — EZETIMIBE SCH MG: 10 TABLET ORAL at 09:14

## 2021-06-26 RX ADMIN — PANTOPRAZOLE SODIUM SCH MG: 40 TABLET, DELAYED RELEASE ORAL at 21:57

## 2021-06-26 RX ADMIN — GUAIFENESIN PRN MG: 100 SOLUTION ORAL at 19:58

## 2021-06-26 RX ADMIN — BACLOFEN SCH MG: 10 TABLET ORAL at 20:41

## 2021-06-26 RX ADMIN — Medication SCH OZ: at 09:24

## 2021-06-26 RX ADMIN — BACLOFEN SCH MG: 10 TABLET ORAL at 04:29

## 2021-06-26 RX ADMIN — HEPARIN SODIUM SCH UNITS: 5000 INJECTION INTRAVENOUS; SUBCUTANEOUS at 20:46

## 2021-06-26 RX ADMIN — Medication SCH MG: at 09:14

## 2021-06-26 RX ADMIN — Medication SCH EACH: at 21:59

## 2021-06-26 RX ADMIN — Medication SCH MG: at 09:19

## 2021-06-26 RX ADMIN — FUROSEMIDE SCH MG: 20 TABLET ORAL at 17:05

## 2021-06-26 RX ADMIN — ACETAMINOPHEN PRN MG: 160 SOLUTION ORAL at 23:38

## 2021-06-26 RX ADMIN — PREGABALIN SCH MG: 25 CAPSULE ORAL at 12:52

## 2021-06-26 RX ADMIN — ATORVASTATIN CALCIUM SCH MG: 10 TABLET, FILM COATED ORAL at 21:57

## 2021-06-26 RX ADMIN — ACETAMINOPHEN PRN MG: 160 SOLUTION ORAL at 05:00

## 2021-06-26 RX ADMIN — CLOTRIMAZOLE SCH GM: 1 CREAM TOPICAL at 17:10

## 2021-06-26 RX ADMIN — Medication SCH EACH: at 06:53

## 2021-06-26 RX ADMIN — Medication SCH EACH: at 16:41

## 2021-06-26 RX ADMIN — Medication SCH MG: at 14:19

## 2021-06-26 RX ADMIN — HEPARIN SODIUM SCH UNITS: 5000 INJECTION INTRAVENOUS; SUBCUTANEOUS at 09:21

## 2021-06-26 RX ADMIN — CIPROFLOXACIN SCH MG: 500 TABLET, FILM COATED ORAL at 20:42

## 2021-06-26 RX ADMIN — OXYBUTYNIN CHLORIDE SCH MG: 5 TABLET, FILM COATED, EXTENDED RELEASE ORAL at 09:14

## 2021-06-26 RX ADMIN — ACETAMINOPHEN PRN MG: 160 SOLUTION ORAL at 17:06

## 2021-06-26 RX ADMIN — Medication SCH EACH: at 12:52

## 2021-06-26 RX ADMIN — CIPROFLOXACIN SCH MG: 500 TABLET, FILM COATED ORAL at 09:14

## 2021-06-26 RX ADMIN — ALBUTEROL SULFATE SCH MG: 2.5 SOLUTION RESPIRATORY (INHALATION) at 07:35

## 2021-06-26 RX ADMIN — PREGABALIN SCH MG: 25 CAPSULE ORAL at 04:29

## 2021-06-26 RX ADMIN — INSULIN HUMAN PRN UNITS: 100 INJECTION, SOLUTION PARENTERAL at 06:51

## 2021-06-26 RX ADMIN — HUMAN INSULIN PRN UNIT: 100 INJECTION, SOLUTION SUBCUTANEOUS at 21:25

## 2021-06-26 RX ADMIN — INSULIN HUMAN PRN UNITS: 100 INJECTION, SOLUTION PARENTERAL at 17:09

## 2021-06-26 RX ADMIN — ACETAMINOPHEN PRN MG: 160 SOLUTION ORAL at 10:08

## 2021-06-26 RX ADMIN — Medication PRN OZ: at 09:23

## 2021-06-26 RX ADMIN — CLOPIDOGREL BISULFATE SCH MG: 75 TABLET, FILM COATED ORAL at 09:14

## 2021-06-26 RX ADMIN — Medication SCH MG: at 07:35

## 2021-06-26 RX ADMIN — INSULIN HUMAN PRN UNITS: 100 INJECTION, SOLUTION PARENTERAL at 13:04

## 2021-06-26 RX ADMIN — PREGABALIN SCH MG: 25 CAPSULE ORAL at 20:42

## 2021-06-26 RX ADMIN — ALBUTEROL SULFATE SCH MG: 2.5 SOLUTION RESPIRATORY (INHALATION) at 20:14

## 2021-06-26 RX ADMIN — Medication SCH MG: at 20:14

## 2021-06-26 RX ADMIN — ALBUTEROL SULFATE SCH MG: 2.5 SOLUTION RESPIRATORY (INHALATION) at 14:19

## 2021-06-26 RX ADMIN — BACLOFEN SCH MG: 10 TABLET ORAL at 12:52

## 2021-06-26 RX ADMIN — TOLTERODINE TARTRATE SCH MG: 2 CAPSULE, EXTENDED RELEASE ORAL at 09:14

## 2021-06-26 RX ADMIN — Medication SCH MG: at 17:05

## 2021-06-26 RX ADMIN — FUROSEMIDE SCH MG: 20 TABLET ORAL at 09:14

## 2021-06-26 NOTE — NUR
TELE/RN CLOSING NOTE



PATIENT ON BED ALERT AND ORIENTED X3. PATIENT IN 2L OXYGEN VIA NASAL CANNULA SATURATING 
WELL. PATIENT IN NO APPARENT RESPIRATORY DISTRESS NOTED. NO COMPLAINTS OF PAIN AT THIS TIME. 
ON TELE MONITOR SR ON THE 80'S. IV ACCESS ON LEFT AC #18G IS INTACT AND PATENT. SAFETY 
PRECAUTION IN PLACED. BED IN LOWEST POSITION AND LOCKED. CALL LIGHT WITHIN REACH. WILL 
ENDORSE TO THE NEXT SHIFT FOR CONTINUITY OF CARE.

## 2021-06-26 NOTE — NUR
TELE/RN OPENING NOTE



RECEIVED PATIENT ON BED ALERT AND ORIENTED X3. PATIENT IN 2L OXYGEN VIA NASAL CANNULA 
SATURATING WELL. PATIENT IN NO APPARENT RESPIRATORY DISTRESS NOTED. NO COMPLAINTS OF PAIN AT 
THIS TIME. ON TELE MONITOR SR ON THE 80'S. IV ACCESS ON LEFT AC #18G IS INTACT WITH A 
RUNNING 1/2 NS @75ML/HR. SAFETY PRECAUTION IN PLACED. BED IN LOWEST POSITION AND LOCKED. 
CALL LIGHT WITHIN REACH. WILL CONTINUE WITH THE PLAN OF CARE.

## 2021-06-26 NOTE — NUR
TELE RN NOTES



PATIENT IS ON BED ALERT AND ORIENTED X3. PATIENT IN 2L OXYGEN VIA NASAL CANNULA SATURATING 
WELL. PATIENT IN NO APPARENT RESPIRATORY DISTRESS NOTED. NO COMPLAINED OF PAIN AT THIS TIME. 
TELE MONITOR SINUS RHYTHM 82 BPM. SAFETY PRECAUTION IN PLACED. BED IN LOWEST POSITION AND 
LOCKED. CALL LIGHT WITHIN REACH. WILL ENDORSE CARE TO DAY SHIFT NURSE.

## 2021-06-26 NOTE — NUR
TELE RN OPENING NOTES



RECEIVED PATIENT IN BED,AWAKE, A&O X 4. IN N ACUTE DISTRESS NOTED. WITH O2 VIA NC AT 2LPM 
SATURATING WELL AT 99%. NO SOB NOTED. WITH IV ACCESS ON PATIENT'S RIGHT WRIST G#18 PATENT 
AND FLUSHES WELL. NO REDNESS NOTED. NOTED WITH REDNESS ON BLE. SAFETY PRECAUTIONS OBSERVED: 
BED ON LOWEST LOCKED POSITION, SIDE RAILS UP X 2, KEPT CALL LIGHT WITHIN EASY REACH. 
INSTRUCTED TO CALL FOR ASSISTANCE WHEN NEEDED. PATIENT VERBALIZED UNDERSTANDING. WILL 
CONTINUE TO MONITOR PATIENT'S CURRENT STATUS.

## 2021-06-27 VITALS — DIASTOLIC BLOOD PRESSURE: 71 MMHG | SYSTOLIC BLOOD PRESSURE: 187 MMHG

## 2021-06-27 VITALS — DIASTOLIC BLOOD PRESSURE: 65 MMHG | SYSTOLIC BLOOD PRESSURE: 158 MMHG

## 2021-06-27 VITALS — DIASTOLIC BLOOD PRESSURE: 57 MMHG | SYSTOLIC BLOOD PRESSURE: 140 MMHG

## 2021-06-27 LAB
BASOPHILS # BLD AUTO: 0 K/UL (ref 0–0.2)
BASOPHILS NFR BLD AUTO: 0.6 % (ref 0–2)
BUN SERPL-MCNC: 31 MG/DL (ref 7–18)
CALCIUM SERPL-MCNC: 9.2 MG/DL (ref 8.5–10.1)
CHLORIDE SERPL-SCNC: 100 MMOL/L (ref 98–107)
CO2 SERPL-SCNC: 32 MMOL/L (ref 21–32)
CREAT SERPL-MCNC: 0.9 MG/DL (ref 0.6–1.3)
EOSINOPHIL NFR BLD AUTO: 0.1 % (ref 0–6)
GLUCOSE SERPL-MCNC: 212 MG/DL (ref 74–106)
HCT VFR BLD AUTO: 30 % (ref 33–45)
HGB BLD-MCNC: 9.9 G/DL (ref 11.5–14.8)
LYMPHOCYTES NFR BLD AUTO: 1.7 K/UL (ref 0.8–4.8)
LYMPHOCYTES NFR BLD AUTO: 21.3 % (ref 20–44)
MCHC RBC AUTO-ENTMCNC: 33 G/DL (ref 31–36)
MCV RBC AUTO: 80 FL (ref 82–100)
MONOCYTES NFR BLD AUTO: 1.1 K/UL (ref 0.1–1.3)
MONOCYTES NFR BLD AUTO: 13.4 % (ref 2–12)
NEUTROPHILS # BLD AUTO: 5.3 K/UL (ref 1.8–8.9)
NEUTROPHILS NFR BLD AUTO: 64.6 % (ref 43–81)
PLATELET # BLD AUTO: 377 K/UL (ref 150–450)
POTASSIUM SERPL-SCNC: 3.3 MMOL/L (ref 3.5–5.1)
RBC # BLD AUTO: 3.75 MIL/UL (ref 4–5.2)
SODIUM SERPL-SCNC: 142 MMOL/L (ref 136–145)
WBC NRBC COR # BLD AUTO: 8.1 K/UL (ref 4.3–11)

## 2021-06-27 PROCEDURE — 05H533Z INSERTION OF INFUSION DEVICE INTO RIGHT SUBCLAVIAN VEIN, PERCUTANEOUS APPROACH: ICD-10-PCS | Performed by: NURSE PRACTITIONER

## 2021-06-27 PROCEDURE — B546ZZA ULTRASONOGRAPHY OF RIGHT SUBCLAVIAN VEIN, GUIDANCE: ICD-10-PCS | Performed by: NURSE PRACTITIONER

## 2021-06-27 RX ADMIN — Medication SCH MG: at 08:19

## 2021-06-27 RX ADMIN — BACLOFEN SCH MG: 10 TABLET ORAL at 21:04

## 2021-06-27 RX ADMIN — ALBUTEROL SULFATE SCH MG: 2.5 SOLUTION RESPIRATORY (INHALATION) at 20:20

## 2021-06-27 RX ADMIN — Medication SCH MG: at 20:21

## 2021-06-27 RX ADMIN — HYDROMORPHONE HYDROCHLORIDE PRN MG: 2 TABLET ORAL at 22:04

## 2021-06-27 RX ADMIN — FUROSEMIDE SCH MG: 20 TABLET ORAL at 08:19

## 2021-06-27 RX ADMIN — LABETALOL HCL SCH MG: 100 TABLET, FILM COATED ORAL at 12:32

## 2021-06-27 RX ADMIN — ACETAMINOPHEN PRN MG: 160 SOLUTION ORAL at 16:37

## 2021-06-27 RX ADMIN — HYDROMORPHONE HYDROCHLORIDE PRN MG: 2 TABLET ORAL at 17:19

## 2021-06-27 RX ADMIN — INSULIN HUMAN PRN UNITS: 100 INJECTION, SOLUTION PARENTERAL at 11:36

## 2021-06-27 RX ADMIN — Medication SCH EACH: at 11:36

## 2021-06-27 RX ADMIN — CLOPIDOGREL BISULFATE SCH MG: 75 TABLET, FILM COATED ORAL at 08:19

## 2021-06-27 RX ADMIN — GUAIFENESIN PRN MG: 100 SOLUTION ORAL at 22:04

## 2021-06-27 RX ADMIN — Medication SCH EACH: at 22:17

## 2021-06-27 RX ADMIN — HEPARIN SODIUM SCH UNITS: 5000 INJECTION INTRAVENOUS; SUBCUTANEOUS at 08:31

## 2021-06-27 RX ADMIN — Medication SCH MG: at 08:21

## 2021-06-27 RX ADMIN — PREGABALIN SCH MG: 25 CAPSULE ORAL at 12:32

## 2021-06-27 RX ADMIN — EZETIMIBE SCH MG: 10 TABLET ORAL at 08:18

## 2021-06-27 RX ADMIN — GUAIFENESIN PRN MG: 100 SOLUTION ORAL at 10:42

## 2021-06-27 RX ADMIN — INSULIN HUMAN PRN UNITS: 100 INJECTION, SOLUTION PARENTERAL at 06:36

## 2021-06-27 RX ADMIN — ALBUTEROL SULFATE SCH MG: 2.5 SOLUTION RESPIRATORY (INHALATION) at 08:21

## 2021-06-27 RX ADMIN — ALBUTEROL SULFATE SCH MG: 2.5 SOLUTION RESPIRATORY (INHALATION) at 13:31

## 2021-06-27 RX ADMIN — INSULIN HUMAN PRN UNITS: 100 INJECTION, SOLUTION PARENTERAL at 16:39

## 2021-06-27 RX ADMIN — LISINOPRIL SCH MG: 20 TABLET ORAL at 08:20

## 2021-06-27 RX ADMIN — CLOTRIMAZOLE SCH GM: 1 CREAM TOPICAL at 16:36

## 2021-06-27 RX ADMIN — Medication SCH EACH: at 07:27

## 2021-06-27 RX ADMIN — HEPARIN SODIUM SCH UNITS: 5000 INJECTION INTRAVENOUS; SUBCUTANEOUS at 21:06

## 2021-06-27 RX ADMIN — CLOTRIMAZOLE SCH GM: 1 CREAM TOPICAL at 08:20

## 2021-06-27 RX ADMIN — Medication SCH MG: at 13:31

## 2021-06-27 RX ADMIN — Medication SCH MG: at 16:36

## 2021-06-27 RX ADMIN — TOLTERODINE TARTRATE SCH MG: 2 CAPSULE, EXTENDED RELEASE ORAL at 08:19

## 2021-06-27 RX ADMIN — PANTOPRAZOLE SODIUM SCH MG: 40 TABLET, DELAYED RELEASE ORAL at 21:33

## 2021-06-27 RX ADMIN — CIPROFLOXACIN SCH MG: 500 TABLET, FILM COATED ORAL at 08:19

## 2021-06-27 RX ADMIN — BACLOFEN SCH MG: 10 TABLET ORAL at 04:26

## 2021-06-27 RX ADMIN — Medication PRN OZ: at 08:20

## 2021-06-27 RX ADMIN — SODIUM CHLORIDE SCH MLS/HR: 9 INJECTION, SOLUTION INTRAVENOUS at 13:42

## 2021-06-27 RX ADMIN — GUAIFENESIN PRN MG: 100 SOLUTION ORAL at 16:36

## 2021-06-27 RX ADMIN — PREGABALIN SCH MG: 25 CAPSULE ORAL at 04:26

## 2021-06-27 RX ADMIN — Medication PRN MG: at 16:10

## 2021-06-27 RX ADMIN — Medication SCH EACH: at 16:39

## 2021-06-27 RX ADMIN — CIPROFLOXACIN SCH MG: 500 TABLET, FILM COATED ORAL at 21:07

## 2021-06-27 RX ADMIN — HUMAN INSULIN PRN UNIT: 100 INJECTION, SOLUTION SUBCUTANEOUS at 21:38

## 2021-06-27 RX ADMIN — Medication PRN MG: at 16:11

## 2021-06-27 RX ADMIN — HYDROMORPHONE HYDROCHLORIDE PRN MG: 2 TABLET ORAL at 08:57

## 2021-06-27 RX ADMIN — Medication PRN MG: at 16:09

## 2021-06-27 RX ADMIN — LABETALOL HCL SCH MG: 100 TABLET, FILM COATED ORAL at 08:54

## 2021-06-27 RX ADMIN — Medication SCH OZ: at 08:21

## 2021-06-27 RX ADMIN — Medication PRN MG: at 16:08

## 2021-06-27 RX ADMIN — Medication PRN MG: at 16:12

## 2021-06-27 RX ADMIN — ACETAMINOPHEN PRN MG: 160 SOLUTION ORAL at 10:42

## 2021-06-27 RX ADMIN — GUAIFENESIN PRN MG: 100 SOLUTION ORAL at 01:48

## 2021-06-27 RX ADMIN — FUROSEMIDE SCH MG: 20 TABLET ORAL at 16:36

## 2021-06-27 RX ADMIN — ATORVASTATIN CALCIUM SCH MG: 10 TABLET, FILM COATED ORAL at 21:33

## 2021-06-27 RX ADMIN — Medication PRN MG: at 16:07

## 2021-06-27 RX ADMIN — PREGABALIN SCH MG: 25 CAPSULE ORAL at 21:08

## 2021-06-27 RX ADMIN — OXYBUTYNIN CHLORIDE SCH MG: 5 TABLET, FILM COATED, EXTENDED RELEASE ORAL at 08:19

## 2021-06-27 RX ADMIN — HYDROMORPHONE HYDROCHLORIDE PRN MG: 2 TABLET ORAL at 13:16

## 2021-06-27 RX ADMIN — BACLOFEN SCH MG: 10 TABLET ORAL at 12:31

## 2021-06-27 RX ADMIN — LABETALOL HCL SCH MG: 100 TABLET, FILM COATED ORAL at 16:37

## 2021-06-27 RX ADMIN — ACETAMINOPHEN PRN MG: 160 SOLUTION ORAL at 23:03

## 2021-06-27 NOTE — NUR
TELE RN OPENING NOTES



RECEIVED PATIENT IN BED,AWAKE, A&O X 4. PATIENT IS BREATHING EVENLY AND NONLABORED IN NO 
ACUTE DISTRESS NOTED. WITH O2 VIA NC AT 2LPM SATURATING WELL AT 99%. NO SOB NOTED. PATIENT 
IS ON TELE MONITOR SHOWING SINUS RHYTHM. PATIENT HAS IV ACCESS ON PATIENT'S RIGHT WRIST G#18 
PATENT AND FLUSHES WELL. NO REDNESS NOTED. NOTED WITH REDNESS ON BLE. SAFETY PRECAUTIONS 
OBSERVED: BED ON LOWEST LOCKED POSITION, SIDE RAILS UP X 2, KEPT CALL LIGHT WITHIN EASY 
REACH. WILL CONTINUE TO MONITOR PATIENT'S

## 2021-06-27 NOTE — NUR
RN NOTE



PATIENT COMPLAINED OF IV REDNESS. IV NOTED WITH REDNESS AND LEAKING. IV REMOVED. MULTIPLE IV 
ATTEMPTS. MD WAS AT BEDSIDE, MD ORDERED MIDLINE INSERTION. MD ORDER TO DC DUBOIS CATHETER. 
WILL MONITOR FOR URINARY OUTPUT

## 2021-06-27 NOTE — NUR
RN NOTE



PATIENT ASKED FOR MILK OF MAGNESIA AND MIRALAX DUE TO FEELING CONSTIPATION, MD NOTIFIED WITH 
NEW ORDER. ORDERS NOTED AND CARRIED OUT. WILL CONTINUE TO MONITOR

## 2021-06-27 NOTE — NUR
RN NOTE



PATIENT COMPLAINED OF SEVERE PAIN, PATIENT ASKED FOR PRN PAIN MEDICATION. WILL ADMINISTER 
PRN PAIN MEDICATION. WILL CONTINUE TO MONITOR

## 2021-06-27 NOTE — NUR
RN NOTE



PATIENT STATED SHE HIT HER LEFT ELBOW WHILE DOWN IN CT. SKIN ASSESSMENT PERFORMED NOTED WITH 
SKIN TEAR. MD NOTIFIED, NNO. BAND AID  APPLIED WILL CONTINUE TO MONITOR  

-------------------------------------------------------------------------------

Addendum: 06/27/21 at 1820 by SHAWN LU RN

-------------------------------------------------------------------------------

CHARGE NURSE AWARE

## 2021-06-27 NOTE — NUR
TELE RN CLOSING NOTES



PATIENT IN BED, AWAKE, A&O X 3-4, WITH O2 VIA NC AT 2LPM, SATURATING WELL AT 97%. VS WNL. 
WITH IV ACCESS ON PATIENT'S RIGHT WRIST G#18, PATENT AND FLUSHES WELL. NO REDNESS NOTED. ON 
TELE MONITOR SHOWING NSR HEART RATE AT 80'S. DUBOIS CONNECTED TO URINE BAG DRAINING CLEAR 
YELLOW COLORED URINE. NO COMPLAINTS OF PAIN AT THIS TIME. SAFETY PRECAUTIONS OBSERVED AND 
MAINTAINED DURING THE SHIFT: BED ON LOWEST LOCKED POSITION, KEPT SIDE RAILS UPX2 AT ALL 
TIMES, KEPT CALL LIGHT WITHIN EASY REACH. WILL ENDORSE TO AM SHIFT NURSE FOR NAHUN.

## 2021-06-27 NOTE — NUR
MS RN OPENING NOTES:



RECEIVED PATIENT AWAKE IN BED, BED IN LOW POSITION, CALL LIGHTS WITHIN REACH, NO COMPLAIN OF 
PAIN AND DISCOMFORT AT THIS TIME, PATIENT IS A/OX3 BED REST, AMBULATORY WITH ASSISTANCE DUE 
TO LEFT SIDED HEMIPLEGIA,  WITH UPPER MIDLINE BS CHECK, ALL NEEDS MET, PATIENT KEPT CLEAN 
AND DRY, WILL CONTINUE TO MONITOR..

## 2021-06-27 NOTE — NUR
MS RN CLOSING NOTES



PATIENT IN BED,AWAKE, A&O X 4. PATIENT IS BREATHING EVENLY AND NONLABORED IN NO ACUTE 
DISTRESS NOTED. WITH O2 VIA NC AT 2LPM SATURATING WELL AT 99%. NO SOB NOTED.  PATIENT HAS IV 
ACCESS ON PATIENT'S RIGHT UPPER ARM  G#18 MIDLINE, PATENT AND FLUSHES WELL. NO REDNESS 
NOTED. NOTED WITH REDNESS ON BLE AND SKIN TEAR AND LEFT ELBOW. ALL MEDICATION GIVEN AS 
ORDERED. SAFETY PRECAUTIONS OBSERVED: BED ON LOWEST LOCKED POSITION, SIDE RAILS UP X 2, KEPT 
CALL LIGHT WITHIN EASY REACH. WILL ENDORSE TO ONCOMING SHIFT 

-------------------------------------------------------------------------------

Addendum: 06/27/21 at 1853 by SHAWN LU RN

-------------------------------------------------------------------------------

PATIENT STATED SHE WANTED VANCO INSTEAD OF CIPRO BECAUSE SHE FELT HER LEG WAS GETTING WORSE 
WITH REDNESS, BUT SWELLING WENT DOWN. MD NOTIFIED, NO NEW ORDER AT THIS TIME.

## 2021-06-27 NOTE — NUR
RN NOTES



PATIENT'S BLOOD PRESSURE /71. DR. KING MADE AWARE, WITH ORDER MADE AND CARRIED 
OUT.LISINOPRIL 20MG GIVEN AS ORDERED.

## 2021-06-27 NOTE — NUR
RN NOTES



CHECKED PATIENT'S BLOOD SUGAR, RESULT 195, DUE REGULAR INSULIN 3 UNITS GIVEN PER SLIDING 
SCALE.

## 2021-06-27 NOTE — NUR
RN NOTE



PATIENT COMPLAINED OF PAIN AND COUGH. PATIENT ASKED FOR TYLENOL AND PRN COUGH MEDICATION. 
WILL GIVE MEDICATION AS ORDERED. WILL CONTINUE TO MONITOR

## 2021-06-28 VITALS — SYSTOLIC BLOOD PRESSURE: 171 MMHG | DIASTOLIC BLOOD PRESSURE: 78 MMHG

## 2021-06-28 VITALS — DIASTOLIC BLOOD PRESSURE: 64 MMHG | SYSTOLIC BLOOD PRESSURE: 157 MMHG

## 2021-06-28 LAB
BASOPHILS # BLD AUTO: 0.1 K/UL (ref 0–0.2)
BASOPHILS NFR BLD AUTO: 0.8 % (ref 0–2)
BUN SERPL-MCNC: 24 MG/DL (ref 7–18)
CALCIUM SERPL-MCNC: 9 MG/DL (ref 8.5–10.1)
CHLORIDE SERPL-SCNC: 101 MMOL/L (ref 98–107)
CO2 SERPL-SCNC: 33 MMOL/L (ref 21–32)
CREAT SERPL-MCNC: 0.8 MG/DL (ref 0.6–1.3)
EOSINOPHIL NFR BLD AUTO: 0.5 % (ref 0–6)
GLUCOSE SERPL-MCNC: 180 MG/DL (ref 74–106)
HCT VFR BLD AUTO: 29 % (ref 33–45)
HGB BLD-MCNC: 9.6 G/DL (ref 11.5–14.8)
LYMPHOCYTES NFR BLD AUTO: 3.2 K/UL (ref 0.8–4.8)
LYMPHOCYTES NFR BLD AUTO: 32.8 % (ref 20–44)
MCHC RBC AUTO-ENTMCNC: 33 G/DL (ref 31–36)
MCV RBC AUTO: 81 FL (ref 82–100)
MONOCYTES NFR BLD AUTO: 1.1 K/UL (ref 0.1–1.3)
MONOCYTES NFR BLD AUTO: 11.5 % (ref 2–12)
NEUTROPHILS # BLD AUTO: 5.4 K/UL (ref 1.8–8.9)
NEUTROPHILS NFR BLD AUTO: 54.4 % (ref 43–81)
PLATELET # BLD AUTO: 339 K/UL (ref 150–450)
POTASSIUM SERPL-SCNC: 3.7 MMOL/L (ref 3.5–5.1)
RBC # BLD AUTO: 3.57 MIL/UL (ref 4–5.2)
SODIUM SERPL-SCNC: 140 MMOL/L (ref 136–145)
WBC NRBC COR # BLD AUTO: 9.9 K/UL (ref 4.3–11)

## 2021-06-28 RX ADMIN — HEPARIN SODIUM SCH UNITS: 5000 INJECTION INTRAVENOUS; SUBCUTANEOUS at 08:37

## 2021-06-28 RX ADMIN — CLOTRIMAZOLE SCH GM: 1 CREAM TOPICAL at 16:16

## 2021-06-28 RX ADMIN — ACETAMINOPHEN PRN MG: 160 SOLUTION ORAL at 05:44

## 2021-06-28 RX ADMIN — Medication SCH MG: at 01:37

## 2021-06-28 RX ADMIN — ALBUTEROL SULFATE SCH MG: 2.5 SOLUTION RESPIRATORY (INHALATION) at 08:10

## 2021-06-28 RX ADMIN — LABETALOL HCL SCH MG: 100 TABLET, FILM COATED ORAL at 08:40

## 2021-06-28 RX ADMIN — FUROSEMIDE SCH MG: 20 TABLET ORAL at 08:39

## 2021-06-28 RX ADMIN — Medication SCH EACH: at 07:33

## 2021-06-28 RX ADMIN — ACETAMINOPHEN PRN MG: 160 SOLUTION ORAL at 11:42

## 2021-06-28 RX ADMIN — GUAIFENESIN PRN MG: 100 SOLUTION ORAL at 05:44

## 2021-06-28 RX ADMIN — ALBUTEROL SULFATE SCH MG: 2.5 SOLUTION RESPIRATORY (INHALATION) at 14:21

## 2021-06-28 RX ADMIN — CIPROFLOXACIN SCH MG: 500 TABLET, FILM COATED ORAL at 08:39

## 2021-06-28 RX ADMIN — EZETIMIBE SCH MG: 10 TABLET ORAL at 08:40

## 2021-06-28 RX ADMIN — BACLOFEN SCH MG: 10 TABLET ORAL at 12:17

## 2021-06-28 RX ADMIN — PREGABALIN SCH MG: 25 CAPSULE ORAL at 05:37

## 2021-06-28 RX ADMIN — LISINOPRIL SCH MG: 20 TABLET ORAL at 08:40

## 2021-06-28 RX ADMIN — LACTULOSE SCH G: 10 SOLUTION ORAL at 17:00

## 2021-06-28 RX ADMIN — CLOTRIMAZOLE SCH GM: 1 CREAM TOPICAL at 08:41

## 2021-06-28 RX ADMIN — Medication SCH MG: at 14:21

## 2021-06-28 RX ADMIN — LABETALOL HCL SCH MG: 100 TABLET, FILM COATED ORAL at 16:15

## 2021-06-28 RX ADMIN — LABETALOL HCL SCH MG: 100 TABLET, FILM COATED ORAL at 12:18

## 2021-06-28 RX ADMIN — Medication SCH OZ: at 08:42

## 2021-06-28 RX ADMIN — INSULIN HUMAN PRN UNITS: 100 INJECTION, SOLUTION PARENTERAL at 11:11

## 2021-06-28 RX ADMIN — FUROSEMIDE SCH MG: 20 TABLET ORAL at 16:16

## 2021-06-28 RX ADMIN — GUAIFENESIN PRN MG: 100 SOLUTION ORAL at 11:42

## 2021-06-28 RX ADMIN — LACTULOSE SCH G: 10 SOLUTION ORAL at 09:40

## 2021-06-28 RX ADMIN — Medication SCH MG: at 08:54

## 2021-06-28 RX ADMIN — HYDROMORPHONE HYDROCHLORIDE PRN MG: 2 TABLET ORAL at 03:58

## 2021-06-28 RX ADMIN — HYDROMORPHONE HYDROCHLORIDE PRN MG: 2 TABLET ORAL at 08:54

## 2021-06-28 RX ADMIN — HYDROMORPHONE HYDROCHLORIDE PRN MG: 2 TABLET ORAL at 16:16

## 2021-06-28 RX ADMIN — Medication SCH MG: at 16:16

## 2021-06-28 RX ADMIN — Medication SCH MG: at 08:39

## 2021-06-28 RX ADMIN — PREGABALIN SCH MG: 25 CAPSULE ORAL at 12:17

## 2021-06-28 RX ADMIN — INSULIN HUMAN PRN UNITS: 100 INJECTION, SOLUTION PARENTERAL at 16:41

## 2021-06-28 RX ADMIN — Medication SCH MG: at 08:10

## 2021-06-28 RX ADMIN — Medication SCH EACH: at 11:14

## 2021-06-28 RX ADMIN — BACLOFEN SCH MG: 10 TABLET ORAL at 05:34

## 2021-06-28 RX ADMIN — INSULIN HUMAN PRN UNITS: 100 INJECTION, SOLUTION PARENTERAL at 06:44

## 2021-06-28 RX ADMIN — SODIUM CHLORIDE SCH MLS/HR: 9 INJECTION, SOLUTION INTRAVENOUS at 13:38

## 2021-06-28 RX ADMIN — Medication SCH MG: at 08:40

## 2021-06-28 RX ADMIN — OXYBUTYNIN CHLORIDE SCH MG: 5 TABLET, FILM COATED, EXTENDED RELEASE ORAL at 08:40

## 2021-06-28 RX ADMIN — ALBUTEROL SULFATE SCH MG: 2.5 SOLUTION RESPIRATORY (INHALATION) at 01:37

## 2021-06-28 RX ADMIN — Medication SCH EACH: at 16:40

## 2021-06-28 RX ADMIN — TOLTERODINE TARTRATE SCH MG: 2 CAPSULE, EXTENDED RELEASE ORAL at 08:39

## 2021-06-28 RX ADMIN — CLOPIDOGREL BISULFATE SCH MG: 75 TABLET, FILM COATED ORAL at 08:39

## 2021-06-28 NOTE — NUR
MS RN OPENING NOTES



RECEIVED PATIENT IN BED,AWAKE, A&O X 4. PATIENT IS BREATHING EVENLY AND NONLABORED IN NO 
ACUTE DISTRESS NOTED. WITH O2 VIA NC AT 2LPM SATURATING WELL AT 99%. NO SOB NOTED. PATIENT 
IS ON TELE MONITOR SHOWING SINUS RHYTHM. PATIENT HAS IV ACCESS ON PATIENT'S RIGHT UPPER ARM 
MIDLINE PATENT AND FLUSHES WELL. NO REDNESS NOTED. NOTED WITH REDNESS ON BLE AND LEFT ELBOW 
SKIN TEAR. SAFETY PRECAUTIONS OBSERVED: BED ON LOWEST LOCKED POSITION, SIDE RAILS UP X 2, 
KEPT CALL LIGHT WITHIN EASY REACH. WILL CONTINUE TO MONITOR

## 2021-06-28 NOTE — NUR
RN NOTE



PATIENT STATED SHE DID NOT FEEL COMFORTABLE GOING HOME DUE TO ELEVATED HIGH BLOOD PRESSURE. 
BLOOD PRESSURE /78 DURING ROUNDS. MD NOTIFIED WITH NEW ORDER FOR NORVASC 5MG NOW. 
WILL RECHECK BLOOD PRESSURE AND LET MD KNOW

## 2021-06-28 NOTE — NUR
RN NOTE



PATIENT WAS NOTED WITH TEMPERATURE 99.4 UPON MORNING ROUNDS. PATIENT STATED SHE FELT CHILLS. 
COOLING MEASURES IMPLEMENTED AND MD NOTIFIED. WILL CONTINUE TO MONITOR

## 2021-06-28 NOTE — NUR
RN DISCHARGE NOTE



RECEIVED ORDER FOR DISCHARGE PATIENT IS A/O X4, ABLE TO MAKE NEEDS KNOWN. PATIENT IS 
BREATHING EVENLY AND NONLABORED ON ROOM AIR. NO SIGNS OF DISTRESS NOTED. PATIENT DOES NOT 
COMPLAIN OF PAIN. PATIENT WAS GIVEN DISCHARGE INSTRUCTIONS BOTH VERBALLY AND IN WRITTEN 
FORM. VERBALIZED UNDERSTANDING. PATIENT'S BLOOD PRESSURE REDUCED /78 AFTER MEDICATION 
WAS GIVEN MD AWARE. PATIENT VERBALIZED THE IMPORTANCE OF FOLLOWING UP WITH PRIMARY MD TO 
ENSURE BLOOD PRESSURE MEDICATION IS MONITORED. PATIENT'S IV ACCESS AND ID WAS REMOVED. 
PATIENT LEFT IN STABLE CONDITION VIA AMBULANCE 2 EMTS PRESENT.

## 2021-06-28 NOTE — NUR
MS RN CLOSING NOTE: 



PATIENT IN BED  AWAKE, AA/OX4, NO COMPLAIN OF PAIN AND DISCOMFORT AT THiS TIME, BED IN LOW 
POSITION, CALL LIGHTS WITHIN REACH, WITH R U MIDLINE #18 SL, PATIENT IS BED  REST BUT 
AMBULATE TO THE BATHROOM USING WALKER WITH SUPERVISION, ALL NEEDS MET, KEPT CLEAN AND DRY, 
ENDORSE TO INCOMING SHIFT.

## 2021-09-17 ENCOUNTER — HOSPITAL ENCOUNTER (EMERGENCY)
Dept: HOSPITAL 54 - ER | Age: 61
Discharge: HOME | End: 2021-09-17
Payer: COMMERCIAL

## 2021-09-17 VITALS — SYSTOLIC BLOOD PRESSURE: 135 MMHG | DIASTOLIC BLOOD PRESSURE: 88 MMHG

## 2021-09-17 VITALS — WEIGHT: 240 LBS | BODY MASS INDEX: 44.16 KG/M2 | HEIGHT: 62 IN

## 2021-09-17 DIAGNOSIS — E11.9: ICD-10-CM

## 2021-09-17 DIAGNOSIS — Z88.5: ICD-10-CM

## 2021-09-17 DIAGNOSIS — Z79.899: ICD-10-CM

## 2021-09-17 DIAGNOSIS — I50.9: ICD-10-CM

## 2021-09-17 DIAGNOSIS — G89.29: Primary | ICD-10-CM

## 2021-09-17 DIAGNOSIS — Z87.440: ICD-10-CM

## 2021-09-17 DIAGNOSIS — J45.909: ICD-10-CM

## 2021-09-17 DIAGNOSIS — I25.10: ICD-10-CM

## 2021-09-17 DIAGNOSIS — Z86.73: ICD-10-CM

## 2021-09-17 DIAGNOSIS — Z88.1: ICD-10-CM

## 2021-09-17 DIAGNOSIS — Z76.5: ICD-10-CM

## 2021-09-17 DIAGNOSIS — E66.01: ICD-10-CM

## 2021-09-17 DIAGNOSIS — I11.0: ICD-10-CM

## 2021-09-17 PROCEDURE — 99284 EMERGENCY DEPT VISIT MOD MDM: CPT

## 2021-09-17 PROCEDURE — 96372 THER/PROPH/DIAG INJ SC/IM: CPT

## 2021-09-17 NOTE — NUR
Patient came in to the er c/o Left leg worsening pain and swelling; 10/10. On 
room air, breathing evenly and unlabored, connected to the monitor and pulse 
ox. kept comfortable, will continue to monitor accordingly.

## 2021-09-17 NOTE — NUR
pain meds - dilaudid and toradol given as ordered

Valium IM - also given as ordered by Dr. Cárdenas

## 2021-09-17 NOTE — NUR
CALLED [facility]. PT WILL BE PICKED UP BY BLS TRANSPORT IN 75-90 MIN AS OF 
1307 TO HOME.

## 2021-09-21 ENCOUNTER — HOSPITAL ENCOUNTER (INPATIENT)
Dept: HOSPITAL 54 - ER | Age: 61
LOS: 2 days | Discharge: NURSING, REHAB OR LONG-TERM CARE | DRG: 683 | End: 2021-09-23
Attending: INTERNAL MEDICINE | Admitting: INTERNAL MEDICINE
Payer: COMMERCIAL

## 2021-09-21 VITALS — HEIGHT: 62 IN | WEIGHT: 254 LBS | BODY MASS INDEX: 46.74 KG/M2

## 2021-09-21 DIAGNOSIS — G89.4: ICD-10-CM

## 2021-09-21 DIAGNOSIS — Z99.81: ICD-10-CM

## 2021-09-21 DIAGNOSIS — E66.9: ICD-10-CM
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
## 2021-09-21 DIAGNOSIS — G47.33: ICD-10-CM

## 2021-09-21 DIAGNOSIS — N17.9: Primary | ICD-10-CM

## 2021-09-21 DIAGNOSIS — E87.2: ICD-10-CM

## 2021-09-21 DIAGNOSIS — Z20.822: ICD-10-CM

## 2021-09-21 DIAGNOSIS — I69.354: ICD-10-CM

## 2021-09-21 DIAGNOSIS — I25.10: ICD-10-CM

## 2021-09-21 DIAGNOSIS — Z79.84: ICD-10-CM

## 2021-09-21 DIAGNOSIS — T50.1X5A: ICD-10-CM

## 2021-09-21 DIAGNOSIS — L03.116: ICD-10-CM

## 2021-09-21 DIAGNOSIS — N39.0: ICD-10-CM

## 2021-09-21 DIAGNOSIS — I12.9: ICD-10-CM

## 2021-09-21 DIAGNOSIS — N18.9: ICD-10-CM

## 2021-09-21 DIAGNOSIS — E11.22: ICD-10-CM

## 2021-09-21 DIAGNOSIS — I87.2: ICD-10-CM

## 2021-09-21 DIAGNOSIS — E86.0: ICD-10-CM

## 2021-09-21 DIAGNOSIS — D64.9: ICD-10-CM

## 2021-09-21 DIAGNOSIS — J44.9: ICD-10-CM

## 2021-09-21 DIAGNOSIS — E78.5: ICD-10-CM

## 2021-09-21 DIAGNOSIS — Z87.440: ICD-10-CM

## 2021-09-21 PROCEDURE — G0480 DRUG TEST DEF 1-7 CLASSES: HCPCS

## 2021-09-21 PROCEDURE — G0378 HOSPITAL OBSERVATION PER HR: HCPCS

## 2021-09-21 PROCEDURE — C9803 HOPD COVID-19 SPEC COLLECT: HCPCS

## 2021-09-21 NOTE — NUR
[id number] FROM HOME WITH C/O CHRONIC NECK AND BACK PAIN. +LEFT LEG PAIN +SWELLING 
PRESENT &DISCOLORATION NOTED.

## 2021-09-22 VITALS — DIASTOLIC BLOOD PRESSURE: 48 MMHG | SYSTOLIC BLOOD PRESSURE: 136 MMHG

## 2021-09-22 VITALS — SYSTOLIC BLOOD PRESSURE: 129 MMHG | DIASTOLIC BLOOD PRESSURE: 65 MMHG

## 2021-09-22 VITALS — SYSTOLIC BLOOD PRESSURE: 121 MMHG | DIASTOLIC BLOOD PRESSURE: 68 MMHG

## 2021-09-22 LAB
ALBUMIN SERPL BCP-MCNC: 3.9 G/DL (ref 3.4–5)
ALP SERPL-CCNC: 88 U/L (ref 46–116)
ALT SERPL W P-5'-P-CCNC: 27 U/L (ref 12–78)
AST SERPL W P-5'-P-CCNC: 17 U/L (ref 15–37)
BASOPHILS # BLD AUTO: 0.1 K/UL (ref 0–0.2)
BASOPHILS NFR BLD AUTO: 0.6 % (ref 0–2)
BILIRUB DIRECT SERPL-MCNC: 0.1 MG/DL (ref 0–0.2)
BILIRUB SERPL-MCNC: 0.4 MG/DL (ref 0.2–1)
BILIRUB UR QL STRIP: NEGATIVE
BUN SERPL-MCNC: 53 MG/DL (ref 7–18)
CALCIUM SERPL-MCNC: 9.4 MG/DL (ref 8.5–10.1)
CHLORIDE SERPL-SCNC: 94 MMOL/L (ref 98–107)
CO2 SERPL-SCNC: 28 MMOL/L (ref 21–32)
COLOR UR: YELLOW
CREAT SERPL-MCNC: 2.3 MG/DL (ref 0.6–1.3)
EOSINOPHIL NFR BLD AUTO: 1.2 % (ref 0–6)
GLUCOSE SERPL-MCNC: 100 MG/DL (ref 74–106)
GLUCOSE UR STRIP-MCNC: NEGATIVE MG/DL
HCT VFR BLD AUTO: 28 % (ref 33–45)
HGB BLD-MCNC: 9 G/DL (ref 11.5–14.8)
LEUKOCYTE ESTERASE UR QL STRIP: (no result)
LYMPHOCYTES NFR BLD AUTO: 1.3 K/UL (ref 0.8–4.8)
LYMPHOCYTES NFR BLD AUTO: 9 % (ref 20–44)
MCHC RBC AUTO-ENTMCNC: 32 G/DL (ref 31–36)
MCV RBC AUTO: 85 FL (ref 82–100)
MONOCYTES NFR BLD AUTO: 0.7 K/UL (ref 0.1–1.3)
MONOCYTES NFR BLD AUTO: 5 % (ref 2–12)
NEUTROPHILS # BLD AUTO: 11.7 K/UL (ref 1.8–8.9)
NEUTROPHILS NFR BLD AUTO: 84.2 % (ref 43–81)
NITRITE UR QL STRIP: NEGATIVE
PH UR STRIP: 5.5 [PH] (ref 5–8)
PLATELET # BLD AUTO: 347 K/UL (ref 150–450)
POTASSIUM SERPL-SCNC: 4.5 MMOL/L (ref 3.5–5.1)
PROT SERPL-MCNC: 7.2 G/DL (ref 6.4–8.2)
PROT UR QL STRIP: NEGATIVE MG/DL
RBC # BLD AUTO: 3.35 MIL/UL (ref 4–5.2)
RBC #/AREA URNS HPF: (no result) /HPF (ref 0–2)
SODIUM SERPL-SCNC: 137 MMOL/L (ref 136–145)
UROBILINOGEN UR STRIP-MCNC: 0.2 EU/DL
WBC #/AREA URNS HPF: (no result) /HPF (ref 0–3)
WBC NRBC COR # BLD AUTO: 13.9 K/UL (ref 4.3–11)

## 2021-09-22 RX ADMIN — CIPROFLOXACIN SCH MG: 250 TABLET, FILM COATED ORAL at 17:15

## 2021-09-22 RX ADMIN — HYDROMORPHONE HYDROCHLORIDE PRN MG: 2 TABLET ORAL at 18:34

## 2021-09-22 RX ADMIN — Medication SCH EACH: at 17:21

## 2021-09-22 RX ADMIN — BACLOFEN SCH MG: 10 TABLET ORAL at 12:34

## 2021-09-22 RX ADMIN — SODIUM CHLORIDE PRN MLS/HR: 4.5 INJECTION, SOLUTION INTRAVENOUS at 12:22

## 2021-09-22 RX ADMIN — PREGABALIN SCH MG: 25 CAPSULE ORAL at 17:15

## 2021-09-22 RX ADMIN — Medication SCH EACH: at 21:57

## 2021-09-22 RX ADMIN — TOLTERODINE TARTRATE SCH MG: 2 CAPSULE, EXTENDED RELEASE ORAL at 12:33

## 2021-09-22 RX ADMIN — SODIUM CHLORIDE PRN MLS/HR: 4.5 INJECTION, SOLUTION INTRAVENOUS at 23:12

## 2021-09-22 RX ADMIN — CLOPIDOGREL BISULFATE SCH MG: 75 TABLET, FILM COATED ORAL at 12:34

## 2021-09-22 RX ADMIN — LABETALOL HCL SCH MG: 100 TABLET, FILM COATED ORAL at 17:16

## 2021-09-22 RX ADMIN — HYDROMORPHONE HYDROCHLORIDE PRN MG: 2 TABLET ORAL at 12:34

## 2021-09-22 RX ADMIN — BACLOFEN SCH MG: 10 TABLET ORAL at 21:22

## 2021-09-22 RX ADMIN — DIPHENOXYLATE HYDROCHLORIDE AND ATROPINE SULFATE SCH UDTAB: .025; 2.5 TABLET ORAL at 17:00

## 2021-09-22 NOTE — NUR
MS ADMIT FROM ER



AFTER REPORT RECEIVED FROM HUA BURGOS. PATIENT ORIENTED TO PRIMARY RN. UNIT, ROOM, BED, AND 
UNIT POLICIES REGARDING PATIENT CARE AND VISITING HOURS. PATIENT WEIGHED BY BEDSCALE AND 
ENCOURAGED TO CALL IF THEY NEED SOMETHING. ALL QUESTIONS AND CONCERNS ADDRESSED, PATIENT 
VERBALIZED UNDERSTANDING.

## 2021-09-22 NOTE — NUR
MS RN NOTES

RECEIVED LAYING COMFORTABLY ON BED,WITH HOB ELEVATED.BREATHING NON LABORED,O2 2L/NC IN USED 
TO KEEP O2 SAT ABOVE 90%.A/O X 4,ABLE TO MAKE NEEDS KNOWN,PRESENT IVF INFUSING WELL VIA IV 
PUMP ON RIGHT AC SALINE LOCK.NO COMPLAINTS AT THE MOMENT.CALL LIGHT IN REACH NEEDS 
ANTICIPATED.

## 2021-09-22 NOTE — NUR
CHANGE OF SHIFT REPORT



PT RESTING COMFORTABLY IN BED. NO S/S OR C/O PAIN OR DISTRESS NOTED. SIDE RAILS UP X2, CALL 
LIGHT LEFT WITHIN REACH. PT KEPT CLEAN, DRY, AND COMFORTABLE. NO SIGNIFICANT CHANGES SINCE 
PREVIOUS SHIFT. WILL GIVE REPORT TO NOC RN.

-------------------------------------------------------------------------------

Addendum: 09/22/21 at 1941 by CESARIO CORTEZ RN

-------------------------------------------------------------------------------

NO SIGNIFICANT CHANGES SINCE ADMISSION

## 2021-09-22 NOTE — NUR
MS RN NOTES  ACCU-CHECK

BLOOD SUGAR CHECK 187MG/DL,COVERED WITH HUMULIN R 3UNITS PER SLIDING SCALE.

## 2021-09-22 NOTE — NUR
-------------------------------------------------------------------------------

            *** Note undone in [facility] - 09/22/21 at 0200 by URBANO ***             

-------------------------------------------------------------------------------

PHLEBOTOMIST AT BEDSIDE

## 2021-09-23 VITALS — DIASTOLIC BLOOD PRESSURE: 63 MMHG | SYSTOLIC BLOOD PRESSURE: 165 MMHG

## 2021-09-23 VITALS — DIASTOLIC BLOOD PRESSURE: 56 MMHG | SYSTOLIC BLOOD PRESSURE: 121 MMHG

## 2021-09-23 VITALS — DIASTOLIC BLOOD PRESSURE: 56 MMHG | SYSTOLIC BLOOD PRESSURE: 155 MMHG

## 2021-09-23 VITALS — SYSTOLIC BLOOD PRESSURE: 121 MMHG | DIASTOLIC BLOOD PRESSURE: 56 MMHG

## 2021-09-23 VITALS — SYSTOLIC BLOOD PRESSURE: 165 MMHG | DIASTOLIC BLOOD PRESSURE: 63 MMHG

## 2021-09-23 LAB
ALBUMIN SERPL BCP-MCNC: 3.3 G/DL (ref 3.4–5)
ALP SERPL-CCNC: 72 U/L (ref 46–116)
ALT SERPL W P-5'-P-CCNC: 26 U/L (ref 12–78)
AST SERPL W P-5'-P-CCNC: 15 U/L (ref 15–37)
BASOPHILS # BLD AUTO: 0.1 K/UL (ref 0–0.2)
BASOPHILS NFR BLD AUTO: 1 % (ref 0–2)
BILIRUB SERPL-MCNC: 0.7 MG/DL (ref 0.2–1)
BUN SERPL-MCNC: 25 MG/DL (ref 7–18)
CALCIUM SERPL-MCNC: 8.6 MG/DL (ref 8.5–10.1)
CHLORIDE SERPL-SCNC: 99 MMOL/L (ref 98–107)
CO2 SERPL-SCNC: 36 MMOL/L (ref 21–32)
CREAT SERPL-MCNC: 0.9 MG/DL (ref 0.6–1.3)
EOSINOPHIL NFR BLD AUTO: 3.7 % (ref 0–6)
GLUCOSE SERPL-MCNC: 117 MG/DL (ref 74–106)
HCT VFR BLD AUTO: 27 % (ref 33–45)
HGB BLD-MCNC: 8.6 G/DL (ref 11.5–14.8)
LYMPHOCYTES NFR BLD AUTO: 2 K/UL (ref 0.8–4.8)
LYMPHOCYTES NFR BLD AUTO: 27.9 % (ref 20–44)
MAGNESIUM SERPL-MCNC: 2.2 MG/DL (ref 1.8–2.4)
MCHC RBC AUTO-ENTMCNC: 32 G/DL (ref 31–36)
MCV RBC AUTO: 85 FL (ref 82–100)
MONOCYTES NFR BLD AUTO: 0.8 K/UL (ref 0.1–1.3)
MONOCYTES NFR BLD AUTO: 11.5 % (ref 2–12)
NEUTROPHILS # BLD AUTO: 4 K/UL (ref 1.8–8.9)
NEUTROPHILS NFR BLD AUTO: 55.9 % (ref 43–81)
PHOSPHATE SERPL-MCNC: 2.7 MG/DL (ref 2.5–4.9)
PLATELET # BLD AUTO: 301 K/UL (ref 150–450)
POTASSIUM SERPL-SCNC: 3.9 MMOL/L (ref 3.5–5.1)
PROT SERPL-MCNC: 6.4 G/DL (ref 6.4–8.2)
RBC # BLD AUTO: 3.14 MIL/UL (ref 4–5.2)
SODIUM SERPL-SCNC: 135 MMOL/L (ref 136–145)
WBC NRBC COR # BLD AUTO: 7.2 K/UL (ref 4.3–11)

## 2021-09-23 RX ADMIN — Medication SCH EACH: at 17:30

## 2021-09-23 RX ADMIN — CIPROFLOXACIN SCH MG: 250 TABLET, FILM COATED ORAL at 17:05

## 2021-09-23 RX ADMIN — HYDROMORPHONE HYDROCHLORIDE PRN MG: 2 TABLET ORAL at 14:43

## 2021-09-23 RX ADMIN — Medication SCH EACH: at 05:49

## 2021-09-23 RX ADMIN — DIPHENOXYLATE HYDROCHLORIDE AND ATROPINE SULFATE SCH UDTAB: .025; 2.5 TABLET ORAL at 17:00

## 2021-09-23 RX ADMIN — TOLTERODINE TARTRATE SCH MG: 2 CAPSULE, EXTENDED RELEASE ORAL at 08:26

## 2021-09-23 RX ADMIN — ACETAMINOPHEN PRN MG: 325 TABLET ORAL at 17:04

## 2021-09-23 RX ADMIN — ALBUTEROL SULFATE SCH MG: 1.25 SOLUTION RESPIRATORY (INHALATION) at 15:01

## 2021-09-23 RX ADMIN — HYDROMORPHONE HYDROCHLORIDE PRN MG: 2 TABLET ORAL at 08:29

## 2021-09-23 RX ADMIN — CLOPIDOGREL BISULFATE SCH MG: 75 TABLET, FILM COATED ORAL at 08:31

## 2021-09-23 RX ADMIN — CIPROFLOXACIN SCH MG: 250 TABLET, FILM COATED ORAL at 08:32

## 2021-09-23 RX ADMIN — PREGABALIN SCH MG: 25 CAPSULE ORAL at 17:02

## 2021-09-23 RX ADMIN — BACLOFEN SCH MG: 10 TABLET ORAL at 05:13

## 2021-09-23 RX ADMIN — ACETAMINOPHEN PRN MG: 325 TABLET ORAL at 00:06

## 2021-09-23 RX ADMIN — PREGABALIN SCH MG: 25 CAPSULE ORAL at 08:33

## 2021-09-23 RX ADMIN — LABETALOL HCL SCH MG: 100 TABLET, FILM COATED ORAL at 08:32

## 2021-09-23 RX ADMIN — BACLOFEN SCH MG: 10 TABLET ORAL at 14:41

## 2021-09-23 RX ADMIN — Medication SCH EACH: at 14:40

## 2021-09-23 RX ADMIN — HYDROMORPHONE HYDROCHLORIDE PRN MG: 2 TABLET ORAL at 00:51

## 2021-09-23 RX ADMIN — DIPHENOXYLATE HYDROCHLORIDE AND ATROPINE SULFATE SCH UDTAB: .025; 2.5 TABLET ORAL at 09:00

## 2021-09-23 RX ADMIN — ALBUTEROL SULFATE SCH MG: 1.25 SOLUTION RESPIRATORY (INHALATION) at 07:35

## 2021-09-23 RX ADMIN — LABETALOL HCL SCH MG: 100 TABLET, FILM COATED ORAL at 18:16

## 2021-09-23 NOTE — NUR
MS/RN OPENING NOTES

RECEIVED PATIENT ON BED, AWAKE AND A/O X4. ON O2 AT 2LPM TOLERATING WELL. NOT IN DISTRESS. 
ON TELE MONITOR CURRENTLY READING SR AT 82BPM. WITH NO COMPLAINTS OF PAIN AT THIS TIME. WITH 
IV ACCESS AT RIGHT AC, SALINE LOCKED. SAFETY MEASURES IN PLACED. CALL LIGHT WITHIN REACH. 
BED ON LOWEST AND LOCKED POSITION WITH SIDE RAILS UP X2. WILL CONTINUE TO MONITOR.

-------------------------------------------------------------------------------

Addendum: 09/23/21 at 2102 by AL RUSHING RN

-------------------------------------------------------------------------------

DOCUMENTED ON WRONG PATIENT

## 2021-09-23 NOTE — NUR
TELE RN NOTES

FAIRLY RESTED AT NIGHT,PAIN TOLERABLE AT THE MOMENT.LEFT REMAINS REDDISH AND SWOLLEN.IN NO 
ACUTE DISTRESS.

## 2021-09-23 NOTE — NUR
MS/RN OPENING NOTES

RECEIVED PATIENT ON BED, AWAKE AND A/O X4. ON O2 AT 2LPM TOLERATING WELL. NOT IN DISTRESS. 
ON TELE MONITOR CURRENTLY READING SR AT 82BPM. WITH NO COMPLAINTS OF PAIN AT THIS TIME. WITH 
IV ACCESS AT RIGHT AC, SALINE LOCKED. SAFETY MEASURES IN PLACED. CALL LIGHT WITHIN REACH. 
BED ON LOWEST AND LOCKED POSITION WITH SIDE RAILS UP X2. WILL CONTINUE TO MONITOR.

## 2021-09-23 NOTE — NUR
MS/RN DISCHARGE NOTES

FOR DISCHARGE PER DOCTOR'S ORDER TO SNF. DISCHARGE INSTRUCTION AND EDUCATION PROVIDED TO 
PATIENT AND EXPLAINED MEDICATIONS AND PRESCRIPTIONS. PATIENT VERBALIZED UNDERSTANDING. 
DISCHARGE FORM AND BELONGINGS LIST FORM SIGNED BY PATIENT. NAME WRIST BAND AND IV LINE 
REMOVED. PATIENT WAS PICKED UP BY AMBULANCE PERSONNEL. CALLED SNF FOR ENDORSEMENT. CHARGE 
NURSE AND MD ARE AWARE OF THE DISCHARGE.

## 2021-09-23 NOTE — NUR
TELE RN NOTES  PAIN MANAGEMENT

C/O LEFT LEG PAIN 8/10 ON PAIN SCALE,DILAUDID 2MG PO GIVEN PER PATIENT REQUEST

## 2021-11-17 ENCOUNTER — HOSPITAL ENCOUNTER (INPATIENT)
Dept: HOSPITAL 54 - ER | Age: 61
LOS: 27 days | Discharge: HOME HEALTH | DRG: 177 | End: 2021-12-14
Attending: INTERNAL MEDICINE | Admitting: INTERNAL MEDICINE
Payer: COMMERCIAL

## 2021-11-17 VITALS — WEIGHT: 231 LBS | HEIGHT: 66 IN | BODY MASS INDEX: 37.12 KG/M2

## 2021-11-17 DIAGNOSIS — U07.1: Primary | ICD-10-CM

## 2021-11-17 DIAGNOSIS — E87.1: ICD-10-CM

## 2021-11-17 DIAGNOSIS — J44.1: ICD-10-CM

## 2021-11-17 DIAGNOSIS — E78.5: ICD-10-CM

## 2021-11-17 DIAGNOSIS — J96.01: ICD-10-CM

## 2021-11-17 DIAGNOSIS — F41.9: ICD-10-CM

## 2021-11-17 DIAGNOSIS — G47.33: ICD-10-CM

## 2021-11-17 DIAGNOSIS — E66.9: ICD-10-CM

## 2021-11-17 DIAGNOSIS — J44.9: ICD-10-CM

## 2021-11-17 DIAGNOSIS — D64.9: ICD-10-CM

## 2021-11-17 DIAGNOSIS — L03.116: ICD-10-CM

## 2021-11-17 DIAGNOSIS — I13.0: ICD-10-CM

## 2021-11-17 DIAGNOSIS — I87.2: ICD-10-CM

## 2021-11-17 DIAGNOSIS — I69.354: ICD-10-CM

## 2021-11-17 DIAGNOSIS — I50.9: ICD-10-CM

## 2021-11-17 DIAGNOSIS — I25.10: ICD-10-CM

## 2021-11-17 DIAGNOSIS — J44.0: ICD-10-CM

## 2021-11-17 DIAGNOSIS — Z79.4: ICD-10-CM

## 2021-11-17 DIAGNOSIS — E11.22: ICD-10-CM

## 2021-11-17 DIAGNOSIS — N18.9: ICD-10-CM

## 2021-11-17 DIAGNOSIS — Z83.3: ICD-10-CM

## 2021-11-17 DIAGNOSIS — I12.9: ICD-10-CM

## 2021-11-17 DIAGNOSIS — Z87.440: ICD-10-CM

## 2021-11-17 DIAGNOSIS — G89.4: ICD-10-CM

## 2021-11-17 DIAGNOSIS — J12.82: ICD-10-CM

## 2021-11-17 LAB
ALBUMIN SERPL BCP-MCNC: 3.2 G/DL (ref 3.4–5)
ALP SERPL-CCNC: 100 U/L (ref 46–116)
ALT SERPL W P-5'-P-CCNC: 30 U/L (ref 12–78)
AST SERPL W P-5'-P-CCNC: 18 U/L (ref 15–37)
BASOPHILS # BLD AUTO: 0 K/UL (ref 0–0.2)
BASOPHILS NFR BLD AUTO: 0.2 % (ref 0–2)
BILIRUB DIRECT SERPL-MCNC: 0.1 MG/DL (ref 0–0.2)
BILIRUB SERPL-MCNC: 0.3 MG/DL (ref 0.2–1)
BUN SERPL-MCNC: 33 MG/DL (ref 7–18)
CALCIUM SERPL-MCNC: 8.7 MG/DL (ref 8.5–10.1)
CHLORIDE SERPL-SCNC: 101 MMOL/L (ref 98–107)
CO2 SERPL-SCNC: 31 MMOL/L (ref 21–32)
CREAT SERPL-MCNC: 1.2 MG/DL (ref 0.6–1.3)
EOSINOPHIL NFR BLD AUTO: 0.2 % (ref 0–6)
GLUCOSE SERPL-MCNC: 126 MG/DL (ref 74–106)
HCT VFR BLD AUTO: 28 % (ref 33–45)
HGB BLD-MCNC: 9.1 G/DL (ref 11.5–14.8)
LYMPHOCYTES NFR BLD AUTO: 0.8 K/UL (ref 0.8–4.8)
LYMPHOCYTES NFR BLD AUTO: 11.6 % (ref 20–44)
MCHC RBC AUTO-ENTMCNC: 32 G/DL (ref 31–36)
MCV RBC AUTO: 83 FL (ref 82–100)
MONOCYTES NFR BLD AUTO: 0.8 K/UL (ref 0.1–1.3)
MONOCYTES NFR BLD AUTO: 11 % (ref 2–12)
NEUTROPHILS # BLD AUTO: 5.6 K/UL (ref 1.8–8.9)
NEUTROPHILS NFR BLD AUTO: 77 % (ref 43–81)
PLATELET # BLD AUTO: 268 K/UL (ref 150–450)
POTASSIUM SERPL-SCNC: 5 MMOL/L (ref 3.5–5.1)
PROT SERPL-MCNC: 6.7 G/DL (ref 6.4–8.2)
RBC # BLD AUTO: 3.42 MIL/UL (ref 4–5.2)
SODIUM SERPL-SCNC: 135 MMOL/L (ref 136–145)
WBC NRBC COR # BLD AUTO: 7.3 K/UL (ref 4.3–11)

## 2021-11-17 PROCEDURE — G0378 HOSPITAL OBSERVATION PER HR: HCPCS

## 2021-11-17 PROCEDURE — 05H533Z INSERTION OF INFUSION DEVICE INTO RIGHT SUBCLAVIAN VEIN, PERCUTANEOUS APPROACH: ICD-10-PCS | Performed by: NURSE PRACTITIONER

## 2021-11-17 PROCEDURE — C9803 HOPD COVID-19 SPEC COLLECT: HCPCS

## 2021-11-17 PROCEDURE — A4216 STERILE WATER/SALINE, 10 ML: HCPCS

## 2021-11-17 PROCEDURE — P9016 RBC LEUKOCYTES REDUCED: HCPCS

## 2021-11-17 PROCEDURE — U0003 INFECTIOUS AGENT DETECTION BY NUCLEIC ACID (DNA OR RNA); SEVERE ACUTE RESPIRATORY SYNDROME CORONAVIRUS 2 (SARS-COV-2) (CORONAVIRUS DISEASE [COVID-19]), AMPLIFIED PROBE TECHNIQUE, MAKING USE OF HIGH THROUGHPUT TECHNOLOGIES AS DESCRIBED BY CMS-2020-01-R: HCPCS

## 2021-11-17 PROCEDURE — B546ZZA ULTRASONOGRAPHY OF RIGHT SUBCLAVIAN VEIN, GUIDANCE: ICD-10-PCS | Performed by: NURSE PRACTITIONER

## 2021-11-17 NOTE — NUR
QUIRINO FROM HOME TO ER BED 7. AAOX4. SOB, PLACED ON O2 VIA NC @ 4LPM SATTING @ 
100%. BROUGHT IN FOR FEVER AND SOB. PT REPROTS THAT SHE IS POSITIVE FOR COVID 
SINCE SUNDAY. MD WAS AT THE BEDSIDE FOR EVAL. ORDERS RECEIVED, NOTED AND 
CARRIED OUT

## 2021-11-18 VITALS — DIASTOLIC BLOOD PRESSURE: 66 MMHG | SYSTOLIC BLOOD PRESSURE: 161 MMHG

## 2021-11-18 VITALS — DIASTOLIC BLOOD PRESSURE: 70 MMHG | SYSTOLIC BLOOD PRESSURE: 150 MMHG

## 2021-11-18 VITALS — DIASTOLIC BLOOD PRESSURE: 70 MMHG | SYSTOLIC BLOOD PRESSURE: 169 MMHG

## 2021-11-18 VITALS — DIASTOLIC BLOOD PRESSURE: 70 MMHG | SYSTOLIC BLOOD PRESSURE: 176 MMHG

## 2021-11-18 LAB
BASOPHILS # BLD AUTO: 0 K/UL (ref 0–0.2)
BASOPHILS NFR BLD AUTO: 0.2 % (ref 0–2)
BUN SERPL-MCNC: 24 MG/DL (ref 7–18)
CALCIUM SERPL-MCNC: 8.7 MG/DL (ref 8.5–10.1)
CHLORIDE SERPL-SCNC: 99 MMOL/L (ref 98–107)
CO2 SERPL-SCNC: 30 MMOL/L (ref 21–32)
CREAT SERPL-MCNC: 1 MG/DL (ref 0.6–1.3)
CRP SERPL-MCNC: 4.7 MG/DL (ref 0–0.9)
EOSINOPHIL NFR BLD AUTO: 0 % (ref 0–6)
GLUCOSE SERPL-MCNC: 224 MG/DL (ref 74–106)
HCT VFR BLD AUTO: 30 % (ref 33–45)
HGB BLD-MCNC: 9.7 G/DL (ref 11.5–14.8)
LYMPHOCYTES NFR BLD AUTO: 0.5 K/UL (ref 0.8–4.8)
LYMPHOCYTES NFR BLD AUTO: 6.4 % (ref 20–44)
MCHC RBC AUTO-ENTMCNC: 33 G/DL (ref 31–36)
MCV RBC AUTO: 83 FL (ref 82–100)
MONOCYTES NFR BLD AUTO: 0.1 K/UL (ref 0.1–1.3)
MONOCYTES NFR BLD AUTO: 1.7 % (ref 2–12)
NEUTROPHILS # BLD AUTO: 6.5 K/UL (ref 1.8–8.9)
NEUTROPHILS NFR BLD AUTO: 91.7 % (ref 43–81)
PLATELET # BLD AUTO: 276 K/UL (ref 150–450)
POTASSIUM SERPL-SCNC: 4.6 MMOL/L (ref 3.5–5.1)
RBC # BLD AUTO: 3.54 MIL/UL (ref 4–5.2)
SODIUM SERPL-SCNC: 136 MMOL/L (ref 136–145)
WBC NRBC COR # BLD AUTO: 7.1 K/UL (ref 4.3–11)

## 2021-11-18 PROCEDURE — 05H533Z INSERTION OF INFUSION DEVICE INTO RIGHT SUBCLAVIAN VEIN, PERCUTANEOUS APPROACH: ICD-10-PCS | Performed by: NURSE PRACTITIONER

## 2021-11-18 PROCEDURE — B546ZZA ULTRASONOGRAPHY OF RIGHT SUBCLAVIAN VEIN, GUIDANCE: ICD-10-PCS | Performed by: NURSE PRACTITIONER

## 2021-11-18 RX ADMIN — CIPROFLOXACIN SCH MG: 250 TABLET, FILM COATED ORAL at 21:28

## 2021-11-18 RX ADMIN — LABETALOL HCL SCH MG: 100 TABLET, FILM COATED ORAL at 16:10

## 2021-11-18 RX ADMIN — ACETAMINOPHEN PRN MG: 325 TABLET ORAL at 16:11

## 2021-11-18 RX ADMIN — Medication SCH EACH: at 12:12

## 2021-11-18 RX ADMIN — ACETAMINOPHEN PRN MG: 160 SOLUTION ORAL at 11:43

## 2021-11-18 RX ADMIN — LABETALOL HCL SCH MG: 100 TABLET, FILM COATED ORAL at 03:09

## 2021-11-18 RX ADMIN — DEXAMETHASONE SCH MG: 4 TABLET ORAL at 09:57

## 2021-11-18 RX ADMIN — PANTOPRAZOLE SODIUM SCH MG: 40 TABLET, DELAYED RELEASE ORAL at 10:06

## 2021-11-18 RX ADMIN — IPRATROPIUM BROMIDE AND ALBUTEROL SULFATE SCH INHALER: 103; 18 AEROSOL, METERED RESPIRATORY (INHALATION) at 18:00

## 2021-11-18 RX ADMIN — LABETALOL HCL SCH MG: 100 TABLET, FILM COATED ORAL at 09:56

## 2021-11-18 RX ADMIN — ACETAMINOPHEN PRN MG: 325 TABLET ORAL at 22:15

## 2021-11-18 RX ADMIN — ENOXAPARIN SODIUM SCH MG: 40 INJECTION SUBCUTANEOUS at 15:34

## 2021-11-18 RX ADMIN — INSULIN HUMAN PRN UNITS: 100 INJECTION, SOLUTION PARENTERAL at 17:17

## 2021-11-18 RX ADMIN — Medication SCH EACH: at 16:40

## 2021-11-18 RX ADMIN — BACLOFEN SCH MG: 10 TABLET ORAL at 16:10

## 2021-11-18 RX ADMIN — ATORVASTATIN CALCIUM SCH MG: 10 TABLET, FILM COATED ORAL at 21:28

## 2021-11-18 RX ADMIN — Medication SCH EACH: at 21:29

## 2021-11-18 RX ADMIN — ACETAMINOPHEN PRN MG: 160 SOLUTION ORAL at 11:31

## 2021-11-18 RX ADMIN — ACETAMINOPHEN PRN MG: 325 TABLET ORAL at 12:12

## 2021-11-18 RX ADMIN — CIPROFLOXACIN SCH MG: 250 TABLET, FILM COATED ORAL at 11:32

## 2021-11-18 RX ADMIN — CLOPIDOGREL BISULFATE SCH MG: 75 TABLET, FILM COATED ORAL at 10:06

## 2021-11-18 RX ADMIN — INSULIN HUMAN PRN UNITS: 100 INJECTION, SOLUTION PARENTERAL at 12:28

## 2021-11-18 RX ADMIN — GUAIFENESIN PRN MG: 100 SOLUTION ORAL at 16:44

## 2021-11-18 RX ADMIN — HUMAN INSULIN PRN UNIT: 100 INJECTION, SOLUTION SUBCUTANEOUS at 22:03

## 2021-11-18 RX ADMIN — AMLODIPINE BESYLATE SCH MG: 5 TABLET ORAL at 11:31

## 2021-11-18 RX ADMIN — BACLOFEN SCH MG: 10 TABLET ORAL at 13:41

## 2021-11-18 RX ADMIN — IPRATROPIUM BROMIDE AND ALBUTEROL SULFATE SCH INHALER: 103; 18 AEROSOL, METERED RESPIRATORY (INHALATION) at 19:30

## 2021-11-18 NOTE — NUR
TELE RN NOTES



PT ARRIVED ON THE UNIT VIA GURNEY AND TRANSFERRED TO BED. UPON ASSESSMENT, JOSE MIDLINE WAS 
DISLODGED. PT SKIN CHECKED AND NOTED L LOWER LEG CELLULITIS, PICTURE TAKEN AND PLACED IN THE 
CHART. PT PLACED ON A CARDIAC MONITOR, AND NC ON 4L O2.  PT ORIENTED TO ROOM, SAFETY 
MEASURES IN PLACE WITH BED IN LOWEST LOCKED POSITION, CALL LIGHT WITHIN REACH AND BED ALARM 
ON.

## 2021-11-18 NOTE — NUR
TELE RN CLOSING NOTES



PT RESTING COMFORTABLY ON RA IN SEMI-FOWLERS POSITION WITH NO S/SX OF RESPIRATORY DISTRESS. 
PT HAS LEFT SIDE PARALYSIS BUT REMAINS INDEPENDENT WITH ADLS X 1 ASSIST. PT HAS A JOSE 
MIDLINE SL, FLUSHED, PATENT, WITH GAUZE WRAP TO KEEP IN PLACE. PT IS ABLE TO MAKE NEEDS 
KNOWN, SAFETY MEASURES ARE IN PLACE WITH BED IN LOWEST LOCKED POSITION, CALL LIGHT WITHIN 
REACH, AND BED ALARM ON.

## 2021-11-18 NOTE — NUR
RN NOTE



PATIENT COMPLAINING OF HEADACHE. REQUESTING FOR TYLENOL. DIMMED ROOM LIGHTS. ADMINISTERED 
TYLENOL 650 MG PER MD ORDER. WILL CONTINUE TO MONITOR. CALL LIGHT WITHIN REACH.

## 2021-11-19 VITALS — DIASTOLIC BLOOD PRESSURE: 72 MMHG | SYSTOLIC BLOOD PRESSURE: 136 MMHG

## 2021-11-19 VITALS — SYSTOLIC BLOOD PRESSURE: 168 MMHG | DIASTOLIC BLOOD PRESSURE: 75 MMHG

## 2021-11-19 VITALS — DIASTOLIC BLOOD PRESSURE: 73 MMHG | SYSTOLIC BLOOD PRESSURE: 140 MMHG

## 2021-11-19 VITALS — DIASTOLIC BLOOD PRESSURE: 82 MMHG | SYSTOLIC BLOOD PRESSURE: 156 MMHG

## 2021-11-19 VITALS — SYSTOLIC BLOOD PRESSURE: 140 MMHG | DIASTOLIC BLOOD PRESSURE: 70 MMHG

## 2021-11-19 VITALS — DIASTOLIC BLOOD PRESSURE: 77 MMHG | SYSTOLIC BLOOD PRESSURE: 146 MMHG

## 2021-11-19 LAB
BASOPHILS # BLD AUTO: 0 K/UL (ref 0–0.2)
BASOPHILS NFR BLD AUTO: 0.2 % (ref 0–2)
BUN SERPL-MCNC: 21 MG/DL (ref 7–18)
CALCIUM SERPL-MCNC: 9.1 MG/DL (ref 8.5–10.1)
CHLORIDE SERPL-SCNC: 101 MMOL/L (ref 98–107)
CO2 SERPL-SCNC: 29 MMOL/L (ref 21–32)
CREAT SERPL-MCNC: 0.9 MG/DL (ref 0.6–1.3)
EOSINOPHIL NFR BLD AUTO: 0 % (ref 0–6)
GLUCOSE SERPL-MCNC: 166 MG/DL (ref 74–106)
HCT VFR BLD AUTO: 29 % (ref 33–45)
HGB BLD-MCNC: 9.6 G/DL (ref 11.5–14.8)
LYMPHOCYTES NFR BLD AUTO: 1 K/UL (ref 0.8–4.8)
LYMPHOCYTES NFR BLD AUTO: 17.2 % (ref 20–44)
LYMPHOCYTES NFR BLD MANUAL: 20 % (ref 16–48)
MCHC RBC AUTO-ENTMCNC: 33 G/DL (ref 31–36)
MCV RBC AUTO: 82 FL (ref 82–100)
MONOCYTES NFR BLD AUTO: 1 K/UL (ref 0.1–1.3)
MONOCYTES NFR BLD AUTO: 16.8 % (ref 2–12)
MONOCYTES NFR BLD MANUAL: 16 % (ref 0–11)
NEUTROPHILS # BLD AUTO: 3.8 K/UL (ref 1.8–8.9)
NEUTROPHILS NFR BLD AUTO: 65.8 % (ref 43–81)
NEUTS SEG NFR BLD MANUAL: 64 % (ref 42–76)
PLATELET # BLD AUTO: 282 K/UL (ref 150–450)
POTASSIUM SERPL-SCNC: 4 MMOL/L (ref 3.5–5.1)
RBC # BLD AUTO: 3.52 MIL/UL (ref 4–5.2)
SODIUM SERPL-SCNC: 138 MMOL/L (ref 136–145)
WBC NRBC COR # BLD AUTO: 5.8 K/UL (ref 4.3–11)

## 2021-11-19 RX ADMIN — CIPROFLOXACIN SCH MG: 250 TABLET, FILM COATED ORAL at 20:00

## 2021-11-19 RX ADMIN — AMLODIPINE BESYLATE SCH MG: 5 TABLET ORAL at 08:05

## 2021-11-19 RX ADMIN — INSULIN HUMAN PRN UNITS: 100 INJECTION, SOLUTION PARENTERAL at 08:13

## 2021-11-19 RX ADMIN — BACLOFEN SCH MG: 10 TABLET ORAL at 08:07

## 2021-11-19 RX ADMIN — IPRATROPIUM BROMIDE AND ALBUTEROL SULFATE SCH PUFF: 103; 18 AEROSOL, METERED RESPIRATORY (INHALATION) at 19:55

## 2021-11-19 RX ADMIN — GUAIFENESIN PRN MG: 100 SOLUTION ORAL at 22:53

## 2021-11-19 RX ADMIN — IPRATROPIUM BROMIDE AND ALBUTEROL SULFATE SCH INHALER: 103; 18 AEROSOL, METERED RESPIRATORY (INHALATION) at 13:33

## 2021-11-19 RX ADMIN — Medication SCH EACH: at 07:47

## 2021-11-19 RX ADMIN — DEXAMETHASONE SCH MG: 4 TABLET ORAL at 08:07

## 2021-11-19 RX ADMIN — GUAIFENESIN PRN MG: 100 SOLUTION ORAL at 01:00

## 2021-11-19 RX ADMIN — INSULIN HUMAN PRN UNITS: 100 INJECTION, SOLUTION PARENTERAL at 12:36

## 2021-11-19 RX ADMIN — ENOXAPARIN SODIUM SCH MG: 40 INJECTION SUBCUTANEOUS at 08:11

## 2021-11-19 RX ADMIN — CIPROFLOXACIN SCH MG: 250 TABLET, FILM COATED ORAL at 08:09

## 2021-11-19 RX ADMIN — Medication SCH EACH: at 22:43

## 2021-11-19 RX ADMIN — ACETAMINOPHEN PRN MG: 325 TABLET ORAL at 22:53

## 2021-11-19 RX ADMIN — PANTOPRAZOLE SODIUM SCH MG: 40 TABLET, DELAYED RELEASE ORAL at 08:06

## 2021-11-19 RX ADMIN — HUMAN INSULIN PRN UNIT: 100 INJECTION, SOLUTION SUBCUTANEOUS at 22:52

## 2021-11-19 RX ADMIN — IPRATROPIUM BROMIDE AND ALBUTEROL SULFATE SCH INHALER: 103; 18 AEROSOL, METERED RESPIRATORY (INHALATION) at 01:30

## 2021-11-19 RX ADMIN — INSULIN HUMAN PRN UNITS: 100 INJECTION, SOLUTION PARENTERAL at 17:35

## 2021-11-19 RX ADMIN — LABETALOL HCL SCH MG: 100 TABLET, FILM COATED ORAL at 16:40

## 2021-11-19 RX ADMIN — CLOPIDOGREL BISULFATE SCH MG: 75 TABLET, FILM COATED ORAL at 08:04

## 2021-11-19 RX ADMIN — GUAIFENESIN PRN MG: 100 SOLUTION ORAL at 16:39

## 2021-11-19 RX ADMIN — ATORVASTATIN CALCIUM SCH MG: 10 TABLET, FILM COATED ORAL at 22:52

## 2021-11-19 RX ADMIN — LABETALOL HCL SCH MG: 100 TABLET, FILM COATED ORAL at 08:06

## 2021-11-19 RX ADMIN — Medication SCH EACH: at 16:53

## 2021-11-19 RX ADMIN — BACLOFEN SCH MG: 10 TABLET ORAL at 12:34

## 2021-11-19 RX ADMIN — BACLOFEN SCH MG: 10 TABLET ORAL at 16:39

## 2021-11-19 RX ADMIN — IPRATROPIUM BROMIDE AND ALBUTEROL SULFATE SCH INHALER: 103; 18 AEROSOL, METERED RESPIRATORY (INHALATION) at 07:35

## 2021-11-19 RX ADMIN — LOSARTAN POTASSIUM SCH MG: 50 TABLET, FILM COATED ORAL at 08:06

## 2021-11-19 RX ADMIN — ACETAMINOPHEN PRN MG: 325 TABLET ORAL at 16:40

## 2021-11-19 RX ADMIN — ACETAMINOPHEN PRN MG: 325 TABLET ORAL at 10:08

## 2021-11-19 RX ADMIN — GUAIFENESIN PRN MG: 100 SOLUTION ORAL at 10:08

## 2021-11-19 RX ADMIN — Medication SCH EACH: at 12:32

## 2021-11-19 NOTE — NUR
RN NOTE



PATIENT REQUESTING FOR COUGH MEDICATION. NOTED WITH MILD PRODUCTIVE COUGH. HOB ELEVATED. 
ADMINISTERED GUAIFENESIN 15ML ORAL SOLUTION PER MD ORDER. PATIENT ALSO COMPLAINING OF 
HEADACHE. DENIES BLURRY VISION. ADMINISTERED TYLENOL 650 MG. PER MD ORDER. WILL CONTINUE TO 
MONITOR.

## 2021-11-19 NOTE — NUR
RN NOTE



PATIENT NOTED WITH BP /70 AND HR 73 BPM. STATES SHE HAS MILD HEADACHE. NO CHANGES IN 
MENTATION. HOB ELEVATED. NO PRN ANTIHYPERTENSIVE MEDICATIONS ON ORDER. CONTACTED [employer] TO 
PAGE DR. GÓMEZ. AWAITING CALL BACK FROM MD. CALL LIGHT WITHIN REACH.

## 2021-11-19 NOTE — NUR
RN NOTE 



IPRATROPIUM BROMIDE AND ALBUTEROL INHALER FRO 11/18 @ 1800, 1930, 11/19 @ 0130 WAS NOT 
SCHEDULE ON MY SHIFT

## 2021-11-19 NOTE — NUR
RN CLOSING NOTE



PATIENT REMAIN STABLE THROUGH OUT SHIFT. REMAIN ON OXYGEN VIA NC @ 2L/MIN. A/O X4. AMBULATE 
WITH ASSIST. HAVE LEFT SIDE WEAKNESS AND LLE REDNESS. FEED SELF. ALL SAFETY MEASURE IN 
PLACE. BED ON LOWEST POSITION WITH HOB ELEVATED AND 3 SIDE RAIL UP. CALL LIGHT WITHIN REACH. 
WILL CONTINUE TO MONITOR AND GIVE REPORT TO ON COMING NURSE.

## 2021-11-19 NOTE — NUR
RN NOTE



RECEIVED PATIENT IN BED, AWAKE, ALERT, AND VERBALLY RESPONSIVE. BREATHING EVEN AND 
UNLABORED. NO SOB DURING MORNING PER PATIENT. NOTED TO BE ON 2L/MIN NC. SKIN WARM AND DRY. 
AFEBRILE. DENIES HEADACHE. NOTED WITH JOSE MIDLINE. NO BLEEDING NOTED. ALL NEEDS ATTENDED. 
BED LOW, IN LOCKED POSITION. CALL LIGHT WITHIN REACH.

## 2021-11-19 NOTE — NUR
RN NOTE



PATIENT REQUESTING FOR COUGH MEDICINE. NOTED WITH DRY, NON-PRODUCTIVE COUGH AT THIS TIME. NO 
SOB NOTED. ON OXYGEN 2L/MIN VIA NC PER REQUEST, SATURATING WELL AT 97 PERCENT. ADMINISTERED 
GUAIFENESIN 15ML ORAL SOLUTION PER MD ORDER. WILL CONTINUE TO MONITOR. CALL LIGHT WITHIN 
REACH.

## 2021-11-19 NOTE — NUR
RN OPENING NOTE



PATIENT IN STABLE CONDITION WITH NC @ 2L/MIN. WILL FOLLOW UP LABS AND DOCTOR ORDERS. ALL 
SAFETY MEASURE IN PLACE. BED ON LOWEST POSITION WITH HOB ELEVATED AND 3 SIDE RAIL UP. CALL 
LIGHT WITHIN REACH. WILL CONTINUE TO MONITOR.

## 2021-11-20 VITALS — SYSTOLIC BLOOD PRESSURE: 154 MMHG | DIASTOLIC BLOOD PRESSURE: 58 MMHG

## 2021-11-20 VITALS — SYSTOLIC BLOOD PRESSURE: 179 MMHG | DIASTOLIC BLOOD PRESSURE: 78 MMHG

## 2021-11-20 VITALS — DIASTOLIC BLOOD PRESSURE: 58 MMHG | SYSTOLIC BLOOD PRESSURE: 154 MMHG

## 2021-11-20 VITALS — SYSTOLIC BLOOD PRESSURE: 138 MMHG | DIASTOLIC BLOOD PRESSURE: 66 MMHG

## 2021-11-20 VITALS — DIASTOLIC BLOOD PRESSURE: 65 MMHG | SYSTOLIC BLOOD PRESSURE: 162 MMHG

## 2021-11-20 VITALS — DIASTOLIC BLOOD PRESSURE: 46 MMHG | SYSTOLIC BLOOD PRESSURE: 134 MMHG

## 2021-11-20 VITALS — DIASTOLIC BLOOD PRESSURE: 71 MMHG | SYSTOLIC BLOOD PRESSURE: 166 MMHG

## 2021-11-20 VITALS — DIASTOLIC BLOOD PRESSURE: 72 MMHG | SYSTOLIC BLOOD PRESSURE: 186 MMHG

## 2021-11-20 LAB
ALBUMIN SERPL BCP-MCNC: 3.2 G/DL (ref 3.4–5)
ALP SERPL-CCNC: 88 U/L (ref 46–116)
ALT SERPL W P-5'-P-CCNC: 34 U/L (ref 12–78)
AST SERPL W P-5'-P-CCNC: 20 U/L (ref 15–37)
BILIRUB SERPL-MCNC: 0.4 MG/DL (ref 0.2–1)
BUN SERPL-MCNC: 20 MG/DL (ref 7–18)
CALCIUM SERPL-MCNC: 9.1 MG/DL (ref 8.5–10.1)
CHLORIDE SERPL-SCNC: 101 MMOL/L (ref 98–107)
CO2 SERPL-SCNC: 29 MMOL/L (ref 21–32)
CREAT SERPL-MCNC: 0.8 MG/DL (ref 0.6–1.3)
CRP SERPL-MCNC: 2.1 MG/DL (ref 0–0.9)
GLUCOSE SERPL-MCNC: 214 MG/DL (ref 74–106)
POTASSIUM SERPL-SCNC: 4 MMOL/L (ref 3.5–5.1)
PROT SERPL-MCNC: 6.8 G/DL (ref 6.4–8.2)
SODIUM SERPL-SCNC: 138 MMOL/L (ref 136–145)

## 2021-11-20 RX ADMIN — PANTOPRAZOLE SODIUM SCH MG: 40 TABLET, DELAYED RELEASE ORAL at 08:49

## 2021-11-20 RX ADMIN — BACLOFEN SCH MG: 10 TABLET ORAL at 17:31

## 2021-11-20 RX ADMIN — LOSARTAN POTASSIUM SCH MG: 50 TABLET, FILM COATED ORAL at 08:45

## 2021-11-20 RX ADMIN — ACETAMINOPHEN PRN MG: 325 TABLET ORAL at 13:20

## 2021-11-20 RX ADMIN — INSULIN HUMAN PRN UNITS: 100 INJECTION, SOLUTION PARENTERAL at 12:24

## 2021-11-20 RX ADMIN — ACETAMINOPHEN PRN MG: 325 TABLET ORAL at 05:45

## 2021-11-20 RX ADMIN — BACLOFEN SCH MG: 10 TABLET ORAL at 08:45

## 2021-11-20 RX ADMIN — VALSARTAN SCH MG: 80 TABLET ORAL at 10:01

## 2021-11-20 RX ADMIN — INSULIN HUMAN PRN UNITS: 100 INJECTION, SOLUTION PARENTERAL at 09:11

## 2021-11-20 RX ADMIN — LABETALOL HCL SCH MG: 100 TABLET, FILM COATED ORAL at 10:03

## 2021-11-20 RX ADMIN — Medication SCH EACH: at 17:49

## 2021-11-20 RX ADMIN — HUMAN INSULIN PRN UNIT: 100 INJECTION, SOLUTION SUBCUTANEOUS at 21:37

## 2021-11-20 RX ADMIN — IPRATROPIUM BROMIDE AND ALBUTEROL SULFATE SCH PUFF: 103; 18 AEROSOL, METERED RESPIRATORY (INHALATION) at 12:31

## 2021-11-20 RX ADMIN — GUAIFENESIN PRN MG: 100 SOLUTION ORAL at 13:42

## 2021-11-20 RX ADMIN — AMLODIPINE BESYLATE SCH MG: 5 TABLET ORAL at 08:41

## 2021-11-20 RX ADMIN — DEXAMETHASONE SCH MG: 4 TABLET ORAL at 08:45

## 2021-11-20 RX ADMIN — CLOPIDOGREL BISULFATE SCH MG: 75 TABLET, FILM COATED ORAL at 08:44

## 2021-11-20 RX ADMIN — BACLOFEN SCH MG: 10 TABLET ORAL at 12:31

## 2021-11-20 RX ADMIN — Medication SCH EACH: at 12:20

## 2021-11-20 RX ADMIN — CIPROFLOXACIN SCH MG: 250 TABLET, FILM COATED ORAL at 08:44

## 2021-11-20 RX ADMIN — INSULIN HUMAN PRN UNITS: 100 INJECTION, SOLUTION PARENTERAL at 17:55

## 2021-11-20 RX ADMIN — GUAIFENESIN PRN MG: 100 SOLUTION ORAL at 21:33

## 2021-11-20 RX ADMIN — IPRATROPIUM BROMIDE AND ALBUTEROL SULFATE SCH PUFF: 103; 18 AEROSOL, METERED RESPIRATORY (INHALATION) at 19:30

## 2021-11-20 RX ADMIN — ACETAMINOPHEN PRN MG: 325 TABLET ORAL at 21:32

## 2021-11-20 RX ADMIN — CIPROFLOXACIN SCH MG: 250 TABLET, FILM COATED ORAL at 21:33

## 2021-11-20 RX ADMIN — ENOXAPARIN SODIUM SCH MG: 40 INJECTION SUBCUTANEOUS at 08:43

## 2021-11-20 RX ADMIN — ATORVASTATIN CALCIUM SCH MG: 10 TABLET, FILM COATED ORAL at 21:33

## 2021-11-20 RX ADMIN — IPRATROPIUM BROMIDE AND ALBUTEROL SULFATE SCH PUFF: 103; 18 AEROSOL, METERED RESPIRATORY (INHALATION) at 01:35

## 2021-11-20 RX ADMIN — IPRATROPIUM BROMIDE AND ALBUTEROL SULFATE SCH PUFF: 103; 18 AEROSOL, METERED RESPIRATORY (INHALATION) at 08:44

## 2021-11-20 RX ADMIN — LABETALOL HCL SCH MG: 100 TABLET, FILM COATED ORAL at 17:00

## 2021-11-20 RX ADMIN — Medication SCH EACH: at 09:08

## 2021-11-20 RX ADMIN — HYDRALAZINE HYDROCHLORIDE PRN MG: 20 INJECTION INTRAMUSCULAR; INTRAVENOUS at 00:48

## 2021-11-20 RX ADMIN — Medication SCH EACH: at 21:33

## 2021-11-20 NOTE — NUR
RN OPEN NOTE



PATIENT RECEIVED IN BED IN STABLE CONDITION ALERT AND ORIENTED X4, WITH NC @ 2L/MIN NO SIGN 
OF DISTRESS OR SOB NOTED AT THIS TIME. LEFT LEG REDNESS OBSERVED, JOSE MID LINE FLUSHING WELL 
PATENT AND INTACT, ALL SAFETY MEASURE IN PLACE. BED ON LOWEST POSITION WITH HOB ELEVATED 
CALL LIGHT WITHIN REACH. WILL CONTINUE TO MONITOR.

## 2021-11-20 NOTE — NUR
RN NOTE



PATIENT REQUESTING FOR TYLENOL. STATES THAT SHE HAS A MILD HEADACHE AND MILD GENERALIZED 
BODY PAIN, RATING OF 3/10. ASSISTED WITH REPOSITIONING. ADMINISTERED TYLENOL 650 MG PER MD 
ORDER. WILL CONTINUE TO MONITOR. CALL LIGHT WITHIN REACH.

## 2021-11-20 NOTE — NUR
RN CLOSING NOTE



PATIENT IS SITTING IN THE CHAIR AT THE MOMENT, STABLE THROUGH OUT SHIFT. REMAIN ON OXYGEN 
VIA NC @ 2L/MIN. A/O X4. AMBULATE WITH ASSIST ABLE TO GO TO THE BATHROOM. HAVE LEFT SIDE 
HEMIPLEGIA AND LLE REDNESS, DOCTOR LAURIE INFORMED, ABLE TO FEED HERSELF, ALL NEEDS MET,  
SAFETY MEASURE IN PLACE. BED IN LOWEST POSITION WITH HOB ELEVATED AND 3 SIDE RAIL UP. CALL 
LIGHT WITHIN REACH. WILL ENDORSE TO NEXT RN

## 2021-11-20 NOTE — NUR
RN NOTE



SPOKE WITH DR. GÓMEZ REGARDING PATIENTS ELEVATED BLOOD PRESSURE. NEW ORDER OF HYDRALAZINE 
10 MG IV EVERY 4 HOURS AS NEEDED FOR SBP GREATER THAN 160. NOTED AND CARRIED OUT. PATIENT 
WITH LATEST BLOOD PRESSURE /78 AND HR OF 69 BPM. ADMINISTERED HYDRALAZINE 10 MG IV PER 
MD ORDER. WILL CONTINUE TO MONITOR. CALL LIGHT WITHIN REACH.

## 2021-11-20 NOTE — NUR
RN NOTE



RECEIVED PATIENT IN BED, AWAKE, ALERT, AND VERBALLY RESPONSIVE. ABLE TO MAKE NEEDS KNOWN. 
BREATHING EVEN AND UNLABORED. DENIES SOB. TOLERATING LOW FLOW OXYGEN 2L/MIN NC. SKIN WARM 
AND DRY. NOTED WITH RIGHT UPPER ARM MIDLINE. NO BLEEDING NOTED. ALL NEEDS ATTENDED. CALL 
LIGHT WITHIN REACH.

## 2021-11-20 NOTE — NUR
RN NOTE



PATIENT REQUESTING MEDICATION FOR COUGH AND HEADACHE. NO SOB NOTED. DESCRIBES HEADACHE AS 
FRONTAL. HOB ELEVATED. DENIES BLURRY VISION, N/V. ADMINISTERED GUAIFENESIN 15ML ORAL 
SOLUTION PER MD ORDER. ADMINISTERED TYLENOL 650 MG. PER MD ORDER. WILL CONTINUE TO MONITOR. 
CALL LIGHT WITHIN REACH.

## 2021-11-20 NOTE — NUR
RN NOTE



PATIENTS IPRATROPIUM/ALBUTEROL SULFATE PUFF NOT AT MEDICATION ROOM OR BEDSIDE. PRINTED AND 
FAXED ORDER TO NURSE SUPERVISOR. WILL FOLLOW UP.

## 2021-11-21 VITALS — SYSTOLIC BLOOD PRESSURE: 130 MMHG | DIASTOLIC BLOOD PRESSURE: 64 MMHG

## 2021-11-21 VITALS — SYSTOLIC BLOOD PRESSURE: 180 MMHG | DIASTOLIC BLOOD PRESSURE: 88 MMHG

## 2021-11-21 VITALS — DIASTOLIC BLOOD PRESSURE: 50 MMHG | SYSTOLIC BLOOD PRESSURE: 139 MMHG

## 2021-11-21 VITALS — DIASTOLIC BLOOD PRESSURE: 70 MMHG | SYSTOLIC BLOOD PRESSURE: 141 MMHG

## 2021-11-21 VITALS — SYSTOLIC BLOOD PRESSURE: 146 MMHG | DIASTOLIC BLOOD PRESSURE: 60 MMHG

## 2021-11-21 VITALS — SYSTOLIC BLOOD PRESSURE: 157 MMHG | DIASTOLIC BLOOD PRESSURE: 81 MMHG

## 2021-11-21 LAB
ALBUMIN SERPL BCP-MCNC: 3.1 G/DL (ref 3.4–5)
ALP SERPL-CCNC: 83 U/L (ref 46–116)
ALT SERPL W P-5'-P-CCNC: 41 U/L (ref 12–78)
AST SERPL W P-5'-P-CCNC: 28 U/L (ref 15–37)
BILIRUB SERPL-MCNC: 0.5 MG/DL (ref 0.2–1)
BUN SERPL-MCNC: 19 MG/DL (ref 7–18)
CALCIUM SERPL-MCNC: 8.8 MG/DL (ref 8.5–10.1)
CHLORIDE SERPL-SCNC: 101 MMOL/L (ref 98–107)
CO2 SERPL-SCNC: 27 MMOL/L (ref 21–32)
CREAT SERPL-MCNC: 1 MG/DL (ref 0.6–1.3)
CRP SERPL-MCNC: 3.1 MG/DL (ref 0–0.9)
GLUCOSE SERPL-MCNC: 175 MG/DL (ref 74–106)
POTASSIUM SERPL-SCNC: 4 MMOL/L (ref 3.5–5.1)
PROT SERPL-MCNC: 6.7 G/DL (ref 6.4–8.2)
SODIUM SERPL-SCNC: 137 MMOL/L (ref 136–145)

## 2021-11-21 PROCEDURE — XW033E5 INTRODUCTION OF REMDESIVIR ANTI-INFECTIVE INTO PERIPHERAL VEIN, PERCUTANEOUS APPROACH, NEW TECHNOLOGY GROUP 5: ICD-10-PCS | Performed by: INTERNAL MEDICINE

## 2021-11-21 RX ADMIN — BACLOFEN SCH MG: 10 TABLET ORAL at 16:57

## 2021-11-21 RX ADMIN — INSULIN HUMAN PRN UNITS: 100 INJECTION, SOLUTION PARENTERAL at 22:00

## 2021-11-21 RX ADMIN — GUAIFENESIN PRN MG: 100 SOLUTION ORAL at 05:06

## 2021-11-21 RX ADMIN — GUAIFENESIN PRN MG: 100 SOLUTION ORAL at 16:55

## 2021-11-21 RX ADMIN — Medication SCH EACH: at 22:01

## 2021-11-21 RX ADMIN — PANTOPRAZOLE SODIUM SCH MG: 40 TABLET, DELAYED RELEASE ORAL at 09:22

## 2021-11-21 RX ADMIN — GUAIFENESIN PRN MG: 100 SOLUTION ORAL at 23:16

## 2021-11-21 RX ADMIN — CIPROFLOXACIN SCH MG: 250 TABLET, FILM COATED ORAL at 21:43

## 2021-11-21 RX ADMIN — Medication SCH EACH: at 08:46

## 2021-11-21 RX ADMIN — GUAIFENESIN PRN MG: 100 SOLUTION ORAL at 11:46

## 2021-11-21 RX ADMIN — AMLODIPINE BESYLATE SCH MG: 5 TABLET ORAL at 08:56

## 2021-11-21 RX ADMIN — DEXAMETHASONE SCH MG: 4 TABLET ORAL at 08:56

## 2021-11-21 RX ADMIN — ACETAMINOPHEN PRN MG: 325 TABLET ORAL at 05:06

## 2021-11-21 RX ADMIN — IPRATROPIUM BROMIDE AND ALBUTEROL SULFATE SCH PUFF: 103; 18 AEROSOL, METERED RESPIRATORY (INHALATION) at 23:25

## 2021-11-21 RX ADMIN — IPRATROPIUM BROMIDE AND ALBUTEROL SULFATE SCH PUFF: 103; 18 AEROSOL, METERED RESPIRATORY (INHALATION) at 01:32

## 2021-11-21 RX ADMIN — Medication SCH EACH: at 17:18

## 2021-11-21 RX ADMIN — ISOSORBIDE DINITRATE SCH MG: 20 TABLET ORAL at 12:02

## 2021-11-21 RX ADMIN — ACETAMINOPHEN PRN MG: 325 TABLET ORAL at 16:55

## 2021-11-21 RX ADMIN — INSULIN HUMAN PRN UNITS: 100 INJECTION, SOLUTION PARENTERAL at 17:22

## 2021-11-21 RX ADMIN — ATORVASTATIN CALCIUM SCH MG: 10 TABLET, FILM COATED ORAL at 21:43

## 2021-11-21 RX ADMIN — ENOXAPARIN SODIUM SCH MG: 40 INJECTION SUBCUTANEOUS at 08:52

## 2021-11-21 RX ADMIN — INSULIN HUMAN PRN UNITS: 100 INJECTION, SOLUTION PARENTERAL at 12:47

## 2021-11-21 RX ADMIN — BACLOFEN SCH MG: 10 TABLET ORAL at 12:51

## 2021-11-21 RX ADMIN — VALSARTAN SCH MG: 80 TABLET ORAL at 08:55

## 2021-11-21 RX ADMIN — LABETALOL HCL SCH MG: 100 TABLET, FILM COATED ORAL at 16:56

## 2021-11-21 RX ADMIN — ACETAMINOPHEN PRN MG: 325 TABLET ORAL at 11:46

## 2021-11-21 RX ADMIN — BACLOFEN SCH MG: 10 TABLET ORAL at 09:00

## 2021-11-21 RX ADMIN — CLOPIDOGREL BISULFATE SCH MG: 75 TABLET, FILM COATED ORAL at 08:56

## 2021-11-21 RX ADMIN — INSULIN HUMAN PRN UNITS: 100 INJECTION, SOLUTION PARENTERAL at 09:01

## 2021-11-21 RX ADMIN — Medication SCH EACH: at 12:29

## 2021-11-21 RX ADMIN — ISOSORBIDE DINITRATE SCH MG: 20 TABLET ORAL at 16:55

## 2021-11-21 RX ADMIN — IPRATROPIUM BROMIDE AND ALBUTEROL SULFATE SCH PUFF: 103; 18 AEROSOL, METERED RESPIRATORY (INHALATION) at 13:32

## 2021-11-21 RX ADMIN — LABETALOL HCL SCH MG: 100 TABLET, FILM COATED ORAL at 08:56

## 2021-11-21 RX ADMIN — ACETAMINOPHEN PRN MG: 325 TABLET ORAL at 23:16

## 2021-11-21 RX ADMIN — CIPROFLOXACIN SCH MG: 250 TABLET, FILM COATED ORAL at 08:57

## 2021-11-21 RX ADMIN — IPRATROPIUM BROMIDE AND ALBUTEROL SULFATE SCH PUFF: 103; 18 AEROSOL, METERED RESPIRATORY (INHALATION) at 09:20

## 2021-11-21 NOTE — NUR
RN NOTE



PATIENT REQUESTING FOR TYLENOL AND COUGH MEDICATION. STATES SHE HAS MILD HEADACHE AND COUGH. 
NOTED WITH NON-PRODUCTIVE COUGH. HOB ELEVATED. NO SOB NOTED. STATES SHE FEELS MILD 
GENERALIZED BODY PAIN 3/10. ASSISTED WITH REPOSITIONING. ADMINISTERED TYLENOL 650 MG AND 
GUAIFENESIN 300 MG/15 ML SOLUTION PER MD ORDER. WILL CONTINUE TO MONITOR. CALL LIGHT WITHIN 
REACH.

## 2021-11-21 NOTE — NUR
RN NOTE



NURSING SUPERVISOR, LON, PULLED OUT COMBIVENT RESPIMAT MEDICATION FROM [employer]. MANUAL 
BARCODE ENTERED FOR MEDICATION ADMINISTRATION.

## 2021-11-21 NOTE — NUR
RN OPEN NOTE



PATIENT REMAINS IN THE CHAIR, RESTING AND IN STABLE CONDITION, ALERT AND ORIENTED X4, WITH 
NC @ 4L/MIN NO SIGN OF DISTRESS OR SOB NOTED AT THIS TIME. LEFT LEG REDNESS CELLULITIS 
OBSERVED, ISOLATION PRECAUTION IN PLACE, JOSE MID LINE FLUSHING WELL, PATENT AND INTACT, 
REMDESIVIR ADMINISTERED AND TOLERATED WELL, ALSO GAVE TYLENOL FOR 99.8 FEVER AND ROBITUSSIN 
PRN FOR COUGH , LAST ACCU CHECK 344 GAVE 12 UNITS INSULIN, CONSUME 75% OF ALL MEALS, ALL 
NEEDS MET DURING SHIFT, SAFETY MEASURE IN PLACE. BED IN LOWEST POSITION AND LOCKED WITH HOB 
ELEVATED CALL LIGHT WITHIN REACH. WILL ENDORSE TO NEXT RN COMING

## 2021-11-21 NOTE — NUR
RN NOTE

PER PULMONOLOGY DOCTOR KEEP OXYGEN 4L NC, ALSO WAITING FOR DOCTOR SHOW TO APPROVE 
REMDESIVIR, WILL FOLLOW UP AND CONTINUE TO MONITOR

## 2021-11-21 NOTE — NUR
rn notes

helped pt to walk to the bathroom able to void without difficulty, lines change oxygen 
monitored sat 2L NC 94% will continue to monitor

## 2021-11-21 NOTE — NUR
RN NOTE



RECEIVED PATIENT RESTING IN BED, IN NO DISTRESS OR PAIN. ON 4L NASAL CANULA, O2 AT 94%. HAS 
NO SIGNS OF LABORED BREATHING AND DISCOMFORT. PATIENT A/O X4 . PATIENT ON ISOLATION 
PRECAUTION AS NEEDED. IV SITE MIDLINE, RIGHT UPPER ARM, LINE WAS INTACT AND PATENT. SAFETY 
PRECAUTIONS TAKEN, BED AT LOW LEVEL, CALL LIGHT IN REACH, BED LOCKED. 3 SIDE RAILS UP. 
CONTINUE TO MONITOR.

## 2021-11-21 NOTE — NUR
RN NOTES 

PULMONOLOGY DOCTOR AT BED SIDE, PT COMPLAINS OF SOB, HE IS GOING TO RE-EVALUATE TREATMENT. 
RT ALSO AT BED SIDE OXYGEN CHANGED TO 3L/ NC WILL CONTINUE TO MONITOR

## 2021-11-21 NOTE — NUR
RN OPEN NOTE



PATIENT RECEIVED IN BED RESTING IN STABLE CONDITION ALERT AND ORIENTED X4, WITH NC @ 2L/MIN 
NO SIGN OF DISTRESS OR SOB NOTED AT THIS TIME. LEFT LEG REDNESS OBSERVED, ISOLATION 
PRECAUTION IN PLACE, JOSE MID LINE FLUSHING WELL PATENT AND INTACT, ALL SAFETY MEASURE IN 
PLACE. BED IN LOWEST POSITION AND LOCKED WITH HOB ELEVATED CALL LIGHT WITHIN REACH. WILL 
CONTINUE TO MONITOR PLAN OF CARE

## 2021-11-22 VITALS — DIASTOLIC BLOOD PRESSURE: 60 MMHG | SYSTOLIC BLOOD PRESSURE: 128 MMHG

## 2021-11-22 VITALS — SYSTOLIC BLOOD PRESSURE: 139 MMHG | DIASTOLIC BLOOD PRESSURE: 61 MMHG

## 2021-11-22 VITALS — DIASTOLIC BLOOD PRESSURE: 66 MMHG | SYSTOLIC BLOOD PRESSURE: 162 MMHG

## 2021-11-22 VITALS — DIASTOLIC BLOOD PRESSURE: 53 MMHG | SYSTOLIC BLOOD PRESSURE: 123 MMHG

## 2021-11-22 VITALS — SYSTOLIC BLOOD PRESSURE: 135 MMHG | DIASTOLIC BLOOD PRESSURE: 56 MMHG

## 2021-11-22 LAB
ALBUMIN SERPL BCP-MCNC: 3 G/DL (ref 3.4–5)
ALP SERPL-CCNC: 83 U/L (ref 46–116)
ALT SERPL W P-5'-P-CCNC: 49 U/L (ref 12–78)
AST SERPL W P-5'-P-CCNC: 33 U/L (ref 15–37)
BILIRUB DIRECT SERPL-MCNC: 0.2 MG/DL (ref 0–0.2)
BILIRUB SERPL-MCNC: 0.7 MG/DL (ref 0.2–1)
BUN SERPL-MCNC: 20 MG/DL (ref 7–18)
CALCIUM SERPL-MCNC: 8.8 MG/DL (ref 8.5–10.1)
CHLORIDE SERPL-SCNC: 101 MMOL/L (ref 98–107)
CO2 SERPL-SCNC: 26 MMOL/L (ref 21–32)
CREAT SERPL-MCNC: 0.9 MG/DL (ref 0.6–1.3)
GLUCOSE SERPL-MCNC: 124 MG/DL (ref 74–106)
POTASSIUM SERPL-SCNC: 3.8 MMOL/L (ref 3.5–5.1)
PROT SERPL-MCNC: 6.4 G/DL (ref 6.4–8.2)
SODIUM SERPL-SCNC: 136 MMOL/L (ref 136–145)

## 2021-11-22 RX ADMIN — ACETAMINOPHEN PRN MG: 325 TABLET ORAL at 17:56

## 2021-11-22 RX ADMIN — INSULIN HUMAN PRN UNITS: 100 INJECTION, SOLUTION PARENTERAL at 08:35

## 2021-11-22 RX ADMIN — VALSARTAN SCH MG: 80 TABLET ORAL at 08:39

## 2021-11-22 RX ADMIN — Medication SCH EACH: at 18:16

## 2021-11-22 RX ADMIN — BACLOFEN SCH MG: 10 TABLET ORAL at 17:57

## 2021-11-22 RX ADMIN — GUAIFENESIN PRN MG: 100 SOLUTION ORAL at 05:17

## 2021-11-22 RX ADMIN — GUAIFENESIN PRN MG: 100 SOLUTION ORAL at 11:34

## 2021-11-22 RX ADMIN — BACLOFEN SCH MG: 10 TABLET ORAL at 08:37

## 2021-11-22 RX ADMIN — IPRATROPIUM BROMIDE AND ALBUTEROL SULFATE SCH PUFF: 103; 18 AEROSOL, METERED RESPIRATORY (INHALATION) at 01:35

## 2021-11-22 RX ADMIN — CIPROFLOXACIN SCH MG: 250 TABLET, FILM COATED ORAL at 21:18

## 2021-11-22 RX ADMIN — Medication SCH EACH: at 12:00

## 2021-11-22 RX ADMIN — IPRATROPIUM BROMIDE AND ALBUTEROL SULFATE SCH PUFF: 103; 18 AEROSOL, METERED RESPIRATORY (INHALATION) at 08:40

## 2021-11-22 RX ADMIN — Medication SCH EACH: at 08:26

## 2021-11-22 RX ADMIN — LABETALOL HCL SCH MG: 100 TABLET, FILM COATED ORAL at 17:58

## 2021-11-22 RX ADMIN — DOXAZOSIN MESYLATE SCH MG: 4 TABLET ORAL at 22:21

## 2021-11-22 RX ADMIN — HUMAN INSULIN PRN UNIT: 100 INJECTION, SOLUTION SUBCUTANEOUS at 22:04

## 2021-11-22 RX ADMIN — HYDRALAZINE HYDROCHLORIDE PRN MG: 20 INJECTION INTRAMUSCULAR; INTRAVENOUS at 04:57

## 2021-11-22 RX ADMIN — ISOSORBIDE DINITRATE SCH MG: 20 TABLET ORAL at 17:57

## 2021-11-22 RX ADMIN — PANTOPRAZOLE SODIUM SCH MG: 40 TABLET, DELAYED RELEASE ORAL at 08:39

## 2021-11-22 RX ADMIN — IPRATROPIUM BROMIDE AND ALBUTEROL SULFATE SCH PUFF: 103; 18 AEROSOL, METERED RESPIRATORY (INHALATION) at 13:13

## 2021-11-22 RX ADMIN — Medication SCH EACH: at 21:54

## 2021-11-22 RX ADMIN — INSULIN HUMAN PRN UNITS: 100 INJECTION, SOLUTION PARENTERAL at 13:07

## 2021-11-22 RX ADMIN — SODIUM CHLORIDE SCH MLS/HR: 9 INJECTION, SOLUTION INTRAVENOUS at 15:13

## 2021-11-22 RX ADMIN — ENOXAPARIN SODIUM SCH MG: 40 INJECTION SUBCUTANEOUS at 08:35

## 2021-11-22 RX ADMIN — ALPRAZOLAM PRN MG: 0.25 TABLET ORAL at 14:01

## 2021-11-22 RX ADMIN — ACETAMINOPHEN PRN MG: 325 TABLET ORAL at 05:17

## 2021-11-22 RX ADMIN — LABETALOL HCL SCH MG: 100 TABLET, FILM COATED ORAL at 08:39

## 2021-11-22 RX ADMIN — ATORVASTATIN CALCIUM SCH MG: 10 TABLET, FILM COATED ORAL at 21:23

## 2021-11-22 RX ADMIN — BACLOFEN SCH MG: 10 TABLET ORAL at 12:51

## 2021-11-22 RX ADMIN — ISOSORBIDE DINITRATE SCH MG: 20 TABLET ORAL at 08:38

## 2021-11-22 RX ADMIN — CLOPIDOGREL BISULFATE SCH MG: 75 TABLET, FILM COATED ORAL at 08:38

## 2021-11-22 RX ADMIN — IPRATROPIUM BROMIDE AND ALBUTEROL SULFATE SCH PUFF: 103; 18 AEROSOL, METERED RESPIRATORY (INHALATION) at 20:04

## 2021-11-22 RX ADMIN — INSULIN HUMAN PRN UNITS: 100 INJECTION, SOLUTION PARENTERAL at 18:19

## 2021-11-22 RX ADMIN — DEXAMETHASONE SCH MG: 4 TABLET ORAL at 08:38

## 2021-11-22 RX ADMIN — ACETAMINOPHEN PRN MG: 325 TABLET ORAL at 11:34

## 2021-11-22 RX ADMIN — CIPROFLOXACIN SCH MG: 250 TABLET, FILM COATED ORAL at 10:03

## 2021-11-22 RX ADMIN — GUAIFENESIN PRN MG: 100 SOLUTION ORAL at 17:56

## 2021-11-22 RX ADMIN — AMLODIPINE BESYLATE SCH MG: 5 TABLET ORAL at 08:39

## 2021-11-22 NOTE — NUR
RN NOTE



SPOKE WITH DR. GÓMEZ ABOUT PT SCHEDULED MEDICATION OF CARDURA 4MG. INFORMED DR. GÓMEZ 
ABOUT PT BLOOD PRESSURE /53. DR. GÓMEZ ORDERED TO GIVE. ORDER NOTED AND CARRIED 
OUT.

## 2021-11-22 NOTE — NUR
Patient noted with anxiety and restlessness and request anxiety med. Dr miller made aware and 
received order for xanax 0.25 mg po q6h prn. Order noted.

## 2021-11-22 NOTE — NUR
RN OPENING NOTES

Patient is alert and oriented and in bed. Patient breathing even and unlabored.Will be 
monitored.

## 2021-11-22 NOTE — NUR
RN NOTE



PATIENT REMAINS IN BED RESTING,. PATIENT IS NOT IN DISCOMFORT OR PAIN AT THE TIME. IS A/O 
*4. BED IN LOW POSITION, WITH THREE SIDE RAILS UP. ON 6 L NASAL CANULA. ALL DUE MEDS GIVEN 
AS MD ORDER.

 KEPT PATIENT CLEAN AND DRY THROUGHOUT THE SHIFT. ALL NEEDS MET, WILL ENDORSE THE ONCOMING 
SHIFT FOR CONTINUOS CARE.

## 2021-11-22 NOTE — NUR
Patient insisted on transferring to the chair and when transferred soiled the chair with 
urine and reefused to be cleaned and transfer back to bed for lucio care. Patient education 
provided but refused.

## 2021-11-22 NOTE — NUR
RN NOTE



PT RECEIVED IN BED. PT IS ON 6L OF 02 VIA NC SHOWING NO S/S OF RESP DISTRESS. PT IS ALERT 
AND ORIENTED X4. ON MONITOR SHOWING NSR. ON CCHO DIET. IV ACCESS NOTED ON RIGHT UPPER ARM 
MIDLINE. LINE FLUSHED, PATENT, AND INTACT WITH NO SIGNS OF INFILTRATION. ALL SAFETY MEASURES 
IMPLEMENTED. CALL LIGHT WITHIN REACH. BED ALARM ON. BED LOCKED AND IN POSITION. WILL 
CONTINUE TO MONITOR AND ASSESS FOR ANY CHANGES DURING SHIFT.

## 2021-11-22 NOTE — NUR
RN CLOSING NOTES

Patient is alert and oriented and in bed. Patient breathing even and unlabored and on 02 6 
liters via n/c with 02 sat of 98%. No c/o pain or discomfort. Patient resting comfortably. 
Patient teaching done regarding safety and fall precautions. Right upper arm midline saline 
lock. Robitussin and tylenol given with good effect. Patient endorsed to next shift in 
stable condition.

## 2021-11-23 VITALS — SYSTOLIC BLOOD PRESSURE: 148 MMHG | DIASTOLIC BLOOD PRESSURE: 67 MMHG

## 2021-11-23 VITALS — DIASTOLIC BLOOD PRESSURE: 52 MMHG | SYSTOLIC BLOOD PRESSURE: 122 MMHG

## 2021-11-23 VITALS — DIASTOLIC BLOOD PRESSURE: 53 MMHG | SYSTOLIC BLOOD PRESSURE: 117 MMHG

## 2021-11-23 VITALS — SYSTOLIC BLOOD PRESSURE: 129 MMHG | DIASTOLIC BLOOD PRESSURE: 52 MMHG

## 2021-11-23 VITALS — DIASTOLIC BLOOD PRESSURE: 71 MMHG | SYSTOLIC BLOOD PRESSURE: 138 MMHG

## 2021-11-23 VITALS — SYSTOLIC BLOOD PRESSURE: 123 MMHG | DIASTOLIC BLOOD PRESSURE: 48 MMHG

## 2021-11-23 VITALS — SYSTOLIC BLOOD PRESSURE: 109 MMHG | DIASTOLIC BLOOD PRESSURE: 46 MMHG

## 2021-11-23 LAB
ALBUMIN SERPL BCP-MCNC: 2.8 G/DL (ref 3.4–5)
ALP SERPL-CCNC: 73 U/L (ref 46–116)
ALT SERPL W P-5'-P-CCNC: 43 U/L (ref 12–78)
AST SERPL W P-5'-P-CCNC: 20 U/L (ref 15–37)
BASOPHILS # BLD AUTO: 0 K/UL (ref 0–0.2)
BASOPHILS NFR BLD AUTO: 0.1 % (ref 0–2)
BILIRUB SERPL-MCNC: 0.5 MG/DL (ref 0.2–1)
BUN SERPL-MCNC: 24 MG/DL (ref 7–18)
CALCIUM SERPL-MCNC: 8.3 MG/DL (ref 8.5–10.1)
CHLORIDE SERPL-SCNC: 100 MMOL/L (ref 98–107)
CO2 SERPL-SCNC: 27 MMOL/L (ref 21–32)
CREAT SERPL-MCNC: 1 MG/DL (ref 0.6–1.3)
CRP SERPL-MCNC: 6.9 MG/DL (ref 0–0.9)
EOSINOPHIL NFR BLD AUTO: 0 % (ref 0–6)
FERRITIN SERPL-MCNC: 258 NG/ML (ref 8–388)
GLUCOSE SERPL-MCNC: 192 MG/DL (ref 74–106)
HCT VFR BLD AUTO: 26 % (ref 33–45)
HGB BLD-MCNC: 8.6 G/DL (ref 11.5–14.8)
IRON SERPL-MCNC: 25 UG/DL (ref 50–175)
LYMPHOCYTES NFR BLD AUTO: 1 K/UL (ref 0.8–4.8)
LYMPHOCYTES NFR BLD AUTO: 14.5 % (ref 20–44)
MCHC RBC AUTO-ENTMCNC: 33 G/DL (ref 31–36)
MCV RBC AUTO: 82 FL (ref 82–100)
MONOCYTES NFR BLD AUTO: 0.9 K/UL (ref 0.1–1.3)
MONOCYTES NFR BLD AUTO: 12.3 % (ref 2–12)
NEUTROPHILS # BLD AUTO: 5.1 K/UL (ref 1.8–8.9)
NEUTROPHILS NFR BLD AUTO: 73.1 % (ref 43–81)
PLATELET # BLD AUTO: 326 K/UL (ref 150–450)
POTASSIUM SERPL-SCNC: 3.9 MMOL/L (ref 3.5–5.1)
PROT SERPL-MCNC: 6.2 G/DL (ref 6.4–8.2)
RBC # BLD AUTO: 3.22 MIL/UL (ref 4–5.2)
SODIUM SERPL-SCNC: 134 MMOL/L (ref 136–145)
TIBC SERPL-MCNC: 237 UG/DL (ref 250–450)
WBC NRBC COR # BLD AUTO: 7 K/UL (ref 4.3–11)

## 2021-11-23 RX ADMIN — ALPRAZOLAM PRN MG: 0.25 TABLET ORAL at 00:07

## 2021-11-23 RX ADMIN — BACLOFEN SCH MG: 10 TABLET ORAL at 18:21

## 2021-11-23 RX ADMIN — BACLOFEN SCH MG: 10 TABLET ORAL at 12:42

## 2021-11-23 RX ADMIN — ACETAMINOPHEN PRN MG: 325 TABLET ORAL at 00:07

## 2021-11-23 RX ADMIN — Medication SCH ML: at 10:03

## 2021-11-23 RX ADMIN — ALPRAZOLAM PRN MG: 0.25 TABLET ORAL at 15:24

## 2021-11-23 RX ADMIN — INSULIN HUMAN PRN UNITS: 100 INJECTION, SOLUTION PARENTERAL at 18:29

## 2021-11-23 RX ADMIN — AMLODIPINE BESYLATE SCH MG: 5 TABLET ORAL at 08:43

## 2021-11-23 RX ADMIN — Medication SCH EACH: at 07:51

## 2021-11-23 RX ADMIN — CLOPIDOGREL BISULFATE SCH MG: 75 TABLET, FILM COATED ORAL at 08:48

## 2021-11-23 RX ADMIN — GUAIFENESIN PRN MG: 100 SOLUTION ORAL at 12:42

## 2021-11-23 RX ADMIN — DEXAMETHASONE SCH MG: 4 TABLET ORAL at 08:43

## 2021-11-23 RX ADMIN — VALSARTAN SCH MG: 80 TABLET ORAL at 08:41

## 2021-11-23 RX ADMIN — ISOSORBIDE DINITRATE SCH MG: 20 TABLET ORAL at 08:47

## 2021-11-23 RX ADMIN — ACETAMINOPHEN PRN MG: 325 TABLET ORAL at 19:20

## 2021-11-23 RX ADMIN — BACLOFEN SCH MG: 10 TABLET ORAL at 08:42

## 2021-11-23 RX ADMIN — GUAIFENESIN PRN MG: 100 SOLUTION ORAL at 19:20

## 2021-11-23 RX ADMIN — INSULIN HUMAN PRN UNITS: 100 INJECTION, SOLUTION PARENTERAL at 07:57

## 2021-11-23 RX ADMIN — INSULIN HUMAN PRN UNITS: 100 INJECTION, SOLUTION PARENTERAL at 12:26

## 2021-11-23 RX ADMIN — HUMAN INSULIN PRN UNIT: 100 INJECTION, SOLUTION SUBCUTANEOUS at 23:00

## 2021-11-23 RX ADMIN — IPRATROPIUM BROMIDE AND ALBUTEROL SULFATE SCH PUFF: 103; 18 AEROSOL, METERED RESPIRATORY (INHALATION) at 19:30

## 2021-11-23 RX ADMIN — ATORVASTATIN CALCIUM SCH MG: 10 TABLET, FILM COATED ORAL at 21:10

## 2021-11-23 RX ADMIN — ACETAMINOPHEN PRN MG: 325 TABLET ORAL at 06:21

## 2021-11-23 RX ADMIN — ALPRAZOLAM PRN MG: 0.25 TABLET ORAL at 06:57

## 2021-11-23 RX ADMIN — PANTOPRAZOLE SODIUM SCH MG: 40 TABLET, DELAYED RELEASE ORAL at 08:48

## 2021-11-23 RX ADMIN — LABETALOL HCL SCH MG: 100 TABLET, FILM COATED ORAL at 17:00

## 2021-11-23 RX ADMIN — Medication SCH EACH: at 12:19

## 2021-11-23 RX ADMIN — PANTOPRAZOLE SODIUM SCH MG: 40 TABLET, DELAYED RELEASE ORAL at 07:49

## 2021-11-23 RX ADMIN — DOXAZOSIN MESYLATE SCH MG: 4 TABLET ORAL at 21:09

## 2021-11-23 RX ADMIN — SODIUM CHLORIDE SCH MLS/HR: 9 INJECTION, SOLUTION INTRAVENOUS at 13:58

## 2021-11-23 RX ADMIN — ENOXAPARIN SODIUM SCH MG: 40 INJECTION SUBCUTANEOUS at 08:53

## 2021-11-23 RX ADMIN — GUAIFENESIN PRN MG: 100 SOLUTION ORAL at 00:07

## 2021-11-23 RX ADMIN — CIPROFLOXACIN SCH MG: 250 TABLET, FILM COATED ORAL at 21:09

## 2021-11-23 RX ADMIN — GUAIFENESIN PRN MG: 100 SOLUTION ORAL at 06:21

## 2021-11-23 RX ADMIN — ALPRAZOLAM PRN MG: 0.25 TABLET ORAL at 19:20

## 2021-11-23 RX ADMIN — ACETAMINOPHEN PRN MG: 325 TABLET ORAL at 12:36

## 2021-11-23 RX ADMIN — Medication SCH EACH: at 21:19

## 2021-11-23 RX ADMIN — INSULIN GLARGINE SCH UNIT: 100 INJECTION, SOLUTION SUBCUTANEOUS at 23:02

## 2021-11-23 RX ADMIN — IPRATROPIUM BROMIDE AND ALBUTEROL SULFATE SCH PUFF: 103; 18 AEROSOL, METERED RESPIRATORY (INHALATION) at 01:43

## 2021-11-23 RX ADMIN — ISOSORBIDE DINITRATE SCH MG: 20 TABLET ORAL at 18:27

## 2021-11-23 RX ADMIN — LABETALOL HCL SCH MG: 100 TABLET, FILM COATED ORAL at 08:48

## 2021-11-23 RX ADMIN — CIPROFLOXACIN SCH MG: 250 TABLET, FILM COATED ORAL at 09:19

## 2021-11-23 RX ADMIN — Medication SCH EACH: at 18:03

## 2021-11-23 NOTE — NUR
RN OPENING NOTE 



PATIENT IS IN BED RESTING AND IN STABLE CONDITION, ALERT AND ORIENTED X3, WITH NC @ 4L/MIN 
O2 SAT AT 99% NO SIGN OF DISTRESS OR SOB NOTED AT THIS TIME, HOB ELEVATED TO 45 DEGREES . 
LEFT LEG REDNESS CELLULITIS OBSERVED, ISOLATION PRECAUTION IN PLACE, JOSE MID LINE FLUSHING 
WELL, PATENT AND INTACT. SAFETY PRECAUTIONS IN PLACE, SIDE RAILS UP X2, BED IN LOWEST 
POSITION , CALL LIGHT WITHIN REACH.

## 2021-11-23 NOTE — NUR
RN NOTE



 PT IS ON 6L OF 02 VIA NC SHOWING NO S/S OF RESP DISTRESS. PT IS ALERT AND ORIENTED X4. ON 
MONITOR SHOWING NSR. ON CCHO DIET. IV ACCESS NOTED ON RIGHT UPPER ARM MIDLINE. LINE FLUSHED, 
PATENT, AND INTACT WITH NO SIGNS OF INFILTRATION. ALL SAFETY MEASURES IMPLEMENTED. CALL 
LIGHT WITHIN REACH. BED ALARM ON. BED LOCKED AND IN POSITION. WILL ENDORSE PATIENT TO 
UPCOMING SHIFT.

## 2021-11-23 NOTE — NUR
TELE RN NOTE 



PATIENT REPORTED SX OF ANXIETY. UNABLE TO SCAN XANAX 0. 25MG TAB PRN , MEDICATION 
ADMINISTERED 13:12 PHARMACY INFORMED.

## 2021-11-23 NOTE — NUR
RN NOTE



INFORMED  THAT PATIENTS BLOOD SUGAR  PER SLIDING SCALE RECOMMENDS 10 UNITS SQ . 
 ORDERED ADDITIONAL LANTUS 18 NITS SQ DAILY, FIRST DOSE NOW. ORDER READ BACK. NOTED 
AND CARRIED OUT.

## 2021-11-23 NOTE — NUR
RN OPENING NOTE



RECEIVED PATIENT IN BED RESTING, IN NO DISTRESS OR DISCOMFORT. ON NASAL CANULA 6L, A/O X 4,  
LEFT LEG PINK.  SAFETY MEASURES TAKEN, HOB ELEVATED, BED LOCKED, THREE SIDE RAILS UP.IV LINE 
IN RIGHT UA MIDLINE, FLUSHED.

## 2021-11-23 NOTE — NUR
TELE RN CLOSING NOTE



PT IS AWAKE IN BED. A/O X3. PT ON 6LPM O2 VIA NC TOLERATING WELL. NO SIGN OF DISTRESS OR SOB 
NOTED. PT ON EXTERNAL CARDIAC MONITOR READING SR. PT HAS NO C/O PAIN OR DISCOMFORT AT THIS 
TIME. IV ACCESS IN JOSE MIDLINE, INTACT AND PATENT. ALL NEEDS HAVE BEEN MET. SAFETY 
PRECAUTIONS MAINTAINED AT ALL TIMES. BED IN LOWEST LOCKED POSITION, HOB ELEVATED, SIDE RAILS 
UP X2. CALL LIGHT AND TABLE WITHIN REACH. WILL ENDORSE TO ONCOMING NURSE FOR NAHUN.

## 2021-11-24 VITALS — DIASTOLIC BLOOD PRESSURE: 64 MMHG | SYSTOLIC BLOOD PRESSURE: 149 MMHG

## 2021-11-24 VITALS — DIASTOLIC BLOOD PRESSURE: 67 MMHG | SYSTOLIC BLOOD PRESSURE: 148 MMHG

## 2021-11-24 VITALS — DIASTOLIC BLOOD PRESSURE: 76 MMHG | SYSTOLIC BLOOD PRESSURE: 160 MMHG

## 2021-11-24 VITALS — SYSTOLIC BLOOD PRESSURE: 146 MMHG | DIASTOLIC BLOOD PRESSURE: 62 MMHG

## 2021-11-24 VITALS — SYSTOLIC BLOOD PRESSURE: 160 MMHG | DIASTOLIC BLOOD PRESSURE: 76 MMHG

## 2021-11-24 VITALS — DIASTOLIC BLOOD PRESSURE: 48 MMHG | SYSTOLIC BLOOD PRESSURE: 112 MMHG

## 2021-11-24 VITALS — SYSTOLIC BLOOD PRESSURE: 103 MMHG | DIASTOLIC BLOOD PRESSURE: 45 MMHG

## 2021-11-24 VITALS — SYSTOLIC BLOOD PRESSURE: 156 MMHG | DIASTOLIC BLOOD PRESSURE: 65 MMHG

## 2021-11-24 LAB
ALBUMIN SERPL BCP-MCNC: 2.8 G/DL (ref 3.4–5)
ALP SERPL-CCNC: 74 U/L (ref 46–116)
ALT SERPL W P-5'-P-CCNC: 35 U/L (ref 12–78)
AST SERPL W P-5'-P-CCNC: 17 U/L (ref 15–37)
BILIRUB SERPL-MCNC: 0.5 MG/DL (ref 0.2–1)
BUN SERPL-MCNC: 26 MG/DL (ref 7–18)
CALCIUM SERPL-MCNC: 8.4 MG/DL (ref 8.5–10.1)
CHLORIDE SERPL-SCNC: 100 MMOL/L (ref 98–107)
CO2 SERPL-SCNC: 25 MMOL/L (ref 21–32)
CREAT SERPL-MCNC: 1 MG/DL (ref 0.6–1.3)
CRP SERPL-MCNC: 5.4 MG/DL (ref 0–0.9)
GLUCOSE SERPL-MCNC: 208 MG/DL (ref 74–106)
POTASSIUM SERPL-SCNC: 4 MMOL/L (ref 3.5–5.1)
PROT SERPL-MCNC: 6.1 G/DL (ref 6.4–8.2)
SODIUM SERPL-SCNC: 134 MMOL/L (ref 136–145)

## 2021-11-24 RX ADMIN — ATORVASTATIN CALCIUM SCH MG: 10 TABLET, FILM COATED ORAL at 23:00

## 2021-11-24 RX ADMIN — BACLOFEN SCH MG: 10 TABLET ORAL at 12:04

## 2021-11-24 RX ADMIN — IPRATROPIUM BROMIDE AND ALBUTEROL SULFATE SCH PUFF: 103; 18 AEROSOL, METERED RESPIRATORY (INHALATION) at 12:57

## 2021-11-24 RX ADMIN — ALPRAZOLAM PRN MG: 0.25 TABLET ORAL at 01:46

## 2021-11-24 RX ADMIN — INSULIN HUMAN PRN UNITS: 100 INJECTION, SOLUTION PARENTERAL at 12:23

## 2021-11-24 RX ADMIN — INSULIN HUMAN PRN UNITS: 100 INJECTION, SOLUTION PARENTERAL at 17:36

## 2021-11-24 RX ADMIN — GUAIFENESIN PRN MG: 100 SOLUTION ORAL at 08:06

## 2021-11-24 RX ADMIN — Medication SCH EACH: at 22:24

## 2021-11-24 RX ADMIN — Medication SCH EACH: at 17:32

## 2021-11-24 RX ADMIN — ACETAMINOPHEN PRN MG: 325 TABLET ORAL at 20:28

## 2021-11-24 RX ADMIN — LABETALOL HCL SCH MG: 100 TABLET, FILM COATED ORAL at 16:55

## 2021-11-24 RX ADMIN — HUMAN INSULIN PRN UNIT: 100 INJECTION, SOLUTION SUBCUTANEOUS at 22:58

## 2021-11-24 RX ADMIN — ALPRAZOLAM PRN MG: 0.25 TABLET ORAL at 08:06

## 2021-11-24 RX ADMIN — AMLODIPINE BESYLATE SCH MG: 5 TABLET ORAL at 09:03

## 2021-11-24 RX ADMIN — ENOXAPARIN SODIUM SCH MG: 40 INJECTION SUBCUTANEOUS at 09:08

## 2021-11-24 RX ADMIN — CLOPIDOGREL BISULFATE SCH MG: 75 TABLET, FILM COATED ORAL at 09:08

## 2021-11-24 RX ADMIN — ISOSORBIDE DINITRATE SCH MG: 20 TABLET ORAL at 16:55

## 2021-11-24 RX ADMIN — Medication SCH EACH: at 12:21

## 2021-11-24 RX ADMIN — LABETALOL HCL SCH MG: 100 TABLET, FILM COATED ORAL at 09:08

## 2021-11-24 RX ADMIN — BACLOFEN SCH MG: 10 TABLET ORAL at 09:02

## 2021-11-24 RX ADMIN — INSULIN HUMAN PRN UNITS: 100 INJECTION, SOLUTION PARENTERAL at 09:10

## 2021-11-24 RX ADMIN — IPRATROPIUM BROMIDE AND ALBUTEROL SULFATE SCH PUFF: 103; 18 AEROSOL, METERED RESPIRATORY (INHALATION) at 01:30

## 2021-11-24 RX ADMIN — INSULIN GLARGINE SCH UNIT: 100 INJECTION, SOLUTION SUBCUTANEOUS at 09:17

## 2021-11-24 RX ADMIN — ACETAMINOPHEN PRN MG: 325 TABLET ORAL at 14:06

## 2021-11-24 RX ADMIN — BACLOFEN SCH MG: 10 TABLET ORAL at 16:56

## 2021-11-24 RX ADMIN — SODIUM CHLORIDE SCH MLS/HR: 9 INJECTION, SOLUTION INTRAVENOUS at 14:48

## 2021-11-24 RX ADMIN — DOXAZOSIN MESYLATE SCH MG: 4 TABLET ORAL at 23:00

## 2021-11-24 RX ADMIN — GUAIFENESIN PRN MG: 100 SOLUTION ORAL at 14:06

## 2021-11-24 RX ADMIN — ALPRAZOLAM PRN MG: 0.25 TABLET ORAL at 14:06

## 2021-11-24 RX ADMIN — IPRATROPIUM BROMIDE AND ALBUTEROL SULFATE SCH PUFF: 103; 18 AEROSOL, METERED RESPIRATORY (INHALATION) at 08:08

## 2021-11-24 RX ADMIN — ALPRAZOLAM PRN MG: 0.25 TABLET ORAL at 20:28

## 2021-11-24 RX ADMIN — VALSARTAN SCH MG: 80 TABLET ORAL at 09:04

## 2021-11-24 RX ADMIN — GUAIFENESIN PRN MG: 100 SOLUTION ORAL at 01:46

## 2021-11-24 RX ADMIN — IPRATROPIUM BROMIDE AND ALBUTEROL SULFATE SCH PUFF: 103; 18 AEROSOL, METERED RESPIRATORY (INHALATION) at 19:52

## 2021-11-24 RX ADMIN — GUAIFENESIN PRN MG: 100 SOLUTION ORAL at 20:27

## 2021-11-24 RX ADMIN — ACETAMINOPHEN PRN MG: 325 TABLET ORAL at 08:06

## 2021-11-24 RX ADMIN — ISOSORBIDE DINITRATE SCH MG: 20 TABLET ORAL at 09:03

## 2021-11-24 RX ADMIN — Medication SCH EACH: at 08:25

## 2021-11-24 RX ADMIN — ACETAMINOPHEN PRN MG: 325 TABLET ORAL at 01:46

## 2021-11-24 RX ADMIN — Medication SCH ML: at 08:06

## 2021-11-24 RX ADMIN — LEVOFLOXACIN SCH MLS/HR: 500 INJECTION, SOLUTION INTRAVENOUS at 09:22

## 2021-11-24 RX ADMIN — DEXAMETHASONE SCH MG: 4 TABLET ORAL at 09:09

## 2021-11-24 RX ADMIN — SODIUM CHLORIDE SCH MLS/HR: 9 INJECTION, SOLUTION INTRAVENOUS at 14:03

## 2021-11-24 NOTE — NUR
RN NOTE 



CHARGE AUBREY HOFFMANN OVERRIDE MEDICATION ON OMNI CELL FOR VANCO D/T PHARMACY DID NOT PROVIDE 
MEDICATION.

## 2021-11-24 NOTE — NUR
RN CLOSING NOTES 



PT REMAINS RESTING IN BED ON SEMI KENNEY POSITION ALERT AND ORIENT X4 WITH NO SIGN OF 
DISTRESS OR SOB WITH EVEN BREATHING AND UNLABORED ON 6L/MIN NASAL CANULA, MIDLINE ON THE JOSE 
PATENT AND INTACT, FLUSHING WELL. ISOLATION PRECAUTIONS IN PLACE, TELE READING SR, BODY 
ASSESSMENT DONE WITH LEFT LEG CELLULITIS NOTED, WOUND CONSULT AND INFECTIOUS DISEASE 
ORDERED, SAFETY MEASURES IN PLACE, BED ALARM ON, LOCKED AND IN LOWEST POSITION  SIDE RAILS 
UP CALL LIGHT WITHIN REACH ALL NEEDS MET WILL ENDORSE TO NIGHT SHIFT NURSE

## 2021-11-24 NOTE — NUR
RN CLOSING NOTE 



PATIENT RESTING IN BED,  IN NO DISTRESS OR DISCOMFORT. ON NASAL CANULA 6L, A/O X 4,  SAFETY 
MEASURES TAKEN, HOB ELEVATED, BED LOCKED, THREE SIDE RAILS UP.IV LINE IN RIGHT UA MIDLINE, 
FLUSHED.EPISODE OF SHORTNESS OF BREATH AND COUGHING, RELIEVED WITH DEEP BREATHING AND PRN 
ROBITUSSIN MEDICATION. WLL ENDORSE CARE TO ONCOMING NURSE.

## 2021-11-24 NOTE — NUR
RN OPENING NOTE 



RECEIVED PATIENT ON COVID ISOLATION. PATIENT RESTING IN BED. A/OX4. ON OXYGEN 6L/MIN VIA 
NASAL CANNULA. SATURATING 99%, TITRATED O2 TO 4L/MIN VIA NC. RESPIRATIONS ARE EVEN AND 
UNLABORED. NO S/S SOB NOTED. NO C/O PAIN AT THIS TIME. PATIENT IS REQUESTING HER XANNAX, 
TYLENOL AND COUGH MEDICINE, INFORMED HER WILL SEE WHEN MEDICATION IS DUE. IN NO APPARENT 
DISTRESS.  TELE MONITOR READS SINUS RHYTHM  HR 72. IV ACCESS IN JOSE MIDLINE PATENT AND 
SALINE LOCKED. BED IS LOW AND LOCKED, HOB ELEVTAED IN SEMI FOWLERS, SIDE RIALS UP X2, CALL 
LIGHT WITHIN REACH.

## 2021-11-24 NOTE — NUR
RN OPEN NOTES 



PT RECEIVED RESTING IN BED ON SEMI KENNEY POSITION ALERT AND ORIENT X4 WITH NO SIGN OF 
DISTRESS OR SOB WITH EVEN BREATHING AND UNLABORED ON 6L/MIN NASAL CANULA, MIDLINE ON THE JOSE 
PATENT AND INTACT, FLUSHING WELL. ISOLATION PRECAUTIONS IN PLACE, TELE READING SR, BODY 
ASSESSMENT DONE WITH LEFT LEG CELLULITIS NOTED, SAFETY MEASURES IN PLACE, BD ALARM ON, 
LOCKED AND IN LOWEST POSITION  SIDE RAILS UP CALL LIGHT WITHIN REACH

## 2021-11-24 NOTE — NUR
RN NOTE 



PATIENT IS SLOUCHING IN BED, HAVING COUGH ATTACK, 02 87% ON 4L/MIN VIA NASAL CANNULA. 
INCREASED O2 TO 6L/MIN, REPOSITIONED PATIENT. ALBUTEROL INHALER GIVEN, 3 PUFFS. O2 
SATURATION 94%

## 2021-11-25 VITALS — DIASTOLIC BLOOD PRESSURE: 80 MMHG | SYSTOLIC BLOOD PRESSURE: 166 MMHG

## 2021-11-25 VITALS — SYSTOLIC BLOOD PRESSURE: 168 MMHG | DIASTOLIC BLOOD PRESSURE: 70 MMHG

## 2021-11-25 VITALS — SYSTOLIC BLOOD PRESSURE: 144 MMHG | DIASTOLIC BLOOD PRESSURE: 58 MMHG

## 2021-11-25 VITALS — DIASTOLIC BLOOD PRESSURE: 65 MMHG | SYSTOLIC BLOOD PRESSURE: 163 MMHG

## 2021-11-25 VITALS — DIASTOLIC BLOOD PRESSURE: 55 MMHG | SYSTOLIC BLOOD PRESSURE: 134 MMHG

## 2021-11-25 VITALS — DIASTOLIC BLOOD PRESSURE: 65 MMHG | SYSTOLIC BLOOD PRESSURE: 156 MMHG

## 2021-11-25 LAB
ALBUMIN SERPL BCP-MCNC: 2.9 G/DL (ref 3.4–5)
ALP SERPL-CCNC: 79 U/L (ref 46–116)
ALT SERPL W P-5'-P-CCNC: 31 U/L (ref 12–78)
AST SERPL W P-5'-P-CCNC: 13 U/L (ref 15–37)
BILIRUB SERPL-MCNC: 0.5 MG/DL (ref 0.2–1)
BUN SERPL-MCNC: 22 MG/DL (ref 7–18)
CALCIUM SERPL-MCNC: 8.5 MG/DL (ref 8.5–10.1)
CHLORIDE SERPL-SCNC: 98 MMOL/L (ref 98–107)
CO2 SERPL-SCNC: 26 MMOL/L (ref 21–32)
CREAT SERPL-MCNC: 0.9 MG/DL (ref 0.6–1.3)
CRP SERPL-MCNC: 4.1 MG/DL (ref 0–0.9)
GLUCOSE SERPL-MCNC: 239 MG/DL (ref 74–106)
POTASSIUM SERPL-SCNC: 4.1 MMOL/L (ref 3.5–5.1)
PROT SERPL-MCNC: 6.2 G/DL (ref 6.4–8.2)
SODIUM SERPL-SCNC: 132 MMOL/L (ref 136–145)

## 2021-11-25 RX ADMIN — Medication SCH EACH: at 13:03

## 2021-11-25 RX ADMIN — Medication SCH EACH: at 22:26

## 2021-11-25 RX ADMIN — ACETAMINOPHEN PRN MG: 325 TABLET ORAL at 17:13

## 2021-11-25 RX ADMIN — GUAIFENESIN PRN MG: 100 SOLUTION ORAL at 17:13

## 2021-11-25 RX ADMIN — ACETAMINOPHEN PRN MG: 325 TABLET ORAL at 04:33

## 2021-11-25 RX ADMIN — IPRATROPIUM BROMIDE AND ALBUTEROL SULFATE SCH PUFF: 103; 18 AEROSOL, METERED RESPIRATORY (INHALATION) at 01:21

## 2021-11-25 RX ADMIN — BACLOFEN SCH MG: 10 TABLET ORAL at 17:11

## 2021-11-25 RX ADMIN — INSULIN HUMAN PRN UNITS: 100 INJECTION, SOLUTION PARENTERAL at 13:05

## 2021-11-25 RX ADMIN — IPRATROPIUM BROMIDE AND ALBUTEROL SULFATE SCH PUFF: 103; 18 AEROSOL, METERED RESPIRATORY (INHALATION) at 19:46

## 2021-11-25 RX ADMIN — SODIUM CHLORIDE SCH MLS/HR: 9 INJECTION, SOLUTION INTRAVENOUS at 15:41

## 2021-11-25 RX ADMIN — HUMAN INSULIN PRN UNIT: 100 INJECTION, SOLUTION SUBCUTANEOUS at 22:28

## 2021-11-25 RX ADMIN — Medication SCH ML: at 08:47

## 2021-11-25 RX ADMIN — ALPRAZOLAM PRN MG: 0.25 TABLET ORAL at 17:13

## 2021-11-25 RX ADMIN — INSULIN HUMAN PRN UNITS: 100 INJECTION, SOLUTION PARENTERAL at 17:41

## 2021-11-25 RX ADMIN — SODIUM CHLORIDE SCH MLS/HR: 9 INJECTION, SOLUTION INTRAVENOUS at 15:32

## 2021-11-25 RX ADMIN — ISOSORBIDE DINITRATE SCH MG: 20 TABLET ORAL at 08:45

## 2021-11-25 RX ADMIN — VALSARTAN SCH MG: 80 TABLET ORAL at 08:46

## 2021-11-25 RX ADMIN — Medication SCH EACH: at 17:33

## 2021-11-25 RX ADMIN — INSULIN HUMAN PRN UNITS: 100 INJECTION, SOLUTION PARENTERAL at 17:33

## 2021-11-25 RX ADMIN — IPRATROPIUM BROMIDE AND ALBUTEROL SULFATE SCH PUFF: 103; 18 AEROSOL, METERED RESPIRATORY (INHALATION) at 08:54

## 2021-11-25 RX ADMIN — BACLOFEN SCH MG: 10 TABLET ORAL at 08:47

## 2021-11-25 RX ADMIN — LABETALOL HCL SCH MG: 100 TABLET, FILM COATED ORAL at 08:46

## 2021-11-25 RX ADMIN — ISOSORBIDE DINITRATE SCH MG: 20 TABLET ORAL at 17:13

## 2021-11-25 RX ADMIN — Medication SCH EACH: at 08:47

## 2021-11-25 RX ADMIN — AMLODIPINE BESYLATE SCH MG: 5 TABLET ORAL at 08:45

## 2021-11-25 RX ADMIN — ALPRAZOLAM PRN MG: 0.25 TABLET ORAL at 04:33

## 2021-11-25 RX ADMIN — PANTOPRAZOLE SODIUM SCH MG: 40 TABLET, DELAYED RELEASE ORAL at 08:44

## 2021-11-25 RX ADMIN — GUAIFENESIN PRN MG: 100 SOLUTION ORAL at 04:33

## 2021-11-25 RX ADMIN — ATORVASTATIN CALCIUM SCH MG: 10 TABLET, FILM COATED ORAL at 21:52

## 2021-11-25 RX ADMIN — INSULIN HUMAN PRN UNITS: 100 INJECTION, SOLUTION PARENTERAL at 08:51

## 2021-11-25 RX ADMIN — LABETALOL HCL SCH MG: 100 TABLET, FILM COATED ORAL at 17:12

## 2021-11-25 RX ADMIN — INSULIN GLARGINE SCH UNIT: 100 INJECTION, SOLUTION SUBCUTANEOUS at 08:50

## 2021-11-25 RX ADMIN — CLOPIDOGREL BISULFATE SCH MG: 75 TABLET, FILM COATED ORAL at 08:45

## 2021-11-25 RX ADMIN — LEVOFLOXACIN SCH MLS/HR: 500 INJECTION, SOLUTION INTRAVENOUS at 08:47

## 2021-11-25 RX ADMIN — ENOXAPARIN SODIUM SCH MG: 40 INJECTION SUBCUTANEOUS at 08:48

## 2021-11-25 RX ADMIN — DEXTROSE MONOHYDRATE SCH MLS/HR: 50 INJECTION, SOLUTION INTRAVENOUS at 16:34

## 2021-11-25 RX ADMIN — DOXAZOSIN MESYLATE SCH MG: 4 TABLET ORAL at 21:52

## 2021-11-25 RX ADMIN — BACLOFEN SCH MG: 10 TABLET ORAL at 12:23

## 2021-11-25 RX ADMIN — DEXAMETHASONE SCH MG: 4 TABLET ORAL at 08:44

## 2021-11-25 RX ADMIN — IPRATROPIUM BROMIDE AND ALBUTEROL SULFATE SCH PUFF: 103; 18 AEROSOL, METERED RESPIRATORY (INHALATION) at 13:45

## 2021-11-25 NOTE — NUR
RN OPENING NOTES



RECEIVED PT AWAKE, A/O X4. ON O2 @6L VIA NC. SATURATING 96%. NO SOB OR ANY S/S OF ACUTE 
RESPIRATORY DISTRESS NOTED. DENIES PAIN AT THIS TIME. SINUS RHYTHM ON TELE MONITOR. IV 
ACCESS AT JOSE MIDLINE INTACT, PATENT AND FLUSHED. SAFETY MEAURES IN PLACE. CALL LIGHT WITHIN 
REACH. BED LOCKED AND IN LOWEST POSITION WITH SIDE RAILS UP X3. HOB IN SEMI FOWLERS. WILL 
CONTINUE TO MONITOR.

## 2021-11-25 NOTE — NUR
RN CLOSING NOTE 



ON COVID ISOLATION. RESTING IN BED. A/OX4. REMAINS ON OXYGEN 6L/MIN VIA NASAL CANNULA. TRIED 
TO TITRATE PATIENT BUT CONTINUED TO WANT TO BE ON 6L. WHEN PATIENT IS COUGHING AGGRESSIVELY 
SHE DOES DESATURATE TO 88%. PROVIDED MULTIPLE ICE PACKS FOR PATIENTS BACK PAIN. PRN  XANNAX, 
TYLENOL AND COUGH MEDICINE, WERE GIVEN THROUGHOUT SHIFT. PATIENT IS SINUS RHYTHM. JOSE 
MIDLINE INTACT. BED REMAINS LOW AND LOCKED, HOB ELEVATED IN SEMI FOWLERS, SIDE RIALS UP X2, 
CALL LIGHT WITHIN REACH. ENDORSED TO ONCOMING SHIFT.

## 2021-11-25 NOTE — NUR
RN NOTE



ACCUCHECK RESULTED 457, REPEATED AND REVEALED 424. 10 UNITS REGULAR INSULIN ADMINISTERED PER 
SLIDING SCALE, DR BLANCO WAS NOTIFIED. ORDER RECEIVED TO ADMINISTER 10 UNITS LANTUS IN 
ADDITION TO SLIDING SCALE. CHARGE AUBREY ANTONIO MADE AWARE

## 2021-11-25 NOTE — NUR
RN NOTE



RECEIVED PATIENT SEATED UPRIGHT IN CHAIR, AO X 4, IN NO S/SX OF ACUTE DISTRESS AT THIS TIME. 
SATURATION AT 94% ON 6L VIA NC, SR ON THE MONITOR, HR IS 81. NOTED JOSE MIDLINE, PATENT AND 
FLUSHING WELL, NO S/S OF INFECTION. SAFETY MEASURES IMPLEMENTED. BED IS LOCKED,  IN LOWEST 
POSITION. CALL LIGHT WITHIN REACH OF THE PATIENT. WILL CONTINUE TO MONITOR AND REASSESS FOR 
ANY CHANGES.

## 2021-11-25 NOTE — NUR
RN NOTES



BLOOD SUGAR . 12 UNITS LANTUS WAS GIVEN AS SCHEDULED. 15 UNITS REGULAR INSULIN FOR 
COVERAGE. DR. BLANCO MADE AWARE, ORDERED TO GIVE ANOTHER 12 UNITS OF INSULIN. NEW ORDERS MADE 
AND CARRIED OUT.

## 2021-11-25 NOTE — NUR
RN CLOSING NOTES



NO SIGNIFICANT CHANGES THROUGHOUT THE SHIFT. NO SOB OR ANY S/S OF RESPIRATORY DISTRESS 
NOTED. DENIES PAIN. ALL DUE MEDS GIVEN. NEEDS ATTENDED. SAFETY MEASURES IMPLEMENTED. CALL 
LIGHT WITHIN REACH. BED LOCKED AND IN LOWEST POSITION WITH SIDE RAILS UP X3.  WILL ENDORSE 
TO NIGHT RN FOR NAHUN.

## 2021-11-26 VITALS — DIASTOLIC BLOOD PRESSURE: 71 MMHG | SYSTOLIC BLOOD PRESSURE: 170 MMHG

## 2021-11-26 VITALS — DIASTOLIC BLOOD PRESSURE: 56 MMHG | SYSTOLIC BLOOD PRESSURE: 136 MMHG

## 2021-11-26 VITALS — DIASTOLIC BLOOD PRESSURE: 60 MMHG | SYSTOLIC BLOOD PRESSURE: 134 MMHG

## 2021-11-26 VITALS — SYSTOLIC BLOOD PRESSURE: 126 MMHG | DIASTOLIC BLOOD PRESSURE: 57 MMHG

## 2021-11-26 VITALS — SYSTOLIC BLOOD PRESSURE: 154 MMHG | DIASTOLIC BLOOD PRESSURE: 55 MMHG

## 2021-11-26 VITALS — DIASTOLIC BLOOD PRESSURE: 78 MMHG | SYSTOLIC BLOOD PRESSURE: 147 MMHG

## 2021-11-26 LAB
ALBUMIN SERPL BCP-MCNC: 2.9 G/DL (ref 3.4–5)
ALP SERPL-CCNC: 79 U/L (ref 46–116)
ALT SERPL W P-5'-P-CCNC: 28 U/L (ref 12–78)
AST SERPL W P-5'-P-CCNC: 11 U/L (ref 15–37)
BASOPHILS # BLD AUTO: 0 K/UL (ref 0–0.2)
BASOPHILS NFR BLD AUTO: 0.2 % (ref 0–2)
BILIRUB SERPL-MCNC: 0.5 MG/DL (ref 0.2–1)
BUN SERPL-MCNC: 26 MG/DL (ref 7–18)
CALCIUM SERPL-MCNC: 9.2 MG/DL (ref 8.5–10.1)
CHLORIDE SERPL-SCNC: 98 MMOL/L (ref 98–107)
CO2 SERPL-SCNC: 26 MMOL/L (ref 21–32)
CREAT SERPL-MCNC: 1 MG/DL (ref 0.6–1.3)
CRP SERPL-MCNC: 3.6 MG/DL (ref 0–0.9)
EOSINOPHIL NFR BLD AUTO: 0 % (ref 0–6)
GLUCOSE SERPL-MCNC: 281 MG/DL (ref 74–106)
HCT VFR BLD AUTO: 27 % (ref 33–45)
HGB BLD-MCNC: 9 G/DL (ref 11.5–14.8)
LYMPHOCYTES NFR BLD AUTO: 0.7 K/UL (ref 0.8–4.8)
LYMPHOCYTES NFR BLD AUTO: 7.4 % (ref 20–44)
MCHC RBC AUTO-ENTMCNC: 33 G/DL (ref 31–36)
MCV RBC AUTO: 81 FL (ref 82–100)
MONOCYTES NFR BLD AUTO: 0.6 K/UL (ref 0.1–1.3)
MONOCYTES NFR BLD AUTO: 6 % (ref 2–12)
NEUTROPHILS # BLD AUTO: 8.1 K/UL (ref 1.8–8.9)
NEUTROPHILS NFR BLD AUTO: 86.4 % (ref 43–81)
PLATELET # BLD AUTO: 390 K/UL (ref 150–450)
POTASSIUM SERPL-SCNC: 4.4 MMOL/L (ref 3.5–5.1)
PROT SERPL-MCNC: 6.3 G/DL (ref 6.4–8.2)
RBC # BLD AUTO: 3.33 MIL/UL (ref 4–5.2)
SODIUM SERPL-SCNC: 132 MMOL/L (ref 136–145)
WBC NRBC COR # BLD AUTO: 9.4 K/UL (ref 4.3–11)

## 2021-11-26 RX ADMIN — BACLOFEN SCH MG: 10 TABLET ORAL at 16:14

## 2021-11-26 RX ADMIN — Medication SCH ML: at 08:27

## 2021-11-26 RX ADMIN — IPRATROPIUM BROMIDE AND ALBUTEROL SULFATE SCH PUFF: 103; 18 AEROSOL, METERED RESPIRATORY (INHALATION) at 12:41

## 2021-11-26 RX ADMIN — Medication SCH EACH: at 21:43

## 2021-11-26 RX ADMIN — ACETAMINOPHEN PRN MG: 325 TABLET ORAL at 00:12

## 2021-11-26 RX ADMIN — GUAIFENESIN PRN MG: 100 SOLUTION ORAL at 07:15

## 2021-11-26 RX ADMIN — DEXTROSE MONOHYDRATE SCH MLS/HR: 50 INJECTION, SOLUTION INTRAVENOUS at 14:08

## 2021-11-26 RX ADMIN — ATORVASTATIN CALCIUM SCH MG: 10 TABLET, FILM COATED ORAL at 21:43

## 2021-11-26 RX ADMIN — BACLOFEN SCH MG: 10 TABLET ORAL at 08:24

## 2021-11-26 RX ADMIN — ALPRAZOLAM PRN MG: 0.25 TABLET ORAL at 13:40

## 2021-11-26 RX ADMIN — ACETAMINOPHEN PRN MG: 325 TABLET ORAL at 07:15

## 2021-11-26 RX ADMIN — LABETALOL HCL SCH MG: 100 TABLET, FILM COATED ORAL at 16:15

## 2021-11-26 RX ADMIN — Medication SCH EACH: at 17:58

## 2021-11-26 RX ADMIN — LEVOFLOXACIN SCH MLS/HR: 500 INJECTION, SOLUTION INTRAVENOUS at 08:23

## 2021-11-26 RX ADMIN — IPRATROPIUM BROMIDE AND ALBUTEROL SULFATE SCH PUFF: 103; 18 AEROSOL, METERED RESPIRATORY (INHALATION) at 08:27

## 2021-11-26 RX ADMIN — HUMAN INSULIN PRN UNIT: 100 INJECTION, SOLUTION SUBCUTANEOUS at 22:08

## 2021-11-26 RX ADMIN — GUAIFENESIN PRN MG: 100 SOLUTION ORAL at 00:12

## 2021-11-26 RX ADMIN — Medication SCH EACH: at 07:50

## 2021-11-26 RX ADMIN — CLOPIDOGREL BISULFATE SCH MG: 75 TABLET, FILM COATED ORAL at 08:25

## 2021-11-26 RX ADMIN — INSULIN HUMAN PRN UNITS: 100 INJECTION, SOLUTION PARENTERAL at 12:40

## 2021-11-26 RX ADMIN — ISOSORBIDE DINITRATE SCH MG: 20 TABLET ORAL at 16:14

## 2021-11-26 RX ADMIN — Medication SCH EACH: at 12:17

## 2021-11-26 RX ADMIN — INSULIN HUMAN PRN UNITS: 100 INJECTION, SOLUTION PARENTERAL at 08:36

## 2021-11-26 RX ADMIN — DOXAZOSIN MESYLATE SCH MG: 4 TABLET ORAL at 21:43

## 2021-11-26 RX ADMIN — ALPRAZOLAM PRN MG: 0.25 TABLET ORAL at 00:12

## 2021-11-26 RX ADMIN — ACETAMINOPHEN PRN MG: 325 TABLET ORAL at 19:43

## 2021-11-26 RX ADMIN — ALPRAZOLAM PRN MG: 0.25 TABLET ORAL at 19:43

## 2021-11-26 RX ADMIN — PANTOPRAZOLE SODIUM SCH MG: 40 TABLET, DELAYED RELEASE ORAL at 07:15

## 2021-11-26 RX ADMIN — GUAIFENESIN PRN MG: 100 SOLUTION ORAL at 19:43

## 2021-11-26 RX ADMIN — ACETAMINOPHEN PRN MG: 325 TABLET ORAL at 13:40

## 2021-11-26 RX ADMIN — DEXTROSE MONOHYDRATE SCH MLS/HR: 50 INJECTION, SOLUTION INTRAVENOUS at 08:23

## 2021-11-26 RX ADMIN — ALPRAZOLAM PRN MG: 0.25 TABLET ORAL at 07:15

## 2021-11-26 RX ADMIN — IPRATROPIUM BROMIDE AND ALBUTEROL SULFATE SCH PUFF: 103; 18 AEROSOL, METERED RESPIRATORY (INHALATION) at 19:17

## 2021-11-26 RX ADMIN — IPRATROPIUM BROMIDE AND ALBUTEROL SULFATE SCH PUFF: 103; 18 AEROSOL, METERED RESPIRATORY (INHALATION) at 02:13

## 2021-11-26 RX ADMIN — ISOSORBIDE DINITRATE SCH MG: 20 TABLET ORAL at 08:25

## 2021-11-26 RX ADMIN — VALSARTAN SCH MG: 80 TABLET ORAL at 08:25

## 2021-11-26 RX ADMIN — INSULIN GLARGINE SCH UNIT: 100 INJECTION, SOLUTION SUBCUTANEOUS at 08:34

## 2021-11-26 RX ADMIN — GUAIFENESIN PRN MG: 100 SOLUTION ORAL at 13:40

## 2021-11-26 RX ADMIN — INSULIN GLARGINE SCH UNIT: 100 INJECTION, SOLUTION SUBCUTANEOUS at 16:25

## 2021-11-26 RX ADMIN — INSULIN HUMAN PRN UNITS: 100 INJECTION, SOLUTION PARENTERAL at 18:00

## 2021-11-26 RX ADMIN — ENOXAPARIN SODIUM SCH MG: 40 INJECTION SUBCUTANEOUS at 08:36

## 2021-11-26 RX ADMIN — SODIUM CHLORIDE SCH MLS/HR: 9 INJECTION, SOLUTION INTRAVENOUS at 14:06

## 2021-11-26 RX ADMIN — LABETALOL HCL SCH MG: 100 TABLET, FILM COATED ORAL at 08:24

## 2021-11-26 RX ADMIN — BACLOFEN SCH MG: 10 TABLET ORAL at 12:27

## 2021-11-26 NOTE — NUR
RN NOTE



AT 1230 PATIENT START DEVELOPING DIFFICULTY BREATHING AND SHORTNESS OF BREATH. PATIENT WAS 
ON NC AT 5L/MIN SAT AT 86%. SIMPLE MASK WAS PLACE AT 10L/MIN. O2 SAT IMPROVE AND SUSTAIN AT 
93%. AROUND 1330 PATIENT START DEVELOPING SOB AND DIFFICULTY BREATHING WITH SIMPLE MASK. O2 
DROP TO 88% AND SUSTAINING. RT WAS NOTIFY.  PATIENT REQUEST TO BE SEATING ON A CHAIR. SHE 
FEELS THAT SHE WILL ALLOW HER TO BREATH BETTER. SIMPLE MASK WAS REPLACE FOR NONREBREATHER AT 
15L. OXYGEN IMPROVER TO 95%. RT THEN REPLACE NONREBREATHER WITH SIMPLE MASK AT 10L/MIN. O2 
SUSTAINING %. WILL CONTINUE TO MONITOR.

## 2021-11-26 NOTE — NUR
RN CLOSING NOTE



PATIENT HAD PERIOD OF DIFFICULTY BREATHING DURING SHIFT. PLEASE SEE PREVIOUS NOTE. CURRENTLY 
ON SIMPLE MASK @ 10L/MIN. 02 SAT 95% AND ABOVE. O2 SAT GOES DOWN TO MID 80'S DURING 
EXERTION. PATIENT PREFER TO BE SEATING ON CHAIR. PATIENT STATE "ITS BETTER TO BREATH WHEN I 
AM SEATING ON A CHAIR." PATIENT SHOW LESS TOLERANT TO ACTIVITIES. VANCOMYCIN WAS GIVEN. 
MAINTAIN BLOOD SUGAR WITH REGULAR INSULIN AND LANTUS. PROPER INSOLATION PRECAUTION IN PLACE. 
ALL SAFETY MEASURE IN PLACE. BED ON LOWEST POSITION WITH HOB ELEVATED WITH 3 SIDE RAIL UP 
AND CALL LIGHT WITHIN REACH. WILL CONTINUE TO MONITOR AND ENDORSE TO ON COMING NURSE.

## 2021-11-26 NOTE — NUR
RN OPENING NOTE



RECEIVE REPORT FROM OUT GOING NURSE. PATIENT IN STABLE CONDITION AT TIME OF REPORT. A/O 
X3-4. ON NC 5L/MIN. AMBULATE WITH ASSIST. WILL FOLLOW UP WITH AM LABS, O2 TITRATION. ALL 
SAFETY MEASURE IN PLACE BED ON LOWEST POSITION WITH HOB ELEVATED AND 3 SIDE RAIL UP. CALL 
LIGHT WITHIN REACH. WILL CONTINUE TO MONITOR.

## 2021-11-26 NOTE — NUR
RN NOTE



RECEIVED PATIENT ON HIGH KENNEY'S, AO X 4, IN NO S/SX OF ACUTE DISTRESS AT THIS TIME. 
SATURATION AT 96% ON 10L VIA O2 MASK, SR ON THE MONITOR, HR IS 70. NOTED JOSE MIDLINE, PATENT 
AND FLUSHING WELL, NO S/S OF INFECTION. SAFETY MEASURES IMPLEMENTED. BED IS LOCKED,  IN 
LOWEST POSITION. CALL LIGHT WITHIN REACH OF THE PATIENT. WILL CONTINUE TO MONITOR AND 
REASSESS FOR ANY CHANGES.

## 2021-11-27 VITALS — DIASTOLIC BLOOD PRESSURE: 65 MMHG | SYSTOLIC BLOOD PRESSURE: 142 MMHG

## 2021-11-27 VITALS — SYSTOLIC BLOOD PRESSURE: 130 MMHG | DIASTOLIC BLOOD PRESSURE: 59 MMHG

## 2021-11-27 VITALS — SYSTOLIC BLOOD PRESSURE: 152 MMHG | DIASTOLIC BLOOD PRESSURE: 83 MMHG

## 2021-11-27 VITALS — DIASTOLIC BLOOD PRESSURE: 74 MMHG | SYSTOLIC BLOOD PRESSURE: 145 MMHG

## 2021-11-27 VITALS — DIASTOLIC BLOOD PRESSURE: 64 MMHG | SYSTOLIC BLOOD PRESSURE: 148 MMHG

## 2021-11-27 VITALS — DIASTOLIC BLOOD PRESSURE: 64 MMHG | SYSTOLIC BLOOD PRESSURE: 149 MMHG

## 2021-11-27 LAB
BASE EXCESS BLDA CALC-SCNC: -1.7 MMOL/L
BUN SERPL-MCNC: 25 MG/DL (ref 7–18)
CALCIUM SERPL-MCNC: 8.5 MG/DL (ref 8.5–10.1)
CHLORIDE SERPL-SCNC: 100 MMOL/L (ref 98–107)
CO2 SERPL-SCNC: 25 MMOL/L (ref 21–32)
CREAT SERPL-MCNC: 0.8 MG/DL (ref 0.6–1.3)
DO-HGB MFR BLDA: 310.7 MMHG
GLUCOSE SERPL-MCNC: 275 MG/DL (ref 74–106)
INHALED O2 CONCENTRATION: 60 %
INHALED O2 FLOW RATE: 10 L/MIN (ref 0–30)
PCO2 TEMP ADJ BLDA: 35 MMHG (ref 35–45)
PH TEMP ADJ BLDA: 7.42 [PH] (ref 7.35–7.45)
PO2 TEMP ADJ BLDA: 78.6 MMHG (ref 75–100)
POTASSIUM SERPL-SCNC: 4.1 MMOL/L (ref 3.5–5.1)
SAO2 % BLDA: 95.3 % (ref 92–98.5)
SODIUM SERPL-SCNC: 136 MMOL/L (ref 136–145)
VENTILATION MODE VENT: (no result)

## 2021-11-27 RX ADMIN — ISOSORBIDE DINITRATE SCH MG: 20 TABLET ORAL at 08:09

## 2021-11-27 RX ADMIN — BACLOFEN SCH MG: 10 TABLET ORAL at 08:20

## 2021-11-27 RX ADMIN — GUAIFENESIN PRN MG: 100 SOLUTION ORAL at 11:19

## 2021-11-27 RX ADMIN — LABETALOL HCL SCH MG: 100 TABLET, FILM COATED ORAL at 08:09

## 2021-11-27 RX ADMIN — Medication SCH EACH: at 07:38

## 2021-11-27 RX ADMIN — DEXTROSE MONOHYDRATE SCH MLS/HR: 50 INJECTION, SOLUTION INTRAVENOUS at 23:01

## 2021-11-27 RX ADMIN — ATORVASTATIN CALCIUM SCH MG: 10 TABLET, FILM COATED ORAL at 21:01

## 2021-11-27 RX ADMIN — Medication SCH ML: at 08:19

## 2021-11-27 RX ADMIN — LEVOFLOXACIN SCH MG: 250 TABLET, FILM COATED ORAL at 08:09

## 2021-11-27 RX ADMIN — DOXAZOSIN MESYLATE SCH MG: 4 TABLET ORAL at 22:00

## 2021-11-27 RX ADMIN — PANTOPRAZOLE SODIUM SCH MG: 40 TABLET, DELAYED RELEASE ORAL at 08:10

## 2021-11-27 RX ADMIN — CLOPIDOGREL BISULFATE SCH MG: 75 TABLET, FILM COATED ORAL at 08:10

## 2021-11-27 RX ADMIN — ACETAMINOPHEN PRN MG: 325 TABLET ORAL at 01:50

## 2021-11-27 RX ADMIN — SODIUM CHLORIDE SCH MLS/HR: 9 INJECTION, SOLUTION INTRAVENOUS at 14:26

## 2021-11-27 RX ADMIN — IPRATROPIUM BROMIDE AND ALBUTEROL SULFATE SCH PUFF: 103; 18 AEROSOL, METERED RESPIRATORY (INHALATION) at 13:34

## 2021-11-27 RX ADMIN — ACETAMINOPHEN PRN MG: 325 TABLET ORAL at 23:17

## 2021-11-27 RX ADMIN — IPRATROPIUM BROMIDE AND ALBUTEROL SULFATE SCH PUFF: 103; 18 AEROSOL, METERED RESPIRATORY (INHALATION) at 08:19

## 2021-11-27 RX ADMIN — ALPRAZOLAM PRN MG: 0.25 TABLET ORAL at 01:50

## 2021-11-27 RX ADMIN — LABETALOL HCL SCH MG: 100 TABLET, FILM COATED ORAL at 17:04

## 2021-11-27 RX ADMIN — ALPRAZOLAM PRN MG: 0.25 TABLET ORAL at 07:51

## 2021-11-27 RX ADMIN — IPRATROPIUM BROMIDE AND ALBUTEROL SULFATE SCH PUFF: 103; 18 AEROSOL, METERED RESPIRATORY (INHALATION) at 19:30

## 2021-11-27 RX ADMIN — ENOXAPARIN SODIUM SCH MG: 40 INJECTION SUBCUTANEOUS at 08:13

## 2021-11-27 RX ADMIN — ALPRAZOLAM PRN MG: 0.25 TABLET ORAL at 21:21

## 2021-11-27 RX ADMIN — INSULIN HUMAN PRN UNITS: 100 INJECTION, SOLUTION PARENTERAL at 08:16

## 2021-11-27 RX ADMIN — GUAIFENESIN PRN MG: 100 SOLUTION ORAL at 01:49

## 2021-11-27 RX ADMIN — VALSARTAN SCH MG: 80 TABLET ORAL at 08:10

## 2021-11-27 RX ADMIN — IPRATROPIUM BROMIDE AND ALBUTEROL SULFATE SCH PUFF: 103; 18 AEROSOL, METERED RESPIRATORY (INHALATION) at 01:40

## 2021-11-27 RX ADMIN — Medication SCH MG: at 11:16

## 2021-11-27 RX ADMIN — INSULIN GLARGINE SCH UNIT: 100 INJECTION, SOLUTION SUBCUTANEOUS at 08:35

## 2021-11-27 RX ADMIN — Medication SCH EACH: at 22:18

## 2021-11-27 RX ADMIN — BACLOFEN SCH MG: 10 TABLET ORAL at 12:51

## 2021-11-27 RX ADMIN — ALPRAZOLAM PRN MG: 0.25 TABLET ORAL at 15:06

## 2021-11-27 RX ADMIN — BACLOFEN SCH MG: 10 TABLET ORAL at 17:04

## 2021-11-27 RX ADMIN — Medication SCH EACH: at 17:12

## 2021-11-27 RX ADMIN — HUMAN INSULIN PRN UNIT: 100 INJECTION, SOLUTION SUBCUTANEOUS at 22:20

## 2021-11-27 RX ADMIN — ACETAMINOPHEN PRN MG: 325 TABLET ORAL at 07:51

## 2021-11-27 RX ADMIN — INSULIN HUMAN PRN UNITS: 100 INJECTION, SOLUTION PARENTERAL at 17:13

## 2021-11-27 RX ADMIN — GUAIFENESIN PRN MG: 100 SOLUTION ORAL at 07:51

## 2021-11-27 RX ADMIN — ISOSORBIDE DINITRATE SCH MG: 20 TABLET ORAL at 17:03

## 2021-11-27 RX ADMIN — DEXTROSE MONOHYDRATE SCH MLS/HR: 50 INJECTION, SOLUTION INTRAVENOUS at 03:10

## 2021-11-27 RX ADMIN — INSULIN HUMAN PRN UNITS: 100 INJECTION, SOLUTION PARENTERAL at 12:10

## 2021-11-27 RX ADMIN — GUAIFENESIN PRN MG: 100 SOLUTION ORAL at 19:48

## 2021-11-27 RX ADMIN — Medication SCH EACH: at 11:57

## 2021-11-27 NOTE — NUR
RN NOTE- PT FOR CT PULM ANGIOGRAM. PT SATURATION CONTINUES TO BE AN ISSUE AS WELL AS 
COUGHING FOR PERIODS. CT NOTIFIED. UNSTABLE STATUS FOR SCAN. WILL ATTEMPT IN A LITTLE WHILE

## 2021-11-27 NOTE — NUR
RN OPENING NOTE-

PATIENT IN BED,COMFORTABLE AT PRESENT . A/O X3-4. ON SIMPLE MASK 10L/MIN. SATURATION 97% 
AMBULATE WITH ASSIST. WILL FOLLOW UP WITH AM LABS, O2 TITRATION. ALL SAFETY MEASURE IN PLACE 
BED ON LOWEST POSITION WITH HOB ELEVATED AND 3 SIDE RAILS UP. CALL LIGHT WITHIN REACH. WILL 
CONTINUE TO MONITOR.

## 2021-11-27 NOTE — NUR
RN NOTES:

AT 1930 PATIENT INHALER AVAILABLE AT BED SIDE,AS OF THIS TIME SHE REFUSED TO DO IT SHE SAID 
A LITTLE BIT LATER.SHE WANTS TO SLEEP FOR SOMETIME SHE JUST WANT HER COUGH SYRUP.

## 2021-11-27 NOTE — NUR
RN NOTES:

PATIENT BP-109/70 REPEATED 2X, SHE IS ON CARDURA, RN/CN NOTIFIED,MEDICINE NOT YET GIVEN DUE 
TO LOW BP.

## 2021-11-27 NOTE — NUR
RN NOTES:

SUDDENLY SHE WAKE UP AND SHE SHOUT HER SPO2 DROP TO 80'S, SHE LOOKS VERY ANXIOUS AND GASPING 
FOR AIR, PUT IN SITTING POSITION AND LET HER 2 LEGS DANGLE ON THE SIDE OF THE BED, HER SPO2 
IMPROVE LITTLE BY LITTLE,THEN SPO2 IMPROVE AND GOES UP TO 85% UP TO  90% AND MAINTAINED IN 
92%, CN/RN AWARE, SHE WAS ASKING FOR HER XANAX SHE SAID "PLEASE GIVE IT TO ME".

## 2021-11-27 NOTE — NUR
RN NOTE- PT W CONTINUED DESATURATION . O2 AT 10 L W SIMPLE MASK. REPOSITIONING , ASSISTED 
FOR COMFORT. SATS AT 88-90%

## 2021-11-27 NOTE — NUR
RN NOTES:

RECEIVED AWAKE ON BED, ON TELE MONITOR SR RATE- 95,A/OX4,  SPO2-96% ON NON REBREATHER MASK 
AT 15 L/MIN, REMAINS ON HIGH FOWLERS POSITION, NOTED WITH SOB ONCE SHE TALK AND DURING 
EXERTION OF EFFORT DURING REPOSITIONING, PER RN SHE WAS ON CLOSE WATCH THE ENTIRE MORNING 
SHIFT DUE TO HER UNSTABLE SPO2 FLUCTUATING IN BETWEEN LOW AS 80'S, SHE HAS CELLULITIS ON THE 
LLE, KEPT ELEVATED,JOSE -MIDLINE PATENT AND WORKING, FOR CT PUML. ANGIOGRAM THIS AFTERNOON 
BUT PER RN IT WAS CANCELLED AND DUE FOR RE-SCHEDULED FUE TO PATIENT UNSTABLE CONDITION.

## 2021-11-27 NOTE — NUR
RN NOTES:

NOTED WITH SLIGHT SOB AND HER SATURATION GOES DOWN IN MOVEMENT AND WHENEVER SHE START TO 
TALK IT WENT DOWN TO 93% THEN SLOWLY ONCE SHE INSTRUCT TO REST AND DEEP BREATH A LITTLE HER 
SATURATION GOES BACK IN SPO2-95-96%.ON CLOSE WATCH.

## 2021-11-27 NOTE — NUR
RN NOTES:

SHE WANTS TO TRANSFER FROM BED TO THE CHAIR, SHE VERBALIZED SHE WILL BREATH EASIER IN 
SITTING POSITION, ASSISTED BY 3 STAFF, AFTER TRANSFERRED SHE IS MORE RELAX SPO2-95%, ASKED 
FOR TYLENOL PRN, SHE SAID' I HAVE MILD PAIN, PLEASE GIVE ME MY TYLENOL", GIVEN, AFTER NON 
PHARMACOLOGIC INTERVENTION IS RENDERED, WARM BLANKET GIVEN.

## 2021-11-27 NOTE — NUR
RN NOTES:

PATIENT REQUEST FOR HER COUGH MEDICATION PRN. SPO2 100% ON 10l/MIN VIA NON RE-BREATHER 
MASK.ON CLOSE WATCH, NO SOB AT THIS TIME.

## 2021-11-27 NOTE — NUR
RN NOTE- PT W DESATURATION . SATS AT 84%. NON REBREATHER ADMINISTERED AT 15LPM. SATS 
RETURNED TO 97-99%/  PLACED PT UP IN CHAIR. COMFORTABLE. MONITOR

## 2021-11-27 NOTE — NUR
RN CLOSING NOTE- 

PATIENT IN BED NOW, TRANSFER FROM CHAIR COMFORTABLE AT PRESENT . A/O X3-4. ON NON REBREATHER 
MASK AT 15LPM,  SATURATION 92- 97%  WILL ATTEMPT O2 TITRATION. PT FOR CT PULM ANGIO. CONSENT 
IN CHART. ALL SAFETY MEASURE IN PLACE BED ON LOWEST POSITION WITH HOB ELEVATED AND 3 SIDE 
RAILS UP. CALL LIGHT WITHIN REACH. WILL CONTINUE TO MONITOR.

## 2021-11-27 NOTE — NUR
RN NOTES:

BLOOD SUGAR CHECKED-277, INSULIN GIVEN PER SCALE, WILL CONTINUE TO MONITOR FOR SIGN OF 
HYPER/HYPOGLYCEMIA, GIVEN SOME SNACKS.

--BP RECHECKED BP-100/65 REMAINS LOW, CARDURA NOT GIVE, CN/RN PACO NOTIFIED.

## 2021-11-27 NOTE — NUR
PER RN, PT UNSTABLE AND UNABLE TO LAY SUPINE. PER RN ATTEMPT CT WHEN PATIENT IS ABLE TO LAY 
SUPINE ON GURNEY

## 2021-11-27 NOTE — NUR
RN NOTE- PT W COUGH, GENERAL MALAISE AND RESTLESSNESS. TYLENOL 650 MG, XANAX 0.25 MG AND 
ROBITUSSIN ADMINISTERED.

## 2021-11-28 VITALS — DIASTOLIC BLOOD PRESSURE: 60 MMHG | SYSTOLIC BLOOD PRESSURE: 148 MMHG

## 2021-11-28 VITALS — DIASTOLIC BLOOD PRESSURE: 68 MMHG | SYSTOLIC BLOOD PRESSURE: 165 MMHG

## 2021-11-28 VITALS — DIASTOLIC BLOOD PRESSURE: 72 MMHG | SYSTOLIC BLOOD PRESSURE: 161 MMHG

## 2021-11-28 VITALS — SYSTOLIC BLOOD PRESSURE: 140 MMHG | DIASTOLIC BLOOD PRESSURE: 60 MMHG

## 2021-11-28 VITALS — SYSTOLIC BLOOD PRESSURE: 157 MMHG | DIASTOLIC BLOOD PRESSURE: 68 MMHG

## 2021-11-28 VITALS — DIASTOLIC BLOOD PRESSURE: 60 MMHG | SYSTOLIC BLOOD PRESSURE: 135 MMHG

## 2021-11-28 LAB
BASOPHILS # BLD AUTO: 0 K/UL (ref 0–0.2)
BASOPHILS NFR BLD AUTO: 0.1 % (ref 0–2)
BUN SERPL-MCNC: 24 MG/DL (ref 7–18)
CALCIUM SERPL-MCNC: 8.9 MG/DL (ref 8.5–10.1)
CHLORIDE SERPL-SCNC: 99 MMOL/L (ref 98–107)
CO2 SERPL-SCNC: 27 MMOL/L (ref 21–32)
CREAT SERPL-MCNC: 0.9 MG/DL (ref 0.6–1.3)
EOSINOPHIL NFR BLD AUTO: 0 % (ref 0–6)
GLUCOSE SERPL-MCNC: 266 MG/DL (ref 74–106)
HCT VFR BLD AUTO: 28 % (ref 33–45)
HGB BLD-MCNC: 9.4 G/DL (ref 11.5–14.8)
LYMPHOCYTES NFR BLD AUTO: 0.7 K/UL (ref 0.8–4.8)
LYMPHOCYTES NFR BLD AUTO: 5.3 % (ref 20–44)
MCHC RBC AUTO-ENTMCNC: 33 G/DL (ref 31–36)
MCV RBC AUTO: 81 FL (ref 82–100)
MONOCYTES NFR BLD AUTO: 1.2 K/UL (ref 0.1–1.3)
MONOCYTES NFR BLD AUTO: 8.2 % (ref 2–12)
NEUTROPHILS # BLD AUTO: 12.2 K/UL (ref 1.8–8.9)
NEUTROPHILS NFR BLD AUTO: 86.4 % (ref 43–81)
PLATELET # BLD AUTO: 475 K/UL (ref 150–450)
POTASSIUM SERPL-SCNC: 3.9 MMOL/L (ref 3.5–5.1)
RBC # BLD AUTO: 3.5 MIL/UL (ref 4–5.2)
SODIUM SERPL-SCNC: 134 MMOL/L (ref 136–145)
WBC NRBC COR # BLD AUTO: 14.1 K/UL (ref 4.3–11)

## 2021-11-28 RX ADMIN — ALPRAZOLAM PRN MG: 0.25 TABLET ORAL at 21:43

## 2021-11-28 RX ADMIN — IPRATROPIUM BROMIDE AND ALBUTEROL SULFATE SCH PUFF: 103; 18 AEROSOL, METERED RESPIRATORY (INHALATION) at 01:08

## 2021-11-28 RX ADMIN — INSULIN HUMAN PRN UNITS: 100 INJECTION, SOLUTION PARENTERAL at 17:22

## 2021-11-28 RX ADMIN — BACLOFEN SCH MG: 10 TABLET ORAL at 17:24

## 2021-11-28 RX ADMIN — LABETALOL HCL SCH MG: 100 TABLET, FILM COATED ORAL at 08:42

## 2021-11-28 RX ADMIN — GUAIFENESIN PRN MG: 100 SOLUTION ORAL at 17:33

## 2021-11-28 RX ADMIN — Medication SCH EACH: at 22:22

## 2021-11-28 RX ADMIN — INSULIN GLARGINE SCH UNIT: 100 INJECTION, SOLUTION SUBCUTANEOUS at 08:49

## 2021-11-28 RX ADMIN — INSULIN HUMAN PRN UNITS: 100 INJECTION, SOLUTION PARENTERAL at 08:47

## 2021-11-28 RX ADMIN — Medication SCH EACH: at 07:48

## 2021-11-28 RX ADMIN — ISOSORBIDE DINITRATE SCH MG: 20 TABLET ORAL at 17:23

## 2021-11-28 RX ADMIN — GUAIFENESIN PRN MG: 100 SOLUTION ORAL at 10:15

## 2021-11-28 RX ADMIN — INSULIN HUMAN PRN UNITS: 100 INJECTION, SOLUTION PARENTERAL at 21:49

## 2021-11-28 RX ADMIN — Medication SCH MG: at 08:44

## 2021-11-28 RX ADMIN — ALPRAZOLAM PRN MG: 0.25 TABLET ORAL at 12:32

## 2021-11-28 RX ADMIN — INSULIN HUMAN PRN UNITS: 100 INJECTION, SOLUTION PARENTERAL at 12:45

## 2021-11-28 RX ADMIN — BACLOFEN SCH MG: 10 TABLET ORAL at 08:43

## 2021-11-28 RX ADMIN — ALPRAZOLAM PRN MG: 0.25 TABLET ORAL at 05:16

## 2021-11-28 RX ADMIN — PANTOPRAZOLE SODIUM SCH MG: 40 TABLET, DELAYED RELEASE ORAL at 08:44

## 2021-11-28 RX ADMIN — Medication SCH EACH: at 17:24

## 2021-11-28 RX ADMIN — ACETAMINOPHEN PRN MG: 325 TABLET ORAL at 21:43

## 2021-11-28 RX ADMIN — SODIUM CHLORIDE SCH MLS/HR: 9 INJECTION, SOLUTION INTRAVENOUS at 13:35

## 2021-11-28 RX ADMIN — GUAIFENESIN PRN MG: 100 SOLUTION ORAL at 23:13

## 2021-11-28 RX ADMIN — GUAIFENESIN PRN MG: 100 SOLUTION ORAL at 05:08

## 2021-11-28 RX ADMIN — LEVOFLOXACIN SCH MG: 250 TABLET, FILM COATED ORAL at 08:43

## 2021-11-28 RX ADMIN — INSULIN GLARGINE SCH UNIT: 100 INJECTION, SOLUTION SUBCUTANEOUS at 21:31

## 2021-11-28 RX ADMIN — Medication SCH ML: at 08:45

## 2021-11-28 RX ADMIN — CLOPIDOGREL BISULFATE SCH MG: 75 TABLET, FILM COATED ORAL at 08:45

## 2021-11-28 RX ADMIN — ENOXAPARIN SODIUM SCH MG: 40 INJECTION SUBCUTANEOUS at 08:50

## 2021-11-28 RX ADMIN — ACETAMINOPHEN PRN MG: 325 TABLET ORAL at 14:05

## 2021-11-28 RX ADMIN — BACLOFEN SCH MG: 10 TABLET ORAL at 12:32

## 2021-11-28 RX ADMIN — DEXTROSE MONOHYDRATE SCH MLS/HR: 50 INJECTION, SOLUTION INTRAVENOUS at 17:00

## 2021-11-28 RX ADMIN — DOXAZOSIN MESYLATE SCH MG: 4 TABLET ORAL at 01:02

## 2021-11-28 RX ADMIN — Medication SCH EACH: at 11:13

## 2021-11-28 RX ADMIN — DOXAZOSIN MESYLATE SCH MG: 4 TABLET ORAL at 21:27

## 2021-11-28 RX ADMIN — ACETAMINOPHEN PRN MG: 325 TABLET ORAL at 06:32

## 2021-11-28 RX ADMIN — LABETALOL HCL SCH MG: 100 TABLET, FILM COATED ORAL at 17:23

## 2021-11-28 RX ADMIN — VALSARTAN SCH MG: 80 TABLET ORAL at 08:44

## 2021-11-28 RX ADMIN — ISOSORBIDE DINITRATE SCH MG: 20 TABLET ORAL at 08:44

## 2021-11-28 RX ADMIN — ATORVASTATIN CALCIUM SCH MG: 10 TABLET, FILM COATED ORAL at 21:24

## 2021-11-28 NOTE — NUR
RN NOTES:

CALLS AND NEEDS ATTENDED, GIVEN WARM BLANKET, DUE IV/ATB GIVEN, AFTER HER TYLENOL SHE FEELS 
BETTER, SPO2 REMIAN IN 94-95%, SHE REMAIN SITTED ON THE CHAIR, WARM BLANKET GIVEN, SHE ALSO 
ASKED FOR ICE WATER, GIVEN.

-RN CALLED BACK HER , BUT NO ANSWER.

## 2021-11-28 NOTE — NUR
RN NOTES:

SHE FINALLY DECIDE TO TRANSFER FROM CHAIR TO THE BED AT 0600 IT TOOK ALMOST 30 MINUTES TO 
COMPLETE IT, SHE HAS PERIODS OF DESATURATION DOWN TO 80'S WHENEVER SHE MOVE,EXERT EFFORT AND 
TALK, INTRUCT TO RELAX AND BREATH,GIVEN AMPLE TIME TO PAUSE AND REGAIN HER BREATHING CONTROL 
UNTIL SPO2 REACH 92%, STABILIZE AT 96%, FOR POSSIBLE ct PULM ANGIOGRAM, CONSENT SIGNED, 
ENDORSED FOR CONTINUITY OF CARE.

## 2021-11-28 NOTE — NUR
RN NOTES:

-SHE WAS AWAKE ASKING FOR HER XANAX,GIVEN PER PATIENT REQUEST. SHE JUST RECEIVED HER PRN FOR 
COUGH AT 0500.

## 2021-11-28 NOTE — NUR
RN NOTE

PATIENT IS IN BED WITH HOB AT SEMI FOWLERS POSITION. PATIENT IS ON 15L NRB SATURATING 100%. 
PATIENT IS AOX4. JOSE MIDLINE IS PATENT AND INTACT. BED IS LOCKED IN THE LOWEST POSITION, 3 
GUARD RAILS RAISED, CALL BELL WITHIN REACH, AND ALL HOSPITAL SAFETY PRECAUTIONS ARE BEING 
FOLLOWED. ALL DUE MEDICATIONS GIVEN AND PATIENT REMAINED STABLE THROUGHOUT SHIFT.

## 2021-11-28 NOTE — NUR
RN NOTES:

COMBIVENT INHALER AVAILABLE AT BED SIDE, PATIENT INSTRUCT TO TAKE IT, STAYED WITH PATIENT AT 
BED SIDE WHILE DOING HER INHALER AND INSTRUCT TO RINSE HER  MOUTH AFTER USE.

## 2021-11-28 NOTE — NUR
RN NOTE

PATIENT IS IN BED WITH HOB AT SEMI FOWLERS POSITION. PATIENT IS ON 15L NRB SATURATING 100%. 
PATIENT IS AOX4. JOSE MIDLINE IS PATENT AND INTACT. BED IS LOCKED IN THE LOWEST POSITION, 3 
GUARD RAILS RAISED, CALL BELL WITHIN REACH, AND ALL HOSPITAL SAFETY PRECAUTIONS ARE BEING 
FOLLOWED. WILL CONTINUE TO MONITOR THROUGHOUT SHIFT.

## 2021-11-28 NOTE — NUR
RN TELE OPENING NOTES:

RECEIVED PATIENT FROM DAY SHIFT, A/O X3, TELE READING SR, PATIENT SHOWS SIGNS OF SOB AND 
DISTRESS, ON NRB 15L SATURATION - 95%, JOSE MIDLINE, PATENT AND INTACT, BED AT LOWEST 
POSITION, BRAKES LOCKED,  SIDE RAILS UP X2, CALL LIGHT WITHIN REACH, WILL CONTINUE TO 
MONITOR AND ADMINISTER PLAN OF CARE.

## 2021-11-28 NOTE — NUR
RN NOTES:

--PATIENT NOTED WITH ON AND OFF COUGH, SHE ASKED FOR HER COUGH MEDICATION,GIVEN.

--BP-140/60, DOXAZOSIN GIVEN, CN/RN NOTIFIED.

## 2021-11-29 VITALS — SYSTOLIC BLOOD PRESSURE: 154 MMHG | DIASTOLIC BLOOD PRESSURE: 74 MMHG

## 2021-11-29 VITALS — DIASTOLIC BLOOD PRESSURE: 62 MMHG | SYSTOLIC BLOOD PRESSURE: 141 MMHG

## 2021-11-29 VITALS — DIASTOLIC BLOOD PRESSURE: 56 MMHG | SYSTOLIC BLOOD PRESSURE: 134 MMHG

## 2021-11-29 VITALS — DIASTOLIC BLOOD PRESSURE: 84 MMHG | SYSTOLIC BLOOD PRESSURE: 156 MMHG

## 2021-11-29 VITALS — SYSTOLIC BLOOD PRESSURE: 117 MMHG | DIASTOLIC BLOOD PRESSURE: 72 MMHG

## 2021-11-29 LAB
BASOPHILS # BLD AUTO: 0 K/UL (ref 0–0.2)
BASOPHILS NFR BLD AUTO: 0.2 % (ref 0–2)
BUN SERPL-MCNC: 26 MG/DL (ref 7–18)
CALCIUM SERPL-MCNC: 8.9 MG/DL (ref 8.5–10.1)
CHLORIDE SERPL-SCNC: 102 MMOL/L (ref 98–107)
CO2 SERPL-SCNC: 29 MMOL/L (ref 21–32)
CREAT SERPL-MCNC: 0.9 MG/DL (ref 0.6–1.3)
CRP SERPL-MCNC: 0.4 MG/DL (ref 0–0.9)
EOSINOPHIL NFR BLD AUTO: 0 % (ref 0–6)
GLUCOSE SERPL-MCNC: 183 MG/DL (ref 74–106)
HCT VFR BLD AUTO: 28 % (ref 33–45)
HGB BLD-MCNC: 9.3 G/DL (ref 11.5–14.8)
LYMPHOCYTES NFR BLD AUTO: 0.8 K/UL (ref 0.8–4.8)
LYMPHOCYTES NFR BLD AUTO: 5.8 % (ref 20–44)
MCHC RBC AUTO-ENTMCNC: 33 G/DL (ref 31–36)
MCV RBC AUTO: 81 FL (ref 82–100)
MONOCYTES NFR BLD AUTO: 0.9 K/UL (ref 0.1–1.3)
MONOCYTES NFR BLD AUTO: 6.7 % (ref 2–12)
NEUTROPHILS # BLD AUTO: 11.8 K/UL (ref 1.8–8.9)
NEUTROPHILS NFR BLD AUTO: 87.3 % (ref 43–81)
PLATELET # BLD AUTO: 466 K/UL (ref 150–450)
POTASSIUM SERPL-SCNC: 4.7 MMOL/L (ref 3.5–5.1)
RBC # BLD AUTO: 3.47 MIL/UL (ref 4–5.2)
SODIUM SERPL-SCNC: 136 MMOL/L (ref 136–145)
WBC NRBC COR # BLD AUTO: 13.5 K/UL (ref 4.3–11)

## 2021-11-29 RX ADMIN — Medication SCH ML: at 08:14

## 2021-11-29 RX ADMIN — LABETALOL HCL SCH MG: 100 TABLET, FILM COATED ORAL at 08:58

## 2021-11-29 RX ADMIN — Medication SCH EACH: at 08:31

## 2021-11-29 RX ADMIN — ISOSORBIDE DINITRATE SCH MG: 20 TABLET ORAL at 08:58

## 2021-11-29 RX ADMIN — INSULIN GLARGINE SCH UNIT: 100 INJECTION, SOLUTION SUBCUTANEOUS at 09:11

## 2021-11-29 RX ADMIN — ENOXAPARIN SODIUM SCH MG: 40 INJECTION SUBCUTANEOUS at 09:05

## 2021-11-29 RX ADMIN — HUMAN INSULIN PRN UNIT: 100 INJECTION, SOLUTION SUBCUTANEOUS at 13:21

## 2021-11-29 RX ADMIN — ISOSORBIDE DINITRATE SCH MG: 20 TABLET ORAL at 17:12

## 2021-11-29 RX ADMIN — INSULIN HUMAN PRN UNITS: 100 INJECTION, SOLUTION PARENTERAL at 09:15

## 2021-11-29 RX ADMIN — CLOPIDOGREL BISULFATE SCH MG: 75 TABLET, FILM COATED ORAL at 08:58

## 2021-11-29 RX ADMIN — GUAIFENESIN PRN MG: 100 SOLUTION ORAL at 13:15

## 2021-11-29 RX ADMIN — HUMAN INSULIN PRN UNIT: 100 INJECTION, SOLUTION SUBCUTANEOUS at 21:44

## 2021-11-29 RX ADMIN — DOXAZOSIN MESYLATE SCH MG: 4 TABLET ORAL at 21:24

## 2021-11-29 RX ADMIN — GUAIFENESIN PRN MG: 100 SOLUTION ORAL at 21:24

## 2021-11-29 RX ADMIN — BACLOFEN SCH MG: 10 TABLET ORAL at 17:11

## 2021-11-29 RX ADMIN — GUAIFENESIN PRN MG: 100 SOLUTION ORAL at 04:29

## 2021-11-29 RX ADMIN — PANTOPRAZOLE SODIUM SCH MG: 40 TABLET, DELAYED RELEASE ORAL at 08:10

## 2021-11-29 RX ADMIN — ACETAMINOPHEN PRN MG: 325 TABLET ORAL at 04:29

## 2021-11-29 RX ADMIN — VALSARTAN SCH MG: 80 TABLET ORAL at 08:59

## 2021-11-29 RX ADMIN — ACETAMINOPHEN PRN MG: 325 TABLET ORAL at 21:23

## 2021-11-29 RX ADMIN — ACETAMINOPHEN PRN MG: 325 TABLET ORAL at 13:15

## 2021-11-29 RX ADMIN — DEXTROSE MONOHYDRATE SCH MLS/HR: 50 INJECTION, SOLUTION INTRAVENOUS at 11:26

## 2021-11-29 RX ADMIN — ALPRAZOLAM PRN MG: 0.25 TABLET ORAL at 04:29

## 2021-11-29 RX ADMIN — ATORVASTATIN CALCIUM SCH MG: 10 TABLET, FILM COATED ORAL at 21:23

## 2021-11-29 RX ADMIN — Medication SCH EACH: at 13:02

## 2021-11-29 RX ADMIN — LABETALOL HCL SCH MG: 100 TABLET, FILM COATED ORAL at 17:12

## 2021-11-29 RX ADMIN — ALPRAZOLAM PRN MG: 0.25 TABLET ORAL at 21:23

## 2021-11-29 RX ADMIN — Medication SCH EACH: at 21:43

## 2021-11-29 RX ADMIN — DEXTROSE MONOHYDRATE SCH MLS/HR: 50 INJECTION, SOLUTION INTRAVENOUS at 17:14

## 2021-11-29 RX ADMIN — INSULIN GLARGINE SCH UNIT: 100 INJECTION, SOLUTION SUBCUTANEOUS at 21:46

## 2021-11-29 RX ADMIN — Medication SCH EACH: at 17:12

## 2021-11-29 RX ADMIN — ALPRAZOLAM PRN MG: 0.25 TABLET ORAL at 13:15

## 2021-11-29 RX ADMIN — GUAIFENESIN PRN MG: 100 SOLUTION ORAL at 17:10

## 2021-11-29 RX ADMIN — HUMAN INSULIN PRN UNIT: 100 INJECTION, SOLUTION SUBCUTANEOUS at 17:29

## 2021-11-29 RX ADMIN — Medication SCH MG: at 08:57

## 2021-11-29 RX ADMIN — BACLOFEN SCH MG: 10 TABLET ORAL at 13:08

## 2021-11-29 RX ADMIN — IPRATROPIUM BROMIDE AND ALBUTEROL SULFATE SCH PUFF: 103; 18 AEROSOL, METERED RESPIRATORY (INHALATION) at 20:24

## 2021-11-29 RX ADMIN — BACLOFEN SCH MG: 10 TABLET ORAL at 09:16

## 2021-11-29 RX ADMIN — LEVOFLOXACIN SCH MG: 250 TABLET, FILM COATED ORAL at 08:59

## 2021-11-29 RX ADMIN — Medication SCH OZ: at 09:17

## 2021-11-29 NOTE — NUR
WOUND CARE CONSULT: RECEIVED CONSULT FOR LEFT LOWER LEG REDNESS. DEFER TO PMD FOR POSSIBLE 
DPM CONSULT. NEGATIVE DVT DOPPLER STUDY NOTED FROM 11/17. RECOMMENDATIONS MADE FOR SKIN 
PROTECTION. DISCUSSED WITH NURSING STAFF.

## 2021-11-29 NOTE — NUR
TELE RN CLOSING NOTE



PATIENT SITTING IN CHAIR. AO X 3 ON NRM 15L, O2 SAT AT 91%. ST HR 79, DENIES CHEST 
DISCOMFORT, JOSE MIDLINE 18G FLUSHES WELL, SITE CLEAR. ON CCHO DIET. BED IS LOCKED IN THE 
LOWEST POSITION, CALL BELL WITHIN REACH, AND ALL NEEDS ANTICIPATED AT THIS TIME. ALL NEEDS 
MET AT THIS TIME. ACCUCHECK DONE AND GAVE RESPECTIVE INSULIN COVERAGE PER SS.WILL ENDORSE TO 
NEXT SHIFT FOR NAHUN.

## 2021-11-29 NOTE — NUR
TELE RN AM NOTE



PATIENT SITTING IN CHAIR. AO X 3 ON NRM 15L, O2 SAT AT 95%. ST HR 79, DENIES CHEST 
DISCOMFORT, JOSE MIDLINE 18G FLUSHES WELL, SITE CLEAR. SEE NURSING FLOWSHEET FOR SKIN ISSUES. 
[facility]. BED IS LOCKED IN THE LOWEST POSITION, CALL BELL WITHIN REACH, AND ALL NEEDS 
ANTICIPATED AT THIS TIME. WILL CONT TO MONITOR.

## 2021-11-29 NOTE — NUR
RN TELE CLOSING NOTES:

PATIENT IN BED, NRB @ 15L, SATURATION 95%, V/S WNL, JOSE MIDLINE INTACT AND PATENT, A/O X4, 
GAVE PRN XANAX, TYLENOL, ROBITUSSIN AT 0430 FOR ANXIETY, PAIN, AND COUGH, BED AT LOWEST 
POSITION, BRAKES ARE LOCKED AND IN PLACE, SIDE RAILS UP X2, CALL LIGHT WITHIN REACH, WILL 
CONTINUE TO MONITOR AND IMPLEMENT NURSING INTERVENTIONS AS NECESSARY, WILL ENDORSE TO DAY 
SHIFT NURSE.

## 2021-11-29 NOTE — NUR
RN NOTE



PT RECEIVED IN BED. PT IS ON 15L OF 02 VIA NON REBREATHER MASK SHOWING NO S/S OF RESP 
DISTRESS. PT IS ALERT AND ORIENTED X4. ON MONITOR SHOWING NSR. IV ACCESS NOTED ON RIGHT 
UPPER ARM MIDLINE #18. LINE FLUSHED, PATENT, AND INTACT WITH NO SIGNS OF INFILTRATION. ALL 
SAFETY MEASURES IMPLEMENTED. CALL LIGHT WITHIN REACH. BED ALARM ON. BED LOCKED AND IN 
POSITION. SIDE RIALS UP. WILL CONTINUE TO MONITOR AND ASSESS FOR ANY CHANGES DURING SHIFT.

## 2021-11-30 VITALS — SYSTOLIC BLOOD PRESSURE: 151 MMHG | DIASTOLIC BLOOD PRESSURE: 58 MMHG

## 2021-11-30 VITALS — SYSTOLIC BLOOD PRESSURE: 135 MMHG | DIASTOLIC BLOOD PRESSURE: 63 MMHG

## 2021-11-30 VITALS — SYSTOLIC BLOOD PRESSURE: 140 MMHG | DIASTOLIC BLOOD PRESSURE: 74 MMHG

## 2021-11-30 VITALS — DIASTOLIC BLOOD PRESSURE: 55 MMHG | SYSTOLIC BLOOD PRESSURE: 130 MMHG

## 2021-11-30 VITALS — SYSTOLIC BLOOD PRESSURE: 148 MMHG | DIASTOLIC BLOOD PRESSURE: 58 MMHG

## 2021-11-30 VITALS — SYSTOLIC BLOOD PRESSURE: 139 MMHG | DIASTOLIC BLOOD PRESSURE: 64 MMHG

## 2021-11-30 LAB
BUN SERPL-MCNC: 25 MG/DL (ref 7–18)
CALCIUM SERPL-MCNC: 8.9 MG/DL (ref 8.5–10.1)
CHLORIDE SERPL-SCNC: 99 MMOL/L (ref 98–107)
CO2 SERPL-SCNC: 29 MMOL/L (ref 21–32)
CREAT SERPL-MCNC: 0.9 MG/DL (ref 0.6–1.3)
GLUCOSE SERPL-MCNC: 235 MG/DL (ref 74–106)
POTASSIUM SERPL-SCNC: 4.4 MMOL/L (ref 3.5–5.1)
SODIUM SERPL-SCNC: 131 MMOL/L (ref 136–145)

## 2021-11-30 RX ADMIN — Medication SCH ML: at 09:03

## 2021-11-30 RX ADMIN — INSULIN HUMAN PRN UNITS: 100 INJECTION, SOLUTION PARENTERAL at 08:22

## 2021-11-30 RX ADMIN — Medication SCH EACH: at 16:53

## 2021-11-30 RX ADMIN — ACETAMINOPHEN PRN MG: 325 TABLET ORAL at 11:11

## 2021-11-30 RX ADMIN — BACLOFEN SCH MG: 10 TABLET ORAL at 16:13

## 2021-11-30 RX ADMIN — PANTOPRAZOLE SODIUM SCH MG: 40 TABLET, DELAYED RELEASE ORAL at 07:43

## 2021-11-30 RX ADMIN — DEXTROSE MONOHYDRATE SCH MLS/HR: 50 INJECTION, SOLUTION INTRAVENOUS at 10:03

## 2021-11-30 RX ADMIN — ACETAMINOPHEN PRN MG: 325 TABLET ORAL at 17:49

## 2021-11-30 RX ADMIN — INSULIN HUMAN PRN UNITS: 100 INJECTION, SOLUTION PARENTERAL at 12:45

## 2021-11-30 RX ADMIN — ISOSORBIDE DINITRATE SCH MG: 20 TABLET ORAL at 08:41

## 2021-11-30 RX ADMIN — ACETAMINOPHEN PRN MG: 325 TABLET ORAL at 04:09

## 2021-11-30 RX ADMIN — GUAIFENESIN PRN MG: 100 SOLUTION ORAL at 05:47

## 2021-11-30 RX ADMIN — GUAIFENESIN PRN MG: 100 SOLUTION ORAL at 01:40

## 2021-11-30 RX ADMIN — ATORVASTATIN CALCIUM SCH MG: 10 TABLET, FILM COATED ORAL at 21:08

## 2021-11-30 RX ADMIN — GUAIFENESIN PRN MG: 100 SOLUTION ORAL at 11:11

## 2021-11-30 RX ADMIN — BACLOFEN SCH MG: 10 TABLET ORAL at 12:12

## 2021-11-30 RX ADMIN — IPRATROPIUM BROMIDE AND ALBUTEROL SULFATE SCH PUFF: 103; 18 AEROSOL, METERED RESPIRATORY (INHALATION) at 19:30

## 2021-11-30 RX ADMIN — Medication SCH OZ: at 09:23

## 2021-11-30 RX ADMIN — Medication SCH EACH: at 12:42

## 2021-11-30 RX ADMIN — INSULIN GLARGINE SCH UNIT: 100 INJECTION, SOLUTION SUBCUTANEOUS at 09:29

## 2021-11-30 RX ADMIN — VALSARTAN SCH MG: 80 TABLET ORAL at 08:40

## 2021-11-30 RX ADMIN — CLOPIDOGREL BISULFATE SCH MG: 75 TABLET, FILM COATED ORAL at 08:40

## 2021-11-30 RX ADMIN — INSULIN HUMAN PRN UNITS: 100 INJECTION, SOLUTION PARENTERAL at 16:52

## 2021-11-30 RX ADMIN — IPRATROPIUM BROMIDE AND ALBUTEROL SULFATE SCH PUFF: 103; 18 AEROSOL, METERED RESPIRATORY (INHALATION) at 01:40

## 2021-11-30 RX ADMIN — LEVOFLOXACIN SCH MG: 250 TABLET, FILM COATED ORAL at 08:42

## 2021-11-30 RX ADMIN — LABETALOL HCL SCH MG: 100 TABLET, FILM COATED ORAL at 16:14

## 2021-11-30 RX ADMIN — ISOSORBIDE DINITRATE SCH MG: 20 TABLET ORAL at 16:14

## 2021-11-30 RX ADMIN — LABETALOL HCL SCH MG: 100 TABLET, FILM COATED ORAL at 08:41

## 2021-11-30 RX ADMIN — ALPRAZOLAM PRN MG: 0.25 TABLET ORAL at 17:49

## 2021-11-30 RX ADMIN — GUAIFENESIN PRN MG: 100 SOLUTION ORAL at 16:53

## 2021-11-30 RX ADMIN — ALPRAZOLAM PRN MG: 0.25 TABLET ORAL at 11:11

## 2021-11-30 RX ADMIN — HUMAN INSULIN PRN UNIT: 100 INJECTION, SOLUTION SUBCUTANEOUS at 21:09

## 2021-11-30 RX ADMIN — GUAIFENESIN PRN MG: 100 SOLUTION ORAL at 20:51

## 2021-11-30 RX ADMIN — INSULIN GLARGINE SCH UNIT: 100 INJECTION, SOLUTION SUBCUTANEOUS at 21:07

## 2021-11-30 RX ADMIN — Medication PRN OZ: at 08:49

## 2021-11-30 RX ADMIN — Medication SCH EACH: at 21:09

## 2021-11-30 RX ADMIN — DOXAZOSIN MESYLATE SCH MG: 4 TABLET ORAL at 21:08

## 2021-11-30 RX ADMIN — Medication SCH EACH: at 08:14

## 2021-11-30 RX ADMIN — Medication SCH MG: at 08:43

## 2021-11-30 RX ADMIN — ALPRAZOLAM PRN MG: 0.25 TABLET ORAL at 04:08

## 2021-11-30 RX ADMIN — BACLOFEN SCH MG: 10 TABLET ORAL at 08:41

## 2021-11-30 RX ADMIN — ENOXAPARIN SODIUM SCH MG: 40 INJECTION SUBCUTANEOUS at 08:39

## 2021-11-30 NOTE — NUR
TELE RN CLOSING NOTE



PT IS AWAKE, SITTING UPRIGHT IN CHAIR. A/O X4. PT IS ON 15LPM O2 VIA NRB SATS 98%. NO SOB OR 
S/S OF RESPIRATORY DISTRESS NOTED. PT ON EXTERNAL CARDIAC MONITOR READING SR. PT HAS NO C/O 
PAIN OR DISCOMFORT AT THIS TIME. IV ACCESS IN JOSE MIDLNE #18, INTACT AND PATENT. ALL NEEDS 
HAVE BEEN MET. SAFETY PRECAUTIONS MAINTAINED AT ALL TIMES. BED IN LOWEST LOCKED POSITION, 
HOB ELEVATED, SIDE RAILS UP X2. CALL LIGHT AND TABLE WITHIN REACH. WILL ENDORSE TO ONCOMING 
NURSE FOR NAHUN.

## 2021-11-30 NOTE — NUR
RN NOTE



NO CHANGES IN PT CONDITION DURING SHIFT. PT IS ON 15L OF 02 VIA NON REBREATHER MASK SHOWING 
NO S/S OF RESP DISTRESS. PT IS ALERT AND ORIENTED X4. IV ACCESS NOTED ON RIGHT UPPER ARM 
MIDLINE #18. LINE FLUSHED, PATENT, AND INTACT WITH NO SIGNS OF INFILTRATION. ALL DUE MEDS 
GIVEN AS ORDERED. PT KEPT CLEAN AND COMFORTABLE. ALL SAFETY MEASURES IMPLEMENTED. CALL LIGHT 
WITHIN REACH. BED ALARM ON. BED LOCKED AND IN POSITION. SIDE RIALS UP. WILL ENDORSE TO 
MORNING SHIFT RN FOR NAHUN.

## 2021-11-30 NOTE — NUR
PATIENT SITTING IN CHAIR. AO X 3 ON NRM 15L, O2 SAT AT 95%. SNUS RHTHM ON MONITOR HR 79, 
DENIES CHEST DISCOMFORT,DRY COUGH STILL NOTED WILL GIVE COUGH SYRUP PRN AS ORDER JOSE MIDLINE 
18G FLUSHES WELL, SITE CLEAR.  ON CCHO DIET. BED IS LOCKED IN THE LOWEST POSITION, CALL BELL 
WITHIN REACH, WILL CONT TO MONITOR.

## 2021-11-30 NOTE — NUR
TELE RN OPENING NOTE



RECEIVED PT AWAKE IN BED. A/O X4. PT IS ON 15LPM O2 VIA NRB TOLERATING WELL. NO SOB OR S/S 
OF RESPIRATORY DISTRESS NOTED. PT ON EXTERNAL CARDIAC MONITOR READING SR. PT HAS NO C/O PAIN 
OR DISCOMFORT AT THIS TIME. [facility]TACT AND PATENT. SAFETY 
PRECAUTIONS MAINTAINED. BED IN LOWEST LOCKED POSITION, HOB ELEVATED, SIDE RAILS UP X2. CALL 
LIGHT AND TABLE WITHIN REACH. WILL CONTINUE TO MONITOR.

## 2021-12-01 VITALS — SYSTOLIC BLOOD PRESSURE: 116 MMHG | DIASTOLIC BLOOD PRESSURE: 56 MMHG

## 2021-12-01 VITALS — SYSTOLIC BLOOD PRESSURE: 123 MMHG | DIASTOLIC BLOOD PRESSURE: 63 MMHG

## 2021-12-01 VITALS — DIASTOLIC BLOOD PRESSURE: 79 MMHG | SYSTOLIC BLOOD PRESSURE: 140 MMHG

## 2021-12-01 VITALS — SYSTOLIC BLOOD PRESSURE: 150 MMHG | DIASTOLIC BLOOD PRESSURE: 75 MMHG

## 2021-12-01 VITALS — DIASTOLIC BLOOD PRESSURE: 74 MMHG | SYSTOLIC BLOOD PRESSURE: 156 MMHG

## 2021-12-01 VITALS — SYSTOLIC BLOOD PRESSURE: 132 MMHG | DIASTOLIC BLOOD PRESSURE: 62 MMHG

## 2021-12-01 LAB
BUN SERPL-MCNC: 25 MG/DL (ref 7–18)
CALCIUM SERPL-MCNC: 8.9 MG/DL (ref 8.5–10.1)
CHLORIDE SERPL-SCNC: 100 MMOL/L (ref 98–107)
CO2 SERPL-SCNC: 28 MMOL/L (ref 21–32)
CREAT SERPL-MCNC: 0.8 MG/DL (ref 0.6–1.3)
GLUCOSE SERPL-MCNC: 144 MG/DL (ref 74–106)
POTASSIUM SERPL-SCNC: 4.5 MMOL/L (ref 3.5–5.1)
SODIUM SERPL-SCNC: 131 MMOL/L (ref 136–145)

## 2021-12-01 RX ADMIN — IPRATROPIUM BROMIDE AND ALBUTEROL SULFATE SCH PUFF: 103; 18 AEROSOL, METERED RESPIRATORY (INHALATION) at 01:30

## 2021-12-01 RX ADMIN — Medication SCH MG: at 08:29

## 2021-12-01 RX ADMIN — ALPRAZOLAM PRN MG: 0.25 TABLET ORAL at 12:36

## 2021-12-01 RX ADMIN — DEXTROSE MONOHYDRATE SCH MLS/HR: 50 INJECTION, SOLUTION INTRAVENOUS at 04:20

## 2021-12-01 RX ADMIN — SILVER SULFADIAZINE SCH GM: 10 CREAM TOPICAL at 21:58

## 2021-12-01 RX ADMIN — ISOSORBIDE DINITRATE SCH MG: 20 TABLET ORAL at 08:37

## 2021-12-01 RX ADMIN — ISOSORBIDE DINITRATE SCH MG: 20 TABLET ORAL at 16:25

## 2021-12-01 RX ADMIN — ENOXAPARIN SODIUM SCH MG: 40 INJECTION SUBCUTANEOUS at 08:33

## 2021-12-01 RX ADMIN — LEVOFLOXACIN SCH MG: 250 TABLET, FILM COATED ORAL at 08:30

## 2021-12-01 RX ADMIN — Medication SCH EACH: at 17:48

## 2021-12-01 RX ADMIN — GUAIFENESIN PRN MG: 100 SOLUTION ORAL at 00:26

## 2021-12-01 RX ADMIN — INSULIN HUMAN PRN UNITS: 100 INJECTION, SOLUTION PARENTERAL at 13:21

## 2021-12-01 RX ADMIN — ALPRAZOLAM PRN MG: 0.25 TABLET ORAL at 06:33

## 2021-12-01 RX ADMIN — Medication SCH MG: at 08:59

## 2021-12-01 RX ADMIN — GUAIFENESIN PRN MG: 100 SOLUTION ORAL at 16:04

## 2021-12-01 RX ADMIN — INSULIN GLARGINE SCH UNIT: 100 INJECTION, SOLUTION SUBCUTANEOUS at 09:04

## 2021-12-01 RX ADMIN — BACLOFEN SCH MG: 10 TABLET ORAL at 16:04

## 2021-12-01 RX ADMIN — HYDRALAZINE HYDROCHLORIDE PRN MG: 20 INJECTION INTRAMUSCULAR; INTRAVENOUS at 23:57

## 2021-12-01 RX ADMIN — DEXTROSE MONOHYDRATE SCH MLS/HR: 50 INJECTION, SOLUTION INTRAVENOUS at 23:39

## 2021-12-01 RX ADMIN — HUMAN INSULIN PRN UNIT: 100 INJECTION, SOLUTION SUBCUTANEOUS at 22:33

## 2021-12-01 RX ADMIN — ALPRAZOLAM PRN MG: 0.25 TABLET ORAL at 00:25

## 2021-12-01 RX ADMIN — BACLOFEN SCH MG: 10 TABLET ORAL at 12:36

## 2021-12-01 RX ADMIN — DOXAZOSIN MESYLATE SCH MG: 4 TABLET ORAL at 22:14

## 2021-12-01 RX ADMIN — PANTOPRAZOLE SODIUM SCH MG: 40 TABLET, DELAYED RELEASE ORAL at 06:33

## 2021-12-01 RX ADMIN — Medication SCH MG: at 09:00

## 2021-12-01 RX ADMIN — HUMAN INSULIN PRN UNIT: 100 INJECTION, SOLUTION SUBCUTANEOUS at 17:50

## 2021-12-01 RX ADMIN — LABETALOL HCL SCH MG: 100 TABLET, FILM COATED ORAL at 16:24

## 2021-12-01 RX ADMIN — Medication SCH EACH: at 22:14

## 2021-12-01 RX ADMIN — ATORVASTATIN CALCIUM SCH MG: 10 TABLET, FILM COATED ORAL at 22:09

## 2021-12-01 RX ADMIN — GUAIFENESIN PRN MG: 100 SOLUTION ORAL at 11:16

## 2021-12-01 RX ADMIN — ACETAMINOPHEN PRN MG: 325 TABLET ORAL at 06:33

## 2021-12-01 RX ADMIN — BACLOFEN SCH MG: 10 TABLET ORAL at 08:27

## 2021-12-01 RX ADMIN — INSULIN GLARGINE SCH UNIT: 100 INJECTION, SOLUTION SUBCUTANEOUS at 22:13

## 2021-12-01 RX ADMIN — CLOPIDOGREL BISULFATE SCH MG: 75 TABLET, FILM COATED ORAL at 08:28

## 2021-12-01 RX ADMIN — IPRATROPIUM BROMIDE AND ALBUTEROL SULFATE SCH PUFF: 103; 18 AEROSOL, METERED RESPIRATORY (INHALATION) at 08:26

## 2021-12-01 RX ADMIN — Medication SCH ML: at 15:08

## 2021-12-01 RX ADMIN — ACETAMINOPHEN PRN MG: 325 TABLET ORAL at 18:22

## 2021-12-01 RX ADMIN — Medication SCH ML: at 16:25

## 2021-12-01 RX ADMIN — GUAIFENESIN PRN MG: 100 SOLUTION ORAL at 20:10

## 2021-12-01 RX ADMIN — Medication SCH OZ: at 08:41

## 2021-12-01 RX ADMIN — ALPRAZOLAM PRN MG: 0.25 TABLET ORAL at 18:22

## 2021-12-01 RX ADMIN — GUAIFENESIN PRN MG: 100 SOLUTION ORAL at 04:22

## 2021-12-01 RX ADMIN — Medication SCH ML: at 08:20

## 2021-12-01 RX ADMIN — IPRATROPIUM BROMIDE AND ALBUTEROL SULFATE SCH PUFF: 103; 18 AEROSOL, METERED RESPIRATORY (INHALATION) at 12:38

## 2021-12-01 RX ADMIN — VALSARTAN SCH MG: 80 TABLET ORAL at 08:27

## 2021-12-01 RX ADMIN — Medication SCH EACH: at 12:41

## 2021-12-01 RX ADMIN — ACETAMINOPHEN PRN MG: 325 TABLET ORAL at 00:25

## 2021-12-01 RX ADMIN — IPRATROPIUM BROMIDE AND ALBUTEROL SULFATE SCH PUFF: 103; 18 AEROSOL, METERED RESPIRATORY (INHALATION) at 19:30

## 2021-12-01 RX ADMIN — Medication SCH EACH: at 08:44

## 2021-12-01 RX ADMIN — LABETALOL HCL SCH MG: 100 TABLET, FILM COATED ORAL at 08:28

## 2021-12-01 RX ADMIN — ACETAMINOPHEN PRN MG: 325 TABLET ORAL at 12:36

## 2021-12-01 NOTE — NUR
BED ON BED ASLEEP EASY TO WAKE UP STILL ON NRM 15L SPO2 100% NO SIGN OF RESPIRATORY 
DISTRESS, PT HAVE SOB UPON EXERTION, TELE MONITOR READS SINUS RHYTHM ON HIGH BACK REST, PRN 
MEDS GIVEN AS REQUESTED NO SIGNIFICANT CHANGES ON CONDITION NOTED ALL NEED ATTENDED WILL 
ENDORSED TO AM SHIFT NURSE

## 2021-12-01 NOTE — NUR
RN CLOSING NOTE



PT REMAINS IN BED  A/O X4. PT IS ON 10 L SIMPLE MASK SAT 96%. NO SOB OR S/S OF RESPIRATORY 
DISTRESS NOTED BREATHING EVEN AND UNLABORED, PT IS ON EXTERNAL CARDIAC MONITOR READING SR. 
IV ACCESS IN JOSE MIDLINE #18, INTACT AND PATENT.CONSUMED 75% OF THE FOOD, AND ABLE TO SIT IN 
THE CHAIR FOR A COUPLE OF HOURS, TYLENOL, COUGH MEDICINE AND XANAX GIVEN PRN PER PT REQUEST, 
ALL NEEDS MET, SAFETY PRECAUTIONS IN PLACE, BED IN LOWEST AND LOCKED POSITION, HOB ELEVATED, 
SIDE RAILS UP X2. CALL LIGHT WITHIN REACH. WILL ENDORSE TO NIGHT SHIFT RN

## 2021-12-01 NOTE — NUR
RN OPEN NOTE



PT RECEIVED IN BED  A/O X4. PT IS ON 15LPM O2 VIA NRB SATS 98%. NO SOB OR S/S OF RESPIRATORY 
DISTRESS NOTED BREATHING EVEN AND UNLABORED, PT IS ON EXTERNAL CARDIAC MONITOR READING SR. 
IV ACCESS IN JOSE MIDLINE #18, INTACT AND PATENT. ALL SAFETY PRECAUTIONS IN PLACE, BED IN 
LOWEST AND LOCKED POSITION, HOB ELEVATED, SIDE RAILS UP X2. CALL LIGHT WITHIN REACH. WILL 
CONTINUE TO MONITOR FOR PLAN OF CARE

## 2021-12-01 NOTE — NUR
RN OPENING NOTES:

RECEIVED RESIDENT IN BED BUT ALERT AND VERBALLY RESPONSIVE. ON SIMPLE MASK AT 10L/MIN. O2 
SAT 98%NO C/O SOB, BREATHING EVEN AND UNLABORED. ON CARDIAC MONITORING SHOWS SR. IV LINE ON 
JOSE MIDLINE #18 INTACT AND PATENT. NO C/O PAIN OR DISCOMFORT. NO ACUTE DISTRESS. INCONTINENT 
ON BOWEL AND BLADDER. ALL SAFETY PROTOCOL PROVIDED. BED IN LOW POSITION AND LOCKED. SIDE 
RAILS UP. PATIENT IS ABLE TO SIT DOWN ON THE BED BY HERSELF. PLACE CALL LIGHT WITH IN REACH. 
WILL CONTINUE TO MONITOR

## 2021-12-02 VITALS — DIASTOLIC BLOOD PRESSURE: 62 MMHG | SYSTOLIC BLOOD PRESSURE: 165 MMHG

## 2021-12-02 VITALS — DIASTOLIC BLOOD PRESSURE: 43 MMHG | SYSTOLIC BLOOD PRESSURE: 115 MMHG

## 2021-12-02 VITALS — SYSTOLIC BLOOD PRESSURE: 108 MMHG | DIASTOLIC BLOOD PRESSURE: 60 MMHG

## 2021-12-02 VITALS — DIASTOLIC BLOOD PRESSURE: 63 MMHG | SYSTOLIC BLOOD PRESSURE: 160 MMHG

## 2021-12-02 VITALS — DIASTOLIC BLOOD PRESSURE: 71 MMHG | SYSTOLIC BLOOD PRESSURE: 160 MMHG

## 2021-12-02 VITALS — DIASTOLIC BLOOD PRESSURE: 60 MMHG | SYSTOLIC BLOOD PRESSURE: 108 MMHG

## 2021-12-02 LAB
BUN SERPL-MCNC: 21 MG/DL (ref 7–18)
CALCIUM SERPL-MCNC: 8.7 MG/DL (ref 8.5–10.1)
CHLORIDE SERPL-SCNC: 99 MMOL/L (ref 98–107)
CO2 SERPL-SCNC: 30 MMOL/L (ref 21–32)
CREAT SERPL-MCNC: 0.7 MG/DL (ref 0.6–1.3)
GLUCOSE SERPL-MCNC: 92 MG/DL (ref 74–106)
POTASSIUM SERPL-SCNC: 4.3 MMOL/L (ref 3.5–5.1)
SODIUM SERPL-SCNC: 133 MMOL/L (ref 136–145)

## 2021-12-02 RX ADMIN — ACETAMINOPHEN PRN MG: 325 TABLET ORAL at 00:35

## 2021-12-02 RX ADMIN — Medication SCH ML: at 17:33

## 2021-12-02 RX ADMIN — BACLOFEN SCH MG: 10 TABLET ORAL at 17:32

## 2021-12-02 RX ADMIN — Medication SCH EACH: at 17:32

## 2021-12-02 RX ADMIN — GUAIFENESIN PRN MG: 100 SOLUTION ORAL at 17:31

## 2021-12-02 RX ADMIN — INSULIN GLARGINE SCH UNIT: 100 INJECTION, SOLUTION SUBCUTANEOUS at 22:24

## 2021-12-02 RX ADMIN — ACETAMINOPHEN PRN MG: 325 TABLET ORAL at 13:56

## 2021-12-02 RX ADMIN — DEXTROSE MONOHYDRATE SCH MLS/HR: 50 INJECTION, SOLUTION INTRAVENOUS at 17:33

## 2021-12-02 RX ADMIN — INSULIN HUMAN PRN UNITS: 100 INJECTION, SOLUTION PARENTERAL at 18:46

## 2021-12-02 RX ADMIN — LEVOFLOXACIN SCH MG: 250 TABLET, FILM COATED ORAL at 09:42

## 2021-12-02 RX ADMIN — ISOSORBIDE DINITRATE SCH MG: 20 TABLET ORAL at 17:31

## 2021-12-02 RX ADMIN — Medication SCH EACH: at 08:14

## 2021-12-02 RX ADMIN — IPRATROPIUM BROMIDE AND ALBUTEROL SULFATE SCH PUFF: 103; 18 AEROSOL, METERED RESPIRATORY (INHALATION) at 01:46

## 2021-12-02 RX ADMIN — CLOPIDOGREL BISULFATE SCH MG: 75 TABLET, FILM COATED ORAL at 09:43

## 2021-12-02 RX ADMIN — IPRATROPIUM BROMIDE AND ALBUTEROL SULFATE SCH PUFF: 103; 18 AEROSOL, METERED RESPIRATORY (INHALATION) at 20:19

## 2021-12-02 RX ADMIN — Medication SCH EACH: at 11:33

## 2021-12-02 RX ADMIN — BACLOFEN SCH MG: 10 TABLET ORAL at 13:18

## 2021-12-02 RX ADMIN — ENOXAPARIN SODIUM SCH MG: 40 INJECTION SUBCUTANEOUS at 09:39

## 2021-12-02 RX ADMIN — GUAIFENESIN PRN MG: 100 SOLUTION ORAL at 05:12

## 2021-12-02 RX ADMIN — BACLOFEN SCH MG: 10 TABLET ORAL at 09:43

## 2021-12-02 RX ADMIN — Medication SCH EACH: at 22:28

## 2021-12-02 RX ADMIN — LABETALOL HCL SCH MG: 100 TABLET, FILM COATED ORAL at 09:41

## 2021-12-02 RX ADMIN — Medication SCH ML: at 11:41

## 2021-12-02 RX ADMIN — SILVER SULFADIAZINE SCH APPLIC: 10 CREAM TOPICAL at 17:32

## 2021-12-02 RX ADMIN — Medication SCH ML: at 09:09

## 2021-12-02 RX ADMIN — ACETAMINOPHEN PRN MG: 325 TABLET ORAL at 07:01

## 2021-12-02 RX ADMIN — GUAIFENESIN PRN MG: 100 SOLUTION ORAL at 22:46

## 2021-12-02 RX ADMIN — LABETALOL HCL SCH MG: 100 TABLET, FILM COATED ORAL at 17:31

## 2021-12-02 RX ADMIN — GUAIFENESIN PRN MG: 100 SOLUTION ORAL at 13:53

## 2021-12-02 RX ADMIN — INSULIN GLARGINE SCH UNIT: 100 INJECTION, SOLUTION SUBCUTANEOUS at 09:37

## 2021-12-02 RX ADMIN — Medication SCH MG: at 09:42

## 2021-12-02 RX ADMIN — INSULIN HUMAN PRN UNITS: 100 INJECTION, SOLUTION PARENTERAL at 11:39

## 2021-12-02 RX ADMIN — GUAIFENESIN PRN MG: 100 SOLUTION ORAL at 00:36

## 2021-12-02 RX ADMIN — DOXAZOSIN MESYLATE SCH MG: 4 TABLET ORAL at 22:27

## 2021-12-02 RX ADMIN — ALPRAZOLAM PRN MG: 0.25 TABLET ORAL at 00:36

## 2021-12-02 RX ADMIN — VALSARTAN SCH MG: 80 TABLET ORAL at 09:45

## 2021-12-02 RX ADMIN — GUAIFENESIN PRN MG: 100 SOLUTION ORAL at 09:39

## 2021-12-02 RX ADMIN — PANTOPRAZOLE SODIUM SCH MG: 40 TABLET, DELAYED RELEASE ORAL at 07:01

## 2021-12-02 RX ADMIN — ALPRAZOLAM PRN MG: 0.25 TABLET ORAL at 13:18

## 2021-12-02 RX ADMIN — ISOSORBIDE DINITRATE SCH MG: 20 TABLET ORAL at 09:43

## 2021-12-02 RX ADMIN — ATORVASTATIN CALCIUM SCH MG: 10 TABLET, FILM COATED ORAL at 22:27

## 2021-12-02 RX ADMIN — ALPRAZOLAM PRN MG: 0.25 TABLET ORAL at 07:01

## 2021-12-02 RX ADMIN — SILVER SULFADIAZINE SCH APPLIC: 10 CREAM TOPICAL at 09:46

## 2021-12-02 RX ADMIN — HUMAN INSULIN PRN UNIT: 100 INJECTION, SOLUTION SUBCUTANEOUS at 22:33

## 2021-12-02 RX ADMIN — Medication SCH OZ: at 09:45

## 2021-12-02 RX ADMIN — IPRATROPIUM BROMIDE AND ALBUTEROL SULFATE SCH PUFF: 103; 18 AEROSOL, METERED RESPIRATORY (INHALATION) at 19:30

## 2021-12-02 NOTE — NUR
RN CLOSING NOTE



PT CONTINUES TO SIT AT BEDSIDE AND IS COMFORTABLE WITH NO REPORTS OF PAIN. PT IS USING 
SIMPLE MAST AT 10L SAT 96 - 97% TOLERATING WELL WITH NO SIGNS OF LABORED BREATHING OR 
DISTRESS. PT IS A/OX4 AND ON MONITOR SR. PT HAS DIAPER AND LLE CELLULITIS. PT IS ON CCHO 
DIET WITH R UA MIDLINE IV ACCESS 18G PATENT AND FLUSHING WELL. BED IS LOCKED IN LOWEST 
POSITION X2 BED RAILS, CALL LIGHT WITHIN REACH, WILL ENDORSE TO NIGHT SHIFT NURSE FOR NAHUN.

## 2021-12-02 NOTE — NUR
RN MORNING NOTE



PT RECEIVED SITTING UP IN CHAIR AT BEDSIDE. PT IS USING SIMPLE MAST AT 10L SAT 96% 
TOLERATING WELL WITH NO SIGNS OF LABORED BREATHING OR DISTRESS. PT IS A/OX4 AND ON MONITOR 
SR. PT HAS DIAPER AND LLE CELLULITIS. PT IS ON CCHO DIET WITH R UA MIDLINE IV ACCESS 18G 
PATENT AND FLUSHING WELL. BED IS LOCKED IN LOWEST POSITION X2 BED RAILS, CALL LIGHT WITHIN 
REACH, WILL CONTINUE TO MONITOR THIS SHIFT.

## 2021-12-02 NOTE — NUR
RN CLOSING  NOTES:

RESIDENT IN BED SITTING POSITION, ALERT AND VERBALLY RESPONSIVE. ON SIMPLE MASK AT 10L/MIN. 
O2 SAT 96%. BREATHING EVEN AND UNLABORED. SINUS RHYTHM ON CARDIAC MONITORING . VANCOMYCIN 
GIVEN AS  ORDERED. ATIENT TOLERATED WELL. IV ACCESS ON JOSE MIDLINE #18 INTACT AND PATENT. NO 
BLEEDING NOTED. NO C/O PAIN OR DISCOMFORT AT THIS MOMENT. NO ACUTE DISTRESS. COUGH 
MEDICATIONS PER PRN ORDER DUE TO C/O NON-PRODUCTIVE COUGH. INCONTINENT ON BOWEL AND BLADDER. 
VOIDED 3 TIMES BUT NO BOWEL MOVEMENT. ALL SAFETY PROTOCOL MEASURE. BED IN LOW POSITION AND 
LOCKED. BOTH SIDE RAIL ARE UP AT THIS MOMENT.  PLACE CALL LIGHT WITH IN REACH. WILL ENDORSE 
TO MORNING SHIFT RN

## 2021-12-02 NOTE — NUR
RN NOTES:



PATIENT C/O MILD GENERALIZED BODY PAIN 3/10 PAIN SCALE, C/O ANXIETY. TYLENOL 325 MG 2 TABS 
GIVEN FOR MILD PAIN AND XANAX GIVEN FOR ANXIETY. STILL NOTED ON AND OFF NON PRODUCTIVE 
COUGH. ROBITUSSIN GIVEN PER PRN ORDER.

## 2021-12-02 NOTE — NUR
RN NOTES:

BLOOD PRESSURE 165/62, HYDRALAZINE 10 MG/0.5 ML GIVEN AS PRN ORDER THROUGH IV PUSH. NO S/S 
OF HYPER/HYPOTENSION

## 2021-12-02 NOTE — NUR
RN NOTES

RECEIVED PATIENT SITTING ON THE CHAIR, PATIENT IS A/O X4, ABLE TO VERBALIZE NEEDS. PATIENT 
IS ON SIMPLE MAST AT 10L SAT 95% TOLERATING WELL WITH NO SIGNS OF LABORED BREATHING OR 
DISTRESS. PATIENT IS ON TELE MONITOR SR. PT HAS DIAPER AND LLE CELLULITIS. PT IS ON CCHO 
DIET. PATIENT HAS R UA MIDLINE IV ACCESS 18G PATENT AND FLUSHING WELL. CALL LIGHT WITHIN 
REACH, WILL CONTINUE TO MONITOR.

## 2021-12-03 VITALS — DIASTOLIC BLOOD PRESSURE: 82 MMHG | SYSTOLIC BLOOD PRESSURE: 119 MMHG

## 2021-12-03 VITALS — DIASTOLIC BLOOD PRESSURE: 60 MMHG | SYSTOLIC BLOOD PRESSURE: 118 MMHG

## 2021-12-03 VITALS — SYSTOLIC BLOOD PRESSURE: 130 MMHG | DIASTOLIC BLOOD PRESSURE: 74 MMHG

## 2021-12-03 VITALS — SYSTOLIC BLOOD PRESSURE: 113 MMHG | DIASTOLIC BLOOD PRESSURE: 46 MMHG

## 2021-12-03 VITALS — DIASTOLIC BLOOD PRESSURE: 61 MMHG | SYSTOLIC BLOOD PRESSURE: 166 MMHG

## 2021-12-03 VITALS — SYSTOLIC BLOOD PRESSURE: 150 MMHG | DIASTOLIC BLOOD PRESSURE: 62 MMHG

## 2021-12-03 LAB
BUN SERPL-MCNC: 17 MG/DL (ref 7–18)
CALCIUM SERPL-MCNC: 8.9 MG/DL (ref 8.5–10.1)
CHLORIDE SERPL-SCNC: 97 MMOL/L (ref 98–107)
CO2 SERPL-SCNC: 31 MMOL/L (ref 21–32)
CREAT SERPL-MCNC: 0.8 MG/DL (ref 0.6–1.3)
GLUCOSE SERPL-MCNC: 56 MG/DL (ref 74–106)
POTASSIUM SERPL-SCNC: 3.7 MMOL/L (ref 3.5–5.1)
SODIUM SERPL-SCNC: 131 MMOL/L (ref 136–145)

## 2021-12-03 RX ADMIN — VALSARTAN SCH MG: 80 TABLET ORAL at 08:21

## 2021-12-03 RX ADMIN — Medication SCH MG: at 08:23

## 2021-12-03 RX ADMIN — INSULIN HUMAN PRN UNITS: 100 INJECTION, SOLUTION PARENTERAL at 09:01

## 2021-12-03 RX ADMIN — Medication SCH EACH: at 17:47

## 2021-12-03 RX ADMIN — ACETAMINOPHEN PRN MG: 325 TABLET ORAL at 09:08

## 2021-12-03 RX ADMIN — Medication SCH EACH: at 12:10

## 2021-12-03 RX ADMIN — IPRATROPIUM BROMIDE AND ALBUTEROL SULFATE SCH PUFF: 103; 18 AEROSOL, METERED RESPIRATORY (INHALATION) at 01:39

## 2021-12-03 RX ADMIN — Medication SCH OZ: at 08:25

## 2021-12-03 RX ADMIN — ENOXAPARIN SODIUM SCH MG: 40 INJECTION SUBCUTANEOUS at 08:16

## 2021-12-03 RX ADMIN — ACETAMINOPHEN PRN MG: 325 TABLET ORAL at 01:27

## 2021-12-03 RX ADMIN — GUAIFENESIN PRN MG: 100 SOLUTION ORAL at 17:46

## 2021-12-03 RX ADMIN — DEXTROSE MONOHYDRATE SCH MLS/HR: 50 INJECTION, SOLUTION INTRAVENOUS at 11:05

## 2021-12-03 RX ADMIN — ISOSORBIDE DINITRATE SCH MG: 20 TABLET ORAL at 08:23

## 2021-12-03 RX ADMIN — IPRATROPIUM BROMIDE AND ALBUTEROL SULFATE SCH PUFF: 103; 18 AEROSOL, METERED RESPIRATORY (INHALATION) at 19:30

## 2021-12-03 RX ADMIN — Medication SCH ML: at 08:06

## 2021-12-03 RX ADMIN — ATORVASTATIN CALCIUM SCH MG: 10 TABLET, FILM COATED ORAL at 21:33

## 2021-12-03 RX ADMIN — HUMAN INSULIN PRN UNIT: 100 INJECTION, SOLUTION SUBCUTANEOUS at 21:40

## 2021-12-03 RX ADMIN — GUAIFENESIN PRN MG: 100 SOLUTION ORAL at 03:20

## 2021-12-03 RX ADMIN — ALPRAZOLAM PRN MG: 0.25 TABLET ORAL at 09:08

## 2021-12-03 RX ADMIN — ALPRAZOLAM PRN MG: 0.25 TABLET ORAL at 15:27

## 2021-12-03 RX ADMIN — ACETAMINOPHEN PRN MG: 325 TABLET ORAL at 13:37

## 2021-12-03 RX ADMIN — LABETALOL HCL SCH MG: 100 TABLET, FILM COATED ORAL at 08:24

## 2021-12-03 RX ADMIN — ALPRAZOLAM PRN MG: 0.25 TABLET ORAL at 23:31

## 2021-12-03 RX ADMIN — CLOPIDOGREL BISULFATE SCH MG: 75 TABLET, FILM COATED ORAL at 08:13

## 2021-12-03 RX ADMIN — LABETALOL HCL SCH MG: 100 TABLET, FILM COATED ORAL at 17:45

## 2021-12-03 RX ADMIN — LEVOFLOXACIN SCH MG: 250 TABLET, FILM COATED ORAL at 08:14

## 2021-12-03 RX ADMIN — BACLOFEN SCH MG: 10 TABLET ORAL at 08:14

## 2021-12-03 RX ADMIN — ALPRAZOLAM PRN MG: 0.25 TABLET ORAL at 01:27

## 2021-12-03 RX ADMIN — Medication SCH EACH: at 08:20

## 2021-12-03 RX ADMIN — INSULIN GLARGINE SCH UNIT: 100 INJECTION, SOLUTION SUBCUTANEOUS at 09:03

## 2021-12-03 RX ADMIN — INSULIN GLARGINE SCH UNIT: 100 INJECTION, SOLUTION SUBCUTANEOUS at 21:37

## 2021-12-03 RX ADMIN — SILVER SULFADIAZINE SCH APPLIC: 10 CREAM TOPICAL at 17:47

## 2021-12-03 RX ADMIN — HUMAN INSULIN PRN UNIT: 100 INJECTION, SOLUTION SUBCUTANEOUS at 18:12

## 2021-12-03 RX ADMIN — ACETAMINOPHEN PRN MG: 325 TABLET ORAL at 20:02

## 2021-12-03 RX ADMIN — GUAIFENESIN PRN MG: 100 SOLUTION ORAL at 13:36

## 2021-12-03 RX ADMIN — HUMAN INSULIN PRN UNIT: 100 INJECTION, SOLUTION SUBCUTANEOUS at 11:53

## 2021-12-03 RX ADMIN — BACLOFEN SCH MG: 10 TABLET ORAL at 17:45

## 2021-12-03 RX ADMIN — BACLOFEN SCH MG: 10 TABLET ORAL at 13:40

## 2021-12-03 RX ADMIN — Medication SCH ML: at 12:11

## 2021-12-03 RX ADMIN — SILVER SULFADIAZINE SCH APPLIC: 10 CREAM TOPICAL at 08:25

## 2021-12-03 RX ADMIN — Medication SCH EACH: at 21:40

## 2021-12-03 RX ADMIN — DOXAZOSIN MESYLATE SCH MG: 4 TABLET ORAL at 21:33

## 2021-12-03 RX ADMIN — ISOSORBIDE DINITRATE SCH MG: 20 TABLET ORAL at 17:45

## 2021-12-03 RX ADMIN — Medication SCH ML: at 17:39

## 2021-12-03 RX ADMIN — GUAIFENESIN PRN MG: 100 SOLUTION ORAL at 08:17

## 2021-12-03 RX ADMIN — GUAIFENESIN PRN MG: 100 SOLUTION ORAL at 23:13

## 2021-12-03 RX ADMIN — PANTOPRAZOLE SODIUM SCH MG: 40 TABLET, DELAYED RELEASE ORAL at 07:52

## 2021-12-03 NOTE — NUR
RN OPENING NOTES

RECEIVED CARE WHILE PATIENT IS SITTING ON THE CHAIR, A/O X4, ABLE TO VERBALIZE NEEDS. 
PATIENT WAS ON A SIMPLE MASK AT 10 L, O2 SAT 94%. PATIENT COMPLAINED OF SOB, 02 WAS 
INCREASED TO 11 L,  PATIENT IS TOLERATING WELL WITH O2 SAT 98% AND WITH NO SIGNS OF LABORED 
BREATHING OR DISTRESS. PATIENT IS ON MONITOR SR. PT HAS DIAPER AND LLE CELLULITIS. PT IS ON 
CCHO DIET WITH R UA MIDLINE IV ACCESS 18G PATENT AND FLUSHING WELL. CALL LIGHT WITHIN REACH, 
WILL CONTINUE TO MONITOR THIS SHIFT.

## 2021-12-03 NOTE — NUR
RN CLOSING NOTES

WILL ENDORSE PATIENT TO DAY SHIFT NURSE. A/O X4. COMPLAINS OF SHORTNESS OF BREATH UPON 
EXERTION. PATIENT WAS REPOSITIONED AND EDUCATED ON DEEP BREATHING EXERCISES. PATIENT IS NOW 
ON CHAIR, NO SOB EXPRESSED, O2 SAT 96%. PATIENT IS ON SIMPLE MASK AT 10 L. ABLE TO VERBALIZE 
NEEDS. PATIENT IS ON TELE MONITOR SR. PATIENT HAS LLE CELLULITIS. PT IS ON CCHO DIET. 
PATIENT HAS R UA MIDLINE IV ACCESS 18G PATENT AND FLUSHING WELL. CALL LIGHT WITHIN REACH, 
WILL ENDORSE TO DAY SHIFT NURSE FOR NAHUN.

## 2021-12-03 NOTE — NUR
RN MORNING NOTE



PT RECEIVED SITTING UP IN CHAIR AT BEDSIDE. PT IS USING SIMPLE MAST AT 10L SAT 96% 
TOLERATING WELL WITH NO SIGNS OF LABORED BREATHING OR DISTRESS. NIGHT SHIFT ENDORSED TRYING 
TO TITRATE TO 8L BUT PATIENT COULD NOT TOLERATE. PT IS A/OX4 AND ON MONITOR SR. PT HAS 
DIAPER AND LLE CELLULITIS. PT IS ON CCHO DIET WITH R UA MIDLINE IV ACCESS 18G PATENT AND 
FLUSHING WELL. BED IS LOCKED IN LOWEST POSITION X2 BED RAILS, CALL LIGHT WITHIN REACH, WILL 
CONTINUE TO MONITOR THIS SHIFT.

## 2021-12-03 NOTE — NUR
RN CLOSING NOTE



PT IS SITTING IN CHAIR AT BEDSIDE. PT IS USING SIMPLE MAST AT 10L SAT 94 -96% TOLERATING 
WELL WITH NO SIGNS OF LABORED BREATHING OR DISTRESS. NIGHT SHIFT ENDORSED TRYING TO TITRATE 
TO 8L BUT PATIENT COULD NOT TOLERATE. PT IS A/OX4 AND ON MONITOR SR. PT HAS DIAPER AND LLE 
CELLULITIS. PT IS ON CCHO DIET WITH R UA MIDLINE IV ACCESS 18G PATENT AND FLUSHING WELL. BED 
IS LOCKED IN LOWEST POSITION X2 BED RAILS, CALL LIGHT WITHIN REACH, WILL CONTINUE TO MONITOR 
THIS SHIFT. 

-------------------------------------------------------------------------------

Addendum: 12/03/21 at 1937 by MAYELA GILMORE RN

-------------------------------------------------------------------------------

PT IS SITTING IN CHAIR AT BEDSIDE. PT IS USING SIMPLE MAST AT 10L SAT 94 -96% TOLERATING 
WELL WITH NO SIGNS OF LABORED BREATHING OR DISTRESS.  PT IS A/OX4 AND ON MONITOR SR. PT HAS 
DIAPER AND LLE CELLULITIS. PT IS ON CCHO DIET WITH R UA MIDLINE IV ACCESS 18G PATENT AND 
FLUSHING WELL. BED IS LOCKED IN LOWEST POSITION X2 BED RAILS, CALL LIGHT WITHIN REACH, WILL 
CONTINUE TO MONITOR THIS SHIFT. 

-------------------------------------------------------------------------------

Addendum: 12/03/21 at 1938 by MAYELA GILMORE RN

-------------------------------------------------------------------------------

PT IS SITTING IN CHAIR AT BEDSIDE. PT IS USING SIMPLE MAST AT 10L SAT 94 -96% TOLERATING 
WELL WITH NO SIGNS OF LABORED BREATHING OR DISTRESS.  PT IS A/OX4 AND ON MONITOR SR. PT HAS 
DIAPER AND LLE CELLULITIS. PT IS ON CCHO DIET WITH R UA MIDLINE IV ACCESS 18G PATENT AND 
FLUSHING WELL. BED IS LOCKED IN LOWEST POSITION X2 BED RAILS, CALL LIGHT WITHIN REACH, WILL 
ENDORSE TO NIGHT SHIFT NURSE FOR NAHUN.

## 2021-12-04 VITALS — SYSTOLIC BLOOD PRESSURE: 143 MMHG | DIASTOLIC BLOOD PRESSURE: 51 MMHG

## 2021-12-04 VITALS — SYSTOLIC BLOOD PRESSURE: 154 MMHG | DIASTOLIC BLOOD PRESSURE: 70 MMHG

## 2021-12-04 VITALS — DIASTOLIC BLOOD PRESSURE: 51 MMHG | SYSTOLIC BLOOD PRESSURE: 99 MMHG

## 2021-12-04 VITALS — DIASTOLIC BLOOD PRESSURE: 67 MMHG | SYSTOLIC BLOOD PRESSURE: 96 MMHG

## 2021-12-04 VITALS — SYSTOLIC BLOOD PRESSURE: 139 MMHG | DIASTOLIC BLOOD PRESSURE: 61 MMHG

## 2021-12-04 VITALS — DIASTOLIC BLOOD PRESSURE: 41 MMHG | SYSTOLIC BLOOD PRESSURE: 115 MMHG

## 2021-12-04 LAB
BUN SERPL-MCNC: 15 MG/DL (ref 7–18)
CALCIUM SERPL-MCNC: 8.4 MG/DL (ref 8.5–10.1)
CHLORIDE SERPL-SCNC: 96 MMOL/L (ref 98–107)
CO2 SERPL-SCNC: 29 MMOL/L (ref 21–32)
CREAT SERPL-MCNC: 0.9 MG/DL (ref 0.6–1.3)
GLUCOSE SERPL-MCNC: 187 MG/DL (ref 74–106)
POTASSIUM SERPL-SCNC: 3.9 MMOL/L (ref 3.5–5.1)
SODIUM SERPL-SCNC: 130 MMOL/L (ref 136–145)

## 2021-12-04 RX ADMIN — Medication SCH MG: at 08:30

## 2021-12-04 RX ADMIN — DEXTROSE MONOHYDRATE SCH MLS/HR: 50 INJECTION, SOLUTION INTRAVENOUS at 23:19

## 2021-12-04 RX ADMIN — PANTOPRAZOLE SODIUM SCH MG: 40 TABLET, DELAYED RELEASE ORAL at 09:33

## 2021-12-04 RX ADMIN — IPRATROPIUM BROMIDE AND ALBUTEROL SULFATE SCH PUFF: 103; 18 AEROSOL, METERED RESPIRATORY (INHALATION) at 13:30

## 2021-12-04 RX ADMIN — BACLOFEN SCH MG: 10 TABLET ORAL at 12:28

## 2021-12-04 RX ADMIN — IPRATROPIUM BROMIDE AND ALBUTEROL SULFATE SCH PUFF: 103; 18 AEROSOL, METERED RESPIRATORY (INHALATION) at 09:43

## 2021-12-04 RX ADMIN — LABETALOL HCL SCH MG: 100 TABLET, FILM COATED ORAL at 17:57

## 2021-12-04 RX ADMIN — Medication SCH EACH: at 17:38

## 2021-12-04 RX ADMIN — SILVER SULFADIAZINE SCH APPLIC: 10 CREAM TOPICAL at 09:43

## 2021-12-04 RX ADMIN — ALPRAZOLAM PRN MG: 0.25 TABLET ORAL at 16:45

## 2021-12-04 RX ADMIN — GUAIFENESIN PRN MG: 100 SOLUTION ORAL at 04:00

## 2021-12-04 RX ADMIN — INSULIN HUMAN PRN UNITS: 100 INJECTION, SOLUTION PARENTERAL at 17:29

## 2021-12-04 RX ADMIN — ACETAMINOPHEN PRN MG: 325 TABLET ORAL at 16:09

## 2021-12-04 RX ADMIN — ALPRAZOLAM PRN MG: 0.25 TABLET ORAL at 09:47

## 2021-12-04 RX ADMIN — SILVER SULFADIAZINE SCH APPLIC: 10 CREAM TOPICAL at 17:38

## 2021-12-04 RX ADMIN — Medication SCH EACH: at 22:06

## 2021-12-04 RX ADMIN — INSULIN HUMAN PRN UNITS: 100 INJECTION, SOLUTION PARENTERAL at 12:31

## 2021-12-04 RX ADMIN — ENOXAPARIN SODIUM SCH MG: 40 INJECTION SUBCUTANEOUS at 09:30

## 2021-12-04 RX ADMIN — BACLOFEN SCH MG: 10 TABLET ORAL at 09:29

## 2021-12-04 RX ADMIN — VALSARTAN SCH MG: 80 TABLET ORAL at 08:30

## 2021-12-04 RX ADMIN — GUAIFENESIN PRN MG: 100 SOLUTION ORAL at 09:31

## 2021-12-04 RX ADMIN — INSULIN GLARGINE SCH UNIT: 100 INJECTION, SOLUTION SUBCUTANEOUS at 09:45

## 2021-12-04 RX ADMIN — IPRATROPIUM BROMIDE AND ALBUTEROL SULFATE SCH PUFF: 103; 18 AEROSOL, METERED RESPIRATORY (INHALATION) at 20:20

## 2021-12-04 RX ADMIN — ATORVASTATIN CALCIUM SCH MG: 10 TABLET, FILM COATED ORAL at 22:00

## 2021-12-04 RX ADMIN — HUMAN INSULIN PRN UNIT: 100 INJECTION, SOLUTION SUBCUTANEOUS at 22:12

## 2021-12-04 RX ADMIN — ISOSORBIDE DINITRATE SCH MG: 20 TABLET ORAL at 16:10

## 2021-12-04 RX ADMIN — LEVOFLOXACIN SCH MG: 250 TABLET, FILM COATED ORAL at 09:31

## 2021-12-04 RX ADMIN — ISOSORBIDE DINITRATE SCH MG: 20 TABLET ORAL at 08:30

## 2021-12-04 RX ADMIN — ACETAMINOPHEN PRN MG: 325 TABLET ORAL at 04:00

## 2021-12-04 RX ADMIN — CLOPIDOGREL BISULFATE SCH MG: 75 TABLET, FILM COATED ORAL at 09:30

## 2021-12-04 RX ADMIN — ACETAMINOPHEN PRN MG: 325 TABLET ORAL at 09:50

## 2021-12-04 RX ADMIN — Medication SCH ML: at 17:38

## 2021-12-04 RX ADMIN — ACETAMINOPHEN PRN MG: 325 TABLET ORAL at 23:49

## 2021-12-04 RX ADMIN — Medication SCH ML: at 12:29

## 2021-12-04 RX ADMIN — Medication SCH ML: at 08:00

## 2021-12-04 RX ADMIN — DEXTROSE MONOHYDRATE SCH MLS/HR: 50 INJECTION, SOLUTION INTRAVENOUS at 05:56

## 2021-12-04 RX ADMIN — IPRATROPIUM BROMIDE AND ALBUTEROL SULFATE SCH PUFF: 103; 18 AEROSOL, METERED RESPIRATORY (INHALATION) at 01:30

## 2021-12-04 RX ADMIN — GUAIFENESIN PRN MG: 100 SOLUTION ORAL at 14:19

## 2021-12-04 RX ADMIN — Medication SCH EACH: at 12:29

## 2021-12-04 RX ADMIN — Medication SCH MG: at 12:28

## 2021-12-04 RX ADMIN — Medication SCH EACH: at 09:33

## 2021-12-04 RX ADMIN — GUAIFENESIN PRN MG: 100 SOLUTION ORAL at 20:18

## 2021-12-04 RX ADMIN — BACLOFEN SCH MG: 10 TABLET ORAL at 16:09

## 2021-12-04 RX ADMIN — Medication SCH OZ: at 09:43

## 2021-12-04 NOTE — NUR
RN Note:



Pt c/o chest pain & discomfort. No changes noted on cardiac monitoring. Pt request for 
Labetalol as prescribed earlier. Called Dr. Avila, notified regarding chest pain/ discomfort 
5/10 & pt requesting to resume labetalol. 

Per Dr. Avila, ok to resume Labetalol 100mg PO BID. Orders noted & will administer. Continue 
to monitor.

## 2021-12-04 NOTE — NUR
RN Note:



Pt received alert awake oriented X 4. On 10 LPM O2 via simple mask, SOB & dry cough noted 
with activity & anxiety episodes. Generalized pain, continue with tylenol & Xanax as needed 
as prescribed. IV site C/D/I, continue with IV ATBs as ordered. Safety measures observed. 
Encourage pt to use call light for assistance. LLE elevated. Continue on contact/droplet 
precautions for COVID +ve. Continue to monitor.

## 2021-12-05 VITALS — DIASTOLIC BLOOD PRESSURE: 67 MMHG | SYSTOLIC BLOOD PRESSURE: 149 MMHG

## 2021-12-05 VITALS — SYSTOLIC BLOOD PRESSURE: 138 MMHG | DIASTOLIC BLOOD PRESSURE: 54 MMHG

## 2021-12-05 VITALS — SYSTOLIC BLOOD PRESSURE: 137 MMHG | DIASTOLIC BLOOD PRESSURE: 57 MMHG

## 2021-12-05 VITALS — DIASTOLIC BLOOD PRESSURE: 56 MMHG | SYSTOLIC BLOOD PRESSURE: 154 MMHG

## 2021-12-05 VITALS — SYSTOLIC BLOOD PRESSURE: 150 MMHG | DIASTOLIC BLOOD PRESSURE: 62 MMHG

## 2021-12-05 VITALS — SYSTOLIC BLOOD PRESSURE: 133 MMHG | DIASTOLIC BLOOD PRESSURE: 58 MMHG

## 2021-12-05 LAB
BASOPHILS # BLD AUTO: 0 K/UL (ref 0–0.2)
BASOPHILS NFR BLD AUTO: 0.5 % (ref 0–2)
BUN SERPL-MCNC: 12 MG/DL (ref 7–18)
CALCIUM SERPL-MCNC: 8.4 MG/DL (ref 8.5–10.1)
CHLORIDE SERPL-SCNC: 97 MMOL/L (ref 98–107)
CO2 SERPL-SCNC: 29 MMOL/L (ref 21–32)
CREAT SERPL-MCNC: 0.8 MG/DL (ref 0.6–1.3)
CRP SERPL-MCNC: < 0.2 MG/DL (ref 0–0.9)
EOSINOPHIL NFR BLD AUTO: 1.5 % (ref 0–6)
GLUCOSE SERPL-MCNC: 138 MG/DL (ref 74–106)
HCT VFR BLD AUTO: 24 % (ref 33–45)
HGB BLD-MCNC: 8.1 G/DL (ref 11.5–14.8)
LYMPHOCYTES NFR BLD AUTO: 2.2 K/UL (ref 0.8–4.8)
LYMPHOCYTES NFR BLD AUTO: 23.1 % (ref 20–44)
MCHC RBC AUTO-ENTMCNC: 33 G/DL (ref 31–36)
MCV RBC AUTO: 81 FL (ref 82–100)
MONOCYTES NFR BLD AUTO: 0.9 K/UL (ref 0.1–1.3)
MONOCYTES NFR BLD AUTO: 10.1 % (ref 2–12)
NEUTROPHILS # BLD AUTO: 6.1 K/UL (ref 1.8–8.9)
NEUTROPHILS NFR BLD AUTO: 64.8 % (ref 43–81)
PLATELET # BLD AUTO: 243 K/UL (ref 150–450)
POTASSIUM SERPL-SCNC: 3.7 MMOL/L (ref 3.5–5.1)
RBC # BLD AUTO: 3 MIL/UL (ref 4–5.2)
SODIUM SERPL-SCNC: 132 MMOL/L (ref 136–145)
WBC NRBC COR # BLD AUTO: 9.3 K/UL (ref 4.3–11)

## 2021-12-05 RX ADMIN — CLOPIDOGREL BISULFATE SCH MG: 75 TABLET, FILM COATED ORAL at 10:20

## 2021-12-05 RX ADMIN — GUAIFENESIN PRN MG: 100 SOLUTION ORAL at 10:18

## 2021-12-05 RX ADMIN — INSULIN HUMAN PRN UNITS: 100 INJECTION, SOLUTION PARENTERAL at 17:22

## 2021-12-05 RX ADMIN — BACLOFEN SCH MG: 10 TABLET ORAL at 17:29

## 2021-12-05 RX ADMIN — ATORVASTATIN CALCIUM SCH MG: 10 TABLET, FILM COATED ORAL at 21:45

## 2021-12-05 RX ADMIN — ISOSORBIDE DINITRATE SCH MG: 20 TABLET ORAL at 17:27

## 2021-12-05 RX ADMIN — Medication SCH MG: at 10:23

## 2021-12-05 RX ADMIN — VALSARTAN SCH MG: 80 TABLET ORAL at 10:18

## 2021-12-05 RX ADMIN — IPRATROPIUM BROMIDE AND ALBUTEROL SULFATE SCH PUFF: 103; 18 AEROSOL, METERED RESPIRATORY (INHALATION) at 02:03

## 2021-12-05 RX ADMIN — Medication SCH ML: at 17:28

## 2021-12-05 RX ADMIN — Medication SCH MG: at 10:20

## 2021-12-05 RX ADMIN — ALPRAZOLAM PRN MG: 0.25 TABLET ORAL at 11:46

## 2021-12-05 RX ADMIN — IPRATROPIUM BROMIDE AND ALBUTEROL SULFATE SCH PUFF: 103; 18 AEROSOL, METERED RESPIRATORY (INHALATION) at 20:26

## 2021-12-05 RX ADMIN — Medication SCH EACH: at 22:01

## 2021-12-05 RX ADMIN — GUAIFENESIN PRN MG: 100 SOLUTION ORAL at 19:20

## 2021-12-05 RX ADMIN — LABETALOL HCL SCH MG: 100 TABLET, FILM COATED ORAL at 10:23

## 2021-12-05 RX ADMIN — ENOXAPARIN SODIUM SCH MG: 40 INJECTION SUBCUTANEOUS at 10:28

## 2021-12-05 RX ADMIN — LABETALOL HCL SCH MG: 100 TABLET, FILM COATED ORAL at 21:46

## 2021-12-05 RX ADMIN — GUAIFENESIN PRN MG: 100 SOLUTION ORAL at 15:24

## 2021-12-05 RX ADMIN — BACLOFEN SCH MG: 10 TABLET ORAL at 10:22

## 2021-12-05 RX ADMIN — Medication SCH ML: at 11:46

## 2021-12-05 RX ADMIN — SILVER SULFADIAZINE SCH APPLIC: 10 CREAM TOPICAL at 17:29

## 2021-12-05 RX ADMIN — Medication SCH ML: at 08:53

## 2021-12-05 RX ADMIN — ACETAMINOPHEN PRN MG: 325 TABLET ORAL at 06:05

## 2021-12-05 RX ADMIN — LEVOFLOXACIN SCH MG: 250 TABLET, FILM COATED ORAL at 10:21

## 2021-12-05 RX ADMIN — Medication SCH OZ: at 10:23

## 2021-12-05 RX ADMIN — ACETAMINOPHEN PRN MG: 325 TABLET ORAL at 13:57

## 2021-12-05 RX ADMIN — ALPRAZOLAM PRN MG: 0.25 TABLET ORAL at 06:45

## 2021-12-05 RX ADMIN — Medication SCH EACH: at 17:19

## 2021-12-05 RX ADMIN — SILVER SULFADIAZINE SCH APPLIC: 10 CREAM TOPICAL at 10:24

## 2021-12-05 RX ADMIN — ALPRAZOLAM PRN MG: 0.25 TABLET ORAL at 00:04

## 2021-12-05 RX ADMIN — INSULIN HUMAN PRN UNITS: 100 INJECTION, SOLUTION PARENTERAL at 10:26

## 2021-12-05 RX ADMIN — Medication SCH EACH: at 08:52

## 2021-12-05 RX ADMIN — HUMAN INSULIN PRN UNIT: 100 INJECTION, SOLUTION SUBCUTANEOUS at 22:01

## 2021-12-05 RX ADMIN — GUAIFENESIN PRN MG: 100 SOLUTION ORAL at 23:22

## 2021-12-05 RX ADMIN — ISOSORBIDE DINITRATE SCH MG: 20 TABLET ORAL at 10:20

## 2021-12-05 RX ADMIN — ALPRAZOLAM PRN MG: 0.25 TABLET ORAL at 20:25

## 2021-12-05 RX ADMIN — DEXTROSE MONOHYDRATE SCH MLS/HR: 50 INJECTION, SOLUTION INTRAVENOUS at 17:31

## 2021-12-05 RX ADMIN — Medication SCH EACH: at 11:47

## 2021-12-05 RX ADMIN — PANTOPRAZOLE SODIUM SCH MG: 40 TABLET, DELAYED RELEASE ORAL at 08:52

## 2021-12-05 RX ADMIN — GUAIFENESIN PRN MG: 100 SOLUTION ORAL at 06:05

## 2021-12-05 RX ADMIN — ACETAMINOPHEN PRN MG: 325 TABLET ORAL at 20:25

## 2021-12-05 RX ADMIN — INSULIN GLARGINE SCH UNIT: 100 INJECTION, SOLUTION SUBCUTANEOUS at 22:00

## 2021-12-05 RX ADMIN — BACLOFEN SCH MG: 10 TABLET ORAL at 13:19

## 2021-12-05 NOTE — NUR
RN NOTES 

GET ORDER LASIX 40 MG IV PUSH X1 VIA HOSPITALIST Dr BLANCO, PATIENT GETTING US OF KIDNEY AT 
THIS TIME. ALSO ADMINISTERED ROBITUSSIN 300 MG PO PRN FOR SOUR THROAT PER PATIENT REQUEST. 
CALL LIGHT WITHIN TO REACH. WILL FOLLOW UP.

## 2021-12-05 NOTE — NUR
rn notes 

administered xanax 0.25 mg po prn for anxiety per patient request, vs taken bp -138/54, 
p-100.

## 2021-12-05 NOTE — NUR
RN NOTES 

BS-209 MG/DL, DUE MEDICATION ADMINISTERED, INFUSING VANCOMYCIN 125 ML/HR INTACT ON RIGHT UA 
MIDLINE . DUE MEDICATION ADMINISTERED. PATIENT USING DIAPER, HIGH FALL RISK. CALL LIGHT 
WITHIN TO REACH. ENDORSED ONCOMING NURSE FOLLOW PLAN OF CARE.

## 2021-12-05 NOTE — NUR
RN NOTES

RECEIVED PATIENT REMAINS IN SIMPLE MASK 8L, NO SIGNS OF RESPIRATORY DISTRESS.  IV ACCESS 
INTACT RUNNING TKO.  DUE MEDS ADMINISTERED,BS-175 MG/DL, COVERAGE GIVEN. SEEN HOSPITALIST 
GET LASIX 40 ML IV PUSH X1. PATIENT SITTING EDGE OF THE BED, A//O X4, REFUSED PAIN. EDEMA 
GENERALIZED , DISTENDED ABDOMEN.  TOLERATED BREAKFAST WELL. NEEDS ATTENDED AND ANTICIPATED. 
CALL LIGHT WITHIN TO REACH. WILL FOLLOW UP.

## 2021-12-05 NOTE — NUR
Patient's boyfriend called, asking for Taryn's results from today from urgent care. I told the boyfriend he was not on her patient contact list, he stated he would have her call back.   RN NOTES

 ADMINISTERED  ROBITUSSIN 300 ML PO PRN FOR SOUR THROAT.

## 2021-12-05 NOTE — NUR
RN NOTE



PT RECEIVED IN BED. PT IS ON 7L OF 02 VIA SIMPLE FACE MASK SHOWING NO S/S OF RESP DISTRESS. 
PT IS ALERT AND ORIENTED X4. ON MONITOR SHOWING NSR. LEFT LOWER LEG CELLULITIS NOTED. ON 
CCHO DIET. IV ACCESS NOTED ON RIGHT UPPER ARM MIDLINE #18. LINE FLUSHED, PATENT, AND INTACT 
WITH NO SIGNS OF INFILTRATION. ALL SAFETY MEASURES IMPLEMENTED. CALL LIGHT WITHIN REACH. BED 
ALARM ON. BED LOCKED AND IN POSITION. SIDE RAILS UP. WILL CONTINUE TO MONITOR AND ASSESS FOR 
ANY CHANGES DURING SHIFT.

## 2021-12-05 NOTE — NUR
RN CLOSING NOTE: 



PATIENT REMAINS IN ROOM IN NO SIGNS OF RESPIRATORY DISTRESS, PATIENT STILL ON 8L OF 02 VIA 
SIMPLE MASK ;TOLERATING WELL SATURATING @ >95% SP02. SAFETY MEASURES IMPLEMENTED, BED IN 
LOWEST POSITION, LOCKED, SIDE RAILS UP, CALL LIGHT WITHIN REACH. ALL NEEDS AND ORDERS 
ADDRESSED DURING THE SHIFT. IV ACCESS MAINTAINED INTACT, SECURED AND FLUSHING WELL.  ALL DUE 
MEDS GIVEN AS ORDERED & SCHEDULED ; PATIENT TOLERATED WELL. PATIENT KEPT CLEAN AND 
COMFORTABLE WITHIN THE SHIFT. PATIENT ENDORSED TO INCOMING SHIFT RN WITH STABLE VITAL SIGN 
AND FOR CONTINUITY OF CARE.

## 2021-12-06 VITALS — SYSTOLIC BLOOD PRESSURE: 135 MMHG | DIASTOLIC BLOOD PRESSURE: 55 MMHG

## 2021-12-06 VITALS — DIASTOLIC BLOOD PRESSURE: 54 MMHG | SYSTOLIC BLOOD PRESSURE: 132 MMHG

## 2021-12-06 VITALS — SYSTOLIC BLOOD PRESSURE: 132 MMHG | DIASTOLIC BLOOD PRESSURE: 60 MMHG

## 2021-12-06 VITALS — SYSTOLIC BLOOD PRESSURE: 128 MMHG | DIASTOLIC BLOOD PRESSURE: 62 MMHG

## 2021-12-06 VITALS — SYSTOLIC BLOOD PRESSURE: 133 MMHG | DIASTOLIC BLOOD PRESSURE: 84 MMHG

## 2021-12-06 VITALS — DIASTOLIC BLOOD PRESSURE: 52 MMHG | SYSTOLIC BLOOD PRESSURE: 117 MMHG

## 2021-12-06 LAB
BUN SERPL-MCNC: 13 MG/DL (ref 7–18)
CALCIUM SERPL-MCNC: 8 MG/DL (ref 8.5–10.1)
CHLORIDE SERPL-SCNC: 93 MMOL/L (ref 98–107)
CO2 SERPL-SCNC: 30 MMOL/L (ref 21–32)
CREAT SERPL-MCNC: 0.9 MG/DL (ref 0.6–1.3)
GLUCOSE SERPL-MCNC: 177 MG/DL (ref 74–106)
POTASSIUM SERPL-SCNC: 3.7 MMOL/L (ref 3.5–5.1)
SODIUM SERPL-SCNC: 128 MMOL/L (ref 136–145)

## 2021-12-06 RX ADMIN — ALPRAZOLAM PRN MG: 0.25 TABLET ORAL at 02:28

## 2021-12-06 RX ADMIN — Medication SCH ML: at 08:00

## 2021-12-06 RX ADMIN — Medication SCH MG: at 10:49

## 2021-12-06 RX ADMIN — POTASSIUM CHLORIDE SCH MEQ: 1500 TABLET, EXTENDED RELEASE ORAL at 11:04

## 2021-12-06 RX ADMIN — GUAIFENESIN PRN MG: 100 SOLUTION ORAL at 15:30

## 2021-12-06 RX ADMIN — Medication SCH EACH: at 12:20

## 2021-12-06 RX ADMIN — Medication SCH OZ: at 09:00

## 2021-12-06 RX ADMIN — CLOPIDOGREL BISULFATE SCH MG: 75 TABLET, FILM COATED ORAL at 10:53

## 2021-12-06 RX ADMIN — PANTOPRAZOLE SODIUM SCH MG: 40 TABLET, DELAYED RELEASE ORAL at 10:40

## 2021-12-06 RX ADMIN — BACLOFEN SCH MG: 10 TABLET ORAL at 17:10

## 2021-12-06 RX ADMIN — ISOSORBIDE DINITRATE SCH MG: 20 TABLET ORAL at 17:10

## 2021-12-06 RX ADMIN — BACLOFEN SCH MG: 10 TABLET ORAL at 12:27

## 2021-12-06 RX ADMIN — INSULIN HUMAN PRN UNITS: 100 INJECTION, SOLUTION PARENTERAL at 08:10

## 2021-12-06 RX ADMIN — ACETAMINOPHEN PRN MG: 325 TABLET ORAL at 17:09

## 2021-12-06 RX ADMIN — LABETALOL HCL SCH MG: 100 TABLET, FILM COATED ORAL at 09:00

## 2021-12-06 RX ADMIN — POLYETHYLENE GLYCOL 3350 SCH GM: 17 POWDER, FOR SOLUTION ORAL at 21:11

## 2021-12-06 RX ADMIN — LABETALOL HCL SCH MG: 100 TABLET, FILM COATED ORAL at 21:11

## 2021-12-06 RX ADMIN — FUROSEMIDE SCH MG: 10 INJECTION, SOLUTION INTRAMUSCULAR; INTRAVENOUS at 18:37

## 2021-12-06 RX ADMIN — Medication SCH EACH: at 08:09

## 2021-12-06 RX ADMIN — GUAIFENESIN PRN MG: 100 SOLUTION ORAL at 03:33

## 2021-12-06 RX ADMIN — POTASSIUM CHLORIDE SCH MEQ: 1500 TABLET, EXTENDED RELEASE ORAL at 12:28

## 2021-12-06 RX ADMIN — PANTOPRAZOLE SODIUM SCH MG: 40 TABLET, DELAYED RELEASE ORAL at 07:39

## 2021-12-06 RX ADMIN — IPRATROPIUM BROMIDE AND ALBUTEROL SULFATE SCH PUFF: 103; 18 AEROSOL, METERED RESPIRATORY (INHALATION) at 13:30

## 2021-12-06 RX ADMIN — GUAIFENESIN PRN MG: 100 SOLUTION ORAL at 07:39

## 2021-12-06 RX ADMIN — Medication SCH ML: at 17:10

## 2021-12-06 RX ADMIN — ALPRAZOLAM PRN MG: 0.25 TABLET ORAL at 10:40

## 2021-12-06 RX ADMIN — HUMAN INSULIN PRN UNIT: 100 INJECTION, SOLUTION SUBCUTANEOUS at 17:41

## 2021-12-06 RX ADMIN — Medication SCH ML: at 11:29

## 2021-12-06 RX ADMIN — VALSARTAN SCH MG: 80 TABLET ORAL at 10:52

## 2021-12-06 RX ADMIN — GUAIFENESIN PRN MG: 100 SOLUTION ORAL at 10:40

## 2021-12-06 RX ADMIN — ENOXAPARIN SODIUM SCH MG: 30 INJECTION SUBCUTANEOUS at 10:55

## 2021-12-06 RX ADMIN — INSULIN GLARGINE SCH UNIT: 100 INJECTION, SOLUTION SUBCUTANEOUS at 11:16

## 2021-12-06 RX ADMIN — Medication SCH EACH: at 17:40

## 2021-12-06 RX ADMIN — Medication SCH EACH: at 21:49

## 2021-12-06 RX ADMIN — FUROSEMIDE SCH MG: 10 INJECTION, SOLUTION INTRAMUSCULAR; INTRAVENOUS at 14:39

## 2021-12-06 RX ADMIN — ACETAMINOPHEN PRN MG: 325 TABLET ORAL at 02:28

## 2021-12-06 RX ADMIN — ENOXAPARIN SODIUM SCH MG: 30 INJECTION SUBCUTANEOUS at 22:01

## 2021-12-06 RX ADMIN — POTASSIUM CHLORIDE SCH MEQ: 1500 TABLET, EXTENDED RELEASE ORAL at 13:00

## 2021-12-06 RX ADMIN — SILVER SULFADIAZINE SCH APPLIC: 10 CREAM TOPICAL at 09:00

## 2021-12-06 RX ADMIN — GUAIFENESIN AND CODEINE PHOSPHATE PRN ML: 100; 10 SOLUTION ORAL at 21:11

## 2021-12-06 RX ADMIN — ISOSORBIDE DINITRATE SCH MG: 20 TABLET ORAL at 10:53

## 2021-12-06 RX ADMIN — IPRATROPIUM BROMIDE AND ALBUTEROL SULFATE SCH PUFF: 103; 18 AEROSOL, METERED RESPIRATORY (INHALATION) at 01:55

## 2021-12-06 RX ADMIN — LORAZEPAM PRN MG: 1 TABLET ORAL at 21:10

## 2021-12-06 RX ADMIN — BACLOFEN SCH MG: 10 TABLET ORAL at 10:52

## 2021-12-06 RX ADMIN — INSULIN GLARGINE SCH UNIT: 100 INJECTION, SOLUTION SUBCUTANEOUS at 21:58

## 2021-12-06 RX ADMIN — ATORVASTATIN CALCIUM SCH MG: 10 TABLET, FILM COATED ORAL at 21:10

## 2021-12-06 RX ADMIN — HUMAN INSULIN PRN UNIT: 100 INJECTION, SOLUTION SUBCUTANEOUS at 21:51

## 2021-12-06 RX ADMIN — Medication SCH MG: at 10:50

## 2021-12-06 RX ADMIN — DEXTROSE MONOHYDRATE SCH MLS/HR: 50 INJECTION, SOLUTION INTRAVENOUS at 10:59

## 2021-12-06 RX ADMIN — IPRATROPIUM BROMIDE AND ALBUTEROL SULFATE SCH PUFF: 103; 18 AEROSOL, METERED RESPIRATORY (INHALATION) at 20:29

## 2021-12-06 RX ADMIN — FUROSEMIDE SCH MG: 10 INJECTION, SOLUTION INTRAMUSCULAR; INTRAVENOUS at 11:04

## 2021-12-06 RX ADMIN — SILVER SULFADIAZINE SCH APPLIC: 10 CREAM TOPICAL at 17:11

## 2021-12-06 RX ADMIN — IPRATROPIUM BROMIDE AND ALBUTEROL SULFATE SCH PUFF: 103; 18 AEROSOL, METERED RESPIRATORY (INHALATION) at 11:00

## 2021-12-06 NOTE — NUR
RN OPENING NOTES

RECEIVED PATIENT SLEEPING, EASY TO AROUSE, A/O X 3 ABLE TO MAKE NEEDS KNOWN, VERY LOUD AND 
VERBAL, DEMANDING MEDICATIONS NOT DUE AT THIS TIME, EDUCATED ON IMPORTANCE OF TIMING 
MEDICATIONS AS PER ORDER, THE NASAL SPRAY WAS EMPTY PHARM NOTIFIED AND ORDERED, PT ON 7L OF 02 VIA SIMPLE FACE MASK, NO SOB, NO S/S OF RESP DISTRESS.  IV ACCESS  RIGHT UPPER ARM 
MIDLINE #18. LINE FLUSHED, PATENT, AND INTACT WITH NO SIGNS OF INFILTRATION. ALL DUE MEDS 
GIVEN AS ORDERED. PT KEPT CLEAN AND COMFORTABLE. ALL SAFETY MEASURES IMPLEMENTED. CALL LIGHT 
WITHIN REACH. BED ALARM ON. BED LOCKED AND IN POSITION. SIDE RAILS UP.  AT THIS TIME.

## 2021-12-06 NOTE — NUR
PATIENT VERY NEEDY, CONSTANTLY ON THE CALL LIGHT ASKING AND NEEDING VARIOUS THINGS, WAS ABLE 
TO CALM HER DOWN AFTER SOME TIME, COMPLIANT WITH CARE AND EDUCATED THE REASON WHY WE CAN NOT 
LEAVE MEDICATIONS TO THE SIDE OF HER BED FOR WHEN SHE IS READY TO TAKE THEM. SHE UNDERSTOOD 
AND WAS COMPLIANT WITH ADMIN OF MEDICATIONS, IV, AND ETC. HAS NO SOB, NO DISTRESS NOTED, 
ANXIETY, AND IN FEAR OF HER SITUATION, CONTINUE TO MONITOR, BED WHEELS LOCKED, BED LOW TO 
FLOOR, CALL LIGHT IN REACH, KEPT DRY AND CLEAN.

## 2021-12-06 NOTE — NUR
RN NOTE



PT ALLERGIC TO HYDROCODONE, PHARMACY STATED THAT IT CAN CAUSE CROSS-SENSITIVITY BUT PT 
STATED THAT SHE WANTS TO TRY COUGH MEDICINE WITH THE CODEINE STATING THAT IT WOULD HELP MORE 
AND THAT SHE HAS HAD IN THE PAST BEFORE. CONFIRMED WITH DR. SUMMERS IF STILL OKAY TO 
GIVE AND DR. SUMMERS APPROVED TO GIVE MEDICATION. MD AWARE OF ALLERGY. WILL CONTINUE TO 
MONITOR.

## 2021-12-06 NOTE — NUR
RN NOTE



NO CHANGES IN PT CONDITION DURING SHIFT. PT IS ON 7L OF 02 VIA SIMPLE FACE MASK SHOWING NO 
S/S OF RESP DISTRESS.  IV ACCESS NOTED ON RIGHT UPPER ARM MIDLINE #18. LINE FLUSHED, PATENT, 
AND INTACT WITH NO SIGNS OF INFILTRATION. ALL DUE MEDS GIVEN AS ORDERED. PT KEPT CLEAN AND 
COMFORTABLE. ALL SAFETY MEASURES IMPLEMENTED. CALL LIGHT WITHIN REACH. BED ALARM ON. BED 
LOCKED AND IN POSITION. SIDE RAILS UP. WILL ENDORSE TO MORNING SHIFT RN FOR [facility]HUN.

## 2021-12-06 NOTE — NUR
RN NOTE



PT STATED SHE WAS HAVING CONSTIPATION FOR 5 DAYS, HAVING ANXIETY, AND FEELING CHEST 
TIGHTNESS. SPOKE WITH DR. SUMMERS AND DR. TRUONG AMADOSIN WITH CODEINE, ATIVAN 1MG PO 
Q6H, AND MIRALAX DAILY 17G AND COLACE  SCHEDULED. ORDER NOTED AND CARRIED OUT. 

-------------------------------------------------------------------------------

Addendum: 12/07/21 at 0641 by VALDEZ CRAFT RN

-------------------------------------------------------------------------------

TIME CORRECTION/ 2015*

## 2021-12-06 NOTE — NUR
RN NOTE



PT RECEIVED IN BED. PT IS NOW ON 10L OF 02 VIA SIMPLE FACE MASK SHOWING NO S/S OF RESP 
DISTRESS. PT REFUSING TO BE WEANED DOWN. WAS ON 7L YESTERDAY WITH OXYGEN SATURATION >95%. 
ASKED IF SHE WOULD LIKE TO BE ON NASAL CANNULA AND SEE HOW SHE WOULD TOLERATE IT, BUT PT 
REFUSING TO BE WEANED DOWN AT THIS TIME. PT IS ALERT AND ORIENTED X4.  LEFT LOWER LEG 
CELLULITIS NOTED. IV ACCESS NOTED ON RIGHT UPPER ARM MIDLINE #18. LINE FLUSHED, PATENT, AND 
INTACT WITH NO SIGNS OF INFILTRATION. ALL SAFETY MEASURES IMPLEMENTED. CALL LIGHT WITHIN 
REACH. BED ALARM ON. BED LOCKED AND IN POSITION. SIDE RAILS UP. WILL CONTINUE TO MONITOR AND 
ASSESS FOR ANY CHANGES DURING SHIFT.

## 2021-12-07 VITALS — DIASTOLIC BLOOD PRESSURE: 60 MMHG | SYSTOLIC BLOOD PRESSURE: 127 MMHG

## 2021-12-07 VITALS — DIASTOLIC BLOOD PRESSURE: 45 MMHG | SYSTOLIC BLOOD PRESSURE: 120 MMHG

## 2021-12-07 VITALS — SYSTOLIC BLOOD PRESSURE: 116 MMHG | DIASTOLIC BLOOD PRESSURE: 47 MMHG

## 2021-12-07 VITALS — DIASTOLIC BLOOD PRESSURE: 61 MMHG | SYSTOLIC BLOOD PRESSURE: 130 MMHG

## 2021-12-07 VITALS — SYSTOLIC BLOOD PRESSURE: 123 MMHG | DIASTOLIC BLOOD PRESSURE: 51 MMHG

## 2021-12-07 VITALS — SYSTOLIC BLOOD PRESSURE: 112 MMHG | DIASTOLIC BLOOD PRESSURE: 42 MMHG

## 2021-12-07 LAB
ALBUMIN SERPL BCP-MCNC: 2.7 G/DL (ref 3.4–5)
ALP SERPL-CCNC: 68 U/L (ref 46–116)
ALT SERPL W P-5'-P-CCNC: 38 U/L (ref 12–78)
AST SERPL W P-5'-P-CCNC: 17 U/L (ref 15–37)
BASE EXCESS BLDA CALC-SCNC: 0.1 MMOL/L
BASOPHILS # BLD AUTO: 0.1 K/UL (ref 0–0.2)
BASOPHILS NFR BLD AUTO: 0.8 % (ref 0–2)
BILIRUB SERPL-MCNC: 1.1 MG/DL (ref 0.2–1)
BUN SERPL-MCNC: 16 MG/DL (ref 7–18)
CALCIUM SERPL-MCNC: 8 MG/DL (ref 8.5–10.1)
CHLORIDE SERPL-SCNC: 93 MMOL/L (ref 98–107)
CO2 SERPL-SCNC: 31 MMOL/L (ref 21–32)
CREAT SERPL-MCNC: 0.9 MG/DL (ref 0.6–1.3)
EOSINOPHIL NFR BLD AUTO: 1.4 % (ref 0–6)
GLUCOSE SERPL-MCNC: 114 MG/DL (ref 74–106)
HCT VFR BLD AUTO: 22 % (ref 33–45)
HGB BLD-MCNC: 7.5 G/DL (ref 11.5–14.8)
INHALED O2 CONCENTRATION: 60 %
IRON SERPL-MCNC: 61 UG/DL (ref 50–175)
LYMPHOCYTES NFR BLD AUTO: 2 K/UL (ref 0.8–4.8)
LYMPHOCYTES NFR BLD AUTO: 23.5 % (ref 20–44)
MAGNESIUM SERPL-MCNC: 2.4 MG/DL (ref 1.8–2.4)
MCHC RBC AUTO-ENTMCNC: 34 G/DL (ref 31–36)
MCV RBC AUTO: 81 FL (ref 82–100)
MONOCYTES NFR BLD AUTO: 1 K/UL (ref 0.1–1.3)
MONOCYTES NFR BLD AUTO: 11.3 % (ref 2–12)
NEUTROPHILS # BLD AUTO: 5.3 K/UL (ref 1.8–8.9)
NEUTROPHILS NFR BLD AUTO: 63 % (ref 43–81)
PCO2 TEMP ADJ BLDA: 38.6 MMHG (ref 35–45)
PH TEMP ADJ BLDA: 7.42 [PH] (ref 7.35–7.45)
PHOSPHATE SERPL-MCNC: 2.4 MG/DL (ref 2.5–4.9)
PLATELET # BLD AUTO: 204 K/UL (ref 150–450)
PO2 TEMP ADJ BLDA: 111 MMHG (ref 75–100)
POTASSIUM SERPL-SCNC: 4 MMOL/L (ref 3.5–5.1)
PROT SERPL-MCNC: 5.2 G/DL (ref 6.4–8.2)
RBC # BLD AUTO: 2.76 MIL/UL (ref 4–5.2)
SODIUM SERPL-SCNC: 127 MMOL/L (ref 136–145)
TIBC SERPL-MCNC: 224 UG/DL (ref 250–450)
VENTILATION MODE VENT: (no result)
WBC NRBC COR # BLD AUTO: 8.5 K/UL (ref 4.3–11)

## 2021-12-07 RX ADMIN — IPRATROPIUM BROMIDE AND ALBUTEROL SULFATE SCH PUFF: 103; 18 AEROSOL, METERED RESPIRATORY (INHALATION) at 07:35

## 2021-12-07 RX ADMIN — VALSARTAN SCH MG: 80 TABLET ORAL at 08:39

## 2021-12-07 RX ADMIN — HUMAN INSULIN PRN UNIT: 100 INJECTION, SOLUTION SUBCUTANEOUS at 22:28

## 2021-12-07 RX ADMIN — ATORVASTATIN CALCIUM SCH MG: 10 TABLET, FILM COATED ORAL at 22:13

## 2021-12-07 RX ADMIN — BACLOFEN SCH MG: 10 TABLET ORAL at 12:36

## 2021-12-07 RX ADMIN — GUAIFENESIN AND CODEINE PHOSPHATE PRN ML: 100; 10 SOLUTION ORAL at 18:49

## 2021-12-07 RX ADMIN — ACETAMINOPHEN PRN MG: 325 TABLET ORAL at 06:55

## 2021-12-07 RX ADMIN — ACETAMINOPHEN PRN MG: 325 TABLET ORAL at 00:21

## 2021-12-07 RX ADMIN — IPRATROPIUM BROMIDE AND ALBUTEROL SULFATE SCH PUFF: 103; 18 AEROSOL, METERED RESPIRATORY (INHALATION) at 01:56

## 2021-12-07 RX ADMIN — LORAZEPAM PRN MG: 1 TABLET ORAL at 03:56

## 2021-12-07 RX ADMIN — IPRATROPIUM BROMIDE AND ALBUTEROL SULFATE SCH PUFF: 103; 18 AEROSOL, METERED RESPIRATORY (INHALATION) at 12:40

## 2021-12-07 RX ADMIN — LABETALOL HCL SCH MG: 100 TABLET, FILM COATED ORAL at 08:42

## 2021-12-07 RX ADMIN — Medication SCH EACH: at 12:09

## 2021-12-07 RX ADMIN — ACETAMINOPHEN PRN MG: 325 TABLET ORAL at 08:40

## 2021-12-07 RX ADMIN — ACETAMINOPHEN PRN MG: 325 TABLET ORAL at 16:51

## 2021-12-07 RX ADMIN — INSULIN HUMAN PRN UNITS: 100 INJECTION, SOLUTION PARENTERAL at 12:12

## 2021-12-07 RX ADMIN — ENOXAPARIN SODIUM SCH MG: 30 INJECTION SUBCUTANEOUS at 22:14

## 2021-12-07 RX ADMIN — POLYETHYLENE GLYCOL 3350 SCH GM: 17 POWDER, FOR SOLUTION ORAL at 22:13

## 2021-12-07 RX ADMIN — LABETALOL HCL SCH MG: 100 TABLET, FILM COATED ORAL at 22:13

## 2021-12-07 RX ADMIN — SILVER SULFADIAZINE SCH APPLIC: 10 CREAM TOPICAL at 08:47

## 2021-12-07 RX ADMIN — BACLOFEN SCH MG: 10 TABLET ORAL at 16:48

## 2021-12-07 RX ADMIN — Medication SCH MG: at 08:41

## 2021-12-07 RX ADMIN — GUAIFENESIN AND CODEINE PHOSPHATE PRN ML: 100; 10 SOLUTION ORAL at 13:27

## 2021-12-07 RX ADMIN — Medication SCH ML: at 08:42

## 2021-12-07 RX ADMIN — INSULIN GLARGINE SCH UNIT: 100 INJECTION, SOLUTION SUBCUTANEOUS at 22:27

## 2021-12-07 RX ADMIN — DILTIAZEM HYDROCHLORIDE SCH MG: 240 CAPSULE, EXTENDED RELEASE ORAL at 08:41

## 2021-12-07 RX ADMIN — Medication SCH EACH: at 17:01

## 2021-12-07 RX ADMIN — ENOXAPARIN SODIUM SCH MG: 30 INJECTION SUBCUTANEOUS at 08:45

## 2021-12-07 RX ADMIN — ISOSORBIDE DINITRATE SCH MG: 20 TABLET ORAL at 16:48

## 2021-12-07 RX ADMIN — Medication SCH MG: at 16:48

## 2021-12-07 RX ADMIN — LORAZEPAM PRN MG: 1 TABLET ORAL at 16:51

## 2021-12-07 RX ADMIN — INSULIN HUMAN PRN UNITS: 100 INJECTION, SOLUTION PARENTERAL at 08:46

## 2021-12-07 RX ADMIN — GUAIFENESIN AND CODEINE PHOSPHATE PRN ML: 100; 10 SOLUTION ORAL at 10:03

## 2021-12-07 RX ADMIN — LORAZEPAM PRN MG: 1 TABLET ORAL at 10:07

## 2021-12-07 RX ADMIN — INSULIN GLARGINE SCH UNIT: 100 INJECTION, SOLUTION SUBCUTANEOUS at 08:50

## 2021-12-07 RX ADMIN — BACLOFEN SCH MG: 10 TABLET ORAL at 08:41

## 2021-12-07 RX ADMIN — Medication SCH EACH: at 22:14

## 2021-12-07 RX ADMIN — Medication SCH EACH: at 07:30

## 2021-12-07 RX ADMIN — Medication SCH ML: at 12:10

## 2021-12-07 RX ADMIN — HUMAN INSULIN PRN UNIT: 100 INJECTION, SOLUTION SUBCUTANEOUS at 17:22

## 2021-12-07 RX ADMIN — DEXTROSE MONOHYDRATE SCH MLS/HR: 50 INJECTION, SOLUTION INTRAVENOUS at 10:42

## 2021-12-07 RX ADMIN — Medication SCH ML: at 16:49

## 2021-12-07 RX ADMIN — SILVER SULFADIAZINE SCH APPLIC: 10 CREAM TOPICAL at 16:49

## 2021-12-07 RX ADMIN — GUAIFENESIN AND CODEINE PHOSPHATE PRN ML: 100; 10 SOLUTION ORAL at 03:56

## 2021-12-07 RX ADMIN — CLOPIDOGREL BISULFATE SCH MG: 75 TABLET, FILM COATED ORAL at 08:38

## 2021-12-07 RX ADMIN — IPRATROPIUM BROMIDE AND ALBUTEROL SULFATE SCH PUFF: 103; 18 AEROSOL, METERED RESPIRATORY (INHALATION) at 20:38

## 2021-12-07 RX ADMIN — Medication SCH OZ: at 08:47

## 2021-12-07 RX ADMIN — ISOSORBIDE DINITRATE SCH MG: 20 TABLET ORAL at 08:41

## 2021-12-07 NOTE — NUR
RN OPENING NOTE 



RECEIVE REPORT FROM NIGHT SHIFT NURSE. PATIENT HAVING DIFFICULTY BREATHING. PATIENT REMAIN 
ON OXYGEN VIA SIMPLE MASK AT 8L/MIN. O2 SAT IS 92% AND ABOVE. A/O X4. WILL FOLLOW UP AM LABS 
AND DOCTOR ORDERS.  PROPER ISOLATION PRECAUTION IN PLACE. ALL SAFETY MEASURE IN PLACE. BED 
ON LOWEST POSITION WITH HOB ELEVATED. CALL LIGHT WITHIN REACH. WILL CONTINUE TO MONITOR.

## 2021-12-07 NOTE — NUR
RN OPENING NOTE 



RECEIVED PATIENT IN BED. A/OX4. ON OXYGEN 8L SIMPLE MASK. RESPIRATIONS ARE EVEN AND 
UNLABORED. NO S/S SOB. NO C/O PAIN AT THIS TIME. TELE MONITOR READS SINUS RHYTHM HR 80.IV 
ACCES JOSE MIDLINE PATENT AND SALINE LOCKED. PATIENT IS CURRENTLY IN CHAIR AT BEDSIDE.

## 2021-12-07 NOTE — NUR
RN CLOSING NOTE 



PATIENT REMAIN ON SIMPLE MASK. ATTEMPT TO TITRATE OXYGEN DOWN. PATIENT DID NOT TOLERATE 
WELL. HAD CHEST PAIN. EKG WAS DONE, ABG WAS DONE. RESULT SHOWS NO CHANGE. LASIX WAS GIVEN. 
PATIENT REPORT URINATE OFTEN. TROPONIN WAS DRAWN. WAITING FOR LAB RESULT. PROPER PRECAUTION 
IN PLACE. ALL SAFETY MEASURE IN PLACE. BED ON LOWEST POSITION WITH HOB ELEVATED. 3 SIDE RAIL 
UP. CALL LIGHT WITHIN REACH. WILL CONTINUE TO MONITOR AND GIVE REPORT TO NIGHT SHIFT NURSE.

## 2021-12-07 NOTE — NUR
RN NOTE



NO CHANGES IN PT CONDITION DURING SHIFT. PT IS NOW ON 8L OF 02 VIA SIMPLE FACE MASK SHOWING 
NO S/S OF RESP DISTRESS. OXYGEN SATURATION >95%. PT IS ALERT AND ORIENTED X4. LEFT LOWER LEG 
CELLULITIS NOTED WITH ACE WRAP NOW MAYRA MD ORDER. IV ACCESS NOTED ON RIGHT UPPER ARM MIDLINE 
#18. LINE FLUSHED, PATENT, AND INTACT WITH NO SIGNS OF INFILTRATION. ALL DUE MEDS GIVEN AS 
ORDERED. PT KEPT CLEAN AND COMFORTABLE. ALL SAFETY MEASURES IMPLEMENTED. CALL LIGHT WITHIN 
REACH. BED ALARM ON. BED LOCKED AND IN POSITION. SIDE RAILS UP. WILL ENDORSE TO MORNING 
SHIFT RN FOR NAHUN.

## 2021-12-08 VITALS — DIASTOLIC BLOOD PRESSURE: 73 MMHG | SYSTOLIC BLOOD PRESSURE: 130 MMHG

## 2021-12-08 VITALS — DIASTOLIC BLOOD PRESSURE: 48 MMHG | SYSTOLIC BLOOD PRESSURE: 113 MMHG

## 2021-12-08 VITALS — SYSTOLIC BLOOD PRESSURE: 104 MMHG | DIASTOLIC BLOOD PRESSURE: 51 MMHG

## 2021-12-08 VITALS — DIASTOLIC BLOOD PRESSURE: 60 MMHG | SYSTOLIC BLOOD PRESSURE: 144 MMHG

## 2021-12-08 VITALS — DIASTOLIC BLOOD PRESSURE: 49 MMHG | SYSTOLIC BLOOD PRESSURE: 122 MMHG

## 2021-12-08 VITALS — DIASTOLIC BLOOD PRESSURE: 70 MMHG | SYSTOLIC BLOOD PRESSURE: 131 MMHG

## 2021-12-08 LAB
BUN SERPL-MCNC: 17 MG/DL (ref 7–18)
CALCIUM SERPL-MCNC: 8 MG/DL (ref 8.5–10.1)
CHLORIDE SERPL-SCNC: 92 MMOL/L (ref 98–107)
CO2 SERPL-SCNC: 30 MMOL/L (ref 21–32)
CREAT SERPL-MCNC: 0.8 MG/DL (ref 0.6–1.3)
GLUCOSE SERPL-MCNC: 153 MG/DL (ref 74–106)
PHOSPHATE SERPL-MCNC: 2.7 MG/DL (ref 2.5–4.9)
POTASSIUM SERPL-SCNC: 4.1 MMOL/L (ref 3.5–5.1)
SODIUM SERPL-SCNC: 126 MMOL/L (ref 136–145)

## 2021-12-08 RX ADMIN — LORAZEPAM PRN MG: 1 TABLET ORAL at 04:43

## 2021-12-08 RX ADMIN — FERROUS SULFATE TAB 325 MG (65 MG ELEMENTAL FE) SCH MG: 325 (65 FE) TAB at 16:31

## 2021-12-08 RX ADMIN — INSULIN GLARGINE SCH UNIT: 100 INJECTION, SOLUTION SUBCUTANEOUS at 21:52

## 2021-12-08 RX ADMIN — ALBUTEROL SULFATE PRN MG: 1.25 SOLUTION RESPIRATORY (INHALATION) at 22:19

## 2021-12-08 RX ADMIN — Medication SCH MG: at 16:34

## 2021-12-08 RX ADMIN — Medication SCH OZ: at 09:02

## 2021-12-08 RX ADMIN — Medication SCH EACH: at 07:30

## 2021-12-08 RX ADMIN — IPRATROPIUM BROMIDE AND ALBUTEROL SULFATE SCH PUFF: 103; 18 AEROSOL, METERED RESPIRATORY (INHALATION) at 08:54

## 2021-12-08 RX ADMIN — ACETAMINOPHEN PRN MG: 325 TABLET ORAL at 16:30

## 2021-12-08 RX ADMIN — CLOPIDOGREL BISULFATE SCH MG: 75 TABLET, FILM COATED ORAL at 08:55

## 2021-12-08 RX ADMIN — INSULIN GLARGINE SCH UNIT: 100 INJECTION, SOLUTION SUBCUTANEOUS at 10:04

## 2021-12-08 RX ADMIN — POLYETHYLENE GLYCOL 3350 SCH GM: 17 POWDER, FOR SOLUTION ORAL at 21:47

## 2021-12-08 RX ADMIN — SILVER SULFADIAZINE SCH APPLIC: 10 CREAM TOPICAL at 08:56

## 2021-12-08 RX ADMIN — Medication PRN MG: at 22:19

## 2021-12-08 RX ADMIN — INSULIN HUMAN PRN UNITS: 100 INJECTION, SOLUTION PARENTERAL at 17:32

## 2021-12-08 RX ADMIN — Medication SCH ML: at 16:31

## 2021-12-08 RX ADMIN — ACETAMINOPHEN PRN MG: 325 TABLET ORAL at 02:29

## 2021-12-08 RX ADMIN — Medication SCH EACH: at 17:28

## 2021-12-08 RX ADMIN — ACETAMINOPHEN PRN MG: 325 TABLET ORAL at 23:13

## 2021-12-08 RX ADMIN — LORAZEPAM PRN MG: 1 TABLET ORAL at 16:36

## 2021-12-08 RX ADMIN — GUAIFENESIN AND CODEINE PHOSPHATE PRN ML: 100; 10 SOLUTION ORAL at 22:01

## 2021-12-08 RX ADMIN — DILTIAZEM HYDROCHLORIDE SCH MG: 240 CAPSULE, EXTENDED RELEASE ORAL at 08:54

## 2021-12-08 RX ADMIN — ISOSORBIDE DINITRATE SCH MG: 20 TABLET ORAL at 08:57

## 2021-12-08 RX ADMIN — HUMAN INSULIN PRN UNIT: 100 INJECTION, SOLUTION SUBCUTANEOUS at 21:50

## 2021-12-08 RX ADMIN — LABETALOL HCL SCH MG: 100 TABLET, FILM COATED ORAL at 08:56

## 2021-12-08 RX ADMIN — IPRATROPIUM BROMIDE AND ALBUTEROL SULFATE SCH PUFF: 103; 18 AEROSOL, METERED RESPIRATORY (INHALATION) at 21:42

## 2021-12-08 RX ADMIN — LABETALOL HCL SCH MG: 100 TABLET, FILM COATED ORAL at 21:45

## 2021-12-08 RX ADMIN — PANTOPRAZOLE SODIUM SCH MG: 40 TABLET, DELAYED RELEASE ORAL at 07:30

## 2021-12-08 RX ADMIN — FLUTICASONE FUROATE AND VILANTEROL TRIFENATATE SCH EACH: 100; 25 POWDER RESPIRATORY (INHALATION) at 09:02

## 2021-12-08 RX ADMIN — Medication SCH ML: at 12:09

## 2021-12-08 RX ADMIN — BACLOFEN SCH MG: 10 TABLET ORAL at 12:24

## 2021-12-08 RX ADMIN — INSULIN HUMAN PRN UNITS: 100 INJECTION, SOLUTION PARENTERAL at 12:24

## 2021-12-08 RX ADMIN — BACLOFEN SCH MG: 10 TABLET ORAL at 08:55

## 2021-12-08 RX ADMIN — LORAZEPAM PRN MG: 1 TABLET ORAL at 09:31

## 2021-12-08 RX ADMIN — Medication SCH EACH: at 12:09

## 2021-12-08 RX ADMIN — ATORVASTATIN CALCIUM SCH MG: 10 TABLET, FILM COATED ORAL at 21:47

## 2021-12-08 RX ADMIN — GUAIFENESIN AND CODEINE PHOSPHATE PRN ML: 100; 10 SOLUTION ORAL at 16:28

## 2021-12-08 RX ADMIN — Medication SCH MG: at 08:55

## 2021-12-08 RX ADMIN — ACETAMINOPHEN PRN MG: 325 TABLET ORAL at 09:31

## 2021-12-08 RX ADMIN — SILVER SULFADIAZINE SCH APPLIC: 10 CREAM TOPICAL at 16:33

## 2021-12-08 RX ADMIN — GUAIFENESIN AND CODEINE PHOSPHATE PRN ML: 100; 10 SOLUTION ORAL at 02:29

## 2021-12-08 RX ADMIN — IPRATROPIUM BROMIDE AND ALBUTEROL SULFATE SCH PUFF: 103; 18 AEROSOL, METERED RESPIRATORY (INHALATION) at 02:29

## 2021-12-08 RX ADMIN — ENOXAPARIN SODIUM SCH MG: 30 INJECTION SUBCUTANEOUS at 21:46

## 2021-12-08 RX ADMIN — ISOSORBIDE DINITRATE SCH MG: 20 TABLET ORAL at 16:33

## 2021-12-08 RX ADMIN — BACLOFEN SCH MG: 10 TABLET ORAL at 16:30

## 2021-12-08 RX ADMIN — VALSARTAN SCH MG: 80 TABLET ORAL at 08:55

## 2021-12-08 RX ADMIN — GUAIFENESIN AND CODEINE PHOSPHATE PRN ML: 100; 10 SOLUTION ORAL at 09:31

## 2021-12-08 RX ADMIN — DEXTROSE MONOHYDRATE SCH MLS/HR: 50 INJECTION, SOLUTION INTRAVENOUS at 10:03

## 2021-12-08 RX ADMIN — ENOXAPARIN SODIUM SCH MG: 30 INJECTION SUBCUTANEOUS at 08:56

## 2021-12-08 RX ADMIN — Medication SCH ML: at 08:54

## 2021-12-08 RX ADMIN — Medication SCH EACH: at 21:47

## 2021-12-08 RX ADMIN — IPRATROPIUM BROMIDE AND ALBUTEROL SULFATE SCH PUFF: 103; 18 AEROSOL, METERED RESPIRATORY (INHALATION) at 12:32

## 2021-12-08 RX ADMIN — LORAZEPAM PRN MG: 1 TABLET ORAL at 23:13

## 2021-12-08 NOTE — NUR
RN OPENING NOTE



PATIENT RECEIVED IN BED, RESTING. PATIENT ON 8L O2 SIMPLE MASK WITH NO SIGNS OF LABORED 
BREATHING. RIGHT UA MIDLINE IN PLACE, PATENT WITH NO SIGNS OF INFILTRATION. BED LOCKED AND 
IN LOWEST POSITION, CALL LIGHT WITHIN REACH, 3 SIDE RAILS UP. ALL SAFETY MEASURES 
IMPLEMENTED. WILL CONTINUE TO MONITOR.

## 2021-12-08 NOTE — NUR
RN CLOSING NOTE 



RESTING IN BED. A/OX4. REMAINS ON OXYGEN 8L SIMPLE MASK. DID TITRATE DOWN TO 6L NC FOR A 
WHILE, WHEN PATIENT LAYS FLAT TO MOVE UP PATIENT GETS A COUGH ATTACK AND SOB. C/O CHEST 
PAIN/ DISCOMFORT WHEN COUGHING, AND HIGH HR. PRN ROBITUSSIN GIVEN, TYLENOL AND ATIVAN. TELE 
IS SINUS RHYTHM. IV IN  JOSE MIDLINE. BED IS LOW AND LOCKED, HOB ELEVATED IN HIGH FOWLERS, 
SIDE RIALS UP X2, YOBANI LIGHT WITHIN REACH. WILL ENDORSE TO ONCOMING SHIFT.

## 2021-12-08 NOTE — NUR
RN NOTES





RECEIVED REPORT FROM MORNING RN PATIENT A/OX4 NOT IN DISTRESS NO SOB. WITH R UA MIDLINE 
PATENT FLUSHES WELL. WITH O2 VIA MASK TOLERATING WELL SATING 100% FIO2. VITAL SIGNS TAKEN 
AND RECORDED. ALL SAFETY MEASURES IN PLACE. HOB ELEVATED. CALL LIGHT WITHIN REACH. WILL 
MONITOR CLOSELY

## 2021-12-08 NOTE — NUR
RN CLOSING NOTE



PATIENT IN BED, AWAKE, A&OX4. PATIENT ON 8L SIMPLE MASK SATURATING AT 94%. RIGHT UA MIDLINE 
IN PLACE, PATENT WITH NO SIGNS OF INFILTRATION. ALL NEEDS ATTENDED DURING SHIFT. NO SIGNS OF 
DISTRESS NOTED AT THIS TIME. BED LOCKED AND IN LOWEST POSITION, CALL LIGHT WITHIN REACH, 3 
SIDE RAILS UP. ALL SAFETY MEASURES IMPLEMENTED. WILL ENDORSE TO NIGHT SHIFT NURSE.

## 2021-12-09 VITALS — DIASTOLIC BLOOD PRESSURE: 73 MMHG | SYSTOLIC BLOOD PRESSURE: 134 MMHG

## 2021-12-09 VITALS — DIASTOLIC BLOOD PRESSURE: 49 MMHG | SYSTOLIC BLOOD PRESSURE: 115 MMHG

## 2021-12-09 VITALS — SYSTOLIC BLOOD PRESSURE: 129 MMHG | DIASTOLIC BLOOD PRESSURE: 65 MMHG

## 2021-12-09 VITALS — SYSTOLIC BLOOD PRESSURE: 130 MMHG | DIASTOLIC BLOOD PRESSURE: 61 MMHG

## 2021-12-09 VITALS — SYSTOLIC BLOOD PRESSURE: 125 MMHG | DIASTOLIC BLOOD PRESSURE: 52 MMHG

## 2021-12-09 VITALS — DIASTOLIC BLOOD PRESSURE: 46 MMHG | SYSTOLIC BLOOD PRESSURE: 124 MMHG

## 2021-12-09 LAB
BASE EXCESS BLDA CALC-SCNC: 0.7 MMOL/L
BASOPHILS # BLD AUTO: 0.1 K/UL (ref 0–0.2)
BASOPHILS NFR BLD AUTO: 0.7 % (ref 0–2)
BUN SERPL-MCNC: 14 MG/DL (ref 7–18)
CALCIUM SERPL-MCNC: 8.1 MG/DL (ref 8.5–10.1)
CHLORIDE SERPL-SCNC: 93 MMOL/L (ref 98–107)
CO2 SERPL-SCNC: 34 MMOL/L (ref 21–32)
CREAT SERPL-MCNC: 0.7 MG/DL (ref 0.6–1.3)
EOSINOPHIL NFR BLD AUTO: 1.2 % (ref 0–6)
GLUCOSE SERPL-MCNC: 85 MG/DL (ref 74–106)
HCT VFR BLD AUTO: 22 % (ref 33–45)
HGB BLD-MCNC: 7.4 G/DL (ref 11.5–14.8)
INHALED O2 CONCENTRATION: 45 %
INHALED O2 FLOW RATE: 6 L/MIN (ref 0–30)
LYMPHOCYTES NFR BLD AUTO: 2.1 K/UL (ref 0.8–4.8)
LYMPHOCYTES NFR BLD AUTO: 22.6 % (ref 20–44)
MCHC RBC AUTO-ENTMCNC: 34 G/DL (ref 31–36)
MCV RBC AUTO: 81 FL (ref 82–100)
MONOCYTES NFR BLD AUTO: 1.1 K/UL (ref 0.1–1.3)
MONOCYTES NFR BLD AUTO: 11.4 % (ref 2–12)
NEUTROPHILS # BLD AUTO: 6.1 K/UL (ref 1.8–8.9)
NEUTROPHILS NFR BLD AUTO: 64.1 % (ref 43–81)
PCO2 TEMP ADJ BLDA: 42.3 MMHG (ref 35–45)
PH TEMP ADJ BLDA: 7.4 [PH] (ref 7.35–7.45)
PLATELET # BLD AUTO: 193 K/UL (ref 150–450)
PO2 TEMP ADJ BLDA: 109.5 MMHG (ref 75–100)
POTASSIUM SERPL-SCNC: 3.9 MMOL/L (ref 3.5–5.1)
RBC # BLD AUTO: 2.71 MIL/UL (ref 4–5.2)
SODIUM SERPL-SCNC: 128 MMOL/L (ref 136–145)
WBC NRBC COR # BLD AUTO: 9.5 K/UL (ref 4.3–11)

## 2021-12-09 RX ADMIN — Medication SCH EACH: at 11:33

## 2021-12-09 RX ADMIN — CLOPIDOGREL BISULFATE SCH MG: 75 TABLET, FILM COATED ORAL at 09:07

## 2021-12-09 RX ADMIN — LABETALOL HCL SCH MG: 100 TABLET, FILM COATED ORAL at 21:53

## 2021-12-09 RX ADMIN — INSULIN HUMAN PRN UNITS: 100 INJECTION, SOLUTION PARENTERAL at 12:32

## 2021-12-09 RX ADMIN — Medication SCH ML: at 08:53

## 2021-12-09 RX ADMIN — FLUTICASONE FUROATE AND VILANTEROL TRIFENATATE SCH EACH: 100; 25 POWDER RESPIRATORY (INHALATION) at 09:12

## 2021-12-09 RX ADMIN — GUAIFENESIN AND CODEINE PHOSPHATE PRN ML: 100; 10 SOLUTION ORAL at 12:06

## 2021-12-09 RX ADMIN — DOXYCYCLINE HYCLATE SCH MG: 100 TABLET, COATED ORAL at 21:52

## 2021-12-09 RX ADMIN — FERROUS SULFATE TAB 325 MG (65 MG ELEMENTAL FE) SCH MG: 325 (65 FE) TAB at 17:03

## 2021-12-09 RX ADMIN — POLYETHYLENE GLYCOL 3350 SCH GM: 17 POWDER, FOR SOLUTION ORAL at 21:55

## 2021-12-09 RX ADMIN — Medication PRN MG: at 13:44

## 2021-12-09 RX ADMIN — ACETAMINOPHEN PRN MG: 325 TABLET ORAL at 09:01

## 2021-12-09 RX ADMIN — LABETALOL HCL SCH MG: 100 TABLET, FILM COATED ORAL at 09:06

## 2021-12-09 RX ADMIN — DILTIAZEM HYDROCHLORIDE SCH MG: 240 CAPSULE, EXTENDED RELEASE ORAL at 09:05

## 2021-12-09 RX ADMIN — INSULIN HUMAN PRN UNITS: 100 INJECTION, SOLUTION PARENTERAL at 19:02

## 2021-12-09 RX ADMIN — Medication SCH ML: at 16:56

## 2021-12-09 RX ADMIN — INSULIN GLARGINE SCH UNIT: 100 INJECTION, SOLUTION SUBCUTANEOUS at 21:55

## 2021-12-09 RX ADMIN — HUMAN INSULIN PRN UNIT: 100 INJECTION, SOLUTION SUBCUTANEOUS at 21:57

## 2021-12-09 RX ADMIN — LORAZEPAM PRN MG: 1 TABLET ORAL at 19:45

## 2021-12-09 RX ADMIN — Medication SCH EACH: at 21:55

## 2021-12-09 RX ADMIN — ALBUTEROL SULFATE PRN MG: 1.25 SOLUTION RESPIRATORY (INHALATION) at 13:44

## 2021-12-09 RX ADMIN — Medication SCH MG: at 09:03

## 2021-12-09 RX ADMIN — IPRATROPIUM BROMIDE AND ALBUTEROL SULFATE SCH PUFF: 103; 18 AEROSOL, METERED RESPIRATORY (INHALATION) at 19:30

## 2021-12-09 RX ADMIN — GUAIFENESIN AND CODEINE PHOSPHATE PRN ML: 100; 10 SOLUTION ORAL at 23:56

## 2021-12-09 RX ADMIN — GUAIFENESIN AND CODEINE PHOSPHATE PRN ML: 100; 10 SOLUTION ORAL at 19:45

## 2021-12-09 RX ADMIN — Medication SCH ML: at 11:21

## 2021-12-09 RX ADMIN — ACETAMINOPHEN PRN MG: 325 TABLET ORAL at 19:45

## 2021-12-09 RX ADMIN — Medication SCH MG: at 12:07

## 2021-12-09 RX ADMIN — ENOXAPARIN SODIUM SCH MG: 30 INJECTION SUBCUTANEOUS at 09:03

## 2021-12-09 RX ADMIN — ATORVASTATIN CALCIUM SCH MG: 10 TABLET, FILM COATED ORAL at 21:51

## 2021-12-09 RX ADMIN — ISOSORBIDE DINITRATE SCH MG: 20 TABLET ORAL at 09:04

## 2021-12-09 RX ADMIN — INSULIN GLARGINE SCH UNIT: 100 INJECTION, SOLUTION SUBCUTANEOUS at 09:11

## 2021-12-09 RX ADMIN — VALSARTAN SCH MG: 80 TABLET ORAL at 09:04

## 2021-12-09 RX ADMIN — FERROUS SULFATE TAB 325 MG (65 MG ELEMENTAL FE) SCH MG: 325 (65 FE) TAB at 09:04

## 2021-12-09 RX ADMIN — ALBUTEROL SULFATE PRN MG: 1.25 SOLUTION RESPIRATORY (INHALATION) at 06:04

## 2021-12-09 RX ADMIN — BACLOFEN SCH MG: 10 TABLET ORAL at 12:40

## 2021-12-09 RX ADMIN — Medication SCH OZ: at 09:09

## 2021-12-09 RX ADMIN — ALBUTEROL SULFATE PRN MG: 1.25 SOLUTION RESPIRATORY (INHALATION) at 21:43

## 2021-12-09 RX ADMIN — DOXYCYCLINE HYCLATE SCH MG: 100 TABLET, COATED ORAL at 11:33

## 2021-12-09 RX ADMIN — Medication PRN MG: at 06:04

## 2021-12-09 RX ADMIN — Medication SCH MG: at 16:59

## 2021-12-09 RX ADMIN — ISOSORBIDE DINITRATE SCH MG: 20 TABLET ORAL at 17:00

## 2021-12-09 RX ADMIN — ENOXAPARIN SODIUM SCH MG: 30 INJECTION SUBCUTANEOUS at 21:52

## 2021-12-09 RX ADMIN — LORAZEPAM PRN MG: 1 TABLET ORAL at 09:58

## 2021-12-09 RX ADMIN — SILVER SULFADIAZINE SCH APPLIC: 10 CREAM TOPICAL at 09:08

## 2021-12-09 RX ADMIN — BACLOFEN SCH MG: 10 TABLET ORAL at 17:00

## 2021-12-09 RX ADMIN — Medication PRN MG: at 21:43

## 2021-12-09 RX ADMIN — IPRATROPIUM BROMIDE AND ALBUTEROL SULFATE SCH PUFF: 103; 18 AEROSOL, METERED RESPIRATORY (INHALATION) at 01:47

## 2021-12-09 RX ADMIN — BACLOFEN SCH MG: 10 TABLET ORAL at 09:07

## 2021-12-09 RX ADMIN — Medication SCH EACH: at 07:53

## 2021-12-09 RX ADMIN — DEXAMETHASONE SCH MG: 4 TABLET ORAL at 09:07

## 2021-12-09 RX ADMIN — SILVER SULFADIAZINE SCH APPLIC: 10 CREAM TOPICAL at 16:57

## 2021-12-09 RX ADMIN — PANTOPRAZOLE SODIUM SCH MG: 40 TABLET, DELAYED RELEASE ORAL at 07:53

## 2021-12-09 RX ADMIN — Medication SCH EACH: at 17:05

## 2021-12-09 RX ADMIN — GUAIFENESIN AND CODEINE PHOSPHATE PRN ML: 100; 10 SOLUTION ORAL at 07:52

## 2021-12-09 NOTE — NUR
oxygen device changed from 10 lpm simple mask to 6 lpm nasal cannula.



patient refused to lower down her oxygen flow to 4 lpm, RN is aware on changes made.

-------------------------------------------------------------------------------

Addendum: 12/09/21 at 1410 by ELVIRA CALLAHAN RT

-------------------------------------------------------------------------------

Amended: Links added.

## 2021-12-09 NOTE — NUR
RN MORNING NOTE



PT RECEIVED FROM NIGHT SHIFT NURSE IN BED WITH HOB 30 DEGREES. PT IS ON 10L SIMPLE MASK SAT 
100% TOLERATING WELL WITH NO SIGNS OF DISTRESS OR LABORED BREATHING. WILL TITRATE TO 8L AND 
ASSESS FOR TOLERATION. PT IS ON TELE MONITOR SR. [facility]A [facility] KATHARINE ACCESS. BED 
LOCKED IN LOWEST POSITION AND ALL HOSPITAL PROTOCOLS IN PLACE. WILL CONTINUE TO MONITOR THIS 
SHIFT.

## 2021-12-09 NOTE — NUR
RT NOTE



FIO2 INCREASED DUE TO INCREASE WOB. PT CURRENTLY ON 10 LPM SIMPLE MASK. PT TOLERATING WELL. 
SPO2 IMPROVED.

## 2021-12-09 NOTE — NUR
RN OPENING  NOTE



RECEIVED PATIENT AT BEDSIDE IN CHAIR, WITH 6L OF O2 VIA NC SATURATING AT 97%.  SHE WAS 
STATING THAT SHE CAN NOT BREATHE. TRANSFERRED THE PATIENT TO THE BED WITH THE HOB ELEVATED, 
AND SHE STATED THAT SHE CAN BREATHE BETTER. PATIENT IS A/O X 4, AND ON TELE MONITOR WITH SR 
78HR. LEFT LOWER LEG CELLULITIS NOTED. PATIENT NOW RESTING IN BED, BED IN LOW POSITION AND 
LOCKED.  HOB ELEVATED, CALL LIGHT WITHIN REACH.

## 2021-12-09 NOTE — NUR
RN NOTES





PATIENT REMAINS STABLE THE WHOLE SHIFT. ALL DUE MEDS GIVEN AS ORDER. ALL NEEDS ATTENDED, 
KEPT CLEAN AND DRY AT ALL TIMES. ALL NEEDS ATTENDED PROMPTLY. ENDORSED

## 2021-12-09 NOTE — NUR
RN CLOSING NOTE



PT IS SITTING AT BEDSIDE IN CHAIR. PT IS ON 6L NC 95% TOLERATING WELL WITH NO SIGNS OF 
DISTRESS OR LABORED BREATHING. PT IS ON TELE MONITOR SR. PT IS ON CCHO DIET, R UA ML KATHARINE 
ACCESS. POSSIBLE DC ONCE BED HAS BEEN SECURED AT [facility] SUB ACUTE UNIT. 
ALL MEDS GIVEN AND PATIENT NEEDS MET. WILL ENDORSE TO NIGHT SHIFT NURSE FOR NAHUN.

## 2021-12-10 VITALS — DIASTOLIC BLOOD PRESSURE: 59 MMHG | SYSTOLIC BLOOD PRESSURE: 117 MMHG

## 2021-12-10 VITALS — SYSTOLIC BLOOD PRESSURE: 153 MMHG | DIASTOLIC BLOOD PRESSURE: 69 MMHG

## 2021-12-10 VITALS — DIASTOLIC BLOOD PRESSURE: 72 MMHG | SYSTOLIC BLOOD PRESSURE: 106 MMHG

## 2021-12-10 VITALS — DIASTOLIC BLOOD PRESSURE: 67 MMHG | SYSTOLIC BLOOD PRESSURE: 111 MMHG

## 2021-12-10 VITALS — DIASTOLIC BLOOD PRESSURE: 61 MMHG | SYSTOLIC BLOOD PRESSURE: 118 MMHG

## 2021-12-10 VITALS — DIASTOLIC BLOOD PRESSURE: 68 MMHG | SYSTOLIC BLOOD PRESSURE: 126 MMHG

## 2021-12-10 RX ADMIN — LORAZEPAM PRN MG: 1 TABLET ORAL at 19:51

## 2021-12-10 RX ADMIN — Medication SCH EACH: at 17:21

## 2021-12-10 RX ADMIN — CLOPIDOGREL BISULFATE SCH MG: 75 TABLET, FILM COATED ORAL at 10:06

## 2021-12-10 RX ADMIN — Medication SCH EACH: at 22:18

## 2021-12-10 RX ADMIN — ISOSORBIDE DINITRATE SCH MG: 20 TABLET ORAL at 17:47

## 2021-12-10 RX ADMIN — GUAIFENESIN AND CODEINE PHOSPHATE PRN ML: 100; 10 SOLUTION ORAL at 09:49

## 2021-12-10 RX ADMIN — ALBUTEROL SULFATE PRN MG: 1.25 SOLUTION RESPIRATORY (INHALATION) at 16:54

## 2021-12-10 RX ADMIN — LORAZEPAM PRN MG: 1 TABLET ORAL at 12:14

## 2021-12-10 RX ADMIN — ATORVASTATIN CALCIUM SCH MG: 10 TABLET, FILM COATED ORAL at 21:51

## 2021-12-10 RX ADMIN — Medication SCH MG: at 09:49

## 2021-12-10 RX ADMIN — ENOXAPARIN SODIUM SCH MG: 30 INJECTION SUBCUTANEOUS at 09:53

## 2021-12-10 RX ADMIN — ACETAMINOPHEN PRN MG: 325 TABLET ORAL at 12:14

## 2021-12-10 RX ADMIN — ISOSORBIDE DINITRATE SCH MG: 20 TABLET ORAL at 09:51

## 2021-12-10 RX ADMIN — Medication SCH MG: at 09:52

## 2021-12-10 RX ADMIN — HUMAN INSULIN PRN UNIT: 100 INJECTION, SOLUTION SUBCUTANEOUS at 22:21

## 2021-12-10 RX ADMIN — Medication SCH MG: at 17:49

## 2021-12-10 RX ADMIN — BACLOFEN SCH MG: 10 TABLET ORAL at 17:48

## 2021-12-10 RX ADMIN — SILVER SULFADIAZINE SCH APPLIC: 10 CREAM TOPICAL at 17:49

## 2021-12-10 RX ADMIN — IPRATROPIUM BROMIDE AND ALBUTEROL SULFATE SCH PUFF: 103; 18 AEROSOL, METERED RESPIRATORY (INHALATION) at 02:04

## 2021-12-10 RX ADMIN — Medication PRN MG: at 10:52

## 2021-12-10 RX ADMIN — ALBUTEROL SULFATE PRN MG: 1.25 SOLUTION RESPIRATORY (INHALATION) at 19:36

## 2021-12-10 RX ADMIN — Medication PRN MG: at 05:56

## 2021-12-10 RX ADMIN — POLYETHYLENE GLYCOL 3350 SCH GM: 17 POWDER, FOR SOLUTION ORAL at 21:51

## 2021-12-10 RX ADMIN — ALBUTEROL SULFATE PRN MG: 1.25 SOLUTION RESPIRATORY (INHALATION) at 05:56

## 2021-12-10 RX ADMIN — FERROUS SULFATE TAB 325 MG (65 MG ELEMENTAL FE) SCH MG: 325 (65 FE) TAB at 09:51

## 2021-12-10 RX ADMIN — BACLOFEN SCH MG: 10 TABLET ORAL at 12:17

## 2021-12-10 RX ADMIN — ACETAMINOPHEN PRN MG: 325 TABLET ORAL at 19:51

## 2021-12-10 RX ADMIN — ALBUTEROL SULFATE PRN MG: 1.25 SOLUTION RESPIRATORY (INHALATION) at 10:52

## 2021-12-10 RX ADMIN — INSULIN GLARGINE SCH UNIT: 100 INJECTION, SOLUTION SUBCUTANEOUS at 09:54

## 2021-12-10 RX ADMIN — Medication SCH OZ: at 10:06

## 2021-12-10 RX ADMIN — DOXYCYCLINE HYCLATE SCH MG: 100 TABLET, COATED ORAL at 09:51

## 2021-12-10 RX ADMIN — GUAIFENESIN AND CODEINE PHOSPHATE PRN ML: 100; 10 SOLUTION ORAL at 19:51

## 2021-12-10 RX ADMIN — LABETALOL HCL SCH MG: 100 TABLET, FILM COATED ORAL at 09:52

## 2021-12-10 RX ADMIN — IPRATROPIUM BROMIDE AND ALBUTEROL SULFATE SCH PUFF: 103; 18 AEROSOL, METERED RESPIRATORY (INHALATION) at 19:44

## 2021-12-10 RX ADMIN — Medication SCH ML: at 08:15

## 2021-12-10 RX ADMIN — LABETALOL HCL SCH MG: 100 TABLET, FILM COATED ORAL at 21:47

## 2021-12-10 RX ADMIN — Medication PRN MG: at 16:54

## 2021-12-10 RX ADMIN — HUMAN INSULIN PRN UNIT: 100 INJECTION, SOLUTION SUBCUTANEOUS at 18:27

## 2021-12-10 RX ADMIN — DOXYCYCLINE HYCLATE SCH MG: 100 TABLET, COATED ORAL at 21:47

## 2021-12-10 RX ADMIN — Medication SCH EACH: at 12:14

## 2021-12-10 RX ADMIN — ACETAMINOPHEN PRN MG: 325 TABLET ORAL at 05:40

## 2021-12-10 RX ADMIN — DEXAMETHASONE SCH MG: 4 TABLET ORAL at 09:52

## 2021-12-10 RX ADMIN — BACLOFEN SCH MG: 10 TABLET ORAL at 09:51

## 2021-12-10 RX ADMIN — IPRATROPIUM BROMIDE AND ALBUTEROL SULFATE SCH PUFF: 103; 18 AEROSOL, METERED RESPIRATORY (INHALATION) at 13:30

## 2021-12-10 RX ADMIN — HUMAN INSULIN PRN UNIT: 100 INJECTION, SOLUTION SUBCUTANEOUS at 13:34

## 2021-12-10 RX ADMIN — DILTIAZEM HYDROCHLORIDE SCH MG: 240 CAPSULE, EXTENDED RELEASE ORAL at 09:51

## 2021-12-10 RX ADMIN — GUAIFENESIN AND CODEINE PHOSPHATE PRN ML: 100; 10 SOLUTION ORAL at 14:33

## 2021-12-10 RX ADMIN — PANTOPRAZOLE SODIUM SCH MG: 40 TABLET, DELAYED RELEASE ORAL at 07:59

## 2021-12-10 RX ADMIN — Medication SCH ML: at 17:47

## 2021-12-10 RX ADMIN — INSULIN HUMAN PRN UNITS: 100 INJECTION, SOLUTION PARENTERAL at 08:13

## 2021-12-10 RX ADMIN — ENOXAPARIN SODIUM SCH MG: 30 INJECTION SUBCUTANEOUS at 21:50

## 2021-12-10 RX ADMIN — SILVER SULFADIAZINE SCH APPLIC: 10 CREAM TOPICAL at 10:07

## 2021-12-10 RX ADMIN — IPRATROPIUM BROMIDE AND ALBUTEROL SULFATE SCH PUFF: 103; 18 AEROSOL, METERED RESPIRATORY (INHALATION) at 07:35

## 2021-12-10 RX ADMIN — Medication PRN MG: at 19:36

## 2021-12-10 RX ADMIN — GUAIFENESIN AND CODEINE PHOSPHATE PRN ML: 100; 10 SOLUTION ORAL at 05:40

## 2021-12-10 RX ADMIN — IPRATROPIUM BROMIDE AND ALBUTEROL SULFATE SCH PUFF: 103; 18 AEROSOL, METERED RESPIRATORY (INHALATION) at 20:27

## 2021-12-10 RX ADMIN — INSULIN GLARGINE SCH UNIT: 100 INJECTION, SOLUTION SUBCUTANEOUS at 21:49

## 2021-12-10 RX ADMIN — LORAZEPAM PRN MG: 1 TABLET ORAL at 05:40

## 2021-12-10 RX ADMIN — Medication SCH ML: at 11:38

## 2021-12-10 RX ADMIN — VALSARTAN SCH MG: 80 TABLET ORAL at 09:51

## 2021-12-10 RX ADMIN — FLUTICASONE FUROATE AND VILANTEROL TRIFENATATE SCH EACH: 100; 25 POWDER RESPIRATORY (INHALATION) at 09:55

## 2021-12-10 RX ADMIN — Medication SCH EACH: at 08:00

## 2021-12-10 RX ADMIN — FERROUS SULFATE TAB 325 MG (65 MG ELEMENTAL FE) SCH MG: 325 (65 FE) TAB at 17:47

## 2021-12-10 NOTE — NUR
RN NOTES







RECEIVED REPORT FROM MORNING RN. PATIENT A/O X4 NOT ON DISTRESS, NO SOB. WITH OXYGEN 
INHALATION AT 4 LPM VIA NC TOLERATING WELL SATING AT 97% FI02. VITAL SIGNS TAKEN AS 
RECORDED. BED ON LOWEST POSITION AND LOCKED. ALL SAFETY MEASURES IN PLACE AT ALL TIMES. CALL 
LIGHT WITHIN REACH. WILL CONTINUE TO MONITOR CLOSELY

## 2021-12-10 NOTE — NUR
RN CLOSING NOTE



PT IS SITTING AT BEDSIDE IN CHAIR PT IS ON 6L NC % TOLERATING WELL WITH NO SIGNS OF 
DISTRESS OR LABORED BREATHING. PT IS ON TELE MONITOR SR. PT IS ON CCHO DIET, R UA ML KATHARINE 
ACCESS. PT CONTINUES TO WAIT FOR PLACEMENT AT [facility] IN SUBACUTE UNITE. 
BED LOCKED IN LOWEST POSITION AND ALL HOSPITAL PROTOCOLS IN PLACE. WILL ENDORSE TO NIGHT 
SHIFT NURSE FOR NAHUN.

## 2021-12-10 NOTE — NUR
RN CLOSING NOTE





PATIENT RESTING IN BED WITH 6L O2 VIA NC SATURATING AT 94%, WITH NO S/S OF DISTRESS/LABORED 
BREATHING  AT THIS TIME. PATIENT IS ON TELE MONITOR SR WITH 74 HR. BED IN LOW POSITION, HOB 
ELEVATED, AND CALL LIGHT WITHIN REACH. ALL PATIENT NEEDS MET, WILL ENDORSE PATIENT CARE TO 
ONCOMING NURSE.

## 2021-12-10 NOTE — NUR
RN NOTE



PATIENT COMPLAINS SHE CANNOT BREATHE AND STARTED COUGHING. SATURATION AT LOW 80'S. PLACED 
PATIENT ON 10L VIA SIMPLE MASK. O2 SAT RECHECKED, RESULTED 93%

## 2021-12-10 NOTE — NUR
PT RECEIVED FROM NIGHT SHIFT NURSE SLEEPING IN BED WITH HOB 30 DEGREES. PT IS ON 6L  SAT 96% 
TOLERATING WELL WITH NO SIGNS OF DISTRESS OR LABORED BREATHING. . PT IS ON TELE MONITOR SR. 
PT IS ON CCHO DIET, R UA ML KATHARINE ACCESS. BED LOCKED IN LOWEST POSITION AND ALL HOSPITAL 
PROTOCOLS IN PLACE. WILL CONTINUE TO MONITOR THIS SHIFT.

## 2021-12-11 VITALS — SYSTOLIC BLOOD PRESSURE: 142 MMHG | DIASTOLIC BLOOD PRESSURE: 70 MMHG

## 2021-12-11 VITALS — SYSTOLIC BLOOD PRESSURE: 138 MMHG | DIASTOLIC BLOOD PRESSURE: 72 MMHG

## 2021-12-11 VITALS — DIASTOLIC BLOOD PRESSURE: 66 MMHG | SYSTOLIC BLOOD PRESSURE: 141 MMHG

## 2021-12-11 VITALS — SYSTOLIC BLOOD PRESSURE: 163 MMHG | DIASTOLIC BLOOD PRESSURE: 67 MMHG

## 2021-12-11 VITALS — SYSTOLIC BLOOD PRESSURE: 139 MMHG | DIASTOLIC BLOOD PRESSURE: 65 MMHG

## 2021-12-11 VITALS — SYSTOLIC BLOOD PRESSURE: 109 MMHG | DIASTOLIC BLOOD PRESSURE: 46 MMHG

## 2021-12-11 VITALS — SYSTOLIC BLOOD PRESSURE: 143 MMHG | DIASTOLIC BLOOD PRESSURE: 70 MMHG

## 2021-12-11 RX ADMIN — SILVER SULFADIAZINE SCH APPLIC: 10 CREAM TOPICAL at 17:26

## 2021-12-11 RX ADMIN — Medication PRN MG: at 05:26

## 2021-12-11 RX ADMIN — INSULIN HUMAN PRN UNITS: 100 INJECTION, SOLUTION PARENTERAL at 12:40

## 2021-12-11 RX ADMIN — INSULIN HUMAN PRN UNITS: 100 INJECTION, SOLUTION PARENTERAL at 06:55

## 2021-12-11 RX ADMIN — ACETAMINOPHEN PRN MG: 325 TABLET ORAL at 19:36

## 2021-12-11 RX ADMIN — IPRATROPIUM BROMIDE AND ALBUTEROL SULFATE SCH PUFF: 103; 18 AEROSOL, METERED RESPIRATORY (INHALATION) at 02:39

## 2021-12-11 RX ADMIN — DILTIAZEM HYDROCHLORIDE SCH MG: 240 CAPSULE, EXTENDED RELEASE ORAL at 08:43

## 2021-12-11 RX ADMIN — POLYETHYLENE GLYCOL 3350 SCH GM: 17 POWDER, FOR SOLUTION ORAL at 21:29

## 2021-12-11 RX ADMIN — LABETALOL HCL SCH MG: 100 TABLET, FILM COATED ORAL at 08:46

## 2021-12-11 RX ADMIN — IPRATROPIUM BROMIDE AND ALBUTEROL SULFATE SCH PUFF: 103; 18 AEROSOL, METERED RESPIRATORY (INHALATION) at 19:36

## 2021-12-11 RX ADMIN — Medication PRN OZ: at 10:40

## 2021-12-11 RX ADMIN — BACLOFEN SCH MG: 10 TABLET ORAL at 08:46

## 2021-12-11 RX ADMIN — Medication SCH OZ: at 09:00

## 2021-12-11 RX ADMIN — FERROUS SULFATE TAB 325 MG (65 MG ELEMENTAL FE) SCH MG: 325 (65 FE) TAB at 17:24

## 2021-12-11 RX ADMIN — BACLOFEN SCH MG: 10 TABLET ORAL at 17:25

## 2021-12-11 RX ADMIN — DOXYCYCLINE HYCLATE SCH MG: 100 TABLET, COATED ORAL at 08:44

## 2021-12-11 RX ADMIN — GUAIFENESIN AND CODEINE PHOSPHATE PRN ML: 100; 10 SOLUTION ORAL at 16:26

## 2021-12-11 RX ADMIN — Medication SCH ML: at 17:26

## 2021-12-11 RX ADMIN — PANTOPRAZOLE SODIUM SCH MG: 40 TABLET, DELAYED RELEASE ORAL at 08:43

## 2021-12-11 RX ADMIN — DOXYCYCLINE HYCLATE SCH MG: 100 TABLET, COATED ORAL at 21:28

## 2021-12-11 RX ADMIN — DEXAMETHASONE SCH MG: 4 TABLET ORAL at 08:44

## 2021-12-11 RX ADMIN — CLOPIDOGREL BISULFATE SCH MG: 75 TABLET, FILM COATED ORAL at 08:43

## 2021-12-11 RX ADMIN — FLUTICASONE FUROATE AND VILANTEROL TRIFENATATE SCH EACH: 100; 25 POWDER RESPIRATORY (INHALATION) at 09:28

## 2021-12-11 RX ADMIN — GUAIFENESIN AND CODEINE PHOSPHATE PRN ML: 100; 10 SOLUTION ORAL at 09:22

## 2021-12-11 RX ADMIN — ENOXAPARIN SODIUM SCH MG: 30 INJECTION SUBCUTANEOUS at 21:32

## 2021-12-11 RX ADMIN — Medication PRN MG: at 09:11

## 2021-12-11 RX ADMIN — FERROUS SULFATE TAB 325 MG (65 MG ELEMENTAL FE) SCH MG: 325 (65 FE) TAB at 08:46

## 2021-12-11 RX ADMIN — ALBUTEROL SULFATE PRN MG: 1.25 SOLUTION RESPIRATORY (INHALATION) at 20:18

## 2021-12-11 RX ADMIN — ALBUTEROL SULFATE PRN MG: 1.25 SOLUTION RESPIRATORY (INHALATION) at 09:11

## 2021-12-11 RX ADMIN — Medication SCH ML: at 12:43

## 2021-12-11 RX ADMIN — IPRATROPIUM BROMIDE AND ALBUTEROL SULFATE SCH PUFF: 103; 18 AEROSOL, METERED RESPIRATORY (INHALATION) at 08:51

## 2021-12-11 RX ADMIN — Medication PRN OZ: at 12:44

## 2021-12-11 RX ADMIN — ISOSORBIDE DINITRATE SCH MG: 20 TABLET ORAL at 17:24

## 2021-12-11 RX ADMIN — Medication SCH MG: at 10:40

## 2021-12-11 RX ADMIN — ALBUTEROL SULFATE PRN MG: 1.25 SOLUTION RESPIRATORY (INHALATION) at 01:46

## 2021-12-11 RX ADMIN — Medication SCH ML: at 08:47

## 2021-12-11 RX ADMIN — ALBUTEROL SULFATE PRN MG: 1.25 SOLUTION RESPIRATORY (INHALATION) at 05:27

## 2021-12-11 RX ADMIN — Medication SCH MG: at 08:44

## 2021-12-11 RX ADMIN — ACETAMINOPHEN PRN MG: 325 TABLET ORAL at 09:24

## 2021-12-11 RX ADMIN — Medication PRN OZ: at 10:41

## 2021-12-11 RX ADMIN — LORAZEPAM PRN MG: 1 TABLET ORAL at 11:06

## 2021-12-11 RX ADMIN — Medication PRN MG: at 01:46

## 2021-12-11 RX ADMIN — Medication PRN OZ: at 10:42

## 2021-12-11 RX ADMIN — GUAIFENESIN AND CODEINE PHOSPHATE PRN ML: 100; 10 SOLUTION ORAL at 01:00

## 2021-12-11 RX ADMIN — Medication SCH MG: at 17:24

## 2021-12-11 RX ADMIN — HUMAN INSULIN PRN UNIT: 100 INJECTION, SOLUTION SUBCUTANEOUS at 18:07

## 2021-12-11 RX ADMIN — BACLOFEN SCH MG: 10 TABLET ORAL at 13:17

## 2021-12-11 RX ADMIN — ALBUTEROL SULFATE PRN MG: 1.25 SOLUTION RESPIRATORY (INHALATION) at 13:13

## 2021-12-11 RX ADMIN — Medication SCH EACH: at 18:05

## 2021-12-11 RX ADMIN — ACETAMINOPHEN PRN MG: 325 TABLET ORAL at 02:39

## 2021-12-11 RX ADMIN — ENOXAPARIN SODIUM SCH MG: 30 INJECTION SUBCUTANEOUS at 11:03

## 2021-12-11 RX ADMIN — LABETALOL HCL SCH MG: 100 TABLET, FILM COATED ORAL at 21:28

## 2021-12-11 RX ADMIN — Medication SCH EACH: at 21:55

## 2021-12-11 RX ADMIN — Medication PRN MG: at 13:13

## 2021-12-11 RX ADMIN — INSULIN GLARGINE SCH UNIT: 100 INJECTION, SOLUTION SUBCUTANEOUS at 09:16

## 2021-12-11 RX ADMIN — HUMAN INSULIN PRN UNIT: 100 INJECTION, SOLUTION SUBCUTANEOUS at 21:58

## 2021-12-11 RX ADMIN — INSULIN GLARGINE SCH UNIT: 100 INJECTION, SOLUTION SUBCUTANEOUS at 21:59

## 2021-12-11 RX ADMIN — Medication SCH EACH: at 12:37

## 2021-12-11 RX ADMIN — VALSARTAN SCH MG: 80 TABLET ORAL at 08:45

## 2021-12-11 RX ADMIN — LORAZEPAM PRN MG: 1 TABLET ORAL at 02:39

## 2021-12-11 RX ADMIN — IPRATROPIUM BROMIDE AND ALBUTEROL SULFATE SCH PUFF: 103; 18 AEROSOL, METERED RESPIRATORY (INHALATION) at 16:28

## 2021-12-11 RX ADMIN — ISOSORBIDE DINITRATE SCH MG: 20 TABLET ORAL at 08:45

## 2021-12-11 RX ADMIN — SILVER SULFADIAZINE SCH APPLIC: 10 CREAM TOPICAL at 08:48

## 2021-12-11 RX ADMIN — LORAZEPAM PRN MG: 1 TABLET ORAL at 17:29

## 2021-12-11 RX ADMIN — Medication SCH EACH: at 07:42

## 2021-12-11 RX ADMIN — Medication PRN MG: at 20:18

## 2021-12-11 RX ADMIN — ATORVASTATIN CALCIUM SCH MG: 10 TABLET, FILM COATED ORAL at 21:29

## 2021-12-11 NOTE — NUR
RN NOTES:

SHE HAS THE URGE TO DO BM, ASSISTED BY CNA IN THE COMMODE, ALL HER DUE MEDICATION GIVEN, SHE 
JUST RECEIVED MIRALAX AT 2200.NEEDS ATTENDED.

## 2021-12-11 NOTE — NUR
RN NOTES:

--RECEIVED AWAKE ON BED, HIGH FOWLERS POSITION, ON O2 MASK AT 8L/MIN, CONTINENT USING THE 
BED SIDE COMMODE WITH ASSISTANCE, JOSE-ML PATENT, WITH LLE CELLULITIS, ELEVATED AT ALL 
TIMES,NON LABORED BREATHING, NO SOB, SHE IS ASKING FOR PRN MEDICATION FOR PAIN. OUTGOING RN 
SAID SHE WILL GIVE.

--FALL,SAFETY ASPIRATION PRECAUTION OBSERVED,ORIENTED TO UNIT AND STAFF, KEPT CALL LIGHT 
WITHIN EASY REACH.

-------------------------------------------------------------------------------

Addendum: 12/12/21 at 0142 by STEPHANIE OG RN

-------------------------------------------------------------------------------

ADDED NOTES:

PER ENDORSEMENT DR. BLANCO IS AWARE THAT THERE IS NO RECENT LABS DONE, PATIENT IS ON LOVENOX, 
HG-7.4, PER  OK TO GIVE ANTICOAGULANT.

## 2021-12-11 NOTE — NUR
RN CLOSING NOTE



 PATIENT IN BED AWAKE. ON O2 INHALATION VIA SIMPLE MASK  AT 8 l/MIN. O2=96%. PATIENT IS A/O 
X 4, LEFT LOWER LEG CELLULITIS NOTED. IV ACCESS ON THE JOSE INTACT.,ALL DUE MEDS AND 
TREATMENT GIVEN.  BED IN LOW POSITION AND LOCKED.  HOB ELEVATED, CALL LIGHT AND TABLE  
WITHIN REACH.WILL ENDORSE INCOMING SHIFT FOR NAHUN.

## 2021-12-11 NOTE — NUR
RN NOTES







PATIENT REMAINS STABLE. PATIENT IS FOR TRANSFER TO ROOM 324-2. ALL DUE MEDS GIVEN AS 
ORDERED. KEPT CLEAN AND DRY AT ALL TIMES. REPORT GIVEN TO VISHNU.

## 2021-12-11 NOTE — NUR
Patient transferred at 0300, in no signs of distress. VS WNL. Denies needs at this time. 
Attached to heart monitor. Upon transfer ERROL nurse and or resp. therapy put pt. on 8L via 
simple face mask pt. satting on 99% and says she feels more comfortable this way. No s/s of 
hypo or hyperglycemia reactions noted. Александр continue to monitor pt.

## 2021-12-11 NOTE — NUR
RN OPENING  NOTE



RECEIVED PATIENT IN BED SLEEPING. ON O2 INHALATION VIA NASAL CANNULA AT 8 l/MIN. O2=96%. 
PATIENT IS A/O X 4, AND ON TELE MONITOR WITH SR 65 HR. LEFT LOWER LEG CELLULITIS NOTED. IV 
ACCESS ON THE JOSE INTACT., BED IN LOW POSITION AND LOCKED.  HOB ELEVATED, CALL LIGHT AND 
TABLE  WITHIN REACH.WILL CONTINUE TO MONITOR.

## 2021-12-11 NOTE — NUR
RN NOTES:

AT 2200 BLOOD SUGAR CHECKED-160, DUE INSULIN GIVEN PER SCALE, DUE LANTUS GIVEN, CONTINUE TO 
MONITOR FOR SIGN OF HYPER AND HYPOGLYCEMIA.

## 2021-12-11 NOTE — NUR
RN NOTES:

-HAD A LARGE BM, CLEAN AND CHANGE, SHE WANTS TO REMIAN SITTED ON THE EDGE OF THE BED, 
EXPLAINED TO HER IT WILL BE SAFE IF SHE GO BACK TO BED, SHE SAID I WANT TO WAIT FOR RT TO 
GIVE MY NEBULIZATION.

--CALLED RT/GENESIS AND NOTIFIED PATIENT ASKED FOR PRN NEBULIZATION.

--GIVEN SNACK WHILE WAITING FOR RT.

## 2021-12-11 NOTE — NUR
RN NOTES







TRANSFER PATIENT TO ROOM 324-2 VIA BED. PATIENT REMAINS STABLE NO SOB, NOT IN DISTRESS. 
ENDORSED.

## 2021-12-12 VITALS — DIASTOLIC BLOOD PRESSURE: 44 MMHG | SYSTOLIC BLOOD PRESSURE: 137 MMHG

## 2021-12-12 VITALS — DIASTOLIC BLOOD PRESSURE: 60 MMHG | SYSTOLIC BLOOD PRESSURE: 125 MMHG

## 2021-12-12 VITALS — SYSTOLIC BLOOD PRESSURE: 137 MMHG | DIASTOLIC BLOOD PRESSURE: 44 MMHG

## 2021-12-12 VITALS — SYSTOLIC BLOOD PRESSURE: 141 MMHG | DIASTOLIC BLOOD PRESSURE: 61 MMHG

## 2021-12-12 RX ADMIN — DOXYCYCLINE HYCLATE SCH MG: 100 TABLET, COATED ORAL at 21:22

## 2021-12-12 RX ADMIN — ENOXAPARIN SODIUM SCH MG: 30 INJECTION SUBCUTANEOUS at 21:22

## 2021-12-12 RX ADMIN — GUAIFENESIN AND CODEINE PHOSPHATE PRN ML: 100; 10 SOLUTION ORAL at 02:17

## 2021-12-12 RX ADMIN — Medication PRN MG: at 09:35

## 2021-12-12 RX ADMIN — BACLOFEN SCH MG: 10 TABLET ORAL at 12:44

## 2021-12-12 RX ADMIN — Medication SCH ML: at 08:55

## 2021-12-12 RX ADMIN — Medication PRN OZ: at 09:02

## 2021-12-12 RX ADMIN — Medication SCH EACH: at 06:54

## 2021-12-12 RX ADMIN — SILVER SULFADIAZINE SCH APPLIC: 10 CREAM TOPICAL at 09:02

## 2021-12-12 RX ADMIN — ENOXAPARIN SODIUM SCH MG: 30 INJECTION SUBCUTANEOUS at 09:38

## 2021-12-12 RX ADMIN — Medication SCH ML: at 12:07

## 2021-12-12 RX ADMIN — DOXYCYCLINE HYCLATE SCH MG: 100 TABLET, COATED ORAL at 09:01

## 2021-12-12 RX ADMIN — PANTOPRAZOLE SODIUM SCH MG: 40 TABLET, DELAYED RELEASE ORAL at 07:49

## 2021-12-12 RX ADMIN — INSULIN HUMAN PRN UNITS: 100 INJECTION, SOLUTION PARENTERAL at 17:18

## 2021-12-12 RX ADMIN — SILVER SULFADIAZINE SCH APPLIC: 10 CREAM TOPICAL at 17:01

## 2021-12-12 RX ADMIN — Medication PRN MG: at 15:52

## 2021-12-12 RX ADMIN — Medication PRN MG: at 20:09

## 2021-12-12 RX ADMIN — POLYETHYLENE GLYCOL 3350 SCH GM: 17 POWDER, FOR SOLUTION ORAL at 21:24

## 2021-12-12 RX ADMIN — ACETAMINOPHEN PRN MG: 325 TABLET ORAL at 07:52

## 2021-12-12 RX ADMIN — Medication SCH EACH: at 12:01

## 2021-12-12 RX ADMIN — INSULIN GLARGINE SCH UNIT: 100 INJECTION, SOLUTION SUBCUTANEOUS at 22:08

## 2021-12-12 RX ADMIN — VALSARTAN SCH MG: 80 TABLET ORAL at 08:59

## 2021-12-12 RX ADMIN — IPRATROPIUM BROMIDE AND ALBUTEROL SULFATE SCH PUFF: 103; 18 AEROSOL, METERED RESPIRATORY (INHALATION) at 07:35

## 2021-12-12 RX ADMIN — BACLOFEN SCH MG: 10 TABLET ORAL at 16:56

## 2021-12-12 RX ADMIN — Medication PRN MG: at 00:04

## 2021-12-12 RX ADMIN — DEXAMETHASONE SCH MG: 4 TABLET ORAL at 09:21

## 2021-12-12 RX ADMIN — ACETAMINOPHEN PRN MG: 325 TABLET ORAL at 23:16

## 2021-12-12 RX ADMIN — IPRATROPIUM BROMIDE AND ALBUTEROL SULFATE SCH PUFF: 103; 18 AEROSOL, METERED RESPIRATORY (INHALATION) at 19:30

## 2021-12-12 RX ADMIN — Medication SCH MG: at 09:01

## 2021-12-12 RX ADMIN — HUMAN INSULIN PRN UNIT: 100 INJECTION, SOLUTION SUBCUTANEOUS at 22:09

## 2021-12-12 RX ADMIN — LABETALOL HCL SCH MG: 100 TABLET, FILM COATED ORAL at 08:59

## 2021-12-12 RX ADMIN — Medication SCH MG: at 09:00

## 2021-12-12 RX ADMIN — ACETAMINOPHEN PRN MG: 325 TABLET ORAL at 14:36

## 2021-12-12 RX ADMIN — ALBUTEROL SULFATE PRN MG: 1.25 SOLUTION RESPIRATORY (INHALATION) at 00:04

## 2021-12-12 RX ADMIN — GUAIFENESIN AND CODEINE PHOSPHATE PRN ML: 100; 10 SOLUTION ORAL at 13:49

## 2021-12-12 RX ADMIN — INSULIN HUMAN PRN UNITS: 100 INJECTION, SOLUTION PARENTERAL at 06:54

## 2021-12-12 RX ADMIN — Medication SCH EACH: at 22:00

## 2021-12-12 RX ADMIN — IPRATROPIUM BROMIDE AND ALBUTEROL SULFATE SCH PUFF: 103; 18 AEROSOL, METERED RESPIRATORY (INHALATION) at 12:49

## 2021-12-12 RX ADMIN — FERROUS SULFATE TAB 325 MG (65 MG ELEMENTAL FE) SCH MG: 325 (65 FE) TAB at 16:55

## 2021-12-12 RX ADMIN — Medication PRN OZ: at 09:04

## 2021-12-12 RX ADMIN — DILTIAZEM HYDROCHLORIDE SCH MG: 240 CAPSULE, EXTENDED RELEASE ORAL at 09:01

## 2021-12-12 RX ADMIN — FLUTICASONE FUROATE AND VILANTEROL TRIFENATATE SCH EACH: 100; 25 POWDER RESPIRATORY (INHALATION) at 08:56

## 2021-12-12 RX ADMIN — BACLOFEN SCH MG: 10 TABLET ORAL at 09:01

## 2021-12-12 RX ADMIN — INSULIN GLARGINE SCH UNIT: 100 INJECTION, SOLUTION SUBCUTANEOUS at 09:16

## 2021-12-12 RX ADMIN — ISOSORBIDE DINITRATE SCH MG: 20 TABLET ORAL at 08:58

## 2021-12-12 RX ADMIN — ALBUTEROL SULFATE PRN MG: 1.25 SOLUTION RESPIRATORY (INHALATION) at 09:35

## 2021-12-12 RX ADMIN — Medication SCH ML: at 17:00

## 2021-12-12 RX ADMIN — ALBUTEROL SULFATE PRN MG: 1.25 SOLUTION RESPIRATORY (INHALATION) at 15:52

## 2021-12-12 RX ADMIN — Medication SCH OZ: at 09:37

## 2021-12-12 RX ADMIN — ALBUTEROL SULFATE PRN MG: 1.25 SOLUTION RESPIRATORY (INHALATION) at 20:09

## 2021-12-12 RX ADMIN — Medication SCH EACH: at 17:14

## 2021-12-12 RX ADMIN — LORAZEPAM PRN MG: 1 TABLET ORAL at 14:37

## 2021-12-12 RX ADMIN — ATORVASTATIN CALCIUM SCH MG: 10 TABLET, FILM COATED ORAL at 21:24

## 2021-12-12 RX ADMIN — FERROUS SULFATE TAB 325 MG (65 MG ELEMENTAL FE) SCH MG: 325 (65 FE) TAB at 09:00

## 2021-12-12 RX ADMIN — Medication SCH MG: at 16:55

## 2021-12-12 RX ADMIN — LORAZEPAM PRN MG: 1 TABLET ORAL at 23:16

## 2021-12-12 RX ADMIN — CLOPIDOGREL BISULFATE SCH MG: 75 TABLET, FILM COATED ORAL at 08:58

## 2021-12-12 RX ADMIN — ISOSORBIDE DINITRATE SCH MG: 20 TABLET ORAL at 16:57

## 2021-12-12 RX ADMIN — INSULIN HUMAN PRN UNITS: 100 INJECTION, SOLUTION PARENTERAL at 12:02

## 2021-12-12 RX ADMIN — GUAIFENESIN AND CODEINE PHOSPHATE PRN ML: 100; 10 SOLUTION ORAL at 19:53

## 2021-12-12 RX ADMIN — GUAIFENESIN AND CODEINE PHOSPHATE PRN ML: 100; 10 SOLUTION ORAL at 07:49

## 2021-12-12 RX ADMIN — LABETALOL HCL SCH MG: 100 TABLET, FILM COATED ORAL at 21:25

## 2021-12-12 NOTE — NUR
RN NOTES:

-RT CAME AND GAVE HER NEBULIZATION, AFTER THAT SHE WANTS TO GO BACK TO BED, ASSISTED BACK TO 
BED, SHE SAID 'I CANT BREATH WELL", ASSISTED BY RT BACK IN SITTING POSITION HER O2 
INHALATION VIA FACIAL MASK OF 8L/MIN WAS WAS INCREASED BY RT TO 10L/MIN, SHE PREFER TO SIT 
AT THE BED SIDE.

--AFTER FEW MINUTES SHE CALM DOWN, SOB WAS RELIEVED, SHE PREFER TO SIT ON THE BED SIDE,WARM 
BLANKET PROVIDED.

## 2021-12-12 NOTE — NUR
RN NOTES:

AFTER HER COUGH MEDICATION, SHE WAS ABLE TO REST AND SLEEP.NO SOB, NO SIGN OF RESPIRATORY 
DISTRESS.

## 2021-12-12 NOTE — NUR
MS RN NOTES

RECEIVED SITTING ON BEDSIDE CHAIR,A/O X4,BREATHING NON LABORED,ON SIMPLE MASK AT 8/NC.O2 SAT 
96%,NOTED SLIGHT REDNESS ON LEFT LOWER LEG,WITH JOSE MIDLINE FOR MEDS.ASSIT WITH ADL;S,CALL 
LIGHT IN REACH,NEEDS ANTICIPATED.

## 2021-12-12 NOTE — NUR
RN NOTES:

COUGHING SHE ASKED FOR HER PRN MEDICATION, GIVEN WATER AND PUT IN UPRIGHT POSITION NEEDS 
ATTENDED..

-------------------------------------------------------------------------------

Addendum: 12/12/21 at 0422 by STEPHANIE OG RN

-------------------------------------------------------------------------------

ADDED NOTES:

GUAIFENESIN PRN GIVEN PER PATIENT REQUEST AT 0218.

## 2021-12-12 NOTE — NUR
RN NOTES:

AWAKE NOW IN BED, BLOOD SUGAR CHECKED-130, NO PAIN OR DISCOMFORT, TAKING A NAP AGAIN, NO 
LABS THIS MORNING, ENDORSED FOR CONTINUITY OF CARE.

-------------------------------------------------------------------------------

Addendum: 12/12/21 at 0655 by STEPHANIE OG RN

-------------------------------------------------------------------------------

RN NOTES:

BLOOD SUGAR-130, NO INSULIN PER SCALE WILL CONTINUE TO MONITOR FOR SIGN OF HYPER AND 
HYPOGLYCEMIA.

## 2021-12-12 NOTE — NUR
RN CLOSING  NOTE



PATIENT IN BED SLEEPING. ON O2 INHALATION VIA MASK AT 8 l/MIN. O2=96%. PATIENT IS A/O X 4,  
LEFT LOWER LEG CELLULITIS NOTED. IV ACCESS ON THE JOSE INTACT., ALL DUE MEDS AND TREATMENTS 
GIVEN AS ORDERED. DR. BELLA JIMENEZ VISITED THE PATIENT AROUND 9AM. NO LABS FOR THE PATIENT. 
ASKED FROM DOCTOR FOR LOVENOX INJECTION , EVEN HEMOGLOBIN WAS LOW FROM LAB REPORT ON 
12/9/21. DR STATED IT IS OK TO GIVE THE LOVENOX. BED IN LOW POSITION AND LOCKED.  HOB 
ELEVATED, CALL LIGHT AND TABLE  WITHIN REACH.WILL ENDORSE FOR INCOMING SHIFT FOR NAHUN..

## 2021-12-12 NOTE — NUR
TELE RN NOTES  ACCU-CHECK

BLOOD SUGAR CHECK 219,COVERED WITH HUMULIN R 6 UNITS PER SLIDING SCALE,ALONG WITH LANTUS 14 
UNITS SQ AS SCHEDULED.

## 2021-12-12 NOTE — NUR
RN OPENING  NOTE



RECEIVED PATIENT IN BED SLEEPING. ON O2 INHALATION VIA MASK AT 8 l/MIN. O2=96%. PATIENT IS 
A/O X 4,  LEFT LOWER LEG CELLULITIS NOTED. IV ACCESS ON THE JOSE INTACT., BED IN LOW POSITION 
AND LOCKED.  HOB ELEVATED, CALL LIGHT AND TABLE  WITHIN REACH.WILL CONTINUE TO MONITOR.

## 2021-12-13 VITALS — DIASTOLIC BLOOD PRESSURE: 92 MMHG | SYSTOLIC BLOOD PRESSURE: 153 MMHG

## 2021-12-13 VITALS — DIASTOLIC BLOOD PRESSURE: 58 MMHG | SYSTOLIC BLOOD PRESSURE: 134 MMHG

## 2021-12-13 VITALS — DIASTOLIC BLOOD PRESSURE: 68 MMHG | SYSTOLIC BLOOD PRESSURE: 137 MMHG

## 2021-12-13 LAB
BASOPHILS # BLD AUTO: 0 K/UL (ref 0–0.2)
BASOPHILS NFR BLD AUTO: 0.6 % (ref 0–2)
BUN SERPL-MCNC: 15 MG/DL (ref 7–18)
CALCIUM SERPL-MCNC: 8.3 MG/DL (ref 8.5–10.1)
CHLORIDE SERPL-SCNC: 91 MMOL/L (ref 98–107)
CO2 SERPL-SCNC: 30 MMOL/L (ref 21–32)
CREAT SERPL-MCNC: 0.7 MG/DL (ref 0.6–1.3)
CRP SERPL-MCNC: < 0.2 MG/DL (ref 0–0.9)
EOSINOPHIL NFR BLD AUTO: 0 % (ref 0–6)
GLUCOSE SERPL-MCNC: 154 MG/DL (ref 74–106)
HCT VFR BLD AUTO: 21 % (ref 33–45)
HGB BLD-MCNC: 6.9 G/DL (ref 11.5–14.8)
LYMPHOCYTES NFR BLD AUTO: 1.3 K/UL (ref 0.8–4.8)
LYMPHOCYTES NFR BLD AUTO: 15.4 % (ref 20–44)
MCHC RBC AUTO-ENTMCNC: 34 G/DL (ref 31–36)
MCV RBC AUTO: 82 FL (ref 82–100)
MONOCYTES NFR BLD AUTO: 0.7 K/UL (ref 0.1–1.3)
MONOCYTES NFR BLD AUTO: 8.5 % (ref 2–12)
NEUTROPHILS # BLD AUTO: 6.1 K/UL (ref 1.8–8.9)
NEUTROPHILS NFR BLD AUTO: 75.5 % (ref 43–81)
PLATELET # BLD AUTO: 176 K/UL (ref 150–450)
POTASSIUM SERPL-SCNC: 4.7 MMOL/L (ref 3.5–5.1)
RBC # BLD AUTO: 2.52 MIL/UL (ref 4–5.2)
SODIUM SERPL-SCNC: 124 MMOL/L (ref 136–145)
WBC NRBC COR # BLD AUTO: 8.1 K/UL (ref 4.3–11)

## 2021-12-13 RX ADMIN — CLOPIDOGREL BISULFATE SCH MG: 75 TABLET, FILM COATED ORAL at 08:40

## 2021-12-13 RX ADMIN — GUAIFENESIN AND CODEINE PHOSPHATE PRN ML: 100; 10 SOLUTION ORAL at 22:10

## 2021-12-13 RX ADMIN — FERROUS SULFATE TAB 325 MG (65 MG ELEMENTAL FE) SCH MG: 325 (65 FE) TAB at 08:34

## 2021-12-13 RX ADMIN — Medication SCH EACH: at 11:39

## 2021-12-13 RX ADMIN — GUAIFENESIN AND CODEINE PHOSPHATE PRN ML: 100; 10 SOLUTION ORAL at 17:21

## 2021-12-13 RX ADMIN — Medication SCH ML: at 08:35

## 2021-12-13 RX ADMIN — ACETAMINOPHEN PRN MG: 325 TABLET ORAL at 08:38

## 2021-12-13 RX ADMIN — GUAIFENESIN AND CODEINE PHOSPHATE PRN ML: 100; 10 SOLUTION ORAL at 10:51

## 2021-12-13 RX ADMIN — DILTIAZEM HYDROCHLORIDE SCH MG: 240 CAPSULE, EXTENDED RELEASE ORAL at 09:00

## 2021-12-13 RX ADMIN — Medication PRN MG: at 06:03

## 2021-12-13 RX ADMIN — BACLOFEN SCH MG: 10 TABLET ORAL at 12:59

## 2021-12-13 RX ADMIN — ISOSORBIDE DINITRATE SCH MG: 20 TABLET ORAL at 17:22

## 2021-12-13 RX ADMIN — FLUTICASONE FUROATE AND VILANTEROL TRIFENATATE SCH EACH: 100; 25 POWDER RESPIRATORY (INHALATION) at 08:40

## 2021-12-13 RX ADMIN — VALSARTAN SCH MG: 80 TABLET ORAL at 08:36

## 2021-12-13 RX ADMIN — ALBUTEROL SULFATE SCH MG: 1.25 SOLUTION RESPIRATORY (INHALATION) at 20:19

## 2021-12-13 RX ADMIN — PANTOPRAZOLE SODIUM SCH MG: 40 TABLET, DELAYED RELEASE ORAL at 08:34

## 2021-12-13 RX ADMIN — INSULIN HUMAN PRN UNITS: 100 INJECTION, SOLUTION PARENTERAL at 05:35

## 2021-12-13 RX ADMIN — ENOXAPARIN SODIUM SCH MG: 30 INJECTION SUBCUTANEOUS at 08:41

## 2021-12-13 RX ADMIN — ACETAMINOPHEN PRN MG: 325 TABLET ORAL at 17:21

## 2021-12-13 RX ADMIN — GUAIFENESIN AND CODEINE PHOSPHATE PRN ML: 100; 10 SOLUTION ORAL at 02:29

## 2021-12-13 RX ADMIN — Medication SCH EACH: at 22:16

## 2021-12-13 RX ADMIN — INSULIN GLARGINE SCH UNIT: 100 INJECTION, SOLUTION SUBCUTANEOUS at 21:00

## 2021-12-13 RX ADMIN — ENOXAPARIN SODIUM SCH MG: 30 INJECTION SUBCUTANEOUS at 22:12

## 2021-12-13 RX ADMIN — FERROUS SULFATE TAB 325 MG (65 MG ELEMENTAL FE) SCH MG: 325 (65 FE) TAB at 17:21

## 2021-12-13 RX ADMIN — INSULIN HUMAN PRN UNITS: 100 INJECTION, SOLUTION PARENTERAL at 11:45

## 2021-12-13 RX ADMIN — ALBUTEROL SULFATE PRN MG: 1.25 SOLUTION RESPIRATORY (INHALATION) at 06:03

## 2021-12-13 RX ADMIN — LABETALOL HCL SCH MG: 100 TABLET, FILM COATED ORAL at 10:52

## 2021-12-13 RX ADMIN — LORAZEPAM PRN MG: 1 TABLET ORAL at 08:38

## 2021-12-13 RX ADMIN — LABETALOL HCL SCH MG: 100 TABLET, FILM COATED ORAL at 22:11

## 2021-12-13 RX ADMIN — Medication SCH EACH: at 17:26

## 2021-12-13 RX ADMIN — HUMAN INSULIN PRN UNIT: 100 INJECTION, SOLUTION SUBCUTANEOUS at 22:20

## 2021-12-13 RX ADMIN — BACLOFEN SCH MG: 10 TABLET ORAL at 17:22

## 2021-12-13 RX ADMIN — DOXYCYCLINE HYCLATE SCH MG: 100 TABLET, COATED ORAL at 08:40

## 2021-12-13 RX ADMIN — Medication PRN OZ: at 08:41

## 2021-12-13 RX ADMIN — GUAIFENESIN AND CODEINE PHOSPHATE PRN ML: 100; 10 SOLUTION ORAL at 06:42

## 2021-12-13 RX ADMIN — Medication PRN MG: at 02:15

## 2021-12-13 RX ADMIN — Medication SCH MG: at 08:34

## 2021-12-13 RX ADMIN — IPRATROPIUM BROMIDE AND ALBUTEROL SULFATE SCH PUFF: 103; 18 AEROSOL, METERED RESPIRATORY (INHALATION) at 08:39

## 2021-12-13 RX ADMIN — Medication SCH ML: at 17:29

## 2021-12-13 RX ADMIN — INSULIN HUMAN PRN UNITS: 100 INJECTION, SOLUTION PARENTERAL at 18:04

## 2021-12-13 RX ADMIN — DOXYCYCLINE HYCLATE SCH MG: 100 TABLET, COATED ORAL at 22:10

## 2021-12-13 RX ADMIN — POLYETHYLENE GLYCOL 3350 SCH GM: 17 POWDER, FOR SOLUTION ORAL at 22:10

## 2021-12-13 RX ADMIN — LORAZEPAM PRN MG: 1 TABLET ORAL at 17:29

## 2021-12-13 RX ADMIN — Medication SCH MG: at 17:21

## 2021-12-13 RX ADMIN — INSULIN GLARGINE SCH UNIT: 100 INJECTION, SOLUTION SUBCUTANEOUS at 08:47

## 2021-12-13 RX ADMIN — Medication SCH EACH: at 05:29

## 2021-12-13 RX ADMIN — IPRATROPIUM BROMIDE AND ALBUTEROL SULFATE SCH PUFF: 103; 18 AEROSOL, METERED RESPIRATORY (INHALATION) at 01:30

## 2021-12-13 RX ADMIN — BACLOFEN SCH MG: 10 TABLET ORAL at 08:34

## 2021-12-13 RX ADMIN — Medication SCH MG: at 13:21

## 2021-12-13 RX ADMIN — ALBUTEROL SULFATE SCH MG: 1.25 SOLUTION RESPIRATORY (INHALATION) at 13:21

## 2021-12-13 RX ADMIN — SILVER SULFADIAZINE SCH APPLIC: 10 CREAM TOPICAL at 08:41

## 2021-12-13 RX ADMIN — Medication SCH ML: at 11:43

## 2021-12-13 RX ADMIN — ALBUTEROL SULFATE PRN MG: 1.25 SOLUTION RESPIRATORY (INHALATION) at 02:15

## 2021-12-13 RX ADMIN — ENOXAPARIN SODIUM SCH MG: 30 INJECTION SUBCUTANEOUS at 21:00

## 2021-12-13 RX ADMIN — ISOSORBIDE DINITRATE SCH MG: 20 TABLET ORAL at 08:37

## 2021-12-13 RX ADMIN — Medication SCH MG: at 20:19

## 2021-12-13 RX ADMIN — Medication SCH MG: at 08:38

## 2021-12-13 RX ADMIN — Medication SCH OZ: at 08:41

## 2021-12-13 RX ADMIN — SILVER SULFADIAZINE SCH APPLIC: 10 CREAM TOPICAL at 17:23

## 2021-12-13 RX ADMIN — ATORVASTATIN CALCIUM SCH MG: 10 TABLET, FILM COATED ORAL at 22:10

## 2021-12-13 NOTE — NUR
MS RN NOTES



CALLED LAB FOR BLOOD SITUATION. PER LAB PATIENT HAS SOME ANTIBODIES, THEY ARE STILL DOING 
THE WORK ON BLOOD AND MAY TAKE 2-3 DAYS. CHARGE NURSE AND MD MADE AWARE.

## 2021-12-13 NOTE — NUR
MS RN CLOSING NOTE



PATIENT REMAINS IN BED, AWAKE, A/O X4, ABLE TO MAKE NEED KNOWN. PATIENT ON OXYGEN THERAPY AT 
4 LPM VIA NASAL CANNULA; BREATHING EVEN AND UNLABORED; WAITING FOR BREATHING TREATMENT. 
COMPLAINING OF MILD PAIN IN HER LOWER BACK TREATED WITH TYLENOL. JOSE MIDLINE PRESENT AND 
INTACT; SL. ALL NEEDS ATTENDED DURING THE DAY. SAFETY PRECAUTIONS IN PLACE; BED IN LOW 
POSITION AND LOCKED, RAILS UP X2, CALL LIGHT WITHIN REACH. WILL ENDORSE TO NIGHT SHIFT NURSE 
FOR NAHUN.

## 2021-12-13 NOTE — NUR
TELE RN NOTES

C/O COUGH,ROBITUSSIN AC 10ML  PO GIVEN AS ORDERED.NO SOB NOTED,BREATHING TREATMENT WITH RT 
TOLERATED WELL.IN NO ACUTE DISTRESS.

## 2021-12-13 NOTE — NUR
MS RN OPENING NOTES



PATIENT RECEIVED IN BED, AWAKE, A/O X4, ABLE TO MAKE NEED KNOWN. PATIENT ON OXYGEN THERAPY 7 
LPM VIA SIMPLE MASK; BREATHING EVEN AND UNLABORED. COMPLAINING OF MILD PAIN IN HER LOWER 
BACK. JOSE MIDLINE PRESENT AND INTACT; SL. SAFETY PRECAUTIONS IN PLACE; BED IN LOW POSITION 
AND LOCKED, RAILS UP X2, CALL LIGHT WITHIN REACH. WILL CONTINUE TO MONITOR PATIENT.

## 2021-12-13 NOTE — NUR
MS RN NOTES



VITAL SIGNS CHECKED AT 1250



BP: 143/70 HR 69 O2 100 ON 4 L VIA NASAL CANNULA RR 20

## 2021-12-14 VITALS — DIASTOLIC BLOOD PRESSURE: 74 MMHG | SYSTOLIC BLOOD PRESSURE: 145 MMHG

## 2021-12-14 VITALS — SYSTOLIC BLOOD PRESSURE: 142 MMHG | DIASTOLIC BLOOD PRESSURE: 66 MMHG

## 2021-12-14 VITALS — SYSTOLIC BLOOD PRESSURE: 164 MMHG | DIASTOLIC BLOOD PRESSURE: 67 MMHG

## 2021-12-14 VITALS — SYSTOLIC BLOOD PRESSURE: 143 MMHG | DIASTOLIC BLOOD PRESSURE: 67 MMHG

## 2021-12-14 VITALS — SYSTOLIC BLOOD PRESSURE: 145 MMHG | DIASTOLIC BLOOD PRESSURE: 74 MMHG

## 2021-12-14 VITALS — DIASTOLIC BLOOD PRESSURE: 62 MMHG | SYSTOLIC BLOOD PRESSURE: 140 MMHG

## 2021-12-14 VITALS — SYSTOLIC BLOOD PRESSURE: 131 MMHG | DIASTOLIC BLOOD PRESSURE: 60 MMHG

## 2021-12-14 LAB
BASOPHILS # BLD AUTO: 0 K/UL (ref 0–0.2)
BASOPHILS NFR BLD AUTO: 0.3 % (ref 0–2)
BUN SERPL-MCNC: 13 MG/DL (ref 7–18)
CALCIUM SERPL-MCNC: 8.3 MG/DL (ref 8.5–10.1)
CHLORIDE SERPL-SCNC: 91 MMOL/L (ref 98–107)
CO2 SERPL-SCNC: 28 MMOL/L (ref 21–32)
CREAT SERPL-MCNC: 0.7 MG/DL (ref 0.6–1.3)
EOSINOPHIL NFR BLD AUTO: 0.7 % (ref 0–6)
EOSINOPHIL NFR BLD MANUAL: 1 % (ref 0–4)
GLUCOSE SERPL-MCNC: 109 MG/DL (ref 74–106)
HCT VFR BLD AUTO: 20 % (ref 33–45)
HCT VFR BLD AUTO: 23 % (ref 33–45)
HGB BLD-MCNC: 6.8 G/DL (ref 11.5–14.8)
HGB BLD-MCNC: 7.9 G/DL (ref 11.5–14.8)
LYMPHOCYTES NFR BLD AUTO: 2.2 K/UL (ref 0.8–4.8)
LYMPHOCYTES NFR BLD AUTO: 20.8 % (ref 20–44)
LYMPHOCYTES NFR BLD MANUAL: 18 % (ref 16–48)
MCHC RBC AUTO-ENTMCNC: 34 G/DL (ref 31–36)
MCV RBC AUTO: 82 FL (ref 82–100)
MONOCYTES NFR BLD AUTO: 1 K/UL (ref 0.1–1.3)
MONOCYTES NFR BLD AUTO: 9.2 % (ref 2–12)
MONOCYTES NFR BLD MANUAL: 5 % (ref 0–11)
NEUTROPHILS # BLD AUTO: 7.2 K/UL (ref 1.8–8.9)
NEUTROPHILS NFR BLD AUTO: 69 % (ref 43–81)
NEUTS SEG NFR BLD MANUAL: 76 % (ref 42–76)
PLATELET # BLD AUTO: 182 K/UL (ref 150–450)
POTASSIUM SERPL-SCNC: 4.1 MMOL/L (ref 3.5–5.1)
RBC # BLD AUTO: 2.47 MIL/UL (ref 4–5.2)
SODIUM SERPL-SCNC: 125 MMOL/L (ref 136–145)
WBC NRBC COR # BLD AUTO: 10.4 K/UL (ref 4.3–11)

## 2021-12-14 PROCEDURE — 30233N1 TRANSFUSION OF NONAUTOLOGOUS RED BLOOD CELLS INTO PERIPHERAL VEIN, PERCUTANEOUS APPROACH: ICD-10-PCS | Performed by: INTERNAL MEDICINE

## 2021-12-14 RX ADMIN — ISOSORBIDE DINITRATE SCH MG: 20 TABLET ORAL at 16:47

## 2021-12-14 RX ADMIN — FERROUS SULFATE TAB 325 MG (65 MG ELEMENTAL FE) SCH MG: 325 (65 FE) TAB at 08:29

## 2021-12-14 RX ADMIN — SILVER SULFADIAZINE SCH APPLIC: 10 CREAM TOPICAL at 16:48

## 2021-12-14 RX ADMIN — GUAIFENESIN AND CODEINE PHOSPHATE PRN ML: 100; 10 SOLUTION ORAL at 06:10

## 2021-12-14 RX ADMIN — ALBUTEROL SULFATE SCH MG: 1.25 SOLUTION RESPIRATORY (INHALATION) at 12:25

## 2021-12-14 RX ADMIN — Medication PRN OZ: at 02:05

## 2021-12-14 RX ADMIN — Medication SCH MG: at 12:25

## 2021-12-14 RX ADMIN — ACETAMINOPHEN PRN MG: 325 TABLET ORAL at 13:39

## 2021-12-14 RX ADMIN — Medication SCH EACH: at 11:38

## 2021-12-14 RX ADMIN — Medication SCH MG: at 16:46

## 2021-12-14 RX ADMIN — Medication SCH MG: at 08:30

## 2021-12-14 RX ADMIN — Medication SCH MG: at 02:26

## 2021-12-14 RX ADMIN — BACLOFEN SCH MG: 10 TABLET ORAL at 16:47

## 2021-12-14 RX ADMIN — BACLOFEN SCH MG: 10 TABLET ORAL at 08:29

## 2021-12-14 RX ADMIN — Medication SCH EACH: at 17:14

## 2021-12-14 RX ADMIN — Medication SCH MG: at 08:29

## 2021-12-14 RX ADMIN — DOXYCYCLINE HYCLATE SCH MG: 100 TABLET, COATED ORAL at 08:29

## 2021-12-14 RX ADMIN — ISOSORBIDE DINITRATE SCH MG: 20 TABLET ORAL at 08:28

## 2021-12-14 RX ADMIN — ENOXAPARIN SODIUM SCH MG: 30 INJECTION SUBCUTANEOUS at 08:30

## 2021-12-14 RX ADMIN — ACETAMINOPHEN PRN MG: 325 TABLET ORAL at 06:10

## 2021-12-14 RX ADMIN — FLUTICASONE FUROATE AND VILANTEROL TRIFENATATE SCH EACH: 100; 25 POWDER RESPIRATORY (INHALATION) at 08:30

## 2021-12-14 RX ADMIN — LORAZEPAM PRN MG: 1 TABLET ORAL at 08:09

## 2021-12-14 RX ADMIN — DILTIAZEM HYDROCHLORIDE SCH MG: 240 CAPSULE, EXTENDED RELEASE ORAL at 11:07

## 2021-12-14 RX ADMIN — Medication SCH ML: at 08:00

## 2021-12-14 RX ADMIN — INSULIN GLARGINE SCH UNIT: 100 INJECTION, SOLUTION SUBCUTANEOUS at 09:19

## 2021-12-14 RX ADMIN — PANTOPRAZOLE SODIUM SCH MG: 40 TABLET, DELAYED RELEASE ORAL at 08:29

## 2021-12-14 RX ADMIN — Medication SCH MG: at 08:11

## 2021-12-14 RX ADMIN — FERROUS SULFATE TAB 325 MG (65 MG ELEMENTAL FE) SCH MG: 325 (65 FE) TAB at 16:47

## 2021-12-14 RX ADMIN — GUAIFENESIN AND CODEINE PHOSPHATE PRN ML: 100; 10 SOLUTION ORAL at 15:27

## 2021-12-14 RX ADMIN — SILVER SULFADIAZINE SCH APPLIC: 10 CREAM TOPICAL at 08:33

## 2021-12-14 RX ADMIN — Medication SCH ML: at 11:45

## 2021-12-14 RX ADMIN — BACLOFEN SCH MG: 10 TABLET ORAL at 12:40

## 2021-12-14 RX ADMIN — LABETALOL HCL SCH MG: 100 TABLET, FILM COATED ORAL at 08:28

## 2021-12-14 RX ADMIN — Medication SCH EACH: at 07:54

## 2021-12-14 RX ADMIN — GUAIFENESIN AND CODEINE PHOSPHATE PRN ML: 100; 10 SOLUTION ORAL at 02:07

## 2021-12-14 RX ADMIN — GUAIFENESIN AND CODEINE PHOSPHATE PRN ML: 100; 10 SOLUTION ORAL at 10:58

## 2021-12-14 RX ADMIN — LORAZEPAM PRN MG: 1 TABLET ORAL at 16:53

## 2021-12-14 RX ADMIN — Medication SCH OZ: at 08:33

## 2021-12-14 RX ADMIN — INSULIN HUMAN PRN UNITS: 100 INJECTION, SOLUTION PARENTERAL at 17:17

## 2021-12-14 RX ADMIN — ALBUTEROL SULFATE SCH MG: 1.25 SOLUTION RESPIRATORY (INHALATION) at 08:11

## 2021-12-14 RX ADMIN — ALBUTEROL SULFATE SCH MG: 1.25 SOLUTION RESPIRATORY (INHALATION) at 02:26

## 2021-12-14 RX ADMIN — CLOPIDOGREL BISULFATE SCH MG: 75 TABLET, FILM COATED ORAL at 08:29

## 2021-12-14 RX ADMIN — Medication SCH ML: at 16:48

## 2021-12-14 RX ADMIN — VALSARTAN SCH MG: 80 TABLET ORAL at 08:28

## 2021-12-14 RX ADMIN — INSULIN HUMAN PRN UNITS: 100 INJECTION, SOLUTION PARENTERAL at 11:41

## 2021-12-14 NOTE — NUR
m/s lvn: initial assessment



received pt in bed awake, a/ox4. for d'c planning home with home health today after blood 
transfusion, pt aware. ace wrap to left lower leg in place, unable to assess cellulitis, pt 
wants it to leave it alone. no c/o pain or any discomfort at this time. instructed to call 
for assistance.

## 2021-12-14 NOTE — NUR
m/s lvn: notes



ambulance here, but will have to come back with a stretcher due to steps in her house. danay 
[facility] notified and made aware.

## 2021-12-14 NOTE — NUR
m/s lvn: notes



no a/r noted after 1 hour of blood transfusion. remains afebrile, vss. will continue to 
monitor.

## 2021-12-14 NOTE — NUR
m/s lvn: notes



hgb 7.9, hct 23 resulted. dr. miller notified and made aware and stated, "okay to go home." pt 
made aware and [employer] arranged ambulance  at 1600. will continue to monitor.

## 2021-12-14 NOTE — NUR
m/s lvn: notes



report given to ro (rn) for continuity of care. still awaiting for ambulance. malgorzata 
([employer] [facility]) aware and will f/u with her coordinator. pt sounds asleep at this time. will 
continue to monitor.

## 2021-12-14 NOTE — NUR
m/s lvn: notes



f/u made to danay (radha fragoso), but not available and called the on call ([phone]), spoke 
to yossi and will have a case coordinator to call me as stated.

## 2021-12-14 NOTE — NUR
m/s lvn: notes



shaina (ambulance crew) came back and informed me that they cannot  the patient per 
their first med dispatch. danay ([employer] ) notified and made aware and will call their 
coordinator and will make arrangement.

## 2021-12-14 NOTE — NUR
m/s lvn: notes



malgorzata called from [facility] and informed me that another crew on the way at 1830.

## 2021-12-14 NOTE — NUR
m/s lvn: notes



no a/r noted after 15 minutes of blood transfusion. vss, remains afebrile. will continue to 
monitor.

## 2021-12-14 NOTE — NUR
m/s lvn: notes



blood transfusion completed without a/r noted. needs attended. vss, afebrile. will continue 
to monitor.

## 2021-12-14 NOTE — NUR
RECEIVED REPORT FROM ALE[facility]. PATIENT STABLE.



DISCHARGE NOTES



PATIENT LEFT VIA GURNEY WITH 2 EMTs. AOx4. ON 4 L/MIN VIA NASAL CANNULA. NO SOB NOTED. NO 
S/SX OF RESPIRATORY DISTRESS NOTED.

REMOVED IV ACCESS. REMOVED WRISTBANDS.

ALL BELONGINGS ACCOUNTED FOR. ALL PAPERWORK SIGNED PER DAY NURSE. EDUCATION PROVIDED.

## 2022-09-02 ENCOUNTER — HOSPITAL ENCOUNTER (INPATIENT)
Dept: HOSPITAL 54 - ER | Age: 62
LOS: 3 days | Discharge: HOME HEALTH | DRG: 190 | End: 2022-09-05
Attending: INTERNAL MEDICINE | Admitting: INTERNAL MEDICINE
Payer: COMMERCIAL

## 2022-09-02 VITALS — BODY MASS INDEX: 51.53 KG/M2 | WEIGHT: 280 LBS | HEIGHT: 62 IN

## 2022-09-02 DIAGNOSIS — E11.22: ICD-10-CM

## 2022-09-02 DIAGNOSIS — G89.4: ICD-10-CM

## 2022-09-02 DIAGNOSIS — J45.901: ICD-10-CM

## 2022-09-02 DIAGNOSIS — E87.70: ICD-10-CM

## 2022-09-02 DIAGNOSIS — Z88.1: ICD-10-CM

## 2022-09-02 DIAGNOSIS — I50.9: ICD-10-CM

## 2022-09-02 DIAGNOSIS — I70.0: ICD-10-CM

## 2022-09-02 DIAGNOSIS — J96.21: ICD-10-CM

## 2022-09-02 DIAGNOSIS — Z79.51: ICD-10-CM

## 2022-09-02 DIAGNOSIS — J44.1: Primary | ICD-10-CM

## 2022-09-02 DIAGNOSIS — Z20.822: ICD-10-CM

## 2022-09-02 DIAGNOSIS — L03.116: ICD-10-CM

## 2022-09-02 DIAGNOSIS — I13.0: ICD-10-CM

## 2022-09-02 DIAGNOSIS — E87.1: ICD-10-CM

## 2022-09-02 DIAGNOSIS — N18.9: ICD-10-CM

## 2022-09-02 DIAGNOSIS — L03.115: ICD-10-CM

## 2022-09-02 DIAGNOSIS — I25.10: ICD-10-CM

## 2022-09-02 DIAGNOSIS — Z88.5: ICD-10-CM

## 2022-09-02 DIAGNOSIS — Z79.899: ICD-10-CM

## 2022-09-02 DIAGNOSIS — D64.9: ICD-10-CM

## 2022-09-02 DIAGNOSIS — Z87.440: ICD-10-CM

## 2022-09-02 DIAGNOSIS — F41.9: ICD-10-CM

## 2022-09-02 DIAGNOSIS — E78.5: ICD-10-CM

## 2022-09-02 DIAGNOSIS — F11.20: ICD-10-CM

## 2022-09-02 DIAGNOSIS — E66.2: ICD-10-CM

## 2022-09-02 DIAGNOSIS — Z83.3: ICD-10-CM

## 2022-09-02 DIAGNOSIS — I87.2: ICD-10-CM

## 2022-09-02 DIAGNOSIS — I69.354: ICD-10-CM

## 2022-09-02 DIAGNOSIS — Z79.02: ICD-10-CM

## 2022-09-02 DIAGNOSIS — Z86.16: ICD-10-CM

## 2022-09-02 LAB
ALBUMIN SERPL BCP-MCNC: 3.3 G/DL (ref 3.4–5)
ALP SERPL-CCNC: 124 U/L (ref 46–116)
ALT SERPL W P-5'-P-CCNC: 27 U/L (ref 12–78)
AST SERPL W P-5'-P-CCNC: 14 U/L (ref 15–37)
BASOPHILS # BLD AUTO: 0.1 K/UL (ref 0–0.2)
BASOPHILS NFR BLD AUTO: 0.7 % (ref 0–2)
BILIRUB DIRECT SERPL-MCNC: 0.1 MG/DL (ref 0–0.2)
BILIRUB SERPL-MCNC: 0.3 MG/DL (ref 0.2–1)
BUN SERPL-MCNC: 11 MG/DL (ref 7–18)
CALCIUM SERPL-MCNC: 9.1 MG/DL (ref 8.5–10.1)
CHLORIDE SERPL-SCNC: 101 MMOL/L (ref 98–107)
CO2 SERPL-SCNC: 34 MMOL/L (ref 21–32)
CREAT SERPL-MCNC: 0.8 MG/DL (ref 0.6–1.3)
EOSINOPHIL NFR BLD AUTO: 3.3 % (ref 0–6)
GLUCOSE SERPL-MCNC: 201 MG/DL (ref 74–106)
HCT VFR BLD AUTO: 30 % (ref 33–45)
HGB BLD-MCNC: 9.6 G/DL (ref 11.5–14.8)
LYMPHOCYTES NFR BLD AUTO: 1.4 K/UL (ref 0.8–4.8)
LYMPHOCYTES NFR BLD AUTO: 16.3 % (ref 20–44)
MCHC RBC AUTO-ENTMCNC: 32 G/DL (ref 31–36)
MCV RBC AUTO: 86 FL (ref 82–100)
MONOCYTES NFR BLD AUTO: 0.7 K/UL (ref 0.1–1.3)
MONOCYTES NFR BLD AUTO: 8.4 % (ref 2–12)
NEUTROPHILS # BLD AUTO: 6.2 K/UL (ref 1.8–8.9)
NEUTROPHILS NFR BLD AUTO: 71.3 % (ref 43–81)
PLATELET # BLD AUTO: 350 K/UL (ref 150–450)
POTASSIUM SERPL-SCNC: 4.3 MMOL/L (ref 3.5–5.1)
PROT SERPL-MCNC: 6.5 G/DL (ref 6.4–8.2)
RBC # BLD AUTO: 3.47 MIL/UL (ref 4–5.2)
SODIUM SERPL-SCNC: 140 MMOL/L (ref 136–145)
WBC NRBC COR # BLD AUTO: 8.7 K/UL (ref 4.3–11)

## 2022-09-02 PROCEDURE — C9803 HOPD COVID-19 SPEC COLLECT: HCPCS

## 2022-09-02 PROCEDURE — G0378 HOSPITAL OBSERVATION PER HR: HCPCS

## 2022-09-02 NOTE — NUR
BIBJUAN CARLOS FROM HOME C/O ASTHMA EXACERBATION S/P "SLID OUT CHAIR EXACERBATED 
HERSELF TRYING TO GET BACK INTO CHAIR" GIVEN 1 BREATHING TX SATTING 100%. 
PLACED ON BED, AAOX4, BREATHING EVEN AND UNLABORED SATURATING % WITH 2LIT 
O2.

## 2022-09-03 VITALS — DIASTOLIC BLOOD PRESSURE: 70 MMHG | SYSTOLIC BLOOD PRESSURE: 192 MMHG

## 2022-09-03 VITALS — SYSTOLIC BLOOD PRESSURE: 198 MMHG | DIASTOLIC BLOOD PRESSURE: 70 MMHG

## 2022-09-03 VITALS — DIASTOLIC BLOOD PRESSURE: 55 MMHG | SYSTOLIC BLOOD PRESSURE: 150 MMHG

## 2022-09-03 VITALS — SYSTOLIC BLOOD PRESSURE: 143 MMHG | DIASTOLIC BLOOD PRESSURE: 66 MMHG

## 2022-09-03 RX ADMIN — HYDROMORPHONE HYDROCHLORIDE PRN MG: 2 TABLET ORAL at 14:25

## 2022-09-03 RX ADMIN — INSULIN HUMAN PRN UNITS: 100 INJECTION, SOLUTION PARENTERAL at 17:42

## 2022-09-03 RX ADMIN — HUMAN INSULIN PRN UNIT: 100 INJECTION, SOLUTION SUBCUTANEOUS at 21:50

## 2022-09-03 RX ADMIN — HYDROMORPHONE HYDROCHLORIDE PRN MG: 2 TABLET ORAL at 21:27

## 2022-09-03 RX ADMIN — ATORVASTATIN CALCIUM SCH MG: 10 TABLET, FILM COATED ORAL at 21:27

## 2022-09-03 RX ADMIN — Medication SCH EACH: at 13:00

## 2022-09-03 RX ADMIN — ALBUTEROL SULFATE SCH MG: 2.5 SOLUTION RESPIRATORY (INHALATION) at 19:14

## 2022-09-03 RX ADMIN — ACETAMINOPHEN PRN MG: 325 TABLET ORAL at 16:43

## 2022-09-03 RX ADMIN — Medication SCH EACH: at 21:16

## 2022-09-03 RX ADMIN — Medication SCH MG: at 17:47

## 2022-09-03 RX ADMIN — PREGABALIN SCH MG: 25 CAPSULE ORAL at 17:47

## 2022-09-03 RX ADMIN — FERROUS SULFATE TAB 325 MG (65 MG ELEMENTAL FE) SCH MG: 325 (65 FE) TAB at 17:47

## 2022-09-03 RX ADMIN — BACLOFEN SCH MG: 10 TABLET ORAL at 13:59

## 2022-09-03 RX ADMIN — BACLOFEN SCH MG: 10 TABLET ORAL at 21:27

## 2022-09-03 RX ADMIN — INSULIN HUMAN PRN UNITS: 100 INJECTION, SOLUTION PARENTERAL at 11:30

## 2022-09-03 RX ADMIN — LABETALOL HCL SCH MG: 100 TABLET, FILM COATED ORAL at 21:27

## 2022-09-03 RX ADMIN — DEXTROSE MONOHYDRATE SCH MLS/HR: 50 INJECTION, SOLUTION INTRAVENOUS at 14:05

## 2022-09-03 RX ADMIN — Medication SCH MG: at 14:00

## 2022-09-03 RX ADMIN — Medication SCH EACH: at 17:41

## 2022-09-03 NOTE — NUR
RN NOTE 

RECEIVED PT FROM ER VIA [facility]. IN BED AWAKE AND ALERT. PT IS ON NC 2L SATURATING WELL. IV 
LEFT AC 20G INTACT AND PATENT. PT IS EXPERIENCING SOB, HEAD OF BED PUT AT SEMI FOWLERS. PT 
IS IN PAIN, WILL ADMINISTER PAIN MEDICATION. A/OX3, ANXIOUS BUT COOPERATIVE. CELLULITIS ON 
THE LEFT LOWER EXTREMITY NOTED. RASH ON THE RIGHT LOWER EXTREMITY AND SACRAL AREA NOTED.  
BRUISES ON HER UPPER LEFT THIGH NOTED. ALL PICTURES OF SKIN TAKEN AND PUT IN CHART. MEPILEX 
PLACED ON SACRAL AREA AND BILATERAL HEELS FOR SKIN PROTECTION. WOUND CARE CONSULT INITIATED. 
 WILL ADMINISTERED PRESCRIBED MEDICATIONS. WILL CONTINUE TO MONITOR.

## 2022-09-03 NOTE — NUR
RN NOTE



PT HAS ORDER TO RECEIVE LASIX 40 MG BID ONCE. FIRST DOSE ALREADY GIVEN AT 1425. SECOND DOSE 
TO BE GIVEN AT 0300 ON 9/4. CALLED [facility] SINCE THE ORDER SAID IT WAS NOT GIVEN 
AT 1500. WAS INFORMED TO PUT IN A NEW ONE TIME ORDER FOR THE SECOND DOSE AND TO DC PREVIOUS 
ORDER. CHARGE NURSE MINDY INFORMED. ORDER WAS CLARIFIED AND CARRIED OUT.

## 2022-09-03 NOTE — NUR
RN CLOSING NOTE

PT RESTING IN BED COMFORTABLY. A/OX3. WORK OF BREATHING HAS DECREASED, DUE TO PT BEING ON 
OXYGEN 2L NC SATURATING WELL,  AND GIVEN LASIX. PT STATES THAT SHE FEELS THAT IT IS EASIER 
TO BREATHE AFTER ALL SCHEDULED MEDS GIVEN. SOB WHEN CHANGING POSITIONS ONLY, NOT AT REST. 
VITAL SIGNS WITHIN NORMAL RANGE. PAIN UNDER CONTROL AT THIS TIME.  WOUND CARE CONSULT 
INITIATED TO ASSESS HER LOWER EXTREMITIES AND SACRAL AREA. IV RIGHT AC 20G INTACT AND 
PATENT. SAFETY MEASURES IMPLEMENTED: BED LOCKED IN LOWEST POSITION, SIDE RAILS UP X2, CALL 
LIGHT IS WITHIN REACH. WILL ENDORSE TO NIGHT SHIFT NURSE FOR NHAUN.

## 2022-09-03 NOTE — NUR
RN NOTE



PT COMPLAINED OF BACK PAIN 9/10. ADMINISTERED DILAUDID 4 MG 2 TABS FOR PAIN AS ORDERED. MADE 
COMFORTABLE IN BED. ALL NEEDS MET AT THIS TIME.

## 2022-09-03 NOTE — NUR
RT NOTE



LOWERED FIO2 FROM 4 LPM TO 3LPM. PT STATED SHES COMFY THERE. NO SOB NOTED. NO S/S OF 
RESPIRATORY DISTRESS NOTED.

## 2022-09-03 NOTE — NUR
waiting for available hospital bed at a [facility] per report 
for NOC  RN

## 2022-09-03 NOTE — NUR
MS RN OPENING NOTE



RECEIVED PT AWAKE IN BED. A/O X3 AND ABLE TO MAKE NEEDS KNOWN. PT ON O2 @ 2LPM VIA NC, 
TOLERATING WELL. NO SOB OR S/S OF RESPIRATORY DISTRESS AT THIS TIME. IV ACCESS RAC 20 GAUGE, 
INTACT AND PATENT. SAFETY PRECAUTIONS IN PLACE. BED IN LOWEST LOCKED POSITION, HOB ELEVATED, 
SIDE RAILS UP X3, AND CALL LIGHT AND TABLE WITHIN REACH. ALL NEEDS MET AT THIS TIME.

## 2022-09-04 VITALS — SYSTOLIC BLOOD PRESSURE: 131 MMHG | DIASTOLIC BLOOD PRESSURE: 64 MMHG

## 2022-09-04 VITALS — SYSTOLIC BLOOD PRESSURE: 124 MMHG | DIASTOLIC BLOOD PRESSURE: 53 MMHG

## 2022-09-04 VITALS — SYSTOLIC BLOOD PRESSURE: 137 MMHG | DIASTOLIC BLOOD PRESSURE: 67 MMHG

## 2022-09-04 LAB
BASOPHILS # BLD AUTO: 0.1 K/UL (ref 0–0.2)
BASOPHILS NFR BLD AUTO: 0.8 % (ref 0–2)
BUN SERPL-MCNC: 13 MG/DL (ref 7–18)
CALCIUM SERPL-MCNC: 8.9 MG/DL (ref 8.5–10.1)
CHLORIDE SERPL-SCNC: 95 MMOL/L (ref 98–107)
CO2 SERPL-SCNC: 33 MMOL/L (ref 21–32)
CREAT SERPL-MCNC: 1.1 MG/DL (ref 0.6–1.3)
EOSINOPHIL NFR BLD AUTO: 4.1 % (ref 0–6)
GLUCOSE SERPL-MCNC: 191 MG/DL (ref 74–106)
HCT VFR BLD AUTO: 30 % (ref 33–45)
HGB BLD-MCNC: 9.6 G/DL (ref 11.5–14.8)
LYMPHOCYTES NFR BLD AUTO: 2.1 K/UL (ref 0.8–4.8)
LYMPHOCYTES NFR BLD AUTO: 20.9 % (ref 20–44)
MCHC RBC AUTO-ENTMCNC: 32 G/DL (ref 31–36)
MCV RBC AUTO: 86 FL (ref 82–100)
MONOCYTES NFR BLD AUTO: 1.2 K/UL (ref 0.1–1.3)
MONOCYTES NFR BLD AUTO: 11.6 % (ref 2–12)
NEUTROPHILS # BLD AUTO: 6.3 K/UL (ref 1.8–8.9)
NEUTROPHILS NFR BLD AUTO: 62.6 % (ref 43–81)
PLATELET # BLD AUTO: 357 K/UL (ref 150–450)
POTASSIUM SERPL-SCNC: 3.9 MMOL/L (ref 3.5–5.1)
RBC # BLD AUTO: 3.47 MIL/UL (ref 4–5.2)
SODIUM SERPL-SCNC: 133 MMOL/L (ref 136–145)
WBC NRBC COR # BLD AUTO: 10.1 K/UL (ref 4.3–11)

## 2022-09-04 RX ADMIN — BACLOFEN SCH MG: 10 TABLET ORAL at 04:52

## 2022-09-04 RX ADMIN — INSULIN HUMAN PRN UNITS: 100 INJECTION, SOLUTION PARENTERAL at 06:32

## 2022-09-04 RX ADMIN — FERROUS SULFATE TAB 325 MG (65 MG ELEMENTAL FE) SCH MG: 325 (65 FE) TAB at 16:44

## 2022-09-04 RX ADMIN — HUMAN INSULIN PRN UNIT: 100 INJECTION, SOLUTION SUBCUTANEOUS at 22:22

## 2022-09-04 RX ADMIN — VALSARTAN SCH MG: 80 TABLET ORAL at 09:08

## 2022-09-04 RX ADMIN — ACETAMINOPHEN PRN MG: 325 TABLET ORAL at 13:53

## 2022-09-04 RX ADMIN — Medication SCH MG: at 12:22

## 2022-09-04 RX ADMIN — DEXTROSE MONOHYDRATE SCH MLS/HR: 50 INJECTION, SOLUTION INTRAVENOUS at 01:08

## 2022-09-04 RX ADMIN — ATORVASTATIN CALCIUM SCH MG: 10 TABLET, FILM COATED ORAL at 21:48

## 2022-09-04 RX ADMIN — Medication SCH EACH: at 22:21

## 2022-09-04 RX ADMIN — ALBUTEROL SULFATE SCH MG: 2.5 SOLUTION RESPIRATORY (INHALATION) at 07:35

## 2022-09-04 RX ADMIN — Medication SCH OZ: at 21:48

## 2022-09-04 RX ADMIN — Medication SCH MG: at 09:09

## 2022-09-04 RX ADMIN — BUDESONIDE SCH MG: 0.5 SUSPENSION RESPIRATORY (INHALATION) at 07:05

## 2022-09-04 RX ADMIN — BACLOFEN SCH MG: 10 TABLET ORAL at 21:00

## 2022-09-04 RX ADMIN — FERROUS SULFATE TAB 325 MG (65 MG ELEMENTAL FE) SCH MG: 325 (65 FE) TAB at 09:09

## 2022-09-04 RX ADMIN — POLYETHYLENE GLYCOL 3350 SCH GM: 17 POWDER, FOR SOLUTION ORAL at 09:07

## 2022-09-04 RX ADMIN — LABETALOL HCL SCH MG: 100 TABLET, FILM COATED ORAL at 09:09

## 2022-09-04 RX ADMIN — PANTOPRAZOLE SODIUM SCH MG: 40 TABLET, DELAYED RELEASE ORAL at 07:49

## 2022-09-04 RX ADMIN — ACETAMINOPHEN PRN MG: 325 TABLET ORAL at 09:21

## 2022-09-04 RX ADMIN — DILTIAZEM HYDROCHLORIDE SCH MG: 240 CAPSULE, EXTENDED RELEASE ORAL at 09:09

## 2022-09-04 RX ADMIN — HYDROMORPHONE HYDROCHLORIDE PRN MG: 2 TABLET ORAL at 04:52

## 2022-09-04 RX ADMIN — Medication SCH EACH: at 17:29

## 2022-09-04 RX ADMIN — CLOPIDOGREL BISULFATE SCH MG: 75 TABLET, FILM COATED ORAL at 09:09

## 2022-09-04 RX ADMIN — Medication SCH EACH: at 06:30

## 2022-09-04 RX ADMIN — PREGABALIN SCH MG: 25 CAPSULE ORAL at 16:44

## 2022-09-04 RX ADMIN — LABETALOL HCL SCH MG: 100 TABLET, FILM COATED ORAL at 21:00

## 2022-09-04 RX ADMIN — Medication SCH MG: at 09:07

## 2022-09-04 RX ADMIN — ALBUTEROL SULFATE SCH MG: 2.5 SOLUTION RESPIRATORY (INHALATION) at 19:06

## 2022-09-04 RX ADMIN — Medication SCH OZ: at 11:42

## 2022-09-04 RX ADMIN — BACLOFEN SCH MG: 10 TABLET ORAL at 12:22

## 2022-09-04 RX ADMIN — Medication SCH EACH: at 11:40

## 2022-09-04 RX ADMIN — ALBUTEROL SULFATE SCH MG: 2.5 SOLUTION RESPIRATORY (INHALATION) at 01:16

## 2022-09-04 RX ADMIN — INSULIN HUMAN PRN UNITS: 100 INJECTION, SOLUTION PARENTERAL at 11:56

## 2022-09-04 RX ADMIN — BUDESONIDE SCH MG: 0.5 SUSPENSION RESPIRATORY (INHALATION) at 15:00

## 2022-09-04 RX ADMIN — ENOXAPARIN SODIUM SCH MG: 40 INJECTION SUBCUTANEOUS at 10:55

## 2022-09-04 RX ADMIN — Medication SCH MG: at 16:44

## 2022-09-04 RX ADMIN — ALBUTEROL SULFATE SCH MG: 2.5 SOLUTION RESPIRATORY (INHALATION) at 14:01

## 2022-09-04 RX ADMIN — HYDROMORPHONE HYDROCHLORIDE PRN MG: 2 TABLET ORAL at 10:57

## 2022-09-04 RX ADMIN — PREGABALIN SCH MG: 25 CAPSULE ORAL at 09:07

## 2022-09-04 RX ADMIN — DEXTROSE MONOHYDRATE SCH MLS/HR: 50 INJECTION, SOLUTION INTRAVENOUS at 13:10

## 2022-09-04 NOTE — NUR
RN NOTES



PATIENT CALLED TO GET UP TO BED WITNESSED BY AUBREY IBARRA, SCANNED AND OPENED THE MEDICATIONS 
FOR HER TO TAKE, THEN PATIENT DOZED OFF AGAIN, AUBREY IBARRA AND MYSELF TRIED TO WAKE THE 
PATIENT UP BUT TO NO AVAIL, UNSAFE TO GIVE MEDICATIONS, DISCARDED AND WITNESSED BY AUBREY IBARRA. WILL CONTINUE TO MONITOR.

## 2022-09-04 NOTE — NUR
RN NOTE



PT STILL DROWSY AND UNABLE TO EAT AT THIS TIME. BS MEDICATION HELD SINCE PT WILL NOT BE 
EATING. CHARGE NURSE MINDY HAGER.

## 2022-09-04 NOTE — NUR
RN NOTES



PATIENT COMPLAINS OF 10/10 PAIN IN THE LLL AND LOWER BACK, PATIENT CRYING, MOANING, 
GROANING. GIVEN DILAUDID 4 MG PO, WILL CONTINUE TO MONITOR

## 2022-09-04 NOTE — NUR
RN NOTES



CARRIED OUT BELOW ORDERS:



PATIENT HAS REDNESS IN THE GROIN AREA - DR BLANCO ORDERED Z GUARD BID ON AFFECTED AREA.

VTE SCORE OF 5 - DR BLANCO ORDERED LOVENOX 40 MG SQ Q DAILY.

## 2022-09-04 NOTE — NUR
RN NOTE



PT TOO DROWSY TO SAFELY SWALLOW MEDICATION. PT MOANING IN RESPONSE TO VERBAL STIMULI. HELD 
PO MEDICATION AT THIS TIME. CHARGE NURSE MINDY AWARE.

## 2022-09-04 NOTE — NUR
RN NOTES



PATIENT IS STILL COMPLAINING OF BREAKTHROUGH PAIN COMING FROM BACK OF HER LEFT KNEE, 
REQUESTED FOR TYLENOL. FREQUENCY WAS CHANGED TO Q4 FROM Q6 BY CHARGE NURSE FRED AS ORDERED 
BY DR BLANCO. PRN XANAX .5 MG GIVEN AS WELL FOR ANXIETY/PANIC ATTACK.

## 2022-09-04 NOTE — NUR
MS RN OPENING NOTE



RECEIVED PT AWAKE IN BED. A/O X3 AND ABLE TO MAKE NEEDS KNOWN. PT ON O2 @ 2LPM VIA NC, 
TOLERATING WELL AT 99% O2 SAY. NO SOB OR S/S OF RESPIRATORY DISTRESS AT THIS TIME. IV ACCESS 
RAC 20 GAUGE, INTACT AND PATENT, FLUSHING WELL. SAFETY PRECAUTIONS IN PLACE AT ALL TIMES. 
BED IN LOWEST LOCKED POSITION, HOB ELEVATED, SIDE RAILS UP X3, AND CALL LIGHT AND TABLE 
WITHIN REACH. WILL CONTINUE TO MONITOR DURING MY SHIFT.

## 2022-09-04 NOTE — NUR
RN NOTES



PATIENT REFUSED TO EAT SINCE LUNCH, GLUCOSE  AT 1715. PATIENT IS STILL SLEEPING, WILL 
HOLD OFF INSULIN UNTIL SURE THAT PATIENT IS ABLE TO EAT. WILL CONTINUE TO MONITOR.

-------------------------------------------------------------------------------

Addendum: 09/04/22 at 1743 by BIBI STRINGER RN

-------------------------------------------------------------------------------

CHARGE NURSE HAS BEEN NOTIFIED REGARDING THIS OBSERVATION.

## 2022-09-04 NOTE — NUR
MS RN CLOSING NOTE



PATIENT ASLEEP IN BED. PATIENT WAKES UP INTERMITTENTLY BUT CANNOT FULLY STAY AWAKE, WANTS TO 
GET UP BUT GOES BACK TO SLEEP. VS ARE AS FOLLOWS BP-137/67, HR-72, T-98, RR-18, O2 SAT-96% - 
PT ON O2 @ 2LPM VIA NC, TOLERATING WELL. NO SOB OR S/S OF RESPIRATORY DISTRESS AT THIS TIME. 
IV ACCESS RAC 20 GAUGE, INTACT AND PATENT, FLUSHING WELL. SAFETY PRECAUTIONS IN PLACE AT ALL 
TIMES. BED IN LOWEST LOCKED POSITION, HOB ELEVATED, SIDE RAILS UP X3, AND CALL LIGHT AND 
TABLE WITHIN REACH. ALL NEEDS MET. WILL ENDORSE TO THE NEXT SHIFT NURSE.

## 2022-09-04 NOTE — NUR
RN NOTES



PATIENT WAS SEEN BY PT ELINOR. PT IS NOT ABLE TO FLEX HER L LEG FULLY. PATIENT IS DEEMED MAX 
ASSIST AND CAN ONLY DO BED TO CHAIR TRANSFERS. PATIENT IS A FALL RISK AND AMBULATION IS NOT 
ADVISED AT THIS TIME.

## 2022-09-04 NOTE — NUR
RN NOTES



RECEIVED A CALL FROM PHARMACY THAT SEMAGLUTIDE (RYBELSUS) 3 MG TABLET IS NOT IN THE 
FORMULARY. INSTRUCTED THE PATIENT TO HAVE HER FAMILY MEMBER BRING IT HERE. PATIENT 
VERBALIZED UNDERSTANDING.

## 2022-09-04 NOTE — NUR
RT NOTE



RECEIVED PT ON 3LPM NC. PT WAS ASLEEP WHEN GIVEN THE TX. MAURY TX WELL. NO S/S OF RESPIRATORY 
DISTRESS NOTED.

## 2022-09-04 NOTE — NUR
MS RN NOTES



PATIENT ASKED FOR PAIN MEDICATION, DILAUDID NOT DUE YET. ASKED FOR TYLENOL IN ANTICIPATION 
FOR PT EXERCISE. RATES PAIN AS 3/10, GIVEN TYLENOL 650MG PO GIVEN AS ORDERED

## 2022-09-04 NOTE — NUR
RN NOTES



PATIENT IS STILL REFUSING TO EAT AND WAKE UP, RECHECKED BLOOD GLUCOSE TO MAKE SURE NOT 
HYPOGLYCEMIC. READING IS . WILL ENDORSE TO THE NEXT NURSE.

## 2022-09-04 NOTE — NUR
MS RN CLOSING NOTE



PT AWAKE IN BED. A/O X3 AND ABLE TO MAKE NEEDS KNOWN. PT ON O2 @ 2LPM VIA NC, TOLERATING 
WELL. NO SOB OR S/S OF RESPIRATORY DISTRESS AT THIS TIME. IV ACCESS RAC 20 GAUGE, INTACT AND 
PATENT. ALL DUE MEDS GIVEN AS ORDERED. SAFETY PRECAUTIONS IN PLACE AT ALL TIMES. BED IN 
LOWEST LOCKED POSITION, HOB ELEVATED, SIDE RAILS UP X3, AND CALL LIGHT AND TABLE WITHIN 
REACH. ALL NEEDS MET AT THIS TIME AND WILL ENDORSE TO ONCOMING NURSE FOR NAHUN.

## 2022-09-04 NOTE — NUR
MS RN OPENING NOTE



RECEIVED PT RESTING IN BED, VERBALLY RESPONSIVE. A/O X3 AND ABLE TO MAKE NEEDS KNOWN. PT ON 
O2 @ 3LPM VIA NC, TOLERATING WELL. NO SOB OR S/S OF RESPIRATORY DISTRESS AT THIS TIME. IV 
ACCESS RAC 20 GAUGE, INTACT AND PATENT. SAFETY PRECAUTIONS IN PLACE. BED IN LOWEST LOCKED 
POSITION, HOB ELEVATED, SIDE RAILS UP X3, AND CALL LIGHT AND TABLE WITHIN REACH. ALL NEEDS 
MET AT THIS TIME.

## 2022-09-04 NOTE — NUR
RN NOTE



PT COMPLAINED OF LEFT LOWER LEG PAIN 9/10. ADMINISTERED DILAUDID 4 MG 2 TABS FOR PAIN AS 
ORDERED. MADE COMFORTABLE IN BED. ALL NEEDS MET AT THIS TIME.

## 2022-09-05 VITALS — SYSTOLIC BLOOD PRESSURE: 135 MMHG | DIASTOLIC BLOOD PRESSURE: 60 MMHG

## 2022-09-05 VITALS — SYSTOLIC BLOOD PRESSURE: 116 MMHG | DIASTOLIC BLOOD PRESSURE: 52 MMHG

## 2022-09-05 LAB
BASOPHILS # BLD AUTO: 0 K/UL (ref 0–0.2)
BASOPHILS NFR BLD AUTO: 0.4 % (ref 0–2)
BUN SERPL-MCNC: 19 MG/DL (ref 7–18)
CALCIUM SERPL-MCNC: 8.7 MG/DL (ref 8.5–10.1)
CHLORIDE SERPL-SCNC: 94 MMOL/L (ref 98–107)
CO2 SERPL-SCNC: 30 MMOL/L (ref 21–32)
CREAT SERPL-MCNC: 1.3 MG/DL (ref 0.6–1.3)
EOSINOPHIL NFR BLD AUTO: 2 % (ref 0–6)
GLUCOSE SERPL-MCNC: 170 MG/DL (ref 74–106)
HCT VFR BLD AUTO: 28 % (ref 33–45)
HGB BLD-MCNC: 9.1 G/DL (ref 11.5–14.8)
LYMPHOCYTES NFR BLD AUTO: 1.6 K/UL (ref 0.8–4.8)
LYMPHOCYTES NFR BLD AUTO: 15.4 % (ref 20–44)
MCHC RBC AUTO-ENTMCNC: 33 G/DL (ref 31–36)
MCV RBC AUTO: 85 FL (ref 82–100)
MONOCYTES NFR BLD AUTO: 1 K/UL (ref 0.1–1.3)
MONOCYTES NFR BLD AUTO: 9.6 % (ref 2–12)
NEUTROPHILS # BLD AUTO: 7.4 K/UL (ref 1.8–8.9)
NEUTROPHILS NFR BLD AUTO: 72.6 % (ref 43–81)
PLATELET # BLD AUTO: 329 K/UL (ref 150–450)
POTASSIUM SERPL-SCNC: 4 MMOL/L (ref 3.5–5.1)
RBC # BLD AUTO: 3.31 MIL/UL (ref 4–5.2)
SODIUM SERPL-SCNC: 132 MMOL/L (ref 136–145)
WBC NRBC COR # BLD AUTO: 10.2 K/UL (ref 4.3–11)

## 2022-09-05 RX ADMIN — BACLOFEN SCH MG: 10 TABLET ORAL at 04:29

## 2022-09-05 RX ADMIN — FERROUS SULFATE TAB 325 MG (65 MG ELEMENTAL FE) SCH MG: 325 (65 FE) TAB at 17:30

## 2022-09-05 RX ADMIN — ALBUTEROL SULFATE SCH MG: 2.5 SOLUTION RESPIRATORY (INHALATION) at 08:25

## 2022-09-05 RX ADMIN — Medication SCH MG: at 13:38

## 2022-09-05 RX ADMIN — PREGABALIN SCH MG: 25 CAPSULE ORAL at 17:30

## 2022-09-05 RX ADMIN — POLYETHYLENE GLYCOL 3350 SCH GM: 17 POWDER, FOR SOLUTION ORAL at 09:54

## 2022-09-05 RX ADMIN — HYDROMORPHONE HYDROCHLORIDE PRN MG: 2 TABLET ORAL at 17:30

## 2022-09-05 RX ADMIN — INSULIN HUMAN PRN UNITS: 100 INJECTION, SOLUTION PARENTERAL at 18:12

## 2022-09-05 RX ADMIN — PANTOPRAZOLE SODIUM SCH MG: 40 TABLET, DELAYED RELEASE ORAL at 07:30

## 2022-09-05 RX ADMIN — Medication SCH OZ: at 09:00

## 2022-09-05 RX ADMIN — INSULIN HUMAN PRN UNITS: 100 INJECTION, SOLUTION PARENTERAL at 06:57

## 2022-09-05 RX ADMIN — DILTIAZEM HYDROCHLORIDE SCH MG: 240 CAPSULE, EXTENDED RELEASE ORAL at 09:53

## 2022-09-05 RX ADMIN — DEXTROSE MONOHYDRATE SCH MLS/HR: 50 INJECTION, SOLUTION INTRAVENOUS at 01:55

## 2022-09-05 RX ADMIN — Medication SCH MG: at 09:54

## 2022-09-05 RX ADMIN — Medication SCH MG: at 17:30

## 2022-09-05 RX ADMIN — ALBUTEROL SULFATE SCH MG: 2.5 SOLUTION RESPIRATORY (INHALATION) at 14:51

## 2022-09-05 RX ADMIN — INSULIN HUMAN PRN UNITS: 100 INJECTION, SOLUTION PARENTERAL at 12:02

## 2022-09-05 RX ADMIN — DEXTROSE MONOHYDRATE SCH MLS/HR: 50 INJECTION, SOLUTION INTRAVENOUS at 01:18

## 2022-09-05 RX ADMIN — LABETALOL HCL SCH MG: 100 TABLET, FILM COATED ORAL at 09:54

## 2022-09-05 RX ADMIN — Medication SCH EACH: at 17:36

## 2022-09-05 RX ADMIN — HYDROMORPHONE HYDROCHLORIDE PRN MG: 2 TABLET ORAL at 10:00

## 2022-09-05 RX ADMIN — ENOXAPARIN SODIUM SCH MG: 40 INJECTION SUBCUTANEOUS at 11:57

## 2022-09-05 RX ADMIN — BUDESONIDE SCH MG: 0.5 SUSPENSION RESPIRATORY (INHALATION) at 14:51

## 2022-09-05 RX ADMIN — CLOPIDOGREL BISULFATE SCH MG: 75 TABLET, FILM COATED ORAL at 09:54

## 2022-09-05 RX ADMIN — Medication SCH EACH: at 11:58

## 2022-09-05 RX ADMIN — BUDESONIDE SCH MG: 0.5 SUSPENSION RESPIRATORY (INHALATION) at 08:25

## 2022-09-05 RX ADMIN — ACETAMINOPHEN PRN MG: 325 TABLET ORAL at 10:25

## 2022-09-05 RX ADMIN — Medication SCH EACH: at 06:41

## 2022-09-05 RX ADMIN — BACLOFEN SCH MG: 10 TABLET ORAL at 13:39

## 2022-09-05 RX ADMIN — ALBUTEROL SULFATE SCH MG: 2.5 SOLUTION RESPIRATORY (INHALATION) at 01:06

## 2022-09-05 RX ADMIN — VALSARTAN SCH MG: 80 TABLET ORAL at 09:54

## 2022-09-05 NOTE — NUR
RN DISCHARGE NOTES



PATIENT DISCHARGED HOME WITH HOME HEALTH. STABLE, A/Ox4, ABLE TO MAKE NEEDS KNOWN. STABLE ON 
3L OF NC, NO S/S OF RESPIRATORY DISTRESS. PATIENT GIVEN DISCHARGE INSTRUCTIONS AND HEALTH 
TEACHINGS, PATIENT VERBALIZED UNDERSTANDING. ALL FORMS SIGNED, COPIED AND FILED IN CHART. 
PHOTOS TAKEN AND FILED. PATIENT GIVEN NEW PRESCRIPTIONS AND MEDICATION LISTS. PATIENT LEFT 
UNIT @1825 ACCOMPANIED BY 2 EMTs. CHARGE NURSE AND MD AWARE OF DISCHARGE.

## 2022-09-05 NOTE — NUR
MS RN OPENING NOTES



RECEIVED PATIENT RESTING IN BED, A/Ox4. ON 3L OF O2 VIA NC NO S/S OF SOB OR RESPIRATORY 
DISTRESS. NO C/O OF CARDIAC DISTRESS. IV ACCESS R AC #20 SL, INTACT AND PATENT. PATIENT IS 
BED BOUND WITH L SIDED WEAKNESS. SKIN ISSUES: LLE CELLULITIS, RASH L LEG, AND SACRAL 
REDNESS. SAFETY MEASURES IN PLACE: BED LOCKED AND IN LOWEST POSITION, SIDE RAILS UP x2, CALL 
LIGHT WITHIN REACH, HOB ELEVATED. WILL CONTINUE TO MONITOR.

## 2022-09-05 NOTE — NUR
RN NOTE



IV VANCO SPIKED AND HUNG BUT NOT ADMINISTERED. PENDING VANCO TROUGH. VANCO TROUGH CAME BACK 
AT 31. WILL HOLD VANCO AND INFORM PHARMACY.

## 2022-09-05 NOTE — NUR
RN NOTES



PATIENT COMPLAINED OF PAIN OF HER HEAD, PRN DILAUDID GIVEN, AS WELL AS PRN TYLENOL FOR PAIN. 
PATIENT SAYS SHE USUALLY TAKES 4MG OF DILAUDID AND NOT 2MG BUT EXPLAINED TO PATIENT THAT MD 
REDUCED DOSAGE DUE TO LETHARGY SHOWN YESTERDAY.

## 2022-09-05 NOTE — NUR
RN NOTES



PATIENT REFUSED HYDRALAZINE MEDICATION AND INSTEAD WANTED PRN XANAX, XANAX ADMINISTERED 
@1339, WILL CONTINUE TO MONITOR.

## 2022-09-05 NOTE — NUR
MS RN CLOSING NOTE



PT RESTING IN BED, VERBALLY RESPONSIVE. A/O X3 AND ABLE TO MAKE NEEDS KNOWN. PT ON O2 @ 3LPM 
VIA NC, TOLERATING WELL. NO SOB OR S/S OF RESPIRATORY DISTRESS AT THIS TIME. IV ACCESS RAC 
20 GAUGE, INTACT AND PATENT. SAFETY PRECAUTIONS IN PLACE AT ALL TIMES. BED IN LOWEST LOCKED 
POSITION, HOB ELEVATED, SIDE RAILS UP X3, AND CALL LIGHT AND TABLE WITHIN REACH. ALL NEEDS 
MET AT THIS TIME AND WILL ENDORSE TO ONCOMING NURSE FOR NAHUN.

## 2022-10-10 ENCOUNTER — HOSPITAL ENCOUNTER (INPATIENT)
Dept: HOSPITAL 54 - ER | Age: 62
LOS: 9 days | Discharge: NURSING, REHAB OR LONG-TERM CARE | DRG: 949 | End: 2022-10-19
Attending: INTERNAL MEDICINE | Admitting: INTERNAL MEDICINE
Payer: COMMERCIAL

## 2022-10-10 VITALS — BODY MASS INDEX: 41.91 KG/M2 | WEIGHT: 267 LBS | HEIGHT: 67 IN

## 2022-10-10 DIAGNOSIS — R07.89: ICD-10-CM

## 2022-10-10 DIAGNOSIS — D64.9: ICD-10-CM

## 2022-10-10 DIAGNOSIS — Z87.440: ICD-10-CM

## 2022-10-10 DIAGNOSIS — Z20.822: ICD-10-CM

## 2022-10-10 DIAGNOSIS — I25.10: ICD-10-CM

## 2022-10-10 DIAGNOSIS — E78.5: ICD-10-CM

## 2022-10-10 DIAGNOSIS — J45.901: ICD-10-CM

## 2022-10-10 DIAGNOSIS — I69.354: ICD-10-CM

## 2022-10-10 DIAGNOSIS — I10: ICD-10-CM

## 2022-10-10 DIAGNOSIS — E66.9: ICD-10-CM

## 2022-10-10 DIAGNOSIS — J44.1: ICD-10-CM

## 2022-10-10 DIAGNOSIS — F41.9: ICD-10-CM

## 2022-10-10 DIAGNOSIS — Z83.3: ICD-10-CM

## 2022-10-10 DIAGNOSIS — I87.2: ICD-10-CM

## 2022-10-10 DIAGNOSIS — Z79.84: Primary | ICD-10-CM

## 2022-10-10 DIAGNOSIS — L03.116: ICD-10-CM

## 2022-10-10 DIAGNOSIS — E11.22: ICD-10-CM

## 2022-10-10 DIAGNOSIS — J96.21: ICD-10-CM

## 2022-10-10 DIAGNOSIS — Z87.891: ICD-10-CM

## 2022-10-10 DIAGNOSIS — E87.1: ICD-10-CM

## 2022-10-10 LAB
ALBUMIN SERPL BCP-MCNC: 3.4 G/DL (ref 3.4–5)
ALP SERPL-CCNC: 125 U/L (ref 46–116)
ALT SERPL W P-5'-P-CCNC: 43 U/L (ref 12–78)
AST SERPL W P-5'-P-CCNC: 31 U/L (ref 15–37)
BASOPHILS # BLD AUTO: 0.1 K/UL (ref 0–0.2)
BASOPHILS NFR BLD AUTO: 0.7 % (ref 0–2)
BILIRUB DIRECT SERPL-MCNC: 0.2 MG/DL (ref 0–0.2)
BILIRUB SERPL-MCNC: 0.9 MG/DL (ref 0.2–1)
BUN SERPL-MCNC: 16 MG/DL (ref 7–18)
CALCIUM SERPL-MCNC: 9.3 MG/DL (ref 8.5–10.1)
CHLORIDE SERPL-SCNC: 97 MMOL/L (ref 98–107)
CO2 SERPL-SCNC: 34 MMOL/L (ref 21–32)
CREAT SERPL-MCNC: 0.9 MG/DL (ref 0.6–1.3)
EOSINOPHIL NFR BLD AUTO: 2.8 % (ref 0–6)
GLUCOSE SERPL-MCNC: 71 MG/DL (ref 74–106)
HCT VFR BLD AUTO: 29 % (ref 33–45)
HGB BLD-MCNC: 9.3 G/DL (ref 11.5–14.8)
LYMPHOCYTES NFR BLD AUTO: 1.6 K/UL (ref 0.8–4.8)
LYMPHOCYTES NFR BLD AUTO: 13.3 % (ref 20–44)
MCHC RBC AUTO-ENTMCNC: 32 G/DL (ref 31–36)
MCV RBC AUTO: 84 FL (ref 82–100)
MONOCYTES NFR BLD AUTO: 1.3 K/UL (ref 0.1–1.3)
MONOCYTES NFR BLD AUTO: 11 % (ref 2–12)
NEUTROPHILS # BLD AUTO: 8.5 K/UL (ref 1.8–8.9)
NEUTROPHILS NFR BLD AUTO: 72.2 % (ref 43–81)
PLATELET # BLD AUTO: 430 K/UL (ref 150–450)
POTASSIUM SERPL-SCNC: 4 MMOL/L (ref 3.5–5.1)
PROT SERPL-MCNC: 6.9 G/DL (ref 6.4–8.2)
RBC # BLD AUTO: 3.46 MIL/UL (ref 4–5.2)
SODIUM SERPL-SCNC: 134 MMOL/L (ref 136–145)
WBC NRBC COR # BLD AUTO: 11.8 K/UL (ref 4.3–11)

## 2022-10-10 PROCEDURE — C9803 HOPD COVID-19 SPEC COLLECT: HCPCS

## 2022-10-10 PROCEDURE — A4216 STERILE WATER/SALINE, 10 ML: HCPCS

## 2022-10-10 PROCEDURE — G0378 HOSPITAL OBSERVATION PER HR: HCPCS

## 2022-10-10 NOTE — NUR
BIBSELF C/O SOB FOR THE PAST WEEK. UPON TRIAGE SATTING 93% ON 5LPM VIA N/C. PT 
A/OX3. CONNECTED PT TO POX AND MONITOR. SAFETY MEASURES IN PLACE.

## 2022-10-11 VITALS — DIASTOLIC BLOOD PRESSURE: 91 MMHG | SYSTOLIC BLOOD PRESSURE: 164 MMHG

## 2022-10-11 VITALS — SYSTOLIC BLOOD PRESSURE: 117 MMHG | DIASTOLIC BLOOD PRESSURE: 63 MMHG

## 2022-10-11 VITALS — DIASTOLIC BLOOD PRESSURE: 62 MMHG | SYSTOLIC BLOOD PRESSURE: 145 MMHG

## 2022-10-11 VITALS — DIASTOLIC BLOOD PRESSURE: 62 MMHG | SYSTOLIC BLOOD PRESSURE: 148 MMHG

## 2022-10-11 LAB
BASOPHILS # BLD AUTO: 0 K/UL (ref 0–0.2)
BASOPHILS NFR BLD AUTO: 0.1 % (ref 0–2)
BUN SERPL-MCNC: 17 MG/DL (ref 7–18)
CALCIUM SERPL-MCNC: 9.2 MG/DL (ref 8.5–10.1)
CHLORIDE SERPL-SCNC: 95 MMOL/L (ref 98–107)
CO2 SERPL-SCNC: 29 MMOL/L (ref 21–32)
CREAT SERPL-MCNC: 0.8 MG/DL (ref 0.6–1.3)
EOSINOPHIL NFR BLD AUTO: 0 % (ref 0–6)
GLUCOSE SERPL-MCNC: 241 MG/DL (ref 74–106)
HCT VFR BLD AUTO: 31 % (ref 33–45)
HGB BLD-MCNC: 9.9 G/DL (ref 11.5–14.8)
LYMPHOCYTES NFR BLD AUTO: 0.5 K/UL (ref 0.8–4.8)
LYMPHOCYTES NFR BLD AUTO: 4.5 % (ref 20–44)
MAGNESIUM SERPL-MCNC: 2.4 MG/DL (ref 1.8–2.4)
MCHC RBC AUTO-ENTMCNC: 32 G/DL (ref 31–36)
MCV RBC AUTO: 84 FL (ref 82–100)
MONOCYTES NFR BLD AUTO: 0.1 K/UL (ref 0.1–1.3)
MONOCYTES NFR BLD AUTO: 0.8 % (ref 2–12)
NEUTROPHILS # BLD AUTO: 10.5 K/UL (ref 1.8–8.9)
NEUTROPHILS NFR BLD AUTO: 94.6 % (ref 43–81)
PLATELET # BLD AUTO: 434 K/UL (ref 150–450)
POTASSIUM SERPL-SCNC: 4.6 MMOL/L (ref 3.5–5.1)
RBC # BLD AUTO: 3.64 MIL/UL (ref 4–5.2)
SODIUM SERPL-SCNC: 131 MMOL/L (ref 136–145)
WBC NRBC COR # BLD AUTO: 11.1 K/UL (ref 4.3–11)

## 2022-10-11 RX ADMIN — ALBUTEROL SULFATE SCH MG: 1.25 SOLUTION RESPIRATORY (INHALATION) at 13:13

## 2022-10-11 RX ADMIN — BACLOFEN SCH MG: 10 TABLET ORAL at 08:49

## 2022-10-11 RX ADMIN — FERROUS SULFATE TAB 325 MG (65 MG ELEMENTAL FE) SCH MG: 325 (65 FE) TAB at 17:46

## 2022-10-11 RX ADMIN — PREGABALIN SCH MG: 25 CAPSULE ORAL at 17:46

## 2022-10-11 RX ADMIN — Medication SCH MG: at 20:27

## 2022-10-11 RX ADMIN — INSULIN HUMAN PRN UNITS: 100 INJECTION, SOLUTION PARENTERAL at 17:47

## 2022-10-11 RX ADMIN — PREGABALIN SCH MG: 25 CAPSULE ORAL at 08:49

## 2022-10-11 RX ADMIN — PANTOPRAZOLE SODIUM SCH MG: 40 TABLET, DELAYED RELEASE ORAL at 07:30

## 2022-10-11 RX ADMIN — ATORVASTATIN CALCIUM SCH MG: 10 TABLET, FILM COATED ORAL at 21:29

## 2022-10-11 RX ADMIN — Medication SCH MG: at 08:35

## 2022-10-11 RX ADMIN — LABETALOL HCL SCH MG: 100 TABLET, FILM COATED ORAL at 21:30

## 2022-10-11 RX ADMIN — DILTIAZEM HYDROCHLORIDE SCH MG: 240 CAPSULE, EXTENDED RELEASE ORAL at 08:49

## 2022-10-11 RX ADMIN — Medication SCH MG: at 08:47

## 2022-10-11 RX ADMIN — TOLTERODINE TARTRATE SCH MG: 2 CAPSULE, EXTENDED RELEASE ORAL at 08:50

## 2022-10-11 RX ADMIN — ACETAMINOPHEN PRN MG: 325 TABLET ORAL at 18:37

## 2022-10-11 RX ADMIN — POLYETHYLENE GLYCOL 3350 SCH GM: 17 POWDER, FOR SOLUTION ORAL at 09:00

## 2022-10-11 RX ADMIN — CLOPIDOGREL BISULFATE SCH MG: 75 TABLET, FILM COATED ORAL at 08:49

## 2022-10-11 RX ADMIN — FLUTICASONE FUROATE AND VILANTEROL TRIFENATATE SCH EACH: 100; 25 POWDER RESPIRATORY (INHALATION) at 09:14

## 2022-10-11 RX ADMIN — LEVOFLOXACIN SCH MLS/HR: 500 INJECTION, SOLUTION INTRAVENOUS at 22:11

## 2022-10-11 RX ADMIN — Medication SCH EACH: at 11:15

## 2022-10-11 RX ADMIN — GUAIFENESIN AND CODEINE PHOSPHATE PRN ML: 100; 10 SOLUTION ORAL at 14:32

## 2022-10-11 RX ADMIN — Medication SCH EACH: at 17:46

## 2022-10-11 RX ADMIN — BACLOFEN SCH MG: 10 TABLET ORAL at 12:10

## 2022-10-11 RX ADMIN — VALSARTAN SCH MG: 80 TABLET ORAL at 08:50

## 2022-10-11 RX ADMIN — Medication SCH MG: at 08:49

## 2022-10-11 RX ADMIN — HUMAN INSULIN PRN UNIT: 100 INJECTION, SOLUTION SUBCUTANEOUS at 22:48

## 2022-10-11 RX ADMIN — ENOXAPARIN SODIUM SCH MG: 40 INJECTION SUBCUTANEOUS at 08:48

## 2022-10-11 RX ADMIN — BACLOFEN SCH MG: 10 TABLET ORAL at 21:29

## 2022-10-11 RX ADMIN — Medication SCH MG: at 13:13

## 2022-10-11 RX ADMIN — ALBUTEROL SULFATE PRN MG: 1.25 SOLUTION RESPIRATORY (INHALATION) at 23:32

## 2022-10-11 RX ADMIN — INSULIN HUMAN PRN UNITS: 100 INJECTION, SOLUTION PARENTERAL at 11:17

## 2022-10-11 RX ADMIN — ALBUTEROL SULFATE SCH MG: 1.25 SOLUTION RESPIRATORY (INHALATION) at 20:27

## 2022-10-11 RX ADMIN — ALBUTEROL SULFATE SCH MG: 1.25 SOLUTION RESPIRATORY (INHALATION) at 08:35

## 2022-10-11 RX ADMIN — DEXTROSE MONOHYDRATE SCH MLS/HR: 50 INJECTION, SOLUTION INTRAVENOUS at 09:18

## 2022-10-11 RX ADMIN — LABETALOL HCL SCH MG: 100 TABLET, FILM COATED ORAL at 08:49

## 2022-10-11 RX ADMIN — Medication SCH EACH: at 22:46

## 2022-10-11 RX ADMIN — FERROUS SULFATE TAB 325 MG (65 MG ELEMENTAL FE) SCH MG: 325 (65 FE) TAB at 08:49

## 2022-10-11 RX ADMIN — GUAIFENESIN AND CODEINE PHOSPHATE PRN ML: 100; 10 SOLUTION ORAL at 23:01

## 2022-10-11 RX ADMIN — ACETAMINOPHEN PRN MG: 325 TABLET ORAL at 12:40

## 2022-10-11 RX ADMIN — GUAIFENESIN AND CODEINE PHOSPHATE PRN ML: 100; 10 SOLUTION ORAL at 18:37

## 2022-10-11 RX ADMIN — DEXTROSE MONOHYDRATE SCH MLS/HR: 50 INJECTION, SOLUTION INTRAVENOUS at 20:35

## 2022-10-11 NOTE — NUR
RN OPENING NOTES



RECEIVED PT SITTING ON THE CHAIR. AWAKE, A/OX4 AND VERBALLY RESPONSIVE. ON 02 AT 4L/MIN VIA 
N/C AND PT TOLERATING  WELL. IV ACCESS ON RAC #20G INTACT AND PATENT. NO S/S OF 
INFILTRATIONS. NO C/O PAIN OR DISCOMFORT AT THIS TIME. NO ACUTE DISTRESS. ALL SAFETY 
MEASURED IN PLACE. PLACE CALL LIGHT WITHIN REACH. WILL CONTINUE TO MONITOR.

## 2022-10-11 NOTE — NUR
JOHNY REGALADO CM CALLED AND STATED SHE WILL CALL [place] [facility] 
FOR UPDATE REGARDING IF PT CAN BE ADMITTED TO [place]

## 2022-10-11 NOTE — NUR
PER JOHNY REGALADO CM:

NO TELE BEDS AVAILABLE AT [facility] UNTIL LATER MORNING

TRYING TO TRANSFER PT TO [facility]. AWAITING FOR DR TO DR VAZQUEZ FROM 
[place].

## 2022-10-11 NOTE — NUR
RN NOTES;



PT RECEIVED SITTING ON EDGE OF BED  AAOX4 ABLE TO MAKE NEEDS KNOWN.ON 02 3L VIA NC MAURY 
WELL,NO SIGN SOB/DISTRESS NOTED,NO COMPLAINE OF PAIN/DISCOMFORT AT THIS TIME,IV ACCESS ON 
RAC 20G PATENT AND INTACT,SAFETY MEASURED INPLACE,CALL LIGHT WITHIN REACH.

## 2022-10-11 NOTE — NUR
RN NOTE 



PATIENT REQUESTED ROBITUSSIN. INFORMED DR BLANCO RECEIVED ORDER FOR Q4HR PRN INFORMED  OF 
ALLERGY. PT TAKES MEDICATION AT HOME. ORDERS PLACED

## 2022-10-11 NOTE — NUR
RN NOTE 



SPOKE WITH PATIENT REGARDING HOME MEDICATION RYBELSUS. INFORMED PATIENT MEDICATION IS NOT 
AVAILABLE AT OUT PHARMACY. PER PATIENT SHE WILL HAVE SOMEBODY BRING IT FROM HOME.

## 2022-10-11 NOTE — NUR
RN CLOSING NOTES



PT SITTING ON EDGE OF BED  AAOX4 ABLE TO MAKE NEEDS KNOWN.ON 02 4L VIA NC TOLERATING  
WELL,NO SIGN DISTRESS NOTED,NO COMPLAIN OF PAIN/DISCOMFORT AT THIS TIME,IV ACCESS ON RAC 20G 
PATENT AND INTACT,SAFETY MEASURED IN PLACE,CALL LIGHT WITHIN REACH. WILL ENDORSE TO NIGHT 
NURSE FOR NAHUN.

## 2022-10-11 NOTE — NUR
RN NOTES;



PT RECEIVED FROM ER WITH RONAK IN -1 AAOX4 ABLE TO MAKE NEEDS KNOWN.ON 02 3L VIA NC 
MAURY WELL,NO SIGN SOB/DISTRESS NOTED,NO COMPLAINE OF PAIN/DISCOMFORT AT THIS TIME,IV ACCESS 
ON RAC 20G PATENT AND INTACT,SAFETY MEASURED INPLACE,CALL LIGHT WITHIN REACH,WILL CONTINUE 
TO MONITOR.

## 2022-10-11 NOTE — NUR
RN NOTES:



PT'S BLOOD SUGAR 362. 10 UNITS OF REGULAR INSULIN GIVEN. NO S/S OF HYPER/HYPOGLYCEMIA. 
ROBITUSSIN AC 5 ML GIVEN FOR C/O NON-PRODUCTIVE COUGH. WILL CONTINUE TO MONITOR

## 2022-10-12 VITALS — DIASTOLIC BLOOD PRESSURE: 70 MMHG | SYSTOLIC BLOOD PRESSURE: 159 MMHG

## 2022-10-12 VITALS — SYSTOLIC BLOOD PRESSURE: 158 MMHG | DIASTOLIC BLOOD PRESSURE: 68 MMHG

## 2022-10-12 VITALS — DIASTOLIC BLOOD PRESSURE: 66 MMHG | SYSTOLIC BLOOD PRESSURE: 132 MMHG

## 2022-10-12 VITALS — DIASTOLIC BLOOD PRESSURE: 62 MMHG | SYSTOLIC BLOOD PRESSURE: 132 MMHG

## 2022-10-12 VITALS — DIASTOLIC BLOOD PRESSURE: 49 MMHG | SYSTOLIC BLOOD PRESSURE: 122 MMHG

## 2022-10-12 VITALS — DIASTOLIC BLOOD PRESSURE: 67 MMHG | SYSTOLIC BLOOD PRESSURE: 130 MMHG

## 2022-10-12 LAB
BASOPHILS # BLD AUTO: 0 K/UL (ref 0–0.2)
BASOPHILS NFR BLD AUTO: 0.3 % (ref 0–2)
BUN SERPL-MCNC: 36 MG/DL (ref 7–18)
CALCIUM SERPL-MCNC: 9.3 MG/DL (ref 8.5–10.1)
CHLORIDE SERPL-SCNC: 92 MMOL/L (ref 98–107)
CO2 SERPL-SCNC: 27 MMOL/L (ref 21–32)
CREAT SERPL-MCNC: 1.2 MG/DL (ref 0.6–1.3)
EOSINOPHIL NFR BLD AUTO: 0 % (ref 0–6)
GLUCOSE SERPL-MCNC: 226 MG/DL (ref 74–106)
HCT VFR BLD AUTO: 31 % (ref 33–45)
HGB BLD-MCNC: 9.8 G/DL (ref 11.5–14.8)
LYMPHOCYTES NFR BLD AUTO: 1.2 K/UL (ref 0.8–4.8)
LYMPHOCYTES NFR BLD AUTO: 7.6 % (ref 20–44)
LYMPHOCYTES NFR BLD MANUAL: 6 % (ref 16–48)
MCHC RBC AUTO-ENTMCNC: 32 G/DL (ref 31–36)
MCV RBC AUTO: 84 FL (ref 82–100)
MONOCYTES NFR BLD AUTO: 0.4 K/UL (ref 0.1–1.3)
MONOCYTES NFR BLD AUTO: 2.7 % (ref 2–12)
MONOCYTES NFR BLD MANUAL: 1 % (ref 0–11)
NEUTROPHILS # BLD AUTO: 14.1 K/UL (ref 1.8–8.9)
NEUTROPHILS NFR BLD AUTO: 89.4 % (ref 43–81)
NEUTS BAND NFR BLD MANUAL: 1 % (ref 0–5)
NEUTS SEG NFR BLD MANUAL: 92 % (ref 42–76)
PLATELET # BLD AUTO: 466 K/UL (ref 150–450)
POTASSIUM SERPL-SCNC: 4.6 MMOL/L (ref 3.5–5.1)
RBC # BLD AUTO: 3.66 MIL/UL (ref 4–5.2)
SODIUM SERPL-SCNC: 127 MMOL/L (ref 136–145)
WBC NRBC COR # BLD AUTO: 15.8 K/UL (ref 4.3–11)

## 2022-10-12 RX ADMIN — HYDROMORPHONE HYDROCHLORIDE PRN MG: 2 TABLET ORAL at 18:36

## 2022-10-12 RX ADMIN — Medication SCH EACH: at 18:03

## 2022-10-12 RX ADMIN — POLYETHYLENE GLYCOL 3350 SCH GM: 17 POWDER, FOR SOLUTION ORAL at 09:16

## 2022-10-12 RX ADMIN — BACLOFEN SCH MG: 10 TABLET ORAL at 12:25

## 2022-10-12 RX ADMIN — Medication SCH EACH: at 09:20

## 2022-10-12 RX ADMIN — CLOTRIMAZOLE SCH GM: 1 CREAM TOPICAL at 17:00

## 2022-10-12 RX ADMIN — Medication SCH MG: at 09:17

## 2022-10-12 RX ADMIN — DEXTROSE MONOHYDRATE SCH MLS/HR: 50 INJECTION, SOLUTION INTRAVENOUS at 21:00

## 2022-10-12 RX ADMIN — Medication SCH OZ: at 09:14

## 2022-10-12 RX ADMIN — ALBUTEROL SULFATE SCH MG: 1.25 SOLUTION RESPIRATORY (INHALATION) at 20:22

## 2022-10-12 RX ADMIN — PANTOPRAZOLE SODIUM SCH MG: 40 TABLET, DELAYED RELEASE ORAL at 07:30

## 2022-10-12 RX ADMIN — DEXTROSE MONOHYDRATE SCH MLS/HR: 50 INJECTION, SOLUTION INTRAVENOUS at 09:22

## 2022-10-12 RX ADMIN — GUAIFENESIN AND CODEINE PHOSPHATE PRN ML: 100; 10 SOLUTION ORAL at 03:36

## 2022-10-12 RX ADMIN — FERROUS SULFATE TAB 325 MG (65 MG ELEMENTAL FE) SCH MG: 325 (65 FE) TAB at 09:17

## 2022-10-12 RX ADMIN — HUMAN INSULIN PRN UNIT: 100 INJECTION, SOLUTION SUBCUTANEOUS at 22:24

## 2022-10-12 RX ADMIN — ALBUTEROL SULFATE SCH MG: 1.25 SOLUTION RESPIRATORY (INHALATION) at 07:41

## 2022-10-12 RX ADMIN — PREGABALIN SCH MG: 25 CAPSULE ORAL at 18:02

## 2022-10-12 RX ADMIN — Medication SCH MG: at 13:26

## 2022-10-12 RX ADMIN — VALSARTAN SCH MG: 80 TABLET ORAL at 09:17

## 2022-10-12 RX ADMIN — BACLOFEN SCH MG: 10 TABLET ORAL at 21:53

## 2022-10-12 RX ADMIN — Medication SCH EA: at 09:16

## 2022-10-12 RX ADMIN — BACLOFEN SCH MG: 10 TABLET ORAL at 09:16

## 2022-10-12 RX ADMIN — LEVOFLOXACIN SCH MLS/HR: 500 INJECTION, SOLUTION INTRAVENOUS at 21:58

## 2022-10-12 RX ADMIN — INSULIN HUMAN PRN UNITS: 100 INJECTION, SOLUTION PARENTERAL at 09:27

## 2022-10-12 RX ADMIN — LABETALOL HCL SCH MG: 100 TABLET, FILM COATED ORAL at 21:53

## 2022-10-12 RX ADMIN — ALBUTEROL SULFATE PRN MG: 1.25 SOLUTION RESPIRATORY (INHALATION) at 03:49

## 2022-10-12 RX ADMIN — ALBUTEROL SULFATE SCH MG: 1.25 SOLUTION RESPIRATORY (INHALATION) at 01:30

## 2022-10-12 RX ADMIN — FLUTICASONE FUROATE AND VILANTEROL TRIFENATATE SCH EACH: 100; 25 POWDER RESPIRATORY (INHALATION) at 09:19

## 2022-10-12 RX ADMIN — ALBUTEROL SULFATE SCH MG: 1.25 SOLUTION RESPIRATORY (INHALATION) at 13:26

## 2022-10-12 RX ADMIN — CLOTRIMAZOLE SCH GM: 1 CREAM TOPICAL at 09:16

## 2022-10-12 RX ADMIN — FERROUS SULFATE TAB 325 MG (65 MG ELEMENTAL FE) SCH MG: 325 (65 FE) TAB at 18:03

## 2022-10-12 RX ADMIN — ENOXAPARIN SODIUM SCH MG: 40 INJECTION SUBCUTANEOUS at 09:18

## 2022-10-12 RX ADMIN — Medication SCH MG: at 07:42

## 2022-10-12 RX ADMIN — CLOPIDOGREL BISULFATE SCH MG: 75 TABLET, FILM COATED ORAL at 09:17

## 2022-10-12 RX ADMIN — INSULIN HUMAN PRN UNITS: 100 INJECTION, SOLUTION PARENTERAL at 18:06

## 2022-10-12 RX ADMIN — Medication SCH MG: at 03:49

## 2022-10-12 RX ADMIN — Medication SCH EACH: at 12:22

## 2022-10-12 RX ADMIN — INSULIN HUMAN PRN UNITS: 100 INJECTION, SOLUTION PARENTERAL at 12:27

## 2022-10-12 RX ADMIN — LABETALOL HCL SCH MG: 100 TABLET, FILM COATED ORAL at 09:17

## 2022-10-12 RX ADMIN — Medication SCH MG: at 01:30

## 2022-10-12 RX ADMIN — TOLTERODINE TARTRATE SCH MG: 2 CAPSULE, EXTENDED RELEASE ORAL at 09:17

## 2022-10-12 RX ADMIN — ATORVASTATIN CALCIUM SCH MG: 10 TABLET, FILM COATED ORAL at 21:53

## 2022-10-12 RX ADMIN — INSULIN GLARGINE SCH UNIT: 100 INJECTION, SOLUTION SUBCUTANEOUS at 09:23

## 2022-10-12 RX ADMIN — ACETAMINOPHEN PRN MG: 325 TABLET ORAL at 16:13

## 2022-10-12 RX ADMIN — ACETAMINOPHEN PRN MG: 325 TABLET ORAL at 03:02

## 2022-10-12 RX ADMIN — PREGABALIN SCH MG: 25 CAPSULE ORAL at 09:17

## 2022-10-12 RX ADMIN — Medication SCH EACH: at 22:22

## 2022-10-12 RX ADMIN — Medication SCH MG: at 20:22

## 2022-10-12 RX ADMIN — HYDROMORPHONE HYDROCHLORIDE PRN MG: 2 TABLET ORAL at 12:24

## 2022-10-12 RX ADMIN — DILTIAZEM HYDROCHLORIDE SCH MG: 240 CAPSULE, EXTENDED RELEASE ORAL at 09:18

## 2022-10-12 RX ADMIN — GUAIFENESIN AND CODEINE PHOSPHATE PRN ML: 100; 10 SOLUTION ORAL at 11:03

## 2022-10-12 RX ADMIN — GUAIFENESIN AND CODEINE PHOSPHATE PRN ML: 100; 10 SOLUTION ORAL at 16:13

## 2022-10-12 NOTE — NUR
RN CLOSING NOTES



PT IN BEDSIDE CHAIR, AWAKE, A/OX4 AND ABLE TO MAKE NEEDS KNOWN. ON 02 AT 4L/MIN VIA N/C AND 
PT TOLERATING  WELL. O2 SAT 99%. IV ACCESS ON RIGHT FOREARM #20G INTACT AND PATENT. NO S/S 
OF INFILTRATIONS. NO C/O PAIN OR DISCOMFORT AT THIS TIME. NO ACUTE DISTRESS. SAFETY MEASURED 
IN PLACE. CALL LIGHT WITHIN REACH. WILL ENDORSE TO NIGHTSHIFT FOR CONTINUATION OF CARE.

## 2022-10-12 NOTE — NUR
RN CLOSING NOTES



PT IN BED, AWAKE, A/OX4 AND VERBALLY RESPONSIVE. ON 02 AT 4L/MIN VIA N/C AND PT TOLERATING  
WELL. O2 SAT 99%. IV ACCESS ON RAC #20G INTACT AND PATENT. NO S/S OF INFILTRATIONS. NO C/O 
PAIN OR DISCOMFORT AT THIS TIME. NO ACUTE DISTRESS. ALL DUE MEDS GIVEN AS ORDERED. ALL 
SAFETY MEASURED IN PLACE. SIDE RAILS UP X2, BED IN LOWEST POSITION AND LOCKED. PLACE CALL 
LIGHT WITHIN REACH. WILL ENDORSE TO MORNING SHIFT NURSE.

## 2022-10-12 NOTE — NUR
RN OPENING NOTES



RECEIVED PT SITTING ON THE BED, AWAKE, A/OX4 AND VERBALLY RESPONSIVE. ON 02 AT 4L/MIN VIA 
N/C AND PT TOLERATING  WELL. IV ACCESS ON RFA #20G INTACT AND PATENT. NO S/S OF 
INFILTRATIONS. NO C/O PAIN OR DISCOMFORT AT THIS TIME. NO ACUTE DISTRESS. ALL SAFETY 
MEASURED IN PLACE. SIDE RAILS UP X3, BED IN LOWEST POSITION AND LOCKED.  PLACE CALL LIGHT 
WITHIN REACH. WILL CONTINUE TO MONITOR.

## 2022-10-12 NOTE — NUR
RN OPENING NOTES



PT IN BEDSIDE CHAIR, AWAKE, A/OX4 AND ABLE TO MAKE NEEDS KNOWN. ON 02 AT 4L/MIN VIA N/C AND 
PT TOLERATING  WELL. O2 SAT 99%. IV ACCESS ON RAC #20G INTACT AND PATENT. NO S/S OF 
INFILTRATIONS. NO C/O PAIN OR DISCOMFORT AT THIS TIME. NO ACUTE DISTRESS. SAFETY MEASURED IN 
PLACE. CALL LIGHT WITHIN REACH. WILL CONTINUE PLAN OF CARE AND ANTICIPATE NEEDS.

## 2022-10-12 NOTE — NUR
WOUND CARE CONSULT: PT PRESENTS WITH VERY RED SWOLLEN RT LOWER LEG AND REDNESS/RASH WITH 
PEELING SKIN TO BUTTOCKS, PRESENT ON ADMISSION. DEFER TO PMD FOR LOWER LEG. RECOMMENDATIONS 
MADE FOR SKIN CARE AND PROTECTION/RASH CARE. DISCUSSED WITH NURSING STAFF. DISCUSSED 
IMPORTANCE OF LEG ELEVATION WITH PT AND NURSING STAFF. MD IN AGREEMENT WITH PLAN OF CARE.

## 2022-10-13 VITALS — DIASTOLIC BLOOD PRESSURE: 61 MMHG | SYSTOLIC BLOOD PRESSURE: 132 MMHG

## 2022-10-13 VITALS — SYSTOLIC BLOOD PRESSURE: 138 MMHG | DIASTOLIC BLOOD PRESSURE: 71 MMHG

## 2022-10-13 VITALS — SYSTOLIC BLOOD PRESSURE: 123 MMHG | DIASTOLIC BLOOD PRESSURE: 84 MMHG

## 2022-10-13 VITALS — SYSTOLIC BLOOD PRESSURE: 130 MMHG | DIASTOLIC BLOOD PRESSURE: 53 MMHG

## 2022-10-13 VITALS — DIASTOLIC BLOOD PRESSURE: 64 MMHG | SYSTOLIC BLOOD PRESSURE: 134 MMHG

## 2022-10-13 VITALS — DIASTOLIC BLOOD PRESSURE: 41 MMHG | SYSTOLIC BLOOD PRESSURE: 112 MMHG

## 2022-10-13 LAB
BASOPHILS # BLD AUTO: 0 K/UL (ref 0–0.2)
BASOPHILS NFR BLD AUTO: 0.1 % (ref 0–2)
BUN SERPL-MCNC: 49 MG/DL (ref 7–18)
CALCIUM SERPL-MCNC: 9.2 MG/DL (ref 8.5–10.1)
CHLORIDE SERPL-SCNC: 92 MMOL/L (ref 98–107)
CO2 SERPL-SCNC: 30 MMOL/L (ref 21–32)
CREAT SERPL-MCNC: 1.4 MG/DL (ref 0.6–1.3)
EOSINOPHIL NFR BLD AUTO: 0 % (ref 0–6)
GLUCOSE SERPL-MCNC: 245 MG/DL (ref 74–106)
HCT VFR BLD AUTO: 30 % (ref 33–45)
HGB BLD-MCNC: 9.9 G/DL (ref 11.5–14.8)
LYMPHOCYTES NFR BLD AUTO: 0.7 K/UL (ref 0.8–4.8)
LYMPHOCYTES NFR BLD AUTO: 6.5 % (ref 20–44)
MCHC RBC AUTO-ENTMCNC: 33 G/DL (ref 31–36)
MCV RBC AUTO: 84 FL (ref 82–100)
MONOCYTES NFR BLD AUTO: 0.4 K/UL (ref 0.1–1.3)
MONOCYTES NFR BLD AUTO: 4 % (ref 2–12)
NEUTROPHILS # BLD AUTO: 9.5 K/UL (ref 1.8–8.9)
NEUTROPHILS NFR BLD AUTO: 89.4 % (ref 43–81)
PLATELET # BLD AUTO: 472 K/UL (ref 150–450)
POTASSIUM SERPL-SCNC: 4.5 MMOL/L (ref 3.5–5.1)
RBC # BLD AUTO: 3.59 MIL/UL (ref 4–5.2)
SODIUM SERPL-SCNC: 126 MMOL/L (ref 136–145)
WBC NRBC COR # BLD AUTO: 10.6 K/UL (ref 4.3–11)

## 2022-10-13 RX ADMIN — ACETAMINOPHEN PRN MG: 325 TABLET ORAL at 06:40

## 2022-10-13 RX ADMIN — CLOPIDOGREL BISULFATE SCH MG: 75 TABLET, FILM COATED ORAL at 08:18

## 2022-10-13 RX ADMIN — FLUTICASONE FUROATE AND VILANTEROL TRIFENATATE SCH EACH: 100; 25 POWDER RESPIRATORY (INHALATION) at 08:30

## 2022-10-13 RX ADMIN — LEVOFLOXACIN SCH MLS/HR: 500 INJECTION, SOLUTION INTRAVENOUS at 21:08

## 2022-10-13 RX ADMIN — Medication SCH EACH: at 17:46

## 2022-10-13 RX ADMIN — PANTOPRAZOLE SODIUM SCH MG: 40 TABLET, DELAYED RELEASE ORAL at 08:17

## 2022-10-13 RX ADMIN — CLOTRIMAZOLE SCH GM: 1 CREAM TOPICAL at 09:57

## 2022-10-13 RX ADMIN — DEXTROSE MONOHYDRATE SCH MLS/HR: 50 INJECTION, SOLUTION INTRAVENOUS at 09:36

## 2022-10-13 RX ADMIN — LABETALOL HCL SCH MG: 100 TABLET, FILM COATED ORAL at 21:10

## 2022-10-13 RX ADMIN — Medication SCH MG: at 08:18

## 2022-10-13 RX ADMIN — BACLOFEN SCH MG: 10 TABLET ORAL at 21:09

## 2022-10-13 RX ADMIN — INSULIN HUMAN PRN UNITS: 100 INJECTION, SOLUTION PARENTERAL at 18:04

## 2022-10-13 RX ADMIN — INSULIN GLARGINE SCH UNIT: 100 INJECTION, SOLUTION SUBCUTANEOUS at 09:55

## 2022-10-13 RX ADMIN — PREGABALIN SCH MG: 25 CAPSULE ORAL at 16:16

## 2022-10-13 RX ADMIN — POLYETHYLENE GLYCOL 3350 SCH GM: 17 POWDER, FOR SOLUTION ORAL at 08:18

## 2022-10-13 RX ADMIN — BACLOFEN SCH MG: 10 TABLET ORAL at 13:00

## 2022-10-13 RX ADMIN — Medication SCH EA: at 10:09

## 2022-10-13 RX ADMIN — Medication SCH MG: at 13:52

## 2022-10-13 RX ADMIN — DEXTROSE MONOHYDRATE SCH MLS/HR: 50 INJECTION, SOLUTION INTRAVENOUS at 10:12

## 2022-10-13 RX ADMIN — HYDROMORPHONE HYDROCHLORIDE PRN MG: 2 TABLET ORAL at 14:41

## 2022-10-13 RX ADMIN — VALSARTAN SCH MG: 80 TABLET ORAL at 08:23

## 2022-10-13 RX ADMIN — HYDROMORPHONE HYDROCHLORIDE PRN MG: 2 TABLET ORAL at 01:44

## 2022-10-13 RX ADMIN — Medication SCH MG: at 20:16

## 2022-10-13 RX ADMIN — ALBUTEROL SULFATE SCH MG: 1.25 SOLUTION RESPIRATORY (INHALATION) at 01:01

## 2022-10-13 RX ADMIN — FERROUS SULFATE TAB 325 MG (65 MG ELEMENTAL FE) SCH MG: 325 (65 FE) TAB at 16:17

## 2022-10-13 RX ADMIN — DILTIAZEM HYDROCHLORIDE SCH MG: 240 CAPSULE, EXTENDED RELEASE ORAL at 08:23

## 2022-10-13 RX ADMIN — ALBUTEROL SULFATE SCH MG: 1.25 SOLUTION RESPIRATORY (INHALATION) at 07:57

## 2022-10-13 RX ADMIN — GUAIFENESIN AND CODEINE PHOSPHATE PRN ML: 100; 10 SOLUTION ORAL at 01:37

## 2022-10-13 RX ADMIN — BACLOFEN SCH MG: 10 TABLET ORAL at 08:18

## 2022-10-13 RX ADMIN — ATORVASTATIN CALCIUM SCH MG: 10 TABLET, FILM COATED ORAL at 21:08

## 2022-10-13 RX ADMIN — ALPRAZOLAM PRN MG: 0.25 TABLET ORAL at 17:50

## 2022-10-13 RX ADMIN — Medication SCH MG: at 08:19

## 2022-10-13 RX ADMIN — TOLTERODINE TARTRATE SCH MG: 2 CAPSULE, EXTENDED RELEASE ORAL at 08:18

## 2022-10-13 RX ADMIN — Medication SCH OZ: at 09:57

## 2022-10-13 RX ADMIN — HUMAN INSULIN PRN UNIT: 100 INJECTION, SOLUTION SUBCUTANEOUS at 21:40

## 2022-10-13 RX ADMIN — PREGABALIN SCH MG: 25 CAPSULE ORAL at 08:18

## 2022-10-13 RX ADMIN — Medication SCH EACH: at 21:38

## 2022-10-13 RX ADMIN — HYDROMORPHONE HYDROCHLORIDE PRN MG: 2 TABLET ORAL at 07:47

## 2022-10-13 RX ADMIN — ALBUTEROL SULFATE SCH MG: 1.25 SOLUTION RESPIRATORY (INHALATION) at 20:16

## 2022-10-13 RX ADMIN — Medication SCH MG: at 07:56

## 2022-10-13 RX ADMIN — ENOXAPARIN SODIUM SCH MG: 40 INJECTION SUBCUTANEOUS at 08:23

## 2022-10-13 RX ADMIN — INSULIN HUMAN PRN UNITS: 100 INJECTION, SOLUTION PARENTERAL at 12:03

## 2022-10-13 RX ADMIN — FERROUS SULFATE TAB 325 MG (65 MG ELEMENTAL FE) SCH MG: 325 (65 FE) TAB at 08:19

## 2022-10-13 RX ADMIN — LABETALOL HCL SCH MG: 100 TABLET, FILM COATED ORAL at 08:24

## 2022-10-13 RX ADMIN — ALBUTEROL SULFATE SCH MG: 1.25 SOLUTION RESPIRATORY (INHALATION) at 13:49

## 2022-10-13 RX ADMIN — Medication SCH EACH: at 11:57

## 2022-10-13 RX ADMIN — HYDROMORPHONE HYDROCHLORIDE PRN MG: 2 TABLET ORAL at 22:18

## 2022-10-13 RX ADMIN — INSULIN HUMAN PRN UNITS: 100 INJECTION, SOLUTION PARENTERAL at 08:22

## 2022-10-13 RX ADMIN — Medication SCH MG: at 01:01

## 2022-10-13 RX ADMIN — Medication SCH EACH: at 08:17

## 2022-10-13 RX ADMIN — CLOTRIMAZOLE SCH GM: 1 CREAM TOPICAL at 17:47

## 2022-10-13 NOTE — NUR
RN NOTE

PT REPORTS OF A 7/10 GENERALIZED PAIN, MAINLY ON HER HEAD, BACK, AND BUTTOCKS. PT REQUESTS 
FOR DILAUDID. PT ADMINISTERED DILAUDID 4 MG. WILL MONITOR FOR EFFECTIVENESS.

## 2022-10-13 NOTE — NUR
RN NOTES:



PT C/O SEVERE GENERALIZED PAIN AND COUGH. DILAUDID 4 MG 2 TABS AND ROBITUSSIN AC GIVEN AND 
PT TOLERATED WELL. WILL CONTINUE TO MONITOR

## 2022-10-13 NOTE — NUR
RN CLOSING NOTES



PT IN BED, AWAKE, A/OX4 AND VERBALLY RESPONSIVE. ON 02 AT 4L/MIN VIA N/C AND PT TOLERATING  
WELL. O2 SAT 96%. IV ACCESS ON RAC #20G INTACT AND PATENT. NO S/S OF INFILTRATIONS. NO C/O 
PAIN OR DISCOMFORT AT THIS TIME. NO ACUTE DISTRESS. ALL DUE MEDS GIVEN AS ORDERED. ALL 
SAFETY MEASURED IN PLACE. SIDE RAILS UP X2, BED IN LOWEST POSITION AND LOCKED. PLACE CALL 
LIGHT WITHIN REACH. WILL ENDORSE TO MORNING SHIFT NURSE.

## 2022-10-13 NOTE — NUR
TELE RN OPENING NOTES



RECEIVED PT FOR CONTINUITY OF CARE. PATIENT A/OX4 IN NO S/SX OF ACUTE DISTRESS AT THIS TIME; 
CURRENTLY ON 3L OF 02 VIA SIMPLE MASK; WITH 02 SAT >95% AT THIS TIME.WITH IV ACCESS PATENT, 
INTACT AND FLUSHING WELL. SAFETY MEASURES IN PLACE. PATIENT BED ALARM IS ON. HEAD OF BED 
ELEVATED. BED IS LOCKED,  IN LOWEST POSITION AND SIDE RAILS UP. CALL LIGHT WITHIN REACH OF 
THE PATIENT. . WILL CONTINUE TO MONITOR AND REASSESS FOR ANY CHANGES AND WILL CARRY OUT ANY 
ONGOING AND ACTIVE MD ORDER.

## 2022-10-13 NOTE — NUR
RN NOTES:

SPOKE TO DR BLANCO PT IS VERY ANXIOUS AND VERY RESTLESS LEADING TO HYPERVENTILATION AND SOB 
ALSO PATIENT IS REQUESTING AN ORDER FOR XANAX WITH ORDER OF XANAX 0.25 MG EVERY 6 HOURS AS 
NEEDED FOR ANXIETY.

## 2022-10-13 NOTE — NUR
TELE RN OPENING NOTE

PT RECEIVED SITTING ON EDGE OF BED, AWAKE, A&O X4, CALM, COOPERATIVE. PT IS ON 4L NC WITH 
CURRENT O2SAT OF 94%; NOTED TO HAVE SOB, ESPECIALLY WHEN GOING FROM BEDSIDE COMMODE TO BED; 
NO OTHER S/S OF RESP DISTRESS, NO COUGH, NON-LABORED AND EQUAL BREATHING. PT ATTACHED TO 
EXTERNAL MONITOR, SR WITH HR OF 83.IV ACCESS ON RFA 20G, INTACT AND PATENT, FLUSHES EASILY 
WITH NO RESISTANCE, NS INFUSING AT 75 ML/HR. BED IN LOWEST POSITION, CALL LIGHT WITHIN 
REACH, SIDE RAILS UP X2. WILL CONTINUE TO MONITOR THROUGHOUT THE NIGHT.

## 2022-10-14 VITALS — DIASTOLIC BLOOD PRESSURE: 62 MMHG | SYSTOLIC BLOOD PRESSURE: 169 MMHG

## 2022-10-14 VITALS — DIASTOLIC BLOOD PRESSURE: 55 MMHG | SYSTOLIC BLOOD PRESSURE: 116 MMHG

## 2022-10-14 VITALS — DIASTOLIC BLOOD PRESSURE: 75 MMHG | SYSTOLIC BLOOD PRESSURE: 116 MMHG

## 2022-10-14 VITALS — DIASTOLIC BLOOD PRESSURE: 70 MMHG | SYSTOLIC BLOOD PRESSURE: 113 MMHG

## 2022-10-14 VITALS — SYSTOLIC BLOOD PRESSURE: 114 MMHG | DIASTOLIC BLOOD PRESSURE: 51 MMHG

## 2022-10-14 VITALS — DIASTOLIC BLOOD PRESSURE: 61 MMHG | SYSTOLIC BLOOD PRESSURE: 140 MMHG

## 2022-10-14 LAB
BUN SERPL-MCNC: 51 MG/DL (ref 7–18)
CALCIUM SERPL-MCNC: 9 MG/DL (ref 8.5–10.1)
CHLORIDE SERPL-SCNC: 92 MMOL/L (ref 98–107)
CO2 SERPL-SCNC: 30 MMOL/L (ref 21–32)
CREAT SERPL-MCNC: 1.3 MG/DL (ref 0.6–1.3)
GLUCOSE SERPL-MCNC: 310 MG/DL (ref 74–106)
POTASSIUM SERPL-SCNC: 4.4 MMOL/L (ref 3.5–5.1)
SODIUM SERPL-SCNC: 127 MMOL/L (ref 136–145)

## 2022-10-14 PROCEDURE — 05H533Z INSERTION OF INFUSION DEVICE INTO RIGHT SUBCLAVIAN VEIN, PERCUTANEOUS APPROACH: ICD-10-PCS | Performed by: NURSE PRACTITIONER

## 2022-10-14 PROCEDURE — B546ZZA ULTRASONOGRAPHY OF RIGHT SUBCLAVIAN VEIN, GUIDANCE: ICD-10-PCS | Performed by: NURSE PRACTITIONER

## 2022-10-14 RX ADMIN — INSULIN HUMAN PRN UNITS: 100 INJECTION, SOLUTION PARENTERAL at 12:56

## 2022-10-14 RX ADMIN — ALBUTEROL SULFATE SCH MG: 1.25 SOLUTION RESPIRATORY (INHALATION) at 01:45

## 2022-10-14 RX ADMIN — FLUTICASONE FUROATE AND VILANTEROL TRIFENATATE SCH EACH: 100; 25 POWDER RESPIRATORY (INHALATION) at 08:34

## 2022-10-14 RX ADMIN — Medication SCH MG: at 20:29

## 2022-10-14 RX ADMIN — SODIUM CHLORIDE PRN MLS/HR: 9 INJECTION, SOLUTION INTRAVENOUS at 03:54

## 2022-10-14 RX ADMIN — ALBUTEROL SULFATE SCH MG: 1.25 SOLUTION RESPIRATORY (INHALATION) at 12:40

## 2022-10-14 RX ADMIN — BACLOFEN SCH MG: 10 TABLET ORAL at 20:31

## 2022-10-14 RX ADMIN — LEVOFLOXACIN SCH MLS/HR: 500 INJECTION, SOLUTION INTRAVENOUS at 21:21

## 2022-10-14 RX ADMIN — TOLTERODINE TARTRATE SCH MG: 2 CAPSULE, EXTENDED RELEASE ORAL at 08:25

## 2022-10-14 RX ADMIN — Medication SCH EACH: at 12:58

## 2022-10-14 RX ADMIN — CLOTRIMAZOLE SCH APPLIC: 1 CREAM TOPICAL at 08:35

## 2022-10-14 RX ADMIN — ALPRAZOLAM PRN MG: 0.25 TABLET ORAL at 12:56

## 2022-10-14 RX ADMIN — FERROUS SULFATE TAB 325 MG (65 MG ELEMENTAL FE) SCH MG: 325 (65 FE) TAB at 16:27

## 2022-10-14 RX ADMIN — HYDROMORPHONE HYDROCHLORIDE PRN MG: 2 TABLET ORAL at 21:21

## 2022-10-14 RX ADMIN — INSULIN GLARGINE SCH UNIT: 100 INJECTION, SOLUTION SUBCUTANEOUS at 09:35

## 2022-10-14 RX ADMIN — INSULIN HUMAN PRN UNITS: 100 INJECTION, SOLUTION PARENTERAL at 08:32

## 2022-10-14 RX ADMIN — PREGABALIN SCH MG: 25 CAPSULE ORAL at 16:27

## 2022-10-14 RX ADMIN — DEXTROSE MONOHYDRATE SCH MLS/HR: 50 INJECTION, SOLUTION INTRAVENOUS at 13:02

## 2022-10-14 RX ADMIN — Medication SCH EA: at 08:30

## 2022-10-14 RX ADMIN — INSULIN HUMAN PRN UNITS: 100 INJECTION, SOLUTION PARENTERAL at 17:22

## 2022-10-14 RX ADMIN — BACLOFEN SCH MG: 10 TABLET ORAL at 12:58

## 2022-10-14 RX ADMIN — ALBUTEROL SULFATE SCH MG: 1.25 SOLUTION RESPIRATORY (INHALATION) at 07:39

## 2022-10-14 RX ADMIN — ATORVASTATIN CALCIUM SCH MG: 10 TABLET, FILM COATED ORAL at 21:21

## 2022-10-14 RX ADMIN — Medication SCH EACH: at 22:16

## 2022-10-14 RX ADMIN — Medication SCH EACH: at 17:22

## 2022-10-14 RX ADMIN — Medication SCH EACH: at 08:34

## 2022-10-14 RX ADMIN — POLYETHYLENE GLYCOL 3350 SCH GM: 17 POWDER, FOR SOLUTION ORAL at 08:36

## 2022-10-14 RX ADMIN — CLOPIDOGREL BISULFATE SCH MG: 75 TABLET, FILM COATED ORAL at 08:26

## 2022-10-14 RX ADMIN — HUMAN INSULIN PRN UNIT: 100 INJECTION, SOLUTION SUBCUTANEOUS at 22:19

## 2022-10-14 RX ADMIN — LABETALOL HCL SCH MG: 100 TABLET, FILM COATED ORAL at 08:27

## 2022-10-14 RX ADMIN — Medication SCH MG: at 12:40

## 2022-10-14 RX ADMIN — FERROUS SULFATE TAB 325 MG (65 MG ELEMENTAL FE) SCH MG: 325 (65 FE) TAB at 08:26

## 2022-10-14 RX ADMIN — Medication SCH MG: at 08:26

## 2022-10-14 RX ADMIN — Medication SCH OZ: at 08:35

## 2022-10-14 RX ADMIN — HYDROMORPHONE HYDROCHLORIDE PRN MG: 2 TABLET ORAL at 05:09

## 2022-10-14 RX ADMIN — Medication SCH MG: at 01:45

## 2022-10-14 RX ADMIN — ENOXAPARIN SODIUM SCH MG: 40 INJECTION SUBCUTANEOUS at 08:29

## 2022-10-14 RX ADMIN — Medication SCH MG: at 07:39

## 2022-10-14 RX ADMIN — LABETALOL HCL SCH MG: 100 TABLET, FILM COATED ORAL at 20:32

## 2022-10-14 RX ADMIN — BACLOFEN SCH MG: 10 TABLET ORAL at 08:26

## 2022-10-14 RX ADMIN — PANTOPRAZOLE SODIUM SCH MG: 40 TABLET, DELAYED RELEASE ORAL at 08:26

## 2022-10-14 RX ADMIN — ALBUTEROL SULFATE SCH MG: 1.25 SOLUTION RESPIRATORY (INHALATION) at 20:30

## 2022-10-14 RX ADMIN — Medication SCH MG: at 08:25

## 2022-10-14 RX ADMIN — ACETAMINOPHEN PRN MG: 325 TABLET ORAL at 23:53

## 2022-10-14 RX ADMIN — DILTIAZEM HYDROCHLORIDE SCH MG: 240 CAPSULE, EXTENDED RELEASE ORAL at 08:25

## 2022-10-14 RX ADMIN — CLOTRIMAZOLE SCH APPLIC: 1 CREAM TOPICAL at 16:28

## 2022-10-14 RX ADMIN — VALSARTAN SCH MG: 80 TABLET ORAL at 08:26

## 2022-10-14 RX ADMIN — PREGABALIN SCH MG: 25 CAPSULE ORAL at 08:26

## 2022-10-14 NOTE — NUR
TELE NOTE

RECEIVED PT ON BED, SLEEPING TO VERBAL STIMULI, ON 3L O2 NC/ , O2 SAT WNL, ON TELE SR HR IN 
70'S , IN SITE CDI, SR UP X3, CALL LIGHT WITHIN EASY REACH, BED LOCKED AND IN LOWEST 
POSITION, CONTINUE TO MONITOR.

## 2022-10-14 NOTE — NUR
RN NOTES 

PT IS NEEDY AND ANXIOUS AT TIMES, ON CALL LIGHTS FREQUENTLY, EMOTION SUPPORT GIVEN, ON 3L O2 
N/C , NO DISTRESS NOTED , WILL ENDORSE TO NIGHT SHIFT NURSE FOR CONTINUITY OF CARE .

## 2022-10-14 NOTE — NUR
RN NOTE

PT COMPLAINS OF 9/10 GENERALIZED PAIN AND REQUESTS FOR DILAUDID. PT ADMINISTERED DILAUDID 4 
MG. WILL MONITOR FOR EFFECTIVENESS.

## 2022-10-14 NOTE — NUR
TELE RN OPENING NOTE



PT RECEIVED IN BED, AWAKE, A/O X 3-4, ABLE TO MAKE NEEDS KNOWN, RESTLESS AT TIMES. CURRENTLY 
ON 3L NC WITH CURRENT O2 SAT OF 97%; NOTED TO HAVE A BIT OF WHEEZING AS PER RT; NO OTHER S/S 
OF RESP DISTRESS, NO COUGH, NON-LABORED AND EQUAL BREATHING. PT ATTACHED TO EXTERNAL 
MONITOR, SR WITH HR OF >80. IV ACCESS ON RFA 20G, INTACT AND PATENT, NS INFUSING AT 75 
ML/HR. PT IS NOTED TO HAVE BLE CELLULITIS.



ALL SAFETY MEASURES IN PLACE: BED IN LOWEST POSITION, CALL LIGHT WITHIN REACH, SIDE RAILS UP 
X2. WILL CONTINUE TO MONITOR THROUGHOUT THE NIGHT.

## 2022-10-14 NOTE — NUR
TELE RN CLOSING NOTE

PT REMAINS IN BED AWAKE, A&O X4, CALM, COOPERATIVE. CONTINUES TO BE ON 3L NC WITH CURRENT 
O2SAT IN THE 90S; NOTED TO HAVE SOB AND PRODUCTIVE COUGH; NO OTHER S/S OF RESP DISTRESS, 
NON-LABORED AND EQUAL BREATHING. PT ATTACHED TO EXTERNAL MONITOR, SR WITH HR OF 85.IV ACCESS 
ON RFA 20G, INTACT AND PATENT, FLUSHES EASILY WITH NO RESISTANCE, NS INFUSING AT 75 ML/HR. 
ALL DUE MEDS ADMINISTERED DURING THE NIGHT. BED IN LOWEST POSITION, CALL LIGHT WITHIN REACH, 
SIDE RAILS UP X2. WILL ENDORSE TO DAYSHIFT NURSE TO CONTINUE CARE.

## 2022-10-15 VITALS — DIASTOLIC BLOOD PRESSURE: 55 MMHG | SYSTOLIC BLOOD PRESSURE: 116 MMHG

## 2022-10-15 VITALS — DIASTOLIC BLOOD PRESSURE: 84 MMHG | SYSTOLIC BLOOD PRESSURE: 166 MMHG

## 2022-10-15 VITALS — SYSTOLIC BLOOD PRESSURE: 119 MMHG | DIASTOLIC BLOOD PRESSURE: 60 MMHG

## 2022-10-15 VITALS — DIASTOLIC BLOOD PRESSURE: 67 MMHG | SYSTOLIC BLOOD PRESSURE: 159 MMHG

## 2022-10-15 VITALS — SYSTOLIC BLOOD PRESSURE: 145 MMHG | DIASTOLIC BLOOD PRESSURE: 55 MMHG

## 2022-10-15 VITALS — DIASTOLIC BLOOD PRESSURE: 72 MMHG | SYSTOLIC BLOOD PRESSURE: 150 MMHG

## 2022-10-15 LAB
BUN SERPL-MCNC: 32 MG/DL (ref 7–18)
CALCIUM SERPL-MCNC: 8.9 MG/DL (ref 8.5–10.1)
CHLORIDE SERPL-SCNC: 96 MMOL/L (ref 98–107)
CO2 SERPL-SCNC: 33 MMOL/L (ref 21–32)
CREAT SERPL-MCNC: 1 MG/DL (ref 0.6–1.3)
GLUCOSE SERPL-MCNC: 262 MG/DL (ref 74–106)
MAGNESIUM SERPL-MCNC: 2.8 MG/DL (ref 1.8–2.4)
POTASSIUM SERPL-SCNC: 4.3 MMOL/L (ref 3.5–5.1)
SODIUM SERPL-SCNC: 131 MMOL/L (ref 136–145)

## 2022-10-15 RX ADMIN — BACLOFEN SCH MG: 10 TABLET ORAL at 21:28

## 2022-10-15 RX ADMIN — Medication SCH MG: at 14:22

## 2022-10-15 RX ADMIN — Medication SCH OZ: at 08:09

## 2022-10-15 RX ADMIN — FERROUS SULFATE TAB 325 MG (65 MG ELEMENTAL FE) SCH MG: 325 (65 FE) TAB at 08:08

## 2022-10-15 RX ADMIN — Medication SCH EACH: at 07:43

## 2022-10-15 RX ADMIN — Medication SCH MG: at 07:49

## 2022-10-15 RX ADMIN — Medication SCH EACH: at 17:17

## 2022-10-15 RX ADMIN — Medication SCH MG: at 01:29

## 2022-10-15 RX ADMIN — FERROUS SULFATE TAB 325 MG (65 MG ELEMENTAL FE) SCH MG: 325 (65 FE) TAB at 16:24

## 2022-10-15 RX ADMIN — ALBUTEROL SULFATE SCH MG: 1.25 SOLUTION RESPIRATORY (INHALATION) at 23:22

## 2022-10-15 RX ADMIN — CLOTRIMAZOLE SCH APPLIC: 1 CREAM TOPICAL at 08:06

## 2022-10-15 RX ADMIN — HUMAN INSULIN PRN UNIT: 100 INJECTION, SOLUTION SUBCUTANEOUS at 22:11

## 2022-10-15 RX ADMIN — TOLTERODINE TARTRATE SCH MG: 2 CAPSULE, EXTENDED RELEASE ORAL at 08:08

## 2022-10-15 RX ADMIN — LEVOFLOXACIN SCH MG: 250 TABLET, FILM COATED ORAL at 21:27

## 2022-10-15 RX ADMIN — Medication SCH EA: at 09:00

## 2022-10-15 RX ADMIN — INSULIN HUMAN PRN UNITS: 100 INJECTION, SOLUTION PARENTERAL at 08:16

## 2022-10-15 RX ADMIN — CLOPIDOGREL BISULFATE SCH MG: 75 TABLET, FILM COATED ORAL at 08:08

## 2022-10-15 RX ADMIN — INSULIN HUMAN PRN UNITS: 100 INJECTION, SOLUTION PARENTERAL at 17:32

## 2022-10-15 RX ADMIN — ATORVASTATIN CALCIUM SCH MG: 10 TABLET, FILM COATED ORAL at 21:26

## 2022-10-15 RX ADMIN — ALPRAZOLAM PRN MG: 0.25 TABLET ORAL at 03:19

## 2022-10-15 RX ADMIN — GUAIFENESIN AND CODEINE PHOSPHATE PRN ML: 100; 10 SOLUTION ORAL at 11:21

## 2022-10-15 RX ADMIN — ALBUTEROL SULFATE PRN MG: 1.25 SOLUTION RESPIRATORY (INHALATION) at 10:49

## 2022-10-15 RX ADMIN — PREGABALIN SCH MG: 25 CAPSULE ORAL at 16:24

## 2022-10-15 RX ADMIN — Medication SCH EACH: at 11:45

## 2022-10-15 RX ADMIN — GUAIFENESIN AND CODEINE PHOSPHATE PRN ML: 100; 10 SOLUTION ORAL at 16:24

## 2022-10-15 RX ADMIN — ALPRAZOLAM PRN MG: 0.25 TABLET ORAL at 16:24

## 2022-10-15 RX ADMIN — Medication SCH EACH: at 22:10

## 2022-10-15 RX ADMIN — HYDROMORPHONE HYDROCHLORIDE PRN MG: 2 TABLET ORAL at 17:30

## 2022-10-15 RX ADMIN — INSULIN HUMAN PRN UNITS: 100 INJECTION, SOLUTION PARENTERAL at 12:20

## 2022-10-15 RX ADMIN — SODIUM CHLORIDE PRN MLS/HR: 9 INJECTION, SOLUTION INTRAVENOUS at 17:30

## 2022-10-15 RX ADMIN — Medication SCH MG: at 19:40

## 2022-10-15 RX ADMIN — LABETALOL HCL SCH MG: 100 TABLET, FILM COATED ORAL at 08:09

## 2022-10-15 RX ADMIN — POLYETHYLENE GLYCOL 3350 SCH GM: 17 POWDER, FOR SOLUTION ORAL at 08:07

## 2022-10-15 RX ADMIN — PANTOPRAZOLE SODIUM SCH MG: 40 TABLET, DELAYED RELEASE ORAL at 08:08

## 2022-10-15 RX ADMIN — Medication SCH MG: at 08:08

## 2022-10-15 RX ADMIN — ACETAMINOPHEN PRN MG: 325 TABLET ORAL at 10:08

## 2022-10-15 RX ADMIN — VALSARTAN SCH MG: 80 TABLET ORAL at 08:08

## 2022-10-15 RX ADMIN — ALBUTEROL SULFATE SCH MG: 1.25 SOLUTION RESPIRATORY (INHALATION) at 14:22

## 2022-10-15 RX ADMIN — ALBUTEROL SULFATE SCH MG: 1.25 SOLUTION RESPIRATORY (INHALATION) at 01:29

## 2022-10-15 RX ADMIN — SODIUM CHLORIDE PRN MLS/HR: 9 INJECTION, SOLUTION INTRAVENOUS at 03:24

## 2022-10-15 RX ADMIN — DEXTROSE MONOHYDRATE SCH MLS/HR: 50 INJECTION, SOLUTION INTRAVENOUS at 11:15

## 2022-10-15 RX ADMIN — FLUTICASONE FUROATE AND VILANTEROL TRIFENATATE SCH EACH: 100; 25 POWDER RESPIRATORY (INHALATION) at 08:10

## 2022-10-15 RX ADMIN — LABETALOL HCL SCH MG: 100 TABLET, FILM COATED ORAL at 21:27

## 2022-10-15 RX ADMIN — HYDROMORPHONE HYDROCHLORIDE PRN MG: 2 TABLET ORAL at 11:21

## 2022-10-15 RX ADMIN — PREGABALIN SCH MG: 25 CAPSULE ORAL at 08:09

## 2022-10-15 RX ADMIN — ALBUTEROL SULFATE PRN MG: 1.25 SOLUTION RESPIRATORY (INHALATION) at 16:34

## 2022-10-15 RX ADMIN — ALBUTEROL SULFATE SCH MG: 1.25 SOLUTION RESPIRATORY (INHALATION) at 07:49

## 2022-10-15 RX ADMIN — DILTIAZEM HYDROCHLORIDE SCH MG: 240 CAPSULE, EXTENDED RELEASE ORAL at 08:08

## 2022-10-15 RX ADMIN — ALBUTEROL SULFATE SCH MG: 1.25 SOLUTION RESPIRATORY (INHALATION) at 19:40

## 2022-10-15 RX ADMIN — CLOTRIMAZOLE SCH APPLIC: 1 CREAM TOPICAL at 16:24

## 2022-10-15 RX ADMIN — INSULIN GLARGINE SCH UNIT: 100 INJECTION, SOLUTION SUBCUTANEOUS at 08:12

## 2022-10-15 RX ADMIN — BACLOFEN SCH MG: 10 TABLET ORAL at 12:18

## 2022-10-15 RX ADMIN — BACLOFEN SCH MG: 10 TABLET ORAL at 08:08

## 2022-10-15 RX ADMIN — ENOXAPARIN SODIUM SCH MG: 40 INJECTION SUBCUTANEOUS at 08:07

## 2022-10-15 NOTE — NUR
RN CLOSING NOTE



PT IN BED, SLEEPING. WAS AWAKE ALMOST THE ENTIRE NIGHT. PT VERY NEEDY, HAS SOME 
HALLUCINATIONS THAT BUGS ARE CRAWLING UP HER BED. GAVE XANAX AND DILAUDID PRN AS ORDERED 
AFTER COMPLAINING OF PAIN AND ANXIETY. CURRENTLY ON NC AT 2L, TOLERATING WELL. SR ON TELE 
MONITOR. 



ALL DUE MEDS GIVEN. NEEDS ATTENDED TO. TURNED AND REPOSITIONED.



WILL ENDORSE TO AM SHIFT NURSE FOR NAHUN.

## 2022-10-15 NOTE — NUR
RN NOTES



RECEIVED PT SITTING ON THE CHAIR FOR CONTINUITY OF CARE. PATIENT A/OX3-4 IN NO S/SX OF ACUTE 
DISTRESS AT THIS TIME; CURRENTLY ON 2L OF 02 VIA NC; WITH 02 SAT >95% AT THIS TIME. WITH IV 
ACCESS ON L HAND #22 PATENT, INTACT AND FLUSHING WELL. ENSURE SAFETY MEASURES WITHIN THE 
SHIFT. PATIENT BED ALARM IS ON. HEAD OF BED ELEVATED. BED IS LOCKED,  IN LOWEST POSITION AND 
SIDE RAILS UP. CALL LIGHT WITHIN REACH OF THE PATIENT.  WILL CONTINUE TO MONITOR AND 
REASSESS FOR ANY CHANGES AND WILL CARRY OUT ANY ONGOING AND ACTIVE MD ORDER.

## 2022-10-15 NOTE — NUR
RT

RECEIVED PT ON 2LPM NC. SPO2 99. HR 95. Q4 TX GIVEN. Q4 CPT PREFORMED. NO S/S OF SOB OR 
ACUTE RESPIRATORY DISTRESS NOTED. PT COMPLAINED ABOUT SEEING THINGS.

## 2022-10-16 VITALS — DIASTOLIC BLOOD PRESSURE: 60 MMHG | SYSTOLIC BLOOD PRESSURE: 155 MMHG

## 2022-10-16 VITALS — SYSTOLIC BLOOD PRESSURE: 142 MMHG | DIASTOLIC BLOOD PRESSURE: 62 MMHG

## 2022-10-16 VITALS — SYSTOLIC BLOOD PRESSURE: 166 MMHG | DIASTOLIC BLOOD PRESSURE: 70 MMHG

## 2022-10-16 VITALS — SYSTOLIC BLOOD PRESSURE: 155 MMHG | DIASTOLIC BLOOD PRESSURE: 60 MMHG

## 2022-10-16 VITALS — SYSTOLIC BLOOD PRESSURE: 171 MMHG | DIASTOLIC BLOOD PRESSURE: 72 MMHG

## 2022-10-16 VITALS — SYSTOLIC BLOOD PRESSURE: 114 MMHG | DIASTOLIC BLOOD PRESSURE: 71 MMHG

## 2022-10-16 LAB
BASOPHILS # BLD AUTO: 0 K/UL (ref 0–0.2)
BASOPHILS NFR BLD AUTO: 0.2 % (ref 0–2)
BUN SERPL-MCNC: 22 MG/DL (ref 7–18)
CALCIUM SERPL-MCNC: 8.8 MG/DL (ref 8.5–10.1)
CHLORIDE SERPL-SCNC: 95 MMOL/L (ref 98–107)
CO2 SERPL-SCNC: 32 MMOL/L (ref 21–32)
CREAT SERPL-MCNC: 0.9 MG/DL (ref 0.6–1.3)
EOSINOPHIL NFR BLD AUTO: 0 % (ref 0–6)
GLUCOSE SERPL-MCNC: 321 MG/DL (ref 74–106)
HCT VFR BLD AUTO: 30 % (ref 33–45)
HGB BLD-MCNC: 9.7 G/DL (ref 11.5–14.8)
LYMPHOCYTES NFR BLD AUTO: 0.5 K/UL (ref 0.8–4.8)
LYMPHOCYTES NFR BLD AUTO: 4.6 % (ref 20–44)
MCHC RBC AUTO-ENTMCNC: 32 G/DL (ref 31–36)
MCV RBC AUTO: 84 FL (ref 82–100)
MONOCYTES NFR BLD AUTO: 0.6 K/UL (ref 0.1–1.3)
MONOCYTES NFR BLD AUTO: 5.4 % (ref 2–12)
NEUTROPHILS # BLD AUTO: 9.6 K/UL (ref 1.8–8.9)
NEUTROPHILS NFR BLD AUTO: 89.8 % (ref 43–81)
PLATELET # BLD AUTO: 410 K/UL (ref 150–450)
POTASSIUM SERPL-SCNC: 4.3 MMOL/L (ref 3.5–5.1)
RBC # BLD AUTO: 3.55 MIL/UL (ref 4–5.2)
SODIUM SERPL-SCNC: 127 MMOL/L (ref 136–145)
WBC NRBC COR # BLD AUTO: 10.7 K/UL (ref 4.3–11)

## 2022-10-16 RX ADMIN — INSULIN HUMAN PRN UNITS: 100 INJECTION, SOLUTION PARENTERAL at 12:03

## 2022-10-16 RX ADMIN — LABETALOL HCL SCH MG: 100 TABLET, FILM COATED ORAL at 08:20

## 2022-10-16 RX ADMIN — LABETALOL HCL SCH MG: 100 TABLET, FILM COATED ORAL at 21:52

## 2022-10-16 RX ADMIN — POLYETHYLENE GLYCOL 3350 SCH GM: 17 POWDER, FOR SOLUTION ORAL at 08:19

## 2022-10-16 RX ADMIN — Medication SCH MG: at 08:20

## 2022-10-16 RX ADMIN — CLOPIDOGREL BISULFATE SCH MG: 75 TABLET, FILM COATED ORAL at 08:21

## 2022-10-16 RX ADMIN — SODIUM CHLORIDE PRN MLS/HR: 9 INJECTION, SOLUTION INTRAVENOUS at 18:11

## 2022-10-16 RX ADMIN — HYDROMORPHONE HYDROCHLORIDE PRN MG: 2 TABLET ORAL at 12:04

## 2022-10-16 RX ADMIN — CLOTRIMAZOLE SCH APPLIC: 1 CREAM TOPICAL at 16:03

## 2022-10-16 RX ADMIN — INSULIN GLARGINE SCH UNIT: 100 INJECTION, SOLUTION SUBCUTANEOUS at 08:24

## 2022-10-16 RX ADMIN — Medication SCH MG: at 20:58

## 2022-10-16 RX ADMIN — GUAIFENESIN AND CODEINE PHOSPHATE PRN ML: 100; 10 SOLUTION ORAL at 22:08

## 2022-10-16 RX ADMIN — Medication SCH EACH: at 07:24

## 2022-10-16 RX ADMIN — Medication SCH EACH: at 17:36

## 2022-10-16 RX ADMIN — FLUTICASONE FUROATE AND VILANTEROL TRIFENATATE SCH EACH: 100; 25 POWDER RESPIRATORY (INHALATION) at 08:21

## 2022-10-16 RX ADMIN — FERROUS SULFATE TAB 325 MG (65 MG ELEMENTAL FE) SCH MG: 325 (65 FE) TAB at 08:21

## 2022-10-16 RX ADMIN — BACLOFEN SCH MG: 10 TABLET ORAL at 08:20

## 2022-10-16 RX ADMIN — ALBUTEROL SULFATE SCH MG: 1.25 SOLUTION RESPIRATORY (INHALATION) at 20:58

## 2022-10-16 RX ADMIN — BACLOFEN SCH MG: 10 TABLET ORAL at 12:04

## 2022-10-16 RX ADMIN — ENOXAPARIN SODIUM SCH MG: 40 INJECTION SUBCUTANEOUS at 08:25

## 2022-10-16 RX ADMIN — DILTIAZEM HYDROCHLORIDE SCH MG: 240 CAPSULE, EXTENDED RELEASE ORAL at 08:21

## 2022-10-16 RX ADMIN — Medication SCH OZ: at 08:22

## 2022-10-16 RX ADMIN — HYDROMORPHONE HYDROCHLORIDE PRN MG: 2 TABLET ORAL at 22:12

## 2022-10-16 RX ADMIN — BACLOFEN SCH MG: 10 TABLET ORAL at 21:53

## 2022-10-16 RX ADMIN — Medication SCH EA: at 08:22

## 2022-10-16 RX ADMIN — PREGABALIN SCH MG: 25 CAPSULE ORAL at 08:20

## 2022-10-16 RX ADMIN — ATORVASTATIN CALCIUM SCH MG: 10 TABLET, FILM COATED ORAL at 21:53

## 2022-10-16 RX ADMIN — HUMAN INSULIN PRN UNIT: 100 INJECTION, SOLUTION SUBCUTANEOUS at 23:05

## 2022-10-16 RX ADMIN — Medication SCH MG: at 08:38

## 2022-10-16 RX ADMIN — HYDROMORPHONE HYDROCHLORIDE PRN MG: 2 TABLET ORAL at 00:15

## 2022-10-16 RX ADMIN — PREGABALIN SCH MG: 25 CAPSULE ORAL at 16:03

## 2022-10-16 RX ADMIN — Medication SCH EACH: at 12:04

## 2022-10-16 RX ADMIN — ACETAMINOPHEN PRN MG: 325 TABLET ORAL at 04:38

## 2022-10-16 RX ADMIN — Medication SCH MG: at 15:05

## 2022-10-16 RX ADMIN — ACETAMINOPHEN PRN MG: 325 TABLET ORAL at 20:08

## 2022-10-16 RX ADMIN — LEVOFLOXACIN SCH MG: 250 TABLET, FILM COATED ORAL at 21:51

## 2022-10-16 RX ADMIN — ALBUTEROL SULFATE SCH MG: 1.25 SOLUTION RESPIRATORY (INHALATION) at 08:38

## 2022-10-16 RX ADMIN — ALBUTEROL SULFATE SCH MG: 1.25 SOLUTION RESPIRATORY (INHALATION) at 12:11

## 2022-10-16 RX ADMIN — PANTOPRAZOLE SODIUM SCH MG: 40 TABLET, DELAYED RELEASE ORAL at 08:20

## 2022-10-16 RX ADMIN — DEXTROSE MONOHYDRATE SCH MLS/HR: 50 INJECTION, SOLUTION INTRAVENOUS at 10:03

## 2022-10-16 RX ADMIN — INSULIN HUMAN PRN UNITS: 100 INJECTION, SOLUTION PARENTERAL at 17:55

## 2022-10-16 RX ADMIN — ALBUTEROL SULFATE SCH MG: 1.25 SOLUTION RESPIRATORY (INHALATION) at 15:05

## 2022-10-16 RX ADMIN — TOLTERODINE TARTRATE SCH MG: 2 CAPSULE, EXTENDED RELEASE ORAL at 08:20

## 2022-10-16 RX ADMIN — SODIUM CHLORIDE PRN MLS/HR: 9 INJECTION, SOLUTION INTRAVENOUS at 04:42

## 2022-10-16 RX ADMIN — Medication SCH MG: at 00:57

## 2022-10-16 RX ADMIN — VALSARTAN SCH MG: 80 TABLET ORAL at 08:21

## 2022-10-16 RX ADMIN — CLOTRIMAZOLE SCH APPLIC: 1 CREAM TOPICAL at 08:20

## 2022-10-16 RX ADMIN — INSULIN HUMAN PRN UNITS: 100 INJECTION, SOLUTION PARENTERAL at 08:25

## 2022-10-16 RX ADMIN — FERROUS SULFATE TAB 325 MG (65 MG ELEMENTAL FE) SCH MG: 325 (65 FE) TAB at 16:03

## 2022-10-16 RX ADMIN — ALBUTEROL SULFATE SCH MG: 1.25 SOLUTION RESPIRATORY (INHALATION) at 02:58

## 2022-10-16 RX ADMIN — Medication SCH EACH: at 23:02

## 2022-10-16 NOTE — NUR
RN CLOSING NOTE: 



PATIENT REMAINS IN ROOM IN NO SIGNS OF RESPIRATORY DISTRESS, PATIENT STILL ON 2L OF 02 VIA 
NC;TOLERATING WELL SATURATING @ >95% SP02. SAFETY MEASURES IMPLEMENTED, BED IN LOWEST 
POSITION, LOCKED, SIDE RAILS UP, CALL LIGHT WITHIN REACH. ALL NEEDS AND ORDERS ADDRESSED 
DURING THE SHIFT. IV ACCESS MAINTAINED INTACT, SECURED AND FLUSHING WELL.  ALL DUE MEDS 
GIVEN AS ORDERED & SCHEDULED ; PATIENT TOLERATED WELL. PATIENT KEPT CLEAN AND COMFORTABLE 
WITHIN THE SHIFT. PATIENT ENDORSED TO INCOMING SHIFT RN WITH STABLE VITAL SIGN AND FOR 
CONTINUITY OF CARE.

## 2022-10-16 NOTE — NUR
RT



Patient expressed wanting to be on 4L nasal cannula because she feels "out of breath" when 
it is less than 4L. Breathing tx and CPT given every 4 hours and tolerated well. No 
respiratory distress noted throughout shift.

## 2022-10-16 NOTE — NUR
RN NOTE



PAGED DR. PAIGE REGARDING PT'S SYMPTOMS OF UTI. PT REPORTING URINARY FREQUENCY AND 
BURNING.

## 2022-10-16 NOTE — NUR
RN OPENING NOTES



RECEIVED PATIENT SITTING ON CHAIR, A/0 X 4. ON ROOM AIR, SATING AT 94%. RESPIRATORY EVEN AND 
UNLABORED, NO SOB NOTED. AFEBRILE, NO S/S OF DISTRESS NOTED. WITH LEFT HAND #22 PERIPHERAL 
LINE,  PATENT, INTACT, FLUSHED WITH NS. NO S/S OF INFILTRATION NOTED. WITH IVF NS @ 75 
ML/HR. REPOSITION PATIENT EVERY 2 HRS. ALL SAFETY PRECAUTION PROVIDED, BED IN LOWEST 
POSITION, LOCKED. BED ALARM ARMED. CALL LIGHT WITH IN REACH. CONTINUE TO MONITOR.

## 2022-10-16 NOTE — NUR
RT NOTE



OTHER 2 TX GIVEN AT 2330 AND 0300 VIA HHN. WOKE PT UP TO VERIFY IF SHE WANTS IT. SPO2 
98-99%. PT PLACED BACK ON 2LPM NC COMFORTABLY. NO S/S OF SOB OR RESPIRATORY DISTRESS NOTED.

## 2022-10-16 NOTE — NUR
RN NOTES



PATIENT REMAINED TO BE IN NO SIGNS OF ACUTE RESPIRATORY DISTRESS , SAFE ENVIRONMENT 
MAINTAINED FOR PT. AM PATIENT CARE ASSISTANCE RENDERED. WILL CONTINUE TO MONITOR AND 
REASSESS FOR ANY CHANGES THROUGHOUT THE SHIFT.

## 2022-10-16 NOTE — NUR
RN NOTE



TALKED TO DR. PAIGE, ORDER RECEIVED FOR URINE CULTURE. PT'S REQUEST FOR AMLODIPINE AND IN 
INCREASED DOSE OF LABETOLOL MENTIONED. DR. PAIGE TO LEAVE IT TO DR. BLANCO TO MODIFY BP'S 
MEDICATION. ORDER PLACED.

## 2022-10-17 VITALS — SYSTOLIC BLOOD PRESSURE: 153 MMHG | DIASTOLIC BLOOD PRESSURE: 82 MMHG

## 2022-10-17 VITALS — DIASTOLIC BLOOD PRESSURE: 64 MMHG | SYSTOLIC BLOOD PRESSURE: 172 MMHG

## 2022-10-17 VITALS — DIASTOLIC BLOOD PRESSURE: 64 MMHG | SYSTOLIC BLOOD PRESSURE: 190 MMHG

## 2022-10-17 VITALS — DIASTOLIC BLOOD PRESSURE: 72 MMHG | SYSTOLIC BLOOD PRESSURE: 159 MMHG

## 2022-10-17 VITALS — SYSTOLIC BLOOD PRESSURE: 172 MMHG | DIASTOLIC BLOOD PRESSURE: 65 MMHG

## 2022-10-17 VITALS — DIASTOLIC BLOOD PRESSURE: 60 MMHG | SYSTOLIC BLOOD PRESSURE: 142 MMHG

## 2022-10-17 LAB
BUN SERPL-MCNC: 20 MG/DL (ref 7–18)
CALCIUM SERPL-MCNC: 8.6 MG/DL (ref 8.5–10.1)
CHLORIDE SERPL-SCNC: 95 MMOL/L (ref 98–107)
CO2 SERPL-SCNC: 32 MMOL/L (ref 21–32)
CREAT SERPL-MCNC: 0.9 MG/DL (ref 0.6–1.3)
GLUCOSE SERPL-MCNC: 311 MG/DL (ref 74–106)
POTASSIUM SERPL-SCNC: 4.1 MMOL/L (ref 3.5–5.1)
SODIUM SERPL-SCNC: 128 MMOL/L (ref 136–145)

## 2022-10-17 RX ADMIN — Medication SCH MG: at 13:19

## 2022-10-17 RX ADMIN — HYDROMORPHONE HYDROCHLORIDE PRN MG: 2 TABLET ORAL at 04:48

## 2022-10-17 RX ADMIN — GUAIFENESIN PRN MG: 100 SOLUTION ORAL at 12:05

## 2022-10-17 RX ADMIN — HYDROMORPHONE HYDROCHLORIDE PRN MG: 2 TABLET ORAL at 13:14

## 2022-10-17 RX ADMIN — GUAIFENESIN PRN MG: 100 SOLUTION ORAL at 22:42

## 2022-10-17 RX ADMIN — Medication SCH MG: at 19:50

## 2022-10-17 RX ADMIN — Medication SCH EACH: at 07:54

## 2022-10-17 RX ADMIN — Medication SCH MG: at 08:43

## 2022-10-17 RX ADMIN — ALBUTEROL SULFATE SCH MG: 1.25 SOLUTION RESPIRATORY (INHALATION) at 15:41

## 2022-10-17 RX ADMIN — LEVOFLOXACIN SCH MG: 250 TABLET, FILM COATED ORAL at 21:26

## 2022-10-17 RX ADMIN — LABETALOL HCL SCH MG: 100 TABLET, FILM COATED ORAL at 21:24

## 2022-10-17 RX ADMIN — BACLOFEN SCH MG: 10 TABLET ORAL at 12:06

## 2022-10-17 RX ADMIN — ALBUTEROL SULFATE SCH MG: 1.25 SOLUTION RESPIRATORY (INHALATION) at 13:18

## 2022-10-17 RX ADMIN — CLOTRIMAZOLE SCH APPLIC: 1 CREAM TOPICAL at 17:09

## 2022-10-17 RX ADMIN — LABETALOL HCL SCH MG: 100 TABLET, FILM COATED ORAL at 08:44

## 2022-10-17 RX ADMIN — INSULIN HUMAN PRN UNITS: 100 INJECTION, SOLUTION PARENTERAL at 12:11

## 2022-10-17 RX ADMIN — TOLTERODINE TARTRATE SCH MG: 2 CAPSULE, EXTENDED RELEASE ORAL at 08:42

## 2022-10-17 RX ADMIN — ALBUTEROL SULFATE SCH MG: 1.25 SOLUTION RESPIRATORY (INHALATION) at 23:28

## 2022-10-17 RX ADMIN — FERROUS SULFATE TAB 325 MG (65 MG ELEMENTAL FE) SCH MG: 325 (65 FE) TAB at 17:09

## 2022-10-17 RX ADMIN — Medication SCH EA: at 08:58

## 2022-10-17 RX ADMIN — GUAIFENESIN AND CODEINE PHOSPHATE PRN ML: 100; 10 SOLUTION ORAL at 03:46

## 2022-10-17 RX ADMIN — CLOPIDOGREL BISULFATE SCH MG: 75 TABLET, FILM COATED ORAL at 08:45

## 2022-10-17 RX ADMIN — SODIUM CHLORIDE PRN MLS/HR: 9 INJECTION, SOLUTION INTRAVENOUS at 08:56

## 2022-10-17 RX ADMIN — PREGABALIN SCH MG: 25 CAPSULE ORAL at 17:08

## 2022-10-17 RX ADMIN — Medication SCH EACH: at 11:47

## 2022-10-17 RX ADMIN — DILTIAZEM HYDROCHLORIDE SCH MG: 240 CAPSULE, EXTENDED RELEASE ORAL at 08:42

## 2022-10-17 RX ADMIN — FLUTICASONE FUROATE AND VILANTEROL TRIFENATATE SCH EACH: 100; 25 POWDER RESPIRATORY (INHALATION) at 08:26

## 2022-10-17 RX ADMIN — ALBUTEROL SULFATE SCH MG: 1.25 SOLUTION RESPIRATORY (INHALATION) at 09:22

## 2022-10-17 RX ADMIN — ACETAMINOPHEN PRN MG: 325 TABLET ORAL at 13:14

## 2022-10-17 RX ADMIN — ALBUTEROL SULFATE SCH MG: 1.25 SOLUTION RESPIRATORY (INHALATION) at 06:19

## 2022-10-17 RX ADMIN — Medication SCH MG: at 08:41

## 2022-10-17 RX ADMIN — Medication SCH OZ: at 08:49

## 2022-10-17 RX ADMIN — DEXTROSE MONOHYDRATE SCH MLS/HR: 50 INJECTION, SOLUTION INTRAVENOUS at 09:07

## 2022-10-17 RX ADMIN — PANTOPRAZOLE SODIUM SCH MG: 40 TABLET, DELAYED RELEASE ORAL at 07:53

## 2022-10-17 RX ADMIN — HUMAN INSULIN PRN UNIT: 100 INJECTION, SOLUTION SUBCUTANEOUS at 21:54

## 2022-10-17 RX ADMIN — HYDROMORPHONE HYDROCHLORIDE PRN MG: 2 TABLET ORAL at 21:23

## 2022-10-17 RX ADMIN — ENOXAPARIN SODIUM SCH MG: 40 INJECTION SUBCUTANEOUS at 08:50

## 2022-10-17 RX ADMIN — INSULIN HUMAN PRN UNITS: 100 INJECTION, SOLUTION PARENTERAL at 08:39

## 2022-10-17 RX ADMIN — Medication SCH MG: at 09:21

## 2022-10-17 RX ADMIN — ALBUTEROL SULFATE SCH MG: 1.25 SOLUTION RESPIRATORY (INHALATION) at 02:22

## 2022-10-17 RX ADMIN — CLOTRIMAZOLE SCH APPLIC: 1 CREAM TOPICAL at 08:48

## 2022-10-17 RX ADMIN — VALSARTAN SCH MG: 80 TABLET ORAL at 08:42

## 2022-10-17 RX ADMIN — PREGABALIN SCH MG: 25 CAPSULE ORAL at 08:43

## 2022-10-17 RX ADMIN — ALBUTEROL SULFATE SCH MG: 1.25 SOLUTION RESPIRATORY (INHALATION) at 19:51

## 2022-10-17 RX ADMIN — Medication SCH MG: at 02:22

## 2022-10-17 RX ADMIN — ATORVASTATIN CALCIUM SCH MG: 10 TABLET, FILM COATED ORAL at 21:24

## 2022-10-17 RX ADMIN — Medication SCH EACH: at 21:52

## 2022-10-17 RX ADMIN — Medication SCH EACH: at 16:30

## 2022-10-17 RX ADMIN — BACLOFEN SCH MG: 10 TABLET ORAL at 08:43

## 2022-10-17 RX ADMIN — FERROUS SULFATE TAB 325 MG (65 MG ELEMENTAL FE) SCH MG: 325 (65 FE) TAB at 08:43

## 2022-10-17 RX ADMIN — POLYETHYLENE GLYCOL 3350 SCH GM: 17 POWDER, FOR SOLUTION ORAL at 08:41

## 2022-10-17 RX ADMIN — BACLOFEN SCH MG: 10 TABLET ORAL at 21:23

## 2022-10-17 RX ADMIN — INSULIN HUMAN PRN UNITS: 100 INJECTION, SOLUTION PARENTERAL at 17:11

## 2022-10-17 NOTE — NUR
TELE RN OPENING NOTE



RECEIVED PT SITTING IN CHAIR, A/OX3-4, ON 3L O2 NC, NO S/SX OF ACUTE DISTRESS AT THIS TIME, 
WITH 02 SAT >95% AT THIS TIME. TELE MONITOR READING SR HR 82.IV ACCESS ON L HAND #22 PATENT, 
WITH IVF NS AT 75 ML/HR, PATEINT ABLE TO USE BEDSIDE COMMODE. ALL SAFETY MEASURES IN PLACE, 
CALL LIGHT WITHIN REACH AT ALL TIMES. WILL CONTINUE TO MONITOR THROUGHOUT SHIFT.

## 2022-10-17 NOTE — NUR
RN NOTES



PATIENT C/O OF NAUSEA, JEFFREY FOSTER NOTIFIED, WAITING FOR CALL BACK. PROVIDE ICE CHIPS.

## 2022-10-17 NOTE — NUR
TELE RN CLOSING NOTES



PT IN BED, RESTING, A/OX3-4, ON 3L O2 NASAL CANULA, NO S/SX OF ACUTE DISTRESS AT THIS TIME; 
WITH 02 SAT >95% AT THIS TIME. SR HR 79 ON MONITOR, DENIES, CHEST PAIN/DISCOMFORT, WITH IV 
ACCESS ON L HAND #22 PATENT, WITH IVF NS AT 75 ML/HR, SITE CLEAR. CCHO DIET, ENSURE SAFETY 
MEASURES WITHIN THE SHIFT. PATIENT BED ALARM IS ON. HEAD OF BED ELEVATED. BED IS LOCKED,  IN 
LOWEST POSITION AND SIDE RAILS UP. CALL LIGHT WITHIN REACH OF THE PATIENT.  WILL CONTINUE TO 
MONITOR AND REASSESS FOR ANY CHANGES AND WILL CARRY OUT ANY ONGOING AND ACTIVE MD ORDER. ALL 
NEEDS MET AT THIS TIME. WILL ENDORSE TO NEXT SHIFT FOR NAHUN.



FOR RAPID TESTING.

## 2022-10-17 NOTE — NUR
TELE RN AM NOTES



PT IN BED, A/OX3-4, ON 3L O2 NASAL CANULA, NO S/SX OF ACUTE DISTRESS AT THIS TIME; WITH 02 
SAT >95% AT THIS TIME. SR HR 90 ON MONITOR, DENIES, CHEST PAUN/DISCOMFORT, WITH IV ACCESS ON 
L HAND #22 PATENT, WITH IVF NS AT 75 ML/HR, SITE CLEAR. SEE NURSING FLOWSHEET FOR SKIN 
ISSUES, CCHO DIET, ENSURE SAFETY MEASURES WITHIN THE SHIFT. PATIENT BED ALARM IS ON. HEAD OF 
BED ELEVATED. BED IS LOCKED,  IN LOWEST POSITION AND SIDE RAILS UP. CALL LIGHT WITHIN REACH 
OF THE PATIENT.  WILL CONTINUE TO MONITOR AND REASSESS FOR ANY CHANGES AND WILL CARRY OUT 
ANY ONGOING AND ACTIVE MD ORDER.

## 2022-10-18 VITALS — SYSTOLIC BLOOD PRESSURE: 157 MMHG | DIASTOLIC BLOOD PRESSURE: 76 MMHG

## 2022-10-18 VITALS — SYSTOLIC BLOOD PRESSURE: 170 MMHG | DIASTOLIC BLOOD PRESSURE: 71 MMHG

## 2022-10-18 VITALS — DIASTOLIC BLOOD PRESSURE: 52 MMHG | SYSTOLIC BLOOD PRESSURE: 150 MMHG

## 2022-10-18 VITALS — DIASTOLIC BLOOD PRESSURE: 63 MMHG | SYSTOLIC BLOOD PRESSURE: 133 MMHG

## 2022-10-18 VITALS — SYSTOLIC BLOOD PRESSURE: 175 MMHG | DIASTOLIC BLOOD PRESSURE: 66 MMHG

## 2022-10-18 VITALS — SYSTOLIC BLOOD PRESSURE: 168 MMHG | DIASTOLIC BLOOD PRESSURE: 81 MMHG

## 2022-10-18 VITALS — SYSTOLIC BLOOD PRESSURE: 160 MMHG | DIASTOLIC BLOOD PRESSURE: 64 MMHG

## 2022-10-18 LAB
BUN SERPL-MCNC: 21 MG/DL (ref 7–18)
CALCIUM SERPL-MCNC: 8.6 MG/DL (ref 8.5–10.1)
CHLORIDE SERPL-SCNC: 95 MMOL/L (ref 98–107)
CO2 SERPL-SCNC: 30 MMOL/L (ref 21–32)
CREAT SERPL-MCNC: 0.8 MG/DL (ref 0.6–1.3)
GLUCOSE SERPL-MCNC: 261 MG/DL (ref 74–106)
POTASSIUM SERPL-SCNC: 4.3 MMOL/L (ref 3.5–5.1)
SODIUM SERPL-SCNC: 130 MMOL/L (ref 136–145)

## 2022-10-18 RX ADMIN — HUMAN INSULIN PRN UNIT: 100 INJECTION, SOLUTION SUBCUTANEOUS at 21:42

## 2022-10-18 RX ADMIN — Medication SCH MG: at 08:46

## 2022-10-18 RX ADMIN — ALBUTEROL SULFATE SCH MG: 1.25 SOLUTION RESPIRATORY (INHALATION) at 19:51

## 2022-10-18 RX ADMIN — CLOPIDOGREL BISULFATE SCH MG: 75 TABLET, FILM COATED ORAL at 08:46

## 2022-10-18 RX ADMIN — Medication SCH MG: at 07:55

## 2022-10-18 RX ADMIN — Medication SCH OZ: at 11:21

## 2022-10-18 RX ADMIN — FERROUS SULFATE TAB 325 MG (65 MG ELEMENTAL FE) SCH MG: 325 (65 FE) TAB at 17:00

## 2022-10-18 RX ADMIN — GUAIFENESIN PRN MG: 100 SOLUTION ORAL at 15:25

## 2022-10-18 RX ADMIN — ALBUTEROL SULFATE SCH MG: 1.25 SOLUTION RESPIRATORY (INHALATION) at 15:51

## 2022-10-18 RX ADMIN — BACLOFEN SCH MG: 10 TABLET ORAL at 21:30

## 2022-10-18 RX ADMIN — LACTULOSE SCH G: 10 SOLUTION ORAL at 17:29

## 2022-10-18 RX ADMIN — TOLTERODINE TARTRATE SCH MG: 2 CAPSULE, EXTENDED RELEASE ORAL at 08:45

## 2022-10-18 RX ADMIN — CLOTRIMAZOLE SCH APPLIC: 1 CREAM TOPICAL at 11:21

## 2022-10-18 RX ADMIN — PREGABALIN SCH MG: 25 CAPSULE ORAL at 17:00

## 2022-10-18 RX ADMIN — LABETALOL HCL SCH MG: 100 TABLET, FILM COATED ORAL at 08:48

## 2022-10-18 RX ADMIN — ALBUTEROL SULFATE SCH MG: 1.25 SOLUTION RESPIRATORY (INHALATION) at 11:00

## 2022-10-18 RX ADMIN — PANTOPRAZOLE SODIUM SCH MG: 40 TABLET, DELAYED RELEASE ORAL at 08:46

## 2022-10-18 RX ADMIN — LABETALOL HCL SCH MG: 100 TABLET, FILM COATED ORAL at 21:31

## 2022-10-18 RX ADMIN — HYDROMORPHONE HYDROCHLORIDE PRN MG: 2 TABLET ORAL at 15:24

## 2022-10-18 RX ADMIN — Medication SCH EA: at 09:00

## 2022-10-18 RX ADMIN — Medication SCH EACH: at 21:38

## 2022-10-18 RX ADMIN — INSULIN GLARGINE SCH UNIT: 100 INJECTION, SOLUTION SUBCUTANEOUS at 10:57

## 2022-10-18 RX ADMIN — ALBUTEROL SULFATE SCH MG: 1.25 SOLUTION RESPIRATORY (INHALATION) at 23:50

## 2022-10-18 RX ADMIN — INSULIN HUMAN PRN UNITS: 100 INJECTION, SOLUTION PARENTERAL at 17:46

## 2022-10-18 RX ADMIN — PREGABALIN SCH MG: 25 CAPSULE ORAL at 09:00

## 2022-10-18 RX ADMIN — GUAIFENESIN PRN MG: 100 SOLUTION ORAL at 21:39

## 2022-10-18 RX ADMIN — Medication SCH EACH: at 08:46

## 2022-10-18 RX ADMIN — LEVOFLOXACIN SCH MG: 250 TABLET, FILM COATED ORAL at 21:31

## 2022-10-18 RX ADMIN — INSULIN HUMAN PRN UNITS: 100 INJECTION, SOLUTION PARENTERAL at 12:40

## 2022-10-18 RX ADMIN — ATORVASTATIN CALCIUM SCH MG: 10 TABLET, FILM COATED ORAL at 21:38

## 2022-10-18 RX ADMIN — Medication SCH MG: at 01:45

## 2022-10-18 RX ADMIN — BACLOFEN SCH MG: 10 TABLET ORAL at 08:46

## 2022-10-18 RX ADMIN — POLYETHYLENE GLYCOL 3350 SCH GM: 17 POWDER, FOR SOLUTION ORAL at 08:46

## 2022-10-18 RX ADMIN — Medication SCH MG: at 08:47

## 2022-10-18 RX ADMIN — DEXTROSE MONOHYDRATE SCH MLS/HR: 50 INJECTION, SOLUTION INTRAVENOUS at 11:30

## 2022-10-18 RX ADMIN — FERROUS SULFATE TAB 325 MG (65 MG ELEMENTAL FE) SCH MG: 325 (65 FE) TAB at 08:47

## 2022-10-18 RX ADMIN — Medication SCH EACH: at 12:37

## 2022-10-18 RX ADMIN — ENOXAPARIN SODIUM SCH MG: 40 INJECTION SUBCUTANEOUS at 08:50

## 2022-10-18 RX ADMIN — Medication SCH MG: at 11:00

## 2022-10-18 RX ADMIN — VALSARTAN SCH MG: 80 TABLET ORAL at 08:47

## 2022-10-18 RX ADMIN — BACLOFEN SCH MG: 10 TABLET ORAL at 11:42

## 2022-10-18 RX ADMIN — ACETAMINOPHEN PRN MG: 325 TABLET ORAL at 04:52

## 2022-10-18 RX ADMIN — Medication SCH EACH: at 17:31

## 2022-10-18 RX ADMIN — LACTULOSE SCH G: 10 SOLUTION ORAL at 11:19

## 2022-10-18 RX ADMIN — ALBUTEROL SULFATE SCH MG: 1.25 SOLUTION RESPIRATORY (INHALATION) at 07:55

## 2022-10-18 RX ADMIN — ALBUTEROL SULFATE SCH MG: 1.25 SOLUTION RESPIRATORY (INHALATION) at 04:49

## 2022-10-18 RX ADMIN — FLUTICASONE FUROATE AND VILANTEROL TRIFENATATE SCH EACH: 100; 25 POWDER RESPIRATORY (INHALATION) at 09:00

## 2022-10-18 RX ADMIN — Medication SCH MG: at 19:51

## 2022-10-18 RX ADMIN — CLOTRIMAZOLE SCH APPLIC: 1 CREAM TOPICAL at 17:31

## 2022-10-18 NOTE — NUR
RN NOTE



PT RESTING IN CHAIR, NO C/O OF CHEST PAIN AT THIS TIME. VITALS WNL. WILL CONT TO MONITOR. 
DUE MEDS GIVEN. SAFETT MAINTAINED.

## 2022-10-18 NOTE — NUR
c/o chest pain 7/10 dr. miller notified .new rorders left and carried out,awaits cardiac 
consult,dc held per md.

## 2022-10-18 NOTE — NUR
TELE RN CLOSING NOTE



PT SITTING IN BED, A/OX3-4, ON 3L O2 NC, NO S/SX OF ACUTE DISTRESS AT THIS TIME, WITH 02 SAT 
>95% AT THIS TIME. PATIENT ABLE TO MAKE NEEDS KNOWN. TELE MONITOR READING SR HR 84.IV ACCESS 
ON L HAND #22 PATENT WITH OCCASIONAL LEAKING, LFA #22 INTACT AND PATENT, WITH IVF NS AT 75 
ML/HR, VSS. STABLE. CLONIDINE WAS RECENTLY ORDERED FOR HIGH BP ONE TIME. ALL DUE MEDS GIVEN. 
ALL SAFETY MEASURES IN PLACE, CALL LIGHT WITHIN REACH AT ALL TIMES. WILL ENDORSE TO MORNING 
SHIFT FOR CONTINUOUS OF CARE

## 2022-10-18 NOTE — NUR
TELE RN OPENING NOTE



RECEIVED PT SITTING IN CHAIR, A/OX3-4, ON 3L O2 NC, NO S/SX OF ACUTE DISTRESS AT THIS TIME, 
WITH 02 SAT >95% AT THIS TIME. TELE MONITOR READING SR HR 83. IV ACCESS ON L HAND #22 PATENT 
AND INTACT, SL. 



ALL SAFETY MEASURES IN PLACE, CALL LIGHT WITHIN REACH AT ALL TIMES. WILL CONTINUE TO MONITOR 
THROUGHOUT SHIFT.

## 2022-10-19 VITALS — DIASTOLIC BLOOD PRESSURE: 56 MMHG | SYSTOLIC BLOOD PRESSURE: 102 MMHG

## 2022-10-19 VITALS — SYSTOLIC BLOOD PRESSURE: 167 MMHG | DIASTOLIC BLOOD PRESSURE: 64 MMHG

## 2022-10-19 VITALS — DIASTOLIC BLOOD PRESSURE: 63 MMHG | SYSTOLIC BLOOD PRESSURE: 199 MMHG

## 2022-10-19 VITALS — SYSTOLIC BLOOD PRESSURE: 158 MMHG | DIASTOLIC BLOOD PRESSURE: 67 MMHG

## 2022-10-19 VITALS — SYSTOLIC BLOOD PRESSURE: 155 MMHG | DIASTOLIC BLOOD PRESSURE: 48 MMHG

## 2022-10-19 LAB
BUN SERPL-MCNC: 19 MG/DL (ref 7–18)
CALCIUM SERPL-MCNC: 9.1 MG/DL (ref 8.5–10.1)
CHLORIDE SERPL-SCNC: 97 MMOL/L (ref 98–107)
CO2 SERPL-SCNC: 35 MMOL/L (ref 21–32)
CREAT SERPL-MCNC: 0.7 MG/DL (ref 0.6–1.3)
GLUCOSE SERPL-MCNC: 120 MG/DL (ref 74–106)
POTASSIUM SERPL-SCNC: 3.8 MMOL/L (ref 3.5–5.1)
SODIUM SERPL-SCNC: 135 MMOL/L (ref 136–145)

## 2022-10-19 RX ADMIN — ENOXAPARIN SODIUM SCH MG: 40 INJECTION SUBCUTANEOUS at 10:18

## 2022-10-19 RX ADMIN — Medication SCH MG: at 08:06

## 2022-10-19 RX ADMIN — TOLTERODINE TARTRATE SCH MG: 2 CAPSULE, EXTENDED RELEASE ORAL at 10:19

## 2022-10-19 RX ADMIN — DEXTROSE MONOHYDRATE SCH MLS/HR: 50 INJECTION, SOLUTION INTRAVENOUS at 11:30

## 2022-10-19 RX ADMIN — BACLOFEN SCH MG: 10 TABLET ORAL at 10:20

## 2022-10-19 RX ADMIN — ALBUTEROL SULFATE SCH MG: 1.25 SOLUTION RESPIRATORY (INHALATION) at 14:35

## 2022-10-19 RX ADMIN — PANTOPRAZOLE SODIUM SCH MG: 40 TABLET, DELAYED RELEASE ORAL at 08:19

## 2022-10-19 RX ADMIN — INSULIN GLARGINE SCH UNIT: 100 INJECTION, SOLUTION SUBCUTANEOUS at 11:29

## 2022-10-19 RX ADMIN — FLUTICASONE FUROATE AND VILANTEROL TRIFENATATE SCH EACH: 100; 25 POWDER RESPIRATORY (INHALATION) at 10:31

## 2022-10-19 RX ADMIN — Medication SCH EA: at 09:00

## 2022-10-19 RX ADMIN — PREGABALIN SCH MG: 25 CAPSULE ORAL at 10:18

## 2022-10-19 RX ADMIN — Medication SCH EACH: at 08:20

## 2022-10-19 RX ADMIN — Medication SCH MG: at 08:17

## 2022-10-19 RX ADMIN — LABETALOL HCL SCH MG: 100 TABLET, FILM COATED ORAL at 10:19

## 2022-10-19 RX ADMIN — VALSARTAN SCH MG: 80 TABLET ORAL at 10:20

## 2022-10-19 RX ADMIN — HYDROMORPHONE HYDROCHLORIDE PRN MG: 2 TABLET ORAL at 15:21

## 2022-10-19 RX ADMIN — Medication SCH EACH: at 11:52

## 2022-10-19 RX ADMIN — Medication SCH MG: at 01:55

## 2022-10-19 RX ADMIN — CLOTRIMAZOLE SCH APPLIC: 1 CREAM TOPICAL at 10:31

## 2022-10-19 RX ADMIN — ALPRAZOLAM PRN MG: 0.25 TABLET ORAL at 11:39

## 2022-10-19 RX ADMIN — ALBUTEROL SULFATE SCH MG: 1.25 SOLUTION RESPIRATORY (INHALATION) at 03:59

## 2022-10-19 RX ADMIN — ACETAMINOPHEN PRN MG: 325 TABLET ORAL at 11:52

## 2022-10-19 RX ADMIN — ALBUTEROL SULFATE SCH MG: 1.25 SOLUTION RESPIRATORY (INHALATION) at 11:15

## 2022-10-19 RX ADMIN — BACLOFEN SCH MG: 10 TABLET ORAL at 14:24

## 2022-10-19 RX ADMIN — LACTULOSE SCH G: 10 SOLUTION ORAL at 08:16

## 2022-10-19 RX ADMIN — Medication SCH MG: at 14:35

## 2022-10-19 RX ADMIN — ACETAMINOPHEN PRN MG: 325 TABLET ORAL at 01:34

## 2022-10-19 RX ADMIN — CLOPIDOGREL BISULFATE SCH MG: 75 TABLET, FILM COATED ORAL at 10:18

## 2022-10-19 RX ADMIN — GUAIFENESIN PRN MG: 100 SOLUTION ORAL at 11:54

## 2022-10-19 RX ADMIN — Medication SCH MG: at 09:00

## 2022-10-19 RX ADMIN — Medication SCH OZ: at 11:30

## 2022-10-19 RX ADMIN — POLYETHYLENE GLYCOL 3350 SCH GM: 17 POWDER, FOR SOLUTION ORAL at 09:00

## 2022-10-19 RX ADMIN — ALBUTEROL SULFATE SCH MG: 1.25 SOLUTION RESPIRATORY (INHALATION) at 08:08

## 2022-10-19 RX ADMIN — FERROUS SULFATE TAB 325 MG (65 MG ELEMENTAL FE) SCH MG: 325 (65 FE) TAB at 10:19

## 2022-10-19 RX ADMIN — HYDROMORPHONE HYDROCHLORIDE PRN MG: 2 TABLET ORAL at 09:25

## 2022-10-19 NOTE — NUR
RN NOTE



PT DISCHARGED AS ORDERED. REPORT GIVEN TO FACILITY. PT LEFT FACILITY THROUGH AMBULANCE ON 
OXYGEN NC, PT GIVEN BREATHING TREATMENT AND PAIN MEDICATION PRIOR TO DISCHARGE. VSS.

## 2022-10-29 ENCOUNTER — HOSPITAL ENCOUNTER (INPATIENT)
Dept: HOSPITAL 12 - ER | Age: 62
LOS: 2 days | Discharge: NURSING, REHAB OR LONG-TERM CARE | DRG: 917 | End: 2022-10-31
Payer: COMMERCIAL

## 2022-10-29 VITALS — SYSTOLIC BLOOD PRESSURE: 188 MMHG | DIASTOLIC BLOOD PRESSURE: 68 MMHG

## 2022-10-29 VITALS — BODY MASS INDEX: 40.18 KG/M2 | WEIGHT: 250 LBS | HEIGHT: 66 IN

## 2022-10-29 DIAGNOSIS — E66.2: ICD-10-CM

## 2022-10-29 DIAGNOSIS — I50.23: ICD-10-CM

## 2022-10-29 DIAGNOSIS — J44.9: ICD-10-CM

## 2022-10-29 DIAGNOSIS — D72.829: ICD-10-CM
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
## 2022-10-29 DIAGNOSIS — D50.9: ICD-10-CM

## 2022-10-29 DIAGNOSIS — E87.6: ICD-10-CM

## 2022-10-29 DIAGNOSIS — T40.2X1A: Primary | ICD-10-CM

## 2022-10-29 DIAGNOSIS — Z79.4: ICD-10-CM

## 2022-10-29 DIAGNOSIS — F17.210: ICD-10-CM

## 2022-10-29 DIAGNOSIS — I25.2: ICD-10-CM

## 2022-10-29 DIAGNOSIS — Z79.84: ICD-10-CM

## 2022-10-29 DIAGNOSIS — D63.8: ICD-10-CM

## 2022-10-29 DIAGNOSIS — G92.8: ICD-10-CM

## 2022-10-29 DIAGNOSIS — E78.5: ICD-10-CM

## 2022-10-29 DIAGNOSIS — Z79.82: ICD-10-CM

## 2022-10-29 DIAGNOSIS — I25.10: ICD-10-CM

## 2022-10-29 DIAGNOSIS — Z83.3: ICD-10-CM

## 2022-10-29 DIAGNOSIS — J96.02: ICD-10-CM

## 2022-10-29 DIAGNOSIS — I11.0: ICD-10-CM

## 2022-10-29 DIAGNOSIS — E11.9: ICD-10-CM

## 2022-10-29 DIAGNOSIS — L03.116: ICD-10-CM

## 2022-10-29 DIAGNOSIS — I69.354: ICD-10-CM

## 2022-10-29 DIAGNOSIS — T42.4X1A: ICD-10-CM

## 2022-10-29 LAB
ALP SERPL-CCNC: 72 U/L (ref 50–136)
ALT SERPL W/O P-5'-P-CCNC: 51 U/L (ref 14–59)
APPEARANCE UR: CLEAR
AST SERPL-CCNC: 16 U/L (ref 15–37)
BASE EXCESS BLDA CALC-SCNC: -0.3 MMOL/L
BASE EXCESS BLDA CALC-SCNC: -1.7 MMOL/L
BILIRUB DIRECT SERPL-MCNC: 0.1 MG/DL (ref 0–0.2)
BILIRUB SERPL-MCNC: 0.6 MG/DL (ref 0.2–1)
BILIRUB UR QL STRIP: NEGATIVE
BUN SERPL-MCNC: 20 MG/DL (ref 7–18)
CHLORIDE SERPL-SCNC: 104 MMOL/L (ref 98–107)
CO2 SERPL-SCNC: 27 MMOL/L (ref 21–32)
COLOR UR: YELLOW
CREAT SERPL-MCNC: 1.3 MG/DL (ref 0.6–1.3)
GLUCOSE SERPL-MCNC: 149 MG/DL (ref 74–106)
GLUCOSE UR STRIP-MCNC: NEGATIVE MG/DL
HCO3 BLDA-SCNC: 23.1 MMOL/L
HCO3 BLDA-SCNC: 25.4 MMOL/L
HCT VFR BLD AUTO: 26.6 % (ref 31.2–41.9)
HGB BLDA OXIMETRY-MCNC: 9.1 G/DL (ref 12–16)
HGB BLDA OXIMETRY-MCNC: 9.2 G/DL (ref 12–16)
HGB UR QL STRIP: NEGATIVE
INHALED O2 CONCENTRATION: 24 %
INHALED O2 CONCENTRATION: 32 %
INHALED O2 FLOW RATE: 1 L/MIN (ref 0–30)
INHALED O2 FLOW RATE: 3 L/MIN (ref 0–30)
KETONES UR STRIP-MCNC: NEGATIVE MG/DL
LEUKOCYTE ESTERASE UR QL STRIP: NEGATIVE
MCH RBC QN AUTO: 27.7 UUG (ref 24.7–32.8)
MCV RBC AUTO: 84.5 FL (ref 75.5–95.3)
NITRITE UR QL STRIP: NEGATIVE
PCO2 TEMP ADJ BLDA: 39.4 MMHG (ref 35–45)
PCO2 TEMP ADJ BLDA: 46.6 MMHG (ref 35–45)
PH TEMP ADJ BLDA: 7.36 [PH] (ref 7.35–7.45)
PH TEMP ADJ BLDA: 7.39 [PH] (ref 7.35–7.45)
PH UR STRIP: 5.5 [PH] (ref 5–8)
PLATELET # BLD AUTO: 251 K/UL (ref 179–408)
PO2 TEMP ADJ BLDA: 114.3 MMHG (ref 75–100)
PO2 TEMP ADJ BLDA: 134.5 MMHG (ref 75–100)
POTASSIUM SERPL-SCNC: 3.5 MMOL/L (ref 3.5–5.1)
SP GR UR STRIP: 1.01 (ref 1–1.03)
SPECIMEN DRAWN FROM PATIENT: (no result)
SPECIMEN DRAWN FROM PATIENT: (no result)
TSH SERPL DL<=0.005 MIU/L-ACNC: 0.65 MIU/ML (ref 0.36–3.74)
UROBILINOGEN UR STRIP-MCNC: 0.2 E.U./DL
WS STN SPEC: 6 G/DL (ref 6.4–8.2)

## 2022-10-29 PROCEDURE — A6209 FOAM DRSG <=16 SQ IN W/O BDR: HCPCS

## 2022-10-29 PROCEDURE — 5A09357 ASSISTANCE WITH RESPIRATORY VENTILATION, LESS THAN 24 CONSECUTIVE HOURS, CONTINUOUS POSITIVE AIRWAY PRESSURE: ICD-10-PCS

## 2022-10-29 PROCEDURE — A4663 DIALYSIS BLOOD PRESSURE CUFF: HCPCS

## 2022-10-29 PROCEDURE — A6213 FOAM DRG >16<=48 SQ IN W/BDR: HCPCS

## 2022-10-29 PROCEDURE — C1758 CATHETER, URETERAL: HCPCS

## 2022-10-29 PROCEDURE — G0378 HOSPITAL OBSERVATION PER HR: HCPCS

## 2022-10-29 NOTE — NUR
respiration alarm sounded, checked on patient and counted her respiratory rate 
for 1 minute, RR 8. attempted sternal rub, unable to wake patient. RT was in ER 
and was asked to see patient, Constantin BURGOS attempted sternal rub once more and 
patient woke up and was breathing at a normal rate of 16. RT assessed patient, 
who is now awake. RT to accompany patient to CT.

## 2022-10-29 NOTE — NUR
PT RECEIVED OK FROM INSURANCE TO STAY HERE. 

DR SIGALA HAS SPOKEN WITH TRES HERNANDEZ REGARDING PT.

PT ADMITTED TO TELE/UNDER DR TRES HERNANDEZ/ALTERED MENTAL STATUS



2ND ABG OBTAINED, NORMAL EXCEPT FOR O2 BEING 134. O2 DISCONTINUED.

MD ORDERED BIPAP INSTEAD. 



RT AT BEDSIDE, BIPAP SETTINGS: 

R16, 18/5, 24% FIO2



PT NOTED TO HAVE SLEEP APNEA FOR 15 SECONDS.

## 2022-10-30 VITALS — SYSTOLIC BLOOD PRESSURE: 195 MMHG | DIASTOLIC BLOOD PRESSURE: 72 MMHG

## 2022-10-30 VITALS — DIASTOLIC BLOOD PRESSURE: 63 MMHG | SYSTOLIC BLOOD PRESSURE: 186 MMHG

## 2022-10-30 VITALS — DIASTOLIC BLOOD PRESSURE: 45 MMHG | SYSTOLIC BLOOD PRESSURE: 126 MMHG

## 2022-10-30 VITALS — SYSTOLIC BLOOD PRESSURE: 179 MMHG | DIASTOLIC BLOOD PRESSURE: 56 MMHG

## 2022-10-30 RX ADMIN — Medication PRN MG: at 00:52

## 2022-10-30 RX ADMIN — Medication SCH EACH: at 20:52

## 2022-10-30 RX ADMIN — BACLOFEN SCH MG: 20 TABLET ORAL at 21:08

## 2022-10-30 RX ADMIN — CLOTRIMAZOLE SCH GM: 1 CREAM TOPICAL at 17:01

## 2022-10-30 RX ADMIN — OXYCODONE HYDROCHLORIDE AND ACETAMINOPHEN PRN TAB: 5; 325 TABLET ORAL at 23:29

## 2022-10-30 RX ADMIN — DEXTROSE SCH MLS/HR: 50 INJECTION, SOLUTION INTRAVENOUS at 01:13

## 2022-10-30 RX ADMIN — DEXTROSE SCH MLS/HR: 50 INJECTION, SOLUTION INTRAVENOUS at 20:45

## 2022-10-30 RX ADMIN — CLOTRIMAZOLE SCH GM: 1 CREAM TOPICAL at 08:16

## 2022-10-30 RX ADMIN — LABETALOL HCL SCH MG: 100 TABLET, FILM COATED ORAL at 21:07

## 2022-10-30 RX ADMIN — AMLODIPINE BESYLATE SCH MG: 10 TABLET ORAL at 15:44

## 2022-10-30 RX ADMIN — OXYCODONE HYDROCHLORIDE AND ACETAMINOPHEN PRN TAB: 5; 325 TABLET ORAL at 15:44

## 2022-10-30 RX ADMIN — LABETALOL HCL SCH MG: 100 TABLET, FILM COATED ORAL at 15:45

## 2022-10-30 RX ADMIN — Medication PRN MG: at 23:28

## 2022-10-30 RX ADMIN — IPRATROPIUM BROMIDE SCH MG: 0.5 SOLUTION RESPIRATORY (INHALATION) at 19:47

## 2022-10-30 RX ADMIN — Medication SCH EACH: at 17:00

## 2022-10-30 RX ADMIN — SODIUM CHLORIDE PRN UNIT: 9 INJECTION, SOLUTION INTRAVENOUS at 17:01

## 2022-10-30 RX ADMIN — BACLOFEN SCH MG: 20 TABLET ORAL at 15:00

## 2022-10-30 RX ADMIN — ALBUTEROL SULFATE SCH MG: 1.25 SOLUTION RESPIRATORY (INHALATION) at 19:47

## 2022-10-30 RX ADMIN — DEXTROSE SCH MLS/HR: 50 INJECTION, SOLUTION INTRAVENOUS at 02:23

## 2022-10-30 RX ADMIN — DEXTROSE SCH MLS/HR: 50 INJECTION, SOLUTION INTRAVENOUS at 08:15

## 2022-10-30 NOTE — NUR
COMPLETE BED BATH AND WOUND TX GIVEN. UNDERNEATH BREAST, PERIANAL AREAS  AND ABDOMINAL FOLDS 
REDNESS MEDICATED WITH LOTRIMIN CREAM. OPEN WOUND RIGHT BUTTOCKS REMAINS RED AND DRAINING 
SEROUS DRAINAGE. WOUND COVERED WITH MEPILEX

## 2022-10-30 NOTE — NUR
SEEN BY DR HERNANDEZ MADE AWARE OF HIGH BP WITH ORDERS. CONTINUE WITH TELE MONITORING. SR ON 
MONITOR

## 2022-10-30 NOTE — NUR
AWAKE ALERT AND ORIENTED X3 DENIES PAIN OR SOB ON 2L NC SATURATING %. CONTINUE PLAN OF 
CARE AS ORDERED

## 2022-10-30 NOTE — NUR
shift note: pt is alert and oriented x2 with periods of confusion pt came from ER has iv 20g 
left forearm. infusing antibiotic no signs of adverse reaction noted from iv.  pt is obese 
with very large abd. Pt has cellulitis of rt leg, red rash rt and lt inguinal area, stage 3 
on rt buttock.  Pt was given ambien for sleep but denies pain. Photos were taken of wounds 
and put in the chart. Pt has 02 2L  nasal canula tolerating well. Will continue to monitor 
for falls and safety.

## 2022-10-31 VITALS — DIASTOLIC BLOOD PRESSURE: 55 MMHG | SYSTOLIC BLOOD PRESSURE: 125 MMHG

## 2022-10-31 VITALS — SYSTOLIC BLOOD PRESSURE: 127 MMHG | DIASTOLIC BLOOD PRESSURE: 52 MMHG

## 2022-10-31 VITALS — SYSTOLIC BLOOD PRESSURE: 180 MMHG | DIASTOLIC BLOOD PRESSURE: 73 MMHG

## 2022-10-31 VITALS — SYSTOLIC BLOOD PRESSURE: 120 MMHG | DIASTOLIC BLOOD PRESSURE: 50 MMHG

## 2022-10-31 LAB
ALP SERPL-CCNC: 79 U/L (ref 50–136)
ALT SERPL W/O P-5'-P-CCNC: 44 U/L (ref 14–59)
AST SERPL-CCNC: 13 U/L (ref 15–37)
BILIRUB SERPL-MCNC: 0.5 MG/DL (ref 0.2–1)
BUN SERPL-MCNC: 17 MG/DL (ref 7–18)
CHLORIDE SERPL-SCNC: 100 MMOL/L (ref 98–107)
CHOLEST SERPL-MCNC: 202 MG/DL (ref ?–200)
CO2 SERPL-SCNC: 32 MMOL/L (ref 21–32)
CREAT SERPL-MCNC: 0.8 MG/DL (ref 0.6–1.3)
GLUCOSE SERPL-MCNC: 181 MG/DL (ref 74–106)
HCT VFR BLD AUTO: 24.4 % (ref 31.2–41.9)
HDLC SERPL-MCNC: 53 MG/DL (ref 40–60)
MCH RBC QN AUTO: 28.3 UUG (ref 24.7–32.8)
MCV RBC AUTO: 84.8 FL (ref 75.5–95.3)
PLATELET # BLD AUTO: 238 K/UL (ref 179–408)
POTASSIUM SERPL-SCNC: 3.4 MMOL/L (ref 3.5–5.1)
TRIGL SERPL-MCNC: 184 MG/DL (ref 30–150)
TSH SERPL DL<=0.005 MIU/L-ACNC: 3.37 MIU/ML (ref 0.36–3.74)
WS STN SPEC: 5.7 G/DL (ref 6.4–8.2)

## 2022-10-31 PROCEDURE — B546ZZA ULTRASONOGRAPHY OF RIGHT SUBCLAVIAN VEIN, GUIDANCE: ICD-10-PCS

## 2022-10-31 PROCEDURE — 05H533Z INSERTION OF INFUSION DEVICE INTO RIGHT SUBCLAVIAN VEIN, PERCUTANEOUS APPROACH: ICD-10-PCS

## 2022-10-31 RX ADMIN — Medication SCH EACH: at 11:39

## 2022-10-31 RX ADMIN — IPRATROPIUM BROMIDE SCH MG: 0.5 SOLUTION RESPIRATORY (INHALATION) at 08:02

## 2022-10-31 RX ADMIN — ALBUTEROL SULFATE SCH MG: 1.25 SOLUTION RESPIRATORY (INHALATION) at 08:02

## 2022-10-31 RX ADMIN — CLOTRIMAZOLE SCH GM: 1 CREAM TOPICAL at 16:36

## 2022-10-31 RX ADMIN — OXYCODONE HYDROCHLORIDE AND ACETAMINOPHEN PRN TAB: 5; 325 TABLET ORAL at 05:38

## 2022-10-31 RX ADMIN — ALBUTEROL SULFATE SCH MG: 1.25 SOLUTION RESPIRATORY (INHALATION) at 01:06

## 2022-10-31 RX ADMIN — ALBUTEROL SULFATE SCH MG: 1.25 SOLUTION RESPIRATORY (INHALATION) at 19:07

## 2022-10-31 RX ADMIN — Medication SCH EACH: at 08:23

## 2022-10-31 RX ADMIN — SODIUM CHLORIDE PRN UNIT: 9 INJECTION, SOLUTION INTRAVENOUS at 16:35

## 2022-10-31 RX ADMIN — AMLODIPINE BESYLATE SCH MG: 10 TABLET ORAL at 08:22

## 2022-10-31 RX ADMIN — LABETALOL HCL SCH MG: 100 TABLET, FILM COATED ORAL at 14:58

## 2022-10-31 RX ADMIN — BACLOFEN SCH MG: 20 TABLET ORAL at 06:00

## 2022-10-31 RX ADMIN — Medication SCH EACH: at 16:36

## 2022-10-31 RX ADMIN — Medication SCH EACH: at 06:15

## 2022-10-31 RX ADMIN — BACLOFEN SCH MG: 20 TABLET ORAL at 14:57

## 2022-10-31 RX ADMIN — ALBUTEROL SULFATE SCH MG: 1.25 SOLUTION RESPIRATORY (INHALATION) at 13:36

## 2022-10-31 RX ADMIN — IPRATROPIUM BROMIDE SCH MG: 0.5 SOLUTION RESPIRATORY (INHALATION) at 01:06

## 2022-10-31 RX ADMIN — Medication SCH EACH: at 16:35

## 2022-10-31 RX ADMIN — DEXTROSE SCH MLS/HR: 50 INJECTION, SOLUTION INTRAVENOUS at 08:21

## 2022-10-31 RX ADMIN — CLOTRIMAZOLE SCH GM: 1 CREAM TOPICAL at 08:24

## 2022-10-31 RX ADMIN — SODIUM CHLORIDE PRN UNIT: 9 INJECTION, SOLUTION INTRAVENOUS at 11:40

## 2022-10-31 RX ADMIN — SODIUM CHLORIDE PRN UNIT: 9 INJECTION, SOLUTION INTRAVENOUS at 07:59

## 2022-10-31 RX ADMIN — IPRATROPIUM BROMIDE SCH MG: 0.5 SOLUTION RESPIRATORY (INHALATION) at 19:07

## 2022-10-31 RX ADMIN — LABETALOL HCL SCH MG: 100 TABLET, FILM COATED ORAL at 05:37

## 2022-10-31 RX ADMIN — IPRATROPIUM BROMIDE SCH MG: 0.5 SOLUTION RESPIRATORY (INHALATION) at 13:36

## 2022-10-31 NOTE — NUR
OK TO DISCHARGE PER TRES HERNANDEZ. MEDICATION RECONCILIATION AND DISCHARGE SUMMARY TO FOLLOW. 
REPORT GIVEN TO [facility] STAFF. NOTE: PICTURE TAKING OF WOUND NOT DONE PATIENT 
REFUSED.

## 2022-10-31 NOTE — NUR
Pt asked for breathing tx. After medication was scanned, open and put in HHN, pt refused 
breathing tx and she want to leave hospital with transport team. AUBREY Walters notified.

## 2022-10-31 NOTE — NUR
Patient is currently on 3L NC. Bipap orders refused early on in shift. Offered again at this 
time but patient is still refusing. Risks and benefits explained X3. Patient states she has 
not used Bipap in a few days and does not need it. Vitals within normal range, will continue 
to monitor. BiPAP on standby at bedside. HHN treatments administered and tolerated well 
without adverse reactions noted.

## 2022-12-02 NOTE — NUR
[facility] Hematolgy/Oncology  History & Physical    Subjective:      Patient ID:   NAME: Alanna Hammer : 2002     20 y.o. female    Referring Doc: Danielle Angel MD  Other Physicians: Dr. Costa, Dr. Conteh, Dr. Diane,          Chief Complaint: Iron Deficiency Anemia       HPI:  20 y.o. female with diagnosis of Iron Deficiency Anemia who has been referred by Danielle Angel MD for evaluation by medical hematology/oncology.     Arti is here today with her mother. She does have Autism and both her and her mother are providing medical history.     She states she has been eating and craving ice for at least 8 - 10 years.   She states she does have irregular and heavy periods.   She is not on any birth control.     She was started on oral iron about 90 days ago by her PCP and had her labs retested about a month ago. Her labs did not seem to improve after 60 days of being on oral iron.       Her mother states that she is iron deficient and is a carrier for Thalessemia trait.           ROS:   Review of Systems   Constitutional:  Positive for fatigue. Negative for activity change, appetite change and fever.   HENT:  Negative for congestion, mouth sores, postnasal drip, rhinorrhea, sinus pressure, sore throat and trouble swallowing.         Patient craves ice    Eyes:  Negative for photophobia and visual disturbance.   Respiratory:  Negative for cough, chest tightness, shortness of breath and wheezing.    Cardiovascular:  Negative for chest pain and leg swelling.   Gastrointestinal:  Positive for diarrhea. Negative for abdominal distention, abdominal pain, constipation, nausea and vomiting.   Endocrine: Negative for cold intolerance.   Genitourinary:  Negative for decreased urine volume, dysuria and frequency.   Musculoskeletal:  Negative for arthralgias and myalgias.   Skin:  Negative for pallor and rash.   Allergic/Immunologic: Negative for immunocompromised state.   Neurological:  Positive for  RN OPENING NOTE



PT WAS RECEIVED IN BED AT LOWEST AND LOCKED POSITION WITH SIDE RAILS X2, A/O X4 BREATHING 
EVEN AND UNLABORED ON 2L VIA NC, NO S/S OF ANY DISTRESS OR PAIN AT THIS TIME, IV IS PATENT 
AND INTACT, NOTED TO HAVE MULTIPLE SKIN REDNESS THROUGHOUT HER BODY, NOTED TO HAVE LEFT SIDE 
WEAKNESS, SAFETY PRECAUTIONS IN PLACE, CALL LIGHT IN REACH, WILL MONITOR ACCORDINGLY headaches. Negative for dizziness, syncope, weakness, light-headedness and numbness.   Hematological:  Negative for adenopathy. Does not bruise/bleed easily.   Psychiatric/Behavioral:  Negative for sleep disturbance. The patient is nervous/anxious.           Past Medical/Surgical History:  Past Medical History:   Diagnosis Date    Acne 11/20/2020    Autism 8/21/2020    Chronic constipation 8/22/2020    Gastroesophageal reflux disease without esophagitis 8/21/2020    History of speech therapy      No past surgical history on file.      Allergies:  Review of patient's allergies indicates:   Allergen Reactions    Egg derived      Egg white    Lactose        Social/Family History:  Social History     Socioeconomic History    Marital status: Single   Tobacco Use    Smoking status: Never    Smokeless tobacco: Never   Substance and Sexual Activity    Alcohol use: Never    Drug use: Never    Sexual activity: Never   Social History Narrative    College Graduate    Lives at home with parents    Mom (Elías Hammer) has medical and financial power of      Family History   Problem Relation Age of Onset    No Known Problems Mother     No Known Problems Father          Medications:    Current Outpatient Medications:     diphenhydrAMINE (BENYLIN) 12.5 mg/5 mL liquid, Take by mouth 4 (four) times daily as needed for Allergies., Disp: , Rfl:     ibuprofen (ADVIL,MOTRIN) 100 mg/5 mL suspension, Take 15 mLs by mouth every 6 (six) hours as needed for Temperature greater than., Disp: , Rfl:     iron fum-B12-IF-C-folic acid (FOLTRIN) 110-0.5 mg capsule, Take 1 capsule by mouth 2 (two) times daily., Disp: , Rfl:     lactase (LACTAID) 3,000 unit tablet, Take 1 tablet by mouth 3 (three) times daily with meals., Disp: , Rfl:     Lactobacillus acidophilus (PROBIOTIC ACIDOPHILUS ORAL), Take by mouth., Disp: , Rfl:     metronidazole 0.75% (METROCREAM) 0.75 % Crea, Apply topically 2 (two) times daily., Disp: , Rfl:     polyethylene  "glycol (GLYCOLAX) 17 gram/dose powder, Take 17 g by mouth as needed., Disp: , Rfl:     polysaccharide iron complex (NOVAFERRUM 50) 50 mg iron Cap, Take 1 capsule by mouth once daily., Disp: 90 capsule, Rfl: 2      Pathology:   Cancer Staging   No matching staging information was found for the patient.      Objective:   Vitals:  Blood pressure (!) 120/58, pulse 77, temperature 98.5 °F (36.9 °C), resp. rate 16, height 5' 5" (1.651 m), weight 59.4 kg (131 lb).    Physical Examination:   Physical Exam  Constitutional:       Appearance: Normal appearance.   HENT:      Head: Normocephalic and atraumatic.      Mouth/Throat:      Mouth: Mucous membranes are moist.   Cardiovascular:      Rate and Rhythm: Normal rate and regular rhythm.      Pulses: Normal pulses.      Heart sounds: Normal heart sounds.   Pulmonary:      Effort: Pulmonary effort is normal. No respiratory distress.      Breath sounds: Normal breath sounds. No wheezing.   Abdominal:      General: There is no distension.      Palpations: Abdomen is soft. There is no mass.      Tenderness: There is no abdominal tenderness.   Musculoskeletal:         General: No swelling. Normal range of motion.      Right lower leg: No edema.      Left lower leg: No edema.   Skin:     General: Skin is warm and dry.      Capillary Refill: Capillary refill takes 2 to 3 seconds.      Coloration: Skin is pale.      Findings: No bruising or rash.   Neurological:      Mental Status: She is alert and oriented to person, place, and time. Mental status is at baseline.      Motor: No weakness.   Psychiatric:         Mood and Affect: Mood normal.         Behavior: Behavior normal.          Labs:   Lab Results   Component Value Date    WBC 4.37 10/10/2022    HGB 11.2 (L) 10/10/2022    HCT 36.3 (L) 10/10/2022    MCV 75 (L) 10/10/2022     10/10/2022    CMP  Sodium   Date Value Ref Range Status   08/31/2022 136 136 - 145 mmol/L Final     Potassium   Date Value Ref Range Status "   08/31/2022 3.4 (L) 3.5 - 5.1 mmol/L Final     Chloride   Date Value Ref Range Status   08/31/2022 104 95 - 110 mmol/L Final     CO2   Date Value Ref Range Status   08/31/2022 25 23 - 29 mmol/L Final     Glucose   Date Value Ref Range Status   08/31/2022 74 70 - 110 mg/dL Final     BUN   Date Value Ref Range Status   08/31/2022 11 6 - 20 mg/dL Final     Creatinine   Date Value Ref Range Status   08/31/2022 0.6 0.5 - 1.4 mg/dL Final     Calcium   Date Value Ref Range Status   08/31/2022 9.1 8.7 - 10.5 mg/dL Final     Total Protein   Date Value Ref Range Status   08/31/2022 7.0 6.0 - 8.4 g/dL Final     Albumin   Date Value Ref Range Status   08/31/2022 4.0 3.5 - 5.2 g/dL Final     Total Bilirubin   Date Value Ref Range Status   08/31/2022 0.3 0.1 - 1.0 mg/dL Final     Comment:     For infants and newborns, interpretation of results should be based  on gestational age, weight and in agreement with clinical  observations.    Premature Infant recommended reference ranges:  Up to 24 hours.............<8.0 mg/dL  Up to 48 hours............<12.0 mg/dL  3-5 days..................<15.0 mg/dL  6-29 days.................<15.0 mg/dL       Alkaline Phosphatase   Date Value Ref Range Status   08/31/2022 62 55 - 135 U/L Final     AST   Date Value Ref Range Status   08/31/2022 20 10 - 40 U/L Final     ALT   Date Value Ref Range Status   08/31/2022 16 10 - 44 U/L Final     Anion Gap   Date Value Ref Range Status   08/31/2022 7 (L) 8 - 16 mmol/L Final         Radiology/Diagnostic Studies:      EXAMINATION:  US BREAST LEFT LIMITED     CLINICAL HISTORY:  Unspecified lump in the left breast, upper outer quadrant     TECHNIQUE:  CMS MANDATED QUALITY DATA - MAMMOGRAM - 225     [facility] utilizes a reminder system to ensure that all patients receive reminder letters and/or direct phone calls for appointments. This includes reminders for routine screening mammograms, diagnostic mammograms, and other breast imaging  interventions when appropriate. This patient will be placed in the appropriate reminder system.     The interpretation was assisted by Computer Aided Detection with [employer].     COMPARISON:  None     FINDINGS:  Grayscale evaluation of the left breast 3-4 o'clock area of interest, 6 cm from the nipple was performed.  Of note, patient reports the palpable abnormality is intermittent, and does not feel it today.  Normal fibroglandular elements are evident in this region.  No suspicious cystic or solid mass.     Impression:     Negative targeted left breast ultrasound.     Clinical follow-up and screening mammography at age 40 recommended.     BI-RADS CATEGORY 1: NEGATIVE.        Electronically signed by: Axel Segura MD  Date:                                            08/31/2022  Time:                                           09:06    All lab results and imaging results have been reviewed and discussed with the patient    Assessment:   (1) 20 y.o. female with diagnosis of iron deficiency anemia. She has been on oral iron for three months with only minimal improvements.   - orders placed for IV iron x 4  - recheck labs after 4 weeks and then follow up with Dr. Issa   - Thalessemia and hemoglobinopathy     (2) Gastritis   - follow up with GI   - takes a probiotic daily     (3) Autism/OCD         VISIT DIAGNOSES:          Microcytic anemia  -     Iron and TIBC; Future; Expected date: 12/02/2022  -     Ferritin; Future; Expected date: 12/02/2022  -     Hemoglobin Electrophoresis,Hgb A2 Mark.; Future; Expected date: 12/02/2022  -     Thalasseima and Hemoglobinopathy Eval; Future; Expected date: 12/02/2022  -     Alpha Thalassemia DNA Mutation Analysis; Future; Expected date: 12/02/2022  -     B-HCG; Future; Expected date: 12/02/2022  -     CBC Auto Differential; Future; Expected date: 12/02/2022  -     CMP; Future; Expected date: 12/02/2022    Iron deficiency anemia, unspecified iron deficiency anemia  type  -     Iron and TIBC; Future; Expected date: 12/02/2022  -     Ferritin; Future; Expected date: 12/02/2022  -     Hemoglobin Electrophoresis,Hgb A2 Mark.; Future; Expected date: 12/02/2022  -     Thalasseima and Hemoglobinopathy Eval; Future; Expected date: 12/02/2022  -     Alpha Thalassemia DNA Mutation Analysis; Future; Expected date: 12/02/2022  -     B-HCG; Future; Expected date: 12/02/2022  -     Ambulatory referral/consult to Hematology / Oncology  -     CBC Auto Differential; Future; Expected date: 12/02/2022  -     CMP; Future; Expected date: 12/02/2022    Other orders  -     iron sucrose (VENOFER) 100 mg in sodium chloride 0.9% 100 mL IVPB  -     sodium chloride 0.9% 250 mL flush bag  -     sodium chloride 0.9% flush 10 mL  -     heparin, porcine (PF) 100 unit/mL injection flush 500 Units  -     alteplase injection 2 mg  -     diphenhydrAMINE injection 12.5 mg  -     acetaminophen tablet 650 mg          Plan:     PLAN:  Venofer x 4 and recheck labs after   2.  Anemia work up at [facility] including Thalessemia work up   3. Follow up with GI as needed   4. Continue oral iron until IV iron is approved by insurance   5.follow up with GYN about heavy periods     RTC in  5 weeks with Dr. Issa     Fax note to Danielle Angel MD and Dr. Gustavo Issa         I have explained and the patient understands all of  the current recommendation(s). I have answered all of their questions to the best of my ability and to their complete satisfaction.             Thank you for allowing me to participate in this patient's care. Please call with any questions or concerns.    Electronically signed Denisa Wong, ZAFAR,APRN,AGNP-C

## 2023-03-30 NOTE — NUR
Cotinue vit D  And wellbutrin  Mammogram  Take care  Clau Paul D.O.        Patient Education     Preventive Health Recommendations  Female Ages 40 to 49    Yearly exam:   See your health care provider every year in order to  Review health changes.   Discuss preventive care.    Review your medicines if your doctor prescribed any.    Get a Pap test every three years (unless you have an abnormal result and your provider advises testing more often).    If you get Pap tests with HPV test, you only need to test every 5 years, unless you have an abnormal result. You do not need a Pap test if your uterus was removed (hysterectomy) and you have not had cancer.    You should be tested each year for STDs (sexually transmitted diseases), if you're at risk.   Ask your doctor if you should have a mammogram.    Have a colonoscopy (test for colon cancer) if someone in your family has had colon cancer or polyps before age 50.     Have a cholesterol test every 5 years.     Have a diabetes test (fasting glucose) after age 45. If you are at risk for diabetes, you should have this test every 3 years.    Shots: Get a flu shot each year. Get a tetanus shot every 10 years.     Nutrition:   Eat at least 5 servings of fruits and vegetables each day.  Eat whole-grain bread, whole-wheat pasta and brown rice instead of white grains and rice.  Get adequate Calcium and Vitamin D.      Lifestyle  Exercise at least 150 minutes a week (an average of 30 minutes a day, 5 days a week). This will help you control your weight and prevent disease.  Limit alcohol to one drink per day.  No smoking.   Wear sunscreen to prevent skin cancer.  See your dentist every six months for an exam and cleaning.   Received pt resting in bed. AAO x3-4. On 2L O2 via NC, no acute distress noted. Denies 
pain/discomfort. Due meds given as ordered. Accucheck 134, insulin coverage given per 
sliding scale. Snack given. Pt on fluid restriction 1,000mL/ day. Noted non- productive 
cough. Pt complain that her right upper arm midline was dislodged in the morning. Inserted 
IV on right hand. Safety measures maintained. Call light and personal items within reach. 
Will continue to monitor.

## 2023-04-26 NOTE — NUR
RN CLOSING NOTE



PATIENT REMAINS ON ALERT ORIENTED X3 VERBALLY RESPONSIVE,FULL CODE, ON TELE MONITORING HR:66 
SINUS RHYTHM,ON 2L OXYGEN VIA NASAL CANNULA, O2:99% NO SOB NOT ACUTE DISTRESS NOTED,IV SITE 
IS ON RIGHT WRIST INTACT PATENT,ON DUBOIS CATHETER URINE DRAINING  YELLOW AND CLEAR/ALL DUE 
MEDS GIVEN AS MD ORDERED,AS TOLERATED,KEPT CLEAN AND DRY ALL THE TIME,KEPT CALL LIGHT WITHIN 
REACH,ALL NEEDS MET,BED LOCKED AND LOW IN POSITION,ENDORSE NEXT COMING SHIFT FOR 
CONTINUATION OF CARE. hematuria

## 2023-11-21 NOTE — NUR
patient came back from CT scan. Oral Minoxidil Pregnancy And Lactation Text: This medication should only be used when clearly needed if you are pregnant, attempting to become pregnant or breast feeding.

## 2023-12-30 ENCOUNTER — HOSPITAL ENCOUNTER (INPATIENT)
Dept: HOSPITAL 54 - ER | Age: 63
LOS: 5 days | Discharge: HOME HEALTH | DRG: 602 | End: 2024-01-04
Attending: INTERNAL MEDICINE | Admitting: INTERNAL MEDICINE
Payer: COMMERCIAL

## 2023-12-30 VITALS — WEIGHT: 246 LBS | BODY MASS INDEX: 38.61 KG/M2 | HEIGHT: 67 IN

## 2023-12-30 DIAGNOSIS — Z85.828: ICD-10-CM

## 2023-12-30 DIAGNOSIS — G93.40: ICD-10-CM

## 2023-12-30 DIAGNOSIS — M54.9: ICD-10-CM

## 2023-12-30 DIAGNOSIS — Z79.4: ICD-10-CM

## 2023-12-30 DIAGNOSIS — J96.20: ICD-10-CM

## 2023-12-30 DIAGNOSIS — Z99.81: ICD-10-CM

## 2023-12-30 DIAGNOSIS — L03.116: Primary | ICD-10-CM

## 2023-12-30 DIAGNOSIS — I50.9: ICD-10-CM

## 2023-12-30 DIAGNOSIS — Z83.3: ICD-10-CM

## 2023-12-30 DIAGNOSIS — F29: ICD-10-CM

## 2023-12-30 DIAGNOSIS — I13.0: ICD-10-CM
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
## 2023-12-30 DIAGNOSIS — Z20.822: ICD-10-CM

## 2023-12-30 DIAGNOSIS — E78.5: ICD-10-CM

## 2023-12-30 DIAGNOSIS — E66.9: ICD-10-CM

## 2023-12-30 DIAGNOSIS — E11.22: ICD-10-CM

## 2023-12-30 DIAGNOSIS — F41.9: ICD-10-CM
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
## 2023-12-30 DIAGNOSIS — I25.10: ICD-10-CM

## 2023-12-30 DIAGNOSIS — J44.1: ICD-10-CM

## 2023-12-30 DIAGNOSIS — Z87.440: ICD-10-CM

## 2023-12-30 DIAGNOSIS — I69.354: ICD-10-CM

## 2023-12-30 DIAGNOSIS — N18.9: ICD-10-CM

## 2023-12-30 LAB
ALBUMIN SERPL BCP-MCNC: 3.4 G/DL (ref 3.4–5)
ALP SERPL-CCNC: 112 U/L (ref 46–116)
ALT SERPL W P-5'-P-CCNC: 47 U/L (ref 12–78)
APTT PPP: 25.6 SEC (ref 24.3–34.3)
AST SERPL W P-5'-P-CCNC: 36 U/L (ref 15–37)
BASOPHILS # BLD AUTO: 0.2 K/UL (ref 0–0.2)
BASOPHILS NFR BLD AUTO: 1.3 % (ref 0–2)
BILIRUB DIRECT SERPL-MCNC: 0.1 MG/DL (ref 0–0.2)
BILIRUB SERPL-MCNC: 0.7 MG/DL (ref 0.2–1)
BUN SERPL-MCNC: 21 MG/DL (ref 7–18)
CALCIUM SERPL-MCNC: 9.7 MG/DL (ref 8.5–10.1)
CHLORIDE SERPL-SCNC: 96 MMOL/L (ref 98–107)
CO2 SERPL-SCNC: 30 MMOL/L (ref 21–32)
CREAT SERPL-MCNC: 0.9 MG/DL (ref 0.6–1.3)
EOSINOPHIL # BLD AUTO: 0.3 K/UL (ref 0–0.7)
EOSINOPHIL NFR BLD AUTO: 2.2 % (ref 0–6)
ERYTHROCYTE [DISTWIDTH] IN BLOOD BY AUTOMATED COUNT: 14.7 % (ref 11.5–15)
GLUCOSE SERPL-MCNC: 280 MG/DL (ref 74–106)
HCT VFR BLD AUTO: 34 % (ref 33–45)
HGB BLD-MCNC: 10.8 G/DL (ref 11.5–14.8)
INR PPP: 1.02 (ref 0.91–1.1)
LACTATE SERPL-SCNC: 1.6 MMOL/L (ref 0.4–2)
LYMPHOCYTES NFR BLD AUTO: 25.2 % (ref 20–44)
LYMPHOCYTES NFR BLD AUTO: 3 K/UL (ref 0.8–4.8)
MCH RBC QN AUTO: 27 PG (ref 26–33)
MCHC RBC AUTO-ENTMCNC: 32 G/DL (ref 31–36)
MCV RBC AUTO: 84 FL (ref 82–100)
MONOCYTES NFR BLD AUTO: 1 K/UL (ref 0.1–1.3)
MONOCYTES NFR BLD AUTO: 8.1 % (ref 2–12)
NEUTROPHILS # BLD AUTO: 7.5 K/UL (ref 1.8–8.9)
NEUTROPHILS NFR BLD AUTO: 63.2 % (ref 43–81)
NT-PROBNP SERPL-MCNC: 801 PG/ML (ref 0–125)
PLATELET # BLD AUTO: 420 K/UL (ref 150–450)
POTASSIUM SERPL-SCNC: 3.7 MMOL/L (ref 3.5–5.1)
PROT SERPL-MCNC: 7.4 G/DL (ref 6.4–8.2)
PROTHROMBIN TIME: 10.8 SECS (ref 9.2–11.1)
RBC # BLD AUTO: 4.02 MIL/UL (ref 4–5.2)
SODIUM SERPL-SCNC: 135 MMOL/L (ref 136–145)
WBC NRBC COR # BLD AUTO: 11.8 K/UL (ref 4.3–11)

## 2023-12-30 PROCEDURE — A9575 INJ GADOTERATE MEGLUMI 0.1ML: HCPCS

## 2023-12-30 PROCEDURE — A4223 INFUSION SUPPLIES W/O PUMP: HCPCS

## 2023-12-30 PROCEDURE — G0378 HOSPITAL OBSERVATION PER HR: HCPCS

## 2023-12-30 PROCEDURE — C9803 HOPD COVID-19 SPEC COLLECT: HCPCS

## 2023-12-31 VITALS — OXYGEN SATURATION: 99 %

## 2023-12-31 VITALS — OXYGEN SATURATION: 97 % | SYSTOLIC BLOOD PRESSURE: 113 MMHG | TEMPERATURE: 98.4 F | DIASTOLIC BLOOD PRESSURE: 68 MMHG

## 2023-12-31 VITALS — OXYGEN SATURATION: 97 % | SYSTOLIC BLOOD PRESSURE: 150 MMHG | DIASTOLIC BLOOD PRESSURE: 56 MMHG | TEMPERATURE: 98.6 F

## 2023-12-31 VITALS — OXYGEN SATURATION: 100 %

## 2023-12-31 VITALS — OXYGEN SATURATION: 97 %

## 2023-12-31 VITALS — OXYGEN SATURATION: 92 %

## 2023-12-31 LAB
BASOPHILS # BLD AUTO: 0 K/UL (ref 0–0.2)
BASOPHILS NFR BLD AUTO: 0.3 % (ref 0–2)
BUN SERPL-MCNC: 20 MG/DL (ref 7–18)
CALCIUM SERPL-MCNC: 9.9 MG/DL (ref 8.5–10.1)
CHLORIDE SERPL-SCNC: 94 MMOL/L (ref 98–107)
CO2 SERPL-SCNC: 33 MMOL/L (ref 21–32)
CREAT SERPL-MCNC: 0.9 MG/DL (ref 0.6–1.3)
EOSINOPHIL # BLD AUTO: 0 K/UL (ref 0–0.7)
EOSINOPHIL NFR BLD AUTO: 0 % (ref 0–6)
ERYTHROCYTE [DISTWIDTH] IN BLOOD BY AUTOMATED COUNT: 14.9 % (ref 11.5–15)
GLUCOSE SERPL-MCNC: 360 MG/DL (ref 74–106)
HCT VFR BLD AUTO: 37 % (ref 33–45)
HGB BLD-MCNC: 12 G/DL (ref 11.5–14.8)
LYMPHOCYTES NFR BLD AUTO: 0.6 K/UL (ref 0.8–4.8)
LYMPHOCYTES NFR BLD AUTO: 4.5 % (ref 20–44)
MCH RBC QN AUTO: 27 PG (ref 26–33)
MCHC RBC AUTO-ENTMCNC: 32 G/DL (ref 31–36)
MCV RBC AUTO: 84 FL (ref 82–100)
MONOCYTES NFR BLD AUTO: 0.1 K/UL (ref 0.1–1.3)
MONOCYTES NFR BLD AUTO: 0.6 % (ref 2–12)
NEUTROPHILS # BLD AUTO: 12.1 K/UL (ref 1.8–8.9)
NEUTROPHILS NFR BLD AUTO: 94.6 % (ref 43–81)
PLATELET # BLD AUTO: 424 K/UL (ref 150–450)
POTASSIUM SERPL-SCNC: 4 MMOL/L (ref 3.5–5.1)
RBC # BLD AUTO: 4.41 MIL/UL (ref 4–5.2)
SODIUM SERPL-SCNC: 135 MMOL/L (ref 136–145)
WBC NRBC COR # BLD AUTO: 12.7 K/UL (ref 4.3–11)

## 2023-12-31 RX ADMIN — BUMETANIDE SCH MG: 1 TABLET ORAL at 09:21

## 2023-12-31 RX ADMIN — LIDOCAINE SCH EA: 50 PATCH CUTANEOUS at 09:21

## 2023-12-31 RX ADMIN — GUAIFENESIN AND DEXTROMETHORPHAN PRN ML: 100; 10 SYRUP ORAL at 22:39

## 2023-12-31 RX ADMIN — PIPERACILLIN SODIUM AND TAZOBACTAM SODIUM SCH MLS/HR: .375; 3 INJECTION, POWDER, LYOPHILIZED, FOR SOLUTION INTRAVENOUS at 12:00

## 2023-12-31 RX ADMIN — BENZONATATE PRN MG: 100 CAPSULE ORAL at 22:56

## 2023-12-31 RX ADMIN — INSULIN GLARGINE SCH UNIT: 100 INJECTION, SOLUTION SUBCUTANEOUS at 16:38

## 2023-12-31 RX ADMIN — Medication SCH EACH: at 21:44

## 2023-12-31 RX ADMIN — ALBUTEROL SULFATE SCH MG: 2.5 SOLUTION RESPIRATORY (INHALATION) at 07:52

## 2023-12-31 RX ADMIN — PIPERACILLIN SODIUM AND TAZOBACTAM SODIUM SCH MLS/HR: .375; 3 INJECTION, POWDER, LYOPHILIZED, FOR SOLUTION INTRAVENOUS at 08:30

## 2023-12-31 RX ADMIN — FLUTICASONE FUROATE AND VILANTEROL TRIFENATATE SCH EACH: 100; 25 POWDER RESPIRATORY (INHALATION) at 14:00

## 2023-12-31 RX ADMIN — INSULIN GLARGINE SCH UNIT: 100 INJECTION, SOLUTION SUBCUTANEOUS at 21:55

## 2023-12-31 RX ADMIN — TOLTERODINE TARTRATE SCH MG: 2 CAPSULE, EXTENDED RELEASE ORAL at 14:00

## 2023-12-31 RX ADMIN — PIPERACILLIN SODIUM AND TAZOBACTAM SODIUM SCH MLS/HR: .375; 3 INJECTION, POWDER, LYOPHILIZED, FOR SOLUTION INTRAVENOUS at 17:57

## 2023-12-31 RX ADMIN — TOLTERODINE TARTRATE SCH MG: 2 CAPSULE, EXTENDED RELEASE ORAL at 16:36

## 2023-12-31 RX ADMIN — POLYETHYLENE GLYCOL 3350 SCH GM: 17 POWDER, FOR SOLUTION ORAL at 14:00

## 2023-12-31 RX ADMIN — BACLOFEN SCH MG: 10 TABLET ORAL at 14:00

## 2023-12-31 RX ADMIN — ASPIRIN 81 MG SCH MG: 81 TABLET ORAL at 09:21

## 2023-12-31 RX ADMIN — INSULIN HUMAN PRN UNITS: 100 INJECTION, SOLUTION PARENTERAL at 16:55

## 2023-12-31 RX ADMIN — LABETALOL HCL SCH MG: 100 TABLET, FILM COATED ORAL at 14:00

## 2023-12-31 RX ADMIN — LABETALOL HCL SCH MG: 100 TABLET, FILM COATED ORAL at 05:50

## 2023-12-31 RX ADMIN — ATORVASTATIN CALCIUM SCH MG: 40 TABLET, FILM COATED ORAL at 21:34

## 2023-12-31 RX ADMIN — LOSARTAN POTASSIUM SCH MG: 50 TABLET, FILM COATED ORAL at 09:21

## 2023-12-31 RX ADMIN — PIPERACILLIN SODIUM AND TAZOBACTAM SODIUM SCH MLS/HR: .375; 3 INJECTION, POWDER, LYOPHILIZED, FOR SOLUTION INTRAVENOUS at 02:00

## 2023-12-31 RX ADMIN — Medication SCH EACH: at 12:18

## 2023-12-31 RX ADMIN — PIPERACILLIN SODIUM AND TAZOBACTAM SODIUM SCH MLS/HR: .375; 3 INJECTION, POWDER, LYOPHILIZED, FOR SOLUTION INTRAVENOUS at 23:52

## 2023-12-31 RX ADMIN — Medication PRN MG: at 20:12

## 2023-12-31 RX ADMIN — INSULIN HUMAN PRN UNITS: 100 INJECTION, SOLUTION PARENTERAL at 12:23

## 2023-12-31 RX ADMIN — CLOPIDOGREL BISULFATE SCH MG: 75 TABLET, FILM COATED ORAL at 09:21

## 2023-12-31 RX ADMIN — ACETAMINOPHEN PRN MG: 500 TABLET ORAL at 21:33

## 2023-12-31 RX ADMIN — Medication PRN MG: at 02:44

## 2023-12-31 RX ADMIN — LABETALOL HCL SCH MG: 100 TABLET, FILM COATED ORAL at 21:00

## 2023-12-31 RX ADMIN — Medication SCH MG: at 13:21

## 2023-12-31 RX ADMIN — DEXTROSE MONOHYDRATE SCH MLS/HR: 50 INJECTION, SOLUTION INTRAVENOUS at 11:00

## 2023-12-31 RX ADMIN — PANTOPRAZOLE SODIUM SCH MG: 40 TABLET, DELAYED RELEASE ORAL at 08:00

## 2023-12-31 RX ADMIN — BACLOFEN SCH MG: 10 TABLET ORAL at 21:34

## 2023-12-31 RX ADMIN — HUMAN INSULIN PRN UNIT: 100 INJECTION, SOLUTION SUBCUTANEOUS at 21:56

## 2023-12-31 RX ADMIN — ALBUTEROL SULFATE SCH MG: 2.5 SOLUTION RESPIRATORY (INHALATION) at 13:21

## 2023-12-31 RX ADMIN — BACLOFEN SCH MG: 10 TABLET ORAL at 05:50

## 2023-12-31 RX ADMIN — Medication PRN MG: at 09:00

## 2023-12-31 RX ADMIN — BUMETANIDE SCH MG: 1 TABLET ORAL at 16:37

## 2023-12-31 RX ADMIN — DEXTROSE MONOHYDRATE SCH MLS/HR: 50 INJECTION, SOLUTION INTRAVENOUS at 20:33

## 2023-12-31 RX ADMIN — Medication SCH MG: at 07:52

## 2024-01-01 VITALS — DIASTOLIC BLOOD PRESSURE: 53 MMHG | TEMPERATURE: 97.7 F | SYSTOLIC BLOOD PRESSURE: 162 MMHG | OXYGEN SATURATION: 97 %

## 2024-01-01 VITALS — OXYGEN SATURATION: 99 %

## 2024-01-01 VITALS — OXYGEN SATURATION: 98 %

## 2024-01-01 VITALS — OXYGEN SATURATION: 98 % | TEMPERATURE: 98 F | SYSTOLIC BLOOD PRESSURE: 143 MMHG | DIASTOLIC BLOOD PRESSURE: 59 MMHG

## 2024-01-01 VITALS — DIASTOLIC BLOOD PRESSURE: 49 MMHG | OXYGEN SATURATION: 100 % | SYSTOLIC BLOOD PRESSURE: 136 MMHG | TEMPERATURE: 97.9 F

## 2024-01-01 VITALS — SYSTOLIC BLOOD PRESSURE: 111 MMHG | TEMPERATURE: 97.8 F | OXYGEN SATURATION: 96 % | DIASTOLIC BLOOD PRESSURE: 55 MMHG

## 2024-01-01 LAB
BASOPHILS # BLD AUTO: 0 K/UL (ref 0–0.2)
BASOPHILS NFR BLD AUTO: 0.4 % (ref 0–2)
BUN SERPL-MCNC: 39 MG/DL (ref 7–18)
CALCIUM SERPL-MCNC: 9.7 MG/DL (ref 8.5–10.1)
CHLORIDE SERPL-SCNC: 95 MMOL/L (ref 98–107)
CO2 SERPL-SCNC: 34 MMOL/L (ref 21–32)
CREAT SERPL-MCNC: 1.3 MG/DL (ref 0.6–1.3)
EOSINOPHIL # BLD AUTO: 0.1 K/UL (ref 0–0.7)
EOSINOPHIL NFR BLD AUTO: 0.6 % (ref 0–6)
ERYTHROCYTE [DISTWIDTH] IN BLOOD BY AUTOMATED COUNT: 14.5 % (ref 11.5–15)
GLUCOSE SERPL-MCNC: 179 MG/DL (ref 74–106)
HCT VFR BLD AUTO: 33 % (ref 33–45)
HGB BLD-MCNC: 10.5 G/DL (ref 11.5–14.8)
LYMPHOCYTES NFR BLD AUTO: 18 % (ref 20–44)
LYMPHOCYTES NFR BLD AUTO: 2.1 K/UL (ref 0.8–4.8)
MCH RBC QN AUTO: 27 PG (ref 26–33)
MCHC RBC AUTO-ENTMCNC: 32 G/DL (ref 31–36)
MCV RBC AUTO: 84 FL (ref 82–100)
MONOCYTES NFR BLD AUTO: 1.1 K/UL (ref 0.1–1.3)
MONOCYTES NFR BLD AUTO: 9.6 % (ref 2–12)
NEUTROPHILS # BLD AUTO: 8.4 K/UL (ref 1.8–8.9)
NEUTROPHILS NFR BLD AUTO: 71.4 % (ref 43–81)
PLATELET # BLD AUTO: 405 K/UL (ref 150–450)
POTASSIUM SERPL-SCNC: 3.2 MMOL/L (ref 3.5–5.1)
RBC # BLD AUTO: 3.94 MIL/UL (ref 4–5.2)
SODIUM SERPL-SCNC: 137 MMOL/L (ref 136–145)
WBC NRBC COR # BLD AUTO: 11.7 K/UL (ref 4.3–11)

## 2024-01-01 RX ADMIN — BUMETANIDE SCH MG: 1 TABLET ORAL at 16:52

## 2024-01-01 RX ADMIN — ACETAMINOPHEN PRN MG: 500 TABLET ORAL at 14:27

## 2024-01-01 RX ADMIN — DEXTROSE MONOHYDRATE SCH MLS/HR: 50 INJECTION, SOLUTION INTRAVENOUS at 07:27

## 2024-01-01 RX ADMIN — BACLOFEN SCH MG: 10 TABLET ORAL at 20:36

## 2024-01-01 RX ADMIN — ALBUTEROL SULFATE SCH MG: 2.5 SOLUTION RESPIRATORY (INHALATION) at 15:44

## 2024-01-01 RX ADMIN — BENZONATATE PRN MG: 100 CAPSULE ORAL at 12:26

## 2024-01-01 RX ADMIN — LABETALOL HCL SCH MG: 100 TABLET, FILM COATED ORAL at 05:12

## 2024-01-01 RX ADMIN — LABETALOL HCL SCH MG: 100 TABLET, FILM COATED ORAL at 12:10

## 2024-01-01 RX ADMIN — Medication SCH EACH: at 21:46

## 2024-01-01 RX ADMIN — ALBUTEROL SULFATE SCH MG: 2.5 SOLUTION RESPIRATORY (INHALATION) at 00:31

## 2024-01-01 RX ADMIN — TOLTERODINE TARTRATE SCH MG: 2 CAPSULE, EXTENDED RELEASE ORAL at 16:53

## 2024-01-01 RX ADMIN — PANTOPRAZOLE SODIUM SCH MG: 40 TABLET, DELAYED RELEASE ORAL at 07:25

## 2024-01-01 RX ADMIN — PIPERACILLIN SODIUM AND TAZOBACTAM SODIUM SCH MLS/HR: .375; 3 INJECTION, POWDER, LYOPHILIZED, FOR SOLUTION INTRAVENOUS at 20:37

## 2024-01-01 RX ADMIN — Medication PRN MG: at 12:26

## 2024-01-01 RX ADMIN — Medication PRN MG: at 23:07

## 2024-01-01 RX ADMIN — Medication SCH EACH: at 07:12

## 2024-01-01 RX ADMIN — ASPIRIN 81 MG SCH MG: 81 TABLET ORAL at 08:28

## 2024-01-01 RX ADMIN — POLYETHYLENE GLYCOL 3350 SCH GM: 17 POWDER, FOR SOLUTION ORAL at 08:28

## 2024-01-01 RX ADMIN — FLUTICASONE FUROATE AND VILANTEROL TRIFENATATE SCH EACH: 100; 25 POWDER RESPIRATORY (INHALATION) at 08:35

## 2024-01-01 RX ADMIN — LABETALOL HCL SCH MG: 100 TABLET, FILM COATED ORAL at 20:36

## 2024-01-01 RX ADMIN — Medication SCH MG: at 07:48

## 2024-01-01 RX ADMIN — BACLOFEN SCH MG: 10 TABLET ORAL at 12:10

## 2024-01-01 RX ADMIN — BENZONATATE PRN MG: 100 CAPSULE ORAL at 22:53

## 2024-01-01 RX ADMIN — GUAIFENESIN AND DEXTROMETHORPHAN PRN ML: 100; 10 SYRUP ORAL at 22:53

## 2024-01-01 RX ADMIN — DEXTROSE MONOHYDRATE SCH MLS/HR: 50 INJECTION, SOLUTION INTRAVENOUS at 20:00

## 2024-01-01 RX ADMIN — TOLTERODINE TARTRATE SCH MG: 2 CAPSULE, EXTENDED RELEASE ORAL at 08:35

## 2024-01-01 RX ADMIN — Medication SCH MG: at 23:47

## 2024-01-01 RX ADMIN — ALBUTEROL SULFATE SCH MG: 2.5 SOLUTION RESPIRATORY (INHALATION) at 07:48

## 2024-01-01 RX ADMIN — Medication SCH EACH: at 11:50

## 2024-01-01 RX ADMIN — Medication SCH MG: at 00:31

## 2024-01-01 RX ADMIN — LOSARTAN POTASSIUM SCH MG: 50 TABLET, FILM COATED ORAL at 08:28

## 2024-01-01 RX ADMIN — INSULIN HUMAN PRN UNITS: 100 INJECTION, SOLUTION PARENTERAL at 07:15

## 2024-01-01 RX ADMIN — DEXTROSE MONOHYDRATE SCH MLS/HR: 50 INJECTION, SOLUTION INTRAVENOUS at 08:00

## 2024-01-01 RX ADMIN — HUMAN INSULIN PRN UNIT: 100 INJECTION, SOLUTION SUBCUTANEOUS at 21:51

## 2024-01-01 RX ADMIN — INSULIN GLARGINE SCH UNIT: 100 INJECTION, SOLUTION SUBCUTANEOUS at 21:50

## 2024-01-01 RX ADMIN — BACLOFEN SCH MG: 10 TABLET ORAL at 04:53

## 2024-01-01 RX ADMIN — ATORVASTATIN CALCIUM SCH MG: 40 TABLET, FILM COATED ORAL at 21:04

## 2024-01-01 RX ADMIN — Medication SCH MG: at 15:44

## 2024-01-01 RX ADMIN — ALBUTEROL SULFATE SCH MG: 2.5 SOLUTION RESPIRATORY (INHALATION) at 23:47

## 2024-01-01 RX ADMIN — Medication SCH EACH: at 16:51

## 2024-01-01 RX ADMIN — INSULIN HUMAN PRN UNITS: 100 INJECTION, SOLUTION PARENTERAL at 11:50

## 2024-01-01 RX ADMIN — PIPERACILLIN SODIUM AND TAZOBACTAM SODIUM SCH MLS/HR: .375; 3 INJECTION, POWDER, LYOPHILIZED, FOR SOLUTION INTRAVENOUS at 12:10

## 2024-01-01 RX ADMIN — LIDOCAINE SCH EA: 50 PATCH CUTANEOUS at 08:28

## 2024-01-01 RX ADMIN — CLOPIDOGREL BISULFATE SCH MG: 75 TABLET, FILM COATED ORAL at 08:28

## 2024-01-01 RX ADMIN — PIPERACILLIN SODIUM AND TAZOBACTAM SODIUM SCH MLS/HR: .375; 3 INJECTION, POWDER, LYOPHILIZED, FOR SOLUTION INTRAVENOUS at 06:55

## 2024-01-01 RX ADMIN — INSULIN HUMAN PRN UNITS: 100 INJECTION, SOLUTION PARENTERAL at 16:52

## 2024-01-01 RX ADMIN — BUMETANIDE SCH MG: 1 TABLET ORAL at 08:28

## 2024-01-02 VITALS — TEMPERATURE: 97 F | SYSTOLIC BLOOD PRESSURE: 138 MMHG | OXYGEN SATURATION: 98 % | DIASTOLIC BLOOD PRESSURE: 65 MMHG

## 2024-01-02 VITALS — OXYGEN SATURATION: 98 %

## 2024-01-02 VITALS — OXYGEN SATURATION: 97 %

## 2024-01-02 VITALS — OXYGEN SATURATION: 94 %

## 2024-01-02 VITALS — TEMPERATURE: 99 F | DIASTOLIC BLOOD PRESSURE: 50 MMHG | SYSTOLIC BLOOD PRESSURE: 126 MMHG | OXYGEN SATURATION: 96 %

## 2024-01-02 VITALS — OXYGEN SATURATION: 99 %

## 2024-01-02 VITALS — OXYGEN SATURATION: 98 % | SYSTOLIC BLOOD PRESSURE: 190 MMHG | DIASTOLIC BLOOD PRESSURE: 67 MMHG | TEMPERATURE: 98 F

## 2024-01-02 VITALS — OXYGEN SATURATION: 93 %

## 2024-01-02 VITALS — SYSTOLIC BLOOD PRESSURE: 146 MMHG | TEMPERATURE: 98.4 F | DIASTOLIC BLOOD PRESSURE: 100 MMHG | OXYGEN SATURATION: 100 %

## 2024-01-02 LAB
BUN SERPL-MCNC: 41 MG/DL (ref 7–18)
CALCIUM SERPL-MCNC: 9.5 MG/DL (ref 8.5–10.1)
CHLORIDE SERPL-SCNC: 97 MMOL/L (ref 98–107)
CO2 SERPL-SCNC: 34 MMOL/L (ref 21–32)
CREAT SERPL-MCNC: 1.3 MG/DL (ref 0.6–1.3)
GLUCOSE SERPL-MCNC: 262 MG/DL (ref 74–106)
POTASSIUM SERPL-SCNC: 3.5 MMOL/L (ref 3.5–5.1)
SODIUM SERPL-SCNC: 139 MMOL/L (ref 136–145)

## 2024-01-02 RX ADMIN — LABETALOL HCL SCH MG: 100 TABLET, FILM COATED ORAL at 13:00

## 2024-01-02 RX ADMIN — BACLOFEN SCH MG: 10 TABLET ORAL at 13:00

## 2024-01-02 RX ADMIN — PIPERACILLIN SODIUM AND TAZOBACTAM SODIUM SCH MLS/HR: .375; 3 INJECTION, POWDER, LYOPHILIZED, FOR SOLUTION INTRAVENOUS at 12:52

## 2024-01-02 RX ADMIN — BACLOFEN SCH MG: 10 TABLET ORAL at 04:09

## 2024-01-02 RX ADMIN — ATORVASTATIN CALCIUM SCH MG: 40 TABLET, FILM COATED ORAL at 22:09

## 2024-01-02 RX ADMIN — PANTOPRAZOLE SODIUM SCH MG: 40 TABLET, DELAYED RELEASE ORAL at 07:30

## 2024-01-02 RX ADMIN — Medication PRN MG: at 23:49

## 2024-01-02 RX ADMIN — ALBUTEROL SULFATE SCH MG: 2.5 SOLUTION RESPIRATORY (INHALATION) at 15:13

## 2024-01-02 RX ADMIN — BUMETANIDE SCH MG: 1 TABLET ORAL at 17:00

## 2024-01-02 RX ADMIN — LABETALOL HCL SCH MG: 100 TABLET, FILM COATED ORAL at 20:54

## 2024-01-02 RX ADMIN — ASPIRIN 81 MG SCH MG: 81 TABLET ORAL at 09:00

## 2024-01-02 RX ADMIN — LOSARTAN POTASSIUM SCH MG: 50 TABLET, FILM COATED ORAL at 09:00

## 2024-01-02 RX ADMIN — Medication SCH EACH: at 06:30

## 2024-01-02 RX ADMIN — POLYETHYLENE GLYCOL 3350 SCH GM: 17 POWDER, FOR SOLUTION ORAL at 09:00

## 2024-01-02 RX ADMIN — PREGABALIN SCH MG: 25 CAPSULE ORAL at 09:00

## 2024-01-02 RX ADMIN — LABETALOL HCL SCH MG: 100 TABLET, FILM COATED ORAL at 04:19

## 2024-01-02 RX ADMIN — FLUTICASONE FUROATE AND VILANTEROL TRIFENATATE SCH EACH: 100; 25 POWDER RESPIRATORY (INHALATION) at 09:00

## 2024-01-02 RX ADMIN — INSULIN HUMAN PRN UNITS: 100 INJECTION, SOLUTION PARENTERAL at 16:59

## 2024-01-02 RX ADMIN — ALBUTEROL SULFATE SCH MG: 2.5 SOLUTION RESPIRATORY (INHALATION) at 23:23

## 2024-01-02 RX ADMIN — TOLTERODINE TARTRATE SCH MG: 2 CAPSULE, EXTENDED RELEASE ORAL at 17:00

## 2024-01-02 RX ADMIN — CLOPIDOGREL BISULFATE SCH MG: 75 TABLET, FILM COATED ORAL at 09:00

## 2024-01-02 RX ADMIN — Medication SCH MG: at 15:13

## 2024-01-02 RX ADMIN — BACLOFEN SCH MG: 10 TABLET ORAL at 20:53

## 2024-01-02 RX ADMIN — Medication SCH EACH: at 21:39

## 2024-01-02 RX ADMIN — BUMETANIDE SCH MG: 1 TABLET ORAL at 09:00

## 2024-01-02 RX ADMIN — Medication SCH MG: at 23:23

## 2024-01-02 RX ADMIN — PIPERACILLIN SODIUM AND TAZOBACTAM SODIUM SCH MLS/HR: .375; 3 INJECTION, POWDER, LYOPHILIZED, FOR SOLUTION INTRAVENOUS at 21:26

## 2024-01-02 RX ADMIN — Medication SCH EACH: at 17:00

## 2024-01-02 RX ADMIN — TOLTERODINE TARTRATE SCH MG: 2 CAPSULE, EXTENDED RELEASE ORAL at 09:00

## 2024-01-02 RX ADMIN — LIDOCAINE SCH EA: 50 PATCH CUTANEOUS at 09:00

## 2024-01-02 RX ADMIN — PIPERACILLIN SODIUM AND TAZOBACTAM SODIUM SCH MLS/HR: .375; 3 INJECTION, POWDER, LYOPHILIZED, FOR SOLUTION INTRAVENOUS at 04:02

## 2024-01-02 RX ADMIN — TOLTERODINE TARTRATE SCH MG: 2 CAPSULE, EXTENDED RELEASE ORAL at 16:16

## 2024-01-02 RX ADMIN — Medication SCH MG: at 07:38

## 2024-01-02 RX ADMIN — DEXTROSE MONOHYDRATE SCH MLS/HR: 50 INJECTION, SOLUTION INTRAVENOUS at 20:12

## 2024-01-02 RX ADMIN — Medication SCH EACH: at 12:52

## 2024-01-02 RX ADMIN — Medication PRN MG: at 04:46

## 2024-01-02 RX ADMIN — DEXTROSE MONOHYDRATE SCH MLS/HR: 50 INJECTION, SOLUTION INTRAVENOUS at 10:14

## 2024-01-02 RX ADMIN — BUMETANIDE SCH MG: 1 TABLET ORAL at 16:16

## 2024-01-02 RX ADMIN — ALBUTEROL SULFATE SCH MG: 2.5 SOLUTION RESPIRATORY (INHALATION) at 07:38

## 2024-01-02 RX ADMIN — PREGABALIN SCH MG: 25 CAPSULE ORAL at 17:00

## 2024-01-02 RX ADMIN — INSULIN HUMAN PRN UNITS: 100 INJECTION, SOLUTION PARENTERAL at 06:33

## 2024-01-02 RX ADMIN — INSULIN GLARGINE SCH UNIT: 100 INJECTION, SOLUTION SUBCUTANEOUS at 21:47

## 2024-01-02 RX ADMIN — INSULIN HUMAN PRN UNITS: 100 INJECTION, SOLUTION PARENTERAL at 21:49

## 2024-01-03 VITALS — OXYGEN SATURATION: 95 %

## 2024-01-03 VITALS — DIASTOLIC BLOOD PRESSURE: 40 MMHG | OXYGEN SATURATION: 96 % | SYSTOLIC BLOOD PRESSURE: 149 MMHG | TEMPERATURE: 98.2 F

## 2024-01-03 VITALS — SYSTOLIC BLOOD PRESSURE: 170 MMHG | OXYGEN SATURATION: 98 % | TEMPERATURE: 98 F | DIASTOLIC BLOOD PRESSURE: 65 MMHG

## 2024-01-03 VITALS — OXYGEN SATURATION: 99 %

## 2024-01-03 VITALS — DIASTOLIC BLOOD PRESSURE: 157 MMHG | TEMPERATURE: 99.7 F | SYSTOLIC BLOOD PRESSURE: 170 MMHG | OXYGEN SATURATION: 94 %

## 2024-01-03 VITALS — OXYGEN SATURATION: 98 %

## 2024-01-03 VITALS — OXYGEN SATURATION: 94 %

## 2024-01-03 LAB
BUN SERPL-MCNC: 27 MG/DL (ref 7–18)
CALCIUM SERPL-MCNC: 9.4 MG/DL (ref 8.5–10.1)
CHLORIDE SERPL-SCNC: 100 MMOL/L (ref 98–107)
CO2 SERPL-SCNC: 28 MMOL/L (ref 21–32)
CREAT SERPL-MCNC: 0.9 MG/DL (ref 0.6–1.3)
GLUCOSE SERPL-MCNC: 244 MG/DL (ref 74–106)
POTASSIUM SERPL-SCNC: 4.2 MMOL/L (ref 3.5–5.1)
SODIUM SERPL-SCNC: 136 MMOL/L (ref 136–145)

## 2024-01-03 RX ADMIN — LABETALOL HCL SCH MG: 100 TABLET, FILM COATED ORAL at 21:50

## 2024-01-03 RX ADMIN — PANTOPRAZOLE SODIUM SCH MG: 40 TABLET, DELAYED RELEASE ORAL at 08:35

## 2024-01-03 RX ADMIN — BACLOFEN SCH MG: 10 TABLET ORAL at 04:01

## 2024-01-03 RX ADMIN — Medication SCH EACH: at 22:00

## 2024-01-03 RX ADMIN — Medication SCH MG: at 15:58

## 2024-01-03 RX ADMIN — INSULIN HUMAN PRN UNITS: 100 INJECTION, SOLUTION PARENTERAL at 18:32

## 2024-01-03 RX ADMIN — AMLODIPINE BESYLATE PRN MG: 5 TABLET ORAL at 08:43

## 2024-01-03 RX ADMIN — Medication SCH EACH: at 06:45

## 2024-01-03 RX ADMIN — POLYETHYLENE GLYCOL 3350 SCH GM: 17 POWDER, FOR SOLUTION ORAL at 09:35

## 2024-01-03 RX ADMIN — Medication SCH EACH: at 17:01

## 2024-01-03 RX ADMIN — INSULIN GLARGINE SCH UNIT: 100 INJECTION, SOLUTION SUBCUTANEOUS at 23:22

## 2024-01-03 RX ADMIN — PREGABALIN SCH MG: 25 CAPSULE ORAL at 17:00

## 2024-01-03 RX ADMIN — TOLTERODINE TARTRATE SCH MG: 2 CAPSULE, EXTENDED RELEASE ORAL at 09:41

## 2024-01-03 RX ADMIN — Medication SCH EACH: at 12:07

## 2024-01-03 RX ADMIN — Medication PRN MG: at 15:21

## 2024-01-03 RX ADMIN — HUMAN INSULIN PRN UNIT: 100 INJECTION, SOLUTION SUBCUTANEOUS at 23:19

## 2024-01-03 RX ADMIN — PREGABALIN SCH MG: 25 CAPSULE ORAL at 09:36

## 2024-01-03 RX ADMIN — ALBUTEROL SULFATE SCH MG: 2.5 SOLUTION RESPIRATORY (INHALATION) at 15:58

## 2024-01-03 RX ADMIN — ATORVASTATIN CALCIUM SCH MG: 40 TABLET, FILM COATED ORAL at 21:49

## 2024-01-03 RX ADMIN — ASPIRIN 81 MG SCH MG: 81 TABLET ORAL at 09:36

## 2024-01-03 RX ADMIN — TOLTERODINE TARTRATE SCH MG: 2 CAPSULE, EXTENDED RELEASE ORAL at 17:02

## 2024-01-03 RX ADMIN — DEXTROSE MONOHYDRATE SCH MLS/HR: 50 INJECTION, SOLUTION INTRAVENOUS at 09:00

## 2024-01-03 RX ADMIN — PIPERACILLIN SODIUM AND TAZOBACTAM SODIUM SCH MLS/HR: .375; 3 INJECTION, POWDER, LYOPHILIZED, FOR SOLUTION INTRAVENOUS at 04:01

## 2024-01-03 RX ADMIN — LABETALOL HCL SCH MG: 100 TABLET, FILM COATED ORAL at 04:31

## 2024-01-03 RX ADMIN — AMLODIPINE BESYLATE PRN MG: 5 TABLET ORAL at 08:05

## 2024-01-03 RX ADMIN — LIDOCAINE SCH EA: 50 PATCH CUTANEOUS at 09:40

## 2024-01-03 RX ADMIN — LOSARTAN POTASSIUM SCH MG: 50 TABLET, FILM COATED ORAL at 09:45

## 2024-01-03 RX ADMIN — BUMETANIDE SCH MG: 1 TABLET ORAL at 17:00

## 2024-01-03 RX ADMIN — ALBUTEROL SULFATE SCH MG: 2.5 SOLUTION RESPIRATORY (INHALATION) at 23:14

## 2024-01-03 RX ADMIN — DEXTROSE MONOHYDRATE SCH MLS/HR: 50 INJECTION, SOLUTION INTRAVENOUS at 20:30

## 2024-01-03 RX ADMIN — CLOPIDOGREL BISULFATE SCH MG: 75 TABLET, FILM COATED ORAL at 09:36

## 2024-01-03 RX ADMIN — Medication SCH MG: at 07:46

## 2024-01-03 RX ADMIN — ALBUTEROL SULFATE SCH MG: 2.5 SOLUTION RESPIRATORY (INHALATION) at 07:46

## 2024-01-03 RX ADMIN — FLUTICASONE FUROATE AND VILANTEROL TRIFENATATE SCH EACH: 100; 25 POWDER RESPIRATORY (INHALATION) at 09:45

## 2024-01-03 RX ADMIN — LABETALOL HCL SCH MG: 100 TABLET, FILM COATED ORAL at 13:26

## 2024-01-03 RX ADMIN — BUMETANIDE SCH MG: 1 TABLET ORAL at 09:36

## 2024-01-03 RX ADMIN — INSULIN HUMAN PRN UNITS: 100 INJECTION, SOLUTION PARENTERAL at 06:49

## 2024-01-03 RX ADMIN — Medication SCH MG: at 23:14

## 2024-01-04 VITALS — OXYGEN SATURATION: 99 %

## 2024-01-04 VITALS — OXYGEN SATURATION: 97 %

## 2024-01-04 VITALS — OXYGEN SATURATION: 98 %

## 2024-01-04 VITALS — DIASTOLIC BLOOD PRESSURE: 43 MMHG | OXYGEN SATURATION: 99 % | TEMPERATURE: 98.3 F | SYSTOLIC BLOOD PRESSURE: 144 MMHG

## 2024-01-04 VITALS — DIASTOLIC BLOOD PRESSURE: 73 MMHG | SYSTOLIC BLOOD PRESSURE: 144 MMHG

## 2024-01-04 LAB
BASOPHILS # BLD AUTO: 0.1 K/UL (ref 0–0.2)
BASOPHILS NFR BLD AUTO: 0.6 % (ref 0–2)
BUN SERPL-MCNC: 26 MG/DL (ref 7–18)
CALCIUM SERPL-MCNC: 9.7 MG/DL (ref 8.5–10.1)
CHLORIDE SERPL-SCNC: 99 MMOL/L (ref 98–107)
CO2 SERPL-SCNC: 28 MMOL/L (ref 21–32)
CREAT SERPL-MCNC: 0.9 MG/DL (ref 0.6–1.3)
EOSINOPHIL # BLD AUTO: 0.1 K/UL (ref 0–0.7)
EOSINOPHIL NFR BLD AUTO: 1.3 % (ref 0–6)
ERYTHROCYTE [DISTWIDTH] IN BLOOD BY AUTOMATED COUNT: 15 % (ref 11.5–15)
GLUCOSE SERPL-MCNC: 242 MG/DL (ref 74–106)
HCT VFR BLD AUTO: 35 % (ref 33–45)
HGB BLD-MCNC: 11 G/DL (ref 11.5–14.8)
LYMPHOCYTES NFR BLD AUTO: 1.9 K/UL (ref 0.8–4.8)
LYMPHOCYTES NFR BLD AUTO: 16.3 % (ref 20–44)
MCH RBC QN AUTO: 27 PG (ref 26–33)
MCHC RBC AUTO-ENTMCNC: 31 G/DL (ref 31–36)
MCV RBC AUTO: 84 FL (ref 82–100)
MONOCYTES NFR BLD AUTO: 1.1 K/UL (ref 0.1–1.3)
MONOCYTES NFR BLD AUTO: 9.8 % (ref 2–12)
NEUTROPHILS # BLD AUTO: 8.4 K/UL (ref 1.8–8.9)
NEUTROPHILS NFR BLD AUTO: 72 % (ref 43–81)
PLATELET # BLD AUTO: 386 K/UL (ref 150–450)
POTASSIUM SERPL-SCNC: 3.3 MMOL/L (ref 3.5–5.1)
RBC # BLD AUTO: 4.17 MIL/UL (ref 4–5.2)
SODIUM SERPL-SCNC: 139 MMOL/L (ref 136–145)
WBC NRBC COR # BLD AUTO: 11.6 K/UL (ref 4.3–11)

## 2024-01-04 RX ADMIN — Medication SCH MG: at 14:57

## 2024-01-04 RX ADMIN — POLYETHYLENE GLYCOL 3350 SCH GM: 17 POWDER, FOR SOLUTION ORAL at 09:12

## 2024-01-04 RX ADMIN — TOLTERODINE TARTRATE SCH MG: 2 CAPSULE, EXTENDED RELEASE ORAL at 09:19

## 2024-01-04 RX ADMIN — DEXTROSE MONOHYDRATE SCH MLS/HR: 50 INJECTION, SOLUTION INTRAVENOUS at 09:07

## 2024-01-04 RX ADMIN — Medication SCH EACH: at 12:26

## 2024-01-04 RX ADMIN — BUMETANIDE SCH MG: 1 TABLET ORAL at 09:13

## 2024-01-04 RX ADMIN — CLOPIDOGREL BISULFATE SCH MG: 75 TABLET, FILM COATED ORAL at 09:12

## 2024-01-04 RX ADMIN — ALBUTEROL SULFATE SCH MG: 2.5 SOLUTION RESPIRATORY (INHALATION) at 14:57

## 2024-01-04 RX ADMIN — LABETALOL HCL SCH MG: 100 TABLET, FILM COATED ORAL at 12:32

## 2024-01-04 RX ADMIN — PANTOPRAZOLE SODIUM SCH MG: 40 TABLET, DELAYED RELEASE ORAL at 07:58

## 2024-01-04 RX ADMIN — HUMAN INSULIN PRN UNIT: 100 INJECTION, SOLUTION SUBCUTANEOUS at 12:25

## 2024-01-04 RX ADMIN — INSULIN HUMAN PRN UNITS: 100 INJECTION, SOLUTION PARENTERAL at 07:03

## 2024-01-04 RX ADMIN — Medication SCH MG: at 07:41

## 2024-01-04 RX ADMIN — LOSARTAN POTASSIUM SCH MG: 50 TABLET, FILM COATED ORAL at 09:12

## 2024-01-04 RX ADMIN — LIDOCAINE SCH EA: 50 PATCH CUTANEOUS at 09:11

## 2024-01-04 RX ADMIN — BENZONATATE PRN MG: 100 CAPSULE ORAL at 09:12

## 2024-01-04 RX ADMIN — GUAIFENESIN AND DEXTROMETHORPHAN PRN ML: 100; 10 SYRUP ORAL at 00:00

## 2024-01-04 RX ADMIN — Medication SCH EACH: at 07:28

## 2024-01-04 RX ADMIN — LABETALOL HCL SCH MG: 100 TABLET, FILM COATED ORAL at 05:54

## 2024-01-04 RX ADMIN — ALBUTEROL SULFATE SCH MG: 2.5 SOLUTION RESPIRATORY (INHALATION) at 07:41

## 2024-01-04 RX ADMIN — Medication PRN MG: at 10:31

## 2024-01-04 RX ADMIN — PREGABALIN SCH MG: 25 CAPSULE ORAL at 09:11

## 2024-01-04 RX ADMIN — ASPIRIN 81 MG SCH MG: 81 TABLET ORAL at 09:12

## 2024-01-04 RX ADMIN — FLUTICASONE FUROATE AND VILANTEROL TRIFENATATE SCH EACH: 100; 25 POWDER RESPIRATORY (INHALATION) at 09:16

## 2024-03-02 ENCOUNTER — HOSPITAL ENCOUNTER (INPATIENT)
Dept: HOSPITAL 54 - ER | Age: 64
LOS: 5 days | Discharge: NURSING, REHAB OR LONG-TERM CARE | DRG: 603 | End: 2024-03-07
Attending: INTERNAL MEDICINE | Admitting: INTERNAL MEDICINE
Payer: COMMERCIAL

## 2024-03-02 VITALS — OXYGEN SATURATION: 100 %

## 2024-03-02 VITALS — OXYGEN SATURATION: 99 %

## 2024-03-02 VITALS — TEMPERATURE: 98 F | DIASTOLIC BLOOD PRESSURE: 75 MMHG | SYSTOLIC BLOOD PRESSURE: 133 MMHG | OXYGEN SATURATION: 98 %

## 2024-03-02 VITALS — HEIGHT: 67 IN | WEIGHT: 229 LBS | BODY MASS INDEX: 35.94 KG/M2

## 2024-03-02 DIAGNOSIS — E78.5: ICD-10-CM

## 2024-03-02 DIAGNOSIS — Z79.4: ICD-10-CM

## 2024-03-02 DIAGNOSIS — Z85.828: ICD-10-CM

## 2024-03-02 DIAGNOSIS — I87.8: ICD-10-CM

## 2024-03-02 DIAGNOSIS — Z87.440: ICD-10-CM

## 2024-03-02 DIAGNOSIS — I69.354: ICD-10-CM

## 2024-03-02 DIAGNOSIS — E66.9: ICD-10-CM

## 2024-03-02 DIAGNOSIS — G89.4: ICD-10-CM

## 2024-03-02 DIAGNOSIS — F41.9: ICD-10-CM

## 2024-03-02 DIAGNOSIS — J44.89: ICD-10-CM

## 2024-03-02 DIAGNOSIS — I50.9: ICD-10-CM

## 2024-03-02 DIAGNOSIS — L03.115: ICD-10-CM

## 2024-03-02 DIAGNOSIS — E11.22: ICD-10-CM

## 2024-03-02 DIAGNOSIS — B37.0: ICD-10-CM

## 2024-03-02 DIAGNOSIS — Z20.822: ICD-10-CM

## 2024-03-02 DIAGNOSIS — Z79.82: ICD-10-CM

## 2024-03-02 DIAGNOSIS — I13.0: ICD-10-CM

## 2024-03-02 DIAGNOSIS — D64.9: ICD-10-CM

## 2024-03-02 DIAGNOSIS — L03.116: Primary | ICD-10-CM

## 2024-03-02 DIAGNOSIS — N18.9: ICD-10-CM

## 2024-03-02 LAB
ALBUMIN SERPL BCP-MCNC: 3.4 G/DL (ref 3.4–5)
ALP SERPL-CCNC: 133 U/L (ref 46–116)
ALT SERPL W P-5'-P-CCNC: 24 U/L (ref 12–78)
AST SERPL W P-5'-P-CCNC: 14 U/L (ref 15–37)
BASOPHILS # BLD AUTO: 0.2 K/UL (ref 0–0.2)
BASOPHILS NFR BLD AUTO: 2.2 % (ref 0–2)
BILIRUB DIRECT SERPL-MCNC: 0.1 MG/DL (ref 0–0.2)
BILIRUB SERPL-MCNC: 0.4 MG/DL (ref 0.2–1)
BUN SERPL-MCNC: 39 MG/DL (ref 7–18)
CALCIUM SERPL-MCNC: 10 MG/DL (ref 8.5–10.1)
CHLORIDE SERPL-SCNC: 100 MMOL/L (ref 98–107)
CO2 SERPL-SCNC: 30 MMOL/L (ref 21–32)
CREAT SERPL-MCNC: 1.2 MG/DL (ref 0.6–1.3)
EOSINOPHIL # BLD AUTO: 0.3 K/UL (ref 0–0.7)
EOSINOPHIL NFR BLD AUTO: 3.1 % (ref 0–6)
ERYTHROCYTE [DISTWIDTH] IN BLOOD BY AUTOMATED COUNT: 14.8 % (ref 11.5–15)
GLUCOSE SERPL-MCNC: 224 MG/DL (ref 74–106)
HCT VFR BLD AUTO: 33 % (ref 33–45)
HGB BLD-MCNC: 10.4 G/DL (ref 11.5–14.8)
LIPASE SERPL-CCNC: 21 U/L (ref 16–77)
LYMPHOCYTES NFR BLD AUTO: 1.5 K/UL (ref 0.8–4.8)
LYMPHOCYTES NFR BLD AUTO: 14 % (ref 20–44)
MCH RBC QN AUTO: 27 PG (ref 26–33)
MCHC RBC AUTO-ENTMCNC: 32 G/DL (ref 31–36)
MCV RBC AUTO: 83 FL (ref 82–100)
MONOCYTES NFR BLD AUTO: 0.6 K/UL (ref 0.1–1.3)
MONOCYTES NFR BLD AUTO: 5.8 % (ref 2–12)
NEUTROPHILS # BLD AUTO: 7.8 K/UL (ref 1.8–8.9)
NEUTROPHILS NFR BLD AUTO: 74.9 % (ref 43–81)
NT-PROBNP SERPL-MCNC: 134 PG/ML (ref 0–125)
PLATELET # BLD AUTO: 374 K/UL (ref 150–450)
POTASSIUM SERPL-SCNC: 4.5 MMOL/L (ref 3.5–5.1)
PROT SERPL-MCNC: 7.2 G/DL (ref 6.4–8.2)
RBC # BLD AUTO: 3.92 MIL/UL (ref 4–5.2)
SODIUM SERPL-SCNC: 137 MMOL/L (ref 136–145)
WBC NRBC COR # BLD AUTO: 10.4 K/UL (ref 4.3–11)

## 2024-03-02 PROCEDURE — A4223 INFUSION SUPPLIES W/O PUMP: HCPCS

## 2024-03-02 PROCEDURE — A6253 ABSORPT DRG > 48 SQ IN W/O B: HCPCS

## 2024-03-02 PROCEDURE — G0378 HOSPITAL OBSERVATION PER HR: HCPCS

## 2024-03-02 RX ADMIN — AZTREONAM ONE MLS/HR: 1 INJECTION, POWDER, FOR SOLUTION INTRAMUSCULAR; INTRAVENOUS at 10:16

## 2024-03-02 RX ADMIN — TRAMADOL HYDROCHLORIDE PRN MG: 50 TABLET, FILM COATED ORAL at 21:29

## 2024-03-02 RX ADMIN — Medication SCH EACH: at 22:10

## 2024-03-02 RX ADMIN — LABETALOL HCL SCH MG: 100 TABLET, FILM COATED ORAL at 21:31

## 2024-03-02 RX ADMIN — BUMETANIDE SCH MG: 1 TABLET ORAL at 21:27

## 2024-03-02 RX ADMIN — HUMAN INSULIN PRN UNIT: 100 INJECTION, SOLUTION SUBCUTANEOUS at 22:15

## 2024-03-02 RX ADMIN — ATORVASTATIN CALCIUM SCH MG: 40 TABLET, FILM COATED ORAL at 21:32

## 2024-03-02 RX ADMIN — BACLOFEN SCH MG: 10 TABLET ORAL at 21:30

## 2024-03-02 RX ADMIN — DEXTROSE MONOHYDRATE ONE MLS/HR: 50 INJECTION, SOLUTION INTRAVENOUS at 10:00

## 2024-03-02 RX ADMIN — ALBUTEROL SULFATE PRN MG: 2.5 SOLUTION RESPIRATORY (INHALATION) at 20:17

## 2024-03-02 RX ADMIN — TOLTERODINE TARTRATE SCH MG: 2 CAPSULE, EXTENDED RELEASE ORAL at 21:30

## 2024-03-03 VITALS — OXYGEN SATURATION: 98 % | TEMPERATURE: 97.8 F | SYSTOLIC BLOOD PRESSURE: 116 MMHG | DIASTOLIC BLOOD PRESSURE: 68 MMHG

## 2024-03-03 VITALS — OXYGEN SATURATION: 97 % | TEMPERATURE: 97.9 F | SYSTOLIC BLOOD PRESSURE: 114 MMHG | DIASTOLIC BLOOD PRESSURE: 90 MMHG

## 2024-03-03 VITALS — DIASTOLIC BLOOD PRESSURE: 75 MMHG | TEMPERATURE: 98 F | SYSTOLIC BLOOD PRESSURE: 126 MMHG | OXYGEN SATURATION: 98 %

## 2024-03-03 RX ADMIN — LOSARTAN POTASSIUM SCH MG: 50 TABLET, FILM COATED ORAL at 08:19

## 2024-03-03 RX ADMIN — THERA TABS SCH UDTAB: TAB at 08:20

## 2024-03-03 RX ADMIN — ALPRAZOLAM PRN MG: 0.25 TABLET ORAL at 18:24

## 2024-03-03 RX ADMIN — DEXTROSE MONOHYDRATE SCH MLS/HR: 50 INJECTION, SOLUTION INTRAVENOUS at 10:18

## 2024-03-03 RX ADMIN — OXYBUTYNIN CHLORIDE SCH MG: 5 TABLET, FILM COATED, EXTENDED RELEASE ORAL at 08:19

## 2024-03-03 RX ADMIN — FERROUS SULFATE TAB 325 MG (65 MG ELEMENTAL FE) SCH MG: 325 (65 FE) TAB at 08:20

## 2024-03-03 RX ADMIN — OXYCODONE HYDROCHLORIDE AND ACETAMINOPHEN PRN UDTAB: 5; 325 TABLET ORAL at 01:47

## 2024-03-03 RX ADMIN — LIDOCAINE SCH EA: 50 PATCH CUTANEOUS at 08:21

## 2024-03-03 RX ADMIN — ZOLPIDEM TARTRATE PRN MG: 5 TABLET, FILM COATED ORAL at 21:16

## 2024-03-03 RX ADMIN — PANTOPRAZOLE SODIUM SCH MG: 40 TABLET, DELAYED RELEASE ORAL at 08:20

## 2024-03-03 RX ADMIN — INSULIN HUMAN PRN UNITS: 100 INJECTION, SOLUTION PARENTERAL at 07:28

## 2024-03-03 RX ADMIN — FLUTICASONE FUROATE AND VILANTEROL TRIFENATATE SCH EACH: 100; 25 POWDER RESPIRATORY (INHALATION) at 08:21

## 2024-03-03 RX ADMIN — CLOPIDOGREL BISULFATE SCH MG: 75 TABLET, FILM COATED ORAL at 08:20

## 2024-03-03 RX ADMIN — HYDROMORPHONE HYDROCHLORIDE PRN MG: 2 TABLET ORAL at 13:00

## 2024-03-03 RX ADMIN — ASPIRIN 81 MG SCH MG: 81 TABLET ORAL at 08:20

## 2024-03-03 RX ADMIN — INSULIN LISPRO SCH UNIT: 100 INJECTION, SOLUTION INTRAVENOUS; SUBCUTANEOUS at 13:10

## 2024-03-03 RX ADMIN — EZETIMIBE SCH MG: 10 TABLET ORAL at 08:19

## 2024-03-03 RX ADMIN — PIPERACILLIN SODIUM AND TAZOBACTAM SODIUM SCH MLS/HR: .375; 3 INJECTION, POWDER, LYOPHILIZED, FOR SOLUTION INTRAVENOUS at 12:06

## 2024-03-03 RX ADMIN — INSULIN GLARGINE SCH UNIT: 100 INJECTION, SOLUTION SUBCUTANEOUS at 21:19

## 2024-03-04 VITALS — DIASTOLIC BLOOD PRESSURE: 54 MMHG | OXYGEN SATURATION: 98 % | TEMPERATURE: 98.4 F | SYSTOLIC BLOOD PRESSURE: 143 MMHG

## 2024-03-04 VITALS — SYSTOLIC BLOOD PRESSURE: 129 MMHG | TEMPERATURE: 97.7 F | DIASTOLIC BLOOD PRESSURE: 83 MMHG | OXYGEN SATURATION: 98 %

## 2024-03-04 VITALS — SYSTOLIC BLOOD PRESSURE: 156 MMHG | OXYGEN SATURATION: 96 % | DIASTOLIC BLOOD PRESSURE: 80 MMHG | TEMPERATURE: 98.4 F

## 2024-03-04 LAB
BASOPHILS # BLD AUTO: 0.1 K/UL (ref 0–0.2)
BASOPHILS NFR BLD AUTO: 0.7 % (ref 0–2)
BUN SERPL-MCNC: 31 MG/DL (ref 7–18)
CALCIUM SERPL-MCNC: 9.6 MG/DL (ref 8.5–10.1)
CHLORIDE SERPL-SCNC: 101 MMOL/L (ref 98–107)
CO2 SERPL-SCNC: 29 MMOL/L (ref 21–32)
CREAT SERPL-MCNC: 1.2 MG/DL (ref 0.6–1.3)
EOSINOPHIL # BLD AUTO: 0.2 K/UL (ref 0–0.7)
EOSINOPHIL NFR BLD AUTO: 1.9 % (ref 0–6)
ERYTHROCYTE [DISTWIDTH] IN BLOOD BY AUTOMATED COUNT: 14.6 % (ref 11.5–15)
GLUCOSE SERPL-MCNC: 121 MG/DL (ref 74–106)
HCT VFR BLD AUTO: 29 % (ref 33–45)
HGB BLD-MCNC: 9.4 G/DL (ref 11.5–14.8)
LYMPHOCYTES NFR BLD AUTO: 1.4 K/UL (ref 0.8–4.8)
LYMPHOCYTES NFR BLD AUTO: 13.2 % (ref 20–44)
MCH RBC QN AUTO: 28 PG (ref 26–33)
MCHC RBC AUTO-ENTMCNC: 33 G/DL (ref 31–36)
MCV RBC AUTO: 84 FL (ref 82–100)
MONOCYTES NFR BLD AUTO: 1.1 K/UL (ref 0.1–1.3)
MONOCYTES NFR BLD AUTO: 10.5 % (ref 2–12)
NEUTROPHILS # BLD AUTO: 7.8 K/UL (ref 1.8–8.9)
NEUTROPHILS NFR BLD AUTO: 73.7 % (ref 43–81)
PLATELET # BLD AUTO: 349 K/UL (ref 150–450)
POTASSIUM SERPL-SCNC: 3.9 MMOL/L (ref 3.5–5.1)
RBC # BLD AUTO: 3.39 MIL/UL (ref 4–5.2)
SODIUM SERPL-SCNC: 140 MMOL/L (ref 136–145)
WBC NRBC COR # BLD AUTO: 10.6 K/UL (ref 4.3–11)

## 2024-03-04 RX ADMIN — Medication SCH OZ: at 09:09

## 2024-03-04 RX ADMIN — PIPERACILLIN SODIUM AND TAZOBACTAM SODIUM SCH MLS/HR: .375; 3 INJECTION, POWDER, LYOPHILIZED, FOR SOLUTION INTRAVENOUS at 13:03

## 2024-03-04 RX ADMIN — LACTOBACILLUS TAB SCH EACH: TAB at 09:08

## 2024-03-04 RX ADMIN — DEXTROSE MONOHYDRATE SCH MLS/HR: 50 INJECTION, SOLUTION INTRAVENOUS at 09:11

## 2024-03-04 RX ADMIN — Medication SCH UNIT: at 10:48

## 2024-03-05 VITALS — SYSTOLIC BLOOD PRESSURE: 144 MMHG | OXYGEN SATURATION: 95 % | TEMPERATURE: 97.9 F | DIASTOLIC BLOOD PRESSURE: 42 MMHG

## 2024-03-05 VITALS — TEMPERATURE: 97.9 F | SYSTOLIC BLOOD PRESSURE: 183 MMHG | OXYGEN SATURATION: 95 % | DIASTOLIC BLOOD PRESSURE: 75 MMHG

## 2024-03-05 VITALS — TEMPERATURE: 98.2 F | SYSTOLIC BLOOD PRESSURE: 140 MMHG | DIASTOLIC BLOOD PRESSURE: 50 MMHG | OXYGEN SATURATION: 99 %

## 2024-03-05 LAB
BUN SERPL-MCNC: 22 MG/DL (ref 7–18)
CALCIUM SERPL-MCNC: 9.3 MG/DL (ref 8.5–10.1)
CHLORIDE SERPL-SCNC: 100 MMOL/L (ref 98–107)
CO2 SERPL-SCNC: 32 MMOL/L (ref 21–32)
CREAT SERPL-MCNC: 1 MG/DL (ref 0.6–1.3)
GLUCOSE SERPL-MCNC: 201 MG/DL (ref 74–106)
POTASSIUM SERPL-SCNC: 3.2 MMOL/L (ref 3.5–5.1)
SODIUM SERPL-SCNC: 140 MMOL/L (ref 136–145)

## 2024-03-05 RX ADMIN — PAROXETINE HYDROCHLORIDE SCH MG: 10 TABLET, FILM COATED ORAL at 08:42

## 2024-03-05 RX ADMIN — POTASSIUM CHLORIDE ONE MEQ: 1500 TABLET, EXTENDED RELEASE ORAL at 09:35

## 2024-03-06 VITALS — TEMPERATURE: 98.6 F | OXYGEN SATURATION: 96 % | SYSTOLIC BLOOD PRESSURE: 158 MMHG | DIASTOLIC BLOOD PRESSURE: 72 MMHG

## 2024-03-06 VITALS — TEMPERATURE: 98 F | OXYGEN SATURATION: 96 % | SYSTOLIC BLOOD PRESSURE: 118 MMHG | DIASTOLIC BLOOD PRESSURE: 43 MMHG

## 2024-03-06 VITALS — DIASTOLIC BLOOD PRESSURE: 56 MMHG | SYSTOLIC BLOOD PRESSURE: 128 MMHG | TEMPERATURE: 98.6 F | OXYGEN SATURATION: 96 %

## 2024-03-06 VITALS — DIASTOLIC BLOOD PRESSURE: 66 MMHG | TEMPERATURE: 97.9 F | OXYGEN SATURATION: 97 % | SYSTOLIC BLOOD PRESSURE: 137 MMHG

## 2024-03-06 LAB
BUN SERPL-MCNC: 20 MG/DL (ref 7–18)
CALCIUM SERPL-MCNC: 9.2 MG/DL (ref 8.5–10.1)
CHLORIDE SERPL-SCNC: 99 MMOL/L (ref 98–107)
CO2 SERPL-SCNC: 31 MMOL/L (ref 21–32)
CREAT SERPL-MCNC: 1.1 MG/DL (ref 0.6–1.3)
GLUCOSE SERPL-MCNC: 131 MG/DL (ref 74–106)
POTASSIUM SERPL-SCNC: 3.4 MMOL/L (ref 3.5–5.1)
SODIUM SERPL-SCNC: 139 MMOL/L (ref 136–145)

## 2024-03-06 RX ADMIN — INSULIN GLARGINE SCH UNIT: 100 INJECTION, SOLUTION SUBCUTANEOUS at 22:16

## 2024-03-06 RX ADMIN — POTASSIUM CHLORIDE ONE MEQ: 1500 TABLET, EXTENDED RELEASE ORAL at 09:06

## 2024-03-06 RX ADMIN — AMLODIPINE BESYLATE SCH MG: 5 TABLET ORAL at 09:00

## 2024-03-06 RX ADMIN — ENOXAPARIN SODIUM SCH MG: 40 INJECTION SUBCUTANEOUS at 09:09

## 2024-03-07 VITALS — SYSTOLIC BLOOD PRESSURE: 140 MMHG | TEMPERATURE: 97.9 F | OXYGEN SATURATION: 96 % | DIASTOLIC BLOOD PRESSURE: 67 MMHG

## 2024-03-07 VITALS — SYSTOLIC BLOOD PRESSURE: 140 MMHG | DIASTOLIC BLOOD PRESSURE: 66 MMHG

## 2024-03-07 VITALS — DIASTOLIC BLOOD PRESSURE: 66 MMHG | TEMPERATURE: 98.1 F | SYSTOLIC BLOOD PRESSURE: 140 MMHG | OXYGEN SATURATION: 97 %

## 2024-03-07 LAB
BASOPHILS # BLD AUTO: 0.1 K/UL (ref 0–0.2)
BASOPHILS NFR BLD AUTO: 1 % (ref 0–2)
BUN SERPL-MCNC: 20 MG/DL (ref 7–18)
CALCIUM SERPL-MCNC: 9.6 MG/DL (ref 8.5–10.1)
CHLORIDE SERPL-SCNC: 98 MMOL/L (ref 98–107)
CO2 SERPL-SCNC: 34 MMOL/L (ref 21–32)
CREAT SERPL-MCNC: 1.2 MG/DL (ref 0.6–1.3)
EOSINOPHIL # BLD AUTO: 0.4 K/UL (ref 0–0.7)
EOSINOPHIL NFR BLD AUTO: 5.1 % (ref 0–6)
ERYTHROCYTE [DISTWIDTH] IN BLOOD BY AUTOMATED COUNT: 14.6 % (ref 11.5–15)
GLUCOSE SERPL-MCNC: 190 MG/DL (ref 74–106)
HCT VFR BLD AUTO: 29 % (ref 33–45)
HGB BLD-MCNC: 9.6 G/DL (ref 11.5–14.8)
LYMPHOCYTES NFR BLD AUTO: 1.9 K/UL (ref 0.8–4.8)
LYMPHOCYTES NFR BLD AUTO: 23.2 % (ref 20–44)
MAGNESIUM SERPL-MCNC: 1.8 MG/DL (ref 1.8–2.4)
MCH RBC QN AUTO: 28 PG (ref 26–33)
MCHC RBC AUTO-ENTMCNC: 33 G/DL (ref 31–36)
MCV RBC AUTO: 84 FL (ref 82–100)
MONOCYTES NFR BLD AUTO: 1.1 K/UL (ref 0.1–1.3)
MONOCYTES NFR BLD AUTO: 13.5 % (ref 2–12)
NEUTROPHILS # BLD AUTO: 4.6 K/UL (ref 1.8–8.9)
NEUTROPHILS NFR BLD AUTO: 57.2 % (ref 43–81)
PLATELET # BLD AUTO: 350 K/UL (ref 150–450)
POTASSIUM SERPL-SCNC: 3.7 MMOL/L (ref 3.5–5.1)
RBC # BLD AUTO: 3.46 MIL/UL (ref 4–5.2)
SODIUM SERPL-SCNC: 141 MMOL/L (ref 136–145)
WBC NRBC COR # BLD AUTO: 8 K/UL (ref 4.3–11)

## 2024-03-07 RX ADMIN — Medication SCH ML: at 08:15

## 2024-11-01 NOTE — NUR
GOT -1 Quality 130: Documentation Of Current Medications In The Medical Record: Current Medications Documented Quality 226: Preventive Care And Screening: Tobacco Use: Screening And Cessation Intervention: Tobacco Screening not Performed Detail Level: Detailed Quality 410: Psoriasis Clinical Response To Oral Systemic Or Biologic Medications: Patient has been on biologic or system therapy for less than 6 months

## 2025-05-30 ENCOUNTER — HOSPITAL ENCOUNTER (INPATIENT)
Dept: HOSPITAL 54 - ER | Age: 65
LOS: 6 days | Discharge: HOME HEALTH | DRG: 602 | End: 2025-06-05
Attending: INTERNAL MEDICINE | Admitting: INTERNAL MEDICINE
Payer: COMMERCIAL

## 2025-05-30 ENCOUNTER — HOSPITAL ENCOUNTER (EMERGENCY)
Dept: HOSPITAL 54 - ER | Age: 65
Discharge: HOME | End: 2025-05-30
Payer: COMMERCIAL

## 2025-05-30 VITALS — BODY MASS INDEX: 48.21 KG/M2 | HEIGHT: 62 IN | WEIGHT: 262 LBS

## 2025-05-30 VITALS — TEMPERATURE: 98.7 F | DIASTOLIC BLOOD PRESSURE: 61 MMHG | SYSTOLIC BLOOD PRESSURE: 131 MMHG | OXYGEN SATURATION: 99 %

## 2025-05-30 DIAGNOSIS — N39.0: ICD-10-CM

## 2025-05-30 DIAGNOSIS — Z16.12: ICD-10-CM

## 2025-05-30 DIAGNOSIS — Z88.1: ICD-10-CM

## 2025-05-30 DIAGNOSIS — Z96.652: ICD-10-CM

## 2025-05-30 DIAGNOSIS — I13.0: ICD-10-CM

## 2025-05-30 DIAGNOSIS — D23.72: ICD-10-CM

## 2025-05-30 DIAGNOSIS — I87.2: ICD-10-CM

## 2025-05-30 DIAGNOSIS — S22.31XA: Primary | ICD-10-CM

## 2025-05-30 DIAGNOSIS — G89.4: ICD-10-CM

## 2025-05-30 DIAGNOSIS — B35.6: ICD-10-CM

## 2025-05-30 DIAGNOSIS — N18.31: ICD-10-CM
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
## 2025-05-30 DIAGNOSIS — L03.116: Primary | ICD-10-CM

## 2025-05-30 DIAGNOSIS — Z88.5: ICD-10-CM

## 2025-05-30 DIAGNOSIS — F11.20: ICD-10-CM

## 2025-05-30 DIAGNOSIS — I69.354: ICD-10-CM

## 2025-05-30 DIAGNOSIS — R60.0: ICD-10-CM

## 2025-05-30 DIAGNOSIS — I50.22: ICD-10-CM

## 2025-05-30 DIAGNOSIS — Z79.82: ICD-10-CM

## 2025-05-30 DIAGNOSIS — E78.5: ICD-10-CM

## 2025-05-30 DIAGNOSIS — E11.65: ICD-10-CM

## 2025-05-30 DIAGNOSIS — Z79.4: ICD-10-CM
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
## 2025-05-30 DIAGNOSIS — Z79.51: ICD-10-CM

## 2025-05-30 DIAGNOSIS — I25.10: ICD-10-CM

## 2025-05-30 DIAGNOSIS — F33.2: ICD-10-CM

## 2025-05-30 DIAGNOSIS — J43.9: ICD-10-CM
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
## 2025-05-30 DIAGNOSIS — F41.9: ICD-10-CM

## 2025-05-30 DIAGNOSIS — N18.9: ICD-10-CM

## 2025-05-30 DIAGNOSIS — S22.31XD: ICD-10-CM

## 2025-05-30 DIAGNOSIS — Z99.81: ICD-10-CM

## 2025-05-30 DIAGNOSIS — B96.20: ICD-10-CM

## 2025-05-30 DIAGNOSIS — E66.2: ICD-10-CM

## 2025-05-30 DIAGNOSIS — E11.22: ICD-10-CM

## 2025-05-30 DIAGNOSIS — Z86.73: ICD-10-CM
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
## 2025-05-30 DIAGNOSIS — Z79.899: ICD-10-CM

## 2025-05-30 DIAGNOSIS — Z85.828: ICD-10-CM

## 2025-05-30 DIAGNOSIS — I89.0: ICD-10-CM

## 2025-05-30 DIAGNOSIS — G93.41: ICD-10-CM

## 2025-05-30 DIAGNOSIS — L30.4: ICD-10-CM

## 2025-05-30 DIAGNOSIS — M54.50: ICD-10-CM

## 2025-05-30 DIAGNOSIS — Z79.02: ICD-10-CM
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
## 2025-05-30 DIAGNOSIS — E11.40: ICD-10-CM

## 2025-05-30 DIAGNOSIS — I50.9: ICD-10-CM

## 2025-05-30 DIAGNOSIS — D64.9: ICD-10-CM

## 2025-05-30 DIAGNOSIS — N17.9: ICD-10-CM

## 2025-05-30 DIAGNOSIS — Z87.440: ICD-10-CM

## 2025-05-30 DIAGNOSIS — J96.10: ICD-10-CM

## 2025-05-30 DIAGNOSIS — J44.9: ICD-10-CM

## 2025-05-30 LAB
ALBUMIN SERPL BCP-MCNC: 3.5 G/DL (ref 3.4–5)
ALP SERPL-CCNC: 150 U/L (ref 46–116)
ALT SERPL W P-5'-P-CCNC: 23 U/L (ref 12–78)
AMORPH PHOS CRY URNS QL MICRO: (no result) /HPF
AMORPH URATE CRY URNS QL MICRO: (no result) /HPF
ANISOCYTOSIS BLD QL: (no result)
AST SERPL W P-5'-P-CCNC: 14 U/L (ref 15–37)
AUER BODIES BLD QL SMEAR: (no result)
BASE EXCESS BLDA CALC-SCNC: 2.7 MMOL/L (ref -2–3)
BASOPHILS # BLD AUTO: 0.1 K/UL (ref 0–0.2)
BASOPHILS NFR BLD AUTO: 0.9 % (ref 0–2)
BILIRUB DIRECT SERPL-MCNC: 0.1 MG/DL (ref 0–0.2)
BILIRUB SERPL-MCNC: 0.6 MG/DL (ref 0.2–1)
BUN SERPL-MCNC: 39 MG/DL (ref 7–18)
CA CARBONATE CRY #/AREA URNS HPF: (no result) /HPF
CA PHOS CRY URNS QL MICRO: (no result) /HPF
CABOT RINGS BLD QL SMEAR: (no result)
CALCIUM SERPL-MCNC: 9.7 MG/DL (ref 8.5–10.1)
CAOX CRY URNS QL MICRO: (no result) /HPF
CASTS URNS QL MICRO: (no result) /LPF
CHLORIDE SERPL-SCNC: 99 MMOL/L (ref 98–107)
CO2 SERPL-SCNC: 29 MMOL/L (ref 21–32)
COARSE GRAN CASTS URNS QL MICRO: (no result) /LPF
COHGB MFR BLDA: 0.2 % (ref 0.5–1.5)
CREAT SERPL-MCNC: 1.8 MG/DL (ref 0.6–1.3)
CRYSTALS URNS QL MICRO: (no result) /HPF
CYSTINE CRY #/AREA URNS HPF: (no result) /HPF
DACRYOCYTES BLD QL SMEAR: (no result)
DO-HGB MFR BLDA: (no result) MMHG
DOHLE BOD BLD QL SMEAR: (no result)
EOSINOPHIL # BLD AUTO: 0.2 K/UL (ref 0–0.7)
EOSINOPHIL NFR BLD AUTO: 1.4 % (ref 0–6)
ERYTHROCYTE [DISTWIDTH] IN BLOOD BY AUTOMATED COUNT: 15.1 % (ref 11.5–15)
ETHANOL SERPL-MCNC: < 3 MG/DL (ref 0–10)
FATTY CASTS URNS QL MICRO: (no result) /LPF
FINE GRAN CASTS URNS QL MICRO: (no result) /LPF
GAS PNL BLDA: 10.9 G/DL (ref 12–16)
GLUCOSE SERPL-MCNC: 444 MG/DL (ref 74–106)
HCT VFR BLD AUTO: 32 % (ref 33–45)
HELMET CELLS BLD QL SMEAR: (no result)
HGB BLD-MCNC: 10.4 G/DL (ref 11.5–14.8)
HOWELL-JOLLY BOD BLD QL SMEAR: (no result)
HYALINE CASTS URNS QL MICRO: (no result) /LPF
HYPOCHROMIA BLD QL: (no result)
INHALED O2 CONCENTRATION: (no result) %
INHALED O2 FLOW RATE: 2 L/MIN (ref 0–30)
INTRINSIC PEEP RESPIRATORY: (no result) CM H2O
LYMPHOCYTES NFR BLD AUTO: 1.5 K/UL (ref 0.8–4.8)
LYMPHOCYTES NFR BLD AUTO: 13 % (ref 20–44)
MACROCYTES BLD QL: (no result)
MCH RBC QN AUTO: 27 PG (ref 26–33)
MCHC RBC AUTO-ENTMCNC: 32 G/DL (ref 31–36)
MCV RBC AUTO: 84 FL (ref 82–100)
METHGB MFR BLDA: 0 % (ref 0–1.5)
MONOCYTES NFR BLD AUTO: 0.8 K/UL (ref 0.1–1.3)
MONOCYTES NFR BLD AUTO: 6.8 % (ref 2–12)
MUCOUS THREADS URNS QL MICRO: (no result) /LPF
NEUTROPHILS # BLD AUTO: 9 K/UL (ref 1.8–8.9)
NEUTROPHILS NFR BLD AUTO: 77.9 % (ref 43–81)
OVALOCYTES BLD QL SMEAR: (no result)
OXYHGB MFR BLDA: 97.3 % (ref 94–97)
PAPPENHEIMER BOD BLD QL SMEAR: (no result)
PCO2 TEMP ADJ BLDA: 37.8 MMHG (ref 32–45)
PEEP SETTING VENT: (no result) ML
PH TEMP ADJ BLDA: 7.46 [PH] (ref 7.35–7.45)
PLATELET # BLD AUTO: 410 K/UL (ref 150–450)
PLATELET BLD QL SMEAR: (no result)
PO2 TEMP ADJ BLDA: 101.3 MMHG (ref 83–108)
POTASSIUM SERPL-SCNC: 4.3 MMOL/L (ref 3.5–5.1)
PROT SERPL-MCNC: 7.4 G/DL (ref 6.4–8.2)
RBC # BLD AUTO: 3.85 MIL/UL (ref 4–5.2)
RBC CASTS URNS QL MICRO: (no result) /LPF
ROULEAUX BLD QL SMEAR: (no result)
SAO2 % BLDA: 97.5 % (ref 94–98)
SET RATE, BG: (no result)
SODIUM SERPL-SCNC: 138 MMOL/L (ref 136–145)
SPECIMEN DRAWN FROM PATIENT: (no result)
SPERM URNS QL MICRO: (no result) /HPF
STOMATOCYTES BLD QL SMEAR: (no result)
T VAGINALIS URNS QL MICRO: (no result) /HPF
TARGETS BLD QL SMEAR: (no result)
TOXIC GRANULES BLD QL SMEAR: (no result)
TRI-PHOS CRY URNS QL MICRO: (no result) /HPF
TSH SERPL DL<=0.005 MIU/L-ACNC: 2.77 UIU/ML (ref 0.36–3.74)
TYROSINE CRY URNS QL MICRO: (no result) /HPF
URATE CRY URNS QL MICRO: (no result) /HPF
VENTILATION MODE VENT: (no result)
WAXY CASTS URNS QL MICRO: (no result) /LPF
WBC NRBC COR # BLD AUTO: 11.5 K/UL (ref 4.3–11)
YEAST URNS QL MICRO: (no result) /HPF

## 2025-05-30 PROCEDURE — A6253 ABSORPT DRG > 48 SQ IN W/O B: HCPCS

## 2025-05-30 PROCEDURE — G0378 HOSPITAL OBSERVATION PER HR: HCPCS

## 2025-05-30 PROCEDURE — A6213 FOAM DRG >16<=48 SQ IN W/BDR: HCPCS

## 2025-05-30 PROCEDURE — G0480 DRUG TEST DEF 1-7 CLASSES: HCPCS

## 2025-05-30 PROCEDURE — A4223 INFUSION SUPPLIES W/O PUMP: HCPCS

## 2025-05-30 RX ADMIN — DEXTROSE MONOHYDRATE ONE MG: 50 INJECTION, SOLUTION INTRAVENOUS at 22:15

## 2025-05-30 RX ADMIN — IBUPROFEN ONE MG: 400 TABLET, FILM COATED ORAL at 06:54

## 2025-05-30 RX ADMIN — SODIUM CHLORIDE ONE ML: 9 INJECTION, SOLUTION INTRAVENOUS at 19:22

## 2025-05-31 VITALS — SYSTOLIC BLOOD PRESSURE: 133 MMHG | TEMPERATURE: 97.7 F | DIASTOLIC BLOOD PRESSURE: 56 MMHG | OXYGEN SATURATION: 100 %

## 2025-05-31 VITALS — DIASTOLIC BLOOD PRESSURE: 71 MMHG | SYSTOLIC BLOOD PRESSURE: 147 MMHG | OXYGEN SATURATION: 98 % | TEMPERATURE: 98.1 F

## 2025-05-31 VITALS — SYSTOLIC BLOOD PRESSURE: 122 MMHG | DIASTOLIC BLOOD PRESSURE: 57 MMHG

## 2025-05-31 VITALS — DIASTOLIC BLOOD PRESSURE: 71 MMHG | OXYGEN SATURATION: 95 % | TEMPERATURE: 98.2 F | SYSTOLIC BLOOD PRESSURE: 159 MMHG

## 2025-05-31 VITALS — OXYGEN SATURATION: 100 % | TEMPERATURE: 97.7 F | SYSTOLIC BLOOD PRESSURE: 133 MMHG | DIASTOLIC BLOOD PRESSURE: 56 MMHG

## 2025-05-31 VITALS — OXYGEN SATURATION: 98 % | SYSTOLIC BLOOD PRESSURE: 130 MMHG | DIASTOLIC BLOOD PRESSURE: 80 MMHG | TEMPERATURE: 98.6 F

## 2025-05-31 VITALS — DIASTOLIC BLOOD PRESSURE: 67 MMHG | OXYGEN SATURATION: 97 % | SYSTOLIC BLOOD PRESSURE: 164 MMHG | TEMPERATURE: 98.4 F

## 2025-05-31 LAB
AMPHETAMINES UR QL: NEGATIVE
ANISOCYTOSIS BLD QL: (no result)
APPEARANCE UR: (no result)
AUER BODIES BLD QL SMEAR: (no result)
BACTERIA UR CULT: YES
BARBITURATES UR QL SCN: NEGATIVE
BASOPHILS # BLD AUTO: 0.1 K/UL (ref 0–0.2)
BASOPHILS NFR BLD AUTO: 1 % (ref 0–2)
BENZODIAZ UR QL SCN: NEGATIVE
BILIRUB UR QL STRIP: NEGATIVE
BUN SERPL-MCNC: 34 MG/DL (ref 7–18)
CABOT RINGS BLD QL SMEAR: (no result)
CALCIUM SERPL-MCNC: 9.3 MG/DL (ref 8.5–10.1)
CANNABINOIDS UR QL SCN: NEGATIVE
CHLORIDE SERPL-SCNC: 105 MMOL/L (ref 98–107)
CO2 SERPL-SCNC: 32 MMOL/L (ref 21–32)
COCAINE UR QL SCN: NEGATIVE
COLOR UR: YELLOW
CREAT SERPL-MCNC: 1.3 MG/DL (ref 0.6–1.3)
DACRYOCYTES BLD QL SMEAR: (no result)
DEPRECATED SQUAMOUS URNS QL MICRO: (no result) /HPF
DOHLE BOD BLD QL SMEAR: (no result)
EOSINOPHIL # BLD AUTO: 0.3 K/UL (ref 0–0.7)
EOSINOPHIL NFR BLD AUTO: 3.5 % (ref 0–6)
ERYTHROCYTE [DISTWIDTH] IN BLOOD BY AUTOMATED COUNT: 14.6 % (ref 11.5–15)
GLUCOSE SERPL-MCNC: 293 MG/DL (ref 74–106)
GLUCOSE UR STRIP-MCNC: (no result) MG/DL
HCT VFR BLD AUTO: 31 % (ref 33–45)
HELMET CELLS BLD QL SMEAR: (no result)
HGB BLD-MCNC: 10 G/DL (ref 11.5–14.8)
HGB UR QL STRIP: NEGATIVE ERY/UL
HOWELL-JOLLY BOD BLD QL SMEAR: (no result)
HYPOCHROMIA BLD QL: (no result)
KETONES UR STRIP-MCNC: NEGATIVE MG/DL
LEUKOCYTE ESTERASE UR QL STRIP: (no result)
LYMPHOCYTES NFR BLD AUTO: 2.1 K/UL (ref 0.8–4.8)
LYMPHOCYTES NFR BLD AUTO: 22.7 % (ref 20–44)
MACROCYTES BLD QL: (no result)
MAGNESIUM SERPL-MCNC: 2.3 MG/DL (ref 1.8–2.4)
MCH RBC QN AUTO: 27 PG (ref 26–33)
MCHC RBC AUTO-ENTMCNC: 32 G/DL (ref 31–36)
MCV RBC AUTO: 83 FL (ref 82–100)
MONOCYTES NFR BLD AUTO: 0.9 K/UL (ref 0.1–1.3)
MONOCYTES NFR BLD AUTO: 9.8 % (ref 2–12)
NEUTROPHILS # BLD AUTO: 6 K/UL (ref 1.8–8.9)
NEUTROPHILS NFR BLD AUTO: 63 % (ref 43–81)
NITRITE UR QL STRIP: POSITIVE
OPIATES UR QL SCN: NEGATIVE
OVALOCYTES BLD QL SMEAR: (no result)
PAPPENHEIMER BOD BLD QL SMEAR: (no result)
PCP UR QL SCN: NEGATIVE
PH UR STRIP: 6 [PH] (ref 5–8)
PLATELET # BLD AUTO: 385 K/UL (ref 150–450)
PLATELET BLD QL SMEAR: (no result)
POTASSIUM SERPL-SCNC: 3.7 MMOL/L (ref 3.5–5.1)
PROT UR QL STRIP: NEGATIVE MG/DL
RBC # BLD AUTO: 3.74 MIL/UL (ref 4–5.2)
REDUCING SUBS UR QL: (no result)
ROULEAUX BLD QL SMEAR: (no result)
SODIUM SERPL-SCNC: 143 MMOL/L (ref 136–145)
SP GR UR STRIP: 1.02 (ref 1–1.03)
STOMATOCYTES BLD QL SMEAR: (no result)
TARGETS BLD QL SMEAR: (no result)
TOXIC GRANULES BLD QL SMEAR: (no result)
TSH SERPL DL<=0.005 MIU/L-ACNC: 1.97 UIU/ML (ref 0.36–3.74)
UROBILINOGEN UR STRIP-MCNC: 0.2 EU/DL
WBC #/AREA URNS HPF: (no result) /HPF
WBC #/AREA URNS HPF: (no result) /HPF (ref 0–3)
WBC NRBC COR # BLD AUTO: 9.4 K/UL (ref 4.3–11)

## 2025-05-31 RX ADMIN — INSULIN GLARGINE SCH UNIT: 100 INJECTION, SOLUTION SUBCUTANEOUS at 21:19

## 2025-05-31 RX ADMIN — LABETALOL HCL SCH MG: 100 TABLET, FILM COATED ORAL at 21:31

## 2025-05-31 RX ADMIN — PIPERACILLIN SODIUM AND TAZOBACTAM SODIUM SCH MLS/HR: .25; 2 INJECTION, POWDER, LYOPHILIZED, FOR SOLUTION INTRAVENOUS at 09:02

## 2025-05-31 RX ADMIN — HEPARIN SODIUM SCH UNITS: 5000 INJECTION INTRAVENOUS; SUBCUTANEOUS at 09:00

## 2025-05-31 RX ADMIN — TOLTERODINE TARTRATE SCH MG: 2 CAPSULE, EXTENDED RELEASE ORAL at 20:11

## 2025-05-31 RX ADMIN — HUMAN INSULIN PRN UNIT: 100 INJECTION, SOLUTION SUBCUTANEOUS at 12:15

## 2025-05-31 RX ADMIN — BUDESONIDE SCH MG: 0.5 SUSPENSION RESPIRATORY (INHALATION) at 14:29

## 2025-05-31 RX ADMIN — INSULIN HUMAN PRN UNITS: 100 INJECTION, SOLUTION PARENTERAL at 06:37

## 2025-05-31 RX ADMIN — ACETAMINOPHEN PRN MG: 325 TABLET ORAL at 22:07

## 2025-05-31 RX ADMIN — Medication SCH EACH: at 06:37

## 2025-05-31 RX ADMIN — BUMETANIDE SCH MG: 1 TABLET ORAL at 16:55

## 2025-05-31 RX ADMIN — BACLOFEN SCH MG: 10 TABLET ORAL at 20:12

## 2025-05-31 RX ADMIN — ATORVASTATIN CALCIUM SCH MG: 40 TABLET, FILM COATED ORAL at 21:31

## 2025-06-01 VITALS — TEMPERATURE: 98.3 F | DIASTOLIC BLOOD PRESSURE: 80 MMHG | OXYGEN SATURATION: 98 % | SYSTOLIC BLOOD PRESSURE: 140 MMHG

## 2025-06-01 VITALS — SYSTOLIC BLOOD PRESSURE: 129 MMHG | OXYGEN SATURATION: 99 % | TEMPERATURE: 98.5 F | DIASTOLIC BLOOD PRESSURE: 52 MMHG

## 2025-06-01 VITALS — OXYGEN SATURATION: 98 %

## 2025-06-01 VITALS — DIASTOLIC BLOOD PRESSURE: 56 MMHG | SYSTOLIC BLOOD PRESSURE: 147 MMHG | TEMPERATURE: 98.2 F | OXYGEN SATURATION: 99 %

## 2025-06-01 VITALS — SYSTOLIC BLOOD PRESSURE: 140 MMHG | DIASTOLIC BLOOD PRESSURE: 70 MMHG | OXYGEN SATURATION: 99 % | TEMPERATURE: 98.6 F

## 2025-06-01 VITALS — DIASTOLIC BLOOD PRESSURE: 36 MMHG | SYSTOLIC BLOOD PRESSURE: 109 MMHG | TEMPERATURE: 97.5 F | OXYGEN SATURATION: 98 %

## 2025-06-01 VITALS — OXYGEN SATURATION: 99 %

## 2025-06-01 LAB
BUN SERPL-MCNC: 22 MG/DL (ref 7–18)
CALCIUM SERPL-MCNC: 9.5 MG/DL (ref 8.5–10.1)
CHLORIDE SERPL-SCNC: 102 MMOL/L (ref 98–107)
CO2 SERPL-SCNC: 32 MMOL/L (ref 21–32)
CREAT SERPL-MCNC: 1.1 MG/DL (ref 0.6–1.3)
GLUCOSE SERPL-MCNC: 359 MG/DL (ref 74–106)
POTASSIUM SERPL-SCNC: 3.4 MMOL/L (ref 3.5–5.1)
SODIUM SERPL-SCNC: 142 MMOL/L (ref 136–145)

## 2025-06-01 RX ADMIN — LABETALOL HCL SCH MG: 100 TABLET, FILM COATED ORAL at 08:15

## 2025-06-01 RX ADMIN — Medication PRN MG: at 21:50

## 2025-06-01 RX ADMIN — CLOPIDOGREL BISULFATE SCH MG: 75 TABLET, FILM COATED ORAL at 08:14

## 2025-06-01 RX ADMIN — SIMVASTATIN SCH MG: 20 TABLET, FILM COATED ORAL at 21:50

## 2025-06-01 RX ADMIN — OXYBUTYNIN CHLORIDE SCH MG: 5 TABLET, FILM COATED, EXTENDED RELEASE ORAL at 08:13

## 2025-06-01 RX ADMIN — ASPIRIN 81 MG SCH MG: 81 TABLET ORAL at 08:12

## 2025-06-01 RX ADMIN — INSULIN ASPART SCH UNIT: 100 INJECTION, SOLUTION INTRAVENOUS; SUBCUTANEOUS at 08:20

## 2025-06-01 RX ADMIN — LIDOCAINE SCH EA: 50 PATCH CUTANEOUS at 08:15

## 2025-06-01 RX ADMIN — EZETIMIBE SCH MG: 10 TABLET ORAL at 08:13

## 2025-06-01 RX ADMIN — DEXTROSE MONOHYDRATE ONE MLS/HR: 50 INJECTION, SOLUTION INTRAVENOUS at 12:35

## 2025-06-01 RX ADMIN — PIPERACILLIN SODIUM AND TAZOBACTAM SODIUM SCH MLS/HR: .375; 3 INJECTION, POWDER, LYOPHILIZED, FOR SOLUTION INTRAVENOUS at 14:17

## 2025-06-01 RX ADMIN — INSULIN GLARGINE SCH UNIT: 100 INJECTION, SOLUTION SUBCUTANEOUS at 22:02

## 2025-06-01 RX ADMIN — Medication SCH EACH: at 08:14

## 2025-06-01 RX ADMIN — PANTOPRAZOLE SODIUM SCH MG: 40 TABLET, DELAYED RELEASE ORAL at 08:13

## 2025-06-01 RX ADMIN — POTASSIUM CHLORIDE ONE MEQ: 1500 TABLET, EXTENDED RELEASE ORAL at 16:13

## 2025-06-01 RX ADMIN — ALBUTEROL SULFATE PRN MG: 2.5 SOLUTION RESPIRATORY (INHALATION) at 12:04

## 2025-06-01 RX ADMIN — TRAMADOL HYDROCHLORIDE PRN MG: 50 TABLET, FILM COATED ORAL at 11:03

## 2025-06-01 RX ADMIN — DEXTROSE MONOHYDRATE ONE MLS/HR: 50 INJECTION, SOLUTION INTRAVENOUS at 10:41

## 2025-06-01 RX ADMIN — Medication SCH EA: at 08:21

## 2025-06-01 RX ADMIN — FERROUS SULFATE TAB 325 MG (65 MG ELEMENTAL FE) SCH MG: 325 (65 FE) TAB at 08:13

## 2025-06-01 RX ADMIN — PAROXETINE HYDROCHLORIDE SCH MG: 10 TABLET, FILM COATED ORAL at 08:21

## 2025-06-02 VITALS — SYSTOLIC BLOOD PRESSURE: 123 MMHG | TEMPERATURE: 98.2 F | OXYGEN SATURATION: 98 % | DIASTOLIC BLOOD PRESSURE: 86 MMHG

## 2025-06-02 VITALS — SYSTOLIC BLOOD PRESSURE: 149 MMHG | OXYGEN SATURATION: 100 % | TEMPERATURE: 97.7 F | DIASTOLIC BLOOD PRESSURE: 59 MMHG

## 2025-06-02 VITALS — OXYGEN SATURATION: 97 %

## 2025-06-02 VITALS — OXYGEN SATURATION: 99 %

## 2025-06-02 VITALS — TEMPERATURE: 97.5 F | OXYGEN SATURATION: 98 % | DIASTOLIC BLOOD PRESSURE: 41 MMHG | SYSTOLIC BLOOD PRESSURE: 125 MMHG

## 2025-06-02 VITALS — OXYGEN SATURATION: 98 %

## 2025-06-02 VITALS — DIASTOLIC BLOOD PRESSURE: 50 MMHG | SYSTOLIC BLOOD PRESSURE: 123 MMHG

## 2025-06-02 VITALS — OXYGEN SATURATION: 98 % | TEMPERATURE: 98.1 F | DIASTOLIC BLOOD PRESSURE: 59 MMHG | SYSTOLIC BLOOD PRESSURE: 115 MMHG

## 2025-06-02 LAB
ANISOCYTOSIS BLD QL: (no result)
AUER BODIES BLD QL SMEAR: (no result)
BASOPHILS # BLD AUTO: 0.1 K/UL (ref 0–0.2)
BASOPHILS NFR BLD AUTO: 1.2 % (ref 0–2)
BUN SERPL-MCNC: 27 MG/DL (ref 7–18)
CABOT RINGS BLD QL SMEAR: (no result)
CALCIUM SERPL-MCNC: 8.3 MG/DL (ref 8.5–10.1)
CHLORIDE SERPL-SCNC: 88 MMOL/L (ref 98–107)
CO2 SERPL-SCNC: 28 MMOL/L (ref 21–32)
CREAT SERPL-MCNC: 1.6 MG/DL (ref 0.6–1.3)
DACRYOCYTES BLD QL SMEAR: (no result)
DOHLE BOD BLD QL SMEAR: (no result)
EOSINOPHIL # BLD AUTO: 0.4 K/UL (ref 0–0.7)
EOSINOPHIL NFR BLD AUTO: 5 % (ref 0–6)
ERYTHROCYTE [DISTWIDTH] IN BLOOD BY AUTOMATED COUNT: 15.1 % (ref 11.5–15)
GLUCOSE SERPL-MCNC: 676 MG/DL (ref 74–106)
HCT VFR BLD AUTO: 29 % (ref 33–45)
HELMET CELLS BLD QL SMEAR: (no result)
HGB BLD-MCNC: 9.5 G/DL (ref 11.5–14.8)
HOWELL-JOLLY BOD BLD QL SMEAR: (no result)
HYPOCHROMIA BLD QL: (no result)
LYMPHOCYTES NFR BLD AUTO: 1.6 K/UL (ref 0.8–4.8)
LYMPHOCYTES NFR BLD AUTO: 21.8 % (ref 20–44)
MACROCYTES BLD QL: (no result)
MCH RBC QN AUTO: 28 PG (ref 26–33)
MCHC RBC AUTO-ENTMCNC: 33 G/DL (ref 31–36)
MCV RBC AUTO: 84 FL (ref 82–100)
MONOCYTES NFR BLD AUTO: 0.6 K/UL (ref 0.1–1.3)
MONOCYTES NFR BLD AUTO: 8.5 % (ref 2–12)
NEUTROPHILS # BLD AUTO: 4.7 K/UL (ref 1.8–8.9)
NEUTROPHILS NFR BLD AUTO: 63.5 % (ref 43–81)
OVALOCYTES BLD QL SMEAR: (no result)
PAPPENHEIMER BOD BLD QL SMEAR: (no result)
PLATELET # BLD AUTO: 341 K/UL (ref 150–450)
PLATELET BLD QL SMEAR: (no result)
POTASSIUM SERPL-SCNC: 3.5 MMOL/L (ref 3.5–5.1)
RBC # BLD AUTO: 3.45 MIL/UL (ref 4–5.2)
ROULEAUX BLD QL SMEAR: (no result)
SODIUM SERPL-SCNC: 130 MMOL/L (ref 136–145)
STOMATOCYTES BLD QL SMEAR: (no result)
TARGETS BLD QL SMEAR: (no result)
TOXIC GRANULES BLD QL SMEAR: (no result)
WBC NRBC COR # BLD AUTO: 7.4 K/UL (ref 4.3–11)

## 2025-06-02 RX ADMIN — DEXTROSE MONOHYDRATE SCH MLS/HR: 50 INJECTION, SOLUTION INTRAVENOUS at 13:05

## 2025-06-02 RX ADMIN — POLYETHYLENE GLYCOL 3350 SCH GM: 17 POWDER, FOR SOLUTION ORAL at 09:01

## 2025-06-02 RX ADMIN — Medication SCH OZ: at 10:55

## 2025-06-02 RX ADMIN — ALBUTEROL SULFATE PRN MG: 2.5 SOLUTION RESPIRATORY (INHALATION) at 10:19

## 2025-06-02 RX ADMIN — CLOTRIMAZOLE AND BETAMETHASONE DIPROPIONATE SCH GM: 10; .5 CREAM TOPICAL at 17:20

## 2025-06-02 RX ADMIN — INSULIN GLARGINE SCH UNIT: 100 INJECTION, SOLUTION SUBCUTANEOUS at 21:24

## 2025-06-02 RX ADMIN — FLUCONAZOLE ONE MG: 100 TABLET ORAL at 09:53

## 2025-06-03 VITALS — OXYGEN SATURATION: 100 %

## 2025-06-03 VITALS — DIASTOLIC BLOOD PRESSURE: 46 MMHG | OXYGEN SATURATION: 98 % | TEMPERATURE: 98.1 F | SYSTOLIC BLOOD PRESSURE: 135 MMHG

## 2025-06-03 VITALS — OXYGEN SATURATION: 95 % | SYSTOLIC BLOOD PRESSURE: 136 MMHG | DIASTOLIC BLOOD PRESSURE: 52 MMHG | TEMPERATURE: 97.7 F

## 2025-06-03 VITALS — DIASTOLIC BLOOD PRESSURE: 52 MMHG | TEMPERATURE: 97.7 F | SYSTOLIC BLOOD PRESSURE: 136 MMHG | OXYGEN SATURATION: 95 %

## 2025-06-03 VITALS — OXYGEN SATURATION: 98 %

## 2025-06-03 VITALS — OXYGEN SATURATION: 99 %

## 2025-06-03 VITALS — SYSTOLIC BLOOD PRESSURE: 121 MMHG | TEMPERATURE: 98.1 F | OXYGEN SATURATION: 97 % | DIASTOLIC BLOOD PRESSURE: 51 MMHG

## 2025-06-03 VITALS — OXYGEN SATURATION: 97 %

## 2025-06-03 LAB
BUN SERPL-MCNC: 30 MG/DL (ref 7–18)
CALCIUM SERPL-MCNC: 8.9 MG/DL (ref 8.5–10.1)
CHLORIDE SERPL-SCNC: 98 MMOL/L (ref 98–107)
CO2 SERPL-SCNC: 33 MMOL/L (ref 21–32)
CREAT SERPL-MCNC: 1.3 MG/DL (ref 0.6–1.3)
GLUCOSE SERPL-MCNC: 291 MG/DL (ref 74–106)
POTASSIUM SERPL-SCNC: 4.4 MMOL/L (ref 3.5–5.1)
SODIUM SERPL-SCNC: 137 MMOL/L (ref 136–145)

## 2025-06-03 RX ADMIN — INSULIN GLARGINE SCH UNIT: 100 INJECTION, SOLUTION SUBCUTANEOUS at 22:00

## 2025-06-03 RX ADMIN — LORATADINE SCH MG: 10 TABLET ORAL at 13:35

## 2025-06-03 RX ADMIN — HYDROMORPHONE HYDROCHLORIDE PRN MG: 2 TABLET ORAL at 15:43

## 2025-06-04 VITALS — SYSTOLIC BLOOD PRESSURE: 122 MMHG | OXYGEN SATURATION: 98 % | TEMPERATURE: 98.6 F | DIASTOLIC BLOOD PRESSURE: 63 MMHG

## 2025-06-04 VITALS — OXYGEN SATURATION: 98 %

## 2025-06-04 VITALS — TEMPERATURE: 97.9 F | DIASTOLIC BLOOD PRESSURE: 64 MMHG | SYSTOLIC BLOOD PRESSURE: 122 MMHG | OXYGEN SATURATION: 97 %

## 2025-06-04 VITALS — DIASTOLIC BLOOD PRESSURE: 63 MMHG | TEMPERATURE: 98.6 F | OXYGEN SATURATION: 98 % | SYSTOLIC BLOOD PRESSURE: 122 MMHG

## 2025-06-04 VITALS — OXYGEN SATURATION: 99 % | DIASTOLIC BLOOD PRESSURE: 48 MMHG | SYSTOLIC BLOOD PRESSURE: 147 MMHG | TEMPERATURE: 98.1 F

## 2025-06-04 VITALS — SYSTOLIC BLOOD PRESSURE: 127 MMHG | DIASTOLIC BLOOD PRESSURE: 77 MMHG | TEMPERATURE: 97.7 F | OXYGEN SATURATION: 96 %

## 2025-06-04 VITALS — OXYGEN SATURATION: 97 %

## 2025-06-04 VITALS — OXYGEN SATURATION: 100 %

## 2025-06-04 LAB
BUN SERPL-MCNC: 33 MG/DL (ref 7–18)
CALCIUM SERPL-MCNC: 9.8 MG/DL (ref 8.5–10.1)
CHLORIDE SERPL-SCNC: 100 MMOL/L (ref 98–107)
CO2 SERPL-SCNC: 32 MMOL/L (ref 21–32)
CREAT SERPL-MCNC: 1.6 MG/DL (ref 0.6–1.3)
GLUCOSE SERPL-MCNC: 223 MG/DL (ref 74–106)
POTASSIUM SERPL-SCNC: 4.2 MMOL/L (ref 3.5–5.1)
SODIUM SERPL-SCNC: 138 MMOL/L (ref 136–145)

## 2025-06-04 RX ADMIN — MEROPENEM SCH MLS/HR: 1 INJECTION INTRAVENOUS at 11:57

## 2025-06-04 RX ADMIN — INSULIN GLARGINE SCH UNIT: 100 INJECTION, SOLUTION SUBCUTANEOUS at 22:00

## 2025-06-05 VITALS — DIASTOLIC BLOOD PRESSURE: 69 MMHG | TEMPERATURE: 98.2 F | OXYGEN SATURATION: 99 % | SYSTOLIC BLOOD PRESSURE: 124 MMHG

## 2025-06-05 VITALS — SYSTOLIC BLOOD PRESSURE: 132 MMHG | OXYGEN SATURATION: 96 % | DIASTOLIC BLOOD PRESSURE: 58 MMHG | TEMPERATURE: 98.1 F

## 2025-06-05 VITALS — OXYGEN SATURATION: 98 %

## 2025-06-05 VITALS — OXYGEN SATURATION: 97 %

## 2025-06-05 VITALS — TEMPERATURE: 98.1 F | SYSTOLIC BLOOD PRESSURE: 144 MMHG | DIASTOLIC BLOOD PRESSURE: 78 MMHG | OXYGEN SATURATION: 96 %

## 2025-06-05 VITALS — TEMPERATURE: 98.1 F

## 2025-06-05 LAB
BUN SERPL-MCNC: 35 MG/DL (ref 7–18)
CALCIUM SERPL-MCNC: 9.8 MG/DL (ref 8.5–10.1)
CHLORIDE SERPL-SCNC: 97 MMOL/L (ref 98–107)
CO2 SERPL-SCNC: 35 MMOL/L (ref 21–32)
CREAT SERPL-MCNC: 1.4 MG/DL (ref 0.6–1.3)
GLUCOSE SERPL-MCNC: 144 MG/DL (ref 74–106)
POTASSIUM SERPL-SCNC: 3.9 MMOL/L (ref 3.5–5.1)
SODIUM SERPL-SCNC: 136 MMOL/L (ref 136–145)

## 2025-06-05 PROCEDURE — 02HV33Z INSERTION OF INFUSION DEVICE INTO SUPERIOR VENA CAVA, PERCUTANEOUS APPROACH: ICD-10-PCS | Performed by: NURSE PRACTITIONER

## 2025-06-24 NOTE — NUR
CM scheduled transport for patient to home. Scheduled with [facility] for 1500 per patient request.    MS/RN   Opening note



Patient received from night shift.

A/O X4, vital signs recorded, slightly hypertensive at 153/69, medications to be 
administered and will recheck BP, other vital signs within normal range. Denies pain or 
discomfort at this time. Heplock to right AC flushing well with normal saline, no signs of 
infiltration seen.

Safety measures in place, bed in low setting, side rails X2 in upright position. Call light 
within reach, will continue to monitor and ensure safety.